# Patient Record
Sex: MALE | Race: WHITE | Employment: OTHER | URBAN - NONMETROPOLITAN AREA
[De-identification: names, ages, dates, MRNs, and addresses within clinical notes are randomized per-mention and may not be internally consistent; named-entity substitution may affect disease eponyms.]

---

## 2017-04-06 ENCOUNTER — OFFICE VISIT (OUTPATIENT)
Dept: FAMILY MEDICINE CLINIC | Age: 62
End: 2017-04-06

## 2017-04-06 VITALS
WEIGHT: 223 LBS | DIASTOLIC BLOOD PRESSURE: 82 MMHG | OXYGEN SATURATION: 98 % | RESPIRATION RATE: 20 BRPM | BODY MASS INDEX: 33.03 KG/M2 | HEART RATE: 64 BPM | HEIGHT: 69 IN | SYSTOLIC BLOOD PRESSURE: 138 MMHG

## 2017-04-06 DIAGNOSIS — I10 BENIGN HYPERTENSION: Primary | ICD-10-CM

## 2017-04-06 DIAGNOSIS — G43.119 INTRACTABLE MIGRAINE WITH AURA WITHOUT STATUS MIGRAINOSUS: ICD-10-CM

## 2017-04-06 DIAGNOSIS — K43.9 VENTRAL HERNIA WITHOUT OBSTRUCTION OR GANGRENE: ICD-10-CM

## 2017-04-06 PROCEDURE — G8427 DOCREV CUR MEDS BY ELIG CLIN: HCPCS | Performed by: FAMILY MEDICINE

## 2017-04-06 PROCEDURE — 3017F COLORECTAL CA SCREEN DOC REV: CPT | Performed by: FAMILY MEDICINE

## 2017-04-06 PROCEDURE — G8599 NO ASA/ANTIPLAT THER USE RNG: HCPCS | Performed by: FAMILY MEDICINE

## 2017-04-06 PROCEDURE — 1036F TOBACCO NON-USER: CPT | Performed by: FAMILY MEDICINE

## 2017-04-06 PROCEDURE — G8417 CALC BMI ABV UP PARAM F/U: HCPCS | Performed by: FAMILY MEDICINE

## 2017-04-06 PROCEDURE — 99214 OFFICE O/P EST MOD 30 MIN: CPT | Performed by: FAMILY MEDICINE

## 2017-04-06 RX ORDER — PROPRANOLOL HYDROCHLORIDE 40 MG/1
40 TABLET ORAL 3 TIMES DAILY
Qty: 90 TABLET | Refills: 3 | Status: ON HOLD | OUTPATIENT
Start: 2017-04-06 | End: 2017-06-03

## 2017-04-06 ASSESSMENT — ENCOUNTER SYMPTOMS
DIARRHEA: 0
NAUSEA: 0
SORE THROAT: 0
COUGH: 0
EYE DISCHARGE: 0
SHORTNESS OF BREATH: 0
RHINORRHEA: 0
ABDOMINAL PAIN: 0
WHEEZING: 0
CONSTIPATION: 0

## 2017-05-08 ENCOUNTER — OFFICE VISIT (OUTPATIENT)
Dept: FAMILY MEDICINE CLINIC | Age: 62
End: 2017-05-08

## 2017-05-08 VITALS
BODY MASS INDEX: 31.84 KG/M2 | HEIGHT: 69 IN | OXYGEN SATURATION: 97 % | RESPIRATION RATE: 20 BRPM | WEIGHT: 215 LBS | HEART RATE: 64 BPM | DIASTOLIC BLOOD PRESSURE: 80 MMHG | SYSTOLIC BLOOD PRESSURE: 122 MMHG

## 2017-05-08 DIAGNOSIS — K43.9 VENTRAL HERNIA WITHOUT OBSTRUCTION OR GANGRENE: ICD-10-CM

## 2017-05-08 DIAGNOSIS — G44.221 CHRONIC TENSION-TYPE HEADACHE, INTRACTABLE: ICD-10-CM

## 2017-05-08 DIAGNOSIS — I10 BENIGN HYPERTENSION: Primary | ICD-10-CM

## 2017-05-08 DIAGNOSIS — F33.1 MODERATE EPISODE OF RECURRENT MAJOR DEPRESSIVE DISORDER (HCC): ICD-10-CM

## 2017-05-08 PROCEDURE — 99214 OFFICE O/P EST MOD 30 MIN: CPT | Performed by: FAMILY MEDICINE

## 2017-05-08 PROCEDURE — G8427 DOCREV CUR MEDS BY ELIG CLIN: HCPCS | Performed by: FAMILY MEDICINE

## 2017-05-08 PROCEDURE — G8599 NO ASA/ANTIPLAT THER USE RNG: HCPCS | Performed by: FAMILY MEDICINE

## 2017-05-08 PROCEDURE — G8417 CALC BMI ABV UP PARAM F/U: HCPCS | Performed by: FAMILY MEDICINE

## 2017-05-08 PROCEDURE — 1036F TOBACCO NON-USER: CPT | Performed by: FAMILY MEDICINE

## 2017-05-08 PROCEDURE — 3017F COLORECTAL CA SCREEN DOC REV: CPT | Performed by: FAMILY MEDICINE

## 2017-05-08 RX ORDER — METHYLPREDNISOLONE 4 MG/1
TABLET ORAL
COMMUNITY
Start: 2017-04-28 | End: 2017-05-08

## 2017-05-08 RX ORDER — IBUPROFEN 800 MG/1
TABLET ORAL
COMMUNITY
Start: 2017-04-28 | End: 2017-06-05 | Stop reason: ALTCHOICE

## 2017-05-08 RX ORDER — HYDROCODONE BITARTRATE AND ACETAMINOPHEN 7.5; 325 MG/1; MG/1
TABLET ORAL
COMMUNITY
Start: 2017-04-28 | End: 2017-05-08

## 2017-05-08 RX ORDER — SERTRALINE HYDROCHLORIDE 100 MG/1
150 TABLET, FILM COATED ORAL DAILY
Qty: 135 TABLET | Refills: 3 | Status: SHIPPED | OUTPATIENT
Start: 2017-05-08 | End: 2017-11-06 | Stop reason: DRUGHIGH

## 2017-05-08 ASSESSMENT — ENCOUNTER SYMPTOMS
NAUSEA: 0
CONSTIPATION: 0
ABDOMINAL PAIN: 0
WHEEZING: 0
SORE THROAT: 0
COUGH: 0
EYE DISCHARGE: 0
ABDOMINAL DISTENTION: 1
SHORTNESS OF BREATH: 0
DIARRHEA: 0
RHINORRHEA: 0

## 2017-05-08 ASSESSMENT — PATIENT HEALTH QUESTIONNAIRE - PHQ9
SUM OF ALL RESPONSES TO PHQ QUESTIONS 1-9: 1
2. FEELING DOWN, DEPRESSED OR HOPELESS: 1
1. LITTLE INTEREST OR PLEASURE IN DOING THINGS: 0
SUM OF ALL RESPONSES TO PHQ9 QUESTIONS 1 & 2: 1

## 2017-05-16 ENCOUNTER — OFFICE VISIT (OUTPATIENT)
Age: 62
End: 2017-05-16

## 2017-05-16 VITALS
SYSTOLIC BLOOD PRESSURE: 126 MMHG | WEIGHT: 215.5 LBS | OXYGEN SATURATION: 95 % | HEIGHT: 68 IN | DIASTOLIC BLOOD PRESSURE: 68 MMHG | BODY MASS INDEX: 32.66 KG/M2 | RESPIRATION RATE: 18 BRPM | HEART RATE: 50 BPM | TEMPERATURE: 96.6 F

## 2017-05-16 DIAGNOSIS — Z01.818 PRE-OP TESTING: ICD-10-CM

## 2017-05-16 DIAGNOSIS — K43.9 VENTRAL HERNIA WITHOUT OBSTRUCTION OR GANGRENE: Primary | ICD-10-CM

## 2017-05-16 DIAGNOSIS — I10 ESSENTIAL HYPERTENSION: ICD-10-CM

## 2017-05-16 PROCEDURE — G8427 DOCREV CUR MEDS BY ELIG CLIN: HCPCS | Performed by: SURGERY

## 2017-05-16 PROCEDURE — G8417 CALC BMI ABV UP PARAM F/U: HCPCS | Performed by: SURGERY

## 2017-05-16 PROCEDURE — 99214 OFFICE O/P EST MOD 30 MIN: CPT | Performed by: SURGERY

## 2017-05-16 PROCEDURE — G8598 ASA/ANTIPLAT THER USED: HCPCS | Performed by: SURGERY

## 2017-05-16 PROCEDURE — 3017F COLORECTAL CA SCREEN DOC REV: CPT | Performed by: SURGERY

## 2017-05-16 PROCEDURE — 1036F TOBACCO NON-USER: CPT | Performed by: SURGERY

## 2017-05-16 RX ORDER — BUTORPHANOL TARTRATE 10 MG/ML
1 SPRAY, METERED NASAL EVERY 4 HOURS PRN
COMMUNITY

## 2017-05-16 ASSESSMENT — ENCOUNTER SYMPTOMS
COUGH: 0
BLOOD IN STOOL: 0
ABDOMINAL DISTENTION: 0
SINUS PRESSURE: 0
CHOKING: 0
NAUSEA: 0
BACK PAIN: 0
ANAL BLEEDING: 0
EYE REDNESS: 0
FACIAL SWELLING: 0
TROUBLE SWALLOWING: 0
VOMITING: 0
EYE PAIN: 0
WHEEZING: 0
SHORTNESS OF BREATH: 0
EYE ITCHING: 0
PHOTOPHOBIA: 0
RECTAL PAIN: 0
ABDOMINAL PAIN: 1
STRIDOR: 0
EYE DISCHARGE: 0
COLOR CHANGE: 0
APNEA: 0
SORE THROAT: 0
CHEST TIGHTNESS: 0
VOICE CHANGE: 0
DIARRHEA: 0
CONSTIPATION: 0
RHINORRHEA: 0

## 2017-05-18 ENCOUNTER — TELEPHONE (OUTPATIENT)
Age: 62
End: 2017-05-18

## 2017-05-18 ENCOUNTER — TELEPHONE (OUTPATIENT)
Dept: FAMILY MEDICINE CLINIC | Age: 62
End: 2017-05-18

## 2017-05-22 ENCOUNTER — OFFICE VISIT (OUTPATIENT)
Dept: FAMILY MEDICINE CLINIC | Age: 62
End: 2017-05-22

## 2017-05-22 VITALS
WEIGHT: 215.6 LBS | OXYGEN SATURATION: 96 % | HEART RATE: 56 BPM | SYSTOLIC BLOOD PRESSURE: 110 MMHG | BODY MASS INDEX: 32.78 KG/M2 | DIASTOLIC BLOOD PRESSURE: 70 MMHG | RESPIRATION RATE: 16 BRPM

## 2017-05-22 DIAGNOSIS — G47.33 OBSTRUCTIVE SLEEP APNEA OF ADULT: ICD-10-CM

## 2017-05-22 DIAGNOSIS — K43.9 VENTRAL HERNIA WITHOUT OBSTRUCTION OR GANGRENE: Primary | ICD-10-CM

## 2017-05-22 PROCEDURE — 3017F COLORECTAL CA SCREEN DOC REV: CPT | Performed by: FAMILY MEDICINE

## 2017-05-22 PROCEDURE — G8599 NO ASA/ANTIPLAT THER USE RNG: HCPCS | Performed by: FAMILY MEDICINE

## 2017-05-22 PROCEDURE — 1036F TOBACCO NON-USER: CPT | Performed by: FAMILY MEDICINE

## 2017-05-22 PROCEDURE — G8417 CALC BMI ABV UP PARAM F/U: HCPCS | Performed by: FAMILY MEDICINE

## 2017-05-22 PROCEDURE — 99214 OFFICE O/P EST MOD 30 MIN: CPT | Performed by: FAMILY MEDICINE

## 2017-05-22 PROCEDURE — G8427 DOCREV CUR MEDS BY ELIG CLIN: HCPCS | Performed by: FAMILY MEDICINE

## 2017-05-22 ASSESSMENT — ENCOUNTER SYMPTOMS
WHEEZING: 0
CONSTIPATION: 0
COUGH: 0
ABDOMINAL DISTENTION: 1
DIARRHEA: 0
SHORTNESS OF BREATH: 0
RHINORRHEA: 0
SORE THROAT: 0
EYE DISCHARGE: 0
ABDOMINAL PAIN: 1
NAUSEA: 0

## 2017-06-05 ENCOUNTER — TELEPHONE (OUTPATIENT)
Dept: PHARMACY | Facility: CLINIC | Age: 62
End: 2017-06-05

## 2017-06-06 ENCOUNTER — OFFICE VISIT (OUTPATIENT)
Dept: FAMILY MEDICINE CLINIC | Age: 62
End: 2017-06-06

## 2017-06-06 VITALS
HEART RATE: 44 BPM | OXYGEN SATURATION: 97 % | WEIGHT: 217 LBS | DIASTOLIC BLOOD PRESSURE: 80 MMHG | BODY MASS INDEX: 32.51 KG/M2 | SYSTOLIC BLOOD PRESSURE: 132 MMHG | RESPIRATION RATE: 16 BRPM

## 2017-06-06 DIAGNOSIS — R00.1 BRADYCARDIA: Primary | ICD-10-CM

## 2017-06-06 DIAGNOSIS — I10 ESSENTIAL HYPERTENSION: ICD-10-CM

## 2017-06-06 PROCEDURE — 99213 OFFICE O/P EST LOW 20 MIN: CPT | Performed by: NURSE PRACTITIONER

## 2017-06-06 PROCEDURE — G8417 CALC BMI ABV UP PARAM F/U: HCPCS | Performed by: NURSE PRACTITIONER

## 2017-06-06 PROCEDURE — G8598 ASA/ANTIPLAT THER USED: HCPCS | Performed by: NURSE PRACTITIONER

## 2017-06-06 PROCEDURE — 3017F COLORECTAL CA SCREEN DOC REV: CPT | Performed by: NURSE PRACTITIONER

## 2017-06-06 PROCEDURE — G8427 DOCREV CUR MEDS BY ELIG CLIN: HCPCS | Performed by: NURSE PRACTITIONER

## 2017-06-06 PROCEDURE — 93000 ELECTROCARDIOGRAM COMPLETE: CPT | Performed by: NURSE PRACTITIONER

## 2017-06-06 PROCEDURE — 1036F TOBACCO NON-USER: CPT | Performed by: NURSE PRACTITIONER

## 2017-06-06 RX ORDER — LISINOPRIL 10 MG/1
10 TABLET ORAL DAILY
Qty: 30 TABLET | Refills: 3 | Status: SHIPPED | OUTPATIENT
Start: 2017-06-06 | End: 2017-09-30 | Stop reason: SDUPTHER

## 2017-06-07 ASSESSMENT — ENCOUNTER SYMPTOMS
COLOR CHANGE: 0
SHORTNESS OF BREATH: 0

## 2017-06-20 ENCOUNTER — OFFICE VISIT (OUTPATIENT)
Dept: FAMILY MEDICINE CLINIC | Age: 62
End: 2017-06-20

## 2017-06-20 VITALS
RESPIRATION RATE: 16 BRPM | HEIGHT: 69 IN | DIASTOLIC BLOOD PRESSURE: 76 MMHG | OXYGEN SATURATION: 98 % | BODY MASS INDEX: 30.96 KG/M2 | SYSTOLIC BLOOD PRESSURE: 124 MMHG | HEART RATE: 74 BPM | WEIGHT: 209 LBS

## 2017-06-20 DIAGNOSIS — R00.1 BRADYCARDIA: Primary | ICD-10-CM

## 2017-06-20 PROCEDURE — G8427 DOCREV CUR MEDS BY ELIG CLIN: HCPCS | Performed by: NURSE PRACTITIONER

## 2017-06-20 PROCEDURE — G8598 ASA/ANTIPLAT THER USED: HCPCS | Performed by: NURSE PRACTITIONER

## 2017-06-20 PROCEDURE — 1036F TOBACCO NON-USER: CPT | Performed by: NURSE PRACTITIONER

## 2017-06-20 PROCEDURE — G8417 CALC BMI ABV UP PARAM F/U: HCPCS | Performed by: NURSE PRACTITIONER

## 2017-06-20 PROCEDURE — 99213 OFFICE O/P EST LOW 20 MIN: CPT | Performed by: NURSE PRACTITIONER

## 2017-06-20 PROCEDURE — 3017F COLORECTAL CA SCREEN DOC REV: CPT | Performed by: NURSE PRACTITIONER

## 2017-06-20 ASSESSMENT — ENCOUNTER SYMPTOMS
VOMITING: 0
NAUSEA: 0
SHORTNESS OF BREATH: 0
SORE THROAT: 0
ABDOMINAL DISTENTION: 0
WHEEZING: 0
SINUS PRESSURE: 0
COUGH: 0
ABDOMINAL PAIN: 1
DIARRHEA: 0
COLOR CHANGE: 0
CONSTIPATION: 0

## 2017-06-22 ENCOUNTER — OFFICE VISIT (OUTPATIENT)
Age: 62
End: 2017-06-22

## 2017-06-22 VITALS
BODY MASS INDEX: 31.25 KG/M2 | TEMPERATURE: 96.9 F | WEIGHT: 211 LBS | OXYGEN SATURATION: 95 % | RESPIRATION RATE: 18 BRPM | SYSTOLIC BLOOD PRESSURE: 134 MMHG | HEIGHT: 69 IN | HEART RATE: 66 BPM | DIASTOLIC BLOOD PRESSURE: 60 MMHG

## 2017-06-22 DIAGNOSIS — Z87.19 S/P REPAIR OF RECURRENT VENTRAL HERNIA: Primary | ICD-10-CM

## 2017-06-22 DIAGNOSIS — Z98.890 S/P REPAIR OF RECURRENT VENTRAL HERNIA: Primary | ICD-10-CM

## 2017-06-22 PROCEDURE — 99024 POSTOP FOLLOW-UP VISIT: CPT | Performed by: NURSE PRACTITIONER

## 2017-06-22 ASSESSMENT — ENCOUNTER SYMPTOMS
ABDOMINAL PAIN: 1
NAUSEA: 0
VOICE CHANGE: 0
CONSTIPATION: 0
ANAL BLEEDING: 0
RHINORRHEA: 0
SHORTNESS OF BREATH: 0
CHOKING: 0
RECTAL PAIN: 0
WHEEZING: 0
EYE PAIN: 0
EYE REDNESS: 0
PHOTOPHOBIA: 0
CHEST TIGHTNESS: 0
VOMITING: 0
EYE ITCHING: 0
COUGH: 0
TROUBLE SWALLOWING: 0
SORE THROAT: 0
COLOR CHANGE: 0
ABDOMINAL DISTENTION: 0
EYE DISCHARGE: 0
APNEA: 0
SINUS PRESSURE: 0
BACK PAIN: 0
STRIDOR: 0
DIARRHEA: 0
BLOOD IN STOOL: 0
FACIAL SWELLING: 0

## 2017-07-28 ENCOUNTER — HOSPITAL ENCOUNTER (INPATIENT)
Age: 62
LOS: 1 days | Discharge: HOME | DRG: 287 | End: 2017-07-31
Attending: EMERGENCY MEDICINE | Admitting: INTERNAL MEDICINE
Payer: MEDICARE

## 2017-07-28 ENCOUNTER — APPOINTMENT (OUTPATIENT)
Dept: GENERAL RADIOLOGY | Age: 62
DRG: 287 | End: 2017-07-28
Payer: MEDICARE

## 2017-07-28 DIAGNOSIS — R07.9 CHEST PAIN WITH MODERATE RISK OF ACUTE CORONARY SYNDROME: Primary | ICD-10-CM

## 2017-07-28 LAB
ANION GAP SERPL CALCULATED.3IONS-SCNC: 11 MEQ/L (ref 8–16)
ANISOCYTOSIS: ABNORMAL
BASOPHILS # BLD: 0.3 %
BASOPHILS ABSOLUTE: 0 THOU/MM3 (ref 0–0.1)
BUN BLDV-MCNC: 16 MG/DL (ref 7–22)
CALCIUM SERPL-MCNC: 9.1 MG/DL (ref 8.5–10.5)
CHLORIDE BLD-SCNC: 101 MEQ/L (ref 98–111)
CO2: 26 MEQ/L (ref 23–33)
CREAT SERPL-MCNC: 0.9 MG/DL (ref 0.4–1.2)
EKG ATRIAL RATE: 55 BPM
EKG P AXIS: 59 DEGREES
EKG P-R INTERVAL: 150 MS
EKG Q-T INTERVAL: 420 MS
EKG QRS DURATION: 126 MS
EKG QTC CALCULATION (BAZETT): 401 MS
EKG R AXIS: -48 DEGREES
EKG T AXIS: 47 DEGREES
EKG VENTRICULAR RATE: 55 BPM
EOSINOPHIL # BLD: 2.9 %
EOSINOPHILS ABSOLUTE: 0.2 THOU/MM3 (ref 0–0.4)
GFR SERPL CREATININE-BSD FRML MDRD: 86 ML/MIN/1.73M2
GLUCOSE BLD-MCNC: 95 MG/DL (ref 70–108)
HCT VFR BLD CALC: 44 % (ref 42–52)
HEMOGLOBIN: 14.7 GM/DL (ref 14–18)
LYMPHOCYTES # BLD: 28.5 %
LYMPHOCYTES ABSOLUTE: 2.2 THOU/MM3 (ref 1–4.8)
MCH RBC QN AUTO: 29 PG (ref 27–31)
MCHC RBC AUTO-ENTMCNC: 33.4 GM/DL (ref 33–37)
MCV RBC AUTO: 87.1 FL (ref 80–94)
MONOCYTES # BLD: 10.1 %
MONOCYTES ABSOLUTE: 0.8 THOU/MM3 (ref 0.4–1.3)
NUCLEATED RED BLOOD CELLS: 0 /100 WBC
OSMOLALITY CALCULATION: 276.7 MOSMOL/KG (ref 275–300)
PDW BLD-RTO: 14.9 % (ref 11.5–14.5)
PLATELET # BLD: 376 THOU/MM3 (ref 130–400)
PMV BLD AUTO: 8.8 MCM (ref 7.4–10.4)
POTASSIUM SERPL-SCNC: 4.7 MEQ/L (ref 3.5–5.2)
RBC # BLD: 5.05 MILL/MM3 (ref 4.7–6.1)
RBC # BLD: NORMAL 10*6/UL
SEG NEUTROPHILS: 58.2 %
SEGMENTED NEUTROPHILS ABSOLUTE COUNT: 4.5 THOU/MM3 (ref 1.8–7.7)
SODIUM BLD-SCNC: 138 MEQ/L (ref 135–145)
TROPONIN T: < 0.01 NG/ML
WBC # BLD: 7.7 THOU/MM3 (ref 4.8–10.8)

## 2017-07-28 PROCEDURE — 71020 XR CHEST STANDARD TWO VW: CPT

## 2017-07-28 PROCEDURE — 85025 COMPLETE CBC W/AUTO DIFF WBC: CPT

## 2017-07-28 PROCEDURE — 96374 THER/PROPH/DIAG INJ IV PUSH: CPT

## 2017-07-28 PROCEDURE — 84484 ASSAY OF TROPONIN QUANT: CPT

## 2017-07-28 PROCEDURE — 36415 COLL VENOUS BLD VENIPUNCTURE: CPT

## 2017-07-28 PROCEDURE — 96372 THER/PROPH/DIAG INJ SC/IM: CPT

## 2017-07-28 PROCEDURE — 93005 ELECTROCARDIOGRAM TRACING: CPT

## 2017-07-28 PROCEDURE — G0378 HOSPITAL OBSERVATION PER HR: HCPCS

## 2017-07-28 PROCEDURE — 99285 EMERGENCY DEPT VISIT HI MDM: CPT

## 2017-07-28 PROCEDURE — 6360000002 HC RX W HCPCS: Performed by: INTERNAL MEDICINE

## 2017-07-28 PROCEDURE — A6257 TRANSPARENT FILM <= 16 SQ IN: HCPCS

## 2017-07-28 PROCEDURE — 2580000003 HC RX 258: Performed by: INTERNAL MEDICINE

## 2017-07-28 PROCEDURE — 80048 BASIC METABOLIC PNL TOTAL CA: CPT

## 2017-07-28 RX ORDER — LISINOPRIL 10 MG/1
10 TABLET ORAL DAILY
Status: DISCONTINUED | OUTPATIENT
Start: 2017-07-28 | End: 2017-07-28

## 2017-07-28 RX ORDER — SODIUM CHLORIDE 0.9 % (FLUSH) 0.9 %
10 SYRINGE (ML) INJECTION PRN
Status: DISCONTINUED | OUTPATIENT
Start: 2017-07-28 | End: 2017-07-31 | Stop reason: SDUPTHER

## 2017-07-28 RX ORDER — OMEPRAZOLE 40 MG/1
40 CAPSULE, DELAYED RELEASE ORAL 2 TIMES DAILY
COMMUNITY
End: 2018-01-08 | Stop reason: SDUPTHER

## 2017-07-28 RX ORDER — ATORVASTATIN CALCIUM 20 MG/1
20 TABLET, FILM COATED ORAL NIGHTLY
Status: DISCONTINUED | OUTPATIENT
Start: 2017-07-28 | End: 2017-07-31 | Stop reason: HOSPADM

## 2017-07-28 RX ORDER — DIVALPROEX SODIUM 500 MG/1
500 TABLET, EXTENDED RELEASE ORAL DAILY
Status: DISCONTINUED | OUTPATIENT
Start: 2017-07-28 | End: 2017-07-28

## 2017-07-28 RX ORDER — ONDANSETRON 2 MG/ML
4 INJECTION INTRAMUSCULAR; INTRAVENOUS EVERY 6 HOURS PRN
Status: DISCONTINUED | OUTPATIENT
Start: 2017-07-28 | End: 2017-07-31 | Stop reason: HOSPADM

## 2017-07-28 RX ORDER — LISINOPRIL 10 MG/1
10 TABLET ORAL DAILY
Status: DISCONTINUED | OUTPATIENT
Start: 2017-07-29 | End: 2017-07-31 | Stop reason: HOSPADM

## 2017-07-28 RX ORDER — ACETAMINOPHEN 325 MG/1
650 TABLET ORAL EVERY 4 HOURS PRN
Status: DISCONTINUED | OUTPATIENT
Start: 2017-07-28 | End: 2017-07-31 | Stop reason: HOSPADM

## 2017-07-28 RX ORDER — ACETAMINOPHEN, ASPIRIN AND CAFFEINE 250; 250; 65 MG/1; MG/1; MG/1
2 TABLET, FILM COATED ORAL EVERY 8 HOURS PRN
Status: ON HOLD | COMMUNITY
End: 2018-10-21

## 2017-07-28 RX ORDER — SODIUM CHLORIDE 0.9 % (FLUSH) 0.9 %
10 SYRINGE (ML) INJECTION EVERY 12 HOURS SCHEDULED
Status: DISCONTINUED | OUTPATIENT
Start: 2017-07-28 | End: 2017-07-31 | Stop reason: SDUPTHER

## 2017-07-28 RX ORDER — LAMOTRIGINE 100 MG/1
200 TABLET ORAL DAILY
Status: DISCONTINUED | OUTPATIENT
Start: 2017-07-29 | End: 2017-07-28

## 2017-07-28 RX ORDER — DIVALPROEX SODIUM 500 MG/1
500 TABLET, EXTENDED RELEASE ORAL DAILY
Status: DISCONTINUED | OUTPATIENT
Start: 2017-07-29 | End: 2017-07-31 | Stop reason: HOSPADM

## 2017-07-28 RX ORDER — ASPIRIN 81 MG/1
81 TABLET, CHEWABLE ORAL DAILY
Status: DISCONTINUED | OUTPATIENT
Start: 2017-07-29 | End: 2017-07-31 | Stop reason: HOSPADM

## 2017-07-28 RX ORDER — LAMOTRIGINE 100 MG/1
200 TABLET ORAL DAILY
Status: DISCONTINUED | OUTPATIENT
Start: 2017-07-29 | End: 2017-07-31 | Stop reason: HOSPADM

## 2017-07-28 RX ADMIN — ENOXAPARIN SODIUM 40 MG: 40 INJECTION SUBCUTANEOUS at 18:49

## 2017-07-28 RX ADMIN — Medication 10 ML: at 22:26

## 2017-07-28 RX ADMIN — ONDANSETRON 4 MG: 2 INJECTION INTRAMUSCULAR; INTRAVENOUS at 20:26

## 2017-07-28 ASSESSMENT — PAIN DESCRIPTION - PAIN TYPE
TYPE: ACUTE PAIN
TYPE: INTRACTABLE PAIN

## 2017-07-28 ASSESSMENT — HEART SCORE: ECG: 1

## 2017-07-28 ASSESSMENT — ENCOUNTER SYMPTOMS
COUGH: 0
SHORTNESS OF BREATH: 1
VOMITING: 0
WHEEZING: 0
NAUSEA: 0
BLOOD IN STOOL: 0
SORE THROAT: 0
DIARRHEA: 0
ABDOMINAL PAIN: 0
BACK PAIN: 0

## 2017-07-28 ASSESSMENT — PAIN SCALES - GENERAL
PAINLEVEL_OUTOF10: 9
PAINLEVEL_OUTOF10: 1
PAINLEVEL_OUTOF10: 9
PAINLEVEL_OUTOF10: 10

## 2017-07-28 ASSESSMENT — PAIN DESCRIPTION - INTENSITY: RATING_2: 3

## 2017-07-28 ASSESSMENT — PAIN DESCRIPTION - LOCATION
LOCATION: HEAD
LOCATION: CHEST
LOCATION: HEAD
LOCATION_2: CHEST
LOCATION: HEAD

## 2017-07-28 ASSESSMENT — PAIN DESCRIPTION - DESCRIPTORS: DESCRIPTORS_2: SHARP

## 2017-07-28 ASSESSMENT — PAIN DESCRIPTION - PROGRESSION: CLINICAL_PROGRESSION: GRADUALLY WORSENING

## 2017-07-28 ASSESSMENT — PAIN DESCRIPTION - ORIENTATION: ORIENTATION: LEFT

## 2017-07-28 ASSESSMENT — PAIN DESCRIPTION - FREQUENCY: FREQUENCY: CONTINUOUS

## 2017-07-28 ASSESSMENT — PAIN DESCRIPTION - DURATION: DURATION_2: INTERMITTENT

## 2017-07-29 LAB
ANION GAP SERPL CALCULATED.3IONS-SCNC: 11 MEQ/L (ref 8–16)
BASOPHILS # BLD: 0.7 %
BASOPHILS ABSOLUTE: 0.1 THOU/MM3 (ref 0–0.1)
BUN BLDV-MCNC: 16 MG/DL (ref 7–22)
CALCIUM SERPL-MCNC: 8.9 MG/DL (ref 8.5–10.5)
CHLORIDE BLD-SCNC: 100 MEQ/L (ref 98–111)
CHOLESTEROL, TOTAL: 195 MG/DL (ref 100–199)
CO2: 27 MEQ/L (ref 23–33)
CREAT SERPL-MCNC: 1 MG/DL (ref 0.4–1.2)
EKG ATRIAL RATE: 54 BPM
EKG P AXIS: 52 DEGREES
EKG P-R INTERVAL: 152 MS
EKG Q-T INTERVAL: 436 MS
EKG QRS DURATION: 114 MS
EKG QTC CALCULATION (BAZETT): 413 MS
EKG R AXIS: -51 DEGREES
EKG T AXIS: 23 DEGREES
EKG VENTRICULAR RATE: 54 BPM
EOSINOPHIL # BLD: 4.9 %
EOSINOPHILS ABSOLUTE: 0.4 THOU/MM3 (ref 0–0.4)
GFR SERPL CREATININE-BSD FRML MDRD: 76 ML/MIN/1.73M2
GLUCOSE BLD-MCNC: 105 MG/DL (ref 70–108)
HCT VFR BLD CALC: 43.6 % (ref 42–52)
HDLC SERPL-MCNC: 32 MG/DL
HEMOGLOBIN: 14.6 GM/DL (ref 14–18)
LDL CHOLESTEROL CALCULATED: 138 MG/DL
LYMPHOCYTES # BLD: 24.5 %
LYMPHOCYTES ABSOLUTE: 2.1 THOU/MM3 (ref 1–4.8)
MCH RBC QN AUTO: 29.7 PG (ref 27–31)
MCHC RBC AUTO-ENTMCNC: 33.5 GM/DL (ref 33–37)
MCV RBC AUTO: 88.7 FL (ref 80–94)
MONOCYTES # BLD: 10.3 %
MONOCYTES ABSOLUTE: 0.9 THOU/MM3 (ref 0.4–1.3)
NUCLEATED RED BLOOD CELLS: 0 /100 WBC
PDW BLD-RTO: 14.3 % (ref 11.5–14.5)
PLATELET # BLD: 358 THOU/MM3 (ref 130–400)
PMV BLD AUTO: 8.8 MCM (ref 7.4–10.4)
POTASSIUM SERPL-SCNC: 5.8 MEQ/L (ref 3.5–5.2)
RBC # BLD: 4.92 MILL/MM3 (ref 4.7–6.1)
RBC # BLD: NORMAL 10*6/UL
SEG NEUTROPHILS: 59.6 %
SEGMENTED NEUTROPHILS ABSOLUTE COUNT: 5.1 THOU/MM3 (ref 1.8–7.7)
SODIUM BLD-SCNC: 138 MEQ/L (ref 135–145)
TRIGL SERPL-MCNC: 126 MG/DL (ref 0–199)
WBC # BLD: 8.5 THOU/MM3 (ref 4.8–10.8)

## 2017-07-29 PROCEDURE — 2580000003 HC RX 258: Performed by: INTERNAL MEDICINE

## 2017-07-29 PROCEDURE — 36415 COLL VENOUS BLD VENIPUNCTURE: CPT

## 2017-07-29 PROCEDURE — 96376 TX/PRO/DX INJ SAME DRUG ADON: CPT

## 2017-07-29 PROCEDURE — 6360000002 HC RX W HCPCS: Performed by: PHYSICIAN ASSISTANT

## 2017-07-29 PROCEDURE — 80048 BASIC METABOLIC PNL TOTAL CA: CPT

## 2017-07-29 PROCEDURE — 85025 COMPLETE CBC W/AUTO DIFF WBC: CPT

## 2017-07-29 PROCEDURE — 6360000002 HC RX W HCPCS: Performed by: INTERNAL MEDICINE

## 2017-07-29 PROCEDURE — G0378 HOSPITAL OBSERVATION PER HR: HCPCS

## 2017-07-29 PROCEDURE — 6370000000 HC RX 637 (ALT 250 FOR IP): Performed by: INTERNAL MEDICINE

## 2017-07-29 PROCEDURE — 96372 THER/PROPH/DIAG INJ SC/IM: CPT

## 2017-07-29 PROCEDURE — 99204 OFFICE O/P NEW MOD 45 MIN: CPT | Performed by: INTERNAL MEDICINE

## 2017-07-29 PROCEDURE — 96375 TX/PRO/DX INJ NEW DRUG ADDON: CPT

## 2017-07-29 PROCEDURE — 80061 LIPID PANEL: CPT

## 2017-07-29 RX ORDER — MORPHINE SULFATE 4 MG/ML
4 INJECTION, SOLUTION INTRAMUSCULAR; INTRAVENOUS EVERY 4 HOURS PRN
Status: DISCONTINUED | OUTPATIENT
Start: 2017-07-29 | End: 2017-07-29

## 2017-07-29 RX ORDER — MORPHINE SULFATE 2 MG/ML
2 INJECTION, SOLUTION INTRAMUSCULAR; INTRAVENOUS EVERY 4 HOURS PRN
Status: DISCONTINUED | OUTPATIENT
Start: 2017-07-29 | End: 2017-07-31 | Stop reason: HOSPADM

## 2017-07-29 RX ORDER — SUMATRIPTAN 6 MG/.5ML
6 INJECTION, SOLUTION SUBCUTANEOUS ONCE
Status: COMPLETED | OUTPATIENT
Start: 2017-07-29 | End: 2017-07-29

## 2017-07-29 RX ORDER — MORPHINE SULFATE 2 MG/ML
2 INJECTION, SOLUTION INTRAMUSCULAR; INTRAVENOUS EVERY 4 HOURS PRN
Status: DISCONTINUED | OUTPATIENT
Start: 2017-07-29 | End: 2017-07-29

## 2017-07-29 RX ORDER — MORPHINE SULFATE 4 MG/ML
4 INJECTION, SOLUTION INTRAMUSCULAR; INTRAVENOUS EVERY 4 HOURS PRN
Status: DISCONTINUED | OUTPATIENT
Start: 2017-07-29 | End: 2017-07-31 | Stop reason: HOSPADM

## 2017-07-29 RX ORDER — ACETAMINOPHEN, ASPIRIN AND CAFFEINE 250; 250; 65 MG/1; MG/1; MG/1
2 TABLET, FILM COATED ORAL EVERY 8 HOURS PRN
Status: DISCONTINUED | OUTPATIENT
Start: 2017-07-29 | End: 2017-07-31 | Stop reason: HOSPADM

## 2017-07-29 RX ADMIN — SUMATRIPTAN 6 MG: 6 INJECTION, SOLUTION SUBCUTANEOUS at 17:26

## 2017-07-29 RX ADMIN — Medication 10 ML: at 08:58

## 2017-07-29 RX ADMIN — MORPHINE SULFATE 4 MG: 4 INJECTION, SOLUTION INTRAMUSCULAR; INTRAVENOUS at 23:25

## 2017-07-29 RX ADMIN — Medication 10 ML: at 16:21

## 2017-07-29 RX ADMIN — LISINOPRIL 10 MG: 10 TABLET ORAL at 10:38

## 2017-07-29 RX ADMIN — ATORVASTATIN CALCIUM 20 MG: 20 TABLET, FILM COATED ORAL at 19:44

## 2017-07-29 RX ADMIN — MORPHINE SULFATE 4 MG: 4 INJECTION, SOLUTION INTRAMUSCULAR; INTRAVENOUS at 04:43

## 2017-07-29 RX ADMIN — ONDANSETRON 4 MG: 2 INJECTION INTRAMUSCULAR; INTRAVENOUS at 08:56

## 2017-07-29 RX ADMIN — DIVALPROEX SODIUM 500 MG: 500 TABLET, EXTENDED RELEASE ORAL at 10:32

## 2017-07-29 RX ADMIN — ONDANSETRON 4 MG: 2 INJECTION INTRAMUSCULAR; INTRAVENOUS at 16:17

## 2017-07-29 RX ADMIN — ASPIRIN 81 MG: 81 TABLET, CHEWABLE ORAL at 10:30

## 2017-07-29 RX ADMIN — Medication 10 ML: at 19:44

## 2017-07-29 RX ADMIN — Medication 10 ML: at 09:04

## 2017-07-29 RX ADMIN — MORPHINE SULFATE 4 MG: 4 INJECTION, SOLUTION INTRAMUSCULAR; INTRAVENOUS at 09:00

## 2017-07-29 RX ADMIN — LAMOTRIGINE 200 MG: 100 TABLET ORAL at 10:34

## 2017-07-29 RX ADMIN — ONDANSETRON 4 MG: 2 INJECTION INTRAMUSCULAR; INTRAVENOUS at 03:16

## 2017-07-29 RX ADMIN — MORPHINE SULFATE 4 MG: 4 INJECTION, SOLUTION INTRAMUSCULAR; INTRAVENOUS at 18:58

## 2017-07-29 RX ADMIN — ENOXAPARIN SODIUM 40 MG: 40 INJECTION SUBCUTANEOUS at 18:58

## 2017-07-29 RX ADMIN — ACETAMINOPHEN, ASPIRIN AND CAFFEINE 2 TABLET: 250; 250; 65 TABLET, FILM COATED ORAL at 11:34

## 2017-07-29 ASSESSMENT — PAIN DESCRIPTION - PAIN TYPE
TYPE: ACUTE PAIN
TYPE: ACUTE PAIN

## 2017-07-29 ASSESSMENT — PAIN DESCRIPTION - LOCATION
LOCATION: HEAD
LOCATION: HEAD

## 2017-07-29 ASSESSMENT — PAIN SCALES - GENERAL
PAINLEVEL_OUTOF10: 8
PAINLEVEL_OUTOF10: 10
PAINLEVEL_OUTOF10: 10
PAINLEVEL_OUTOF10: 8
PAINLEVEL_OUTOF10: 10

## 2017-07-29 ASSESSMENT — PAIN DESCRIPTION - PROGRESSION: CLINICAL_PROGRESSION: NOT CHANGED

## 2017-07-29 ASSESSMENT — PAIN DESCRIPTION - DESCRIPTORS
DESCRIPTORS: OTHER (COMMENT)
DESCRIPTORS: CONSTANT

## 2017-07-29 ASSESSMENT — PAIN - FUNCTIONAL ASSESSMENT: PAIN_FUNCTIONAL_ASSESSMENT: 0-10

## 2017-07-30 PROCEDURE — 96374 THER/PROPH/DIAG INJ IV PUSH: CPT

## 2017-07-30 PROCEDURE — 3430000000 HC RX DIAGNOSTIC RADIOPHARMACEUTICAL: Performed by: INTERNAL MEDICINE

## 2017-07-30 PROCEDURE — 78452 HT MUSCLE IMAGE SPECT MULT: CPT

## 2017-07-30 PROCEDURE — 1200000003 HC TELEMETRY R&B

## 2017-07-30 PROCEDURE — 2580000003 HC RX 258: Performed by: INTERNAL MEDICINE

## 2017-07-30 PROCEDURE — 6360000002 HC RX W HCPCS

## 2017-07-30 PROCEDURE — A9500 TC99M SESTAMIBI: HCPCS | Performed by: INTERNAL MEDICINE

## 2017-07-30 PROCEDURE — 6370000000 HC RX 637 (ALT 250 FOR IP): Performed by: INTERNAL MEDICINE

## 2017-07-30 PROCEDURE — 6360000002 HC RX W HCPCS: Performed by: INTERNAL MEDICINE

## 2017-07-30 PROCEDURE — 93017 CV STRESS TEST TRACING ONLY: CPT | Performed by: INTERNAL MEDICINE

## 2017-07-30 PROCEDURE — 96376 TX/PRO/DX INJ SAME DRUG ADON: CPT

## 2017-07-30 RX ORDER — PROPRANOLOL HYDROCHLORIDE 20 MG/1
20 TABLET ORAL 2 TIMES DAILY
Status: DISCONTINUED | OUTPATIENT
Start: 2017-07-30 | End: 2017-07-31 | Stop reason: HOSPADM

## 2017-07-30 RX ORDER — LAMOTRIGINE 200 MG/1
200 TABLET ORAL 2 TIMES DAILY
COMMUNITY
End: 2018-01-08 | Stop reason: SDUPTHER

## 2017-07-30 RX ADMIN — MORPHINE SULFATE 4 MG: 4 INJECTION, SOLUTION INTRAMUSCULAR; INTRAVENOUS at 23:47

## 2017-07-30 RX ADMIN — LAMOTRIGINE 200 MG: 100 TABLET ORAL at 07:29

## 2017-07-30 RX ADMIN — LISINOPRIL 10 MG: 10 TABLET ORAL at 07:29

## 2017-07-30 RX ADMIN — ACETAMINOPHEN, ASPIRIN AND CAFFEINE 2 TABLET: 250; 250; 65 TABLET, FILM COATED ORAL at 11:36

## 2017-07-30 RX ADMIN — ONDANSETRON 4 MG: 2 INJECTION INTRAMUSCULAR; INTRAVENOUS at 11:34

## 2017-07-30 RX ADMIN — ASPIRIN 81 MG: 81 TABLET, CHEWABLE ORAL at 07:28

## 2017-07-30 RX ADMIN — Medication 10 ML: at 07:25

## 2017-07-30 RX ADMIN — Medication 10.8 MILLICURIE: at 07:45

## 2017-07-30 RX ADMIN — MORPHINE SULFATE 2 MG: 2 INJECTION, SOLUTION INTRAMUSCULAR; INTRAVENOUS at 07:25

## 2017-07-30 RX ADMIN — Medication 34.7 MILLICURIE: at 08:40

## 2017-07-30 RX ADMIN — MORPHINE SULFATE 4 MG: 4 INJECTION, SOLUTION INTRAMUSCULAR; INTRAVENOUS at 18:23

## 2017-07-30 RX ADMIN — Medication 10 ML: at 18:23

## 2017-07-30 RX ADMIN — ONDANSETRON 4 MG: 2 INJECTION INTRAMUSCULAR; INTRAVENOUS at 23:48

## 2017-07-30 RX ADMIN — LAMOTRIGINE 200 MG: 100 TABLET ORAL at 21:46

## 2017-07-30 RX ADMIN — Medication 10 ML: at 11:36

## 2017-07-30 RX ADMIN — DIVALPROEX SODIUM 500 MG: 500 TABLET, EXTENDED RELEASE ORAL at 07:28

## 2017-07-30 RX ADMIN — ACETAMINOPHEN, ASPIRIN AND CAFFEINE 2 TABLET: 250; 250; 65 TABLET, FILM COATED ORAL at 21:48

## 2017-07-30 RX ADMIN — ENOXAPARIN SODIUM 40 MG: 40 INJECTION SUBCUTANEOUS at 18:23

## 2017-07-30 RX ADMIN — Medication 10 ML: at 23:48

## 2017-07-30 ASSESSMENT — PAIN SCALES - GENERAL
PAINLEVEL_OUTOF10: 8
PAINLEVEL_OUTOF10: 4
PAINLEVEL_OUTOF10: 0
PAINLEVEL_OUTOF10: 3
PAINLEVEL_OUTOF10: 6
PAINLEVEL_OUTOF10: 3
PAINLEVEL_OUTOF10: 7
PAINLEVEL_OUTOF10: 4

## 2017-07-30 ASSESSMENT — PAIN DESCRIPTION - PROGRESSION: CLINICAL_PROGRESSION: GRADUALLY IMPROVING

## 2017-07-30 ASSESSMENT — PAIN DESCRIPTION - LOCATION
LOCATION: HEAD

## 2017-07-30 ASSESSMENT — PAIN DESCRIPTION - ONSET: ONSET: ON-GOING

## 2017-07-30 ASSESSMENT — PAIN DESCRIPTION - PAIN TYPE
TYPE: CHRONIC PAIN
TYPE: ACUTE PAIN
TYPE: ACUTE PAIN

## 2017-07-30 ASSESSMENT — PAIN DESCRIPTION - FREQUENCY: FREQUENCY: CONTINUOUS

## 2017-07-30 ASSESSMENT — PAIN DESCRIPTION - DESCRIPTORS
DESCRIPTORS: HEADACHE
DESCRIPTORS: ACHING

## 2017-07-31 VITALS
HEART RATE: 72 BPM | HEIGHT: 68 IN | BODY MASS INDEX: 31.52 KG/M2 | SYSTOLIC BLOOD PRESSURE: 125 MMHG | DIASTOLIC BLOOD PRESSURE: 57 MMHG | RESPIRATION RATE: 16 BRPM | TEMPERATURE: 99.2 F | WEIGHT: 208 LBS | OXYGEN SATURATION: 91 %

## 2017-07-31 PROBLEM — Z98.890 S/P CARDIAC CATHETERIZATION: Status: ACTIVE | Noted: 2017-07-31

## 2017-07-31 PROBLEM — I20.0 UNSTABLE ANGINA (HCC): Status: ACTIVE | Noted: 2017-07-31

## 2017-07-31 LAB
ABO: NORMAL
ANION GAP SERPL CALCULATED.3IONS-SCNC: 10 MEQ/L (ref 8–16)
ANTIBODY SCREEN: NORMAL
APTT: 26.7 SECONDS (ref 22–38)
BUN BLDV-MCNC: 16 MG/DL (ref 7–22)
CALCIUM SERPL-MCNC: 8.8 MG/DL (ref 8.5–10.5)
CHLORIDE BLD-SCNC: 100 MEQ/L (ref 98–111)
CO2: 30 MEQ/L (ref 23–33)
CREAT SERPL-MCNC: 1.1 MG/DL (ref 0.4–1.2)
GFR SERPL CREATININE-BSD FRML MDRD: 68 ML/MIN/1.73M2
GLUCOSE BLD-MCNC: 95 MG/DL (ref 70–108)
HCT VFR BLD CALC: 45.2 % (ref 42–52)
HEMOGLOBIN: 15 GM/DL (ref 14–18)
INR BLD: 1.01 (ref 0.85–1.13)
MCH RBC QN AUTO: 29.6 PG (ref 27–31)
MCHC RBC AUTO-ENTMCNC: 33.3 GM/DL (ref 33–37)
MCV RBC AUTO: 88.7 FL (ref 80–94)
PDW BLD-RTO: 13.9 % (ref 11.5–14.5)
PLATELET # BLD: 350 THOU/MM3 (ref 130–400)
PMV BLD AUTO: 8.9 MCM (ref 7.4–10.4)
POTASSIUM SERPL-SCNC: 5.1 MEQ/L (ref 3.5–5.2)
RBC # BLD: 5.09 MILL/MM3 (ref 4.7–6.1)
RH FACTOR: NORMAL
SODIUM BLD-SCNC: 140 MEQ/L (ref 135–145)
WBC # BLD: 6.6 THOU/MM3 (ref 4.8–10.8)

## 2017-07-31 PROCEDURE — 93458 L HRT ARTERY/VENTRICLE ANGIO: CPT | Performed by: INTERNAL MEDICINE

## 2017-07-31 PROCEDURE — B2151ZZ FLUOROSCOPY OF LEFT HEART USING LOW OSMOLAR CONTRAST: ICD-10-PCS | Performed by: INTERNAL MEDICINE

## 2017-07-31 PROCEDURE — 99024 POSTOP FOLLOW-UP VISIT: CPT | Performed by: PHYSICIAN ASSISTANT

## 2017-07-31 PROCEDURE — 2500000003 HC RX 250 WO HCPCS

## 2017-07-31 PROCEDURE — 80048 BASIC METABOLIC PNL TOTAL CA: CPT

## 2017-07-31 PROCEDURE — 2580000003 HC RX 258: Performed by: INTERNAL MEDICINE

## 2017-07-31 PROCEDURE — 85730 THROMBOPLASTIN TIME PARTIAL: CPT

## 2017-07-31 PROCEDURE — 4A023N7 MEASUREMENT OF CARDIAC SAMPLING AND PRESSURE, LEFT HEART, PERCUTANEOUS APPROACH: ICD-10-PCS | Performed by: INTERNAL MEDICINE

## 2017-07-31 PROCEDURE — 86901 BLOOD TYPING SEROLOGIC RH(D): CPT

## 2017-07-31 PROCEDURE — A6258 TRANSPARENT FILM >16<=48 IN: HCPCS

## 2017-07-31 PROCEDURE — C1894 INTRO/SHEATH, NON-LASER: HCPCS

## 2017-07-31 PROCEDURE — 85027 COMPLETE CBC AUTOMATED: CPT

## 2017-07-31 PROCEDURE — 86900 BLOOD TYPING SEROLOGIC ABO: CPT

## 2017-07-31 PROCEDURE — 2580000003 HC RX 258: Performed by: PHYSICIAN ASSISTANT

## 2017-07-31 PROCEDURE — 2780000010 HC IMPLANT OTHER

## 2017-07-31 PROCEDURE — 36415 COLL VENOUS BLD VENIPUNCTURE: CPT

## 2017-07-31 PROCEDURE — 6360000002 HC RX W HCPCS

## 2017-07-31 PROCEDURE — 6360000002 HC RX W HCPCS: Performed by: INTERNAL MEDICINE

## 2017-07-31 PROCEDURE — C1769 GUIDE WIRE: HCPCS

## 2017-07-31 PROCEDURE — 85610 PROTHROMBIN TIME: CPT

## 2017-07-31 PROCEDURE — 86850 RBC ANTIBODY SCREEN: CPT

## 2017-07-31 PROCEDURE — 2720000010 HC SURG SUPPLY STERILE

## 2017-07-31 PROCEDURE — 6370000000 HC RX 637 (ALT 250 FOR IP): Performed by: INTERNAL MEDICINE

## 2017-07-31 PROCEDURE — C1760 CLOSURE DEV, VASC: HCPCS

## 2017-07-31 PROCEDURE — B2111ZZ FLUOROSCOPY OF MULTIPLE CORONARY ARTERIES USING LOW OSMOLAR CONTRAST: ICD-10-PCS | Performed by: INTERNAL MEDICINE

## 2017-07-31 RX ORDER — ONDANSETRON 2 MG/ML
4 INJECTION INTRAMUSCULAR; INTRAVENOUS EVERY 6 HOURS PRN
Status: DISCONTINUED | OUTPATIENT
Start: 2017-07-31 | End: 2017-07-31 | Stop reason: SDUPTHER

## 2017-07-31 RX ORDER — SODIUM CHLORIDE 0.9 % (FLUSH) 0.9 %
10 SYRINGE (ML) INJECTION PRN
Status: DISCONTINUED | OUTPATIENT
Start: 2017-07-31 | End: 2017-07-31 | Stop reason: HOSPADM

## 2017-07-31 RX ORDER — SODIUM CHLORIDE 9 MG/ML
INJECTION, SOLUTION INTRAVENOUS CONTINUOUS
Status: ACTIVE | OUTPATIENT
Start: 2017-07-31 | End: 2017-07-31

## 2017-07-31 RX ORDER — SODIUM CHLORIDE 0.9 % (FLUSH) 0.9 %
10 SYRINGE (ML) INJECTION EVERY 12 HOURS SCHEDULED
Status: DISCONTINUED | OUTPATIENT
Start: 2017-07-31 | End: 2017-07-31 | Stop reason: SDUPTHER

## 2017-07-31 RX ORDER — SODIUM CHLORIDE 9 MG/ML
INJECTION, SOLUTION INTRAVENOUS CONTINUOUS
Status: DISCONTINUED | OUTPATIENT
Start: 2017-07-31 | End: 2017-07-31 | Stop reason: HOSPADM

## 2017-07-31 RX ORDER — SODIUM CHLORIDE 0.9 % (FLUSH) 0.9 %
10 SYRINGE (ML) INJECTION PRN
Status: DISCONTINUED | OUTPATIENT
Start: 2017-07-31 | End: 2017-07-31 | Stop reason: SDUPTHER

## 2017-07-31 RX ORDER — SODIUM CHLORIDE 0.9 % (FLUSH) 0.9 %
10 SYRINGE (ML) INJECTION EVERY 12 HOURS SCHEDULED
Status: DISCONTINUED | OUTPATIENT
Start: 2017-07-31 | End: 2017-07-31 | Stop reason: HOSPADM

## 2017-07-31 RX ORDER — ATORVASTATIN CALCIUM 20 MG/1
20 TABLET, FILM COATED ORAL NIGHTLY
Qty: 30 TABLET | Refills: 3 | Status: SHIPPED | OUTPATIENT
Start: 2017-07-31 | End: 2018-05-27

## 2017-07-31 RX ORDER — ASPIRIN 81 MG/1
81 TABLET, CHEWABLE ORAL DAILY
Qty: 30 TABLET | Refills: 3 | Status: ON HOLD | OUTPATIENT
Start: 2017-07-31 | End: 2018-10-21

## 2017-07-31 RX ORDER — NITROGLYCERIN 0.4 MG/1
0.4 TABLET SUBLINGUAL EVERY 5 MIN PRN
Status: DISCONTINUED | OUTPATIENT
Start: 2017-07-31 | End: 2017-07-31 | Stop reason: HOSPADM

## 2017-07-31 RX ORDER — PROPRANOLOL HYDROCHLORIDE 20 MG/1
20 TABLET ORAL 2 TIMES DAILY
Qty: 90 TABLET | Refills: 3 | Status: SHIPPED | OUTPATIENT
Start: 2017-07-31 | End: 2017-08-01

## 2017-07-31 RX ORDER — DIPHENHYDRAMINE HYDROCHLORIDE 50 MG/ML
50 INJECTION INTRAMUSCULAR; INTRAVENOUS ONCE
Status: DISCONTINUED | OUTPATIENT
Start: 2017-07-31 | End: 2017-07-31 | Stop reason: HOSPADM

## 2017-07-31 RX ORDER — ACETAMINOPHEN 325 MG/1
650 TABLET ORAL EVERY 4 HOURS PRN
Status: DISCONTINUED | OUTPATIENT
Start: 2017-07-31 | End: 2017-07-31 | Stop reason: SDUPTHER

## 2017-07-31 RX ADMIN — Medication 10 ML: at 08:11

## 2017-07-31 RX ADMIN — SODIUM CHLORIDE: 9 INJECTION, SOLUTION INTRAVENOUS at 11:13

## 2017-07-31 RX ADMIN — MORPHINE SULFATE 4 MG: 4 INJECTION, SOLUTION INTRAMUSCULAR; INTRAVENOUS at 08:10

## 2017-07-31 RX ADMIN — LAMOTRIGINE 200 MG: 100 TABLET ORAL at 08:08

## 2017-07-31 RX ADMIN — ONDANSETRON 4 MG: 2 INJECTION INTRAMUSCULAR; INTRAVENOUS at 08:10

## 2017-07-31 RX ADMIN — ACETAMINOPHEN, ASPIRIN AND CAFFEINE 2 TABLET: 250; 250; 65 TABLET, FILM COATED ORAL at 11:17

## 2017-07-31 RX ADMIN — ASPIRIN 81 MG: 81 TABLET, CHEWABLE ORAL at 08:10

## 2017-07-31 RX ADMIN — LISINOPRIL 10 MG: 10 TABLET ORAL at 08:08

## 2017-07-31 RX ADMIN — DIVALPROEX SODIUM 500 MG: 500 TABLET, EXTENDED RELEASE ORAL at 08:08

## 2017-07-31 ASSESSMENT — PAIN DESCRIPTION - LOCATION
LOCATION: HEAD
LOCATION: HEAD

## 2017-07-31 ASSESSMENT — PAIN SCALES - GENERAL
PAINLEVEL_OUTOF10: 0
PAINLEVEL_OUTOF10: 9
PAINLEVEL_OUTOF10: 6
PAINLEVEL_OUTOF10: 10
PAINLEVEL_OUTOF10: 7
PAINLEVEL_OUTOF10: 7

## 2017-07-31 ASSESSMENT — PAIN DESCRIPTION - PAIN TYPE
TYPE: ACUTE PAIN
TYPE: ACUTE PAIN

## 2017-07-31 ASSESSMENT — PAIN DESCRIPTION - DESCRIPTORS: DESCRIPTORS: HEADACHE

## 2017-08-01 ENCOUNTER — TELEPHONE (OUTPATIENT)
Dept: PHARMACY | Facility: CLINIC | Age: 62
End: 2017-08-01

## 2017-08-01 ENCOUNTER — CARE COORDINATION (OUTPATIENT)
Dept: CASE MANAGEMENT | Age: 62
End: 2017-08-01

## 2017-08-01 RX ORDER — DIVALPROEX SODIUM 500 MG/1
1500 TABLET, EXTENDED RELEASE ORAL DAILY
COMMUNITY

## 2017-08-07 ENCOUNTER — OFFICE VISIT (OUTPATIENT)
Dept: FAMILY MEDICINE CLINIC | Age: 62
End: 2017-08-07
Payer: MEDICARE

## 2017-08-07 VITALS
HEART RATE: 66 BPM | BODY MASS INDEX: 31.83 KG/M2 | DIASTOLIC BLOOD PRESSURE: 68 MMHG | RESPIRATION RATE: 20 BRPM | OXYGEN SATURATION: 97 % | HEIGHT: 68 IN | WEIGHT: 210 LBS | SYSTOLIC BLOOD PRESSURE: 124 MMHG

## 2017-08-07 DIAGNOSIS — R07.9 CHEST PAIN, UNSPECIFIED TYPE: Primary | ICD-10-CM

## 2017-08-07 DIAGNOSIS — I20.9 ANGINAL PAIN (HCC): ICD-10-CM

## 2017-08-07 DIAGNOSIS — G43.001 MIGRAINE WITHOUT AURA AND WITH STATUS MIGRAINOSUS, NOT INTRACTABLE: ICD-10-CM

## 2017-08-07 DIAGNOSIS — R56.9 SEIZURE (HCC): ICD-10-CM

## 2017-08-07 PROCEDURE — 99495 TRANSJ CARE MGMT MOD F2F 14D: CPT | Performed by: NURSE PRACTITIONER

## 2017-08-07 RX ORDER — BUTALBITAL, ACETAMINOPHEN AND CAFFEINE 50; 325; 40 MG/1; MG/1; MG/1
1 TABLET ORAL EVERY 4 HOURS PRN
Qty: 45 TABLET | Refills: 1 | Status: SHIPPED | OUTPATIENT
Start: 2017-08-07 | End: 2018-10-03 | Stop reason: ALTCHOICE

## 2017-08-07 ASSESSMENT — ENCOUNTER SYMPTOMS
ABDOMINAL PAIN: 0
COUGH: 0
CONSTIPATION: 0
COLOR CHANGE: 0
SORE THROAT: 0
NAUSEA: 0
ABDOMINAL DISTENTION: 0
DIARRHEA: 0
SHORTNESS OF BREATH: 0
STRIDOR: 0
CHEST TIGHTNESS: 0
BACK PAIN: 0
SINUS PRESSURE: 0
VOMITING: 0
WHEEZING: 0

## 2017-10-02 RX ORDER — LISINOPRIL 10 MG/1
10 TABLET ORAL DAILY
Qty: 30 TABLET | Refills: 3 | Status: SHIPPED | OUTPATIENT
Start: 2017-10-02 | End: 2017-11-20

## 2017-10-02 RX ORDER — LISINOPRIL 10 MG/1
TABLET ORAL
Qty: 30 TABLET | Refills: 2 | Status: SHIPPED | OUTPATIENT
Start: 2017-10-02 | End: 2017-10-02

## 2017-10-02 NOTE — TELEPHONE ENCOUNTER
From: Jannette Ryan  To: Anastasia Ryan CNP  Sent: 10/1/2017 9:06 AM EDT  Subject: Medication Renewal Request    Original authorizing provider: TAISHA Luo.  Ruslan Mock would like a refill of the following medications:  lisinopril (PRINIVIL) 10 MG tablet Anastasia Ryan CNP]    Preferred pharmacy: [id number] - [place], [address] [place] [address] [phone]    Comment:

## 2017-11-06 ENCOUNTER — OFFICE VISIT (OUTPATIENT)
Dept: FAMILY MEDICINE CLINIC | Age: 62
End: 2017-11-06
Payer: MEDICARE

## 2017-11-06 VITALS
HEART RATE: 48 BPM | BODY MASS INDEX: 32.14 KG/M2 | DIASTOLIC BLOOD PRESSURE: 70 MMHG | SYSTOLIC BLOOD PRESSURE: 108 MMHG | WEIGHT: 211.4 LBS | OXYGEN SATURATION: 96 % | RESPIRATION RATE: 16 BRPM

## 2017-11-06 DIAGNOSIS — G40.909 SEIZURE DISORDER (HCC): ICD-10-CM

## 2017-11-06 DIAGNOSIS — R00.1 BRADYCARDIA, DRUG INDUCED: ICD-10-CM

## 2017-11-06 DIAGNOSIS — T50.905A BRADYCARDIA, DRUG INDUCED: ICD-10-CM

## 2017-11-06 DIAGNOSIS — F33.1 MODERATE EPISODE OF RECURRENT MAJOR DEPRESSIVE DISORDER (HCC): Primary | ICD-10-CM

## 2017-11-06 DIAGNOSIS — G43.119 INTRACTABLE MIGRAINE WITH AURA WITHOUT STATUS MIGRAINOSUS: ICD-10-CM

## 2017-11-06 PROCEDURE — 1036F TOBACCO NON-USER: CPT | Performed by: FAMILY MEDICINE

## 2017-11-06 PROCEDURE — 99214 OFFICE O/P EST MOD 30 MIN: CPT | Performed by: FAMILY MEDICINE

## 2017-11-06 PROCEDURE — G8598 ASA/ANTIPLAT THER USED: HCPCS | Performed by: FAMILY MEDICINE

## 2017-11-06 PROCEDURE — G8427 DOCREV CUR MEDS BY ELIG CLIN: HCPCS | Performed by: FAMILY MEDICINE

## 2017-11-06 PROCEDURE — G8417 CALC BMI ABV UP PARAM F/U: HCPCS | Performed by: FAMILY MEDICINE

## 2017-11-06 PROCEDURE — 3017F COLORECTAL CA SCREEN DOC REV: CPT | Performed by: FAMILY MEDICINE

## 2017-11-06 PROCEDURE — G8484 FLU IMMUNIZE NO ADMIN: HCPCS | Performed by: FAMILY MEDICINE

## 2017-11-06 RX ORDER — SERTRALINE HYDROCHLORIDE 100 MG/1
200 TABLET, FILM COATED ORAL DAILY
Qty: 180 TABLET | Refills: 3 | Status: SHIPPED
Start: 2017-11-06 | End: 2018-01-08 | Stop reason: SDUPTHER

## 2017-11-06 ASSESSMENT — ENCOUNTER SYMPTOMS
RHINORRHEA: 0
CONSTIPATION: 0
SHORTNESS OF BREATH: 0
ABDOMINAL PAIN: 0
EYE DISCHARGE: 0
NAUSEA: 0
COUGH: 0
SORE THROAT: 0
WHEEZING: 0
DIARRHEA: 0

## 2017-11-06 NOTE — PROGRESS NOTES
Ranjan Silva  [address] [place] [address]  Dept: [phone]  Dept Fax: [phone]  Loc: [phone]    Aislinn Morris is a 63 y.o. male who presents today for his medical conditions/complaints as noted below. Chief Complaint   Patient presents with    3 Month Follow-Up     mood f/u.doing ok wxcept for the migraines getting worse           HPI:     Patient presents for three-month recheck of mood. States that he recently increased his Zoloft from 150 mg daily to 200 mg daily. He states that this was approximately 2 weeks ago and he thinks he's noticed a slight improvement. He has had increased stress dealing with his stepdaughter and her children. He states that she was renting a trailer that he owns and left the placed trash. He states he is worried about the children she is caring for. He states he's had difficulty falling asleep. Also notes that he's had increased migraines lately and even had a seizure 2 weeks ago. He is still following with neurology who is planning to do Botox for him. Patient's propranolol was discontinued as he was having bradycardia and he was placed on Lopressor. Today patient's heart rate is in the 40s again. Patient denies dizziness or lightheadedness but does sometimes feel fatigued.         Current Outpatient Prescriptions   Medication Sig Dispense Refill    sertraline (ZOLOFT) 100 MG tablet Take 2 tablets by mouth daily 180 tablet 3    lisinopril (PRINIVIL) 10 MG tablet Take 1 tablet by mouth daily 30 tablet 3    butalbital-acetaminophen-caffeine (FIORICET) -40 MG per tablet Take 1 tablet by mouth every 4 hours as needed for Headaches or Migraine 45 tablet 1    divalproex (DEPAKOTE ER) 500 MG extended release tablet Take 1,000 mg by mouth daily      aspirin 81 MG chewable tablet Take 1 tablet by mouth daily 30 tablet 3    atorvastatin (LIPITOR) 20 MG tablet Take 1 tablet by mouth nightly

## 2017-11-20 ENCOUNTER — HOSPITAL ENCOUNTER (OUTPATIENT)
Age: 62
Discharge: HOME | End: 2017-11-20
Payer: MEDICARE

## 2017-11-20 ENCOUNTER — OFFICE VISIT (OUTPATIENT)
Dept: FAMILY MEDICINE CLINIC | Age: 62
End: 2017-11-20
Payer: MEDICARE

## 2017-11-20 VITALS
DIASTOLIC BLOOD PRESSURE: 78 MMHG | WEIGHT: 210.2 LBS | SYSTOLIC BLOOD PRESSURE: 118 MMHG | BODY MASS INDEX: 31.96 KG/M2 | OXYGEN SATURATION: 92 % | RESPIRATION RATE: 20 BRPM | HEART RATE: 62 BPM

## 2017-11-20 DIAGNOSIS — M25.474 EFFUSION OF RIGHT FOOT JOINT: ICD-10-CM

## 2017-11-20 DIAGNOSIS — T78.40XA ALLERGIC REACTION, INITIAL ENCOUNTER: ICD-10-CM

## 2017-11-20 DIAGNOSIS — I10 BENIGN HYPERTENSION: Primary | ICD-10-CM

## 2017-11-20 LAB
BASOPHILS # BLD: 0.3 %
BASOPHILS ABSOLUTE: 0 THOU/MM3 (ref 0–0.1)
EOSINOPHIL # BLD: 3.5 %
EOSINOPHILS ABSOLUTE: 0.3 THOU/MM3 (ref 0–0.4)
HCT VFR BLD CALC: 48.2 % (ref 42–52)
HEMOGLOBIN: 15.6 GM/DL (ref 14–18)
LYMPHOCYTES # BLD: 26.4 %
LYMPHOCYTES ABSOLUTE: 2.1 THOU/MM3 (ref 1–4.8)
MCH RBC QN AUTO: 29.4 PG (ref 27–31)
MCHC RBC AUTO-ENTMCNC: 32.3 GM/DL (ref 33–37)
MCV RBC AUTO: 91.1 FL (ref 80–94)
MONOCYTES # BLD: 9.5 %
MONOCYTES ABSOLUTE: 0.8 THOU/MM3 (ref 0.4–1.3)
NUCLEATED RED BLOOD CELLS: 0 /100 WBC
PDW BLD-RTO: 14.4 % (ref 11.5–14.5)
PLATELET # BLD: 374 THOU/MM3 (ref 130–400)
PMV BLD AUTO: 9.5 MCM (ref 7.4–10.4)
RBC # BLD: 5.29 MILL/MM3 (ref 4.7–6.1)
SEG NEUTROPHILS: 60.3 %
SEGMENTED NEUTROPHILS ABSOLUTE COUNT: 4.9 THOU/MM3 (ref 1.8–7.7)
WBC # BLD: 8.1 THOU/MM3 (ref 4.8–10.8)

## 2017-11-20 PROCEDURE — 99214 OFFICE O/P EST MOD 30 MIN: CPT | Performed by: FAMILY MEDICINE

## 2017-11-20 PROCEDURE — 1036F TOBACCO NON-USER: CPT | Performed by: FAMILY MEDICINE

## 2017-11-20 PROCEDURE — 86003 ALLG SPEC IGE CRUDE XTRC EA: CPT

## 2017-11-20 PROCEDURE — G8417 CALC BMI ABV UP PARAM F/U: HCPCS | Performed by: FAMILY MEDICINE

## 2017-11-20 PROCEDURE — 80053 COMPREHEN METABOLIC PANEL: CPT

## 2017-11-20 PROCEDURE — 36415 COLL VENOUS BLD VENIPUNCTURE: CPT

## 2017-11-20 PROCEDURE — G8427 DOCREV CUR MEDS BY ELIG CLIN: HCPCS | Performed by: FAMILY MEDICINE

## 2017-11-20 PROCEDURE — 84550 ASSAY OF BLOOD/URIC ACID: CPT

## 2017-11-20 PROCEDURE — 96372 THER/PROPH/DIAG INJ SC/IM: CPT | Performed by: FAMILY MEDICINE

## 2017-11-20 PROCEDURE — 3017F COLORECTAL CA SCREEN DOC REV: CPT | Performed by: FAMILY MEDICINE

## 2017-11-20 PROCEDURE — G8598 ASA/ANTIPLAT THER USED: HCPCS | Performed by: FAMILY MEDICINE

## 2017-11-20 PROCEDURE — G8484 FLU IMMUNIZE NO ADMIN: HCPCS | Performed by: FAMILY MEDICINE

## 2017-11-20 PROCEDURE — 85025 COMPLETE CBC W/AUTO DIFF WBC: CPT

## 2017-11-20 RX ORDER — TRIAMCINOLONE ACETONIDE 40 MG/ML
60 INJECTION, SUSPENSION INTRA-ARTICULAR; INTRAMUSCULAR ONCE
Status: COMPLETED | OUTPATIENT
Start: 2017-11-20 | End: 2017-11-20

## 2017-11-20 RX ADMIN — TRIAMCINOLONE ACETONIDE 60 MG: 40 INJECTION, SUSPENSION INTRA-ARTICULAR; INTRAMUSCULAR at 13:15

## 2017-11-20 ASSESSMENT — ENCOUNTER SYMPTOMS
NAUSEA: 0
FACIAL SWELLING: 1
WHEEZING: 0
DIARRHEA: 0
COUGH: 0
ABDOMINAL PAIN: 0
CONSTIPATION: 0
RHINORRHEA: 0
EYE DISCHARGE: 0
SHORTNESS OF BREATH: 0
SORE THROAT: 0

## 2017-11-20 NOTE — PROGRESS NOTES
Mercy Steve  [address] [place] [address]  Dept: [phone]  Dept Fax: [phone]  Loc: [phone]    Nickgene Carrier is a 63 y.o. male who presents today for his medical conditions/complaints as noted below. Chief Complaint   Patient presents with    Foot Swelling     first time for foot swelling. right foot pain and swelling on the bottom,also itchy. did drop something on his toe 2 years ago.  Oral Swelling     lip swelling starting yesterday at this. this has happened 2 times a month for 4 months           HPI:     Patient presents with complaints of swelling in his lip and his right foot. States that for the past 4 months or so patient has had intermittent swelling in his lip. He does not recall any new foods or medicines. He does take lisinopril but has been on this for a few years. He states that his lower lip swells. He denies any swelling of his tongue or throat. States he has not taken anything for it but it has gone down over the past day or so. Also states that since yesterday he had swelling of his right great toe. States that it's extremely painful and was red and warm as well. Denies any known trauma. Does state that he eats red meat occasionally but does not drink any alcohol. Has never had anything like this before. Past Medical History:   Diagnosis Date    Back pain     Depression     GERD (gastroesophageal reflux disease)     Headache(784.0) 9/22/2013    Hypertension     Migraines     ALICIA treated with BiPAP     2013    Pneumonia     S/P cardiac catheterization: 7/31/2017: No obstructive lasions. 7/31/2017 7/31/2017: No obstructive lasions.  Dr. Sharon Holloway [facility])       Past Surgical History:   Procedure Laterality Date    [facility]    BACK SURGERY  12/18/15    l3-s1 decompression, posterior fusion    CARDIAC CATHETERIZATION  2013    CERVICAL SPINE SURGERY 10-16-15    C4-6 ACDF    COLONOSCOPY      ENDOSCOPY, COLON, DIAGNOSTIC      [facility]  [facility]    [facility]    HERNIA REPAIR  2012    [address]    Rt     LUMBAR SPINE SURGERY  08/26/2014     L3-5 decompression, Dr. Spencer Strauss, [facility]    OTHER SURGICAL HISTORY  06/02/2017    Diagnostic laparoscopy, Laparoscopic Ventral hernia Repair with Mesh by Dr Shari orozco, Dr. Patience Marroquin       Family History   Problem Relation Age of Onset    Arthritis Father     Heart Disease Father     Other Father      COPD    Lupus Mother     Other Mother      Lupus    Depression Sister     Depression Child     Arthritis Maternal Grandmother     Arthritis Maternal Grandfather     Diabetes Maternal Grandfather     Heart Disease Maternal Grandfather     Asthma Paternal Grandmother     Asthma Paternal Grandfather        Social History   Substance Use Topics    Smoking status: Never Smoker    Smokeless tobacco: Never Used    Alcohol use No      Current Outpatient Prescriptions   Medication Sig Dispense Refill    sertraline (ZOLOFT) 100 MG tablet Take 2 tablets by mouth daily 180 tablet 3    butalbital-acetaminophen-caffeine (FIORICET) -40 MG per tablet Take 1 tablet by mouth every 4 hours as needed for Headaches or Migraine 45 tablet 1    divalproex (DEPAKOTE ER) 500 MG extended release tablet Take 1,000 mg by mouth daily      aspirin 81 MG chewable tablet Take 1 tablet by mouth daily 30 tablet 3    atorvastatin (LIPITOR) 20 MG tablet Take 1 tablet by mouth nightly 30 tablet 3    lamoTRIgine (LAMICTAL) 200 MG tablet Take 200 mg by mouth 2 times daily      omeprazole (PRILOSEC) 40 MG delayed release capsule Take 40 mg by mouth 2 times daily      aspirin-acetaminophen-caffeine (EXCEDRIN MIGRAINE) 250-250-65 MG per tablet Take 2 tablets by mouth every 8 hours as needed for Headaches      butorphanol (STADOL) 10 MG/ML nasal spray 1 spray by Nasal route [id number]    RAST Panel     Standing Status:   Future     Number of Occurrences:   1     Standing Expiration Date:   11/20/2018    Comprehensive Metabolic Panel     Standing Status:   Future     Number of Occurrences:   1     Standing Expiration Date:   11/20/2018    CBC Auto Differential     Standing Status:   Future     Number of Occurrences:   1     Standing Expiration Date:   11/20/2018    Uric Acid     Standing Status:   Future     Number of Occurrences:   1     Standing Expiration Date:   11/20/2018     Orders Placed This Encounter   Medications    triamcinolone acetonide (KENALOG-40) injection 60 mg        Reccommended tobacco cessation options including pharmacologic methods, counseled great than 3 minutes during this visit:  Yes  []  No  []     Patient given educational materials - see patient instructions. Discussed use, benefit, and side effects of prescribed medications. All patient questions answered. Pt voiced understanding. Reviewed health maintenance. Instructed to continue current medications, diet and exercise. Patient agreed with treatment plan. Follow up as directed.      Electronically signed by Karon Norwood MD on 11/20/2017 at 4:33 PM

## 2017-11-21 LAB
ALBUMIN SERPL-MCNC: 4.6 G/DL (ref 3.5–5.1)
ALP BLD-CCNC: 119 U/L (ref 38–126)
ALT SERPL-CCNC: 19 U/L (ref 11–66)
ANION GAP SERPL CALCULATED.3IONS-SCNC: 13 MEQ/L (ref 8–16)
AST SERPL-CCNC: 20 U/L (ref 5–40)
BILIRUB SERPL-MCNC: 0.4 MG/DL (ref 0.3–1.2)
BUN BLDV-MCNC: 15 MG/DL (ref 7–22)
CALCIUM SERPL-MCNC: 9.4 MG/DL (ref 8.5–10.5)
CHLORIDE BLD-SCNC: 102 MEQ/L (ref 98–111)
CO2: 27 MEQ/L (ref 23–33)
CREAT SERPL-MCNC: 0.9 MG/DL (ref 0.4–1.2)
GFR SERPL CREATININE-BSD FRML MDRD: 86 ML/MIN/1.73M2
GLUCOSE BLD-MCNC: 86 MG/DL (ref 70–108)
POTASSIUM SERPL-SCNC: 4.9 MEQ/L (ref 3.5–5.2)
SODIUM BLD-SCNC: 142 MEQ/L (ref 135–145)
TOTAL PROTEIN: 8.1 G/DL (ref 6.1–8)
URIC ACID: 6.3 MG/DL (ref 3.7–7)

## 2017-11-23 LAB
ALLERGEN BARLEY IGE: < 0.1 KU/L
ALLERGEN BEEF: < 0.1 KU/L
ALLERGEN BERMUDA GRASS IGE: < 0.1 KU/L
ALLERGEN BIRCH IGE: < 0.1 KU/L
ALLERGEN BOX ELDER: < 0.1 KU/L
ALLERGEN CABBAGE IGE: < 0.1 KU/L
ALLERGEN CARROT IGE: < 0.1 KU/L
ALLERGEN CAT DANDER IGE: < 0.1 KU/L
ALLERGEN CHICKEN IGE: < 0.1 KU/L
ALLERGEN CODFISH IGE: < 0.1 KU/L
ALLERGEN COMMON SHORT RAGWEED IGE: < 0.1 KU/L
ALLERGEN CORN IGE: < 0.1 KU/L
ALLERGEN COTTONWOOD: < 0.1 KU/L
ALLERGEN CRAB IGE: < 0.1 KU/L
ALLERGEN D. FARINAE (DUST MITE): < 0.1 KU/L
ALLERGEN DOG DANDER IGE: < 0.1 KU/L
ALLERGEN EGG WHITE IGE: < 0.1 KU/L
ALLERGEN ELM IGE: < 0.1 KU/L
ALLERGEN FUNGI/MOLD A. ALTERNATA IGE: < 0.1 KU/L
ALLERGEN FUNGI/MOLD A. FUMIGATUS IGE: < 0.1 KU/L
ALLERGEN FUNGI/MOLD M.RACEMOSUS IGE: < 0.1 KU/L
ALLERGEN FUNGI/MOLD P. NOTATUM IGE: < 0.1 KU/L
ALLERGEN GERMAN COCKROACH IGE: < 0.1 KU/L
ALLERGEN GRAPE IGE: < 0.1 KU/L
ALLERGEN INTERPRETATION/SCORE: NORMAL
ALLERGEN LETTUCE IGE: < 0.1 KU/L
ALLERGEN MILK IGE: < 0.1 KU/L
ALLERGEN MITE DUST PTERONYSSINUS IGE: < 0.1 KU/L
ALLERGEN MOUNTAIN CEDAR: < 0.1 KU/L
ALLERGEN MOUSE EPITHELIA IGE: < 0.1 KU/L
ALLERGEN NAVY BEAN: < 0.1 KU/L
ALLERGEN OAT: < 0.1 KU/L
ALLERGEN ORANGE IGE: < 0.1 KU/L
ALLERGEN PEANUT (F13) IGE: < 0.1 KU/L
ALLERGEN PECAN TREE IGE: < 0.1 KU/L
ALLERGEN PEPPER C. ANNUUM IGE: < 0.1 KU/L
ALLERGEN PORK: < 0.1 KU/L
ALLERGEN POTATO IGE: < 0.1 KU/L
ALLERGEN RICE IGE: < 0.1 KU/L
ALLERGEN RUSSIAN THISTLE IGE: < 0.1 KU/L
ALLERGEN RYE IGE: < 0.1 KU/L
ALLERGEN SHEEP SORREL (W18) IGE: < 0.1 KU/L
ALLERGEN SHRIMP IGE: < 0.1 KU/L
ALLERGEN SOYBEAN IGE: < 0.1 KU/L
ALLERGEN TIMOTHY GRASS: < 0.1 KU/L
ALLERGEN TOMATO IGE: < 0.1 KU/L
ALLERGEN TREE SYCAMORE: < 0.1 KU/L
ALLERGEN TUNA IGE: < 0.1 KU/L
ALLERGEN WALNUT TREE IGE: < 0.1 KU/L
ALLERGEN WEED, PIGWEED IGE: < 0.1 KU/L
ALLERGEN WHEAT IGE: < 0.1 KU/L
ALLERGEN WHITE ASH: < 0.1 KU/L
ALLERGEN WHITE MULBERRY TREE, IGE: < 0.1 KU/L
ALLERGEN, FUNGI/MOLD: < 0.1 KU/L
ALLERGEN, TREE, OAK: < 0.1 KU/L
IMMUNOGLOBULIN E: 64 KU/L

## 2018-01-08 ENCOUNTER — OFFICE VISIT (OUTPATIENT)
Dept: FAMILY MEDICINE CLINIC | Age: 63
End: 2018-01-08
Payer: MEDICARE

## 2018-01-08 VITALS
HEART RATE: 67 BPM | DIASTOLIC BLOOD PRESSURE: 80 MMHG | SYSTOLIC BLOOD PRESSURE: 122 MMHG | BODY MASS INDEX: 32.78 KG/M2 | RESPIRATION RATE: 20 BRPM | OXYGEN SATURATION: 95 % | WEIGHT: 215.6 LBS

## 2018-01-08 DIAGNOSIS — F33.1 MODERATE EPISODE OF RECURRENT MAJOR DEPRESSIVE DISORDER (HCC): Primary | ICD-10-CM

## 2018-01-08 DIAGNOSIS — K21.9 GASTROESOPHAGEAL REFLUX DISEASE WITHOUT ESOPHAGITIS: ICD-10-CM

## 2018-01-08 DIAGNOSIS — G43.119 INTRACTABLE MIGRAINE WITH AURA WITHOUT STATUS MIGRAINOSUS: ICD-10-CM

## 2018-01-08 PROCEDURE — 99214 OFFICE O/P EST MOD 30 MIN: CPT | Performed by: FAMILY MEDICINE

## 2018-01-08 PROCEDURE — G8417 CALC BMI ABV UP PARAM F/U: HCPCS | Performed by: FAMILY MEDICINE

## 2018-01-08 PROCEDURE — G8427 DOCREV CUR MEDS BY ELIG CLIN: HCPCS | Performed by: FAMILY MEDICINE

## 2018-01-08 PROCEDURE — G8598 ASA/ANTIPLAT THER USED: HCPCS | Performed by: FAMILY MEDICINE

## 2018-01-08 PROCEDURE — G8484 FLU IMMUNIZE NO ADMIN: HCPCS | Performed by: FAMILY MEDICINE

## 2018-01-08 PROCEDURE — 1036F TOBACCO NON-USER: CPT | Performed by: FAMILY MEDICINE

## 2018-01-08 PROCEDURE — 3017F COLORECTAL CA SCREEN DOC REV: CPT | Performed by: FAMILY MEDICINE

## 2018-01-08 RX ORDER — OMEPRAZOLE 40 MG/1
40 CAPSULE, DELAYED RELEASE ORAL DAILY
Qty: 90 CAPSULE | Refills: 3 | Status: SHIPPED | OUTPATIENT
Start: 2018-01-08 | End: 2019-01-26 | Stop reason: SDUPTHER

## 2018-01-08 RX ORDER — LAMOTRIGINE 200 MG/1
200 TABLET ORAL 2 TIMES DAILY
Qty: 180 TABLET | Refills: 3 | Status: SHIPPED | OUTPATIENT
Start: 2018-01-08 | End: 2019-01-26 | Stop reason: SDUPTHER

## 2018-01-08 RX ORDER — SERTRALINE HYDROCHLORIDE 100 MG/1
200 TABLET, FILM COATED ORAL DAILY
Qty: 180 TABLET | Refills: 3 | Status: SHIPPED | OUTPATIENT
Start: 2018-01-08 | End: 2019-01-24

## 2018-01-08 ASSESSMENT — ENCOUNTER SYMPTOMS
CONSTIPATION: 0
SORE THROAT: 0
DIARRHEA: 0
ABDOMINAL PAIN: 0
RHINORRHEA: 0
COUGH: 0
WHEEZING: 0
PHOTOPHOBIA: 1
EYE DISCHARGE: 0
SHORTNESS OF BREATH: 0
NAUSEA: 0

## 2018-01-08 NOTE — PROGRESS NOTES
Kacey Holder  [address] [place] [address]  Dept: [phone]  Dept Fax: [phone]  Loc: [phone]    Aaliyah Michel is a 63 y.o. male who presents today for his medical conditions/complaints as noted below. Chief Complaint   Patient presents with    1 Month Follow-Up     migraines.  Other     seeing dr Jenet Cogan on 02-02-18    Discuss Medications     needs refills           HPI:     Patient presents for three-month recheck. States that his mood is much better and he feels much less stress lately. He is taking 200 mg of Zoloft and he thinks this is controlling his depression well. Does have migraines which are still severe. Recently had his Depakote increased which she is not sure if it helped much. Is seeing pain management early next month in hopes that this will help. There is discussion of Botox injections and he is open to try this. He states that he sleeping well and appetite is good. Also has GERD but states this is controlled with Prilosec. Does request medication refills. Past Medical History:   Diagnosis Date    Back pain     Depression     GERD (gastroesophageal reflux disease)     Headache(784.0) 9/22/2013    Hypertension     Migraines     ALICIA treated with BiPAP     2013    Pneumonia     S/P cardiac catheterization: 7/31/2017: No obstructive lasions. 7/31/2017 7/31/2017: No obstructive lasions.  Dr. Mendoza  [facility])       Past Surgical History:   Procedure Laterality Date    [facility]    BACK SURGERY  12/18/15    l3-s1 decompression, posterior fusion   [address]  2013    CERVICAL SPINE SURGERY  10-16-15    C4-6 ACDF    COLONOSCOPY      ENDOSCOPY, COLON, DIAGNOSTIC      [facility]    [facility]    KNEE SURGERY  1976    Rt     LUMBAR SPINE SURGERY  08/26/2014     L3-5 decompression, Dr. John Mckeon, [facility] [place] Anaphylaxis    Dilaudid [Hydromorphone Hcl] Nausea And Vomiting and Rash    Nitrofuran Derivatives Nausea And Vomiting     Severe headache    Nitroglycerin Nausea And Vomiting     migraine       Health Maintenance   Topic Date Due    Hepatitis C screen  1955    HIV screen  05/09/1972    DTaP/Tdap/Td vaccine (1 - Tdap) 05/09/1976    Pneumococcal med risk (1 of 1 - PPSV23) 05/09/1976    Colon cancer screen colonoscopy  05/09/2007    A1C test (Diabetic or Prediabetic)  03/18/2015    Zostavax vaccine  05/09/2017    Flu vaccine (1) 09/01/2017    Potassium monitoring  11/20/2018    Creatinine monitoring  11/20/2018    Lipid screen  07/29/2022       Subjective:      Review of Systems   Constitutional: Negative for chills, fatigue and fever. HENT: Negative for congestion, rhinorrhea and sore throat. Eyes: Positive for photophobia. Negative for discharge and visual disturbance. Respiratory: Negative for cough, shortness of breath and wheezing. Cardiovascular: Negative for chest pain and palpitations. Gastrointestinal: Negative for abdominal pain, constipation, diarrhea and nausea. Genitourinary: Negative for dysuria and hematuria. Musculoskeletal: Negative for arthralgias and myalgias. Neurological: Positive for headaches. Negative for dizziness. Psychiatric/Behavioral: Positive for dysphoric mood (controlled with zoloft). Negative for sleep disturbance. The patient is not nervous/anxious. Objective:     Physical Exam   Constitutional: He is oriented to person, place, and time. He appears well-developed and well-nourished. HENT:   Head: Normocephalic and atraumatic. Eyes: Conjunctivae and EOM are normal. Right eye exhibits no discharge. Left eye exhibits no discharge. No scleral icterus. Neck: Normal range of motion. Cardiovascular: Normal rate, regular rhythm and normal heart sounds. Pulmonary/Chest: Effort normal and breath sounds normal. He has no wheezes.

## 2018-01-29 ENCOUNTER — OFFICE VISIT (OUTPATIENT)
Dept: FAMILY MEDICINE CLINIC | Age: 63
End: 2018-01-29
Payer: MEDICARE

## 2018-01-29 VITALS
BODY MASS INDEX: 32.44 KG/M2 | SYSTOLIC BLOOD PRESSURE: 134 MMHG | WEIGHT: 219 LBS | DIASTOLIC BLOOD PRESSURE: 88 MMHG | RESPIRATION RATE: 20 BRPM | HEART RATE: 70 BPM | HEIGHT: 69 IN | OXYGEN SATURATION: 96 %

## 2018-01-29 DIAGNOSIS — G40.311 INTRACTABLE GENERALIZED IDIOPATHIC EPILEPSY WITH STATUS EPILEPTICUS (HCC): ICD-10-CM

## 2018-01-29 DIAGNOSIS — R10.31 GROIN PAIN, RIGHT: Primary | ICD-10-CM

## 2018-01-29 PROCEDURE — G8484 FLU IMMUNIZE NO ADMIN: HCPCS | Performed by: NURSE PRACTITIONER

## 2018-01-29 PROCEDURE — 1036F TOBACCO NON-USER: CPT | Performed by: NURSE PRACTITIONER

## 2018-01-29 PROCEDURE — 3017F COLORECTAL CA SCREEN DOC REV: CPT | Performed by: NURSE PRACTITIONER

## 2018-01-29 PROCEDURE — 99213 OFFICE O/P EST LOW 20 MIN: CPT | Performed by: NURSE PRACTITIONER

## 2018-01-29 PROCEDURE — G8427 DOCREV CUR MEDS BY ELIG CLIN: HCPCS | Performed by: NURSE PRACTITIONER

## 2018-01-29 PROCEDURE — G8598 ASA/ANTIPLAT THER USED: HCPCS | Performed by: NURSE PRACTITIONER

## 2018-01-29 PROCEDURE — G8417 CALC BMI ABV UP PARAM F/U: HCPCS | Performed by: NURSE PRACTITIONER

## 2018-01-29 NOTE — PROGRESS NOTES
Boy Whitaker  [address] [place] [address]  Dept: [phone]  Dept Fax: [phone]  Loc: [phone]    Wendy Mcintosh is a 63 y.o. male who presents today for his medical conditions/complaints as noted below. Chief Complaint   Patient presents with    Groin Pain     right sided groin pain started 1 week ago has increased in pain has lump had hernia repair in 1996       HPI:       HPI    Right groin pain. Started getting back over last week. Last 2 days much worse. Pain doesn't vanessa away now. Did have hernia surgery in 1996. States when lifting things pain is much worse. No bowel or bladder problems. Past Medical History:   Diagnosis Date    Back pain     Depression     GERD (gastroesophageal reflux disease)     Headache(784.0) 9/22/2013    Hypertension     Migraines     ALICIA treated with BiPAP     2013    Pneumonia     S/P cardiac catheterization: 7/31/2017: No obstructive lasions. 7/31/2017 7/31/2017: No obstructive lasions.  Dr. Jeni Viveros [facility])       Past Surgical History:   Procedure Laterality Date    [facility]    BACK SURGERY  12/18/15    l3-s1 decompression, posterior fusion   [address]  2013    CERVICAL SPINE SURGERY  10-16-15    C4-6 ACDF    COLONOSCOPY      ENDOSCOPY, COLON, DIAGNOSTIC      [place]    [facility]    KNEE SURGERY  1976    Rt     LUMBAR SPINE SURGERY  08/26/2014     L3-5 decompression, Dr. Nav Cedillo, [facility] OTHER SURGICAL HISTORY  06/02/2017    Diagnostic laparoscopy, Laparoscopic Ventral hernia Repair with Mesh by Dr Kanika orozco, Dr. Melody De Santiago       Family History   Problem Relation Age of Onset    Arthritis Father     Heart Disease Father     Other Father      COPD    Lupus Mother     Other Mother      Lupus    Depression Sister    [facility] heavy lifting  Tylenol for discomfort    Orders Placed This Encounter   Procedures    US SCROTUM AND TESTICLES     Standing Status:   Future     Number of Occurrences:   1     Standing Expiration Date:   1/29/2019    US ABDOMEN LIMITED     Standing Status:   Future     Standing Expiration Date:   1/29/2019     No orders of the defined types were placed in this encounter. Patient given educational materials - see patient instructions. Discussed use, benefit, and side effects of prescribed medications. All patient questions answered. Pt voiced understanding. Reviewed health maintenance. Instructed to continue current medications, diet and exercise. Patient agreed with treatment plan. Follow up as directed.      Electronically signed by Miguel Paez CNP on 2/1/2018 at 8:47 AM

## 2018-01-31 ENCOUNTER — TELEPHONE (OUTPATIENT)
Dept: FAMILY MEDICINE CLINIC | Age: 63
End: 2018-01-31

## 2018-01-31 ENCOUNTER — HOSPITAL ENCOUNTER (OUTPATIENT)
Dept: ULTRASOUND IMAGING | Age: 63
Discharge: HOME | End: 2018-01-31
Payer: MEDICARE

## 2018-01-31 DIAGNOSIS — R10.31 GROIN PAIN, RIGHT: ICD-10-CM

## 2018-01-31 DIAGNOSIS — N45.2 ORCHITIS: ICD-10-CM

## 2018-01-31 DIAGNOSIS — R10.31 RIGHT LOWER QUADRANT ABDOMINAL PAIN: Primary | ICD-10-CM

## 2018-01-31 PROCEDURE — 76870 US EXAM SCROTUM: CPT

## 2018-01-31 RX ORDER — LEVOFLOXACIN 500 MG/1
500 TABLET, FILM COATED ORAL DAILY
Qty: 10 TABLET | Refills: 0 | Status: SHIPPED | OUTPATIENT
Start: 2018-01-31 | End: 2018-02-10

## 2018-01-31 NOTE — TELEPHONE ENCOUNTER
Please schedule pt for CT scan at [facility]. I spoke with pt regarding ultrasound result. Pain is mostly in right lower quad abd. Discussed ordering CT scan. Also sent in levaquin for orchitis. Pt ok with plan.

## 2018-02-01 ASSESSMENT — ENCOUNTER SYMPTOMS
ABDOMINAL DISTENTION: 0
SORE THROAT: 0
COLOR CHANGE: 0
ABDOMINAL PAIN: 1
COUGH: 0
VOMITING: 0
CHEST TIGHTNESS: 0
STRIDOR: 0
SHORTNESS OF BREATH: 0
BACK PAIN: 0
CONSTIPATION: 0
WHEEZING: 0
SINUS PRESSURE: 0
DIARRHEA: 0
NAUSEA: 0

## 2018-02-01 NOTE — TELEPHONE ENCOUNTER
Patient is scheduled for the CT on 02-02-18 at 8:30am. He needs to be at [facility] at 7am. Patient informed.

## 2018-02-02 ENCOUNTER — HOSPITAL ENCOUNTER (OUTPATIENT)
Age: 63
Discharge: HOME | End: 2018-02-02
Payer: MEDICARE

## 2018-02-02 ENCOUNTER — HOSPITAL ENCOUNTER (OUTPATIENT)
Dept: CT IMAGING | Age: 63
Discharge: HOME | End: 2018-02-02
Payer: MEDICARE

## 2018-02-02 DIAGNOSIS — R10.31 RIGHT LOWER QUADRANT ABDOMINAL PAIN: ICD-10-CM

## 2018-02-02 LAB
CREATININE, WHOLE BLOOD: 0.9 MG/DL (ref 0.5–1.2)
ESTIMATED GFR, PCACC: > 90 ML/MIN/1.73M2

## 2018-02-02 PROCEDURE — 6360000004 HC RX CONTRAST MEDICATION: Performed by: NURSE PRACTITIONER

## 2018-02-02 PROCEDURE — 82565 ASSAY OF CREATININE: CPT

## 2018-02-02 PROCEDURE — 74177 CT ABD & PELVIS W/CONTRAST: CPT

## 2018-02-02 RX ADMIN — IOHEXOL 50 ML: 240 INJECTION, SOLUTION INTRATHECAL; INTRAVASCULAR; INTRAVENOUS; ORAL at 07:10

## 2018-02-02 RX ADMIN — IOPAMIDOL 100 ML: 755 INJECTION, SOLUTION INTRAVENOUS at 08:28

## 2018-02-26 ENCOUNTER — OFFICE VISIT (OUTPATIENT)
Dept: FAMILY MEDICINE CLINIC | Age: 63
End: 2018-02-26
Payer: MEDICARE

## 2018-02-26 VITALS
WEIGHT: 218.6 LBS | SYSTOLIC BLOOD PRESSURE: 136 MMHG | DIASTOLIC BLOOD PRESSURE: 80 MMHG | BODY MASS INDEX: 32.75 KG/M2 | HEART RATE: 69 BPM | RESPIRATION RATE: 16 BRPM | OXYGEN SATURATION: 96 %

## 2018-02-26 DIAGNOSIS — G56.02 CARPAL TUNNEL SYNDROME OF LEFT WRIST: ICD-10-CM

## 2018-02-26 DIAGNOSIS — T78.40XD ALLERGIC REACTION, SUBSEQUENT ENCOUNTER: Primary | ICD-10-CM

## 2018-02-26 PROCEDURE — 3017F COLORECTAL CA SCREEN DOC REV: CPT | Performed by: FAMILY MEDICINE

## 2018-02-26 PROCEDURE — 1036F TOBACCO NON-USER: CPT | Performed by: FAMILY MEDICINE

## 2018-02-26 PROCEDURE — G8484 FLU IMMUNIZE NO ADMIN: HCPCS | Performed by: FAMILY MEDICINE

## 2018-02-26 PROCEDURE — 99214 OFFICE O/P EST MOD 30 MIN: CPT | Performed by: FAMILY MEDICINE

## 2018-02-26 PROCEDURE — G8427 DOCREV CUR MEDS BY ELIG CLIN: HCPCS | Performed by: FAMILY MEDICINE

## 2018-02-26 PROCEDURE — 96372 THER/PROPH/DIAG INJ SC/IM: CPT | Performed by: FAMILY MEDICINE

## 2018-02-26 PROCEDURE — G8417 CALC BMI ABV UP PARAM F/U: HCPCS | Performed by: FAMILY MEDICINE

## 2018-02-26 PROCEDURE — G8598 ASA/ANTIPLAT THER USED: HCPCS | Performed by: FAMILY MEDICINE

## 2018-02-26 RX ORDER — TRIAMCINOLONE ACETONIDE 40 MG/ML
60 INJECTION, SUSPENSION INTRA-ARTICULAR; INTRAMUSCULAR ONCE
Status: COMPLETED | OUTPATIENT
Start: 2018-02-26 | End: 2018-02-26

## 2018-02-26 RX ORDER — EPINEPHRINE 0.3 MG/.3ML
0.3 INJECTION SUBCUTANEOUS ONCE
Qty: 0.3 ML | Refills: 0 | Status: SHIPPED | OUTPATIENT
Start: 2018-02-26 | End: 2018-11-21 | Stop reason: SDUPTHER

## 2018-02-26 RX ADMIN — TRIAMCINOLONE ACETONIDE 60 MG: 40 INJECTION, SUSPENSION INTRA-ARTICULAR; INTRAMUSCULAR at 14:30

## 2018-02-27 ASSESSMENT — ENCOUNTER SYMPTOMS
WHEEZING: 0
DIARRHEA: 0
EYE DISCHARGE: 0
RHINORRHEA: 0
CONSTIPATION: 0
SHORTNESS OF BREATH: 0
SORE THROAT: 0
ABDOMINAL PAIN: 0
FACIAL SWELLING: 1
COUGH: 0
NAUSEA: 0

## 2018-02-28 ENCOUNTER — TELEPHONE (OUTPATIENT)
Dept: FAMILY MEDICINE CLINIC | Age: 63
End: 2018-02-28

## 2018-03-05 ENCOUNTER — HOSPITAL ENCOUNTER (OUTPATIENT)
Age: 63
Discharge: HOME | End: 2018-03-05
Payer: MEDICARE

## 2018-03-05 LAB
ALBUMIN SERPL-MCNC: 4.6 G/DL (ref 3.5–5.1)
ALP BLD-CCNC: 99 U/L (ref 38–126)
ALT SERPL-CCNC: 15 U/L (ref 11–66)
ANION GAP SERPL CALCULATED.3IONS-SCNC: 13 MEQ/L (ref 8–16)
AST SERPL-CCNC: 17 U/L (ref 5–40)
BASOPHILS # BLD: 0.7 %
BASOPHILS ABSOLUTE: 0.1 THOU/MM3 (ref 0–0.1)
BILIRUB SERPL-MCNC: 0.3 MG/DL (ref 0.3–1.2)
BUN BLDV-MCNC: 15 MG/DL (ref 7–22)
CALCIUM SERPL-MCNC: 9.4 MG/DL (ref 8.5–10.5)
CHLORIDE BLD-SCNC: 100 MEQ/L (ref 98–111)
CO2: 25 MEQ/L (ref 23–33)
CREAT SERPL-MCNC: 0.8 MG/DL (ref 0.4–1.2)
EOSINOPHIL # BLD: 0.5 %
EOSINOPHILS ABSOLUTE: 0.1 THOU/MM3 (ref 0–0.4)
GFR SERPL CREATININE-BSD FRML MDRD: > 90 ML/MIN/1.73M2
GLUCOSE BLD-MCNC: 96 MG/DL (ref 70–108)
HCT VFR BLD CALC: 50.7 % (ref 42–52)
HEMOGLOBIN: 17.3 GM/DL (ref 14–18)
LYMPHOCYTES # BLD: 24.4 %
LYMPHOCYTES ABSOLUTE: 2.4 THOU/MM3 (ref 1–4.8)
MCH RBC QN AUTO: 30.7 PG (ref 27–31)
MCHC RBC AUTO-ENTMCNC: 34.2 GM/DL (ref 33–37)
MCV RBC AUTO: 89.7 FL (ref 80–94)
MONOCYTES # BLD: 9.8 %
MONOCYTES ABSOLUTE: 1 THOU/MM3 (ref 0.4–1.3)
NUCLEATED RED BLOOD CELLS: 0 /100 WBC
PDW BLD-RTO: 13.9 % (ref 11.5–14.5)
PLATELET # BLD: 391 THOU/MM3 (ref 130–400)
PMV BLD AUTO: 9.1 FL (ref 7.4–10.4)
POTASSIUM SERPL-SCNC: 4.3 MEQ/L (ref 3.5–5.2)
RBC # BLD: 5.65 MILL/MM3 (ref 4.7–6.1)
SEG NEUTROPHILS: 64.6 %
SEGMENTED NEUTROPHILS ABSOLUTE COUNT: 6.5 THOU/MM3 (ref 1.8–7.7)
SODIUM BLD-SCNC: 138 MEQ/L (ref 135–145)
T3 TOTAL: 87 NG/DL (ref 72–181)
T4 FREE: 1.26 NG/DL (ref 0.93–1.76)
TOTAL PROTEIN: 8.1 G/DL (ref 6.1–8)
TSH SERPL DL<=0.05 MIU/L-ACNC: 2.56 UIU/ML (ref 0.4–4.2)
VITAMIN D 25-HYDROXY: 13 NG/ML (ref 30–100)
WBC # BLD: 10 THOU/MM3 (ref 4.8–10.8)

## 2018-03-05 PROCEDURE — 82306 VITAMIN D 25 HYDROXY: CPT

## 2018-03-05 PROCEDURE — 36415 COLL VENOUS BLD VENIPUNCTURE: CPT

## 2018-03-05 PROCEDURE — 86160 COMPLEMENT ANTIGEN: CPT

## 2018-03-05 PROCEDURE — 83520 IMMUNOASSAY QUANT NOS NONAB: CPT

## 2018-03-05 PROCEDURE — 84439 ASSAY OF FREE THYROXINE: CPT

## 2018-03-05 PROCEDURE — 80053 COMPREHEN METABOLIC PANEL: CPT

## 2018-03-05 PROCEDURE — 84443 ASSAY THYROID STIM HORMONE: CPT

## 2018-03-05 PROCEDURE — 86161 COMPLEMENT/FUNCTION ACTIVITY: CPT

## 2018-03-05 PROCEDURE — 86332 IMMUNE COMPLEX ASSAY: CPT

## 2018-03-05 PROCEDURE — 85025 COMPLETE CBC W/AUTO DIFF WBC: CPT

## 2018-03-05 PROCEDURE — 84480 ASSAY TRIIODOTHYRONINE (T3): CPT

## 2018-03-05 PROCEDURE — 86376 MICROSOMAL ANTIBODY EACH: CPT

## 2018-03-05 PROCEDURE — 86800 THYROGLOBULIN ANTIBODY: CPT

## 2018-03-05 PROCEDURE — 86003 ALLG SPEC IGE CRUDE XTRC EA: CPT

## 2018-03-07 LAB
C3 COMPLEMENT: 157 MG/DL (ref 88–201)
C4 COMPLEMENT: 25 MG/DL (ref 10–40)
MISC. #1 REFERENCE GROUP TEST: NORMAL
THYROGLOBULIN AB: < 0.9 IU/ML (ref 0–4)
THYROID PEROXIDASE ANTIBODY: < 0.3 IU/ML (ref 0–9)

## 2018-03-08 LAB
C1 EST.INHIB.FUNCT.: NORMAL
C1 ESTERASE INHIBITOR: 28 MG/DL (ref 21–39)
C1Q BINDING: NORMAL
TRYPTASE: NORMAL

## 2018-03-13 LAB — MISC. #2 REFERENCE REPORT: NORMAL

## 2018-03-20 ENCOUNTER — HOSPITAL ENCOUNTER (OUTPATIENT)
Age: 63
Discharge: HOME | End: 2018-03-20
Payer: MEDICARE

## 2018-03-20 ENCOUNTER — HOSPITAL ENCOUNTER (OUTPATIENT)
Dept: GENERAL RADIOLOGY | Age: 63
Discharge: HOME | End: 2018-03-20
Payer: MEDICARE

## 2018-03-20 DIAGNOSIS — M47.816 LUMBAR SPONDYLOSIS: ICD-10-CM

## 2018-03-20 DIAGNOSIS — M47.812 OSTEOARTHRITIS OF CERVICAL SPINE, UNSPECIFIED SPINAL OSTEOARTHRITIS COMPLICATION STATUS: ICD-10-CM

## 2018-03-20 PROCEDURE — 72050 X-RAY EXAM NECK SPINE 4/5VWS: CPT

## 2018-03-20 PROCEDURE — 72100 X-RAY EXAM L-S SPINE 2/3 VWS: CPT

## 2018-05-10 ENCOUNTER — HOSPITAL ENCOUNTER (OUTPATIENT)
Dept: MRI IMAGING | Age: 63
Discharge: HOME | End: 2018-05-10
Payer: MEDICARE

## 2018-05-10 ENCOUNTER — HOSPITAL ENCOUNTER (OUTPATIENT)
Age: 63
Discharge: HOME | End: 2018-05-10
Payer: MEDICARE

## 2018-05-10 DIAGNOSIS — M50.20 CERVICAL DISC DISPLACEMENT: ICD-10-CM

## 2018-05-10 PROCEDURE — 72141 MRI NECK SPINE W/O DYE: CPT

## 2018-05-27 ENCOUNTER — HOSPITAL ENCOUNTER (EMERGENCY)
Age: 63
Discharge: HOME | End: 2018-05-27
Attending: FAMILY MEDICINE
Payer: MEDICARE

## 2018-05-27 VITALS
DIASTOLIC BLOOD PRESSURE: 92 MMHG | BODY MASS INDEX: 32.58 KG/M2 | OXYGEN SATURATION: 93 % | HEIGHT: 68 IN | RESPIRATION RATE: 18 BRPM | SYSTOLIC BLOOD PRESSURE: 148 MMHG | WEIGHT: 215 LBS | HEART RATE: 73 BPM | TEMPERATURE: 98.4 F

## 2018-05-27 DIAGNOSIS — T78.3XXA ANGIOEDEMA, INITIAL ENCOUNTER: Primary | ICD-10-CM

## 2018-05-27 PROCEDURE — 96374 THER/PROPH/DIAG INJ IV PUSH: CPT

## 2018-05-27 PROCEDURE — 6360000002 HC RX W HCPCS: Performed by: FAMILY MEDICINE

## 2018-05-27 PROCEDURE — 96375 TX/PRO/DX INJ NEW DRUG ADDON: CPT

## 2018-05-27 PROCEDURE — 99283 EMERGENCY DEPT VISIT LOW MDM: CPT

## 2018-05-27 PROCEDURE — 2500000003 HC RX 250 WO HCPCS: Performed by: FAMILY MEDICINE

## 2018-05-27 PROCEDURE — S0028 INJECTION, FAMOTIDINE, 20 MG: HCPCS | Performed by: FAMILY MEDICINE

## 2018-05-27 RX ORDER — DIPHENHYDRAMINE HYDROCHLORIDE 50 MG/ML
50 INJECTION INTRAMUSCULAR; INTRAVENOUS ONCE
Status: COMPLETED | OUTPATIENT
Start: 2018-05-27 | End: 2018-05-27

## 2018-05-27 RX ORDER — METHYLPREDNISOLONE SODIUM SUCCINATE 125 MG/2ML
125 INJECTION, POWDER, LYOPHILIZED, FOR SOLUTION INTRAMUSCULAR; INTRAVENOUS ONCE
Status: COMPLETED | OUTPATIENT
Start: 2018-05-27 | End: 2018-05-27

## 2018-05-27 RX ADMIN — FAMOTIDINE 20 MG: 10 INJECTION, SOLUTION INTRAVENOUS at 16:09

## 2018-05-27 RX ADMIN — DIPHENHYDRAMINE HYDROCHLORIDE 50 MG: 50 INJECTION, SOLUTION INTRAMUSCULAR; INTRAVENOUS at 16:09

## 2018-05-27 RX ADMIN — METHYLPREDNISOLONE SODIUM SUCCINATE 125 MG: 125 INJECTION, POWDER, FOR SOLUTION INTRAMUSCULAR; INTRAVENOUS at 16:09

## 2018-05-27 ASSESSMENT — ENCOUNTER SYMPTOMS
NAUSEA: 0
SHORTNESS OF BREATH: 0
COUGH: 0
DIARRHEA: 0
EYE PAIN: 0
WHEEZING: 0
EYE DISCHARGE: 0
VOMITING: 0
RHINORRHEA: 0
BACK PAIN: 0
ABDOMINAL PAIN: 0
EYE REDNESS: 0
SORE THROAT: 0

## 2018-05-29 ENCOUNTER — OFFICE VISIT (OUTPATIENT)
Dept: FAMILY MEDICINE CLINIC | Age: 63
End: 2018-05-29
Payer: MEDICARE

## 2018-05-29 ENCOUNTER — HOSPITAL ENCOUNTER (OUTPATIENT)
Dept: CT IMAGING | Age: 63
Discharge: HOME | End: 2018-05-29
Payer: MEDICARE

## 2018-05-29 VITALS
HEIGHT: 68 IN | DIASTOLIC BLOOD PRESSURE: 84 MMHG | OXYGEN SATURATION: 98 % | RESPIRATION RATE: 20 BRPM | SYSTOLIC BLOOD PRESSURE: 132 MMHG | HEART RATE: 72 BPM | WEIGHT: 221 LBS | BODY MASS INDEX: 33.49 KG/M2

## 2018-05-29 DIAGNOSIS — G43.119 INTRACTABLE MIGRAINE WITH AURA WITHOUT STATUS MIGRAINOSUS: ICD-10-CM

## 2018-05-29 DIAGNOSIS — T78.3XXD ANGIOEDEMA, SUBSEQUENT ENCOUNTER: Primary | ICD-10-CM

## 2018-05-29 DIAGNOSIS — R22.1 NECK MASS: ICD-10-CM

## 2018-05-29 LAB
CREATININE, WHOLE BLOOD: 1 MG/DL (ref 0.5–1.2)
ESTIMATED GFR, PCACC: 81 ML/MIN/1.73M2

## 2018-05-29 PROCEDURE — 82565 ASSAY OF CREATININE: CPT

## 2018-05-29 PROCEDURE — G8427 DOCREV CUR MEDS BY ELIG CLIN: HCPCS | Performed by: FAMILY MEDICINE

## 2018-05-29 PROCEDURE — 99213 OFFICE O/P EST LOW 20 MIN: CPT | Performed by: FAMILY MEDICINE

## 2018-05-29 PROCEDURE — G8598 ASA/ANTIPLAT THER USED: HCPCS | Performed by: FAMILY MEDICINE

## 2018-05-29 PROCEDURE — 6360000004 HC RX CONTRAST MEDICATION: Performed by: PHYSICIAN ASSISTANT

## 2018-05-29 PROCEDURE — G8417 CALC BMI ABV UP PARAM F/U: HCPCS | Performed by: FAMILY MEDICINE

## 2018-05-29 PROCEDURE — 1036F TOBACCO NON-USER: CPT | Performed by: FAMILY MEDICINE

## 2018-05-29 PROCEDURE — 70491 CT SOFT TISSUE NECK W/DYE: CPT

## 2018-05-29 PROCEDURE — 3017F COLORECTAL CA SCREEN DOC REV: CPT | Performed by: FAMILY MEDICINE

## 2018-05-29 RX ADMIN — IOPAMIDOL 100 ML: 755 INJECTION, SOLUTION INTRAVENOUS at 08:46

## 2018-05-29 ASSESSMENT — PATIENT HEALTH QUESTIONNAIRE - PHQ9
SUM OF ALL RESPONSES TO PHQ QUESTIONS 1-9: 0
1. LITTLE INTEREST OR PLEASURE IN DOING THINGS: 0
SUM OF ALL RESPONSES TO PHQ9 QUESTIONS 1 & 2: 0
2. FEELING DOWN, DEPRESSED OR HOPELESS: 0

## 2018-05-29 ASSESSMENT — ENCOUNTER SYMPTOMS
SHORTNESS OF BREATH: 0
WHEEZING: 0
NAUSEA: 0
COUGH: 0
ABDOMINAL PAIN: 0
SORE THROAT: 0
CONSTIPATION: 0
DIARRHEA: 0
EYE DISCHARGE: 0
RHINORRHEA: 0

## 2018-07-16 ENCOUNTER — OFFICE VISIT (OUTPATIENT)
Dept: FAMILY MEDICINE CLINIC | Age: 63
End: 2018-07-16
Payer: MEDICARE

## 2018-07-16 VITALS
SYSTOLIC BLOOD PRESSURE: 138 MMHG | OXYGEN SATURATION: 96 % | RESPIRATION RATE: 18 BRPM | WEIGHT: 222.2 LBS | HEART RATE: 70 BPM | DIASTOLIC BLOOD PRESSURE: 86 MMHG | BODY MASS INDEX: 33.79 KG/M2

## 2018-07-16 DIAGNOSIS — F33.1 MODERATE EPISODE OF RECURRENT MAJOR DEPRESSIVE DISORDER (HCC): Primary | ICD-10-CM

## 2018-07-16 DIAGNOSIS — M54.2 NECK PAIN, CHRONIC: ICD-10-CM

## 2018-07-16 DIAGNOSIS — G89.29 NECK PAIN, CHRONIC: ICD-10-CM

## 2018-07-16 DIAGNOSIS — G43.119 INTRACTABLE MIGRAINE WITH AURA WITHOUT STATUS MIGRAINOSUS: ICD-10-CM

## 2018-07-16 DIAGNOSIS — G40.909 SEIZURE DISORDER (HCC): ICD-10-CM

## 2018-07-16 PROCEDURE — G8598 ASA/ANTIPLAT THER USED: HCPCS | Performed by: FAMILY MEDICINE

## 2018-07-16 PROCEDURE — 96372 THER/PROPH/DIAG INJ SC/IM: CPT | Performed by: FAMILY MEDICINE

## 2018-07-16 PROCEDURE — G8417 CALC BMI ABV UP PARAM F/U: HCPCS | Performed by: FAMILY MEDICINE

## 2018-07-16 PROCEDURE — 1036F TOBACCO NON-USER: CPT | Performed by: FAMILY MEDICINE

## 2018-07-16 PROCEDURE — G8427 DOCREV CUR MEDS BY ELIG CLIN: HCPCS | Performed by: FAMILY MEDICINE

## 2018-07-16 PROCEDURE — 3017F COLORECTAL CA SCREEN DOC REV: CPT | Performed by: FAMILY MEDICINE

## 2018-07-16 PROCEDURE — 99214 OFFICE O/P EST MOD 30 MIN: CPT | Performed by: FAMILY MEDICINE

## 2018-07-16 RX ORDER — KETOROLAC TROMETHAMINE 30 MG/ML
60 INJECTION, SOLUTION INTRAMUSCULAR; INTRAVENOUS ONCE
Status: COMPLETED | OUTPATIENT
Start: 2018-07-16 | End: 2018-07-16

## 2018-07-16 RX ADMIN — KETOROLAC TROMETHAMINE 60 MG: 30 INJECTION, SOLUTION INTRAMUSCULAR; INTRAVENOUS at 10:18

## 2018-07-16 ASSESSMENT — ENCOUNTER SYMPTOMS
FACIAL SWELLING: 0
ABDOMINAL PAIN: 0
DIARRHEA: 0
CONSTIPATION: 0
COUGH: 0
RHINORRHEA: 0
EYE DISCHARGE: 0
SHORTNESS OF BREATH: 0
BACK PAIN: 1
WHEEZING: 0
NAUSEA: 0
SORE THROAT: 0

## 2018-07-16 NOTE — PROGRESS NOTES
After obtaining consent, and per orders of Dr. Alfonso Louis, injection of Ketorolac 60mg given in Right upper quad. gluteus by Jared Russell. Patient instructed to report any adverse reaction to me immediately.     Administrations This Visit     ketorolac (TORADOL) injection 60 mg     Admin Date  07/16/2018  10:18 Action  Given Dose  60 mg Route  Intramuscular Site  Dorsogluteal Right Administered By  Jared Russell CMA ([facility])    Ordering Provider:  Carleen Mark MD    [address]:  [id number]    Lot#:  [id number]    :  Evita Hawkins    Patient Supplied?:  No
approx 10 months. Follow up with edgar  Migraine/neck pain- toradol today  Follow  Up with pain management and dr. Laura Rose as needed. Discussed use, benefit, and side effects of prescribed medications. Barriers to medication compliance addressed. All patient questions answered. Pt voiced understanding. Return in about 6 months (around 1/16/2019) for mood. No orders of the defined types were placed in this encounter. Orders Placed This Encounter   Medications    ketorolac (TORADOL) injection 60 mg        Reccommended tobacco cessation options including pharmacologic methods, counseled great than 3 minutes during this visit:  Yes  []  No  []     Patient given educational materials - see patient instructions. Discussed use, benefit, and side effects of prescribed medications. All patient questions answered. Pt voiced understanding. Reviewed health maintenance. Instructed to continue current medications, diet and exercise. Patient agreed with treatment plan. Follow up as directed.      Electronically signed by Kaylene Plunkett MD on 7/16/2018 at 10:17 AM

## 2018-08-20 ENCOUNTER — OFFICE VISIT (OUTPATIENT)
Dept: FAMILY MEDICINE CLINIC | Age: 63
End: 2018-08-20
Payer: MEDICARE

## 2018-08-20 VITALS
HEART RATE: 61 BPM | DIASTOLIC BLOOD PRESSURE: 86 MMHG | WEIGHT: 224.6 LBS | SYSTOLIC BLOOD PRESSURE: 138 MMHG | RESPIRATION RATE: 18 BRPM | OXYGEN SATURATION: 95 % | BODY MASS INDEX: 34.15 KG/M2

## 2018-08-20 DIAGNOSIS — M25.561 ACUTE PAIN OF RIGHT KNEE: Primary | ICD-10-CM

## 2018-08-20 PROCEDURE — 3017F COLORECTAL CA SCREEN DOC REV: CPT | Performed by: NURSE PRACTITIONER

## 2018-08-20 PROCEDURE — 1036F TOBACCO NON-USER: CPT | Performed by: NURSE PRACTITIONER

## 2018-08-20 PROCEDURE — 99213 OFFICE O/P EST LOW 20 MIN: CPT | Performed by: NURSE PRACTITIONER

## 2018-08-20 PROCEDURE — G8598 ASA/ANTIPLAT THER USED: HCPCS | Performed by: NURSE PRACTITIONER

## 2018-08-20 PROCEDURE — G8417 CALC BMI ABV UP PARAM F/U: HCPCS | Performed by: NURSE PRACTITIONER

## 2018-08-20 PROCEDURE — G8427 DOCREV CUR MEDS BY ELIG CLIN: HCPCS | Performed by: NURSE PRACTITIONER

## 2018-08-20 ASSESSMENT — ENCOUNTER SYMPTOMS
VOMITING: 0
BACK PAIN: 1
ABDOMINAL PAIN: 0
DIARRHEA: 0
COUGH: 0
SINUS PRESSURE: 0
SORE THROAT: 0
WHEEZING: 0
SINUS PAIN: 0
CONSTIPATION: 0
CHEST TIGHTNESS: 0
NAUSEA: 0
SHORTNESS OF BREATH: 0

## 2018-08-20 NOTE — PROGRESS NOTES
No current facility-administered medications for this visit. Allergies   Allergen Reactions    Bee Venom Anaphylaxis    Lisinopril     Dilaudid [Hydromorphone Hcl] Nausea And Vomiting and Rash    Nitrofuran Derivatives Nausea And Vomiting     Severe headache    Nitroglycerin Nausea And Vomiting     migraine       Health Maintenance   Topic Date Due    Hepatitis C screen  1955    HIV screen  05/09/1972    DTaP/Tdap/Td vaccine (1 - Tdap) 05/09/1976    Shingles Vaccine (1 of 2 - 2 Dose Series) 05/09/2007    Colon cancer screen colonoscopy  05/09/2007    A1C test (Diabetic or Prediabetic)  03/18/2015    Flu vaccine (1) 09/01/2018    Potassium monitoring  03/05/2019    Creatinine monitoring  05/29/2019    Lipid screen  07/29/2022       Subjective:      Review of Systems   Constitutional: Negative for activity change, appetite change, diaphoresis, fatigue and fever. HENT: Negative for dental problem, ear pain, sinus pain, sinus pressure and sore throat. Respiratory: Negative for cough, chest tightness, shortness of breath and wheezing. Cardiovascular: Negative for chest pain and palpitations. Gastrointestinal: Negative for abdominal pain, constipation, diarrhea, nausea and vomiting. Genitourinary: Negative for dysuria. Musculoskeletal: Positive for arthralgias, back pain, joint swelling and neck pain. Allergic/Immunologic: Negative for environmental allergies and food allergies. Neurological: Negative for dizziness and headaches. Psychiatric/Behavioral: Negative for agitation and confusion. Objective:     Physical Exam   Constitutional: He is oriented to person, place, and time. He appears well-developed and well-nourished. No distress. HENT:   Head: Normocephalic. Right Ear: External ear normal.   Left Ear: External ear normal.   Mouth/Throat: No oropharyngeal exudate. Eyes: Pupils are equal, round, and reactive to light. Right eye exhibits no discharge.  Left eye exhibits no discharge. Neck: Normal range of motion. No tracheal deviation present. No thyromegaly present. Cardiovascular: Normal rate, regular rhythm, S1 normal, S2 normal, normal heart sounds and intact distal pulses. Exam reveals no gallop and no friction rub. No murmur heard. Pulmonary/Chest: Breath sounds normal. No respiratory distress. He has no wheezes. He has no rales. Abdominal: Soft. Bowel sounds are normal. He exhibits no distension. There is no tenderness. There is no rebound. Musculoskeletal: Normal range of motion. He exhibits edema (right lateral knee) and tenderness (right knee). Right knee: He exhibits swelling. Tenderness found. Neurological: He is alert and oriented to person, place, and time. GCS eye subscore is 4. GCS verbal subscore is 5. GCS motor subscore is 6. Skin: Skin is warm and dry. No rash noted. He is not diaphoretic. No erythema. No pallor. Psychiatric: He has a normal mood and affect. His behavior is normal. Judgment and thought content normal.     /86 (Site: Left Arm, Position: Sitting, Cuff Size: Medium Adult)   Pulse 61   Resp 18   Wt 224 lb 9.6 oz (101.9 kg)   SpO2 95%   BMI 34.15 kg/m²     Assessment:       Diagnosis Orders   1. Acute pain of right knee  XR KNEE MIN 4 V RT       Plan:     Right knee XRAY for pain and edema. Over the counter tylenol if needed, but not at the same time as the other medications. Return to the office for worsening or other concerns. Discussed use, benefit, and side effects of prescribed medications. Barriers to medication compliance addressed. All patient questions answered. Pt voiced understanding. Return if symptoms worsen or fail to improve.     Orders Placed This Encounter   Procedures    XR KNEE MIN 4 V RT     Standing Status:   Future     Standing Expiration Date:   8/20/2019     Order Specific Question:   Reason for exam:     Answer:   pain     No orders of the defined types were

## 2018-08-22 ENCOUNTER — HOSPITAL ENCOUNTER (OUTPATIENT)
Age: 63
Discharge: HOME | End: 2018-08-22
Payer: MEDICARE

## 2018-08-22 ENCOUNTER — HOSPITAL ENCOUNTER (OUTPATIENT)
Dept: GENERAL RADIOLOGY | Age: 63
Discharge: HOME | End: 2018-08-22
Payer: MEDICARE

## 2018-08-22 DIAGNOSIS — M25.561 RIGHT KNEE PAIN, UNSPECIFIED CHRONICITY: ICD-10-CM

## 2018-08-22 PROCEDURE — 73564 X-RAY EXAM KNEE 4 OR MORE: CPT

## 2018-08-29 ENCOUNTER — OFFICE VISIT (OUTPATIENT)
Dept: FAMILY MEDICINE CLINIC | Age: 63
End: 2018-08-29
Payer: MEDICARE

## 2018-08-29 VITALS
DIASTOLIC BLOOD PRESSURE: 82 MMHG | SYSTOLIC BLOOD PRESSURE: 138 MMHG | HEART RATE: 64 BPM | RESPIRATION RATE: 16 BRPM | BODY MASS INDEX: 33.15 KG/M2 | WEIGHT: 218 LBS | OXYGEN SATURATION: 95 %

## 2018-08-29 DIAGNOSIS — M17.11 PRIMARY OSTEOARTHRITIS OF RIGHT KNEE: Primary | ICD-10-CM

## 2018-08-29 PROCEDURE — G8598 ASA/ANTIPLAT THER USED: HCPCS | Performed by: FAMILY MEDICINE

## 2018-08-29 PROCEDURE — G8417 CALC BMI ABV UP PARAM F/U: HCPCS | Performed by: FAMILY MEDICINE

## 2018-08-29 PROCEDURE — 96372 THER/PROPH/DIAG INJ SC/IM: CPT | Performed by: FAMILY MEDICINE

## 2018-08-29 PROCEDURE — 3017F COLORECTAL CA SCREEN DOC REV: CPT | Performed by: FAMILY MEDICINE

## 2018-08-29 PROCEDURE — 99213 OFFICE O/P EST LOW 20 MIN: CPT | Performed by: FAMILY MEDICINE

## 2018-08-29 PROCEDURE — G8427 DOCREV CUR MEDS BY ELIG CLIN: HCPCS | Performed by: FAMILY MEDICINE

## 2018-08-29 PROCEDURE — 1036F TOBACCO NON-USER: CPT | Performed by: FAMILY MEDICINE

## 2018-08-29 RX ORDER — TRAMADOL HYDROCHLORIDE 50 MG/1
50 TABLET ORAL EVERY 6 HOURS PRN
Qty: 12 TABLET | Refills: 0 | Status: SHIPPED | OUTPATIENT
Start: 2018-08-29 | End: 2018-09-01

## 2018-08-29 RX ORDER — TRIAMCINOLONE ACETONIDE 40 MG/ML
60 INJECTION, SUSPENSION INTRA-ARTICULAR; INTRAMUSCULAR ONCE
Status: COMPLETED | OUTPATIENT
Start: 2018-08-29 | End: 2018-08-29

## 2018-08-29 RX ADMIN — TRIAMCINOLONE ACETONIDE 60 MG: 40 INJECTION, SUSPENSION INTRA-ARTICULAR; INTRAMUSCULAR at 11:50

## 2018-08-29 ASSESSMENT — ENCOUNTER SYMPTOMS
SINUS PAIN: 0
SHORTNESS OF BREATH: 0
WHEEZING: 0
NAUSEA: 0
DIARRHEA: 0
VOMITING: 0

## 2018-08-29 NOTE — PROGRESS NOTES
After obtaining consent, and per orders of Dr. Estiven Trujillo, injection of 60mg Kenalog given in Right upper quad. gluteus by Justin Schilling. Patient instructed to report any adverse reaction to me immediately.   Administrations This Visit     triamcinolone acetonide (KENALOG-40) injection 60 mg     Admin Date  08/29/2018  11:50 Action  Given Dose  60 mg Route  Intramuscular Site  Dorsogluteal Right Administered By  Justin Schilling CMA ([facility])    Ordering Provider:  Kaylene Plunkett MD    NDC:  5966-0454-00    Lot#:  [id number]    :  [employer] (PRIMARY CARE)    Patient Supplied?:  No

## 2018-08-29 NOTE — PROGRESS NOTES
[facility]  [address] [place] [address]  Dept: [phone]  Dept Fax: [phone]  Loc: [phone]    Juli Jhonson is a 66 y.o. male who presents today for his medical conditions/complaints as noted below. Chief Complaint   Patient presents with    Knee Pain     swollen and painful -decreased range of motion.  Other     Patient stated that he is going to be having neck surgery soon. HPI:     David Cerrato is a 66year old male presents today for worsening right knee pain. David Cerrato was in the office last week with complaints of right knee pain. Patient received an x-ray which showed narrowing of the medial tibiofemoral joint compartment with mild spurring along the lateral aspect of the joint and the patella. Imaging consistent with degenerative arthritis of the right knee. Patient tried Tylenol with no improvement of symptoms. Knee Pain    Incident onset: no incident. There was no injury mechanism. The pain is present in the right knee. The quality of the pain is described as aching. The pain is moderate. The pain has been worsening since onset. Associated symptoms include a loss of motion and tingling. He reports no foreign bodies present. The symptoms are aggravated by movement, palpation and weight bearing. He has tried acetaminophen, rest and ice for the symptoms. The treatment provided no relief. Past Medical History:   Diagnosis Date    Back pain     Depression     GERD (gastroesophageal reflux disease)     Headache(784.0) 9/22/2013    Hypertension     Migraines     ALICIA treated with BiPAP     2013    Pneumonia     S/P cardiac catheterization: 7/31/2017: No obstructive lasions. 7/31/2017 7/31/2017: No obstructive lasions.  Dr. Carmelita Blackwell Seizures [facility])       Past Surgical History:   Procedure Laterality Date    [facility]  2012    [facility]    BACK SURGERY  12/18/15    l3-s1 every 8 hours as needed for Headaches      butorphanol (STADOL) 10 MG/ML nasal spray 1 spray by Nasal route every 4 hours as needed for Pain      magnesium oxide (MAG-OX) 400 MG tablet Take 400 mg by mouth daily.  EPINEPHrine (EPIPEN 2-BREA) 0.3 MG/0.3ML SOAJ injection Inject 0.3 mLs into the muscle once for 1 dose Use as directed for allergic reaction 0.3 mL 0     No current facility-administered medications for this visit. Allergies   Allergen Reactions    Bee Venom Anaphylaxis    Lisinopril     Dilaudid [Hydromorphone Hcl] Nausea And Vomiting and Rash    Nitrofuran Derivatives Nausea And Vomiting     Severe headache    Nitroglycerin Nausea And Vomiting     migraine       Health Maintenance   Topic Date Due    Hepatitis C screen  1955    HIV screen  05/09/1972    DTaP/Tdap/Td vaccine (1 - Tdap) 05/09/1976    Shingles Vaccine (1 of 2 - 2 Dose Series) 05/09/2007    Colon cancer screen colonoscopy  05/09/2007    A1C test (Diabetic or Prediabetic)  03/18/2015    Flu vaccine (1) 09/01/2018    Potassium monitoring  03/05/2019    Creatinine monitoring  05/29/2019    Lipid screen  07/29/2022       Subjective:      Review of Systems   Constitutional: Negative for chills and fever. HENT: Negative for congestion, postnasal drip and sinus pain. Respiratory: Negative for shortness of breath and wheezing. Cardiovascular: Negative for chest pain and palpitations. Gastrointestinal: Negative for diarrhea, nausea and vomiting. Musculoskeletal: Positive for arthralgias, gait problem, joint swelling and myalgias. Neurological: Positive for tingling. Psychiatric/Behavioral: Positive for sleep disturbance (2/2 knee pain). Negative for dysphoric mood. Objective:     Physical Exam   Constitutional: He is oriented to person, place, and time. He appears well-developed and well-nourished. HENT:   Head: Normocephalic and atraumatic.    Eyes: Conjunctivae and EOM are normal. Right eye

## 2018-09-26 ENCOUNTER — HOSPITAL ENCOUNTER (OUTPATIENT)
Dept: PREADMISSION TESTING | Age: 63
Discharge: HOME | End: 2018-09-30
Payer: MEDICARE

## 2018-09-26 ENCOUNTER — HOSPITAL ENCOUNTER (OUTPATIENT)
Dept: GENERAL RADIOLOGY | Age: 63
Discharge: HOME | End: 2018-09-26
Payer: MEDICARE

## 2018-09-26 VITALS
HEART RATE: 66 BPM | BODY MASS INDEX: 32.78 KG/M2 | HEIGHT: 68 IN | OXYGEN SATURATION: 96 % | TEMPERATURE: 98 F | WEIGHT: 216.27 LBS

## 2018-09-26 DIAGNOSIS — Z01.818 PRE-OP EXAM: ICD-10-CM

## 2018-09-26 DIAGNOSIS — M48.02 CERVICAL SPINAL STENOSIS: ICD-10-CM

## 2018-09-26 LAB
ANION GAP SERPL CALCULATED.3IONS-SCNC: 17 MEQ/L (ref 8–16)
APTT: 28.1 SECONDS (ref 22–38)
BUN BLDV-MCNC: 15 MG/DL (ref 7–22)
CALCIUM SERPL-MCNC: 9.6 MG/DL (ref 8.5–10.5)
CHLORIDE BLD-SCNC: 103 MEQ/L (ref 98–111)
CO2: 21 MEQ/L (ref 23–33)
CREAT SERPL-MCNC: 1 MG/DL (ref 0.4–1.2)
EKG ATRIAL RATE: 59 BPM
EKG P AXIS: 57 DEGREES
EKG P-R INTERVAL: 148 MS
EKG Q-T INTERVAL: 416 MS
EKG QRS DURATION: 128 MS
EKG QTC CALCULATION (BAZETT): 411 MS
EKG R AXIS: -51 DEGREES
EKG T AXIS: 69 DEGREES
EKG VENTRICULAR RATE: 59 BPM
ERYTHROCYTE [DISTWIDTH] IN BLOOD BY AUTOMATED COUNT: 13.2 % (ref 11.5–14.5)
ERYTHROCYTE [DISTWIDTH] IN BLOOD BY AUTOMATED COUNT: 43.2 FL (ref 35–45)
GFR SERPL CREATININE-BSD FRML MDRD: 76 ML/MIN/1.73M2
GLUCOSE BLD-MCNC: 115 MG/DL (ref 70–108)
HCT VFR BLD CALC: 49 % (ref 42–52)
HEMOGLOBIN: 16.2 GM/DL (ref 14–18)
INR BLD: 1.03 (ref 0.85–1.13)
MCH RBC QN AUTO: 29.9 PG (ref 26–33)
MCHC RBC AUTO-ENTMCNC: 33.1 GM/DL (ref 32.2–35.5)
MCV RBC AUTO: 90.4 FL (ref 80–94)
MRSA NASAL SCREEN RT-PCR: NEGATIVE
PLATELET # BLD: 390 THOU/MM3 (ref 130–400)
PMV BLD AUTO: 10.4 FL (ref 9.4–12.4)
POTASSIUM SERPL-SCNC: 4.2 MEQ/L (ref 3.5–5.2)
RBC # BLD: 5.42 MILL/MM3 (ref 4.7–6.1)
SODIUM BLD-SCNC: 141 MEQ/L (ref 135–145)
STAPH AUREUS SCREEN RT-PCR: POSITIVE
WBC # BLD: 8.7 THOU/MM3 (ref 4.8–10.8)

## 2018-09-26 PROCEDURE — 85027 COMPLETE CBC AUTOMATED: CPT

## 2018-09-26 PROCEDURE — 85730 THROMBOPLASTIN TIME PARTIAL: CPT

## 2018-09-26 PROCEDURE — 80048 BASIC METABOLIC PNL TOTAL CA: CPT

## 2018-09-26 PROCEDURE — 36415 COLL VENOUS BLD VENIPUNCTURE: CPT

## 2018-09-26 PROCEDURE — 71046 X-RAY EXAM CHEST 2 VIEWS: CPT

## 2018-09-26 PROCEDURE — 93005 ELECTROCARDIOGRAM TRACING: CPT | Performed by: ORTHOPAEDIC SURGERY

## 2018-09-26 PROCEDURE — 93010 ELECTROCARDIOGRAM REPORT: CPT | Performed by: NUCLEAR MEDICINE

## 2018-09-26 PROCEDURE — 87641 MR-STAPH DNA AMP PROBE: CPT

## 2018-09-26 PROCEDURE — 87640 STAPH A DNA AMP PROBE: CPT

## 2018-09-26 PROCEDURE — 85610 PROTHROMBIN TIME: CPT

## 2018-09-26 ASSESSMENT — PAIN SCALES - GENERAL: PAINLEVEL_OUTOF10: 5

## 2018-09-26 ASSESSMENT — PAIN DESCRIPTION - ORIENTATION: ORIENTATION: MID

## 2018-09-26 ASSESSMENT — PAIN DESCRIPTION - FREQUENCY: FREQUENCY: CONTINUOUS

## 2018-09-26 ASSESSMENT — PAIN DESCRIPTION - ONSET: ONSET: AWAKENED FROM SLEEP

## 2018-09-26 ASSESSMENT — PAIN DESCRIPTION - PROGRESSION: CLINICAL_PROGRESSION: GRADUALLY WORSENING

## 2018-09-26 ASSESSMENT — PAIN DESCRIPTION - DESCRIPTORS: DESCRIPTORS: ACHING;THROBBING;SHARP

## 2018-09-26 ASSESSMENT — PAIN DESCRIPTION - LOCATION: LOCATION: NECK

## 2018-09-26 ASSESSMENT — PAIN DESCRIPTION - PAIN TYPE: TYPE: CHRONIC PAIN

## 2018-09-27 NOTE — PROGRESS NOTES
PT +MSSA. MUPIROCIN OINTMENT RX CALLED IN TO [facility] IN [place]. PT NOTIFIED OF +MSSA RESULTS AND NEED TO  MUPIROCIN RX. I REVIEWED USE OF DATE AND USE OF MED WITH PT.  HE VERBALIZED UNDERSTANDING. DR MACHUCA'S OFFICE NOTIFIED OF +MSSA RESULTS.

## 2018-10-03 ENCOUNTER — OFFICE VISIT (OUTPATIENT)
Dept: FAMILY MEDICINE CLINIC | Age: 63
End: 2018-10-03
Payer: MEDICARE

## 2018-10-03 VITALS
HEART RATE: 64 BPM | WEIGHT: 215 LBS | RESPIRATION RATE: 20 BRPM | HEIGHT: 66 IN | DIASTOLIC BLOOD PRESSURE: 68 MMHG | OXYGEN SATURATION: 97 % | BODY MASS INDEX: 34.55 KG/M2 | SYSTOLIC BLOOD PRESSURE: 122 MMHG

## 2018-10-03 DIAGNOSIS — M48.02 DEGENERATIVE CERVICAL SPINAL STENOSIS: Primary | ICD-10-CM

## 2018-10-03 PROCEDURE — 1036F TOBACCO NON-USER: CPT | Performed by: FAMILY MEDICINE

## 2018-10-03 PROCEDURE — G8417 CALC BMI ABV UP PARAM F/U: HCPCS | Performed by: FAMILY MEDICINE

## 2018-10-03 PROCEDURE — G8427 DOCREV CUR MEDS BY ELIG CLIN: HCPCS | Performed by: FAMILY MEDICINE

## 2018-10-03 PROCEDURE — G8598 ASA/ANTIPLAT THER USED: HCPCS | Performed by: FAMILY MEDICINE

## 2018-10-03 PROCEDURE — 3017F COLORECTAL CA SCREEN DOC REV: CPT | Performed by: FAMILY MEDICINE

## 2018-10-03 PROCEDURE — G8484 FLU IMMUNIZE NO ADMIN: HCPCS | Performed by: FAMILY MEDICINE

## 2018-10-03 PROCEDURE — 99213 OFFICE O/P EST LOW 20 MIN: CPT | Performed by: FAMILY MEDICINE

## 2018-10-03 ASSESSMENT — ENCOUNTER SYMPTOMS
EYE DISCHARGE: 0
DIARRHEA: 0
SHORTNESS OF BREATH: 0
WHEEZING: 0
NAUSEA: 0
ABDOMINAL PAIN: 0
RHINORRHEA: 0
SORE THROAT: 0
COUGH: 0
CONSTIPATION: 0

## 2018-10-18 NOTE — H&P
Maternal Grandfather     Diabetes Maternal Grandfather     Heart Disease Maternal Grandfather     Asthma Paternal Grandmother     Asthma Paternal Grandfather        REVIEW OF SYSTEMS:    CONSTITUTIONAL:  negative for fevers, chills  RESPIRATORY:  negative for cough and shortness of breath  CARDIOVASCULAR:  negative for chest pain, palpitations  GASTROINTESTINAL:  negative for nausea, vomiting, incontinence  GENITOURINARY:  negative for frequency and urinary incontinence  MUSCULOSKELETAL:  (+) for cervical pain and arm pain  NEUROLOGICAL:  (+) for headaches and numbness/tingling  BEHAVIOR/PSYCH:  negative for depressed mood, increased anxiety    PHYSICAL EXAM:    VITALS: Ht 5 ft 8.5 in, Wt 220 lbs, BMI 32.96  CONSTITUTIONAL:  Awake, alert, cooperative, no apparent distress, and appears stated age  HEAD: Atraumatic, normocephalic  LUNGS:  No respiratory distress. CTA bilaterally. No wheezes, rales, rhonchi  CARDIOVASCULAR:  Regular rate and rhythm, no murmur  ABDOMEN:  Normal bowel sounds, soft, non-distended, non-tender  SPINE: Limited cervical ROM. Inspection of the spine shows a well-healed anterior cervical incision. TTP cervical spine. MUSCULOSKELETAL:  There is no redness, warmth, or swelling of the joints. Full range of motion noted. Motor strength is 5 out of 5 bilateral upper extremities. NEUROLOGIC:  Awake, alert, oriented to name, place and time.  Sensory is intact upper extremities    DATA:    CBC:   Lab Results   Component Value Date    WBC 8.7 09/26/2018    RBC 5.42 09/26/2018    RBC 4.75 05/04/2012    HGB 16.2 09/26/2018    HCT 49.0 09/26/2018    MCV 90.4 09/26/2018    MCH 29.9 09/26/2018    MCHC 33.1 09/26/2018    RDW 13.9 03/05/2018     09/26/2018    MPV 10.4 09/26/2018     WBC:    Lab Results   Component Value Date    WBC 8.7 09/26/2018     Hemoglobin/Hematocrit:    Lab Results   Component Value Date    HGB 16.2 09/26/2018    HCT 49.0 09/26/2018     CMP:    Lab Results   Component

## 2018-10-19 ENCOUNTER — ANESTHESIA EVENT (OUTPATIENT)
Dept: OPERATING ROOM | Age: 63
DRG: 473 | End: 2018-10-19
Payer: MEDICARE

## 2018-10-19 ENCOUNTER — ANESTHESIA (OUTPATIENT)
Dept: OPERATING ROOM | Age: 63
DRG: 473 | End: 2018-10-19
Payer: MEDICARE

## 2018-10-19 ENCOUNTER — HOSPITAL ENCOUNTER (INPATIENT)
Age: 63
LOS: 2 days | Discharge: HOME | DRG: 473 | End: 2018-10-21
Attending: ORTHOPAEDIC SURGERY | Admitting: ORTHOPAEDIC SURGERY
Payer: MEDICARE

## 2018-10-19 ENCOUNTER — APPOINTMENT (OUTPATIENT)
Dept: GENERAL RADIOLOGY | Age: 63
DRG: 473 | End: 2018-10-19
Attending: ORTHOPAEDIC SURGERY
Payer: MEDICARE

## 2018-10-19 VITALS
TEMPERATURE: 98.4 F | SYSTOLIC BLOOD PRESSURE: 168 MMHG | OXYGEN SATURATION: 94 % | RESPIRATION RATE: 3 BRPM | DIASTOLIC BLOOD PRESSURE: 110 MMHG

## 2018-10-19 PROBLEM — M48.02 CERVICAL SPINAL STENOSIS: Status: ACTIVE | Noted: 2018-10-19

## 2018-10-19 LAB
ABO: NORMAL
ANTIBODY SCREEN: NORMAL
RH FACTOR: NORMAL

## 2018-10-19 PROCEDURE — 6360000002 HC RX W HCPCS: Performed by: SPECIALIST

## 2018-10-19 PROCEDURE — 1200000000 HC SEMI PRIVATE

## 2018-10-19 PROCEDURE — 95940 IONM IN OPERATNG ROOM 15 MIN: CPT | Performed by: ORTHOPAEDIC SURGERY

## 2018-10-19 PROCEDURE — C9359 IMPLNT,BON VOID FILLER-PUTTY: HCPCS | Performed by: ORTHOPAEDIC SURGERY

## 2018-10-19 PROCEDURE — 2709999900 HC NON-CHARGEABLE SUPPLY: Performed by: ORTHOPAEDIC SURGERY

## 2018-10-19 PROCEDURE — 7100000001 HC PACU RECOVERY - ADDTL 15 MIN: Performed by: ORTHOPAEDIC SURGERY

## 2018-10-19 PROCEDURE — 6370000000 HC RX 637 (ALT 250 FOR IP): Performed by: ORTHOPAEDIC SURGERY

## 2018-10-19 PROCEDURE — 6360000002 HC RX W HCPCS: Performed by: ANESTHESIOLOGY

## 2018-10-19 PROCEDURE — 2500000003 HC RX 250 WO HCPCS: Performed by: ORTHOPAEDIC SURGERY

## 2018-10-19 PROCEDURE — 3600000015 HC SURGERY LEVEL 5 ADDTL 15MIN: Performed by: ORTHOPAEDIC SURGERY

## 2018-10-19 PROCEDURE — 86900 BLOOD TYPING SEROLOGIC ABO: CPT

## 2018-10-19 PROCEDURE — 2580000003 HC RX 258: Performed by: PHYSICIAN ASSISTANT

## 2018-10-19 PROCEDURE — 2580000003 HC RX 258: Performed by: ORTHOPAEDIC SURGERY

## 2018-10-19 PROCEDURE — 6370000000 HC RX 637 (ALT 250 FOR IP): Performed by: PHYSICIAN ASSISTANT

## 2018-10-19 PROCEDURE — 3700000000 HC ANESTHESIA ATTENDED CARE: Performed by: ORTHOPAEDIC SURGERY

## 2018-10-19 PROCEDURE — 94760 N-INVAS EAR/PLS OXIMETRY 1: CPT

## 2018-10-19 PROCEDURE — 86850 RBC ANTIBODY SCREEN: CPT

## 2018-10-19 PROCEDURE — 7100000000 HC PACU RECOVERY - FIRST 15 MIN: Performed by: ORTHOPAEDIC SURGERY

## 2018-10-19 PROCEDURE — L8699 PROSTHETIC IMPLANT NOS: HCPCS | Performed by: ORTHOPAEDIC SURGERY

## 2018-10-19 PROCEDURE — 6360000002 HC RX W HCPCS: Performed by: PHYSICIAN ASSISTANT

## 2018-10-19 PROCEDURE — 3700000001 HC ADD 15 MINUTES (ANESTHESIA): Performed by: ORTHOPAEDIC SURGERY

## 2018-10-19 PROCEDURE — 86901 BLOOD TYPING SEROLOGIC RH(D): CPT

## 2018-10-19 PROCEDURE — 3600000005 HC SURGERY LEVEL 5 BASE: Performed by: ORTHOPAEDIC SURGERY

## 2018-10-19 PROCEDURE — C1713 ANCHOR/SCREW BN/BN,TIS/BN: HCPCS | Performed by: ORTHOPAEDIC SURGERY

## 2018-10-19 PROCEDURE — 72020 X-RAY EXAM OF SPINE 1 VIEW: CPT

## 2018-10-19 PROCEDURE — 2720000010 HC SURG SUPPLY STERILE: Performed by: ORTHOPAEDIC SURGERY

## 2018-10-19 PROCEDURE — 2500000003 HC RX 250 WO HCPCS: Performed by: SPECIALIST

## 2018-10-19 PROCEDURE — 0RG10A0 FUSION OF CERVICAL VERTEBRAL JOINT WITH INTERBODY FUSION DEVICE, ANTERIOR APPROACH, ANTERIOR COLUMN, OPEN APPROACH: ICD-10-PCS | Performed by: ORTHOPAEDIC SURGERY

## 2018-10-19 PROCEDURE — 0RB30ZZ EXCISION OF CERVICAL VERTEBRAL DISC, OPEN APPROACH: ICD-10-PCS | Performed by: ORTHOPAEDIC SURGERY

## 2018-10-19 PROCEDURE — 36415 COLL VENOUS BLD VENIPUNCTURE: CPT

## 2018-10-19 DEVICE — SPACER 9010000037 PRV PK 16X12X7
Type: IMPLANTABLE DEVICE | Site: NECK | Status: FUNCTIONAL
Brand: PEEK PREVAIL™ CERVICAL INTERBODY DEVICE

## 2018-10-19 DEVICE — DBM 005001 PROGENIX DBM 1CC SRVC FEE
Type: IMPLANTABLE DEVICE | Site: NECK | Status: FUNCTIONAL
Brand: PROGENIX® PUTTY AND PROGENIX® PLUS

## 2018-10-19 DEVICE — SCREW 8792815 VA SELF DRILLING 3.5X15MM
Type: IMPLANTABLE DEVICE | Site: NECK | Status: FUNCTIONAL
Brand: ZEPHIR® ANTERIOR CERVICAL SYSTEM

## 2018-10-19 RX ORDER — SERTRALINE HYDROCHLORIDE 100 MG/1
200 TABLET, FILM COATED ORAL DAILY
Status: DISCONTINUED | OUTPATIENT
Start: 2018-10-19 | End: 2018-10-21 | Stop reason: HOSPADM

## 2018-10-19 RX ORDER — SODIUM CHLORIDE 9 MG/ML
INJECTION, SOLUTION INTRAVENOUS CONTINUOUS
Status: DISCONTINUED | OUTPATIENT
Start: 2018-10-19 | End: 2018-10-19

## 2018-10-19 RX ORDER — CYCLOBENZAPRINE HCL 10 MG
10 TABLET ORAL 3 TIMES DAILY PRN
Status: DISCONTINUED | OUTPATIENT
Start: 2018-10-19 | End: 2018-10-21 | Stop reason: HOSPADM

## 2018-10-19 RX ORDER — ROCURONIUM BROMIDE 10 MG/ML
INJECTION, SOLUTION INTRAVENOUS PRN
Status: DISCONTINUED | OUTPATIENT
Start: 2018-10-19 | End: 2018-10-19 | Stop reason: SDUPTHER

## 2018-10-19 RX ORDER — SODIUM CHLORIDE 0.9 % (FLUSH) 0.9 %
10 SYRINGE (ML) INJECTION EVERY 12 HOURS SCHEDULED
Status: DISCONTINUED | OUTPATIENT
Start: 2018-10-19 | End: 2018-10-19 | Stop reason: HOSPADM

## 2018-10-19 RX ORDER — LABETALOL HYDROCHLORIDE 5 MG/ML
5 INJECTION, SOLUTION INTRAVENOUS EVERY 10 MIN PRN
Status: DISCONTINUED | OUTPATIENT
Start: 2018-10-19 | End: 2018-10-19 | Stop reason: HOSPADM

## 2018-10-19 RX ORDER — KETOROLAC TROMETHAMINE 30 MG/ML
INJECTION, SOLUTION INTRAMUSCULAR; INTRAVENOUS PRN
Status: DISCONTINUED | OUTPATIENT
Start: 2018-10-19 | End: 2018-10-19 | Stop reason: SDUPTHER

## 2018-10-19 RX ORDER — SODIUM CHLORIDE 0.9 % (FLUSH) 0.9 %
10 SYRINGE (ML) INJECTION EVERY 12 HOURS SCHEDULED
Status: DISCONTINUED | OUTPATIENT
Start: 2018-10-19 | End: 2018-10-21 | Stop reason: HOSPADM

## 2018-10-19 RX ORDER — MORPHINE SULFATE 2 MG/ML
2 INJECTION, SOLUTION INTRAMUSCULAR; INTRAVENOUS
Status: DISCONTINUED | OUTPATIENT
Start: 2018-10-19 | End: 2018-10-21 | Stop reason: HOSPADM

## 2018-10-19 RX ORDER — DEXAMETHASONE SODIUM PHOSPHATE 4 MG/ML
INJECTION, SOLUTION INTRA-ARTICULAR; INTRALESIONAL; INTRAMUSCULAR; INTRAVENOUS; SOFT TISSUE PRN
Status: DISCONTINUED | OUTPATIENT
Start: 2018-10-19 | End: 2018-10-19 | Stop reason: SDUPTHER

## 2018-10-19 RX ORDER — MORPHINE SULFATE 4 MG/ML
4 INJECTION, SOLUTION INTRAMUSCULAR; INTRAVENOUS
Status: DISCONTINUED | OUTPATIENT
Start: 2018-10-19 | End: 2018-10-21 | Stop reason: HOSPADM

## 2018-10-19 RX ORDER — ONDANSETRON 2 MG/ML
4 INJECTION INTRAMUSCULAR; INTRAVENOUS EVERY 6 HOURS PRN
Status: DISCONTINUED | OUTPATIENT
Start: 2018-10-19 | End: 2018-10-21 | Stop reason: HOSPADM

## 2018-10-19 RX ORDER — FENTANYL CITRATE 50 UG/ML
25 INJECTION, SOLUTION INTRAMUSCULAR; INTRAVENOUS EVERY 5 MIN PRN
Status: DISCONTINUED | OUTPATIENT
Start: 2018-10-19 | End: 2018-10-19 | Stop reason: HOSPADM

## 2018-10-19 RX ORDER — METOPROLOL TARTRATE 5 MG/5ML
INJECTION INTRAVENOUS PRN
Status: DISCONTINUED | OUTPATIENT
Start: 2018-10-19 | End: 2018-10-19 | Stop reason: SDUPTHER

## 2018-10-19 RX ORDER — OXYCODONE HYDROCHLORIDE AND ACETAMINOPHEN 5; 325 MG/1; MG/1
1 TABLET ORAL EVERY 4 HOURS PRN
Status: DISCONTINUED | OUTPATIENT
Start: 2018-10-19 | End: 2018-10-21 | Stop reason: HOSPADM

## 2018-10-19 RX ORDER — SODIUM CHLORIDE 9 MG/ML
INJECTION, SOLUTION INTRAVENOUS CONTINUOUS
Status: DISCONTINUED | OUTPATIENT
Start: 2018-10-19 | End: 2018-10-21 | Stop reason: HOSPADM

## 2018-10-19 RX ORDER — NEOSTIGMINE METHYLSULFATE 1 MG/ML
INJECTION, SOLUTION INTRAVENOUS PRN
Status: DISCONTINUED | OUTPATIENT
Start: 2018-10-19 | End: 2018-10-19 | Stop reason: SDUPTHER

## 2018-10-19 RX ORDER — ACETAMINOPHEN 325 MG/1
650 TABLET ORAL EVERY 4 HOURS PRN
Status: DISCONTINUED | OUTPATIENT
Start: 2018-10-19 | End: 2018-10-21 | Stop reason: HOSPADM

## 2018-10-19 RX ORDER — LAMOTRIGINE 200 MG/1
200 TABLET ORAL 2 TIMES DAILY
Status: DISCONTINUED | OUTPATIENT
Start: 2018-10-19 | End: 2018-10-21 | Stop reason: HOSPADM

## 2018-10-19 RX ORDER — SODIUM CHLORIDE 0.9 % (FLUSH) 0.9 %
10 SYRINGE (ML) INJECTION PRN
Status: DISCONTINUED | OUTPATIENT
Start: 2018-10-19 | End: 2018-10-21 | Stop reason: HOSPADM

## 2018-10-19 RX ORDER — GLYCOPYRROLATE 1 MG/5 ML
SYRINGE (ML) INTRAVENOUS PRN
Status: DISCONTINUED | OUTPATIENT
Start: 2018-10-19 | End: 2018-10-19 | Stop reason: SDUPTHER

## 2018-10-19 RX ORDER — LABETALOL HYDROCHLORIDE 5 MG/ML
INJECTION, SOLUTION INTRAVENOUS PRN
Status: DISCONTINUED | OUTPATIENT
Start: 2018-10-19 | End: 2018-10-19 | Stop reason: SDUPTHER

## 2018-10-19 RX ORDER — FENTANYL CITRATE 50 UG/ML
INJECTION, SOLUTION INTRAMUSCULAR; INTRAVENOUS PRN
Status: DISCONTINUED | OUTPATIENT
Start: 2018-10-19 | End: 2018-10-19 | Stop reason: SDUPTHER

## 2018-10-19 RX ORDER — DOCUSATE SODIUM 100 MG/1
100 CAPSULE, LIQUID FILLED ORAL 2 TIMES DAILY
Status: DISCONTINUED | OUTPATIENT
Start: 2018-10-19 | End: 2018-10-21 | Stop reason: HOSPADM

## 2018-10-19 RX ORDER — DIVALPROEX SODIUM 500 MG/1
1500 TABLET, EXTENDED RELEASE ORAL DAILY
Status: DISCONTINUED | OUTPATIENT
Start: 2018-10-19 | End: 2018-10-21 | Stop reason: HOSPADM

## 2018-10-19 RX ORDER — BISACODYL 10 MG
10 SUPPOSITORY, RECTAL RECTAL DAILY PRN
Status: DISCONTINUED | OUTPATIENT
Start: 2018-10-19 | End: 2018-10-21 | Stop reason: HOSPADM

## 2018-10-19 RX ORDER — OXYCODONE HYDROCHLORIDE AND ACETAMINOPHEN 5; 325 MG/1; MG/1
2 TABLET ORAL EVERY 4 HOURS PRN
Status: DISCONTINUED | OUTPATIENT
Start: 2018-10-19 | End: 2018-10-21 | Stop reason: HOSPADM

## 2018-10-19 RX ORDER — OMEPRAZOLE 40 MG/1
40 CAPSULE, DELAYED RELEASE ORAL DAILY
Status: DISCONTINUED | OUTPATIENT
Start: 2018-10-19 | End: 2018-10-19 | Stop reason: CLARIF

## 2018-10-19 RX ORDER — FENTANYL CITRATE 50 UG/ML
50 INJECTION, SOLUTION INTRAMUSCULAR; INTRAVENOUS EVERY 5 MIN PRN
Status: DISCONTINUED | OUTPATIENT
Start: 2018-10-19 | End: 2018-10-19 | Stop reason: HOSPADM

## 2018-10-19 RX ORDER — MEPERIDINE HYDROCHLORIDE 25 MG/ML
12.5 INJECTION INTRAMUSCULAR; INTRAVENOUS; SUBCUTANEOUS EVERY 5 MIN PRN
Status: DISCONTINUED | OUTPATIENT
Start: 2018-10-19 | End: 2018-10-19 | Stop reason: HOSPADM

## 2018-10-19 RX ORDER — SODIUM CHLORIDE 0.9 % (FLUSH) 0.9 %
10 SYRINGE (ML) INJECTION PRN
Status: DISCONTINUED | OUTPATIENT
Start: 2018-10-19 | End: 2018-10-19 | Stop reason: HOSPADM

## 2018-10-19 RX ORDER — PROPOFOL 10 MG/ML
INJECTION, EMULSION INTRAVENOUS PRN
Status: DISCONTINUED | OUTPATIENT
Start: 2018-10-19 | End: 2018-10-19 | Stop reason: SDUPTHER

## 2018-10-19 RX ORDER — OMEPRAZOLE 40 MG/1
40 CAPSULE, DELAYED RELEASE ORAL
Status: DISCONTINUED | OUTPATIENT
Start: 2018-10-20 | End: 2018-10-21 | Stop reason: HOSPADM

## 2018-10-19 RX ORDER — PANTOPRAZOLE SODIUM 40 MG/1
40 TABLET, DELAYED RELEASE ORAL
Status: DISCONTINUED | OUTPATIENT
Start: 2018-10-19 | End: 2018-10-19

## 2018-10-19 RX ORDER — BUTORPHANOL TARTRATE 10 MG/ML
1 SPRAY, METERED NASAL EVERY 4 HOURS PRN
Status: DISCONTINUED | OUTPATIENT
Start: 2018-10-19 | End: 2018-10-19 | Stop reason: RX

## 2018-10-19 RX ORDER — PROMETHAZINE HYDROCHLORIDE 25 MG/ML
12.5 INJECTION, SOLUTION INTRAMUSCULAR; INTRAVENOUS
Status: DISCONTINUED | OUTPATIENT
Start: 2018-10-19 | End: 2018-10-19 | Stop reason: HOSPADM

## 2018-10-19 RX ORDER — LIDOCAINE HYDROCHLORIDE 20 MG/ML
INJECTION, SOLUTION INFILTRATION; PERINEURAL PRN
Status: DISCONTINUED | OUTPATIENT
Start: 2018-10-19 | End: 2018-10-19 | Stop reason: SDUPTHER

## 2018-10-19 RX ORDER — ACETAMINOPHEN 650 MG/1
650 SUPPOSITORY RECTAL EVERY 4 HOURS PRN
Status: DISCONTINUED | OUTPATIENT
Start: 2018-10-19 | End: 2018-10-21 | Stop reason: HOSPADM

## 2018-10-19 RX ORDER — ONDANSETRON 2 MG/ML
INJECTION INTRAMUSCULAR; INTRAVENOUS PRN
Status: DISCONTINUED | OUTPATIENT
Start: 2018-10-19 | End: 2018-10-19 | Stop reason: SDUPTHER

## 2018-10-19 RX ADMIN — LAMOTRIGINE 200 MG: 200 TABLET ORAL at 21:11

## 2018-10-19 RX ADMIN — PROPOFOL 200 MG: 10 INJECTION, EMULSION INTRAVENOUS at 07:27

## 2018-10-19 RX ADMIN — OXYCODONE HYDROCHLORIDE AND ACETAMINOPHEN 1 TABLET: 5; 325 TABLET ORAL at 14:13

## 2018-10-19 RX ADMIN — Medication 0.8 MG: at 08:34

## 2018-10-19 RX ADMIN — PROPOFOL 20 MG: 10 INJECTION, EMULSION INTRAVENOUS at 08:05

## 2018-10-19 RX ADMIN — LAMOTRIGINE 200 MG: 200 TABLET ORAL at 14:09

## 2018-10-19 RX ADMIN — ROCURONIUM BROMIDE 10 MG: 10 INJECTION INTRAVENOUS at 07:47

## 2018-10-19 RX ADMIN — Medication 0.1 MG: at 07:25

## 2018-10-19 RX ADMIN — ROCURONIUM BROMIDE 50 MG: 10 INJECTION INTRAVENOUS at 07:27

## 2018-10-19 RX ADMIN — MORPHINE SULFATE 2 MG: 2 INJECTION, SOLUTION INTRAMUSCULAR; INTRAVENOUS at 23:44

## 2018-10-19 RX ADMIN — OXYCODONE HYDROCHLORIDE AND ACETAMINOPHEN 2 TABLET: 5; 325 TABLET ORAL at 21:11

## 2018-10-19 RX ADMIN — Medication 2 G: at 07:34

## 2018-10-19 RX ADMIN — LIDOCAINE HYDROCHLORIDE 100 MG: 20 INJECTION, SOLUTION INFILTRATION; PERINEURAL at 07:27

## 2018-10-19 RX ADMIN — DIVALPROEX SODIUM 1500 MG: 500 TABLET, EXTENDED RELEASE ORAL at 14:08

## 2018-10-19 RX ADMIN — MORPHINE SULFATE 2 MG: 2 INJECTION, SOLUTION INTRAMUSCULAR; INTRAVENOUS at 10:54

## 2018-10-19 RX ADMIN — SODIUM CHLORIDE: 9 INJECTION, SOLUTION INTRAVENOUS at 23:44

## 2018-10-19 RX ADMIN — DOCUSATE SODIUM 100 MG: 100 CAPSULE, LIQUID FILLED ORAL at 21:10

## 2018-10-19 RX ADMIN — PROPOFOL 20 MG: 10 INJECTION, EMULSION INTRAVENOUS at 07:54

## 2018-10-19 RX ADMIN — PROPOFOL 20 MG: 10 INJECTION, EMULSION INTRAVENOUS at 07:58

## 2018-10-19 RX ADMIN — PHENYLEPHRINE HYDROCHLORIDE 100 MCG: 10 INJECTION INTRAVENOUS at 07:37

## 2018-10-19 RX ADMIN — PROPOFOL 20 MG: 10 INJECTION, EMULSION INTRAVENOUS at 08:01

## 2018-10-19 RX ADMIN — FENTANYL CITRATE 50 MCG: 50 INJECTION INTRAMUSCULAR; INTRAVENOUS at 09:10

## 2018-10-19 RX ADMIN — METOPROLOL TARTRATE 2 MG: 5 INJECTION, SOLUTION INTRAVENOUS at 07:56

## 2018-10-19 RX ADMIN — SODIUM CHLORIDE: 9 INJECTION, SOLUTION INTRAVENOUS at 08:20

## 2018-10-19 RX ADMIN — FENTANYL CITRATE 50 MCG: 50 INJECTION INTRAMUSCULAR; INTRAVENOUS at 07:27

## 2018-10-19 RX ADMIN — ONDANSETRON 4 MG: 2 INJECTION INTRAMUSCULAR; INTRAVENOUS at 16:11

## 2018-10-19 RX ADMIN — NEOSTIGMINE METHYLSULFATE 4.5 MG: 1 INJECTION, SOLUTION INTRAVENOUS at 08:34

## 2018-10-19 RX ADMIN — METOPROLOL TARTRATE 1 MG: 5 INJECTION, SOLUTION INTRAVENOUS at 08:08

## 2018-10-19 RX ADMIN — SODIUM CHLORIDE: 9 INJECTION, SOLUTION INTRAVENOUS at 06:12

## 2018-10-19 RX ADMIN — DEXTROSE MONOHYDRATE 2 G: 50 INJECTION, SOLUTION INTRAVENOUS at 23:44

## 2018-10-19 RX ADMIN — KETOROLAC TROMETHAMINE 30 MG: 30 INJECTION, SOLUTION INTRAMUSCULAR; INTRAVENOUS at 08:40

## 2018-10-19 RX ADMIN — DEXAMETHASONE SODIUM PHOSPHATE 8 MG: 4 INJECTION, SOLUTION INTRAMUSCULAR; INTRAVENOUS at 07:54

## 2018-10-19 RX ADMIN — MORPHINE SULFATE 2 MG: 2 INJECTION, SOLUTION INTRAMUSCULAR; INTRAVENOUS at 15:57

## 2018-10-19 RX ADMIN — ONDANSETRON HYDROCHLORIDE 4 MG: 4 INJECTION, SOLUTION INTRAMUSCULAR; INTRAVENOUS at 08:17

## 2018-10-19 RX ADMIN — FENTANYL CITRATE 50 MCG: 50 INJECTION INTRAMUSCULAR; INTRAVENOUS at 09:15

## 2018-10-19 RX ADMIN — FENTANYL CITRATE 50 MCG: 50 INJECTION INTRAMUSCULAR; INTRAVENOUS at 07:24

## 2018-10-19 RX ADMIN — SERTRALINE HYDROCHLORIDE 200 MG: 100 TABLET, FILM COATED ORAL at 14:09

## 2018-10-19 RX ADMIN — DEXTROSE MONOHYDRATE 2 G: 50 INJECTION, SOLUTION INTRAVENOUS at 15:57

## 2018-10-19 RX ADMIN — DOCUSATE SODIUM 100 MG: 100 CAPSULE, LIQUID FILLED ORAL at 14:12

## 2018-10-19 RX ADMIN — SODIUM CHLORIDE: 9 INJECTION, SOLUTION INTRAVENOUS at 15:57

## 2018-10-19 RX ADMIN — METOPROLOL TARTRATE 2 MG: 5 INJECTION, SOLUTION INTRAVENOUS at 08:03

## 2018-10-19 RX ADMIN — ONDANSETRON 4 MG: 2 INJECTION INTRAMUSCULAR; INTRAVENOUS at 21:16

## 2018-10-19 RX ADMIN — LABETALOL HYDROCHLORIDE 5 MG: 5 INJECTION INTRAVENOUS at 08:54

## 2018-10-19 ASSESSMENT — PULMONARY FUNCTION TESTS
PIF_VALUE: 18
PIF_VALUE: 17
PIF_VALUE: 17
PIF_VALUE: 19
PIF_VALUE: 16
PIF_VALUE: 0
PIF_VALUE: 1
PIF_VALUE: 3
PIF_VALUE: 18
PIF_VALUE: 18
PIF_VALUE: 19
PIF_VALUE: 16
PIF_VALUE: 19
PIF_VALUE: 16
PIF_VALUE: 18
PIF_VALUE: 44
PIF_VALUE: 17
PIF_VALUE: 19
PIF_VALUE: 18
PIF_VALUE: 19
PIF_VALUE: 19
PIF_VALUE: 2
PIF_VALUE: 18
PIF_VALUE: 18
PIF_VALUE: 0
PIF_VALUE: 17
PIF_VALUE: 19
PIF_VALUE: 17
PIF_VALUE: 17
PIF_VALUE: 1
PIF_VALUE: 19
PIF_VALUE: 17
PIF_VALUE: 22
PIF_VALUE: 19
PIF_VALUE: 17
PIF_VALUE: 18
PIF_VALUE: 12
PIF_VALUE: 18
PIF_VALUE: 18
PIF_VALUE: 19
PIF_VALUE: 19
PIF_VALUE: 17
PIF_VALUE: 20
PIF_VALUE: 19
PIF_VALUE: 16
PIF_VALUE: 19
PIF_VALUE: 20
PIF_VALUE: 21
PIF_VALUE: 18
PIF_VALUE: 16
PIF_VALUE: 19
PIF_VALUE: 17
PIF_VALUE: 18
PIF_VALUE: 19
PIF_VALUE: 17
PIF_VALUE: 18
PIF_VALUE: 18
PIF_VALUE: 1
PIF_VALUE: 19
PIF_VALUE: 18
PIF_VALUE: 19
PIF_VALUE: 17
PIF_VALUE: 19
PIF_VALUE: 33
PIF_VALUE: 17
PIF_VALUE: 19
PIF_VALUE: 18
PIF_VALUE: 18
PIF_VALUE: 19
PIF_VALUE: 17
PIF_VALUE: 18
PIF_VALUE: 19
PIF_VALUE: 18
PIF_VALUE: 16
PIF_VALUE: 18
PIF_VALUE: 19
PIF_VALUE: 12
PIF_VALUE: 17
PIF_VALUE: 18
PIF_VALUE: 4
PIF_VALUE: 18
PIF_VALUE: 23
PIF_VALUE: 18
PIF_VALUE: 17
PIF_VALUE: 19
PIF_VALUE: 17
PIF_VALUE: 18

## 2018-10-19 ASSESSMENT — PAIN DESCRIPTION - PROGRESSION
CLINICAL_PROGRESSION: NOT CHANGED
CLINICAL_PROGRESSION: GRADUALLY IMPROVING

## 2018-10-19 ASSESSMENT — PAIN SCALES - GENERAL
PAINLEVEL_OUTOF10: 6
PAINLEVEL_OUTOF10: 10
PAINLEVEL_OUTOF10: 6
PAINLEVEL_OUTOF10: 4
PAINLEVEL_OUTOF10: 5
PAINLEVEL_OUTOF10: 8
PAINLEVEL_OUTOF10: 8
PAINLEVEL_OUTOF10: 6
PAINLEVEL_OUTOF10: 10
PAINLEVEL_OUTOF10: 6
PAINLEVEL_OUTOF10: 8
PAINLEVEL_OUTOF10: 10
PAINLEVEL_OUTOF10: 6
PAINLEVEL_OUTOF10: 6
PAINLEVEL_OUTOF10: 8

## 2018-10-19 ASSESSMENT — PAIN DESCRIPTION - PAIN TYPE
TYPE: SURGICAL PAIN;CHRONIC PAIN
TYPE: ACUTE PAIN;SURGICAL PAIN
TYPE: ACUTE PAIN;SURGICAL PAIN
TYPE: CHRONIC PAIN;SURGICAL PAIN
TYPE: ACUTE PAIN;SURGICAL PAIN
TYPE: ACUTE PAIN;SURGICAL PAIN

## 2018-10-19 ASSESSMENT — PAIN DESCRIPTION - FREQUENCY
FREQUENCY: CONTINUOUS

## 2018-10-19 ASSESSMENT — PAIN DESCRIPTION - DESCRIPTORS
DESCRIPTORS: ACHING
DESCRIPTORS: DISCOMFORT;DULL
DESCRIPTORS: ACHING

## 2018-10-19 ASSESSMENT — PAIN DESCRIPTION - ORIENTATION
ORIENTATION: ANTERIOR
ORIENTATION: POSTERIOR
ORIENTATION: ANTERIOR

## 2018-10-19 ASSESSMENT — PAIN DESCRIPTION - LOCATION
LOCATION: NECK
LOCATION: NECK;BACK

## 2018-10-19 ASSESSMENT — PAIN - FUNCTIONAL ASSESSMENT: PAIN_FUNCTIONAL_ASSESSMENT: 0-10

## 2018-10-19 ASSESSMENT — ENCOUNTER SYMPTOMS: SHORTNESS OF BREATH: 1

## 2018-10-19 ASSESSMENT — PAIN DESCRIPTION - ONSET
ONSET: ON-GOING

## 2018-10-19 NOTE — PROGRESS NOTES
7331 ARRIVED IN PACU FROM O R AFTER GENERAL ANESTHESIA. SEE FLOW SHEET.   0910 HAND GRASPS STRONG BUT RIGHT JUST SLIGHTLY WEAKER THAN LEFT AS REPORTED ALSO WAS WHEN CHECKED PREOP PER DENNY DORADO RN , OR NURSE. FENTANYL GIVEN FOR POST OP PAIN PER ORDER DR. HARDEN. 9010 PAIN EASED UP NOW. ELÍAS. REPORT CALLED TO [facility].  5184 WAITING FOR TRANSPORT. WAITING ROOM NOTIFIED  2500 ONE TRANSPORTER HERE WAITING FOR SECOND   SECOND TRANSPORTER HERE. PT . TRANSPORTED TO ROOM WITH O2 IN STABLE CONDITION. ELÍAS.

## 2018-10-19 NOTE — CARE COORDINATION
10/19/18, 3:23 PM      Megan Davila       Admitted from: OR 10/19/2018/ 6[facility] Ext day: 0   Location: 7K-06/006-A Reason for admit: Cervical spinal stenosis [M48.02]  Cervical spinal stenosis [M48.02] Status: Inpatient  Admit order signed?: yes  PMH:  has a past medical history of Back pain; Depression; GERD (gastroesophageal reflux disease); Headache(784.0); Hypertension; Migraines; ALICIA treated with BiPAP; Pneumonia; S/P cardiac catheterization: 7/31/2017: No obstructive lasions. ; and Seizures ([address] Procedure: 10/19/18  C3-4 ACDF, Exploration of fusion C4-6 by Dr. Rayna Lindsay   Pertinent abnormal Imaging:na  Medications:  Scheduled Meds:   divalproex  1,500 mg Oral Daily    lamoTRIgine  200 mg Oral BID    sertraline  200 mg Oral Daily    sodium chloride flush  10 mL Intravenous 2 times per day    docusate sodium  100 mg Oral BID    ceFAZolin (ANCEF) IVPB  2 g Intravenous Q8H    [START ON 10/20/2018] omeprazole  40 mg Oral QAM AC     Continuous Infusions:   sodium chloride 125 mL/hr at 10/19/18 1052      Pertinent Info/Orders/Treatment Plan:  IV fluids, nausea and pain control, N/V checks, dressing care, Ancef IV, monitor drain outputs and record, incentive spirometer    Diet: DIET GENERAL;   Smoking status:  reports that he has never smoked.  He has never used smokeless tobacco.   PCP: Naomy Kirkland MD  Readmission: no  Readmission Risk Score: 7%    Discharge Planning  Current Residence:  Private Residence  Living Arrangements:  Spouse/Significant Other, Children   Support Systems:  Spouse/Significant Other  Current Services PTA:   none  Potential Assistance Needed:  Home Care  Potential Assistance Purchasing Medications:  No  Does patient want to participate in local refill/ meds to beds program?  Yes  Type of Home Care Services:  Nursing Services  Patient expects to be discharged to:  home  Expected Discharge date:  10/21/18  Follow Up Appointment: Best Day/ Time: Monday PM    Discharge Plan: Met

## 2018-10-19 NOTE — BRIEF OP NOTE
Brief Postoperative Note  ______________________________________________________________    Patient: Sherlyn Gant  YOB: 1955  MRN: [id number]  Date of Procedure: 10/19/2018    Pre-Op Diagnosis:   1) Prior C4-6 anterior cervical discectomy and fusion  2) C3-C4 and C6-C7 degenerative disc disease with stenosis and radiculopathy. Post-Op Diagnosis: Same       Procedure(s):  C3-4 ACDF  Exploration of fusion C4-6    Anesthesia: General    Surgeon(s):  [facility], MD    Staff:  Scrub Person First: Josafat Sargent  Physician Assistant: CORINA Jason     Estimated Blood Loss: 10 cc    Complications: None    Specimens:   None    Implants:    Implant Name Type Inv.  Item Serial No.  Lot No. LRB No. Used   KARON-PUTTY PROGENIX DBM 1CC Bone/Graft/Tissue KARON-PUTTY PROGENIX DBM 1CC  [employer] [employer] [id number] N/A 1   IMPL SPACER PEEK PREVAIL 7MM Spine IMPL SPACER PEEK PREVAIL 7MM  [employer] [employer] [id number] N/A 1   SCREW SPINE SELF DRILL 3.5X15MM Spine SCREW SPINE SELF DRILL 3.5X15MM   [employer] [employer]   N/A 2         Drains: RUBENS drain    Findings: Solid fusion at [address] [place]  Date: 10/19/2018  Time: 8:49 AM

## 2018-10-20 PROCEDURE — 1200000000 HC SEMI PRIVATE

## 2018-10-20 PROCEDURE — 6370000000 HC RX 637 (ALT 250 FOR IP): Performed by: PHYSICIAN ASSISTANT

## 2018-10-20 PROCEDURE — 2580000003 HC RX 258: Performed by: PHYSICIAN ASSISTANT

## 2018-10-20 PROCEDURE — 6360000002 HC RX W HCPCS: Performed by: PHYSICIAN ASSISTANT

## 2018-10-20 RX ORDER — DEXAMETHASONE SODIUM PHOSPHATE 4 MG/ML
6 INJECTION, SOLUTION INTRA-ARTICULAR; INTRALESIONAL; INTRAMUSCULAR; INTRAVENOUS; SOFT TISSUE ONCE
Status: COMPLETED | OUTPATIENT
Start: 2018-10-20 | End: 2018-10-20

## 2018-10-20 RX ORDER — ACETAMINOPHEN, ASPIRIN AND CAFFEINE 250; 250; 65 MG/1; MG/1; MG/1
1 TABLET, FILM COATED ORAL ONCE
Status: COMPLETED | OUTPATIENT
Start: 2018-10-20 | End: 2018-10-20

## 2018-10-20 RX ADMIN — ONDANSETRON 4 MG: 2 INJECTION INTRAMUSCULAR; INTRAVENOUS at 10:18

## 2018-10-20 RX ADMIN — Medication 10 ML: at 21:04

## 2018-10-20 RX ADMIN — ONDANSETRON 4 MG: 2 INJECTION INTRAMUSCULAR; INTRAVENOUS at 03:27

## 2018-10-20 RX ADMIN — OMEPRAZOLE 40 MG: 40 CAPSULE, DELAYED RELEASE ORAL at 08:40

## 2018-10-20 RX ADMIN — LAMOTRIGINE 200 MG: 200 TABLET ORAL at 08:40

## 2018-10-20 RX ADMIN — DIVALPROEX SODIUM 1500 MG: 500 TABLET, EXTENDED RELEASE ORAL at 08:40

## 2018-10-20 RX ADMIN — DOCUSATE SODIUM 100 MG: 100 CAPSULE, LIQUID FILLED ORAL at 21:04

## 2018-10-20 RX ADMIN — LAMOTRIGINE 200 MG: 200 TABLET ORAL at 21:04

## 2018-10-20 RX ADMIN — DEXAMETHASONE SODIUM PHOSPHATE 6 MG: 4 INJECTION, SOLUTION INTRAMUSCULAR; INTRAVENOUS at 14:26

## 2018-10-20 RX ADMIN — SODIUM CHLORIDE: 9 INJECTION, SOLUTION INTRAVENOUS at 11:37

## 2018-10-20 RX ADMIN — ACETAMINOPHEN, ASPIRIN AND CAFFEINE 1 TABLET: 250; 250; 65 TABLET, FILM COATED ORAL at 14:26

## 2018-10-20 ASSESSMENT — PAIN DESCRIPTION - DURATION: DURATION_2: CONTINUOUS

## 2018-10-20 ASSESSMENT — PAIN DESCRIPTION - ORIENTATION
ORIENTATION: ANTERIOR
ORIENTATION_2: ANTERIOR

## 2018-10-20 ASSESSMENT — PAIN DESCRIPTION - LOCATION
LOCATION: NECK
LOCATION_2: HEAD
LOCATION: NECK
LOCATION_2: NECK
LOCATION: HEAD
LOCATION_2: HEAD
LOCATION: NECK
LOCATION: HEAD
LOCATION_2: HEAD
LOCATION: NECK

## 2018-10-20 ASSESSMENT — PAIN DESCRIPTION - PROGRESSION
CLINICAL_PROGRESSION: NOT CHANGED
CLINICAL_PROGRESSION_2: NOT CHANGED
CLINICAL_PROGRESSION: GRADUALLY IMPROVING
CLINICAL_PROGRESSION: NOT CHANGED

## 2018-10-20 ASSESSMENT — PAIN SCALES - GENERAL
PAINLEVEL_OUTOF10: 10
PAINLEVEL_OUTOF10: 10
PAINLEVEL_OUTOF10: 3
PAINLEVEL_OUTOF10: 5
PAINLEVEL_OUTOF10: 10
PAINLEVEL_OUTOF10: 10
PAINLEVEL_OUTOF10: 2

## 2018-10-20 ASSESSMENT — PAIN DESCRIPTION - PAIN TYPE
TYPE_2: ACUTE PAIN
TYPE_2: SURGICAL
TYPE: ACUTE PAIN;SURGICAL PAIN
TYPE: SURGICAL PAIN
TYPE: SURGICAL PAIN
TYPE: ACUTE PAIN
TYPE: ACUTE PAIN
TYPE_2: ACUTE PAIN
TYPE: SURGICAL PAIN
TYPE_2: ACUTE PAIN

## 2018-10-20 ASSESSMENT — PAIN DESCRIPTION - ONSET
ONSET: ON-GOING
ONSET_2: ON-GOING

## 2018-10-20 ASSESSMENT — PAIN DESCRIPTION - INTENSITY
RATING_2: 10
RATING_2: 4
RATING_2: 10
RATING_2: 4

## 2018-10-20 ASSESSMENT — PAIN DESCRIPTION - DESCRIPTORS
DESCRIPTORS: ACHING
DESCRIPTORS_2: ACHING
DESCRIPTORS: ACHING
DESCRIPTORS: ACHING

## 2018-10-20 ASSESSMENT — PAIN DESCRIPTION - FREQUENCY
FREQUENCY: CONTINUOUS
FREQUENCY: INTERMITTENT
FREQUENCY: INTERMITTENT

## 2018-10-20 NOTE — PROGRESS NOTES
Department of Orthopedic Surgery  Spine Service  Attending Progress Note        Subjective:  Patient c/o migraine, present preoperatively. Patient reports it feels the same as his chronic migraines, takes Excedrin at home which mildly helps. Patient also having n/v secondary to migraine and pain medications-refusing pain meds. C/o mild dysphagia. Vitals  VITALS:  /70   Pulse 58   Temp 99.1 °F (37.3 °C) (Oral)   Resp 16   Ht 5' 8.5\" (1.74 m)   Wt 212 lb (96.2 kg)   SpO2 92%   BMI 31.77 kg/m²   24HR INTAKE/OUTPUT:    Intake/Output Summary (Last 24 hours) at 10/20/18 1238  Last data filed at 10/20/18 1131   Gross per 24 hour   Intake             2611 ml   Output             1290 ml   Net             1321 ml     URINARY CATHETER OUTPUT ([place]):     DRAIN/TUBE OUTPUT:  Closed/Suction Drain Left; Anterior Neck Bulb-Output (ml): 25 ml    PHYSICAL EXAM:    Orientation:  alert and oriented to person, place and time    Incision:  dressing in place, clean, dry, intact    Upper Extremity Motor :   Deltoid:  5/5  Biceps:  5/5  Triceps:  5/5  Wrist Flexion:  5/5  Wrist Extension:  5/5  Finger Flexion:  5/5  Finger Extension:  5/5    Upper Motor Neuron Signs:  None  Upper Extremity Sensory:  Intact C3-T1 distribution    Flatus:  positive    ABNORMAL EXAM FINDINGS:  none    LABS:    HgB:    Lab Results   Component Value Date    HGB 16.2 09/26/2018       ASSESSMENT AND PLAN:    Post operative day 1 status post C3-4 ACDF    1:  Monitor labs and drain output  2:  Activity Level:  As tolerated  3:  Pain Control:  Ok  4:  Discharge Planning:  Pending  5:  Okay to remove brace when patient sitting up or in bed. Brace to be worn when ambulating.    6:  Will try Excedrin for patient's migraine  7:  Decadron 6 mg IV x 1 dose for dysphagia    Tati Alvarez PA-C

## 2018-10-20 NOTE — OP NOTE
Service and had satisfactory general anesthesia. A first-generation cephalosporin was given within 1 hour of surgical incision, 2 g of cefazolin was given IV. Venous thromboembolic prophylaxis was performed with sequential devices. The patient was then positioned supine on a standard OR table, occiput in a doughnut. Neck extended well within the means of what can be tolerated neurologically. The anterior neck was then prepped and draped entirely in the usual sterile fashion.     Before incision, a formal time-out was taken per protocol. We next took a left-sided Freire-Donovan approach to the anterior cervical spine. A vertical incision was made in line with the skin crease. The platysma was divided in line with this incision. Blunt dissection was then proceeded medial to the sternocleidomastoid and carotid sheath. The omohyoid was divided. Intraoperative localization of level was confirmed. We then elevated the longus colli from C3 through C4 and C6 through C7. We then explored his prior fusion. Patient appeared to have a solid, robust fusion so we elected to leave his hardware in place.      Satisfied with the exposure and confirmation of level, we placed the self-retaining retractor at C6-C7. We then attempted to complete diskectomy, but were unable to safely gain access to the disc space due to patient's anatomy and close proximity to the esophagus.     Satisfied with this, we then repositioned the self-retaining retractor at C3-C4 and complete diskectomy was performed with a variety of curettes from uncus to uncus. Bilateral endplate decortications were performed with a high-speed agnes going back to the PLL. This thereby indirectly decompressed our neural elements. Satisfied with this, we then achieved hemostasis and sized the interspace. A size 7-mm PREVAIL cage was found to be appropriate. The cage was then packed with demineralized bone matrix, impacted into position.  It was then stabilized using

## 2018-10-21 VITALS
OXYGEN SATURATION: 94 % | TEMPERATURE: 98.8 F | WEIGHT: 212 LBS | HEART RATE: 66 BPM | BODY MASS INDEX: 31.4 KG/M2 | SYSTOLIC BLOOD PRESSURE: 142 MMHG | RESPIRATION RATE: 18 BRPM | DIASTOLIC BLOOD PRESSURE: 70 MMHG | HEIGHT: 69 IN

## 2018-10-21 PROCEDURE — 2580000003 HC RX 258: Performed by: PHYSICIAN ASSISTANT

## 2018-10-21 PROCEDURE — 6370000000 HC RX 637 (ALT 250 FOR IP): Performed by: PHYSICIAN ASSISTANT

## 2018-10-21 RX ORDER — ACETAMINOPHEN, ASPIRIN AND CAFFEINE 250; 250; 65 MG/1; MG/1; MG/1
2 TABLET, FILM COATED ORAL EVERY 8 HOURS PRN
Qty: 90 TABLET | Refills: 3 | Status: ON HOLD | OUTPATIENT
Start: 2018-10-22 | End: 2021-07-08 | Stop reason: HOSPADM

## 2018-10-21 RX ORDER — ASPIRIN 81 MG/1
81 TABLET, CHEWABLE ORAL DAILY
Qty: 30 TABLET | Refills: 3 | Status: SHIPPED | OUTPATIENT
Start: 2018-10-22 | End: 2020-02-25

## 2018-10-21 RX ADMIN — Medication 10 ML: at 08:09

## 2018-10-21 RX ADMIN — DOCUSATE SODIUM 100 MG: 100 CAPSULE, LIQUID FILLED ORAL at 08:09

## 2018-10-21 RX ADMIN — DIVALPROEX SODIUM 1500 MG: 500 TABLET, EXTENDED RELEASE ORAL at 08:10

## 2018-10-21 RX ADMIN — LAMOTRIGINE 200 MG: 200 TABLET ORAL at 08:10

## 2018-10-21 RX ADMIN — OMEPRAZOLE 40 MG: 40 CAPSULE, DELAYED RELEASE ORAL at 08:10

## 2018-10-21 RX ADMIN — SERTRALINE HYDROCHLORIDE 200 MG: 100 TABLET, FILM COATED ORAL at 08:10

## 2018-10-21 ASSESSMENT — PAIN DESCRIPTION - PROGRESSION: CLINICAL_PROGRESSION: NOT CHANGED

## 2018-10-21 ASSESSMENT — PAIN DESCRIPTION - ONSET: ONSET: ON-GOING

## 2018-10-21 ASSESSMENT — PAIN DESCRIPTION - ORIENTATION: ORIENTATION: ANTERIOR

## 2018-10-21 ASSESSMENT — PAIN DESCRIPTION - FREQUENCY: FREQUENCY: INTERMITTENT

## 2018-10-21 ASSESSMENT — PAIN DESCRIPTION - PAIN TYPE: TYPE: SURGICAL PAIN

## 2018-10-21 ASSESSMENT — PAIN DESCRIPTION - LOCATION: LOCATION: NECK

## 2018-10-21 ASSESSMENT — PAIN DESCRIPTION - DESCRIPTORS: DESCRIPTORS: ACHING

## 2018-10-21 ASSESSMENT — PAIN SCALES - GENERAL
PAINLEVEL_OUTOF10: 1
PAINLEVEL_OUTOF10: 0

## 2018-10-23 ENCOUNTER — OFFICE VISIT (OUTPATIENT)
Dept: FAMILY MEDICINE CLINIC | Age: 63
End: 2018-10-23
Payer: MEDICARE

## 2018-10-23 VITALS
OXYGEN SATURATION: 94 % | BODY MASS INDEX: 32.36 KG/M2 | WEIGHT: 216 LBS | RESPIRATION RATE: 12 BRPM | SYSTOLIC BLOOD PRESSURE: 120 MMHG | DIASTOLIC BLOOD PRESSURE: 86 MMHG | HEART RATE: 93 BPM

## 2018-10-23 DIAGNOSIS — G43.119 INTRACTABLE MIGRAINE WITH AURA WITHOUT STATUS MIGRAINOSUS: ICD-10-CM

## 2018-10-23 DIAGNOSIS — M48.02 CERVICAL SPINAL STENOSIS: Primary | ICD-10-CM

## 2018-10-23 DIAGNOSIS — R22.0 LIP SWELLING: ICD-10-CM

## 2018-10-23 PROCEDURE — 1111F DSCHRG MED/CURRENT MED MERGE: CPT | Performed by: FAMILY MEDICINE

## 2018-10-23 PROCEDURE — 99495 TRANSJ CARE MGMT MOD F2F 14D: CPT | Performed by: FAMILY MEDICINE

## 2018-10-23 ASSESSMENT — ENCOUNTER SYMPTOMS
DIARRHEA: 0
SORE THROAT: 0
NAUSEA: 0
FACIAL SWELLING: 1
ABDOMINAL PAIN: 0
EYE DISCHARGE: 0
WHEEZING: 0
COUGH: 0
CONSTIPATION: 0
SHORTNESS OF BREATH: 0
RHINORRHEA: 0

## 2018-10-23 NOTE — PROGRESS NOTES
 omeprazole (PRILOSEC) 40 MG delayed release capsule Take 1 capsule by mouth daily 90 capsule 3    divalproex (DEPAKOTE ER) 500 MG extended release tablet Take 1,500 mg by mouth daily       butorphanol (STADOL) 10 MG/ML nasal spray 1 spray by Nasal route every 4 hours as needed for Pain      magnesium oxide (MAG-OX) 400 MG tablet Take 400 mg by mouth daily. Medications patient taking as of now reconciled against medications ordered at time of hospital discharge: Yes    Chief Complaint   Patient presents with    Follow-Up from [address] [place] [address] [place] Sw[facility]       Patient presents for hospital follow-up after surgery on his cervical spine. States the surgery went well to his knowledge and although he does have some obvious surgical pain, he believes she is doing pretty well. He will start work with physical therapy in hopes of reducing pain even more. States that he did have a terrible migraine while in the hospital but was given some steroids and some Excedrin and that did resolve. Also, patient has noted some swelling of his lip since being discharged from the hospital.  Denies any swelling of his tongue or difficulty breathing. Patient has recurrent history of lip swelling but states he hasn't taken anything for this yet. He did have steroids in the hospital after his surgery so wasn't sure if he should take anymore or if he should take an EpiPen. Has seen allergist in the past who suggested that swelling was from lisinopril, but patient hasnt taken in months and still has had episode of lip swelling. States he is sleeping well and denies fever or chills. Otherwise doing well without complaints. Inpatient course: Discharge summary reviewed- see chart. Interval history/Current status: see HPI  Review of Systems   Constitutional: Negative for chills, fatigue and fever. HENT: Positive for facial swelling (lips, recurrent). Negative for congestion, rhinorrhea and sore throat.     Eyes: thought content normal.   Nursing note and vitals reviewed. Assessment/Plan:    1. Cervical spinal stenosis  Cont brace and restrctions per surg  - MO DISCHARGE MEDS RECONCILED W/ CURRENT OUTPATIENT MED LIST    2. Lip swelling  Benadryl q 4 hrs til resolution. Call or return if symptoms worsen or has swelling of tongue/throat    3. Intractable migraine with aura without status migrainosus  Cont meds as prescribed.   - MO DISCHARGE MEDS RECONCILED W/ CURRENT OUTPATIENT MED LIST      Medical Decision Making: moderate complexity

## 2018-11-20 ENCOUNTER — HOSPITAL ENCOUNTER (EMERGENCY)
Age: 63
Discharge: HOME | End: 2018-11-20
Attending: EMERGENCY MEDICINE
Payer: MEDICARE

## 2018-11-20 VITALS
HEIGHT: 69 IN | OXYGEN SATURATION: 99 % | WEIGHT: 218 LBS | HEART RATE: 67 BPM | RESPIRATION RATE: 18 BRPM | DIASTOLIC BLOOD PRESSURE: 89 MMHG | BODY MASS INDEX: 32.29 KG/M2 | SYSTOLIC BLOOD PRESSURE: 131 MMHG | TEMPERATURE: 98.1 F

## 2018-11-20 DIAGNOSIS — S61.412D LACERATION OF LEFT HAND WITHOUT FOREIGN BODY, SUBSEQUENT ENCOUNTER: Primary | ICD-10-CM

## 2018-11-20 PROCEDURE — 12002 RPR S/N/AX/GEN/TRNK2.6-7.5CM: CPT

## 2018-11-20 PROCEDURE — 2709999900 HC NON-CHARGEABLE SUPPLY

## 2018-11-20 PROCEDURE — 99282 EMERGENCY DEPT VISIT SF MDM: CPT

## 2018-11-20 PROCEDURE — 6360000002 HC RX W HCPCS: Performed by: EMERGENCY MEDICINE

## 2018-11-20 PROCEDURE — 90715 TDAP VACCINE 7 YRS/> IM: CPT | Performed by: EMERGENCY MEDICINE

## 2018-11-20 PROCEDURE — 90471 IMMUNIZATION ADMIN: CPT | Performed by: EMERGENCY MEDICINE

## 2018-11-20 PROCEDURE — 2500000003 HC RX 250 WO HCPCS: Performed by: EMERGENCY MEDICINE

## 2018-11-20 RX ORDER — LIDOCAINE HYDROCHLORIDE 10 MG/ML
5 INJECTION, SOLUTION INFILTRATION; PERINEURAL ONCE
Status: COMPLETED | OUTPATIENT
Start: 2018-11-20 | End: 2018-11-20

## 2018-11-20 RX ADMIN — LIDOCAINE HYDROCHLORIDE 5 ML: 10 INJECTION, SOLUTION INFILTRATION; PERINEURAL at 17:25

## 2018-11-20 RX ADMIN — TETANUS TOXOID, REDUCED DIPHTHERIA TOXOID AND ACELLULAR PERTUSSIS VACCINE, ADSORBED 0.5 ML: 5; 2.5; 8; 8; 2.5 SUSPENSION INTRAMUSCULAR at 17:26

## 2018-11-20 ASSESSMENT — PAIN SCALES - GENERAL
PAINLEVEL_OUTOF10: 4
PAINLEVEL_OUTOF10: 4

## 2018-11-20 NOTE — ED NOTES
Non stick dressing applied to sutures on hand. Pt tolerated well. Pt pink warm and dry, breathing with ease. Pt remains alert and oriented. AVS reviewed. Teach back method used. Denies questions or concerns. Pt discharged in stable condition.       Alyssa Sparrow RN  11/20/18 5998

## 2018-11-20 NOTE — ED PROVIDER NOTES
aspirin-acetaminophen-caffeine (EXCEDRIN MIGRAINE) 250-250-65 MG per tablet Take 2 tablets by mouth every 8 hours as needed for Headaches, Disp-90 tablet, R-3Normal      Cholecalciferol (VITAMIN D PO) Take by mouthHistorical Med      EPINEPHrine (EPIPEN 2-BREA) 0.3 MG/0.3ML SOAJ injection Inject 0.3 mLs into the muscle once for 1 dose Use as directed for allergic reaction, Disp-0.3 mL, R-0Normal      sertraline (ZOLOFT) 100 MG tablet Take 2 tablets by mouth daily, Disp-180 tablet, R-3Normal      lamoTRIgine (LAMICTAL) 200 MG tablet Take 1 tablet by mouth 2 times daily, Disp-180 tablet, R-3Normal      omeprazole (PRILOSEC) 40 MG delayed release capsule Take 1 capsule by mouth daily, Disp-90 capsule, R-3Normal      divalproex (DEPAKOTE ER) 500 MG extended release tablet Take 1,500 mg by mouth daily Historical Med      butorphanol (STADOL) 10 MG/ML nasal spray 1 spray by Nasal route every 4 hours as needed for PainHistorical Med      magnesium oxide (MAG-OX) 400 MG tablet Take 400 mg by mouth daily. ALLERGIES    is allergic to bee venom; lisinopril; dilaudid [hydromorphone hcl]; nitrofuran derivatives; and nitroglycerin. FAMILY HISTORY    indicated that his mother is . He indicated that his father is . He indicated that both of his sisters are alive. He indicated that his brother is alive. He indicated that the status of his maternal grandmother is unknown. He indicated that the status of his maternal grandfather is unknown. He indicated that the status of his paternal grandmother is unknown. He indicated that the status of his paternal grandfather is unknown. He indicated that all of his four tabitha are alive.     family history includes Arthritis in his father, maternal grandfather, and maternal grandmother; Asthma in his paternal grandfather and paternal grandmother; Depression in his child and sister; Diabetes in his maternal grandfather; Heart Disease in his father and maternal

## 2018-11-21 ENCOUNTER — OFFICE VISIT (OUTPATIENT)
Dept: FAMILY MEDICINE CLINIC | Age: 63
End: 2018-11-21
Payer: MEDICARE

## 2018-11-21 VITALS
OXYGEN SATURATION: 96 % | TEMPERATURE: 98.6 F | SYSTOLIC BLOOD PRESSURE: 154 MMHG | HEIGHT: 69 IN | HEART RATE: 68 BPM | BODY MASS INDEX: 31.93 KG/M2 | DIASTOLIC BLOOD PRESSURE: 100 MMHG | RESPIRATION RATE: 18 BRPM | WEIGHT: 215.6 LBS

## 2018-11-21 DIAGNOSIS — T78.3XXA ANGIOEDEMA, INITIAL ENCOUNTER: Primary | ICD-10-CM

## 2018-11-21 DIAGNOSIS — T78.40XD ALLERGIC REACTION, SUBSEQUENT ENCOUNTER: ICD-10-CM

## 2018-11-21 PROCEDURE — 96372 THER/PROPH/DIAG INJ SC/IM: CPT | Performed by: NURSE PRACTITIONER

## 2018-11-21 PROCEDURE — 3017F COLORECTAL CA SCREEN DOC REV: CPT | Performed by: NURSE PRACTITIONER

## 2018-11-21 PROCEDURE — G8427 DOCREV CUR MEDS BY ELIG CLIN: HCPCS | Performed by: NURSE PRACTITIONER

## 2018-11-21 PROCEDURE — G8417 CALC BMI ABV UP PARAM F/U: HCPCS | Performed by: NURSE PRACTITIONER

## 2018-11-21 PROCEDURE — G8598 ASA/ANTIPLAT THER USED: HCPCS | Performed by: NURSE PRACTITIONER

## 2018-11-21 PROCEDURE — 99213 OFFICE O/P EST LOW 20 MIN: CPT | Performed by: NURSE PRACTITIONER

## 2018-11-21 PROCEDURE — G8484 FLU IMMUNIZE NO ADMIN: HCPCS | Performed by: NURSE PRACTITIONER

## 2018-11-21 PROCEDURE — 1036F TOBACCO NON-USER: CPT | Performed by: NURSE PRACTITIONER

## 2018-11-21 RX ORDER — EPINEPHRINE 0.3 MG/.3ML
0.3 INJECTION SUBCUTANEOUS ONCE
Qty: 0.3 ML | Refills: 0 | Status: SHIPPED | OUTPATIENT
Start: 2018-11-21 | End: 2020-02-25 | Stop reason: SDUPTHER

## 2018-11-21 RX ORDER — TRIAMCINOLONE ACETONIDE 40 MG/ML
80 INJECTION, SUSPENSION INTRA-ARTICULAR; INTRAMUSCULAR ONCE
Status: COMPLETED | OUTPATIENT
Start: 2018-11-21 | End: 2018-11-21

## 2018-11-21 RX ADMIN — TRIAMCINOLONE ACETONIDE 80 MG: 40 INJECTION, SUSPENSION INTRA-ARTICULAR; INTRAMUSCULAR at 15:58

## 2018-11-21 ASSESSMENT — ENCOUNTER SYMPTOMS
NAUSEA: 0
CHEST TIGHTNESS: 0
VOMITING: 0
BACK PAIN: 0
SHORTNESS OF BREATH: 0
COLOR CHANGE: 0
DIARRHEA: 0
ABDOMINAL PAIN: 0
SORE THROAT: 0
SINUS PRESSURE: 0
WHEEZING: 0
STRIDOR: 0
COUGH: 0
ABDOMINAL DISTENTION: 0
CONSTIPATION: 0

## 2019-01-14 ENCOUNTER — OFFICE VISIT (OUTPATIENT)
Dept: FAMILY MEDICINE CLINIC | Age: 64
End: 2019-01-14
Payer: MEDICARE

## 2019-01-14 VITALS
DIASTOLIC BLOOD PRESSURE: 82 MMHG | OXYGEN SATURATION: 97 % | WEIGHT: 213 LBS | RESPIRATION RATE: 16 BRPM | BODY MASS INDEX: 31.45 KG/M2 | HEART RATE: 56 BPM | SYSTOLIC BLOOD PRESSURE: 128 MMHG

## 2019-01-14 DIAGNOSIS — M54.2 CHRONIC NECK PAIN: ICD-10-CM

## 2019-01-14 DIAGNOSIS — F33.1 MODERATE EPISODE OF RECURRENT MAJOR DEPRESSIVE DISORDER (HCC): ICD-10-CM

## 2019-01-14 DIAGNOSIS — G43.119 INTRACTABLE MIGRAINE WITH AURA WITHOUT STATUS MIGRAINOSUS: Primary | ICD-10-CM

## 2019-01-14 DIAGNOSIS — G89.29 CHRONIC NECK PAIN: ICD-10-CM

## 2019-01-14 PROCEDURE — 3017F COLORECTAL CA SCREEN DOC REV: CPT | Performed by: FAMILY MEDICINE

## 2019-01-14 PROCEDURE — G8598 ASA/ANTIPLAT THER USED: HCPCS | Performed by: FAMILY MEDICINE

## 2019-01-14 PROCEDURE — 96372 THER/PROPH/DIAG INJ SC/IM: CPT | Performed by: FAMILY MEDICINE

## 2019-01-14 PROCEDURE — G8427 DOCREV CUR MEDS BY ELIG CLIN: HCPCS | Performed by: FAMILY MEDICINE

## 2019-01-14 PROCEDURE — 1036F TOBACCO NON-USER: CPT | Performed by: FAMILY MEDICINE

## 2019-01-14 PROCEDURE — G8484 FLU IMMUNIZE NO ADMIN: HCPCS | Performed by: FAMILY MEDICINE

## 2019-01-14 PROCEDURE — 99214 OFFICE O/P EST MOD 30 MIN: CPT | Performed by: FAMILY MEDICINE

## 2019-01-14 PROCEDURE — G8417 CALC BMI ABV UP PARAM F/U: HCPCS | Performed by: FAMILY MEDICINE

## 2019-01-14 RX ORDER — KETOROLAC TROMETHAMINE 30 MG/ML
60 INJECTION, SOLUTION INTRAMUSCULAR; INTRAVENOUS ONCE
Status: COMPLETED | OUTPATIENT
Start: 2019-01-14 | End: 2019-01-14

## 2019-01-14 RX ORDER — RIZATRIPTAN BENZOATE 10 MG/1
10 TABLET ORAL
COMMUNITY
End: 2020-10-14

## 2019-01-14 RX ORDER — DESVENLAFAXINE 50 MG/1
50 TABLET, EXTENDED RELEASE ORAL DAILY
Qty: 30 TABLET | Refills: 3 | Status: SHIPPED | OUTPATIENT
Start: 2019-01-14 | End: 2019-06-05

## 2019-01-14 RX ADMIN — KETOROLAC TROMETHAMINE 60 MG: 30 INJECTION, SOLUTION INTRAMUSCULAR; INTRAVENOUS at 10:57

## 2019-01-14 ASSESSMENT — ENCOUNTER SYMPTOMS
DIARRHEA: 0
BACK PAIN: 1
COUGH: 0
ABDOMINAL PAIN: 0
RHINORRHEA: 0
SHORTNESS OF BREATH: 0
CONSTIPATION: 0
NAUSEA: 0
SORE THROAT: 0
WHEEZING: 0

## 2019-01-17 ENCOUNTER — HOSPITAL ENCOUNTER (OUTPATIENT)
Dept: PHYSICAL THERAPY | Age: 64
Setting detail: THERAPIES SERIES
Discharge: HOME | End: 2019-01-17
Payer: MEDICARE

## 2019-01-17 PROCEDURE — 97161 PT EVAL LOW COMPLEX 20 MIN: CPT

## 2019-01-17 PROCEDURE — 97162 PT EVAL MOD COMPLEX 30 MIN: CPT

## 2019-01-17 ASSESSMENT — PAIN DESCRIPTION - LOCATION: LOCATION: NECK

## 2019-01-17 ASSESSMENT — PAIN DESCRIPTION - ORIENTATION: ORIENTATION: RIGHT;LEFT;ANTERIOR

## 2019-01-17 ASSESSMENT — PAIN DESCRIPTION - PAIN TYPE: TYPE: CHRONIC PAIN

## 2019-01-17 ASSESSMENT — PAIN SCALES - GENERAL: PAINLEVEL_OUTOF10: 7

## 2019-01-21 ENCOUNTER — TELEPHONE (OUTPATIENT)
Dept: FAMILY MEDICINE CLINIC | Age: 64
End: 2019-01-21

## 2019-01-21 ENCOUNTER — HOSPITAL ENCOUNTER (OUTPATIENT)
Dept: PHYSICAL THERAPY | Age: 64
Setting detail: THERAPIES SERIES
Discharge: HOME | End: 2019-01-21
Payer: MEDICARE

## 2019-01-21 DIAGNOSIS — I10 BENIGN HYPERTENSION: Primary | ICD-10-CM

## 2019-01-21 PROCEDURE — 97140 MANUAL THERAPY 1/> REGIONS: CPT

## 2019-01-21 PROCEDURE — 97530 THERAPEUTIC ACTIVITIES: CPT

## 2019-01-21 RX ORDER — HYDROCHLOROTHIAZIDE 25 MG/1
25 TABLET ORAL EVERY MORNING
Qty: 90 TABLET | Refills: 1 | Status: SHIPPED | OUTPATIENT
Start: 2019-01-21 | End: 2019-04-11

## 2019-01-21 ASSESSMENT — PAIN DESCRIPTION - LOCATION: LOCATION: NECK

## 2019-01-21 ASSESSMENT — PAIN SCALES - GENERAL: PAINLEVEL_OUTOF10: 7

## 2019-01-21 ASSESSMENT — PAIN DESCRIPTION - PAIN TYPE: TYPE: CHRONIC PAIN

## 2019-01-22 ENCOUNTER — HOSPITAL ENCOUNTER (EMERGENCY)
Age: 64
Discharge: HOME | End: 2019-01-22
Payer: MEDICARE

## 2019-01-22 ENCOUNTER — TELEPHONE (OUTPATIENT)
Dept: FAMILY MEDICINE CLINIC | Age: 64
End: 2019-01-22

## 2019-01-22 VITALS
BODY MASS INDEX: 31.55 KG/M2 | RESPIRATION RATE: 16 BRPM | OXYGEN SATURATION: 94 % | TEMPERATURE: 98.6 F | HEART RATE: 64 BPM | DIASTOLIC BLOOD PRESSURE: 73 MMHG | WEIGHT: 213 LBS | SYSTOLIC BLOOD PRESSURE: 141 MMHG | HEIGHT: 69 IN

## 2019-01-22 DIAGNOSIS — G43.009 MIGRAINE WITHOUT AURA AND WITHOUT STATUS MIGRAINOSUS, NOT INTRACTABLE: Primary | ICD-10-CM

## 2019-01-22 DIAGNOSIS — R03.0 ELEVATED BLOOD PRESSURE READING: ICD-10-CM

## 2019-01-22 LAB
ALBUMIN SERPL-MCNC: 4.6 G/DL (ref 3.5–5.1)
ALP BLD-CCNC: 99 U/L (ref 38–126)
ALT SERPL-CCNC: 25 U/L (ref 11–66)
AMPHETAMINE+METHAMPHETAMINE URINE SCREEN: NEGATIVE
ANION GAP SERPL CALCULATED.3IONS-SCNC: 17 MEQ/L (ref 8–16)
AST SERPL-CCNC: 24 U/L (ref 5–40)
BARBITURATE QUANTITATIVE URINE: NEGATIVE
BASOPHILS # BLD: 0.4 %
BASOPHILS ABSOLUTE: 0 THOU/MM3 (ref 0–0.1)
BENZODIAZEPINE QUANTITATIVE URINE: NEGATIVE
BILIRUB SERPL-MCNC: 0.5 MG/DL (ref 0.3–1.2)
BUN BLDV-MCNC: 15 MG/DL (ref 7–22)
CALCIUM SERPL-MCNC: 9.8 MG/DL (ref 8.5–10.5)
CANNABINOID QUANTITATIVE URINE: NEGATIVE
CHLORIDE BLD-SCNC: 102 MEQ/L (ref 98–111)
CO2: 22 MEQ/L (ref 23–33)
COCAINE METABOLITE QUANTITATIVE URINE: NEGATIVE
CREAT SERPL-MCNC: 0.9 MG/DL (ref 0.4–1.2)
EKG ATRIAL RATE: 63 BPM
EKG P AXIS: 54 DEGREES
EKG P-R INTERVAL: 138 MS
EKG Q-T INTERVAL: 428 MS
EKG QRS DURATION: 112 MS
EKG QTC CALCULATION (BAZETT): 437 MS
EKG R AXIS: -47 DEGREES
EKG T AXIS: 30 DEGREES
EKG VENTRICULAR RATE: 63 BPM
EOSINOPHIL # BLD: 0.1 %
EOSINOPHILS ABSOLUTE: 0 THOU/MM3 (ref 0–0.4)
ERYTHROCYTE [DISTWIDTH] IN BLOOD BY AUTOMATED COUNT: 13.4 % (ref 11.5–14.5)
ERYTHROCYTE [DISTWIDTH] IN BLOOD BY AUTOMATED COUNT: 45.4 FL (ref 35–45)
GFR SERPL CREATININE-BSD FRML MDRD: 86 ML/MIN/1.73M2
GLUCOSE BLD-MCNC: 107 MG/DL (ref 70–108)
HCT VFR BLD CALC: 50.3 % (ref 42–52)
HEMOGLOBIN: 16.3 GM/DL (ref 14–18)
IMMATURE GRANS (ABS): 0.06 THOU/MM3 (ref 0–0.07)
IMMATURE GRANULOCYTES: 0.5 %
LYMPHOCYTES # BLD: 22.2 %
LYMPHOCYTES ABSOLUTE: 2.8 THOU/MM3 (ref 1–4.8)
MAGNESIUM: 2.4 MG/DL (ref 1.6–2.4)
MCH RBC QN AUTO: 30 PG (ref 26–33)
MCHC RBC AUTO-ENTMCNC: 32.4 GM/DL (ref 32.2–35.5)
MCV RBC AUTO: 92.5 FL (ref 80–94)
MONOCYTES # BLD: 9.7 %
MONOCYTES ABSOLUTE: 1.2 THOU/MM3 (ref 0.4–1.3)
NUCLEATED RED BLOOD CELLS: 0 /100 WBC
OPIATES, URINE: NEGATIVE
OSMOLALITY CALCULATION: 282.6 MOSMOL/KG (ref 275–300)
OXYCODONE: NEGATIVE
PHENCYCLIDINE QUANTITATIVE URINE: NEGATIVE
PLATELET # BLD: 406 THOU/MM3 (ref 130–400)
PMV BLD AUTO: 10.4 FL (ref 9.4–12.4)
POTASSIUM SERPL-SCNC: 4.8 MEQ/L (ref 3.5–5.2)
RBC # BLD: 5.44 MILL/MM3 (ref 4.7–6.1)
SEG NEUTROPHILS: 67.1 %
SEGMENTED NEUTROPHILS ABSOLUTE COUNT: 8.3 THOU/MM3 (ref 1.8–7.7)
SODIUM BLD-SCNC: 141 MEQ/L (ref 135–145)
TOTAL PROTEIN: 7.8 G/DL (ref 6.1–8)
TROPONIN T: < 0.01 NG/ML
WBC # BLD: 12.4 THOU/MM3 (ref 4.8–10.8)

## 2019-01-22 PROCEDURE — 93005 ELECTROCARDIOGRAM TRACING: CPT | Performed by: PHYSICIAN ASSISTANT

## 2019-01-22 PROCEDURE — 80307 DRUG TEST PRSMV CHEM ANLYZR: CPT

## 2019-01-22 PROCEDURE — 99284 EMERGENCY DEPT VISIT MOD MDM: CPT

## 2019-01-22 PROCEDURE — 83735 ASSAY OF MAGNESIUM: CPT

## 2019-01-22 PROCEDURE — 2580000003 HC RX 258: Performed by: PHYSICIAN ASSISTANT

## 2019-01-22 PROCEDURE — 6360000002 HC RX W HCPCS: Performed by: PHYSICIAN ASSISTANT

## 2019-01-22 PROCEDURE — 96374 THER/PROPH/DIAG INJ IV PUSH: CPT

## 2019-01-22 PROCEDURE — 36415 COLL VENOUS BLD VENIPUNCTURE: CPT

## 2019-01-22 PROCEDURE — 84484 ASSAY OF TROPONIN QUANT: CPT

## 2019-01-22 PROCEDURE — 80053 COMPREHEN METABOLIC PANEL: CPT

## 2019-01-22 PROCEDURE — 96375 TX/PRO/DX INJ NEW DRUG ADDON: CPT

## 2019-01-22 PROCEDURE — 85025 COMPLETE CBC W/AUTO DIFF WBC: CPT

## 2019-01-22 PROCEDURE — 96372 THER/PROPH/DIAG INJ SC/IM: CPT

## 2019-01-22 RX ORDER — KETOROLAC TROMETHAMINE 30 MG/ML
30 INJECTION, SOLUTION INTRAMUSCULAR; INTRAVENOUS ONCE
Status: COMPLETED | OUTPATIENT
Start: 2019-01-22 | End: 2019-01-22

## 2019-01-22 RX ORDER — ONDANSETRON 4 MG/1
4 TABLET, ORALLY DISINTEGRATING ORAL EVERY 8 HOURS PRN
Qty: 21 TABLET | Refills: 0 | Status: SHIPPED | OUTPATIENT
Start: 2019-01-22 | End: 2019-01-29

## 2019-01-22 RX ORDER — DIPHENHYDRAMINE HYDROCHLORIDE 50 MG/ML
25 INJECTION INTRAMUSCULAR; INTRAVENOUS ONCE
Status: COMPLETED | OUTPATIENT
Start: 2019-01-22 | End: 2019-01-22

## 2019-01-22 RX ORDER — 0.9 % SODIUM CHLORIDE 0.9 %
500 INTRAVENOUS SOLUTION INTRAVENOUS ONCE
Status: COMPLETED | OUTPATIENT
Start: 2019-01-22 | End: 2019-01-22

## 2019-01-22 RX ADMIN — DIPHENHYDRAMINE HYDROCHLORIDE 25 MG: 50 INJECTION, SOLUTION INTRAMUSCULAR; INTRAVENOUS at 14:33

## 2019-01-22 RX ADMIN — SODIUM CHLORIDE 500 ML: 9 INJECTION, SOLUTION INTRAVENOUS at 14:33

## 2019-01-22 RX ADMIN — KETOROLAC TROMETHAMINE 30 MG: 30 INJECTION, SOLUTION INTRAMUSCULAR at 14:33

## 2019-01-22 RX ADMIN — PROCHLORPERAZINE EDISYLATE 10 MG: 5 INJECTION INTRAMUSCULAR; INTRAVENOUS at 14:33

## 2019-01-22 ASSESSMENT — ENCOUNTER SYMPTOMS
ABDOMINAL PAIN: 0
DIARRHEA: 0
BLOOD IN STOOL: 0
CHEST TIGHTNESS: 0
CONSTIPATION: 0
SHORTNESS OF BREATH: 0
COUGH: 0
SORE THROAT: 0
NAUSEA: 1
BACK PAIN: 0
EYE PAIN: 0
RHINORRHEA: 0
VOMITING: 1
PHOTOPHOBIA: 1
WHEEZING: 0

## 2019-01-22 ASSESSMENT — PAIN SCALES - GENERAL
PAINLEVEL_OUTOF10: 10
PAINLEVEL_OUTOF10: 10

## 2019-01-22 ASSESSMENT — PAIN DESCRIPTION - DESCRIPTORS: DESCRIPTORS: ACHING;PRESSURE

## 2019-01-22 ASSESSMENT — PAIN DESCRIPTION - LOCATION: LOCATION: HEAD

## 2019-01-22 ASSESSMENT — PAIN DESCRIPTION - PAIN TYPE: TYPE: ACUTE PAIN

## 2019-01-23 ENCOUNTER — TELEPHONE (OUTPATIENT)
Dept: FAMILY MEDICINE CLINIC | Age: 64
End: 2019-01-23

## 2019-01-23 ENCOUNTER — HOSPITAL ENCOUNTER (OUTPATIENT)
Dept: PHYSICAL THERAPY | Age: 64
Setting detail: THERAPIES SERIES
Discharge: HOME | End: 2019-01-23
Payer: MEDICARE

## 2019-01-23 PROCEDURE — 97530 THERAPEUTIC ACTIVITIES: CPT

## 2019-01-23 PROCEDURE — 97140 MANUAL THERAPY 1/> REGIONS: CPT

## 2019-01-23 PROCEDURE — 93010 ELECTROCARDIOGRAM REPORT: CPT | Performed by: INTERNAL MEDICINE

## 2019-01-23 ASSESSMENT — PAIN SCALES - GENERAL: PAINLEVEL_OUTOF10: 6

## 2019-01-23 ASSESSMENT — PAIN DESCRIPTION - PAIN TYPE: TYPE: CHRONIC PAIN

## 2019-01-23 ASSESSMENT — PAIN DESCRIPTION - LOCATION: LOCATION: BACK;NECK

## 2019-01-23 ASSESSMENT — PAIN DESCRIPTION - ORIENTATION: ORIENTATION: LOWER

## 2019-01-24 ENCOUNTER — OFFICE VISIT (OUTPATIENT)
Dept: FAMILY MEDICINE CLINIC | Age: 64
End: 2019-01-24
Payer: MEDICARE

## 2019-01-24 VITALS
RESPIRATION RATE: 18 BRPM | WEIGHT: 211.6 LBS | DIASTOLIC BLOOD PRESSURE: 80 MMHG | BODY MASS INDEX: 31.25 KG/M2 | SYSTOLIC BLOOD PRESSURE: 130 MMHG | OXYGEN SATURATION: 98 % | HEART RATE: 56 BPM

## 2019-01-24 DIAGNOSIS — G40.909 SEIZURE DISORDER (HCC): ICD-10-CM

## 2019-01-24 DIAGNOSIS — F33.1 MODERATE EPISODE OF RECURRENT MAJOR DEPRESSIVE DISORDER (HCC): Primary | ICD-10-CM

## 2019-01-24 DIAGNOSIS — G43.119 INTRACTABLE MIGRAINE WITH AURA WITHOUT STATUS MIGRAINOSUS: ICD-10-CM

## 2019-01-24 PROCEDURE — G8417 CALC BMI ABV UP PARAM F/U: HCPCS | Performed by: FAMILY MEDICINE

## 2019-01-24 PROCEDURE — G8484 FLU IMMUNIZE NO ADMIN: HCPCS | Performed by: FAMILY MEDICINE

## 2019-01-24 PROCEDURE — 1036F TOBACCO NON-USER: CPT | Performed by: FAMILY MEDICINE

## 2019-01-24 PROCEDURE — 99214 OFFICE O/P EST MOD 30 MIN: CPT | Performed by: FAMILY MEDICINE

## 2019-01-24 PROCEDURE — G8427 DOCREV CUR MEDS BY ELIG CLIN: HCPCS | Performed by: FAMILY MEDICINE

## 2019-01-24 PROCEDURE — G8598 ASA/ANTIPLAT THER USED: HCPCS | Performed by: FAMILY MEDICINE

## 2019-01-24 PROCEDURE — 3017F COLORECTAL CA SCREEN DOC REV: CPT | Performed by: FAMILY MEDICINE

## 2019-01-24 RX ORDER — KETOROLAC TROMETHAMINE 10 MG/1
10 TABLET, FILM COATED ORAL EVERY 8 HOURS PRN
Qty: 10 TABLET | Refills: 0 | Status: SHIPPED | OUTPATIENT
Start: 2019-01-24 | End: 2020-02-25

## 2019-01-24 ASSESSMENT — ENCOUNTER SYMPTOMS
SORE THROAT: 0
COUGH: 0
ABDOMINAL PAIN: 0
BACK PAIN: 1
RHINORRHEA: 0
WHEEZING: 0
DIARRHEA: 0
CONSTIPATION: 0
SHORTNESS OF BREATH: 0
NAUSEA: 0

## 2019-01-26 DIAGNOSIS — K21.9 GASTROESOPHAGEAL REFLUX DISEASE WITHOUT ESOPHAGITIS: ICD-10-CM

## 2019-01-26 DIAGNOSIS — G43.119 INTRACTABLE MIGRAINE WITH AURA WITHOUT STATUS MIGRAINOSUS: ICD-10-CM

## 2019-01-26 DIAGNOSIS — F33.1 MODERATE EPISODE OF RECURRENT MAJOR DEPRESSIVE DISORDER (HCC): ICD-10-CM

## 2019-01-28 ENCOUNTER — HOSPITAL ENCOUNTER (OUTPATIENT)
Dept: PHYSICAL THERAPY | Age: 64
Setting detail: THERAPIES SERIES
Discharge: HOME | End: 2019-01-28
Payer: MEDICARE

## 2019-01-28 PROCEDURE — 97530 THERAPEUTIC ACTIVITIES: CPT

## 2019-01-28 PROCEDURE — 97140 MANUAL THERAPY 1/> REGIONS: CPT

## 2019-01-28 RX ORDER — OMEPRAZOLE 40 MG/1
40 CAPSULE, DELAYED RELEASE ORAL DAILY
Qty: 90 CAPSULE | Refills: 3 | Status: SHIPPED | OUTPATIENT
Start: 2019-01-28 | End: 2020-02-04 | Stop reason: SDUPTHER

## 2019-01-28 RX ORDER — LAMOTRIGINE 200 MG/1
TABLET ORAL
Qty: 180 TABLET | Refills: 3 | Status: SHIPPED | OUTPATIENT
Start: 2019-01-28

## 2019-01-28 RX ORDER — SERTRALINE HYDROCHLORIDE 100 MG/1
200 TABLET, FILM COATED ORAL DAILY
Qty: 180 TABLET | Refills: 3 | Status: SHIPPED | OUTPATIENT
Start: 2019-01-28 | End: 2019-03-14

## 2019-01-28 ASSESSMENT — PAIN SCALES - GENERAL: PAINLEVEL_OUTOF10: 10

## 2019-01-28 ASSESSMENT — PAIN DESCRIPTION - LOCATION: LOCATION: HEAD

## 2019-01-28 ASSESSMENT — PAIN DESCRIPTION - PAIN TYPE: TYPE: CHRONIC PAIN

## 2019-01-31 ENCOUNTER — HOSPITAL ENCOUNTER (OUTPATIENT)
Dept: PHYSICAL THERAPY | Age: 64
Setting detail: THERAPIES SERIES
End: 2019-01-31
Payer: MEDICARE

## 2019-01-31 ASSESSMENT — PAIN SCALES - GENERAL: PAINLEVEL_OUTOF10: 7

## 2019-02-04 ENCOUNTER — HOSPITAL ENCOUNTER (OUTPATIENT)
Dept: PHYSICAL THERAPY | Age: 64
Setting detail: THERAPIES SERIES
Discharge: HOME | End: 2019-02-04
Payer: MEDICARE

## 2019-02-04 PROCEDURE — 97140 MANUAL THERAPY 1/> REGIONS: CPT

## 2019-02-04 PROCEDURE — 97110 THERAPEUTIC EXERCISES: CPT

## 2019-02-07 ENCOUNTER — HOSPITAL ENCOUNTER (OUTPATIENT)
Dept: PHYSICAL THERAPY | Age: 64
Setting detail: THERAPIES SERIES
Discharge: HOME | End: 2019-02-07
Payer: MEDICARE

## 2019-02-07 PROCEDURE — 97140 MANUAL THERAPY 1/> REGIONS: CPT

## 2019-02-07 PROCEDURE — 97110 THERAPEUTIC EXERCISES: CPT

## 2019-02-07 ASSESSMENT — PAIN DESCRIPTION - PAIN TYPE: TYPE: CHRONIC PAIN

## 2019-02-07 ASSESSMENT — PAIN SCALES - GENERAL: PAINLEVEL_OUTOF10: 5

## 2019-02-07 ASSESSMENT — PAIN DESCRIPTION - LOCATION: LOCATION: NECK

## 2019-02-11 ENCOUNTER — APPOINTMENT (OUTPATIENT)
Dept: PHYSICAL THERAPY | Age: 64
End: 2019-02-11
Payer: MEDICARE

## 2019-02-14 ENCOUNTER — HOSPITAL ENCOUNTER (OUTPATIENT)
Dept: PHYSICAL THERAPY | Age: 64
Setting detail: THERAPIES SERIES
Discharge: HOME | End: 2019-02-14
Payer: MEDICARE

## 2019-02-14 PROCEDURE — 97530 THERAPEUTIC ACTIVITIES: CPT

## 2019-02-14 ASSESSMENT — PAIN DESCRIPTION - LOCATION: LOCATION: NECK

## 2019-02-14 ASSESSMENT — PAIN DESCRIPTION - PAIN TYPE: TYPE: CHRONIC PAIN

## 2019-02-14 ASSESSMENT — PAIN SCALES - GENERAL: PAINLEVEL_OUTOF10: 10

## 2019-03-14 ENCOUNTER — OFFICE VISIT (OUTPATIENT)
Dept: FAMILY MEDICINE CLINIC | Age: 64
End: 2019-03-14
Payer: MEDICARE

## 2019-03-14 VITALS
BODY MASS INDEX: 31.22 KG/M2 | RESPIRATION RATE: 12 BRPM | DIASTOLIC BLOOD PRESSURE: 80 MMHG | SYSTOLIC BLOOD PRESSURE: 120 MMHG | OXYGEN SATURATION: 94 % | HEART RATE: 63 BPM | WEIGHT: 211.4 LBS

## 2019-03-14 DIAGNOSIS — R42 VERTIGO: ICD-10-CM

## 2019-03-14 DIAGNOSIS — Z11.4 ENCOUNTER FOR SCREENING FOR HIV: ICD-10-CM

## 2019-03-14 DIAGNOSIS — Z11.59 ENCOUNTER FOR HEPATITIS C SCREENING TEST FOR LOW RISK PATIENT: ICD-10-CM

## 2019-03-14 DIAGNOSIS — F33.1 MODERATE EPISODE OF RECURRENT MAJOR DEPRESSIVE DISORDER (HCC): Primary | ICD-10-CM

## 2019-03-14 DIAGNOSIS — I10 ESSENTIAL HYPERTENSION: ICD-10-CM

## 2019-03-14 DIAGNOSIS — M48.02 CERVICAL SPINAL STENOSIS: ICD-10-CM

## 2019-03-14 PROCEDURE — G8427 DOCREV CUR MEDS BY ELIG CLIN: HCPCS | Performed by: FAMILY MEDICINE

## 2019-03-14 PROCEDURE — G8484 FLU IMMUNIZE NO ADMIN: HCPCS | Performed by: FAMILY MEDICINE

## 2019-03-14 PROCEDURE — 3017F COLORECTAL CA SCREEN DOC REV: CPT | Performed by: FAMILY MEDICINE

## 2019-03-14 PROCEDURE — G8417 CALC BMI ABV UP PARAM F/U: HCPCS | Performed by: FAMILY MEDICINE

## 2019-03-14 PROCEDURE — 99214 OFFICE O/P EST MOD 30 MIN: CPT | Performed by: FAMILY MEDICINE

## 2019-03-14 PROCEDURE — G8598 ASA/ANTIPLAT THER USED: HCPCS | Performed by: FAMILY MEDICINE

## 2019-03-14 PROCEDURE — 1036F TOBACCO NON-USER: CPT | Performed by: FAMILY MEDICINE

## 2019-03-14 ASSESSMENT — ENCOUNTER SYMPTOMS
EYE DISCHARGE: 0
CONSTIPATION: 0
DIARRHEA: 0
SHORTNESS OF BREATH: 0
WHEEZING: 0
RHINORRHEA: 0

## 2019-03-18 ENCOUNTER — HOSPITAL ENCOUNTER (OUTPATIENT)
Age: 64
Discharge: HOME | End: 2019-03-18
Payer: MEDICARE

## 2019-03-18 DIAGNOSIS — Z11.4 ENCOUNTER FOR SCREENING FOR HIV: ICD-10-CM

## 2019-03-18 DIAGNOSIS — Z11.59 ENCOUNTER FOR HEPATITIS C SCREENING TEST FOR LOW RISK PATIENT: ICD-10-CM

## 2019-03-18 PROCEDURE — 36415 COLL VENOUS BLD VENIPUNCTURE: CPT

## 2019-03-18 PROCEDURE — 87389 HIV-1 AG W/HIV-1&-2 AB AG IA: CPT

## 2019-03-18 PROCEDURE — 86803 HEPATITIS C AB TEST: CPT

## 2019-03-19 LAB — HEPATITIS C ANTIBODY: NEGATIVE

## 2019-03-21 LAB — HIV-2 AB: NEGATIVE

## 2019-04-11 ENCOUNTER — PATIENT MESSAGE (OUTPATIENT)
Dept: FAMILY MEDICINE CLINIC | Age: 64
End: 2019-04-11

## 2019-04-11 NOTE — TELEPHONE ENCOUNTER
From: Brynn Ellington  To: Sinai Solorzano MD  Sent: 4/11/2019 11:21 AM EDT  Subject: Non-Urgent Medical Question    let you know, I think I know what is making me very dizzy, I was reading more on the blood pressure med hydrochiorothiazide 25mg. that makes you dizzy. so I didn't take for 4days an felt fine, I started it back up today an after a few hours I started to get very dizzy. please let me know what you want me to do.  thank you Surgeon Performing Repair (Optional): RAPHAEL Tucker MD

## 2019-06-05 ENCOUNTER — PATIENT MESSAGE (OUTPATIENT)
Dept: FAMILY MEDICINE CLINIC | Age: 64
End: 2019-06-05

## 2019-06-05 DIAGNOSIS — F33.1 MODERATE EPISODE OF RECURRENT MAJOR DEPRESSIVE DISORDER (HCC): ICD-10-CM

## 2019-06-05 RX ORDER — SERTRALINE HYDROCHLORIDE 100 MG/1
200 TABLET, FILM COATED ORAL DAILY
Qty: 180 TABLET | Refills: 3
Start: 2019-06-05 | End: 2021-06-10 | Stop reason: SDUPTHER

## 2019-06-05 NOTE — TELEPHONE ENCOUNTER
From: Jahaira Perea  To:  Brandi Cho MD  Sent: 6/5/2019 1:18 PM EDT  Subject: Prescription Question    was wondering if I can go back to zoloff, the other is getting to expensive. medicare isn't paying their part, we don't know why. plus I have a lot of zoloff left thank you Salvatore Bauer

## 2020-02-05 RX ORDER — OMEPRAZOLE 40 MG/1
40 CAPSULE, DELAYED RELEASE ORAL DAILY
Qty: 90 CAPSULE | Refills: 3 | Status: SHIPPED | OUTPATIENT
Start: 2020-02-05 | End: 2020-11-16

## 2020-02-18 ENCOUNTER — TELEPHONE (OUTPATIENT)
Dept: FAMILY MEDICINE CLINIC | Age: 65
End: 2020-02-18

## 2020-02-18 NOTE — TELEPHONE ENCOUNTER
Patient is scheduled for a pre op with Dr. Conor Rosales 02/27/20. I received the form from [facility] and they have ordered the testing required. I attempted to contact patient to see where is will have the testing done but the telephone listed is no longer is service.

## 2020-02-25 ENCOUNTER — HOSPITAL ENCOUNTER (OUTPATIENT)
Dept: GENERAL RADIOLOGY | Age: 65
Discharge: HOME | End: 2020-02-25
Payer: MEDICARE

## 2020-02-25 ENCOUNTER — HOSPITAL ENCOUNTER (OUTPATIENT)
Dept: PREADMISSION TESTING | Age: 65
Discharge: HOME | End: 2020-02-29
Payer: MEDICARE

## 2020-02-25 VITALS
OXYGEN SATURATION: 94 % | WEIGHT: 205.03 LBS | HEART RATE: 63 BPM | TEMPERATURE: 97.5 F | SYSTOLIC BLOOD PRESSURE: 146 MMHG | BODY MASS INDEX: 30.37 KG/M2 | DIASTOLIC BLOOD PRESSURE: 93 MMHG | HEIGHT: 69 IN

## 2020-02-25 LAB
ANION GAP SERPL CALCULATED.3IONS-SCNC: 12 MEQ/L (ref 8–16)
APTT: 31.6 SECONDS (ref 22–38)
BUN BLDV-MCNC: 10 MG/DL (ref 7–22)
CALCIUM SERPL-MCNC: 9.2 MG/DL (ref 8.5–10.5)
CHLORIDE BLD-SCNC: 102 MEQ/L (ref 98–111)
CO2: 28 MEQ/L (ref 23–33)
CREAT SERPL-MCNC: 1 MG/DL (ref 0.4–1.2)
EKG ATRIAL RATE: 62 BPM
EKG P AXIS: 58 DEGREES
EKG P-R INTERVAL: 140 MS
EKG Q-T INTERVAL: 420 MS
EKG QRS DURATION: 106 MS
EKG QTC CALCULATION (BAZETT): 426 MS
EKG R AXIS: -60 DEGREES
EKG T AXIS: 48 DEGREES
EKG VENTRICULAR RATE: 62 BPM
ERYTHROCYTE [DISTWIDTH] IN BLOOD BY AUTOMATED COUNT: 12.8 % (ref 11.5–14.5)
ERYTHROCYTE [DISTWIDTH] IN BLOOD BY AUTOMATED COUNT: 45.2 FL (ref 35–45)
GFR SERPL CREATININE-BSD FRML MDRD: 76 ML/MIN/1.73M2
GLUCOSE BLD-MCNC: 108 MG/DL (ref 70–108)
HCT VFR BLD CALC: 52.2 % (ref 42–52)
HEMOGLOBIN: 16.4 GM/DL (ref 14–18)
INR BLD: 1.02 (ref 0.85–1.13)
MCH RBC QN AUTO: 30.4 PG (ref 26–33)
MCHC RBC AUTO-ENTMCNC: 31.4 GM/DL (ref 32.2–35.5)
MCV RBC AUTO: 96.8 FL (ref 80–94)
MRSA NASAL SCREEN RT-PCR: NEGATIVE
PLATELET # BLD: 446 THOU/MM3 (ref 130–400)
PMV BLD AUTO: 10.2 FL (ref 9.4–12.4)
POTASSIUM SERPL-SCNC: 4.6 MEQ/L (ref 3.5–5.2)
RBC # BLD: 5.39 MILL/MM3 (ref 4.7–6.1)
SODIUM BLD-SCNC: 142 MEQ/L (ref 135–145)
STAPH AUREUS SCREEN RT-PCR: NEGATIVE
WBC # BLD: 9.4 THOU/MM3 (ref 4.8–10.8)

## 2020-02-25 PROCEDURE — 80048 BASIC METABOLIC PNL TOTAL CA: CPT

## 2020-02-25 PROCEDURE — 87081 CULTURE SCREEN ONLY: CPT

## 2020-02-25 PROCEDURE — 71046 X-RAY EXAM CHEST 2 VIEWS: CPT

## 2020-02-25 PROCEDURE — 87641 MR-STAPH DNA AMP PROBE: CPT

## 2020-02-25 PROCEDURE — 93010 ELECTROCARDIOGRAM REPORT: CPT | Performed by: INTERNAL MEDICINE

## 2020-02-25 PROCEDURE — 85730 THROMBOPLASTIN TIME PARTIAL: CPT

## 2020-02-25 PROCEDURE — 85610 PROTHROMBIN TIME: CPT

## 2020-02-25 PROCEDURE — 85027 COMPLETE CBC AUTOMATED: CPT

## 2020-02-25 PROCEDURE — 87640 STAPH A DNA AMP PROBE: CPT

## 2020-02-25 PROCEDURE — 93005 ELECTROCARDIOGRAM TRACING: CPT | Performed by: ORTHOPAEDIC SURGERY

## 2020-02-25 PROCEDURE — 36415 COLL VENOUS BLD VENIPUNCTURE: CPT

## 2020-02-25 RX ORDER — EPINEPHRINE 0.3 MG/.3ML
0.3 INJECTION SUBCUTANEOUS PRN
COMMUNITY
End: 2022-08-31

## 2020-02-25 NOTE — TELEPHONE ENCOUNTER
Labs, EKG and testing are done and in the media. Will print off tomorrow to place with the form in the mailbox that needs filled out for prior authorization.

## 2020-02-25 NOTE — PROGRESS NOTES
[employer]® KIT SHOWER INSTRUCITONS    ___3/11_____ (date)   FIRST SHOWER: Two days before surgery: Take a shower and wash your entire body, including your hair and scalp in the following manner:   Wash your hair using normal shampoo. Make sure you rinse the shampoo from your hair and body. Wash your face with your regular soap or cleanser.  Use one of the sponges in the [facility] kit and apply 1/3 of the Chlorhexidine soap to the sponge, wash from your neck down. This is very important. Do not use the soap on your face or hair and avoid private areas.  Rinse your body thoroughly. This is very important.  Using a fresh, clean towel, dry your body.  Dress in freshly washed clothes.  Do not use lotions, powders, or creams after this shower.      ___3/12_____ (date)   SECOND SHOWER: The day before surgery: Take a shower and wash your entire body, including your hair and scalp in the following manner:   Wash your hair using normal shampoo. Make sure you rinse the shampoo from your hair and body. Wash your face with your regular soap or cleanser.  Use one of the sponges in the [facility] kit and apply 1/3 of the Chlorhexidine soap to the sponge, wash from your neck down. This is very important. Do not use the soap on your face or hair and avoid private areas.  Rinse your body thoroughly. This is very important.  Using a fresh, clean towel, dry your body.  Dress in freshly washed clothes.  Fresh clean sheets and pillow cases should be used after this shower.  Do not use lotions, powders, or creams after this shower. __3/13______ (date)   THE FINAL SHOWER: The morning of surgery: Take a shower and wash your entire body, including your hair and scalp in the following manner:   Wash your hair using normal shampoo. Make sure you rinse the shampoo from your hair and body. Wash your face with your regular soap or cleanser.    Use one of the sponges in the [facility] kit and apply 1/3 of the Chlorhexidine soap to the sponge, wash from your neck down. This is very important. Do not use the soap on your face or hair and avoid private areas.  Rinse your body thoroughly. This is very important.  Using a fresh, clean towel, dry your body.  Dress warmly with freshly washed clean clothes. Keeping warm before surgery decreases your risk of developing and infection.  Do not use lotions, powders, or creams after this shower.

## 2020-02-25 NOTE — PROGRESS NOTES
SURGERY PREPARATION CHECKLIST     NAME: ________________________________________     DATE OF SURGERY: ____________________________    Enter Dates, Check (?) circles to indicate task is completed. Date MUPIROCIN NASAL OINTMENT BODY CLEANSING     DAY 1 ________3/8______________   Morning    Evening          Day 2 _________3/9_____________   Morning     Evening        Day 3 ________3/10______________   Morning  Evening        Day 4 _________3/11_____________   Morning    Evening        Day 5 ________3/12______________   Morning  Evening        Day 6 ______3/13________________  (Day of Surgery)               PLEASE COMPLETE and BRING THIS CHECKLIST WITH YOU TO [address] to give to your nurse on the day of surgery. You will be notified if you need to use Mupirocin Nasal Ointment.

## 2020-02-25 NOTE — PROGRESS NOTES
Lumbar Spine Surgery Pre-op Instructions   Nothing to eat or drink after midnight   Bring your medications in the original bottles    Bring photo ID and any health insurance cards   Wear comfortable clean loose fitting clothing   Do not wear any jewelry    Bring your walker   Bring your back brace if you were given one   Bring your CPAP machine if you have one   Wear clean clothes to bed and place fresh clean sheets and pillowcases on your bed the night before surgery   Physical therapy and occupational therapy clinic declined due to multiple back surgeries   Routine preop instructions given for pain, hand hygiene, fall prevention, infection prevention, anesthesia, cough and deep breath, and progressive diet and ambulation   Bathing:  o _x___Shower 2 days before, day before and morning of surgery using the StartClean® kit provided (follow the instructions provided with the kit), OR   o ____Cleanse your surgical site, 2 days before, day before and morning of surgery using the Baltazar 2% CHG cloths® provided (follow the instructions provided).  Do not shave the surgical area! If needed, your nurse will use clippers to remove any excess hair at the surgical site when you come in for surgery. Do not use a razor on the site of your surgery for at least 2 days prior to surgery because shaving can leave tiny nicks in the skin which may allow germs to enter and cause infection.    Perform the exercises given in your booklet 3 times daily starting today

## 2020-02-25 NOTE — PROGRESS NOTES
Keeping You Safe for Surgery    Staphylococcus aureus or Aaliyah Estrada is a germ that lives on the skin and in the nose of some healthy people. Your skin protects you from those germs. However, when you have surgery, we will be cutting your skin. Sometimes germs can get into those cuts and cause infection. How do we screen for Staph? We will swab your nose to see if you are a carrier of Staph. The results will be available about 2 hours after we obtain the nasal specimen. A positive test does not mean you have an infection; it just means you are a carrier of Staph. Your surgery most likely will not be canceled or delayed. If my test is positive, what happens? If your test is positive, a nurse will call you and tell you to get Mupirocin Ointment (Bactroban) at your pharmacy. The medicine may come in one large tube or may come in many small packets. When the medication is administered into your nose, it works by killing some of the germs that could cause you to get a surgical site infection.  If you get a big tube of Mupirocin, place enough medicine to cover the tip of a cotton swab (Q-tip). Place the cotton swab inside one nostril and rub the medicine onto the inside of the nose. Then repeat this same procedure in your other nostril.  If you get small individual tubes, put half the tube on a cotton swab and put the medicine in one nostril. Then the other half in the other nostril. After the medication has been inserted into each nostril, gently press your nose together and release for about a minute to get the medicine all over the inside of your nose. Do this once in the morning and once at night for 5 days prior to your scheduled surgery date. If I have Staph, will I be treated differently in the hospital?  If you have a certain type of Staph called MRSA (Methicillin-Resistant Staph aureus), you will be in a single room on Contact Precautions.  This means your doctors and nurses will wear gloves and gowns when taking care of you. We do this (along with good hand hygiene) to make sure we do not spread MRSA to any another patients in our care.

## 2020-02-27 ENCOUNTER — OFFICE VISIT (OUTPATIENT)
Dept: FAMILY MEDICINE CLINIC | Age: 65
End: 2020-02-27
Payer: MEDICARE

## 2020-02-27 VITALS
WEIGHT: 207.3 LBS | OXYGEN SATURATION: 96 % | RESPIRATION RATE: 18 BRPM | BODY MASS INDEX: 30.7 KG/M2 | DIASTOLIC BLOOD PRESSURE: 82 MMHG | SYSTOLIC BLOOD PRESSURE: 128 MMHG | HEART RATE: 61 BPM

## 2020-02-27 LAB — MRSA SCREEN: NORMAL

## 2020-02-27 PROCEDURE — 99214 OFFICE O/P EST MOD 30 MIN: CPT | Performed by: FAMILY MEDICINE

## 2020-02-27 PROCEDURE — 3017F COLORECTAL CA SCREEN DOC REV: CPT | Performed by: FAMILY MEDICINE

## 2020-02-27 PROCEDURE — G8427 DOCREV CUR MEDS BY ELIG CLIN: HCPCS | Performed by: FAMILY MEDICINE

## 2020-02-27 PROCEDURE — G8417 CALC BMI ABV UP PARAM F/U: HCPCS | Performed by: FAMILY MEDICINE

## 2020-02-27 PROCEDURE — 96372 THER/PROPH/DIAG INJ SC/IM: CPT | Performed by: FAMILY MEDICINE

## 2020-02-27 PROCEDURE — G8484 FLU IMMUNIZE NO ADMIN: HCPCS | Performed by: FAMILY MEDICINE

## 2020-02-27 PROCEDURE — 1036F TOBACCO NON-USER: CPT | Performed by: FAMILY MEDICINE

## 2020-02-27 RX ORDER — KETOROLAC TROMETHAMINE 30 MG/ML
60 INJECTION, SOLUTION INTRAMUSCULAR; INTRAVENOUS ONCE
Status: COMPLETED | OUTPATIENT
Start: 2020-02-27 | End: 2020-02-27

## 2020-02-27 RX ADMIN — KETOROLAC TROMETHAMINE 60 MG: 30 INJECTION, SOLUTION INTRAMUSCULAR; INTRAVENOUS at 09:57

## 2020-02-27 SDOH — ECONOMIC STABILITY: TRANSPORTATION INSECURITY
IN THE PAST 12 MONTHS, HAS LACK OF TRANSPORTATION KEPT YOU FROM MEETINGS, WORK, OR FROM GETTING THINGS NEEDED FOR DAILY LIVING?: NO

## 2020-02-27 SDOH — ECONOMIC STABILITY: FOOD INSECURITY: WITHIN THE PAST 12 MONTHS, YOU WORRIED THAT YOUR FOOD WOULD RUN OUT BEFORE YOU GOT MONEY TO BUY MORE.: NEVER TRUE

## 2020-02-27 SDOH — ECONOMIC STABILITY: TRANSPORTATION INSECURITY
IN THE PAST 12 MONTHS, HAS THE LACK OF TRANSPORTATION KEPT YOU FROM MEDICAL APPOINTMENTS OR FROM GETTING MEDICATIONS?: NO

## 2020-02-27 SDOH — ECONOMIC STABILITY: INCOME INSECURITY: HOW HARD IS IT FOR YOU TO PAY FOR THE VERY BASICS LIKE FOOD, HOUSING, MEDICAL CARE, AND HEATING?: NOT HARD AT ALL

## 2020-02-27 SDOH — ECONOMIC STABILITY: FOOD INSECURITY: WITHIN THE PAST 12 MONTHS, THE FOOD YOU BOUGHT JUST DIDN'T LAST AND YOU DIDN'T HAVE MONEY TO GET MORE.: NEVER TRUE

## 2020-02-27 ASSESSMENT — ENCOUNTER SYMPTOMS
NAUSEA: 0
WHEEZING: 0
EYE DISCHARGE: 0
RHINORRHEA: 0
SHORTNESS OF BREATH: 0
SORE THROAT: 0
BACK PAIN: 1
CONSTIPATION: 0
COUGH: 0
DIARRHEA: 0
ABDOMINAL PAIN: 0

## 2020-02-27 NOTE — PROGRESS NOTES
[address] [place] [address]  [address] [place] [address]  Dept: [phone]  Dept Fax: [phone]  Loc: [phone]    Christiana Matos is a 60 y.o. male who presents today for his medical conditions/complaints as noted below. Chief Complaint   Patient presents with    Pre-op Exam           HPI:     And presents for preop clearance exam prior to his lumbar spine surgery. He is having surgery next month to remove previously placed hardware in his lumbar spine. Patient complains of chronic achy pain in his low back with radiation down his right leg. He states that the pain is severe and makes it difficult for him to sleep. Also having difficulty with ambulation as this makes it worse. He does see pain management. He is also still having headaches but states these are fairly well controlled at this time. He denies any chest pain or shortness of breath and states that he can walk a flight of stairs without getting short of breath. Did have a heart cath in 2017 is had no change in cardiac symptoms since then. Labs and EKG were reviewed as well as chest x-ray.   Lab Results       Component                Value               Date                       WBC                      9.4                 02/25/2020                 HGB                      16.4                02/25/2020                 HCT                      52.2 (H)            02/25/2020                 PLT                      446 (H)             02/25/2020                 CHOL                     195                 07/29/2017                 TRIG                     126                 07/29/2017                 HDL                      32                  07/29/2017                 ALT                      25                  01/22/2019                 AST                      24                  01/22/2019                 NA                       142 02/25/2020                 K                        4.6                 02/25/2020                 CL                       102                 02/25/2020                 CREATININE               1.0                 02/25/2020                 BUN                      10                  02/25/2020                 CO2                      28                  02/25/2020                        INR                      1.02                02/25/2020                            Current Outpatient Medications   Medication Sig Dispense Refill    zoster recombinant adjuvanted vaccine [facility]) 50 MCG/0.5ML SUSR injection Inject 0.5 mLs into the muscle once for 1 dose 50 MCG IM then repeat 2-6 months. 1 each 1    EPINEPHrine (EPIPEN) 0.3 MG/0.3ML SOAJ injection Inject 0.3 mg into the muscle as needed Use as directed for allergic reaction      omeprazole (PRILOSEC) 40 MG delayed release capsule Take 1 capsule by mouth daily 90 capsule 3    sertraline (ZOLOFT) 100 MG tablet Take 2 tablets by mouth daily 180 tablet 3    lamoTRIgine (LAMICTAL) 200 MG tablet TAKE 1 TABLET BY MOUTH TWO  TIMES DAILY 180 tablet 3    rizatriptan (MAXALT) 10 MG tablet Take 10 mg by mouth once as needed for Migraine May repeat in 2 hours if needed      aspirin-acetaminophen-caffeine (EXCEDRIN MIGRAINE) 250-250-65 MG per tablet Take 2 tablets by mouth every 8 hours as needed for Headaches 90 tablet 3    divalproex (DEPAKOTE ER) 500 MG extended release tablet Take by mouth daily 3 tab      butorphanol (STADOL) 10 MG/ML nasal spray 1 spray by Nasal route every 4 hours as needed for Pain       No current facility-administered medications for this visit.       Allergies   Allergen Reactions    Bee Venom Anaphylaxis    Lisinopril Swelling    Dilaudid [Hydromorphone Hcl] Nausea And Vomiting and Rash    Nitrofuran Derivatives Nausea And Vomiting     Severe headache    Nitroglycerin Nausea And Vomiting     migraine       Subjective:      Review of Systems   Constitutional: Positive for activity change. Negative for chills, fatigue and fever. HENT: Negative for congestion, rhinorrhea and sore throat. Eyes: Negative for discharge and visual disturbance. Respiratory: Negative for cough, shortness of breath and wheezing. Cardiovascular: Negative for chest pain and palpitations. Gastrointestinal: Negative for abdominal pain, constipation, diarrhea and nausea. Genitourinary: Negative for dysuria and hematuria. Musculoskeletal: Positive for back pain and gait problem. Negative for arthralgias and myalgias. Neurological: Positive for headaches. Negative for dizziness. Psychiatric/Behavioral: Positive for sleep disturbance. The patient is not nervous/anxious. Objective:     /82 (Site: Left Upper Arm, Position: Sitting, Cuff Size: Medium Adult)   Pulse 61   Resp 18   Wt 207 lb 4.8 oz (94 kg)   SpO2 96%   BMI 30.70 kg/m²     Physical Exam  Vitals signs and nursing note reviewed. Constitutional:       Appearance: He is well-developed. Comments: Appears uncomfortable   HENT:      Head: Normocephalic and atraumatic. Eyes:      General: No scleral icterus. Right eye: No discharge. Left eye: No discharge. Conjunctiva/sclera: Conjunctivae normal.   Neck:      Musculoskeletal: Normal range of motion. Cardiovascular:      Rate and Rhythm: Normal rate and regular rhythm. Heart sounds: Normal heart sounds. Pulmonary:      Effort: Pulmonary effort is normal.      Breath sounds: Normal breath sounds. No wheezing. Abdominal:      General: Bowel sounds are normal. There is no distension. Palpations: Abdomen is soft. Tenderness: There is no abdominal tenderness. Musculoskeletal:      Lumbar back: He exhibits decreased range of motion, tenderness, pain and spasm. Lymphadenopathy:      Cervical: No cervical adenopathy. Skin:     General: Skin is warm and dry.    Neurological:      Mental Status: He is alert and oriented to person, place, and time. Coordination: Coordination normal.   Psychiatric:         Behavior: Behavior normal.         Thought Content: Thought content normal.         Judgment: Judgment normal.           Assessment:       Diagnosis Orders   1. Back pain of lumbar region with sciatica  ketorolac (TORADOL) injection 60 mg   2. Need for prophylactic vaccination and inoculation against varicella  zoster recombinant adjuvanted vaccine [facility]) 50 MCG/0.5ML SUSR injection       Plan:     toradol for acute back pain  prescriptino for shingrix  Cleared for surgery as scheduled  Discussed use, benefit, and side effects of prescribed medications. Barriers to medication compliance addressed. All patient questions answered. Pt voiced understanding. No follow-ups on file. No orders of the defined types were placed in this encounter. Orders Placed This Encounter   Medications    zoster recombinant adjuvanted vaccine (SHINGRIX) 50 MCG/0.5ML SUSR injection     Sig: Inject 0.5 mLs into the muscle once for 1 dose 50 MCG IM then repeat 2-6 months.      Dispense:  1 each     Refill:  1    ketorolac (TORADOL) injection 60 mg           Electronically signed by Romina Mott MD on 2/27/2020 at 12:01 PM

## 2020-02-27 NOTE — PROGRESS NOTES
After obtaining consent, and per orders of Dr. Cristian Mcdonald, injection of 60mg Toradol given in Right upper quad. gluteus by Heriberto Perez. Patient instructed to report any adverse reaction to me immediately. Administrations This Visit     ketorolac (TORADOL) injection 60 mg     Admin Date  02/27/2020  09:57 Action  Given Dose  60 mg Route  Intramuscular Site  Dorsogluteal Right Administered By  Heriberto Perez, [facility] ([address] [place] [address])    Ordering Provider:  Sigifredo Ott MD    [address]:  [id number]    Lot#:  [id number]    :  [address] [place] [address]    Patient Supplied?:  No                Patient tolerated well.

## 2020-03-12 NOTE — H&P
History and Physical    James Serrano (1955)  3/12/2020      CHIEF COMPLAINT:  Lumbar pain    HISTORY OF PRESENT ILLNESS:    The patient is a 72-year-old male who is having constant lumbar pain. No radicular symptoms. Symptoms have been present on a chronic basis. No injury or fall. Current VAS 9/10. Percentage wise, 100% back and 0% into the legs. Activities that aggravate the pain include movement. Activities that help relieve the pain include rest. Modifying factors include medication management, physician directed home exercises, nerve ablations and injections with only temporary relief. He had a prior L3-S1 decompression and fusion in 2015 with Dr. Katerina Fragoso. He also had a L3-5 laminectomy in 2014, C4-6 ACDF in 2015 and C3-4 ACDF in 2018. Patient is a nonsmoker. PCP: Lori Benavides MD    Past Medical History:        Diagnosis Date    Back pain     Depression     GERD (gastroesophageal reflux disease)     Headache(784.0) 9/22/2013    Migraines     ALICIA treated with BiPAP 2013    doesn't wear Bipap    Pneumonia     S/P cardiac catheterization: 7/31/2017: No obstructive lasions. 7/31/2017 7/31/2017: No obstructive lasions.  Dr. Demetrio Moreno Seizures [facility])      Past Surgical History:        Procedure Laterality Date    [facility]  [facility]    BACK SURGERY  12/18/15    l3-s1 decompression, posterior fusion   [address]  2013    CERVICAL SPINE SURGERY  10-16-15    C4-6 ACDF    COLONOSCOPY      ENDOSCOPY, COLON, DIAGNOSTIC      [facility]IA [facility]PAIR  1996    [place]    [facility]    KNEE SURGERY  1976    Rt     LUMBAR SPINE SURGERY  08/26/2014     L3-5 decompression, Dr. Katerina Fragoso, [address] SURGERY  10/19/2018    OTHER SURGICAL HISTORY  06/02/2017    Diagnostic laparoscopy, Laparoscopic Ventral hernia Repair with Mesh by Dr Butt Holding OFFICE/OUTPT VISIT,PROCEDURE ONLY N/A 10/19/2018    C3-4 AND C6-7 ACDF performed by Didi Huston MD at [address] [place] [address]    Rt Rotator cuff, Dr. Jose M Paz     Current Medications:   Prior to Admission medications    Medication Sig Start Date End Date Taking? Authorizing Provider   EPINEPHrine (EPIPEN) 0.3 MG/0.3ML SOAJ injection Inject 0.3 mg into the muscle as needed Use as directed for allergic reaction    Historical Provider, MD   omeprazole (PRILOSEC) 40 MG delayed release capsule Take 1 capsule by mouth daily 2/5/20   Dariana Tsai MD   sertraline (ZOLOFT) 100 MG tablet Take 2 tablets by mouth daily 6/5/19   YUSEF Martinez CNP   lamoTRIgine (LAMICTAL) 200 MG tablet TAKE 1 TABLET BY MOUTH TWO  TIMES DAILY 1/28/19   Dariana Tsai MD   rizatriptan (MAXALT) 10 MG tablet Take 10 mg by mouth once as needed for Migraine May repeat in 2 hours if needed    Historical Provider, MD   aspirin-acetaminophen-caffeine (EXCEDRIN MIGRAINE) 252-032-40 MG per tablet Take 2 tablets by mouth every 8 hours as needed for Headaches 10/22/18   CORINA Anna   divalproex (DEPAKOTE ER) 500 MG extended release tablet Take by mouth daily 3 tab    Historical Provider, MD   butorphanol (STADOL) 10 MG/ML nasal spray 1 spray by Nasal route every 4 hours as needed for Pain    Historical Provider, MD    D@  Allergies:  Bee venom; Lisinopril; Dilaudid [hydromorphone hcl]; Nitrofuran derivatives; and Nitroglycerin    Social History:   TOBACCO:   reports that he has never smoked. He has never used smokeless tobacco.  ETOH:   reports no history of alcohol use. DRUGS:   reports no history of drug use.   Family History:       Problem Relation Age of Onset    Arthritis Father     Heart Disease Father     Other Father         COPD    Lupus Mother     Other Mother         Lupus    Depression Sister     Depression Child     Arthritis Maternal Grandmother     Stroke Maternal Grandmother     High Blood Pressure Maternal Grandmother     Arthritis Maternal Grandfather     Diabetes Maternal Grandfather     Heart Disease Maternal Grandfather     Asthma Paternal Grandmother     Stroke Paternal Grandmother     High Blood Pressure Paternal Grandmother     Asthma Paternal Grandfather        REVIEW OF SYSTEMS:    CONSTITUTIONAL:  negative for fevers, chills  RESPIRATORY:  negative for cough and shortness of breath  CARDIOVASCULAR:  negative for chest pain, palpitations  GASTROINTESTINAL:  negative for nausea, vomiting, incontinence  GENITOURINARY:  negative for frequency and urinary incontinence  MUSCULOSKELETAL:  (+) for back pain, negative for leg pain  NEUROLOGICAL:  negative for numbness/tingling, syncope  BEHAVIOR/PSYCH:  negative for depressed mood, increased anxiety    PHYSICAL EXAM:    VITAL SIGNS: Ht 5'8\" Wt 218 lbs BMI 32.66  CONSTITUTIONAL:  Awake, alert, cooperative, no apparent distress, and appears stated age  HEAD: Atraumatic, normocephalic  LUNGS:  No respiratory distress. CTA bilaterally. No wheezes, rales, rhonchi  CARDIOVASCULAR:  Regular rate and rhythm, no murmur  ABDOMEN:  Normal bowel sounds, soft, non-distended, non-tender  SPINE: Limited lumbar ROM. Inspection of the spine shows no skin lesions or open wounds. TTP lumbar spine and bilateral paraspinous muscles. MUSCULOSKELETAL:  There is no redness, warmth, or swelling of the joints. Full range of motion noted. Motor strength is 5 out of 5 bilateral LE  Well-healed lumbar incision  NEUROLOGIC:  Awake, alert, oriented to name, place and time. Sensory is intact bilateral LE.     DATA:    CBC:   Lab Results   Component Value Date    WBC 9.4 02/25/2020    RBC 5.39 02/25/2020    RBC 4.75 05/04/2012    HGB 16.4 02/25/2020    HCT 52.2 02/25/2020    MCV 96.8 02/25/2020    MCH 30.4 02/25/2020    MCHC 31.4 02/25/2020    RDW 13.9 03/05/2018     02/25/2020    MPV 10.2 02/25/2020     WBC:    Lab Results   Component Value Date    WBC 9.4 02/25/2020     Hemoglobin/Hematocrit:    Lab Results   Component Value Date    HGB 16.4 02/25/2020    HCT 52.2 02/25/2020

## 2020-03-13 ENCOUNTER — ANESTHESIA EVENT (OUTPATIENT)
Dept: OPERATING ROOM | Age: 65
DRG: 495 | End: 2020-03-13
Payer: MEDICARE

## 2020-03-13 ENCOUNTER — ANESTHESIA (OUTPATIENT)
Dept: OPERATING ROOM | Age: 65
DRG: 495 | End: 2020-03-13
Payer: MEDICARE

## 2020-03-13 ENCOUNTER — HOSPITAL ENCOUNTER (INPATIENT)
Age: 65
LOS: 3 days | Discharge: HOME | DRG: 495 | End: 2020-03-16
Attending: ORTHOPAEDIC SURGERY | Admitting: ORTHOPAEDIC SURGERY
Payer: MEDICARE

## 2020-03-13 ENCOUNTER — APPOINTMENT (OUTPATIENT)
Dept: GENERAL RADIOLOGY | Age: 65
DRG: 495 | End: 2020-03-13
Attending: ORTHOPAEDIC SURGERY
Payer: MEDICARE

## 2020-03-13 VITALS
TEMPERATURE: 98.2 F | SYSTOLIC BLOOD PRESSURE: 129 MMHG | DIASTOLIC BLOOD PRESSURE: 71 MMHG | RESPIRATION RATE: 3 BRPM | OXYGEN SATURATION: 97 %

## 2020-03-13 PROBLEM — T84.84XA PAINFUL ORTHOPAEDIC HARDWARE (HCC): Status: ACTIVE | Noted: 2020-03-13

## 2020-03-13 LAB
ABO: NORMAL
ANTIBODY SCREEN: NORMAL
RH FACTOR: NORMAL

## 2020-03-13 PROCEDURE — 86900 BLOOD TYPING SEROLOGIC ABO: CPT

## 2020-03-13 PROCEDURE — 6360000002 HC RX W HCPCS: Performed by: PHYSICIAN ASSISTANT

## 2020-03-13 PROCEDURE — 2720000010 HC SURG SUPPLY STERILE: Performed by: ORTHOPAEDIC SURGERY

## 2020-03-13 PROCEDURE — 6360000002 HC RX W HCPCS: Performed by: NURSE ANESTHETIST, CERTIFIED REGISTERED

## 2020-03-13 PROCEDURE — 86901 BLOOD TYPING SEROLOGIC RH(D): CPT

## 2020-03-13 PROCEDURE — 6370000000 HC RX 637 (ALT 250 FOR IP): Performed by: PHYSICIAN ASSISTANT

## 2020-03-13 PROCEDURE — 2580000003 HC RX 258: Performed by: ORTHOPAEDIC SURGERY

## 2020-03-13 PROCEDURE — 7100000001 HC PACU RECOVERY - ADDTL 15 MIN: Performed by: ORTHOPAEDIC SURGERY

## 2020-03-13 PROCEDURE — 86850 RBC ANTIBODY SCREEN: CPT

## 2020-03-13 PROCEDURE — 3600000004 HC SURGERY LEVEL 4 BASE: Performed by: ORTHOPAEDIC SURGERY

## 2020-03-13 PROCEDURE — 2580000003 HC RX 258: Performed by: PHYSICIAN ASSISTANT

## 2020-03-13 PROCEDURE — 1200000000 HC SEMI PRIVATE

## 2020-03-13 PROCEDURE — 3700000000 HC ANESTHESIA ATTENDED CARE: Performed by: ORTHOPAEDIC SURGERY

## 2020-03-13 PROCEDURE — 2780000010 HC IMPLANT OTHER: Performed by: ORTHOPAEDIC SURGERY

## 2020-03-13 PROCEDURE — 7100000000 HC PACU RECOVERY - FIRST 15 MIN: Performed by: ORTHOPAEDIC SURGERY

## 2020-03-13 PROCEDURE — 0QP004Z REMOVAL OF INTERNAL FIXATION DEVICE FROM LUMBAR VERTEBRA, OPEN APPROACH: ICD-10-PCS | Performed by: ORTHOPAEDIC SURGERY

## 2020-03-13 PROCEDURE — 36415 COLL VENOUS BLD VENIPUNCTURE: CPT

## 2020-03-13 PROCEDURE — 6360000002 HC RX W HCPCS: Performed by: ANESTHESIOLOGY

## 2020-03-13 PROCEDURE — 2500000003 HC RX 250 WO HCPCS: Performed by: ORTHOPAEDIC SURGERY

## 2020-03-13 PROCEDURE — 72020 X-RAY EXAM OF SPINE 1 VIEW: CPT

## 2020-03-13 PROCEDURE — 0QP104Z REMOVAL OF INTERNAL FIXATION DEVICE FROM SACRUM, OPEN APPROACH: ICD-10-PCS | Performed by: ORTHOPAEDIC SURGERY

## 2020-03-13 PROCEDURE — 94760 N-INVAS EAR/PLS OXIMETRY 1: CPT

## 2020-03-13 PROCEDURE — 3600000014 HC SURGERY LEVEL 4 ADDTL 15MIN: Performed by: ORTHOPAEDIC SURGERY

## 2020-03-13 PROCEDURE — 2709999900 HC NON-CHARGEABLE SUPPLY: Performed by: ORTHOPAEDIC SURGERY

## 2020-03-13 PROCEDURE — 2500000003 HC RX 250 WO HCPCS: Performed by: NURSE ANESTHETIST, CERTIFIED REGISTERED

## 2020-03-13 PROCEDURE — 2700000000 HC OXYGEN THERAPY PER DAY

## 2020-03-13 PROCEDURE — 3700000001 HC ADD 15 MINUTES (ANESTHESIA): Performed by: ORTHOPAEDIC SURGERY

## 2020-03-13 DEVICE — AGENT HEMOSTATIC SURGIFLOW MATRIX KIT W/THROMBIN: Type: IMPLANTABLE DEVICE | Status: FUNCTIONAL

## 2020-03-13 RX ORDER — LAMOTRIGINE 200 MG/1
200 TABLET ORAL 2 TIMES DAILY
Status: DISCONTINUED | OUTPATIENT
Start: 2020-03-13 | End: 2020-03-16 | Stop reason: HOSPADM

## 2020-03-13 RX ORDER — GLYCOPYRROLATE 1 MG/5 ML
SYRINGE (ML) INTRAVENOUS PRN
Status: DISCONTINUED | OUTPATIENT
Start: 2020-03-13 | End: 2020-03-13 | Stop reason: SDUPTHER

## 2020-03-13 RX ORDER — DIVALPROEX SODIUM 500 MG/1
500 TABLET, EXTENDED RELEASE ORAL DAILY
Status: DISCONTINUED | OUTPATIENT
Start: 2020-03-13 | End: 2020-03-16 | Stop reason: HOSPADM

## 2020-03-13 RX ORDER — LIDOCAINE HYDROCHLORIDE 20 MG/ML
INJECTION, SOLUTION INTRAVENOUS PRN
Status: DISCONTINUED | OUTPATIENT
Start: 2020-03-13 | End: 2020-03-13 | Stop reason: SDUPTHER

## 2020-03-13 RX ORDER — ROCURONIUM BROMIDE 10 MG/ML
INJECTION, SOLUTION INTRAVENOUS PRN
Status: DISCONTINUED | OUTPATIENT
Start: 2020-03-13 | End: 2020-03-13 | Stop reason: SDUPTHER

## 2020-03-13 RX ORDER — SUMATRIPTAN 50 MG/1
100 TABLET, FILM COATED ORAL
Status: DISPENSED | OUTPATIENT
Start: 2020-03-13 | End: 2020-03-13

## 2020-03-13 RX ORDER — MORPHINE SULFATE 4 MG/ML
4 INJECTION, SOLUTION INTRAMUSCULAR; INTRAVENOUS
Status: DISCONTINUED | OUTPATIENT
Start: 2020-03-13 | End: 2020-03-16 | Stop reason: HOSPADM

## 2020-03-13 RX ORDER — ONDANSETRON 2 MG/ML
4 INJECTION INTRAMUSCULAR; INTRAVENOUS EVERY 6 HOURS PRN
Status: DISCONTINUED | OUTPATIENT
Start: 2020-03-13 | End: 2020-03-16 | Stop reason: HOSPADM

## 2020-03-13 RX ORDER — SODIUM CHLORIDE 0.9 % (FLUSH) 0.9 %
10 SYRINGE (ML) INJECTION EVERY 12 HOURS SCHEDULED
Status: DISCONTINUED | OUTPATIENT
Start: 2020-03-13 | End: 2020-03-13 | Stop reason: HOSPADM

## 2020-03-13 RX ORDER — SODIUM CHLORIDE 0.9 % (FLUSH) 0.9 %
10 SYRINGE (ML) INJECTION PRN
Status: DISCONTINUED | OUTPATIENT
Start: 2020-03-13 | End: 2020-03-13 | Stop reason: HOSPADM

## 2020-03-13 RX ORDER — LAMOTRIGINE 100 MG/1
200 TABLET ORAL 2 TIMES DAILY
Status: DISCONTINUED | OUTPATIENT
Start: 2020-03-13 | End: 2020-03-13 | Stop reason: SDUPTHER

## 2020-03-13 RX ORDER — PROMETHAZINE HYDROCHLORIDE 25 MG/ML
12.5 INJECTION, SOLUTION INTRAMUSCULAR; INTRAVENOUS
Status: DISCONTINUED | OUTPATIENT
Start: 2020-03-13 | End: 2020-03-13 | Stop reason: HOSPADM

## 2020-03-13 RX ORDER — POLYETHYLENE GLYCOL 3350 17 G/17G
17 POWDER, FOR SOLUTION ORAL DAILY
Status: DISCONTINUED | OUTPATIENT
Start: 2020-03-13 | End: 2020-03-16 | Stop reason: HOSPADM

## 2020-03-13 RX ORDER — CYCLOBENZAPRINE HCL 10 MG
10 TABLET ORAL 3 TIMES DAILY PRN
Status: DISCONTINUED | OUTPATIENT
Start: 2020-03-13 | End: 2020-03-14

## 2020-03-13 RX ORDER — SODIUM CHLORIDE 9 MG/ML
INJECTION, SOLUTION INTRAVENOUS CONTINUOUS
Status: DISCONTINUED | OUTPATIENT
Start: 2020-03-13 | End: 2020-03-13

## 2020-03-13 RX ORDER — EPHEDRINE SULFATE/0.9% NACL/PF 50 MG/5 ML
SYRINGE (ML) INTRAVENOUS PRN
Status: DISCONTINUED | OUTPATIENT
Start: 2020-03-13 | End: 2020-03-13 | Stop reason: SDUPTHER

## 2020-03-13 RX ORDER — OXYCODONE HYDROCHLORIDE AND ACETAMINOPHEN 5; 325 MG/1; MG/1
1 TABLET ORAL EVERY 6 HOURS PRN
Status: DISCONTINUED | OUTPATIENT
Start: 2020-03-13 | End: 2020-03-16 | Stop reason: HOSPADM

## 2020-03-13 RX ORDER — NEOSTIGMINE METHYLSULFATE 5 MG/5 ML
SYRINGE (ML) INTRAVENOUS PRN
Status: DISCONTINUED | OUTPATIENT
Start: 2020-03-13 | End: 2020-03-13 | Stop reason: SDUPTHER

## 2020-03-13 RX ORDER — FENTANYL CITRATE 50 UG/ML
INJECTION, SOLUTION INTRAMUSCULAR; INTRAVENOUS PRN
Status: DISCONTINUED | OUTPATIENT
Start: 2020-03-13 | End: 2020-03-13 | Stop reason: SDUPTHER

## 2020-03-13 RX ORDER — MORPHINE SULFATE 2 MG/ML
1 INJECTION, SOLUTION INTRAMUSCULAR; INTRAVENOUS EVERY 5 MIN PRN
Status: COMPLETED | OUTPATIENT
Start: 2020-03-13 | End: 2020-03-13

## 2020-03-13 RX ORDER — LABETALOL 20 MG/4 ML (5 MG/ML) INTRAVENOUS SYRINGE
10 EVERY 10 MIN PRN
Status: DISCONTINUED | OUTPATIENT
Start: 2020-03-13 | End: 2020-03-13 | Stop reason: HOSPADM

## 2020-03-13 RX ORDER — SUCCINYLCHOLINE/SOD CL,ISO/PF 200MG/10ML
SYRINGE (ML) INTRAVENOUS PRN
Status: DISCONTINUED | OUTPATIENT
Start: 2020-03-13 | End: 2020-03-13 | Stop reason: SDUPTHER

## 2020-03-13 RX ORDER — SODIUM CHLORIDE 0.9 % (FLUSH) 0.9 %
10 SYRINGE (ML) INJECTION PRN
Status: DISCONTINUED | OUTPATIENT
Start: 2020-03-13 | End: 2020-03-16 | Stop reason: HOSPADM

## 2020-03-13 RX ORDER — OMEPRAZOLE 40 MG/1
40 CAPSULE, DELAYED RELEASE ORAL DAILY
Status: DISCONTINUED | OUTPATIENT
Start: 2020-03-14 | End: 2020-03-16 | Stop reason: HOSPADM

## 2020-03-13 RX ORDER — PANTOPRAZOLE SODIUM 40 MG/1
40 TABLET, DELAYED RELEASE ORAL
Status: DISCONTINUED | OUTPATIENT
Start: 2020-03-14 | End: 2020-03-13 | Stop reason: ALTCHOICE

## 2020-03-13 RX ORDER — ONDANSETRON 2 MG/ML
INJECTION INTRAMUSCULAR; INTRAVENOUS PRN
Status: DISCONTINUED | OUTPATIENT
Start: 2020-03-13 | End: 2020-03-13 | Stop reason: SDUPTHER

## 2020-03-13 RX ORDER — EPINEPHRINE 0.3 MG/.3ML
0.3 INJECTION SUBCUTANEOUS PRN
Status: DISCONTINUED | OUTPATIENT
Start: 2020-03-13 | End: 2020-03-13 | Stop reason: RX

## 2020-03-13 RX ORDER — BISACODYL 10 MG
10 SUPPOSITORY, RECTAL RECTAL DAILY PRN
Status: DISCONTINUED | OUTPATIENT
Start: 2020-03-13 | End: 2020-03-16 | Stop reason: HOSPADM

## 2020-03-13 RX ORDER — FENTANYL CITRATE 50 UG/ML
50 INJECTION, SOLUTION INTRAMUSCULAR; INTRAVENOUS EVERY 5 MIN PRN
Status: COMPLETED | OUTPATIENT
Start: 2020-03-13 | End: 2020-03-13

## 2020-03-13 RX ORDER — SERTRALINE HYDROCHLORIDE 100 MG/1
200 TABLET, FILM COATED ORAL DAILY
Status: DISCONTINUED | OUTPATIENT
Start: 2020-03-13 | End: 2020-03-16 | Stop reason: HOSPADM

## 2020-03-13 RX ORDER — OXYCODONE HYDROCHLORIDE AND ACETAMINOPHEN 5; 325 MG/1; MG/1
2 TABLET ORAL EVERY 6 HOURS PRN
Status: DISCONTINUED | OUTPATIENT
Start: 2020-03-13 | End: 2020-03-16 | Stop reason: HOSPADM

## 2020-03-13 RX ORDER — RIZATRIPTAN BENZOATE 10 MG/1
10 TABLET ORAL
Status: DISCONTINUED | OUTPATIENT
Start: 2020-03-13 | End: 2020-03-13 | Stop reason: CLARIF

## 2020-03-13 RX ORDER — PROMETHAZINE HYDROCHLORIDE 25 MG/1
12.5 TABLET ORAL EVERY 6 HOURS PRN
Status: DISCONTINUED | OUTPATIENT
Start: 2020-03-13 | End: 2020-03-16 | Stop reason: HOSPADM

## 2020-03-13 RX ORDER — SODIUM CHLORIDE 9 MG/ML
INJECTION, SOLUTION INTRAVENOUS CONTINUOUS
Status: DISCONTINUED | OUTPATIENT
Start: 2020-03-13 | End: 2020-03-16 | Stop reason: HOSPADM

## 2020-03-13 RX ORDER — DEXAMETHASONE SODIUM PHOSPHATE 4 MG/ML
INJECTION, SOLUTION INTRA-ARTICULAR; INTRALESIONAL; INTRAMUSCULAR; INTRAVENOUS; SOFT TISSUE PRN
Status: DISCONTINUED | OUTPATIENT
Start: 2020-03-13 | End: 2020-03-13 | Stop reason: SDUPTHER

## 2020-03-13 RX ORDER — SODIUM CHLORIDE 0.9 % (FLUSH) 0.9 %
10 SYRINGE (ML) INJECTION EVERY 12 HOURS SCHEDULED
Status: DISCONTINUED | OUTPATIENT
Start: 2020-03-13 | End: 2020-03-16 | Stop reason: HOSPADM

## 2020-03-13 RX ORDER — PROPOFOL 10 MG/ML
INJECTION, EMULSION INTRAVENOUS PRN
Status: DISCONTINUED | OUTPATIENT
Start: 2020-03-13 | End: 2020-03-13 | Stop reason: SDUPTHER

## 2020-03-13 RX ORDER — MORPHINE SULFATE 2 MG/ML
2 INJECTION, SOLUTION INTRAMUSCULAR; INTRAVENOUS
Status: DISCONTINUED | OUTPATIENT
Start: 2020-03-13 | End: 2020-03-16 | Stop reason: HOSPADM

## 2020-03-13 RX ADMIN — FENTANYL CITRATE 50 MCG: 50 INJECTION, SOLUTION INTRAMUSCULAR; INTRAVENOUS at 13:45

## 2020-03-13 RX ADMIN — MORPHINE SULFATE 1 MG: 2 INJECTION, SOLUTION INTRAMUSCULAR; INTRAVENOUS at 14:00

## 2020-03-13 RX ADMIN — PROPOFOL 50 MG: 10 INJECTION, EMULSION INTRAVENOUS at 12:55

## 2020-03-13 RX ADMIN — OXYCODONE HYDROCHLORIDE AND ACETAMINOPHEN 2 TABLET: 5; 325 TABLET ORAL at 17:30

## 2020-03-13 RX ADMIN — Medication 4 MG: at 13:08

## 2020-03-13 RX ADMIN — Medication 10 MG: at 12:46

## 2020-03-13 RX ADMIN — FENTANYL CITRATE 50 MCG: 50 INJECTION, SOLUTION INTRAMUSCULAR; INTRAVENOUS at 13:35

## 2020-03-13 RX ADMIN — SODIUM CHLORIDE: 9 INJECTION, SOLUTION INTRAVENOUS at 07:42

## 2020-03-13 RX ADMIN — PROPOFOL 150 MG: 10 INJECTION, EMULSION INTRAVENOUS at 11:30

## 2020-03-13 RX ADMIN — MORPHINE SULFATE 1 MG: 2 INJECTION, SOLUTION INTRAMUSCULAR; INTRAVENOUS at 13:55

## 2020-03-13 RX ADMIN — MORPHINE SULFATE 4 MG: 4 INJECTION, SOLUTION INTRAMUSCULAR; INTRAVENOUS at 19:38

## 2020-03-13 RX ADMIN — MORPHINE SULFATE 1 MG: 2 INJECTION, SOLUTION INTRAMUSCULAR; INTRAVENOUS at 14:10

## 2020-03-13 RX ADMIN — CEFAZOLIN 2 G: 10 INJECTION, POWDER, FOR SOLUTION INTRAVENOUS at 11:43

## 2020-03-13 RX ADMIN — FENTANYL CITRATE 50 MCG: 50 INJECTION INTRAMUSCULAR; INTRAVENOUS at 12:55

## 2020-03-13 RX ADMIN — SERTRALINE HYDROCHLORIDE 200 MG: 100 TABLET, FILM COATED ORAL at 21:49

## 2020-03-13 RX ADMIN — FENTANYL CITRATE 50 MCG: 50 INJECTION, SOLUTION INTRAMUSCULAR; INTRAVENOUS at 13:40

## 2020-03-13 RX ADMIN — DEXAMETHASONE SODIUM PHOSPHATE 8 MG: 4 INJECTION, SOLUTION INTRAMUSCULAR; INTRAVENOUS at 12:06

## 2020-03-13 RX ADMIN — MORPHINE SULFATE 4 MG: 4 INJECTION, SOLUTION INTRAMUSCULAR; INTRAVENOUS at 15:33

## 2020-03-13 RX ADMIN — SODIUM CHLORIDE: 9 INJECTION, SOLUTION INTRAVENOUS at 11:47

## 2020-03-13 RX ADMIN — PROMETHAZINE HYDROCHLORIDE 12.5 MG: 25 TABLET ORAL at 17:30

## 2020-03-13 RX ADMIN — DIVALPROEX SODIUM 500 MG: 500 TABLET, EXTENDED RELEASE ORAL at 17:32

## 2020-03-13 RX ADMIN — Medication 0.6 MG: at 13:08

## 2020-03-13 RX ADMIN — Medication 140 MG: at 11:30

## 2020-03-13 RX ADMIN — Medication 10 MG: at 11:49

## 2020-03-13 RX ADMIN — Medication 10 MG: at 12:17

## 2020-03-13 RX ADMIN — FENTANYL CITRATE 50 MCG: 50 INJECTION, SOLUTION INTRAMUSCULAR; INTRAVENOUS at 13:50

## 2020-03-13 RX ADMIN — LIDOCAINE HYDROCHLORIDE 100 MG: 20 INJECTION, SOLUTION INTRAVENOUS at 11:30

## 2020-03-13 RX ADMIN — DEXTROSE MONOHYDRATE 2 G: 50 INJECTION, SOLUTION INTRAVENOUS at 21:48

## 2020-03-13 RX ADMIN — FENTANYL CITRATE 100 MCG: 50 INJECTION INTRAMUSCULAR; INTRAVENOUS at 11:30

## 2020-03-13 RX ADMIN — Medication 10 MG: at 11:51

## 2020-03-13 RX ADMIN — POLYETHYLENE GLYCOL 3350 17 G: 17 POWDER, FOR SOLUTION ORAL at 21:50

## 2020-03-13 RX ADMIN — ROCURONIUM BROMIDE 30 MG: 10 INJECTION INTRAVENOUS at 11:43

## 2020-03-13 RX ADMIN — ONDANSETRON HYDROCHLORIDE 4 MG: 4 INJECTION, SOLUTION INTRAMUSCULAR; INTRAVENOUS at 13:05

## 2020-03-13 RX ADMIN — LAMOTRIGINE 200 MG: 200 TABLET ORAL at 21:49

## 2020-03-13 RX ADMIN — OXYCODONE HYDROCHLORIDE AND ACETAMINOPHEN 2 TABLET: 5; 325 TABLET ORAL at 21:55

## 2020-03-13 RX ADMIN — FENTANYL CITRATE 100 MCG: 50 INJECTION INTRAMUSCULAR; INTRAVENOUS at 11:56

## 2020-03-13 RX ADMIN — SODIUM CHLORIDE: 9 INJECTION, SOLUTION INTRAVENOUS at 14:23

## 2020-03-13 RX ADMIN — MORPHINE SULFATE 1 MG: 2 INJECTION, SOLUTION INTRAMUSCULAR; INTRAVENOUS at 14:05

## 2020-03-13 ASSESSMENT — PULMONARY FUNCTION TESTS
PIF_VALUE: 9
PIF_VALUE: 15
PIF_VALUE: 15
PIF_VALUE: 22
PIF_VALUE: 1
PIF_VALUE: 21
PIF_VALUE: 20
PIF_VALUE: 20
PIF_VALUE: 11
PIF_VALUE: 1
PIF_VALUE: 21
PIF_VALUE: 30
PIF_VALUE: 11
PIF_VALUE: 15
PIF_VALUE: 18
PIF_VALUE: 40
PIF_VALUE: 14
PIF_VALUE: 18
PIF_VALUE: 14
PIF_VALUE: 14
PIF_VALUE: 16
PIF_VALUE: 1
PIF_VALUE: 15
PIF_VALUE: 14
PIF_VALUE: 18
PIF_VALUE: 2
PIF_VALUE: 1
PIF_VALUE: 17
PIF_VALUE: 14
PIF_VALUE: 2
PIF_VALUE: 18
PIF_VALUE: 0
PIF_VALUE: 22
PIF_VALUE: 0
PIF_VALUE: 17
PIF_VALUE: 14
PIF_VALUE: 18
PIF_VALUE: 0
PIF_VALUE: 16
PIF_VALUE: 31
PIF_VALUE: 15
PIF_VALUE: 16
PIF_VALUE: 14
PIF_VALUE: 20
PIF_VALUE: 20
PIF_VALUE: 14
PIF_VALUE: 11
PIF_VALUE: 21
PIF_VALUE: 23
PIF_VALUE: 20
PIF_VALUE: 2
PIF_VALUE: 11
PIF_VALUE: 19
PIF_VALUE: 18
PIF_VALUE: 11
PIF_VALUE: 15
PIF_VALUE: 21
PIF_VALUE: 16
PIF_VALUE: 19
PIF_VALUE: 15
PIF_VALUE: 14
PIF_VALUE: 17
PIF_VALUE: 11
PIF_VALUE: 14
PIF_VALUE: 10
PIF_VALUE: 13
PIF_VALUE: 20
PIF_VALUE: 9
PIF_VALUE: 11
PIF_VALUE: 18
PIF_VALUE: 16
PIF_VALUE: 19
PIF_VALUE: 11
PIF_VALUE: 11
PIF_VALUE: 14
PIF_VALUE: 21
PIF_VALUE: 18
PIF_VALUE: 15
PIF_VALUE: 10
PIF_VALUE: 21
PIF_VALUE: 23
PIF_VALUE: 15
PIF_VALUE: 11
PIF_VALUE: 11
PIF_VALUE: 15
PIF_VALUE: 15
PIF_VALUE: 19
PIF_VALUE: 11
PIF_VALUE: 16
PIF_VALUE: 14
PIF_VALUE: 20
PIF_VALUE: 24
PIF_VALUE: 22
PIF_VALUE: 21
PIF_VALUE: 18
PIF_VALUE: 19
PIF_VALUE: 20
PIF_VALUE: 19
PIF_VALUE: 15
PIF_VALUE: 21
PIF_VALUE: 18
PIF_VALUE: 16
PIF_VALUE: 23
PIF_VALUE: 14
PIF_VALUE: 11
PIF_VALUE: 16
PIF_VALUE: 23

## 2020-03-13 ASSESSMENT — PAIN SCALES - GENERAL
PAINLEVEL_OUTOF10: 9
PAINLEVEL_OUTOF10: 6
PAINLEVEL_OUTOF10: 8
PAINLEVEL_OUTOF10: 9
PAINLEVEL_OUTOF10: 6
PAINLEVEL_OUTOF10: 9
PAINLEVEL_OUTOF10: 6
PAINLEVEL_OUTOF10: 8
PAINLEVEL_OUTOF10: 8
PAINLEVEL_OUTOF10: 6
PAINLEVEL_OUTOF10: 8
PAINLEVEL_OUTOF10: 8

## 2020-03-13 ASSESSMENT — PAIN DESCRIPTION - FREQUENCY
FREQUENCY: CONTINUOUS
FREQUENCY: CONTINUOUS

## 2020-03-13 ASSESSMENT — PAIN DESCRIPTION - LOCATION
LOCATION: BACK

## 2020-03-13 ASSESSMENT — PAIN DESCRIPTION - DESCRIPTORS
DESCRIPTORS: SHARP;ACHING
DESCRIPTORS: SHARP;SHOOTING;PRESSURE
DESCRIPTORS: NUMBNESS;TINGLING;SHARP;SHOOTING

## 2020-03-13 ASSESSMENT — PAIN DESCRIPTION - PAIN TYPE
TYPE: SURGICAL PAIN

## 2020-03-13 ASSESSMENT — PAIN DESCRIPTION - PROGRESSION: CLINICAL_PROGRESSION: NOT CHANGED

## 2020-03-13 ASSESSMENT — PAIN DESCRIPTION - ORIENTATION
ORIENTATION: LOWER
ORIENTATION: LOWER

## 2020-03-13 ASSESSMENT — PAIN - FUNCTIONAL ASSESSMENT
PAIN_FUNCTIONAL_ASSESSMENT: 0-10
PAIN_FUNCTIONAL_ASSESSMENT: PREVENTS OR INTERFERES SOME ACTIVE ACTIVITIES AND ADLS

## 2020-03-13 ASSESSMENT — PAIN DESCRIPTION - ONSET: ONSET: ON-GOING

## 2020-03-13 ASSESSMENT — ENCOUNTER SYMPTOMS: SHORTNESS OF BREATH: 1

## 2020-03-13 NOTE — ANESTHESIA POSTPROCEDURE EVALUATION
Department of Anesthesiology  Postprocedure Note    Patient: Carmen Bradley  MRN: [id number]  YOB: 1955  Date of evaluation: 3/13/2020  Time:  3:07 PM     Procedure Summary     Date:  03/13/20 Room / Location:  [address] [place]    Anesthesia Start:  5455 Anesthesia Stop:  7765    Procedure:  L3-S1 HARDWARE REMOVAL (N/A ) Diagnosis:  (PAINFUL ORTHOPAEDIC HARDWARE)    Surgeon:  Dee Dee Jones MD Responsible Provider:  Figueroa Izaguirre DO    Anesthesia Type:  general ASA Status:  3          Anesthesia Type: general    Mustapha Phase I: Mustapha Score: 9    Mustapha Phase II:      Last vitals: Reviewed and per EMR flowsheets. Anesthesia Post Evaluation    Comments: Kathleen Lozano 62  POST-ANESTHESIA NOTE       Name:  Carmen Bradley                                         Age:  60 y.o.   MRN:  [id number]      Last Vitals:  /67   Pulse 88   Temp 97.8 °F (36.6 °C) (Temporal)   Resp 10   Ht 5' 8.5\" (1.74 m)   Wt 206 lb (93.4 kg)   SpO2 98%   BMI 30.87 kg/m²   Patient Vitals in the past 4 hrs:  03/13/20 1445, BP:106/67, Pulse:88, Resp:10, SpO2:98 %  03/13/20 1440, BP:101/62, Pulse:68, Resp:15, SpO2:98 %  03/13/20 1435, BP:102/63, Pulse:73, Resp:13, SpO2:98 %  03/13/20 1430, BP:100/66, Pulse:71, Resp:19, SpO2:96 %  03/13/20 1425, BP:(!) 105/58, Pulse:69, Resp:20, SpO2:99 %  03/13/20 1420, BP:106/69, Pulse:69, Resp:14, SpO2:98 %  03/13/20 1415, BP:106/63, Pulse:105, Resp:17, SpO2:100 %  03/13/20 1410, BP:108/66, Pulse:69, Resp:12, SpO2:98 %  03/13/20 1405, BP:108/61, Pulse:68, Resp:16, SpO2:100 %  03/13/20 1400, BP:(!) 100/55, Pulse:66, Resp:12, SpO2:99 %  03/13/20 1355, BP:109/66, Pulse:75, Resp:12, SpO2:100 %  03/13/20 1350, BP:110/69, Pulse:67, Resp:10, SpO2:100 %  03/13/20 1345, BP:118/77, Pulse:64, Resp:12, SpO2:96 %  03/13/20 1340, BP:127/82, Pulse:70, Resp:16, SpO2:94 %  03/13/20 1335, BP:128/79, Pulse:69, Resp:12, SpO2:92 %  03/13/20 1330, BP:107/74, Pulse:73, Resp:12, SpO2:95 %  03/13/20 1325, BP:(!) 103/59, Pulse:75, Resp:11, SpO2:97 %  03/13/20 1320, BP:(!) 142/72, Pulse:69, Resp:10  03/13/20 1318, BP:(!) 142/72, Temp:97.8 °F (36.6 °C), Temp src:Temporal, Pulse:74, Resp:12, SpO2:95 %    Level of Consciousness:  Awake    Respiratory:  Stable    Oxygen Saturation:  Stable    Cardiovascular:  Stable    Hydration:  Adequate    PONV:  Stable    Post-op Pain:  Adequate analgesia    Post-op Assessment:  No apparent anesthetic complications    Additional Follow-Up / Treatment / Comment:  None    Lolly Sutherland.  DO Theodore  March 13, 2020   3:07 PM

## 2020-03-13 NOTE — OP NOTE
OPERATIVE REPORT     PATIENT NAME: Evita Mcnulty                              : 1955  MED REC NO:  [id number]                         ACCOUNT NO: [de-identified]                               ROOM: 011  ADMISSION DATE: 3/13/2020    PROVIDER:  Aric Bustillos M.D.     DATE OF PROCEDURE: 3/13/2020     PREOPERATIVE DIAGNOSES:  1. Prior L3-S1 decompression and fusion with symptomatic hardware  2. Obesity, BMI 30.9     POSTOPERATIVE DIAGNOSES:  1. Prior L3-S1 decompression and fusion with symptomatic hardware  2. Obesity, BMI 30.9     OPERATION PERFORMED:  1. L3-S1 exploration of fusion  2. L3-S1 removal of instrumentation     SURGEON: Rossi Power MD    ASSISTANT:  Sharmila Terry PA-C     ANESTHESIA:  General.     INDICATIONS:  This is a 60 y.o. male who underwent a L3-S1 decompression and fusion in . He developed refractory back pain. Patient had a hardware injection with improvement of his symptoms. CT was obtained and showed a solid fusion with no evidence of screw loosening. Due to the persistence of symptoms and reduction in the ADLs, patient elected surgical treatment for removal of the hardware. Patient, therefore, understood indications for the surgery as well as its risks, benefits, and alternatives. These risks include but are not limited to paralysis, infection, hematoma, dural tear, nerve root injury, nonunion, DVT/PE, stroke, MI, death etc.  All questions were answered. Informed consent was obtained. OPERATIVE PROCEDURE:  The patient was taken to the operating room by the Anesthesiology Service and had satisfactory general anesthesia. A first-generation cephalosporin was given within 1 hour of surgical incision. 2 gm of cefazolin was given IV. Venous thromboembolic prophylaxis was performed with sequential devices. The patient was then positioned prone on a standard OSI frame with the abdomen hanging free and all bony prominences well padded.   The low back was then prepped

## 2020-03-13 NOTE — PROGRESS NOTES
2860:  Patient admitted to [facility] room 16. Justyna,spouse at bedside. Arm bands applied. Bilat. ASHUTOSH hose, Socks, SCD's applied. Call light in reach. Patient verbalized approval for first name, last initial and physician name on unit whiteboard.

## 2020-03-13 NOTE — PROGRESS NOTES
5 ARRIVED IN PACU FROM O R AFTER GENERAL ANESTHESIA . SEE FLOW SHEET   1335 FENTANYL GIVEN FOR PAIN 9/10. SEE MAR  1355 CONTINUES TO COMPLAIN OF POST OP BACK PAIN . [place] NOW WITH MORPHINE PER ORDER. SEE MAR.  1400 TAKING ICE CHIPS. Keesha AT 4/10. DOZES. 1430 DOZES. [facility] NOTIFIED. WAITING FOR TRANSPORT.   1456 TO ROOM WITH O2 PER TRANSPORT IN STABLE CONDITION.

## 2020-03-13 NOTE — BRIEF OP NOTE
Brief Postoperative Note  ______________________________________________________________    Patient: Huyen Vigil  YOB: 1955  MRN: [id number]  Date of Procedure: 3/13/2020    Pre-Op Diagnosis:   1) Prior L3-S1 decompression and fusion with symptomatic hardware    Post-Op Diagnosis: Same       Procedure(s):  L3-S1 HARDWARE REMOVAL    Anesthesia: General    Surgeon(s):  Ethel Bloom MD    Assistant: Irlanda KEVIN    Estimated Blood Loss (mL): 828 mL    Complications: None    Specimens:   None    Implants:  Implant Name Type Inv.  Item Serial No.  Lot No. LRB No. Used   KIT SEALANT SURGIFLO HEMOSTATIC MATRIX Bone/Graft/Tissue/Human/Synth K[employer] HEMOSTATIC MATRIX  [facility]: [address] [facility] [id number] N/A 1         Drains:   Closed/Suction Drain Back [employer] (Active)       Findings: Solid fusion, no screw loosening    Ethel Bloom MD  Date: 3/13/2020  Time: 12:46 PM

## 2020-03-13 NOTE — ANESTHESIA PRE PROCEDURE
Department of Anesthesiology  Preprocedure Note       Name:  Dilip Kaur   Age:  60 y.o.  :  1955                                          MRN:  [id number]         Date:  3/13/2020      Surgeon: Massimo Villafuerte):  Valdez Perkins MD    Procedure: L3-S1 HARDWARE REMOVAL (N/A )    Medications prior to admission:   Prior to Admission medications    Medication Sig Start Date End Date Taking?  Authorizing Provider   omeprazole (PRILOSEC) 40 MG delayed release capsule Take 1 capsule by mouth daily 20  Yes Tea Leon MD   sertraline (ZOLOFT) 100 MG tablet Take 2 tablets by mouth daily 19  Yes YUSEF Apple - CNP   lamoTRIgine (LAMICTAL) 200 MG tablet TAKE 1 TABLET BY MOUTH TWO  TIMES DAILY 19  Yes Tea Leon MD   rizatriptan (MAXALT) 10 MG tablet Take 10 mg by mouth once as needed for Migraine May repeat in 2 hours if needed   Yes Historical Provider, MD   aspirin-acetaminophen-caffeine (Ирина Blocker) 160-475-87 MG per tablet Take 2 tablets by mouth every 8 hours as needed for Headaches 10/22/18  Yes VeCORINA Reynoso   divalproex (DEPAKOTE ER) 500 MG extended release tablet Take by mouth daily 3 tab   Yes Historical Provider, MD   butorphanol (STADOL) 10 MG/ML nasal spray 1 spray by Nasal route every 4 hours as needed for Pain   Yes Historical Provider, MD   EPINEPHrine (EPIPEN) 0.3 MG/0.3ML SOAJ injection Inject 0.3 mg into the muscle as needed Use as directed for allergic reaction    Historical Provider, MD       Current medications:    Current Facility-Administered Medications   Medication Dose Route Frequency Provider Last Rate Last Dose    0.9 % sodium chloride infusion   Intravenous Continuous CORINA Pearl 125 mL/hr at 20 0742      sodium chloride flush 0.9 % injection 10 mL  10 mL Intravenous 2 times per day CORINA Reid        sodium chloride flush 0.9 % injection 10 mL  10 mL Intravenous PRN CORINA Reid        ceFAZolin (ANCEF) 2 g in dextrose 5 % 50 30.87 kg/m². CBC:   Lab Results   Component Value Date    WBC 9.4 02/25/2020    RBC 5.39 02/25/2020    RBC 4.75 05/04/2012    HGB 16.4 02/25/2020    HCT 52.2 02/25/2020    MCV 96.8 02/25/2020    RDW 13.9 03/05/2018     02/25/2020       CMP:   Lab Results   Component Value Date     02/25/2020    K 4.6 02/25/2020     02/25/2020    CO2 28 02/25/2020    BUN 10 02/25/2020    CREATININE 1.0 02/25/2020    LABGLOM 76 02/25/2020    GLUCOSE 108 02/25/2020    GLUCOSE 103 05/09/2012    PROT 7.8 01/22/2019    CALCIUM 9.2 02/25/2020    BILITOT 0.5 01/22/2019    ALKPHOS 99 01/22/2019    AST 24 01/22/2019    ALT 25 01/22/2019       POC Tests: No results for input(s): POCGLU, POCNA, POCK, POCCL, POCBUN, POCHEMO, POCHCT in the last 72 hours. Coags:   Lab Results   Component Value Date    INR 1.02 02/25/2020    APTT 31.6 02/25/2020       HCG (If Applicable): No results found for: PREGTESTUR, PREGSERUM, HCG, HCGQUANT     ABGs: No results found for: PHART, PO2ART, XRQ4YKU, RMS0QID, BEART, P6OHBZQH     Type & Screen (If Applicable):  Lab Results   Component Value Date    LABRH NEG 03/13/2020       Anesthesia Evaluation  Patient summary reviewed  Airway: Mallampati: II  TM distance: >3 FB   Neck ROM: full  Mouth opening: > = 3 FB Dental:          Pulmonary:   (+) shortness of breath:  sleep apnea:                             Cardiovascular:    (+) hypertension:, angina:, CAD: non-obstructive,       ECG reviewed                        Neuro/Psych:   (+) seizures:, headaches:, psychiatric history:            GI/Hepatic/Renal:             Endo/Other:                     Abdominal:           Vascular:                                        Anesthesia Plan      general     ASA 3       Induction: intravenous. MIPS: Postoperative opioids intended and Prophylactic antiemetics administered. Anesthetic plan and risks discussed with patient and spouse. Plan discussed with JAY. Shima De Jesus.  DO Theodore

## 2020-03-13 NOTE — PROGRESS NOTES
Patient brought from PACU. Patient alert and oriented x 4. Iv normal saline infusing at 100ml/hr in left wrist with 800ml left in bag. 02 via nasal cannula 2.5L. surgical dressing to back dry and intact, hemovac intact. SCD and ASHUTOSH hose bilateral lower legs. Wife at bedside. Ice chips and water given. Patient oriented to room and call light. Two nurse skin assessment performed by Evita Tan RN and Jarod DOMINIQUE.

## 2020-03-14 PROCEDURE — 2580000003 HC RX 258: Performed by: PHYSICIAN ASSISTANT

## 2020-03-14 PROCEDURE — 6370000000 HC RX 637 (ALT 250 FOR IP): Performed by: PHYSICIAN ASSISTANT

## 2020-03-14 PROCEDURE — 6360000002 HC RX W HCPCS: Performed by: PHYSICIAN ASSISTANT

## 2020-03-14 PROCEDURE — 1200000000 HC SEMI PRIVATE

## 2020-03-14 PROCEDURE — 94761 N-INVAS EAR/PLS OXIMETRY MLT: CPT

## 2020-03-14 PROCEDURE — 2700000000 HC OXYGEN THERAPY PER DAY

## 2020-03-14 RX ORDER — DIAZEPAM 5 MG/1
5 TABLET ORAL EVERY 8 HOURS PRN
Status: DISCONTINUED | OUTPATIENT
Start: 2020-03-14 | End: 2020-03-16 | Stop reason: HOSPADM

## 2020-03-14 RX ADMIN — MORPHINE SULFATE 4 MG: 4 INJECTION, SOLUTION INTRAMUSCULAR; INTRAVENOUS at 21:31

## 2020-03-14 RX ADMIN — DIAZEPAM 5 MG: 5 TABLET ORAL at 21:32

## 2020-03-14 RX ADMIN — SERTRALINE HYDROCHLORIDE 200 MG: 100 TABLET, FILM COATED ORAL at 09:44

## 2020-03-14 RX ADMIN — SODIUM CHLORIDE, PRESERVATIVE FREE 10 ML: 5 INJECTION INTRAVENOUS at 09:44

## 2020-03-14 RX ADMIN — DIVALPROEX SODIUM 500 MG: 500 TABLET, EXTENDED RELEASE ORAL at 09:44

## 2020-03-14 RX ADMIN — SODIUM CHLORIDE: 9 INJECTION, SOLUTION INTRAVENOUS at 23:18

## 2020-03-14 RX ADMIN — DEXTROSE MONOHYDRATE 2 G: 50 INJECTION, SOLUTION INTRAVENOUS at 05:32

## 2020-03-14 RX ADMIN — BISACODYL 5 MG: 5 TABLET, COATED ORAL at 00:26

## 2020-03-14 RX ADMIN — POLYETHYLENE GLYCOL 3350 17 G: 17 POWDER, FOR SOLUTION ORAL at 09:56

## 2020-03-14 RX ADMIN — SODIUM CHLORIDE: 9 INJECTION, SOLUTION INTRAVENOUS at 09:53

## 2020-03-14 RX ADMIN — BISACODYL 5 MG: 5 TABLET, COATED ORAL at 09:43

## 2020-03-14 RX ADMIN — OMEPRAZOLE 40 MG: 40 CAPSULE, DELAYED RELEASE ORAL at 06:30

## 2020-03-14 RX ADMIN — OXYCODONE HYDROCHLORIDE AND ACETAMINOPHEN 2 TABLET: 5; 325 TABLET ORAL at 23:18

## 2020-03-14 RX ADMIN — MORPHINE SULFATE 4 MG: 4 INJECTION, SOLUTION INTRAMUSCULAR; INTRAVENOUS at 14:52

## 2020-03-14 RX ADMIN — OXYCODONE HYDROCHLORIDE AND ACETAMINOPHEN 2 TABLET: 5; 325 TABLET ORAL at 03:56

## 2020-03-14 RX ADMIN — LAMOTRIGINE 200 MG: 200 TABLET ORAL at 09:44

## 2020-03-14 RX ADMIN — LAMOTRIGINE 200 MG: 200 TABLET ORAL at 21:32

## 2020-03-14 RX ADMIN — OXYCODONE HYDROCHLORIDE AND ACETAMINOPHEN 2 TABLET: 5; 325 TABLET ORAL at 09:47

## 2020-03-14 ASSESSMENT — PAIN DESCRIPTION - DESCRIPTORS
DESCRIPTORS: ACHING
DESCRIPTORS: SHARP

## 2020-03-14 ASSESSMENT — PAIN SCALES - GENERAL
PAINLEVEL_OUTOF10: 8
PAINLEVEL_OUTOF10: 10
PAINLEVEL_OUTOF10: 8
PAINLEVEL_OUTOF10: 10
PAINLEVEL_OUTOF10: 8
PAINLEVEL_OUTOF10: 9
PAINLEVEL_OUTOF10: 7
PAINLEVEL_OUTOF10: 9
PAINLEVEL_OUTOF10: 10
PAINLEVEL_OUTOF10: 8
PAINLEVEL_OUTOF10: 10

## 2020-03-14 ASSESSMENT — PAIN DESCRIPTION - ORIENTATION
ORIENTATION: LOWER
ORIENTATION: LOWER

## 2020-03-14 ASSESSMENT — PAIN - FUNCTIONAL ASSESSMENT
PAIN_FUNCTIONAL_ASSESSMENT: PREVENTS OR INTERFERES SOME ACTIVE ACTIVITIES AND ADLS
PAIN_FUNCTIONAL_ASSESSMENT: PREVENTS OR INTERFERES SOME ACTIVE ACTIVITIES AND ADLS

## 2020-03-14 ASSESSMENT — PAIN DESCRIPTION - FREQUENCY
FREQUENCY: CONTINUOUS
FREQUENCY: CONTINUOUS

## 2020-03-14 ASSESSMENT — PAIN DESCRIPTION - ONSET
ONSET: ON-GOING
ONSET: ON-GOING

## 2020-03-14 ASSESSMENT — PAIN DESCRIPTION - PROGRESSION
CLINICAL_PROGRESSION: NOT CHANGED
CLINICAL_PROGRESSION: NOT CHANGED

## 2020-03-14 ASSESSMENT — PAIN DESCRIPTION - LOCATION
LOCATION: BACK
LOCATION: BACK

## 2020-03-14 ASSESSMENT — PAIN DESCRIPTION - PAIN TYPE
TYPE: SURGICAL PAIN
TYPE: SURGICAL PAIN

## 2020-03-14 NOTE — PROGRESS NOTES
Department of Orthopedic Surgery  Spine Service  Attending Progress Note        Subjective:  Patient doing okay, c/o severe back surgical site pain. No numbness/tingling or radicular sx. Vitals  VITALS:  /65   Pulse 67   Temp 97.9 °F (36.6 °C) (Oral)   Resp 18   Ht 5' 8.5\" (1.74 m)   Wt 206 lb (93.4 kg)   SpO2 96%   BMI 30.87 kg/m²   24HR INTAKE/OUTPUT:      Intake/Output Summary (Last 24 hours) at 3/14/2020 1114  Last data filed at 3/14/2020 4850  Gross per 24 hour   Intake 4185.63 ml   Output 1465 ml   Net 2720.63 ml     URINARY CATHETER OUTPUT (Colindres):     DRAIN/TUBE OUTPUT:  Closed/Suction Drain Back Accordion-Output (ml): 200 ml      PHYSICAL EXAM:    Orientation:  alert and oriented to person, place and time    Incision:  dressing in place, clean, dry, intact      Lower Extremity Motor :  quadriceps, extensor hallucis longus, dorsiflexion, plantarflexion 5/5 bilaterally  Lower Extremity Sensory:  Intact L1-S1    Flatus:  positive    ABNORMAL EXAM FINDINGS:  none    LABS:    HgB:    Lab Results   Component Value Date    HGB 16.4 02/25/2020         ASSESSMENT AND PLAN:    Post operative day 1 status post L3-S1 hardware remova    1:  Monitor labs and drain output  2:  Activity Level:  As tolerated.  PT ordered  3:  Pain Control:  Good  4:  Discharge Planning:  Pending, home tomorrow  5:  Switch flexeril to Valium       CORINA Royal

## 2020-03-14 NOTE — PLAN OF CARE
Problem: Pain:  Goal: Pain level will decrease  Description: Pain level will decrease  Outcome: Ongoing  Note: Pt report pain at 7 on scale. Pt states oral/iv medication helping to achieve pain goal of a 5 on scale. Problem: Infection:  Goal: Will remain free from infection  Description: Will remain free from infection  Outcome: Ongoing  Note: Pt shows no signs or symptoms of infection this shift will continue to monitor. Problem: Daily Care:  Goal: Daily care needs are met  Description: Daily care needs are met  Outcome: Ongoing  Note: Pt encouraged to provide self care as able. Continue to monitor needs. Problem: Skin Integrity:  Goal: Skin integrity will stabilize  Description: Skin integrity will stabilize  Outcome: Ongoing  Note: Pt able to make position changes per staff assistance every 2 hours and as needed. Encouraged to make frequent changes in position to prevent skin breakdown. Problem: Discharge Planning:  Goal: Patients continuum of care needs are met  Description: Patients continuum of care needs are met  Outcome: Ongoing  Note: Pt plans home with wife at discharge. Care manager and social working helping with discharge needs. Problem: Respiratory  Goal: O2 Sat > 90%  Outcome: Ongoing  Note: Pt sats 95 on 2 LO2 per nasal cannula. No shortness of breath noted. Lungs clear diminished, uses IS, and able to C&DB  as ordered. Problem: GI  Goal: No bowel complications  Outcome: Ongoing  Note: Pt with bowel sounds, not passing flatus, and without nausea. Taking prescribed medications to assist with BM       Problem:   Goal: Adequate urinary output  Outcome: Ongoing  Note: Pt voiding adequate amounts without difficulty.         Problem: Pain:  Goal: Control of acute pain  Description: Control of acute pain  Outcome: Completed  Note: duplicate     Problem: Pain:  Goal: Control of chronic pain  Description: Control of chronic pain  Outcome: Completed  Note: duplicate

## 2020-03-15 PROCEDURE — 6360000002 HC RX W HCPCS: Performed by: PHYSICIAN ASSISTANT

## 2020-03-15 PROCEDURE — 97110 THERAPEUTIC EXERCISES: CPT

## 2020-03-15 PROCEDURE — 97161 PT EVAL LOW COMPLEX 20 MIN: CPT

## 2020-03-15 PROCEDURE — 6370000000 HC RX 637 (ALT 250 FOR IP): Performed by: PHYSICIAN ASSISTANT

## 2020-03-15 PROCEDURE — 1200000000 HC SEMI PRIVATE

## 2020-03-15 RX ORDER — DIAZEPAM 5 MG/1
5 TABLET ORAL EVERY 8 HOURS PRN
Qty: 21 TABLET | Refills: 0 | Status: SHIPPED | OUTPATIENT
Start: 2020-03-15 | End: 2020-03-22

## 2020-03-15 RX ADMIN — OXYCODONE HYDROCHLORIDE AND ACETAMINOPHEN 2 TABLET: 5; 325 TABLET ORAL at 06:11

## 2020-03-15 RX ADMIN — LAMOTRIGINE 200 MG: 200 TABLET ORAL at 21:04

## 2020-03-15 RX ADMIN — LAMOTRIGINE 200 MG: 200 TABLET ORAL at 11:25

## 2020-03-15 RX ADMIN — DIVALPROEX SODIUM 500 MG: 500 TABLET, EXTENDED RELEASE ORAL at 11:25

## 2020-03-15 RX ADMIN — OXYCODONE HYDROCHLORIDE AND ACETAMINOPHEN 2 TABLET: 5; 325 TABLET ORAL at 21:03

## 2020-03-15 RX ADMIN — SERTRALINE HYDROCHLORIDE 200 MG: 100 TABLET, FILM COATED ORAL at 11:22

## 2020-03-15 RX ADMIN — DIAZEPAM 5 MG: 5 TABLET ORAL at 14:34

## 2020-03-15 RX ADMIN — ONDANSETRON 4 MG: 2 INJECTION INTRAMUSCULAR; INTRAVENOUS at 08:06

## 2020-03-15 RX ADMIN — POLYETHYLENE GLYCOL 3350 17 G: 17 POWDER, FOR SOLUTION ORAL at 11:20

## 2020-03-15 RX ADMIN — BISACODYL 5 MG: 5 TABLET, COATED ORAL at 11:21

## 2020-03-15 RX ADMIN — OMEPRAZOLE 40 MG: 40 CAPSULE, DELAYED RELEASE ORAL at 06:11

## 2020-03-15 RX ADMIN — DIAZEPAM 5 MG: 5 TABLET ORAL at 06:11

## 2020-03-15 RX ADMIN — OXYCODONE HYDROCHLORIDE AND ACETAMINOPHEN 2 TABLET: 5; 325 TABLET ORAL at 12:48

## 2020-03-15 ASSESSMENT — PAIN SCALES - GENERAL
PAINLEVEL_OUTOF10: 5
PAINLEVEL_OUTOF10: 7
PAINLEVEL_OUTOF10: 7
PAINLEVEL_OUTOF10: 8

## 2020-03-15 ASSESSMENT — PAIN DESCRIPTION - PROGRESSION
CLINICAL_PROGRESSION: NOT CHANGED
CLINICAL_PROGRESSION: NOT CHANGED

## 2020-03-15 ASSESSMENT — PAIN DESCRIPTION - DESCRIPTORS
DESCRIPTORS: ACHING
DESCRIPTORS: ACHING

## 2020-03-15 ASSESSMENT — PAIN DESCRIPTION - LOCATION
LOCATION: BACK
LOCATION: BACK

## 2020-03-15 ASSESSMENT — PAIN DESCRIPTION - DIRECTION: RADIATING_TOWARDS: FEET

## 2020-03-15 ASSESSMENT — PAIN DESCRIPTION - ORIENTATION
ORIENTATION: MID;LOWER
ORIENTATION: MID;LOWER

## 2020-03-15 ASSESSMENT — PAIN DESCRIPTION - PAIN TYPE
TYPE: SURGICAL PAIN
TYPE: SURGICAL PAIN

## 2020-03-15 ASSESSMENT — PAIN DESCRIPTION - ONSET
ONSET: ON-GOING
ONSET: ON-GOING

## 2020-03-15 ASSESSMENT — PAIN DESCRIPTION - FREQUENCY
FREQUENCY: CONTINUOUS
FREQUENCY: CONTINUOUS

## 2020-03-15 NOTE — PROGRESS NOTES
[facility] - [id number]    Time In: 6060  Time Out: 0804  Timed Code Treatment Minutes: 8 Minutes  Minutes: 29          Date: 3/15/2020  Patient Name: Jayleen Marie,  Gender:  male        MRN: [id number]  : 1955  (60 y.o.)      Referring Practitioner: Elpidio Wynne PA-C   Diagnosis: Painful orthopaedic hardware  Additional Pertinent Hx: 51-year-old male who is having constant lumbar pain. No radicular symptoms. Symptoms have been present on a chronic basis. He had a prior L3-S1 decompression and fusion in  with Dr. Nayeli Rodriguez. He also had a L3-5 laminectomy in , C4-6 ACDF in  and C3-4 ACDF in 2018. Patient is a nonsmoker. L3-S1 HARDWARE REMOVAL on 3-13-20. Restrictions/Precautions:  Restrictions/Precautions: General Precautions, Fall Risk  Position Activity Restriction  Spinal Precautions: No Bending, No Lifting, No Twisting    Subjective:  Chart Reviewed: Yes  Patient assessed for rehabilitation services?: Yes  Comments:      Subjective: Pt resting in bed  and c/o pain in low back RN approved session     General:  Overall Orientation Status: Within Normal Limits    Vision: Within Functional Limits    Hearing: Within functional limits         Pain:  Yes(low back ).      Pre Treatment Pain Screening  Pain at present: 8    Social/Functional History:    Lives With: Spouse  Type of Home: House  Home Layout: One level  Home Access: Stairs to enter with rails  Entrance Stairs - Number of Steps: 3 BRITTON   Entrance Stairs - Rails: Both  Home Equipment: Rolling walker, Standard walker, [email or web address] Help From: Family(Wife works during the day )  ADL Assistance: Needs assistance(Assist with shoes )     Ambulation Assistance: Independent(pt was not using a RW )  Transfer Assistance: Independent       Occupation: On disability  Additional Comments: Pt plans to return home     OBJECTIVE:  Range of Motion:  Right Lower Extremity: Impaired - Ankle df limited to neutral, pt initially stiff but able to attain normal ROM in supine with AAROM   Left Lower Extremity: Impaired - Ankle df limited to neutral, pt initially stiff but able to attain normal ROM in supine with AAROM     Strength:  Did not assess due to increased pain. Pt at least 3/5 throughout     Balance:  Static Sitting Balance:  Stand By Assistance, Upon coming up to sit pt c/o light headedness. Needs cues to deep breath. RN called in to see drain in back upon sitting up. Dressing saturated but drain intact. RN to contact MD.   Static Standing Balance: Contact Guard Assistance, with RW     Bed Mobility:  Rolling to Left: Contact Guard Assistance  cues for hooklying. Side lying to sit CGA x 1. Cues to breath during mobility        Transfers:  Sit to Stand: [address], with increased time for completion, cues for hand placement   Stand to Sit:Contact Guard Assistance, with increased time for completion, with verbal cues    Ambulation:  Contact Guard Assistance  Distance: 4'x 1   Surface: Level Tile  Device:Rolling Walker  Gait Deviations: Forward Flexed Posture, Decreased Step Length Bilaterally, Decreased Gait Speed, Wide Base of Support and cues to deep breath and not hold breath       Exercise:  Patient was guided in 1 set(s) 10 reps of exercise to both lower extremities. Ankle pumps, Heelslides and Hip abduction/adduction. Exercises were completed for increased independence with functional mobility. Pt required increased time to perform due to pain. Cues to deep breath. Functional Outcome Measures: Completed  -PAC Inpatient Mobility without Stair Climbing Raw Score : 15  -PAC Inpatient without Stair Climbing T-Scale Score : 43.03    ASSESSMENT:  Activity Tolerance:  Patient tolerance of  treatment: good. Pt with decreased endurance and mobility at this time. Continue to assess progress for needs at time of discharge.       Treatment Initiated: See ther ex above. Assessment: Body structures, Functions, Activity limitations: Decreased functional mobility , Decreased strength, Decreased endurance, Decreased ROM, Decreased balance  Assessment: Pt with increased pain this session. Pt able to get up to chair but pt very gaurded. Pt with limited mobility at this time. Pt with poor endurance. Pt to benefit from increased therapy to improve strength for functional mobility. Plan to continue to assess progress. Pending progress pt may require additional therapy prior to home due to wife working during the day. Prognosis: Good    REQUIRES PT FOLLOW UP: Yes    Discharge Recommendations:  Discharge Recommendations: Continue to assess pending progress    Patient Education:  PT Education: Goals, Precautions, PT Role, Plan of Care    Equipment Recommendations:       Plan:  Times per week: 6x0  Times per day: Daily  Current Treatment Recommendations: Strengthening, ROM, Balance Training, Transfer Training, Functional Mobility Training, Home Exercise Program, Gait Training, Endurance Training, Stair training    Goals:  Patient goals : To return home   Short term goals  Time Frame for Short term goals: At time of discharge   Short term goal 1: Mod I with bed mobility using log roll technique to get in and out of bed. Short term goal 2: Mod I with t/fs so pt can get up to go to the bathroom  Short term goal 3: Mod I to amb with 'x 1 for household amb   Short term goal 4: Pt to negotiate 3 steps with B HRs with SBA x 1 to enter home   Long term goals  Time Frame for Long term goals : No LTGs secondary to ELOS     Following session, patient left in safe position with all fall risk precautions in place.

## 2020-03-15 NOTE — PLAN OF CARE
Problem: Pain:  Description: Pain management should include both nonpharmacologic and pharmacologic interventions. Goal: Pain level will decrease  Description: Pain level will decrease  Outcome: Ongoing  Note: Pain goal \"5\". Consistently rating pain at 10 with oral pain meds available and given per pt demand. Asleep at recheck     Problem: Infection:  Goal: Will remain free from infection  Description: Will remain free from infection  Outcome: Ongoing     Problem: Daily Care:  Goal: Daily care needs are met  Description: Daily care needs are met  Outcome: Ongoing     Problem: Skin Integrity:  Goal: Skin integrity will stabilize  Description: Skin integrity will stabilize  Outcome: Ongoing     Problem: Discharge Planning:  Goal: Patients continuum of care needs are met  Description: Patients continuum of care needs are met  Outcome: Ongoing     Problem: Respiratory  Goal: O2 Sat > 90%  Outcome: Ongoing     Problem: GI  Goal: No bowel complications  Outcome: Ongoing     Problem:   Goal: Adequate urinary output  Outcome: Ongoing   Care plan reviewed with patient  who verbalizes understanding of the plan of care and contribute to goal setting.

## 2020-03-15 NOTE — PROGRESS NOTES
Department of Orthopedic Surgery  Spine Service  Attending Progress Note        Subjective: Patient continues to c/o severe back pain. No numbness/tingling or radicular sx. Drain not staying compressed    Vitals  VITALS:  BP (!) 145/87   Pulse 97   Temp 98.1 °F (36.7 °C) (Oral)   Resp 18   Ht 5' 8.5\" (1.74 m)   Wt 206 lb (93.4 kg)   SpO2 93%   BMI 30.87 kg/m²   24HR INTAKE/OUTPUT:      Intake/Output Summary (Last 24 hours) at 3/15/2020 0451  Last data filed at 3/15/2020 0342  Gross per 24 hour   Intake 2945.75 ml   Output 1245 ml   Net 1700.75 ml     URINARY CATHETER OUTPUT (Colindres):     DRAIN/TUBE OUTPUT:  Closed/Suction Drain Back Accordion-Output (ml): 20 ml      PHYSICAL EXAM:    Orientation:  alert and oriented to person, place and time    Incision:  dressing saturated      Lower Extremity Motor :  quadriceps, extensor hallucis longus, dorsiflexion, plantarflexion 5/5 bilaterally  Lower Extremity Sensory:  Intact L1-S1    Flatus:  positive    ABNORMAL EXAM FINDINGS:  none    LABS:    HgB:    Lab Results   Component Value Date    HGB 16.4 02/25/2020         ASSESSMENT AND PLAN:    Post operative day 2 status post L3-S1 hardware removal    1:  Monitor labs. Remove drain  2:  Activity Level:  As tolerated.  PT ordered  3:  Pain Control:  Good  4:  Discharge Planning:  Pending      CORINA Winters

## 2020-03-16 VITALS
RESPIRATION RATE: 16 BRPM | BODY MASS INDEX: 30.51 KG/M2 | HEIGHT: 69 IN | DIASTOLIC BLOOD PRESSURE: 77 MMHG | WEIGHT: 206 LBS | TEMPERATURE: 98.2 F | SYSTOLIC BLOOD PRESSURE: 115 MMHG | OXYGEN SATURATION: 91 % | HEART RATE: 93 BPM

## 2020-03-16 PROCEDURE — 6370000000 HC RX 637 (ALT 250 FOR IP): Performed by: PHYSICIAN ASSISTANT

## 2020-03-16 PROCEDURE — 97116 GAIT TRAINING THERAPY: CPT

## 2020-03-16 PROCEDURE — 2580000003 HC RX 258: Performed by: PHYSICIAN ASSISTANT

## 2020-03-16 PROCEDURE — 97110 THERAPEUTIC EXERCISES: CPT

## 2020-03-16 RX ADMIN — POLYETHYLENE GLYCOL 3350 17 G: 17 POWDER, FOR SOLUTION ORAL at 09:03

## 2020-03-16 RX ADMIN — OXYCODONE HYDROCHLORIDE AND ACETAMINOPHEN 2 TABLET: 5; 325 TABLET ORAL at 11:04

## 2020-03-16 RX ADMIN — DIVALPROEX SODIUM 500 MG: 500 TABLET, EXTENDED RELEASE ORAL at 09:04

## 2020-03-16 RX ADMIN — BISACODYL 10 MG: 10 SUPPOSITORY RECTAL at 12:35

## 2020-03-16 RX ADMIN — SERTRALINE HYDROCHLORIDE 200 MG: 100 TABLET, FILM COATED ORAL at 09:04

## 2020-03-16 RX ADMIN — DIAZEPAM 5 MG: 5 TABLET ORAL at 02:37

## 2020-03-16 RX ADMIN — OXYCODONE HYDROCHLORIDE AND ACETAMINOPHEN 2 TABLET: 5; 325 TABLET ORAL at 03:47

## 2020-03-16 RX ADMIN — LAMOTRIGINE 200 MG: 200 TABLET ORAL at 09:04

## 2020-03-16 RX ADMIN — BISACODYL 5 MG: 5 TABLET, COATED ORAL at 09:03

## 2020-03-16 RX ADMIN — SODIUM CHLORIDE, PRESERVATIVE FREE 10 ML: 5 INJECTION INTRAVENOUS at 09:04

## 2020-03-16 RX ADMIN — OMEPRAZOLE 40 MG: 40 CAPSULE, DELAYED RELEASE ORAL at 05:54

## 2020-03-16 ASSESSMENT — PAIN DESCRIPTION - ORIENTATION
ORIENTATION: LOWER
ORIENTATION: LOWER

## 2020-03-16 ASSESSMENT — PAIN DESCRIPTION - PAIN TYPE
TYPE: SURGICAL PAIN
TYPE: SURGICAL PAIN

## 2020-03-16 ASSESSMENT — PAIN SCALES - GENERAL
PAINLEVEL_OUTOF10: 6
PAINLEVEL_OUTOF10: 7
PAINLEVEL_OUTOF10: 7
PAINLEVEL_OUTOF10: 4
PAINLEVEL_OUTOF10: 5
PAINLEVEL_OUTOF10: 7

## 2020-03-16 ASSESSMENT — PAIN DESCRIPTION - LOCATION
LOCATION: BACK
LOCATION: BACK

## 2020-03-16 ASSESSMENT — PAIN - FUNCTIONAL ASSESSMENT: PAIN_FUNCTIONAL_ASSESSMENT: PREVENTS OR INTERFERES SOME ACTIVE ACTIVITIES AND ADLS

## 2020-03-16 ASSESSMENT — PAIN DESCRIPTION - DESCRIPTORS: DESCRIPTORS: SHARP

## 2020-03-16 ASSESSMENT — PAIN DESCRIPTION - FREQUENCY: FREQUENCY: CONTINUOUS

## 2020-03-16 ASSESSMENT — PAIN DESCRIPTION - ONSET: ONSET: ON-GOING

## 2020-03-16 ASSESSMENT — PAIN DESCRIPTION - PROGRESSION: CLINICAL_PROGRESSION: NOT CHANGED

## 2020-03-16 NOTE — PROGRESS NOTES
[address] [address]  INPATIENT PHYSICAL THERAPY  DAILY NOTE  [facility] ORTHOPEDICS 7K - [id number]      Time In: 3949  Time Out: 0930  Timed Code Treatment Minutes: 25 Minutes  Minutes: 25          Date: 3/16/2020  Patient Name: Sade Abarca,  Gender:  male        MRN: [id number]  : 1955  (60 y.o.)     Referring Practitioner: Blake Noguera PA-C   Diagnosis: Painful orthopaedic hardware  Additional Pertinent Hx: 82-year-old male who is having constant lumbar pain. No radicular symptoms. Symptoms have been present on a chronic basis. He had a prior L3-S1 decompression and fusion in  with Dr. Emily Pfeiffer. He also had a L3-5 laminectomy in , C4-6 ACDF in  and C3-4 ACDF in 2018. Patient is a nonsmoker. L3-S1 HARDWARE REMOVAL on 3-13-20. Prior Level of Function:  Lives With: Spouse  Type of Home: House  Home Layout: One level  Home Access: Stairs to enter with rails  Entrance Stairs - Number of Steps: 3 BRITTON   Entrance Stairs - Rails: Both  Home Equipment: Rolling walker, Standard walker, [employer] Help From: Family(Wife works during the day )  ADL Assistance: Needs assistance(Assist with shoes )  Ambulation Assistance: Independent(pt was not using a RW )  Transfer Assistance: Independent  Additional Comments: Pt plans to return home     Restrictions/Precautions:  Restrictions/Precautions: General Precautions, Fall Risk  Position Activity Restriction  Spinal Precautions: No Bending, No Lifting, No Twisting    SUBJECTIVE: RN approved session. Pt resting in bed at arrival, agreeable to session.      PAIN: 6/10:       OBJECTIVE:  Bed Mobility:  Rolling to Left: Stand By Assistance, with increased time for completion   Rolling to Right: Stand By Assistance, with increased time for completion   Supine to Sit: Stand By Assistance, with increased time for completion  Sit to Supine: Stand By Assistance, with increased time for completion   Scooting: Stand By Assistance, with increased time

## 2020-03-16 NOTE — PROGRESS NOTES
Department of Orthopedic Surgery  Spine Service  Attending Progress Note        Subjective: no new issues, pt reports he is doing better today    Vitals  VITALS:  /82   Pulse 81   Temp 98.2 °F (36.8 °C) (Oral)   Resp 18   Ht 5' 8.5\" (1.74 m)   Wt 206 lb (93.4 kg)   SpO2 93%   BMI 30.87 kg/m²   24HR INTAKE/OUTPUT:      Intake/Output Summary (Last 24 hours) at 3/16/2020 0622  Last data filed at 3/16/2020 0344  Gross per 24 hour   Intake 1585.39 ml   Output 850 ml   Net 735.39 ml     URINARY CATHETER OUTPUT (Colindres):     DRAIN/TUBE OUTPUT:  [REMOVED] Closed/Suction Drain Back Accordion-Output (ml): 20 ml      PHYSICAL EXAM:    Orientation:  alert and oriented to person, place and time    Incision:  dressing saturated      Lower Extremity Motor :  quadriceps, extensor hallucis longus, dorsiflexion, plantarflexion 5/5 bilaterally  Lower Extremity Sensory:  Intact L1-S1    Flatus:  positive    ABNORMAL EXAM FINDINGS:  none    LABS:    HgB:    Lab Results   Component Value Date    HGB 16.4 02/25/2020         ASSESSMENT AND PLAN:    Post operative day 3 status post L3-S1 hardware removal    1:  Monitor labs. 2:  Activity Level:  As tolerated.  PT ordered  3:  Pain Control:  Good  4:  Discharge Planning: Home today  5:  Acute post-op pulmonary insufficiency requiring O2--now resolved      CORINA Angelo

## 2020-03-16 NOTE — PLAN OF CARE
Problem: Pain:  Goal: Pain level will decrease  Description: Pain level will decrease  Outcome: Ongoing  Note: Pt report pain at 7 on scale. Pt states oral medication helping to achieve pain goal of a 5 on scale. Problem: Infection:  Goal: Will remain free from infection  Description: Will remain free from infection  Outcome: Ongoing  Note: Pt shows no signs or symptoms of infection this shift will continue to monitor. Problem: Daily Care:  Goal: Daily care needs are met  Description: Daily care needs are met  Outcome: Ongoing  Note: Pt encouraged to provide self care as able. Continue to monitor needs. Problem: Skin Integrity:  Goal: Skin integrity will stabilize  Description: Skin integrity will stabilize  Outcome: Ongoing  Note: Pt able to make position changes per staff assistance every 2 hours and as needed. Encouraged to make frequent changes in position to prevent skin breakdown. Problem: Discharge Planning:  Goal: Patients continuum of care needs are met  Description: Patients continuum of care needs are met  Outcome: Ongoing  Note: Pt plans home with wife at discharge. Care manager and social working helping with discharge needs. Problem: Respiratory  Goal: O2 Sat > 90%  Outcome: Ongoing  Note: Pt sats 92 on 2 LO2 per nasal cannula. No shortness of breath noted. Lungs clear diminished, uses IS, and able to C&DB  as ordered. Problem: GI  Goal: No bowel complications  Outcome: Ongoing  Note: Pt with bowel sounds,  passing flatus, and without nausea. Taking prescribed medications to assist with BM        Problem:   Goal: Adequate urinary output  Outcome: Ongoing  Note: Pt voiding adequate amounts without difficulty. Care plan reviewed with patient. Patient verbalize understanding of the plan of care and contribute to goal setting.

## 2020-03-16 NOTE — PROGRESS NOTES
1500 Discharge instructions reviewed and taught using teach-back method with patient. Script given for Valium. Dr. Papi Hurtado discharge instructions reviewed with patient. Follow-up appointment made with family physician. Family physician voiced they are not doing follow-ups for three weeks that patient could do an evisit if he wanted. Follow-up appointment made for 3 weeks per their request.  Patient denies any questions. Await for wife to arrive.     1602 Patient discharged via wheelchair with transport. Personal belongings sent with patient. Home medications given back to patient.

## 2020-03-17 ENCOUNTER — CARE COORDINATION (OUTPATIENT)
Dept: CASE MANAGEMENT | Age: 65
End: 2020-03-17

## 2020-03-17 PROCEDURE — 1111F DSCHRG MED/CURRENT MED MERGE: CPT | Performed by: FAMILY MEDICINE

## 2020-03-17 NOTE — DISCHARGE SUMMARY
date.        PHYSICAL EXAMINATION ON DISCHARGE:   The patient was afebrile, stable. Dressing was clean, dry and intact. 5/5 strength in bilateral lower extremities. Neurologically intact. DISCHARGE INSTRUCTIONS:   Patient can resume regular diet. Resume home medications except for NSAIDs or blood thinners. Resume asa POD#2 and all other blood thinners POD#5. Okay to resume NSAIDs 6 months after surgery. Take previously prescribed narcotic pain medications as directed. Change the dressing daily until the incision is clean and dry and then leave open to air. Okay to take a shower without submerging the incision. Limit any heavy lifting, bending or twisting until first postoperative visit. Patient to follow up with Dr. Jeni Herr 6 weeks post discharge as scheduled.     Franco Morelos PA-C

## 2020-03-31 ENCOUNTER — CARE COORDINATION (OUTPATIENT)
Dept: CASE MANAGEMENT | Age: 65
End: 2020-03-31

## 2020-03-31 NOTE — CARE COORDINATION
Patient resolved from the Care Transitions episode on 3/17/20  Patient/family has been provided the following resources and education related to COVID-19:                         Signs, symptoms and red flags related to COVID-19            CDC exposure and quarantine guidelines            [facility]it exposure contact - [phone]            Contact for their local Department of Health     No further outreach scheduled with this CTN/ACM. Episode of Care resolved. Patient has this CTN/ACM contact information if future needs arise.

## 2020-04-06 ENCOUNTER — OFFICE VISIT (OUTPATIENT)
Dept: PRIMARY CARE CLINIC | Age: 65
End: 2020-04-06
Payer: MEDICARE

## 2020-04-06 VITALS
RESPIRATION RATE: 16 BRPM | WEIGHT: 204 LBS | DIASTOLIC BLOOD PRESSURE: 68 MMHG | SYSTOLIC BLOOD PRESSURE: 120 MMHG | OXYGEN SATURATION: 98 % | BODY MASS INDEX: 30.57 KG/M2 | HEART RATE: 65 BPM

## 2020-04-06 PROCEDURE — 3017F COLORECTAL CA SCREEN DOC REV: CPT | Performed by: FAMILY MEDICINE

## 2020-04-06 PROCEDURE — G0439 PPPS, SUBSEQ VISIT: HCPCS | Performed by: FAMILY MEDICINE

## 2020-04-06 ASSESSMENT — LIFESTYLE VARIABLES: HOW OFTEN DO YOU HAVE A DRINK CONTAINING ALCOHOL: 0

## 2020-04-06 ASSESSMENT — PATIENT HEALTH QUESTIONNAIRE - PHQ9
SUM OF ALL RESPONSES TO PHQ QUESTIONS 1-9: 0
SUM OF ALL RESPONSES TO PHQ QUESTIONS 1-9: 0

## 2020-04-06 NOTE — PATIENT INSTRUCTIONS
family can't agree on what should be in your living will. · You can change your living will at any time. Some people find that their wishes about end-of-life care change as their health changes. If you make big changes to your living will, complete a new form. · If you move to another state, make sure that your living will is legal in the state where you now live. In most cases, doctors will respect your wishes even if you have a form from a different state. · You might use a universal form that has been approved by many states. This kind of form can sometimes be filled out and stored online. Your digital copy will then be available wherever you have a connection to the internet. The doctors and nurses who need to treat you can find it right away. · Your state may offer an online registry. This is another place where you can store your living will online. · It's a good idea to get your living will notarized. This means using a person called a  to watch two people sign, or witness, your living will. What should you know when you create a living will? Here are some questions to ask yourself as you make your living will:  · Do you know enough about life support methods that might be used? If not, talk to your doctor so you know what might be done if you can't breathe on your own, your heart stops, or you can't swallow. · What things would you still want to be able to do after you receive life-support methods? Would you want to be able to walk? To speak? To eat on your own? To live without the help of machines? · Do you want certain Congregational practices performed if you become very ill? · If you have a choice, where do you want to be cared for? In your home? At a hospital or nursing home? · If you have a choice at the end of your life, where would you prefer to die? At home? In a hospital or nursing home? Somewhere else? · Would you prefer to be buried or cremated?   · Do you want your organs to by making a few small changes. Follow-up care is a key part of your treatment and safety. Be sure to make and go to all appointments, and call your doctor if you are having problems. It's also a good idea to know your test results and keep a list of the medicines you take. How can you care for yourself at home? Look at what you eat  · Keep a food diary for a week or two and record everything you eat or drink. Track the number of servings you eat from each food group. · For a balanced diet every day, eat a variety of:  ? 6 or more ounce-equivalents of grains, such as cereals, breads, crackers, rice, or pasta, every day. An ounce-equivalent is 1 slice of bread, 1 cup of ready-to-eat cereal, or ½ cup of cooked rice, cooked pasta, or cooked cereal.  ? 2½ cups of vegetables, especially:  § Dark-green vegetables such as broccoli and spinach. § Orange vegetables such as carrots and sweet potatoes. § Dry beans (such as newman and kidney beans) and peas (such as lentils). ? 2 cups of fresh, frozen, or canned fruit. A small apple or 1 banana or orange equals 1 cup. ? 3 cups of nonfat or low-fat milk, yogurt, or other milk products. ? 5½ ounces of meat and beans, such as chicken, fish, lean meat, beans, nuts, and seeds. One egg, 1 tablespoon of peanut butter, ½ ounce nuts or seeds, or ¼ cup of cooked beans equals 1 ounce of meat. · Learn how to read food labels for serving sizes and ingredients. Fast-food and convenience-food meals often contain few or no fruits or vegetables. Make sure you eat some fruits and vegetables to make the meal more nutritious. · Look at your food diary. For each food group, add up what you have eaten and then divide the total by the number of days. This will give you an idea of how much you are eating from each food group. See if you can find some ways to change your diet to make it more healthy. Start small  · Do not try to make dramatic changes to your diet all at once.  You might feel

## 2020-04-06 NOTE — PROGRESS NOTES
Medicare Annual Wellness Visit  Name: Raj Begin Date: 2020   MRN: [id number] Sex: Male   Age: 60 y.o. Ethnicity: Non-/Non    : 1955 Race: Yazmin Lopez is here for Follow-Up from Hospital (Transition of care-call done follow up from back surgery. ) and Medicare AWV    Screenings for behavioral, psychosocial and functional/safety risks, and cognitive dysfunction are all negative except as indicated below. These results, as well as other patient data from the [address] form, are documented in Flowsheets linked to this Encounter. Allergies   Allergen Reactions    Bee Venom Anaphylaxis    Lisinopril Swelling    Dilaudid [Hydromorphone Hcl] Nausea And Vomiting and Rash    Nitrofuran Derivatives Nausea And Vomiting     Severe headache    Nitroglycerin Nausea And Vomiting     migraine       Prior to Visit Medications    Medication Sig Taking?  Authorizing Provider   EPINEPHrine (EPIPEN) 0.3 MG/0.3ML SOAJ injection Inject 0.3 mg into the muscle as needed Use as directed for allergic reaction Yes Historical Provider, MD   omeprazole (PRILOSEC) 40 MG delayed release capsule Take 1 capsule by mouth daily Yes Paola Mendez MD   sertraline (ZOLOFT) 100 MG tablet Take 2 tablets by mouth daily Yes YUSEF Dias CNP   lamoTRIgine (LAMICTAL) 200 MG tablet TAKE 1 TABLET BY MOUTH TWO  TIMES DAILY Yes Paola Mendez MD   rizatriptan (MAXALT) 10 MG tablet Take 10 mg by mouth once as needed for Migraine May repeat in 2 hours if needed Yes Historical Provider, MD   aspirin-acetaminophen-caffeine ([address] [place] [address]) [id number] MG per tablet Take 2 tablets by mouth every 8 hours as needed for Headaches Yes CORINA Mccall   divalproex (DEPAKOTE ER) 500 MG extended release tablet Take by mouth daily 3 tab Yes Historical Provider, MD   butorphanol (STADOL) 10 MG/ML nasal spray 1 spray by Nasal route every 4 hours as needed for Pain Yes Historical Provider, MD Past Medical History:   Diagnosis Date    Back pain     Depression     GERD (gastroesophageal reflux disease)     Headache(784.0) 9/22/2013    Migraines     ALICIA treated with BiPAP 2013    doesn't wear Bipap    Pneumonia     S/P cardiac catheterization: 7/31/2017: No obstructive lasions. 7/31/2017 7/31/2017: No obstructive lasions.  Dr. Kwan Ward [facility])        Past Surgical History:   Procedure Laterality Date    [facility]  [facility]    BACK SURGERY  12/18/15    l3-s1 decompression, posterior fusion   [address]  2013    CERVICAL SPINE SURGERY  10-16-15    C4-6 ACDF    COLONOSCOPY      ENDOSCOPY, COLON, DIAGNOSTIC      [facility]    [facility]    KNEE SURGERY  1976    Rt     LUMBAR SPINE SURGERY  08/26/2014     L3-5 decompression, Dr. Anthony Corona, [address] SURGERY  10/19/2018    OTHER SURGICAL HISTORY  06/02/2017    Diagnostic laparoscopy, Laparoscopic Ventral hernia Repair with Mesh by Dr Skyler Stringer OFFICE/OUTPT VISIT,PROCEDURE ONLY N/A 10/19/2018    C3-4 AND C6-7 ACDF performed by Joey Pappas MD at [address] [place] [address] N/A 3/13/2020    L3-S1 HARDWARE REMOVAL performed by Joey Pappas MD at [facility]    Rt Rotator cuff, Dr. Brenda Montoya       Family History   Problem Relation Age of Onset    Arthritis Father     Heart Disease Father     Other Father         COPD    Lupus Mother     Other Mother         Lupus    Depression Sister     Depression Child     Arthritis Maternal Grandmother     Stroke Maternal Grandmother     High Blood Pressure Maternal Grandmother     Arthritis Maternal Grandfather     Diabetes Maternal Grandfather     Heart Disease Maternal Grandfather     Asthma Paternal Grandmother     Stroke Paternal Grandmother     High Blood Pressure Paternal Grandmother     Asthma Paternal Grandfather        CareTeam (Including outside providers/suppliers regularly involved in providing care):   Patient Care Team:  Sharla Baxter MD as PCP - Melissa Vergara MD as PCP - [facility] Empaneled Provider  Speedy Bateman PA-C as Physician Assistant (Pulmonology)  Jerica Hansen MD as Consulting Physician (Cardiology)  Anand Nixon MD as Consulting Physician (Neurology)  Maria Guadalupe Goins MD as Consulting Physician (Gastroenterology)  Todd Carias MD as Consulting Physician (Sleep Medicine)  Palma Schaumann (Physician Assistant)    Wt Readings from Last 3 Encounters:   04/06/20 204 lb (92.5 kg)   03/13/20 206 lb (93.4 kg)   02/25/20 205 lb 0.4 oz (93 kg)     Vitals:    04/06/20 1320   BP: 120/68   Site: Left Upper Arm   Position: Sitting   Cuff Size: Medium Adult   Pulse: 65   Resp: 16   SpO2: 98%   Weight: 204 lb (92.5 kg)     Body mass index is 30.57 kg/m². Based upon direct observation of the patient, evaluation of cognition reveals recent and remote memory intact. General Appearance: alert and oriented to person, place and time, well-developed and well-nourished, in no acute distress  Head: normocephalic and atraumatic    Patient's complete Health Risk Assessment and screening values have been reviewed and are found in Flowsheets. The following problems were reviewed today and where indicated follow up appointments were made and/or referrals ordered. Positive Risk Factor Screenings with Interventions:     Health Habits/Nutrition:     Body mass index is 30.57 kg/m².   Health Habits/Nutrition Interventions:  · Inadequate physical activity:  patient is not ready to increase his/her physical activity level at this time    Personalized Preventive Plan   Current Health Maintenance Status  Immunization History   Administered Date(s) Administered    Tdap (Boostrix, Adacel) 11/20/2018        Health Maintenance   Topic Date Due    Shingles Vaccine (1 of 2) 05/09/2007    A1C test (Diabetic or Prediabetic)  03/18/2015    Annual Wellness Visit (AWV)  05/29/2019    Flu vaccine (Season Ended) 09/01/2020    Potassium monitoring  02/25/2021    Creatinine monitoring  02/25/2021    Colon cancer screen colonoscopy  05/04/2022    Lipid screen  07/29/2022    DTaP/Tdap/Td vaccine (2 - Td) 11/20/2028    Hepatitis C screen  Completed    HIV screen  Completed    Hepatitis A vaccine  Aged Out    Hepatitis B vaccine  Aged Out    Hib vaccine  Aged Out    Meningococcal (ACWY) vaccine  Aged Out    Pneumococcal 0-64 years Vaccine  Aged Out     Recommendations for [facility] Due: see orders and patient instructions/AVS.  . Recommended screening schedule for the next 5-10 years is provided to the patient in written form: see Patient Rosa Manzo was seen today for follow-up from hospital and medicare awv.     Diagnoses and all orders for this visit:    Coronary artery disease involving native coronary artery without angina pectoris, unspecified whether native or transplanted heart    Encounter for Medicare annual wellness exam    Screening for lipoid disorders    Routine general medical examination at a health care facility

## 2020-07-01 ENCOUNTER — HOSPITAL ENCOUNTER (EMERGENCY)
Age: 65
Discharge: HOME | End: 2020-07-01
Attending: EMERGENCY MEDICINE
Payer: MEDICARE

## 2020-07-01 ENCOUNTER — TELEPHONE (OUTPATIENT)
Dept: FAMILY MEDICINE CLINIC | Age: 65
End: 2020-07-01

## 2020-07-01 ENCOUNTER — APPOINTMENT (OUTPATIENT)
Dept: CT IMAGING | Age: 65
End: 2020-07-01
Payer: MEDICARE

## 2020-07-01 VITALS
WEIGHT: 218 LBS | HEIGHT: 68 IN | TEMPERATURE: 97.3 F | RESPIRATION RATE: 16 BRPM | SYSTOLIC BLOOD PRESSURE: 137 MMHG | HEART RATE: 50 BPM | DIASTOLIC BLOOD PRESSURE: 94 MMHG | BODY MASS INDEX: 33.04 KG/M2 | OXYGEN SATURATION: 96 %

## 2020-07-01 LAB
ALBUMIN SERPL-MCNC: 3.7 GM/DL (ref 3.4–5)
ALP BLD-CCNC: 109 U/L (ref 46–116)
ALT SERPL-CCNC: 25 U/L (ref 14–63)
ANION GAP: 6 MEQ/L (ref 8–16)
AST SERPL-CCNC: 22 U/L (ref 15–37)
BASOPHILS # BLD: 0.6 % (ref 0–3)
BILIRUB SERPL-MCNC: 0.3 MG/DL (ref 0.2–1)
BUN BLDV-MCNC: 13 MG/DL (ref 7–18)
CHLORIDE BLD-SCNC: 105 MEQ/L (ref 98–107)
CO2: 32 MEQ/L (ref 21–32)
CREAT SERPL-MCNC: 1.1 MG/DL (ref 0.6–1.3)
DATE LAST DOSE: ABNORMAL
DOSE TIME: ABNORMAL
EOSINOPHILS RELATIVE PERCENT: 6.5 % (ref 0–4)
GFR, ESTIMATED: 72 ML/MIN/1.73M2
GLUCOSE BLD-MCNC: 95 MG/DL (ref 74–106)
HCT VFR BLD CALC: 47.4 % (ref 42–52)
HEMOGLOBIN: 15.4 GM/DL (ref 14–18)
LYMPHOCYTES # BLD: 29.3 % (ref 15–47)
MCH RBC QN AUTO: 29 PG (ref 27–31)
MCHC RBC AUTO-ENTMCNC: 32.5 GM/DL (ref 33–37)
MCV RBC AUTO: 89.3 FL (ref 80–94)
MONOCYTES: 10.3 % (ref 0–12)
PDW BLD-RTO: 14.7 % (ref 11.5–14.5)
PLATELET # BLD: 389 THOU/MM3 (ref 130–400)
PMV BLD AUTO: 8.2 FL (ref 7.4–10.4)
POC CALCIUM: 9.5 MG/DL (ref 8.5–10.1)
POTASSIUM SERPL-SCNC: 4.8 MEQ/L (ref 3.5–5.1)
RBC # BLD: 5.31 MILL/MM3 (ref 4.7–6.1)
SEGS: 53.3 % (ref 43–75)
SODIUM BLD-SCNC: 143 MEQ/L (ref 136–145)
TOTAL PROTEIN: 8 GM/DL (ref 6.4–8.2)
VALPROIC ACID LEVEL: 4.1 MCG/ML (ref 50–100)
WBC # BLD: 9.2 THOU/MM3 (ref 4.8–10.8)

## 2020-07-01 PROCEDURE — 99283 EMERGENCY DEPT VISIT LOW MDM: CPT

## 2020-07-01 PROCEDURE — 80053 COMPREHEN METABOLIC PANEL: CPT

## 2020-07-01 PROCEDURE — 96372 THER/PROPH/DIAG INJ SC/IM: CPT

## 2020-07-01 PROCEDURE — 6370000000 HC RX 637 (ALT 250 FOR IP): Performed by: EMERGENCY MEDICINE

## 2020-07-01 PROCEDURE — 80164 ASSAY DIPROPYLACETIC ACD TOT: CPT

## 2020-07-01 PROCEDURE — 70450 CT HEAD/BRAIN W/O DYE: CPT

## 2020-07-01 PROCEDURE — 36415 COLL VENOUS BLD VENIPUNCTURE: CPT

## 2020-07-01 PROCEDURE — 6360000002 HC RX W HCPCS: Performed by: EMERGENCY MEDICINE

## 2020-07-01 PROCEDURE — 80175 DRUG SCREEN QUAN LAMOTRIGINE: CPT

## 2020-07-01 PROCEDURE — 85025 COMPLETE CBC W/AUTO DIFF WBC: CPT

## 2020-07-01 RX ORDER — KETOROLAC TROMETHAMINE 30 MG/ML
30 INJECTION, SOLUTION INTRAMUSCULAR; INTRAVENOUS ONCE
Status: COMPLETED | OUTPATIENT
Start: 2020-07-01 | End: 2020-07-01

## 2020-07-01 RX ORDER — ONDANSETRON 4 MG/1
4 TABLET, ORALLY DISINTEGRATING ORAL ONCE
Status: COMPLETED | OUTPATIENT
Start: 2020-07-01 | End: 2020-07-01

## 2020-07-01 RX ORDER — ONDANSETRON 4 MG/1
4 TABLET, FILM COATED ORAL 3 TIMES DAILY PRN
Qty: 15 TABLET | Refills: 0 | Status: SHIPPED | OUTPATIENT
Start: 2020-07-01 | End: 2020-10-14

## 2020-07-01 RX ORDER — KETOROLAC TROMETHAMINE 30 MG/ML
15 INJECTION, SOLUTION INTRAMUSCULAR; INTRAVENOUS ONCE
Status: DISCONTINUED | OUTPATIENT
Start: 2020-07-01 | End: 2020-07-01

## 2020-07-01 RX ADMIN — KETOROLAC TROMETHAMINE 30 MG: 30 INJECTION, SOLUTION INTRAMUSCULAR at 13:30

## 2020-07-01 RX ADMIN — ONDANSETRON 4 MG: 4 TABLET, ORALLY DISINTEGRATING ORAL at 12:21

## 2020-07-01 ASSESSMENT — PAIN SCALES - GENERAL
PAINLEVEL_OUTOF10: 9
PAINLEVEL_OUTOF10: 4
PAINLEVEL_OUTOF10: 9

## 2020-07-01 ASSESSMENT — PAIN DESCRIPTION - LOCATION: LOCATION: HEAD

## 2020-07-01 NOTE — ED NOTES
Pupils equal and reactive. Hand grasp equal, strong. Pedal push pull equal and strong.       Manisha Lawrence RN  07/01/20 5959

## 2020-07-01 NOTE — TELEPHONE ENCOUNTER
Patient called and stated he had fell out of bed last night and hit his head. He has been dizzy, blurred vision and just not feeling right. I did not have any openings with Dr. Macarena Ramirez and Bruno Aleman is out of the office today. No nausea or vomiting. He just feels funny and not right. Advised patient to go to the ER or [address]

## 2020-07-01 NOTE — ED NOTES
Pt presents amb with spouse. Pt gait slightly unsteady. Pt states he fell out of bed last night, hitting head on night stand. Pt has migraines and has generalized head pain. Pt also has seizure disorder and wife does not recall him shaking. She woke up to him falling out of bed. Pt usually sleeps in center of bed. Pt complains of feeling lethargic and nauseated since. Pt feels like this after his seizures.        Wolf Serrano RN  07/01/20 0516

## 2020-07-01 NOTE — ED PROVIDER NOTES
[place] [phone]  Phone: [address]    Chief Complaint   Patient presents with    Fall    Fatigue    Nausea    Head Injury       GUILHERME Gilbert is a 63 y.o. male who presents above-noted complaint. Patient's been doing pretty well. He found himself on the floor this morning face down after falling out of bed. Unclear what happened at that point. He does not recall having seizure or other issues. He has history of chronic headaches and migraines. He subsequently has headache and nausea this morning. He was not sure if it was from related to head injury or something else going on. Denies tongue biting although is edentulous as well and is denies bowel bladder incontinence. PAST MEDICAL HISTORY    Past Medical History:   Diagnosis Date    Back pain     Depression     GERD (gastroesophageal reflux disease)     Headache(784.0) 9/22/2013    Migraines     ALICIA treated with BiPAP 2013    doesn't wear Bipap    Pneumonia     S/P cardiac catheterization: 7/31/2017: No obstructive lasions. 7/31/2017 7/31/2017: No obstructive lasions.  Dr. Jay Jay Leblanc [facility])        SURGICAL HISTORY    Past Surgical History:   Procedure Laterality Date    [facility]    BACK SURGERY  12/18/15    l3-s1 decompression, posterior fusion   [address]  2013    CERVICAL SPINE SURGERY  10-16-15    C4-6 ACDF    COLONOSCOPY      ENDOSCOPY, COLON, DIAGNOSTIC      [facility]  [facility]    [facility]    KNEE SURGERY  1976    Rt     LUMBAR SPINE SURGERY  08/26/2014     L3-5 decompression, Dr. Marylin Snyder, [address] SURGERY  10/19/2018    OTHER SURGICAL HISTORY  06/02/2017    Diagnostic laparoscopy, Laparoscopic Ventral hernia Repair with Mesh by Dr Mike Grimm OFFICE/OUTPT VISIT,PROCEDURE ONLY N/A 10/19/2018    C3-4 AND C6-7 ACDF performed by Tobias Torres MD at [address] N/A 3/13/2020    L3-S1 HARDWARE REMOVAL performed by Tobias Torres MD at [address]  2001    Rt Rotator cuff, Dr. Sammi Conner    Current Outpatient Rx   Medication Sig Dispense Refill    ondansetron (ZOFRAN) 4 MG tablet Take 1 tablet by mouth 3 times daily as needed for Nausea or Vomiting 15 tablet 0    EPINEPHrine (EPIPEN) 0.3 MG/0.3ML SOAJ injection Inject 0.3 mg into the muscle as needed Use as directed for allergic reaction      omeprazole (PRILOSEC) 40 MG delayed release capsule Take 1 capsule by mouth daily 90 capsule 3    sertraline (ZOLOFT) 100 MG tablet Take 2 tablets by mouth daily 180 tablet 3    lamoTRIgine (LAMICTAL) 200 MG tablet TAKE 1 TABLET BY MOUTH TWO  TIMES DAILY 180 tablet 3    aspirin-acetaminophen-caffeine (EXCEDRIN MIGRAINE) 250-250-65 MG per tablet Take 2 tablets by mouth every 8 hours as needed for Headaches 90 tablet 3    divalproex (DEPAKOTE ER) 500 MG extended release tablet Take by mouth daily 3 tab      butorphanol (STADOL) 10 MG/ML nasal spray 1 spray by Nasal route every 4 hours as needed for Pain      rizatriptan (MAXALT) 10 MG tablet Take 10 mg by mouth once as needed for Migraine May repeat in 2 hours if needed         ALLERGIES    Allergies   Allergen Reactions    Bee Venom Anaphylaxis    Lisinopril Swelling    Dilaudid [Hydromorphone Hcl] Nausea And Vomiting and Rash    Nitrofuran Derivatives Nausea And Vomiting     Severe headache    Nitroglycerin Nausea And Vomiting     migraine       FAMILY HISTORY    Family History   Problem Relation Age of Onset    Arthritis Father     Heart Disease Father     Other Father         COPD    Lupus Mother     Other Mother         Lupus    Depression Sister     Depression Child     Arthritis Maternal Grandmother     Stroke Maternal Grandmother     High Blood Pressure Maternal Grandmother     Arthritis Maternal Grandfather     Diabetes Maternal Grandfather     Heart Disease Maternal Grandfather     Asthma Paternal Grandmother     Stroke Paternal Grandmother     High Blood Pressure Paternal Grandmother     Asthma Paternal Grandfather        SOCIAL HISTORY    Social History     Socioeconomic History    Marital status:      Spouse name: Jose Last Number of children: 4    Years of education: None    Highest education level: None   Occupational History    None   Social Needs    Financial resource strain: Not hard at all   Velia-Michelle insecurity     Worry: Never true     Inability: Never true    Transportation needs     Medical: No     Non-medical: No   Tobacco Use    Smoking status: Never Smoker    Smokeless tobacco: Never Used   Substance and Sexual Activity    Alcohol use: No    Drug use: No    Sexual activity: Yes     Partners: Female   Lifestyle    Physical activity     Days per week: None     Minutes per session: None    Stress: None   Relationships    Social connections     Talks on phone: None     Gets together: None     Attends [employer] service: None     Active member of club or organization: None     Attends meetings of clubs or organizations: None     Relationship status: None    Intimate partner violence     Fear of current or ex partner: None     Emotionally abused: None     Physically abused: None     Forced sexual activity: None   Other Topics Concern    None   Social History Narrative    None       REVIEW OF SYSTEMS    Positive for fall head injury. Negative for neck pain chest pain or abdominal pain. No focal weakness. All systems negative except as marked.      PHYSICAL EXAM    VITAL SIGNS: BP (!) 149/91   Pulse 50   Temp 97.3 °F (36.3 °C) (Temporal)   Resp 16   Ht 5' 8\" (1.727 m)   Wt 218 lb (98.9 kg)   SpO2 97%   BMI 33.15 kg/m²    Constitutional:  Alert not toxic or ill, not toxic able to give coherent history  HENT:  Normocephalic, Atraumatic, Bilateral external ears normal, Oropharynx moist, No oral exudates, Nose normal.  No evidence of trauma  Cervical Spine: Normal range of motion,  No stridor. No tenderness, Supple,  Eyes:  No discharge or  Swelling,Conjunctiva normal, PERRL, EOMI,  Respiratory: No respiratory distress, Normal breath sounds,  No wheezing, No chest tenderness. Cardiovascular:  Normal heart rate, Normal rhythm, No murmurs, No rubs, No gallops. GI:  No reproducible pain, Bowel sounds normal, Soft, No masses, No pulsatile masses. No tenderness  Musculoskeletal:  Intact distal pulses, No edema, No tenderness, No cyanosis, No clubbing. Good range of motion in all major joints. No tenderness to palpation or major deformities noted. Back:No tenderness. Integument:  Warm, Dry, No erythema, No rash (on exposed areas)   Lymphatic:  No lymphadenopathy noted. Neurologic:  Alert & oriented x 3, Normal motor function, Normal sensory function, No focal deficits noted. Psychiatric:  Affect normal, Judgment normal, Mood normal.     EKG             NIH Stroke Scale  Interval: Baseline  Level of Consciousness (1a. ): Alert  LOC Questions (1b. ): Answers both correctly  LOC Commands (1c. ): Performs both tasks correctly  Best Gaze (2. ): Normal  Visual (3. ): No visual loss  Facial Palsy (4. ): Normal symmetrical movement  Motor Arm, Left (5a. ): No drift  Motor Arm, Right (5b. ): No drift  Motor Leg, Left (6a. ): No drift  Motor Leg, Right (6b. ): No drift  Limb Ataxia (7. ): Absent  Sensory (8. ): Normal  Best Language (9. ): No aphasia  Dysarthria (10. ): Normal        RADIOLOGY    CT HEAD WO CONTRAST   Final Result   No acute intracranial findings. **This report has been created using voice recognition software. It may contain minor errors which are inherent in voice recognition technology. **      Final report electronically signed by Dr. Yusuf Wheleer on 7/1/2020 12:44 PM          PROCEDURES    none      CONSULTS:  None      CRITICAL vomiting I did obtain a CT which was unremarkable. He is encouraged to take his home meds. I estimate there is LOW risk for SKULL FRACTURE, SUBARACHNOID HEMORRHAGE, INTRACRANIAL HEMORRHAGE, SUBDURAL OR EPIDURAL HEMATOMA,  thus I consider the discharge disposition reasonable. The patient and/or family and I have discussed the diagnosis and risks, and we agree with discharging home to follow-up with their primary doctor. We also discussed returning to the Emergency Department immediately if new or worsening symptoms occur. We have discussed the symptoms which are most concerning (e.g., changing or worsening pain, weakness, vomiting, fever) that necessitate immediate return. FINAL IMPRESSION    1. Closed head injury, initial encounter    2. Nausea    3.  General weakness         PATIENT REFERRED TO:  Hieu Andersen MD  [address] [place] [facility] [address] [phone]    Call   For evaluation      DISCHARGE MEDICATIONS:  New Prescriptions    ONDANSETRON (ZOFRAN) 4 MG TABLET    Take 1 tablet by mouth 3 times daily as needed for Nausea or Vomiting           Susannah Green MD  07/01/20 5389

## 2020-07-03 LAB — LAMOTRIGINE LEVEL: 1.8 UG/ML (ref 2.5–15)

## 2020-10-12 ENCOUNTER — APPOINTMENT (OUTPATIENT)
Dept: GENERAL RADIOLOGY | Age: 65
End: 2020-10-12
Payer: MEDICARE

## 2020-10-12 ENCOUNTER — HOSPITAL ENCOUNTER (EMERGENCY)
Age: 65
Discharge: HOME | End: 2020-10-13
Attending: EMERGENCY MEDICINE
Payer: MEDICARE

## 2020-10-12 LAB
BASOPHILS # BLD: 0.5 %
BASOPHILS ABSOLUTE: 0 THOU/MM3 (ref 0–0.1)
EKG ATRIAL RATE: 60 BPM
EKG P AXIS: 66 DEGREES
EKG P-R INTERVAL: 140 MS
EKG Q-T INTERVAL: 414 MS
EKG QRS DURATION: 126 MS
EKG QTC CALCULATION (BAZETT): 414 MS
EKG R AXIS: -47 DEGREES
EKG T AXIS: 53 DEGREES
EKG VENTRICULAR RATE: 60 BPM
EOSINOPHIL # BLD: 3.7 %
EOSINOPHILS ABSOLUTE: 0.3 THOU/MM3 (ref 0–0.4)
ERYTHROCYTE [DISTWIDTH] IN BLOOD BY AUTOMATED COUNT: 13.6 % (ref 11.5–14.5)
ERYTHROCYTE [DISTWIDTH] IN BLOOD BY AUTOMATED COUNT: 47.8 FL (ref 35–45)
HCT VFR BLD CALC: 47.6 % (ref 42–52)
HEMOGLOBIN: 15.5 GM/DL (ref 14–18)
IMMATURE GRANS (ABS): 0.04 THOU/MM3 (ref 0–0.07)
IMMATURE GRANULOCYTES: 0.4 %
LYMPHOCYTES # BLD: 29.3 %
LYMPHOCYTES ABSOLUTE: 2.8 THOU/MM3 (ref 1–4.8)
MCH RBC QN AUTO: 30.9 PG (ref 26–33)
MCHC RBC AUTO-ENTMCNC: 32.6 GM/DL (ref 32.2–35.5)
MCV RBC AUTO: 94.8 FL (ref 80–94)
MONOCYTES # BLD: 12.5 %
MONOCYTES ABSOLUTE: 1.2 THOU/MM3 (ref 0.4–1.3)
NUCLEATED RED BLOOD CELLS: 0 /100 WBC
PLATELET # BLD: 361 THOU/MM3 (ref 130–400)
PMV BLD AUTO: 10.8 FL (ref 9.4–12.4)
RBC # BLD: 5.02 MILL/MM3 (ref 4.7–6.1)
SEG NEUTROPHILS: 53.6 %
SEGMENTED NEUTROPHILS ABSOLUTE COUNT: 5 THOU/MM3 (ref 1.8–7.7)
WBC # BLD: 9.4 THOU/MM3 (ref 4.8–10.8)

## 2020-10-12 PROCEDURE — 83690 ASSAY OF LIPASE: CPT

## 2020-10-12 PROCEDURE — 99285 EMERGENCY DEPT VISIT HI MDM: CPT

## 2020-10-12 PROCEDURE — 85025 COMPLETE CBC W/AUTO DIFF WBC: CPT

## 2020-10-12 PROCEDURE — 83735 ASSAY OF MAGNESIUM: CPT

## 2020-10-12 PROCEDURE — G0480 DRUG TEST DEF 1-7 CLASSES: HCPCS

## 2020-10-12 PROCEDURE — 36415 COLL VENOUS BLD VENIPUNCTURE: CPT

## 2020-10-12 PROCEDURE — 83880 ASSAY OF NATRIURETIC PEPTIDE: CPT

## 2020-10-12 PROCEDURE — 93005 ELECTROCARDIOGRAM TRACING: CPT | Performed by: EMERGENCY MEDICINE

## 2020-10-12 PROCEDURE — 80053 COMPREHEN METABOLIC PANEL: CPT

## 2020-10-12 PROCEDURE — 99284 EMERGENCY DEPT VISIT MOD MDM: CPT

## 2020-10-12 PROCEDURE — 82248 BILIRUBIN DIRECT: CPT

## 2020-10-12 PROCEDURE — 84484 ASSAY OF TROPONIN QUANT: CPT

## 2020-10-12 PROCEDURE — 71045 X-RAY EXAM CHEST 1 VIEW: CPT

## 2020-10-12 PROCEDURE — 6370000000 HC RX 637 (ALT 250 FOR IP): Performed by: EMERGENCY MEDICINE

## 2020-10-12 RX ORDER — PANTOPRAZOLE SODIUM 40 MG/1
40 TABLET, DELAYED RELEASE ORAL ONCE
Status: COMPLETED | OUTPATIENT
Start: 2020-10-12 | End: 2020-10-12

## 2020-10-12 RX ORDER — ONDANSETRON 4 MG/1
4 TABLET, ORALLY DISINTEGRATING ORAL ONCE
Status: COMPLETED | OUTPATIENT
Start: 2020-10-12 | End: 2020-10-12

## 2020-10-12 RX ADMIN — ONDANSETRON 4 MG: 4 TABLET, ORALLY DISINTEGRATING ORAL at 23:41

## 2020-10-12 RX ADMIN — PANTOPRAZOLE SODIUM 40 MG: 40 TABLET, DELAYED RELEASE ORAL at 23:41

## 2020-10-12 ASSESSMENT — ENCOUNTER SYMPTOMS
EYE PAIN: 0
TROUBLE SWALLOWING: 0
EYE REDNESS: 0
NAUSEA: 0
VOMITING: 0
VOICE CHANGE: 0
COUGH: 0
BACK PAIN: 0
CONSTIPATION: 0
SORE THROAT: 0
CHOKING: 0
ABDOMINAL PAIN: 0
DIARRHEA: 0
CHEST TIGHTNESS: 0
SINUS PRESSURE: 0
EYE DISCHARGE: 0
PHOTOPHOBIA: 0
WHEEZING: 0
BLOOD IN STOOL: 0
EYE ITCHING: 0
ABDOMINAL DISTENTION: 0
SHORTNESS OF BREATH: 0
RHINORRHEA: 0

## 2020-10-12 ASSESSMENT — PAIN SCALES - GENERAL: PAINLEVEL_OUTOF10: 5

## 2020-10-13 VITALS
SYSTOLIC BLOOD PRESSURE: 152 MMHG | DIASTOLIC BLOOD PRESSURE: 99 MMHG | HEART RATE: 58 BPM | RESPIRATION RATE: 18 BRPM | TEMPERATURE: 98.2 F | OXYGEN SATURATION: 94 %

## 2020-10-13 LAB
ALBUMIN SERPL-MCNC: 4.1 G/DL (ref 3.5–5.1)
ALP BLD-CCNC: 82 U/L (ref 38–126)
ALT SERPL-CCNC: 19 U/L (ref 11–66)
ANION GAP SERPL CALCULATED.3IONS-SCNC: 10 MEQ/L (ref 8–16)
AST SERPL-CCNC: 21 U/L (ref 5–40)
BILIRUB SERPL-MCNC: 0.2 MG/DL (ref 0.3–1.2)
BILIRUBIN DIRECT: < 0.2 MG/DL (ref 0–0.3)
BUN BLDV-MCNC: 12 MG/DL (ref 7–22)
CALCIUM SERPL-MCNC: 9 MG/DL (ref 8.5–10.5)
CHLORIDE BLD-SCNC: 102 MEQ/L (ref 98–111)
CO2: 26 MEQ/L (ref 23–33)
CREAT SERPL-MCNC: 0.9 MG/DL (ref 0.4–1.2)
ETHYL ALCOHOL, SERUM: < 0.01 %
GFR SERPL CREATININE-BSD FRML MDRD: 85 ML/MIN/1.73M2
GLUCOSE BLD-MCNC: 115 MG/DL (ref 70–108)
LIPASE: 23.5 U/L (ref 5.6–51.3)
MAGNESIUM: 2 MG/DL (ref 1.6–2.4)
OSMOLALITY CALCULATION: 276.4 MOSMOL/KG (ref 275–300)
POTASSIUM SERPL-SCNC: 4.7 MEQ/L (ref 3.5–5.2)
PRO-BNP: 152.4 PG/ML (ref 0–900)
SODIUM BLD-SCNC: 138 MEQ/L (ref 135–145)
TOTAL PROTEIN: 7.5 G/DL (ref 6.1–8)
TROPONIN T: < 0.01 NG/ML
TROPONIN T: < 0.01 NG/ML

## 2020-10-13 PROCEDURE — 84484 ASSAY OF TROPONIN QUANT: CPT

## 2020-10-13 PROCEDURE — 36415 COLL VENOUS BLD VENIPUNCTURE: CPT

## 2020-10-13 NOTE — ED NOTES
Resting in bed. Wife at bedside. Reports pain unchanged. Medicated per MAR. Will monitor.         Fco Neri RN  10/12/20 5982

## 2020-10-13 NOTE — ED NOTES
Resting in bed. Lab drawn. Call light within reach. Denies any need. , will monitor.       Albert Leon RN  10/13/20 2370

## 2020-10-13 NOTE — ED PROVIDER NOTES
[facility]  eMERGENCY dEPARTMENT eNCOUnter          CHIEF COMPLAINT       Chief Complaint   Patient presents with    Chest Pain       Nurses Notes reviewed and I agree except as noted in the HPI. HISTORY OF PRESENT ILLNESS    Sandoval Tompkins is a 63 y.o. male who presents for chest pain. He states that it started while he was watching TV. It started radiating up his neck and down his left arm. Patient states that he became concerned so he decided to come in and seek evaluation and treatment. Currently the patient is resting comfortably on the cot in no apparent distress. He is not experiencing the pain anymore. He denies any trauma to his neck. He ate approximately 2 hours prior to this. He does have some mild epigastric discomfort. Patient did have a cardiac catheterization in 2017 which was essentially negative. Currently the patient is resting comfortably on the cot in no apparent distress. Cardiac catheterization  Conclusions      Procedure Summary   Normal LM. Normal LAD. Normal LCx. Normal RCA. Normal LV systolic function. Recommendations   Patient and family were informed about the findings and their questions   were answered to their satisfaction. The importance of lifestyle changes and cardiovascular risk factors   modification was emphasized. The importance of compliance with medications was emphasized. Estimated Blood Loss:15 ml. Complications:No complications. Signatures      ----------------------------------------------------------------   Electronically signed by Kaylee Soto MD (Performing   Physician) on 07/31/2017 at 18:33    REVIEW OF SYSTEMS     Review of Systems   Constitutional: Negative for activity change, appetite change, diaphoresis, fatigue and unexpected weight change.    HENT: Negative for congestion, ear discharge, ear pain, hearing loss, rhinorrhea, sinus pressure, sore throat, trouble swallowing and voice change. Eyes: Negative for photophobia, pain, discharge, redness and itching. Respiratory: Negative for cough, choking, chest tightness, shortness of breath and wheezing. Cardiovascular: Positive for chest pain. Negative for palpitations and leg swelling. Gastrointestinal: Negative for abdominal distention, abdominal pain, blood in stool, constipation, diarrhea, nausea and vomiting. Endocrine: Negative for polydipsia, polyphagia and polyuria. Genitourinary: Negative for decreased urine volume, difficulty urinating, dysuria, enuresis, frequency, hematuria and urgency. Musculoskeletal: Negative for arthralgias, back pain, gait problem, myalgias, neck pain and neck stiffness. Skin: Negative for pallor and rash. Allergic/Immunologic: Negative for immunocompromised state. Neurological: Negative for dizziness, tremors, seizures, syncope, facial asymmetry, weakness, light-headedness, numbness and headaches. Hematological: Negative for adenopathy. Does not bruise/bleed easily. Psychiatric/Behavioral: Negative for agitation, hallucinations and suicidal ideas. The patient is not nervous/anxious. PAST MEDICAL HISTORY    has a past medical history of Back pain, Depression, GERD (gastroesophageal reflux disease), Headache(784.0), Migraines, ALICIA treated with BiPAP, Pneumonia, S/P cardiac catheterization: 7/31/2017: No obstructive lasions. , and Seizures ([address] SURGICAL HISTORY      has a past surgical history that includes knee surgery (1976); Appendectomy (2012); hernia repair (1996); hernia repair (2012); shoulder surgery (2001); Cervical spine surgery (10-16-15); Cardiac catheterization (2013); Lumbar spine surgery (08/26/2014 ); back surgery (12/18/15); other surgical history (06/02/2017); Colonoscopy; Endoscopy, colon, diagnostic; pr office/outpt visit,procedure only (N/A, 10/19/2018); Neck surgery (10/19/2018); and REMOVE HARDWARE SPINE (N/A, 3/13/2020).     CURRENT MEDICATIONS Previous Medications    ASPIRIN-ACETAMINOPHEN-CAFFEINE (EXCEDRIN MIGRAINE) 250-250-65 MG PER TABLET    Take 2 tablets by mouth every 8 hours as needed for Headaches    BUTORPHANOL (STADOL) 10 MG/ML NASAL SPRAY    1 spray by Nasal route every 4 hours as needed for Pain    DIVALPROEX (DEPAKOTE ER) 500 MG EXTENDED RELEASE TABLET    Take by mouth daily 3 tab    EPINEPHRINE (EPIPEN) 0.3 MG/0.3ML SOAJ INJECTION    Inject 0.3 mg into the muscle as needed Use as directed for allergic reaction    LAMOTRIGINE (LAMICTAL) 200 MG TABLET    TAKE 1 TABLET BY MOUTH TWO  TIMES DAILY    OMEPRAZOLE (PRILOSEC) 40 MG DELAYED RELEASE CAPSULE    Take 1 capsule by mouth daily    ONDANSETRON (ZOFRAN) 4 MG TABLET    Take 1 tablet by mouth 3 times daily as needed for Nausea or Vomiting    RIZATRIPTAN (MAXALT) 10 MG TABLET    Take 10 mg by mouth once as needed for Migraine May repeat in 2 hours if needed    SERTRALINE (ZOLOFT) 100 MG TABLET    Take 2 tablets by mouth daily       ALLERGIES     is allergic to bee venom; lisinopril; dilaudid [hydromorphone hcl]; nitrofuran derivatives; and nitroglycerin. FAMILY HISTORY     He indicated that his mother is . He indicated that his father is . He indicated that both of his sisters are alive. He indicated that his brother is alive. He indicated that the status of his maternal grandmother is unknown. He indicated that the status of his maternal grandfather is unknown. He indicated that the status of his paternal grandmother is unknown. He indicated that the status of his paternal grandfather is unknown. He indicated that all of his four children are alive.    family history includes Arthritis in his father, maternal grandfather, and maternal grandmother; Asthma in his paternal grandfather and paternal grandmother; Depression in his child and sister; Diabetes in his maternal grandfather; Heart Disease in his father and maternal grandfather; High Blood Pressure in his maternal grandmother and paternal grandmother; Lupus in his mother; Other in his father and mother; Stroke in his maternal grandmother and paternal grandmother. SOCIAL HISTORY      reports that he has never smoked. He has never used smokeless tobacco. He reports that he does not drink alcohol or use drugs. PHYSICAL EXAM     INITIAL VITALS:  oral temperature is 98.2 °F (36.8 °C). His blood pressure is 152/99 (abnormal) and his pulse is 58. His respiration is 18 and oxygen saturation is 94%. Physical Exam  Vitals signs and nursing note reviewed. Constitutional:       General: He is not in acute distress. Appearance: He is well-developed. He is not diaphoretic. HENT:      Head: Normocephalic and atraumatic. Right Ear: External ear normal.      Left Ear: External ear normal.      Nose: Nose normal.   Eyes:      General: Lids are normal. No scleral icterus. Right eye: No discharge. Left eye: No discharge. Conjunctiva/sclera: Conjunctivae normal.      Right eye: No exudate. Left eye: No exudate. Pupils: Pupils are equal, round, and reactive to light. Neck:      Musculoskeletal: Normal range of motion and neck supple. Normal range of motion. Thyroid: No thyromegaly. Vascular: No JVD. Trachea: No tracheal deviation. Cardiovascular:      Rate and Rhythm: Normal rate and regular rhythm. Pulses: Normal pulses. Carotid pulses are 2+ on the right side and 2+ on the left side. Radial pulses are 2+ on the right side and 2+ on the left side. Femoral pulses are 2+ on the right side and 2+ on the left side. Popliteal pulses are 2+ on the right side and 2+ on the left side. Dorsalis pedis pulses are 2+ on the right side and 2+ on the left side. Posterior tibial pulses are 2+ on the right side and 2+ on the left side. Heart sounds: Normal heart sounds, S1 normal and S2 normal. No murmur. No friction rub. No gallop. Pulmonary:      Effort: Pulmonary effort is normal. No respiratory distress. Breath sounds: Normal breath sounds. No stridor. No decreased breath sounds, wheezing, rhonchi or rales. Chest:      Chest wall: No tenderness. Abdominal:      General: Bowel sounds are normal. There is no distension. Palpations: Abdomen is soft. There is no mass. Tenderness: There is no abdominal tenderness. There is no guarding or rebound. Musculoskeletal: Normal range of motion. General: No tenderness. Right shoulder: He exhibits no tenderness, no bony tenderness, no crepitus and normal strength. Right lower leg: No edema. Left lower leg: No edema. Lymphadenopathy:      Cervical: No cervical adenopathy. Skin:     General: Skin is warm and dry. Findings: No bruising, ecchymosis, lesion or rash. Neurological:      Mental Status: He is alert and oriented to person, place, and time. Cranial Nerves: No cranial nerve deficit. Sensory: No sensory deficit. Coordination: Coordination normal.      Deep Tendon Reflexes: Reflexes are normal and symmetric. Psychiatric:         Speech: Speech normal.         Behavior: Behavior normal.         Thought Content: Thought content normal.         Judgment: Judgment normal.           DIFFERENTIAL DIAGNOSIS:   Differential diagnosis discussed extensively at bedside with the patient including but not limited to GERD gastritis less likely chest pain ACS    DIAGNOSTIC RESULTS     EKG: All EKG's are interpreted by the Emergency Department Physician who either signs or Co-signs this chart in the absence of a cardiologist.  EKG revealed normal sinus rhythm, slight left axis deviation, ventricular rate of 60 ND interval 148 QRS duration 126 QT interval 414 QTC of 414. When compared with EKG dated February 25, 2020 there are no more fusion complexes or PACs. Probable still left ventricular hypertrophy.   No other significant changes appreciated. RADIOLOGY: non-plain film images(s) such as CT, Ultrasound and MRI are read by the radiologist.  XR CHEST PORTABLE   Final Result   Mild linear atelectasis within the left lower lung. This document has been electronically signed by: Faviola Carballo MD on    10/12/2020 11:11 PM               LABS:   Labs Reviewed   CBC WITH AUTO DIFFERENTIAL - Abnormal; Notable for the following components:       Result Value    MCV 94.8 (*)     RDW-SD 47.8 (*)     All other components within normal limits   BASIC METABOLIC PANEL - Abnormal; Notable for the following components:    Glucose 115 (*)     All other components within normal limits   HEPATIC FUNCTION PANEL - Abnormal; Notable for the following components: Total Bilirubin 0.2 (*)     All other components within normal limits   GLOMERULAR FILTRATION RATE, ESTIMATED - Abnormal; Notable for the following components:    Est, Glom Filt Rate 85 (*)     All other components within normal limits   BRAIN NATRIURETIC PEPTIDE   LIPASE   TROPONIN   MAGNESIUM   ETHANOL   OSMOLALITY   ANION GAP   TROPONIN       EMERGENCY DEPARTMENT COURSE:   Vitals:    Vitals:    10/12/20 2249 10/12/20 2340 10/13/20 0107   BP:  (!) 150/93 (!) 152/99   Pulse: 63 64 58   Resp: 20 14 18   Temp: 98.2 °F (36.8 °C)     TempSrc: Oral     SpO2: 94% 94% 94%     Patient was assessed at bedside appropriate labs and imaging were ordered. Patient was given fluids at bedside. This may represent GERD so reflux medication was administered. Patient had improvement of symptoms. He had 2 sets of negative troponins here. At this point I feel the patient be safely discharged home. He is instructed to follow-up with clarisa cardiologist here on the second floor on 10/14/2020. His scheduled appointment is at 10 AM.  I discussed this with the patient and wife at bedside who understood and agreed with the plan. Patient is subsequently discharged home in good condition.     Patient has what appears to be atypical chest pain. Patient is instructed to take his reflux medications as prescribed. He is instructed to follow-up with his primary care physician call for an appointment within the next 1 to 2 days. He has atypical chest pain however he is scheduled to follow-up with [facility] cardiology on the second floor here on 10/14/2020 at 10 AM he is instructed to keep this appointment. Patient is instructed to take all medications as prescribed follow-up as directed return to the emergency room immediately for any new or worsening complaints. CRITICAL CARE:   None    CONSULTS:  None    PROCEDURES:  None    FINAL IMPRESSION      1. Atypical chest pain    2.  Gastroesophageal reflux disease without esophagitis          DISPOSITION/PLAN   Discharge    PATIENT REFERRED TO:  [facility] [address]  [facility] [address]  Go in 1 day  Keep scheduled appointment at [address]:  New Prescriptions    No medications on file       (Please note that portions of this note were completed with a voice recognition program.  Efforts were made to edit the dictations but occasionally words are mis-transcribed.)    Sam Bonilla, [address], DO  10/13/20 4045

## 2020-10-13 NOTE — ED NOTES
Bed: 002A  Expected date: 10/12/20  Expected time: 10:39 PM  Means of arrival: [facility]  Comments:     Sylvia Coburn RN  10/12/20 4075

## 2020-10-14 ENCOUNTER — OFFICE VISIT (OUTPATIENT)
Dept: CARDIOLOGY CLINIC | Age: 65
End: 2020-10-14
Payer: MEDICARE

## 2020-10-14 ENCOUNTER — HOSPITAL ENCOUNTER (OUTPATIENT)
Dept: CT IMAGING | Age: 65
Discharge: HOME | End: 2020-10-14
Payer: MEDICARE

## 2020-10-14 VITALS
SYSTOLIC BLOOD PRESSURE: 138 MMHG | BODY MASS INDEX: 30.63 KG/M2 | HEART RATE: 69 BPM | HEIGHT: 69 IN | WEIGHT: 206.8 LBS | DIASTOLIC BLOOD PRESSURE: 88 MMHG

## 2020-10-14 PROBLEM — I20.9 ANGINA PECTORIS, UNSPECIFIED (HCC): Status: ACTIVE | Noted: 2020-03-13

## 2020-10-14 PROCEDURE — 6360000004 HC RX CONTRAST MEDICATION: Performed by: NUCLEAR MEDICINE

## 2020-10-14 PROCEDURE — 1036F TOBACCO NON-USER: CPT | Performed by: NUCLEAR MEDICINE

## 2020-10-14 PROCEDURE — 99204 OFFICE O/P NEW MOD 45 MIN: CPT | Performed by: NUCLEAR MEDICINE

## 2020-10-14 PROCEDURE — 71275 CT ANGIOGRAPHY CHEST: CPT

## 2020-10-14 PROCEDURE — G8484 FLU IMMUNIZE NO ADMIN: HCPCS | Performed by: NUCLEAR MEDICINE

## 2020-10-14 PROCEDURE — G8417 CALC BMI ABV UP PARAM F/U: HCPCS | Performed by: NUCLEAR MEDICINE

## 2020-10-14 PROCEDURE — 3017F COLORECTAL CA SCREEN DOC REV: CPT | Performed by: NUCLEAR MEDICINE

## 2020-10-14 PROCEDURE — G8427 DOCREV CUR MEDS BY ELIG CLIN: HCPCS | Performed by: NUCLEAR MEDICINE

## 2020-10-14 RX ADMIN — IOPAMIDOL 85 ML: 755 INJECTION, SOLUTION INTRAVENOUS at 10:54

## 2020-10-14 ASSESSMENT — ENCOUNTER SYMPTOMS
RECTAL PAIN: 0
NAUSEA: 0
PHOTOPHOBIA: 0
CHEST TIGHTNESS: 1
CONSTIPATION: 0
ABDOMINAL DISTENTION: 0
DIARRHEA: 0
COLOR CHANGE: 0
ANAL BLEEDING: 0
BLOOD IN STOOL: 0
ABDOMINAL PAIN: 0
VOMITING: 0
SHORTNESS OF BREATH: 1
BACK PAIN: 0

## 2020-10-14 NOTE — PROGRESS NOTES
REMOVAL performed by Dilip Bustillos MD at [address] [place] [address]    Rt Rotator cuff, Dr. Alyssa Yu     Family History   Problem Relation Age of Onset    Arthritis Father     Heart Disease Father     Other Father         COPD    Lupus Mother     Other Mother         Lupus    Depression Sister     Depression Child     Arthritis Maternal Grandmother     Stroke Maternal Grandmother     High Blood Pressure Maternal Grandmother     Arthritis Maternal Grandfather     Diabetes Maternal Grandfather     Heart Disease Maternal Grandfather     Asthma Paternal Grandmother     Stroke Paternal Grandmother     High Blood Pressure Paternal Grandmother     Asthma Paternal Grandfather      Social History     Tobacco Use    Smoking status: Never Smoker    Smokeless tobacco: Never Used   Substance Use Topics    Alcohol use: No      Current Outpatient Medications   Medication Sig Dispense Refill    EPINEPHrine (EPIPEN) 0.3 MG/0.3ML SOAJ injection Inject 0.3 mg into the muscle as needed Use as directed for allergic reaction      omeprazole (PRILOSEC) 40 MG delayed release capsule Take 1 capsule by mouth daily 90 capsule 3    sertraline (ZOLOFT) 100 MG tablet Take 2 tablets by mouth daily 180 tablet 3    lamoTRIgine (LAMICTAL) 200 MG tablet TAKE 1 TABLET BY MOUTH TWO  TIMES DAILY 180 tablet 3    aspirin-acetaminophen-caffeine (EXCEDRIN MIGRAINE) 250-250-65 MG per tablet Take 2 tablets by mouth every 8 hours as needed for Headaches 90 tablet 3    divalproex (DEPAKOTE ER) 500 MG extended release tablet Take by mouth daily 3 tab      butorphanol (STADOL) 10 MG/ML nasal spray 1 spray by Nasal route every 4 hours as needed for Pain       No current facility-administered medications for this visit.       Allergies   Allergen Reactions    Bee Venom Anaphylaxis    Lisinopril Swelling    Dilaudid [Hydromorphone Hcl] Nausea And Vomiting and Rash    Nitrofuran Derivatives Nausea And Vomiting     Severe headache    Nitroglycerin Nausea And Vomiting     migraine     Health Maintenance   Topic Date Due    Shingles Vaccine (1 of 2) 05/09/2007    A1C test (Diabetic or Prediabetic)  03/18/2015    Flu vaccine (1) 09/01/2020    Annual Wellness Visit (AWV)  04/07/2021    Potassium monitoring  10/12/2021    Creatinine monitoring  10/12/2021    Colon cancer screen colonoscopy  05/04/2022    Lipid screen  07/29/2022    DTaP/Tdap/Td vaccine (2 - Td) 11/20/2028    Hepatitis C screen  Completed    HIV screen  Completed    Hepatitis A vaccine  Aged Out    Hepatitis B vaccine  Aged Out    Hib vaccine  Aged Out    Meningococcal (ACWY) vaccine  Aged Out    Pneumococcal 0-64 years Vaccine  Aged Out       Subjective:  Review of Systems   Constitutional: Positive for fatigue. HENT: Negative for ear pain and postnasal drip. Eyes: Negative for photophobia. Respiratory: Positive for chest tightness and shortness of breath. Cardiovascular: Positive for chest pain. Negative for palpitations. Gastrointestinal: Negative for abdominal distention, abdominal pain, anal bleeding, blood in stool, constipation, diarrhea, nausea, rectal pain and vomiting. Endocrine: Negative for polyphagia. Genitourinary: Negative for dysuria, hematuria and urgency. Musculoskeletal: Negative for arthralgias, back pain, gait problem, joint swelling, myalgias, neck pain and neck stiffness. Skin: Negative for color change, pallor, rash and wound. Allergic/Immunologic: Negative for food allergies. Neurological: Positive for light-headedness. Negative for dizziness and syncope. Psychiatric/Behavioral: Negative for decreased concentration, dysphoric mood, hallucinations, self-injury and sleep disturbance. The patient is not nervous/anxious and is not hyperactive. Objective:  Physical Exam  HENT:      Head: Normocephalic. Nose: Nose normal.   Eyes:      Pupils: Pupils are equal, round, and reactive to light.    Neck: Musculoskeletal: Normal range of motion. Cardiovascular:      Rate and Rhythm: Normal rate and regular rhythm. Heart sounds: Murmur present. Pulmonary:      Effort: No respiratory distress. Breath sounds: No stridor. No wheezing, rhonchi or rales. Chest:      Chest wall: No tenderness. Abdominal:      General: There is no distension. Palpations: There is no mass. Tenderness: There is no abdominal tenderness. There is no right CVA tenderness, left CVA tenderness, guarding or rebound. Hernia: No hernia is present. Musculoskeletal:         General: No swelling, tenderness, deformity or signs of injury. Right lower leg: No edema. Left lower leg: No edema. Skin:     Coloration: Skin is not jaundiced or pale. Findings: No bruising, erythema, lesion or rash. Neurological:      Mental Status: He is alert. Cranial Nerves: No cranial nerve deficit. Sensory: No sensory deficit. Motor: No weakness. Coordination: Coordination normal.      Gait: Gait normal.      Deep Tendon Reflexes: Reflexes normal.   Psychiatric:         Mood and Affect: Mood normal.       /88   Pulse 69   Ht 5' 8.5\" (1.74 m)   Wt 206 lb 12.8 oz (93.8 kg)   BMI 30.99 kg/m²     Assessment:      Diagnosis Orders   1. Precordial pain     2. Angina pectoris, unspecified ([address]     3. Essential hypertension     as above  Chest pain  ?/ atelectesis   Vs angina  Vs pleuritic   Vs GI related  ECG was okay   Cath 2017 was okay     Plan:  No follow-ups on file. CTA chest today   If normal will consider a stress test and echo   Continue risk factor modification and medical management. Thank you for allowing me to participate in the care of your patient. Please don't hesitate to contact me regarding any further issues related to the patient care      Orders Placed:  No orders of the defined types were placed in this encounter.       Medications Prescribed:  No orders of the defined types

## 2020-10-14 NOTE — PROGRESS NOTES
Pt c/o s.o.b. with exertion, c/o sharp left sided chest pain that started yesterday that radiated to left side of neck. States chest pain and neck pain is still present. Describes pain today as a dull ache.

## 2020-10-14 NOTE — ADDENDUM NOTE
Addended by: James Cornell on: 10/14/2020 02:03 PM     Modules accepted: Orders Encounter Date: 9/5/2017    SCRIBE #1 NOTE: Bee PINEDA am scribing for, and in the presence of,  Myles Obrien PA-C. I have scribed the following portions of the note - Other sections scribed: HPI, ROS.       History     Chief Complaint   Patient presents with    Dental Pain     x 2 weeks     CC: Dental Pain    34 year old female presents to the ED for evaluation of a 2 week history of dental pain, left sided jaw pain, and left sided ear pain. Pt was evaluated at [facility] on 8/3017 for the same symptoms; she was prescribed Penicillin and Percocet from this encounter. Pt reports the Penicillin decreased the swelling and the Percocet made the pain tolerable. Pt was evaluated by a dentist but cannot afford to have the procedure completed right now. She has an appointment with [facility] on 9/12/17. She denies difficulty swallowing and all other associated symptoms. Dental pain is exacerbated with eating. No alleviating factors reported.         PMHx: anemia, anxiety, bipolar 1 disorder, depression, endometriosis, GERD, history of psychiatric care, history of psychiatric hospitalization, mounika, opiate use, s/p gastric bypass, and suicidal ideation.         The history is provided by the patient. No  was used.     Review of patient's allergies indicates:   Allergen Reactions    Tramadol      Stomach upset and vomiting      Past Medical History:   Diagnosis Date    Anemia     Anxiety     Bipolar 1 disorder     Depression     Ear infection     Endometriosis     GERD (gastroesophageal reflux disease)     History of psychiatric care     History of psychiatric hospitalization     [facility]    Mounika     Opiate use     Psychiatric problem     S/P gastric bypass     Suicidal ideation     Therapy     Dr Roas On 12/26/14, scheduled appointment     Past Surgical History:   Procedure Laterality Date    ABDOMINAL SURGERY      Exp lap to r/o  bowel obstruction. Pt says surg was neg     SECTION, LOW TRANSVERSE      x2    and 10/2016    DILATION AND CURETTAGE OF UTERUS      GASTRIC BYPASS  2005     Family History   Problem Relation Age of Onset    Diabetes Mother     Depression Mother     Depression Sister     Cancer Maternal Grandmother 80     CRC     Social History   Substance Use Topics    Smoking status: Light Tobacco Smoker     Packs/day: 0.50     Years: 2.00     Types: Cigarettes     Last attempt to quit: 3/12/2016    Smokeless tobacco: Current User    Alcohol use No     Review of Systems   Constitutional: Negative for fever.   HENT: Positive for dental problem (pain) and ear pain (left). Negative for sore throat.         (+) left sided jaw pain   Respiratory: Negative for shortness of breath.    Cardiovascular: Negative for chest pain.   Gastrointestinal: Negative for nausea.   Genitourinary: Negative for dysuria.   Musculoskeletal: Negative for back pain.   Skin: Negative for rash.   Neurological: Negative for weakness.   Hematological: Does not bruise/bleed easily.       Physical Exam     Initial Vitals [17 0926]   BP Pulse Resp Temp SpO2   127/62 69 16 98.2 °F (36.8 °C) 99 %      MAP       83.67         Physical Exam    Nursing note and vitals reviewed.  Constitutional: She appears well-developed and well-nourished. She is not diaphoretic. No distress.   HENT:   Head: Normocephalic and atraumatic.   Mouth/Throat:       Jaw with painless range of motion, tolerating secretions, no significant tender anterior cervical lymphadenopathy, no tonsillar exudate or erythema, no tonsillar asymmetry, uvula midline, no stridor on exam, tenderness to percussion of various teeth left-sided mouth.  Sublingual area soft.  No significant or erythema to gumline, no obvious intraoral abscess.   Eyes: Conjunctivae and EOM are normal. Pupils are equal, round, and reactive to light.   Neck: Normal range of motion. Neck supple. No tracheal  deviation present.   Cardiovascular: Normal heart sounds.   Pulmonary/Chest: Breath sounds normal. No stridor. No respiratory distress. She has no wheezes. She has no rhonchi. She exhibits no tenderness.   Abdominal: Soft. Bowel sounds are normal. She exhibits no distension and no mass. There is no tenderness. There is no guarding.   Musculoskeletal: Normal range of motion. She exhibits no tenderness.   Lymphadenopathy:     She has no cervical adenopathy.   Neurological: She is alert and oriented to person, place, and time.   Skin: Skin is warm and dry. Capillary refill takes less than 2 seconds.   Psychiatric: She has a normal mood and affect. Her behavior is normal. Judgment and thought content normal.         ED Course   Procedures  Labs Reviewed   POCT URINE PREGNANCY             Medical Decision Making:   Initial Assessment:   34-year-old female chief complaint tooth pain ×2 weeks.  Differential Diagnosis:   Dental pain, gingivitis, periodontitis, odontogenic infection, Guzman's angina, pharyngitis, dental abscess  ED Management:  Patient overall well-appearing, in no acute distress, afebrile, vitals within normal limits.    Patient recently seen in this ED on 8/25/17, diagnosed with odontogenic infection, given Pen-Vee K.  Patient has finished her antibiotics.  She has seen a dentist who has recommended some sort of oral surgery.  Patient was unable to afford surgery, therefore made an appointment with [facility] dental clinic.  She does have an appointment on September 12 of this year.  Patient denies fever or chills.  She denies dysphagia or odynophagia.  She does exhibit pain to percussion to various teeth left side of her mouth without obvious intraoral abscess or gumline erythema/swelling.  Jaw with full range of motion.  Patient tolerating secretions, sublingual area soft, no tender cervical lymphadenopathy, no evidence of Guzman's angina.  Neck remains supple.  I do not feel need for IV antibiotics or  antibiotics of any kind.  There is no evidence of infection on exam, therefore I've instructed patient to keep her appointment with the dentist on 9/12/17.  I will discharge with short course of analgesia, muscle relaxers, mouthwash, and benzocaine to help get patient to her appointment.  She does understand and agree treatment plan.  I've asked her to return to this ED if any other problems occur.  Other:   I have discussed this case with another health care provider.       <> Summary of the Discussion: I have discussed this case with .            Scribe Attestation:   Scribe #1: I performed the above scribed service and the documentation accurately describes the services I performed. I attest to the accuracy of the note.    Attending Attestation:           Physician Attestation for Scribe:  Physician Attestation Statement for Scribe #1: I, Myles Obrien PA-C, reviewed documentation, as scribed by Bee Su in my presence, and it is both accurate and complete.                 ED Course      Clinical Impression:   The encounter diagnosis was Pain, dental.    Disposition:   Disposition: Discharged  Condition: Stable                        Myles Obrien PA-C  09/05/17 1047

## 2020-10-15 ENCOUNTER — TELEPHONE (OUTPATIENT)
Dept: CARDIOLOGY CLINIC | Age: 65
End: 2020-10-15

## 2020-11-06 ENCOUNTER — HOSPITAL ENCOUNTER (OUTPATIENT)
Dept: NON INVASIVE DIAGNOSTICS | Age: 65
Discharge: HOME | End: 2020-11-06
Payer: MEDICARE

## 2020-11-06 VITALS — HEIGHT: 69 IN | WEIGHT: 214 LBS | BODY MASS INDEX: 31.7 KG/M2

## 2020-11-06 LAB
LV EF: 48 %
LVEF MODALITY: NORMAL

## 2020-11-06 PROCEDURE — 93016 CV STRESS TEST SUPVJ ONLY: CPT | Performed by: NUCLEAR MEDICINE

## 2020-11-06 PROCEDURE — 3430000000 HC RX DIAGNOSTIC RADIOPHARMACEUTICAL: Performed by: NUCLEAR MEDICINE

## 2020-11-06 PROCEDURE — 78452 HT MUSCLE IMAGE SPECT MULT: CPT

## 2020-11-06 PROCEDURE — 93306 TTE W/DOPPLER COMPLETE: CPT

## 2020-11-06 PROCEDURE — A9500 TC99M SESTAMIBI: HCPCS | Performed by: NUCLEAR MEDICINE

## 2020-11-06 PROCEDURE — 93017 CV STRESS TEST TRACING ONLY: CPT | Performed by: NUCLEAR MEDICINE

## 2020-11-06 PROCEDURE — 93018 CV STRESS TEST I&R ONLY: CPT | Performed by: NUCLEAR MEDICINE

## 2020-11-06 PROCEDURE — 96374 THER/PROPH/DIAG INJ IV PUSH: CPT | Performed by: NUCLEAR MEDICINE

## 2020-11-06 PROCEDURE — 78452 HT MUSCLE IMAGE SPECT MULT: CPT | Performed by: NUCLEAR MEDICINE

## 2020-11-06 PROCEDURE — 6360000002 HC RX W HCPCS

## 2020-11-06 RX ADMIN — Medication 33 MILLICURIE: at 12:50

## 2020-11-06 RX ADMIN — Medication 9.4 MILLICURIE: at 11:55

## 2020-11-16 RX ORDER — OMEPRAZOLE 40 MG/1
40 CAPSULE, DELAYED RELEASE ORAL DAILY
Qty: 90 CAPSULE | Refills: 3 | Status: SHIPPED | OUTPATIENT
Start: 2020-11-16 | End: 2021-07-29 | Stop reason: SDUPTHER

## 2021-02-25 ENCOUNTER — VIRTUAL VISIT (OUTPATIENT)
Dept: FAMILY MEDICINE CLINIC | Age: 66
End: 2021-02-25
Payer: MEDICARE

## 2021-02-25 DIAGNOSIS — G40.909 SEIZURE DISORDER (HCC): ICD-10-CM

## 2021-02-25 DIAGNOSIS — I20.9 ANGINA PECTORIS, UNSPECIFIED (HCC): ICD-10-CM

## 2021-02-25 DIAGNOSIS — M54.40 BACK PAIN OF LUMBAR REGION WITH SCIATICA: Primary | ICD-10-CM

## 2021-02-25 DIAGNOSIS — F33.1 MODERATE EPISODE OF RECURRENT MAJOR DEPRESSIVE DISORDER (HCC): ICD-10-CM

## 2021-02-25 PROBLEM — I26.99 PULMONARY EMBOLISM (HCC): Status: ACTIVE | Noted: 2021-02-25

## 2021-02-25 PROBLEM — I26.99 PULMONARY EMBOLISM (HCC): Status: RESOLVED | Noted: 2021-02-25 | Resolved: 2021-02-25

## 2021-02-25 PROCEDURE — 99214 OFFICE O/P EST MOD 30 MIN: CPT | Performed by: FAMILY MEDICINE

## 2021-02-25 PROCEDURE — 3017F COLORECTAL CA SCREEN DOC REV: CPT | Performed by: FAMILY MEDICINE

## 2021-02-25 PROCEDURE — G8428 CUR MEDS NOT DOCUMENT: HCPCS | Performed by: FAMILY MEDICINE

## 2021-02-25 RX ORDER — METHYLPREDNISOLONE 4 MG/1
TABLET ORAL
Qty: 1 KIT | Refills: 0 | Status: SHIPPED | OUTPATIENT
Start: 2021-02-25 | End: 2021-03-03

## 2021-02-25 ASSESSMENT — ENCOUNTER SYMPTOMS
SHORTNESS OF BREATH: 0
BACK PAIN: 1

## 2021-02-25 NOTE — PROGRESS NOTES
[address] [employer] cancelled script there per pts request spoke to Nayely Taylor and called JADA Walsh spoke to [employer] pharmacist gave him the script and he will get it filled for the pt.

## 2021-03-11 ENCOUNTER — OFFICE VISIT (OUTPATIENT)
Dept: PAIN MANAGEMENT | Age: 66
End: 2021-03-11
Payer: MEDICARE

## 2021-03-11 ENCOUNTER — HOSPITAL ENCOUNTER (OUTPATIENT)
Age: 66
Discharge: HOME | End: 2021-03-11
Payer: MEDICARE

## 2021-03-11 VITALS
BODY MASS INDEX: 31.84 KG/M2 | HEART RATE: 70 BPM | DIASTOLIC BLOOD PRESSURE: 70 MMHG | HEIGHT: 69 IN | SYSTOLIC BLOOD PRESSURE: 128 MMHG | WEIGHT: 215 LBS

## 2021-03-11 DIAGNOSIS — M96.1 LUMBAR POST-LAMINECTOMY SYNDROME: Primary | ICD-10-CM

## 2021-03-11 DIAGNOSIS — Z01.818 PRE-OP TESTING: ICD-10-CM

## 2021-03-11 DIAGNOSIS — M48.07 LUMBOSACRAL SPINAL STENOSIS: ICD-10-CM

## 2021-03-11 DIAGNOSIS — M48.061 SPINAL STENOSIS OF LUMBAR REGION WITHOUT NEUROGENIC CLAUDICATION: ICD-10-CM

## 2021-03-11 DIAGNOSIS — G89.4 CHRONIC PAIN SYNDROME: ICD-10-CM

## 2021-03-11 DIAGNOSIS — M47.816 SPONDYLOSIS OF LUMBAR REGION WITHOUT MYELOPATHY OR RADICULOPATHY: ICD-10-CM

## 2021-03-11 LAB
EKG ATRIAL RATE: 57 BPM
EKG P AXIS: 47 DEGREES
EKG P-R INTERVAL: 144 MS
EKG Q-T INTERVAL: 430 MS
EKG QRS DURATION: 112 MS
EKG QTC CALCULATION (BAZETT): 418 MS
EKG R AXIS: -37 DEGREES
EKG T AXIS: 48 DEGREES
EKG VENTRICULAR RATE: 57 BPM

## 2021-03-11 PROCEDURE — 93005 ELECTROCARDIOGRAM TRACING: CPT | Performed by: FAMILY MEDICINE

## 2021-03-11 PROCEDURE — 1036F TOBACCO NON-USER: CPT | Performed by: NURSE PRACTITIONER

## 2021-03-11 PROCEDURE — G8417 CALC BMI ABV UP PARAM F/U: HCPCS | Performed by: NURSE PRACTITIONER

## 2021-03-11 PROCEDURE — G8484 FLU IMMUNIZE NO ADMIN: HCPCS | Performed by: NURSE PRACTITIONER

## 2021-03-11 PROCEDURE — 3017F COLORECTAL CA SCREEN DOC REV: CPT | Performed by: NURSE PRACTITIONER

## 2021-03-11 PROCEDURE — G8427 DOCREV CUR MEDS BY ELIG CLIN: HCPCS | Performed by: NURSE PRACTITIONER

## 2021-03-11 PROCEDURE — 99205 OFFICE O/P NEW HI 60 MIN: CPT | Performed by: NURSE PRACTITIONER

## 2021-03-11 ASSESSMENT — ENCOUNTER SYMPTOMS
BACK PAIN: 1
DIARRHEA: 0
RHINORRHEA: 0
WHEEZING: 0
EYE PAIN: 0
SINUS PRESSURE: 0
VOMITING: 0
ABDOMINAL PAIN: 0
NAUSEA: 0
PHOTOPHOBIA: 0
CONSTIPATION: 0
CHEST TIGHTNESS: 0
SORE THROAT: 0
COUGH: 0
COLOR CHANGE: 0
SHORTNESS OF BREATH: 0

## 2021-03-11 NOTE — PROGRESS NOTES
[facility]  [address]  Dept: [phone]  Dept Fax: [phone]  Loc: [phone]    Visit Date: 3/11/2021    Nikki Rodriguez is a 63 y.o. male who is referred for pain management evaluation and treatment per Dr. Allyson Briones. CAGE and CAGE-AID Questions   1. In the last three months, have you felt you should cut down or stop drinking or using drugs? Yes []        No [x]     2. In the last three months, has anyone annoyed you or gotten on your nerves by telling you to cut down or stop drinking or using drugs? Yes []        No [x]     3. In the last three months, have you felt guilty or bad about how much you drink or use drugs? Yes []        No [x]     4. In the last three months, have you been waking up wanting to have an alcoholic drink or use drugs? Yes []        No [x]        Opioid Risk Tool:  Clinician Form       1. Family History of Substance Abuse: Female Male    Alcohol   []1   []3    Illegal drugs   []2   []3    Prescription drugs     []4   []4   2. Personal History of Substance Abuse:          Alcohol   []3   []3    Illegal drugs   []4   []4    Prescription drugs     []5   []5   3. Age (kris box if between 12 and 39):     []1   []1   4. History of Preadolescent Sexual Abuse:     []3   []0   5. Psychological Disease:      Attention deficit disorder, obsessive-compulsive disorder, bipolar, schizophrenia   []2   []2      Depression     []1   [x]1    Scoring Totals  1       Total Score  Low Risk  Moderate Risk  High Risk   Risk Category   0 - 3   4 - 7   8 or Above      Patient states symptoms interfere with:  A.  General Activity:  yes   B. Mood: yes    C. Walking Ability:   yes   D. Normal Work (Includes both work outside the home and housework):   yes    E.  Relations with Other People:  no   F. Sleep:   yes   G.  Enjoyment of Life:  yes       HPI:     ChiefComplaint: Low back pain, Hip pain and Knee pain     HPI  New pt here  with complaints of low back pain for the past 15 years. He has had 3-4 lumbar surgeries since 2013 all with Dr Kojo Goff last one March 2020 with hardware removal and L3-S1 Fusion. He has h/o Cervical surgery also in 2014. He continues to have low back pain mostly all axial back pain. He does have left hip pain Right knee pain and pending RTKR soon. When he raises his right leg is causes right hip area to be painful. He has RLE weakness and pain but states mostly all from his knee. He has went to [facility] pain clinic and has had long history of Lumbar RFA  with relief but over past 2 years minimal relief. He describes his low back bilateral SI pain as constant aching sharp shooting stabbing at times. The pain increases with walking standing bending lifting twisting turning getting up and down. He has RLE weakness walks with limp unsteady gait at times. Rates pain at highest 10/10, lowest 5/10 average 6-7/10. He has had Lumbar RFA LESI TFLESI hip knee injections, PT, Chiropractor, steroid burst, narcotics, muscle relaxers, NSAIDS, rubs creams patches. , back brace. Ice heat massage. He denies h/o cancer or long term steroid use  He denies any bowel or bladder dysfunction. 1. Prior lower lumbar fusion, L3-S1. Metallic pedicle screws and rods are in place. Period multilevel laminectomy defects are present. 2. Mild thoracolumbar levoscoliosis. Cannot exclude muscle spasm. 3. Moderate disc space narrowing and degenerative disc disease L1-2. Mild disc space narrowing L3-4. Suggestion of mild retrolisthesis L3 upon L4, 3 mm. Mild vertebral body spondylosis scattered in the lumbar spine. Sacroiliac joints unremarkable. FINDINGS: The alignment of the osseous framework is satisfactory and stable.       Consistent with the history, there are surgical changes of the posterior elements that extend from L4 inferiorly through L5, consistent with decompressive laminectomy.  Postcontrast images reveal pathologically enhancing tissues that extend along the    right more than left aspects of the thecal sac as well is into the ventral epidural space. These result in very minimal if any contour deformity of the thecal sac. There is some enhancement of the joint space of the right L5-S1 facet complex.       The appearance of the conus medullaris and the cauda equina show no evidence of intrinsic abnormality. There is a small component of the cauda equina that shows characteristics of pathologic enhancement. This is challenging to localize, but estimated    right S2.       Imaged portions of the thoracic canal and foramina show no significant stenosis, grossly stable. Once again, findings are suspicious for a congenitally narrowed canal.       The L1-2 canal is mildly narrowed related to disc osteophyte complex, predominantly resulting in narrowing of the right more the left lateral canal. There is also mild right more than left foraminal stenosis. There are mild facet degenerative changes    bilaterally.       The L2-3 canal is also minimally affected by mild facet degenerative changes and slightly prominent epidural left more than right epidural fat, which has progressed since the prior exam. No aggressive features. Foramina are uncompromised.       The L3-4 canal is narrowed related to moderate facet degeneration bilaterally. There is improved appearance of the canal related to partially resected posterior elements. Diffuse bulging disc material is again evident, stable. Bilateral foraminal    stenosis, right more than left, mild/moderate also. Slight progression of bilateral foraminal stenosis suggested.       There is diffuse bulging disc material at L4-5, similar to the prior exam. Decompressive laminectomy as well as partial right medial facetectomy changes produce improved appearance of the central canal. There is persistent foraminal stenosis bilaterally,    mild/moderate.  This appears progressed, best demonstrated by the near-complete effacement of the perineural fat in the foramina bilaterally.       At L5-S1, there is severe stenosis of the thecal sac demonstrated. This has progressed. Essentially, complete effacement of subarachnoid spaces now evident. This results from moderate severe facet degenerative changes, prominent epidural fat, and very    minimal disc material. Very mild foraminal stenosis bilaterally.       Surrounding tissues show no concerning findings.           Impression   IMPRESSION: SURGICAL CHANGES HAVE OCCURRED IN THE INTERVAL RESULTING IN IMPROVED APPEARANCE OF THE CENTRAL CANAL AT L4-5 AND ALSO AT L3-4. HOWEVER, THERE HAS BEEN INTERVAL PROGRESSION OF BILATERAL FORAMINAL STENOSIS AT THESE LEVELS, PREDOMINANTLY RELATED    TO BULGING ANNULAR FIBERS OF THE DISC AND FACET OSTEOPHYTES. THERE HAS ALSO BEEN INTERVAL PROGRESSION OF CANAL STENOSIS AT L5-S1. SOME CONTRIBUTION BY EPIDURAL FIBROSIS ALSO DEMONSTRATED. PATHOLOGICALLY ENHANCING RIGHT S2 NEURAL ELEMENTS COULD REPRESENT    SEQUELAE OF ARACHNOIDITIS, THOUGH THE FINDINGS ARE NONSPECIFIC.             The patient is allergic to bee venom; lisinopril; dilaudid [hydromorphone hcl]; nitrofuran derivatives; and nitroglycerin. Subjective:      Review of Systems   Constitutional: Positive for activity change. Negative for appetite change, chills, diaphoresis, fatigue, fever and unexpected weight change. HENT: Negative for congestion, ear pain, hearing loss, mouth sores, nosebleeds, rhinorrhea, sinus pressure and sore throat. Eyes: Negative for photophobia, pain and visual disturbance. Respiratory: Negative for cough, chest tightness, shortness of breath and wheezing. Cardiovascular: Negative for chest pain and palpitations. CAD HTN ANgina   Gastrointestinal: Negative for abdominal pain, constipation, diarrhea, nausea and vomiting.    Endocrine: Negative for cold intolerance, heat intolerance, polydipsia, polyphagia and polyuria. Genitourinary: Negative for decreased urine volume, difficulty urinating, frequency and hematuria. Musculoskeletal: Positive for arthralgias, back pain, gait problem and myalgias. Negative for joint swelling, neck pain and neck stiffness. Skin: Negative for color change and rash. Allergic/Immunologic: Negative for food allergies and immunocompromised state. Neurological: Positive for seizures and headaches. Negative for dizziness, tremors, syncope, facial asymmetry, speech difficulty, weakness, light-headedness and numbness. Hematological: Does not bruise/bleed easily. Psychiatric/Behavioral: Negative for agitation, behavioral problems, confusion, decreased concentration, dysphoric mood, hallucinations, self-injury, sleep disturbance and suicidal ideas. The patient is nervous/anxious. The patient is not hyperactive. MDD       Objective:     Vitals:    03/11/21 1128   BP: 128/70   Site: Left Upper Arm   Position: Sitting   Cuff Size: Large Adult   Pulse: 70   Weight: 215 lb (97.5 kg)   Height: 5' 8.5\" (1.74 m)       Physical Exam  Vitals signs and nursing note reviewed. Constitutional:       General: He is not in acute distress. Appearance: He is well-developed. He is not diaphoretic. HENT:      Head: Normocephalic and atraumatic. Right Ear: External ear normal.      Left Ear: External ear normal.      Nose: Nose normal.      Mouth/Throat:      Pharynx: No oropharyngeal exudate. Eyes:      General: No scleral icterus. Right eye: No discharge. Left eye: No discharge. Conjunctiva/sclera: Conjunctivae normal.      Pupils: Pupils are equal, round, and reactive to light. Neck:      Musculoskeletal: Neck supple. Decreased range of motion. No edema, erythema, neck rigidity or muscular tenderness. Thyroid: No thyromegaly. Comments: CERVICAL  FUSION 2014  Cardiovascular:      Rate and Rhythm: Normal rate and regular rhythm. Heart sounds: Normal heart sounds. No murmur. No friction rub. No gallop. Pulmonary:      Effort: Pulmonary effort is normal. No respiratory distress. Breath sounds: Normal breath sounds. No wheezing or rales. Chest:      Chest wall: No tenderness. Abdominal:      General: Bowel sounds are normal. There is no distension. Palpations: Abdomen is soft. Tenderness: There is no abdominal tenderness. There is no guarding or rebound. Musculoskeletal:         General: Tenderness present. Right shoulder: He exhibits decreased range of motion and tenderness. Right hip: He exhibits decreased range of motion, decreased strength, tenderness and bony tenderness. Left hip: He exhibits decreased range of motion, decreased strength, tenderness and bony tenderness. Right knee: He exhibits decreased range of motion, swelling and bony tenderness. Tenderness found. Cervical back: He exhibits decreased range of motion and tenderness. Thoracic back: He exhibits tenderness, bony tenderness, pain and spasm. Lumbar back: He exhibits decreased range of motion, tenderness, bony tenderness, pain and spasm. Right upper leg: He exhibits tenderness. Left upper leg: He exhibits tenderness. Comments: H/o cervical surgery no pain but limited ROM  same with Right shoulder    Skin:     General: Skin is warm. Coloration: Skin is not pale. Findings: No erythema or rash. Neurological:      Mental Status: He is alert and oriented to person, place, and time. He is not disoriented. Cranial Nerves: No cranial nerve deficit. Sensory: No sensory deficit. Motor: Weakness present. No atrophy or abnormal muscle tone. Coordination: Coordination normal.      Gait: Gait abnormal.      Deep Tendon Reflexes: Babinski sign absent on the right side. Reflex Scores:       Tricep reflexes are 2+ on the right side and 2+ on the left side.        Bicep reflexes are 2+ on the right side and 2+ on the left side. Brachioradialis reflexes are 2+ on the right side and 2+ on the left side. Patellar reflexes are 1+ on the right side and 2+ on the left side. Achilles reflexes are 1+ on the right side and 2+ on the left side. Comments: 4/5 RUE 3/5 RLE   SLR + RLE   Psychiatric:         Attention and Perception: Attention and perception normal. He is attentive. Mood and Affect: Mood and affect normal. Mood is not anxious or depressed. Affect is not labile, blunt, angry or inappropriate. Speech: Speech normal. He is communicative. Speech is not rapid and pressured, delayed, slurred or tangential.         Behavior: Behavior normal. Behavior is not agitated, slowed, aggressive, withdrawn, hyperactive or combative. Thought Content: Thought content normal. Thought content is not paranoid or delusional. Thought content does not include homicidal or suicidal ideation. Thought content does not include homicidal or suicidal plan. Cognition and Memory: Cognition and memory normal. Memory is not impaired. He does not exhibit impaired recent memory or impaired remote memory. Judgment: Judgment normal. Judgment is not impulsive or inappropriate. Comments: MDD       LAURA test: +  Yeoman's test: +  Gaenslen test: +     Assessment:     1. Lumbar post-laminectomy syndrome    2. Lumbosacral spinal stenosis    3. Spinal stenosis of lumbar region without neurogenic claudication    4. Spondylosis of lumbar region without myelopathy or radiculopathy    5. Chronic pain syndrome    6. Pre-op testing            Plan:      · Patient read and signed orientation and opioid agreement. · OARRS reviewed. Current MED: 12.5  · Patient was not offered naloxone for home. · Discussed long term side effects of medications, tolerance, dependency and addiction. · UDS preformed today.   · Patient told can not receive any pain medications from any other source. · No evidence of abuse, diversion or aberrant behavior. · Prescription Needs: No prescription pain medications at this time   Medications and/or procedures to improve function and quality of life- patient understanding with this and that may not be pain free   Discussed possible weaning of medication dosing dependent on treatment/procedure results.  Discussed with patient about safe storage of medications at home   Testing: reviewed Lumbar MRI from 2015 Reviewed Lumbar XR 2021   Will order updated Lumbar LAZ to eval and SCS trial    Procedures: SCS trial Marcinro or Gatzke after all pre op testing and Psych eval if need, will need Dr Luke Otero cardiac clearance also   Discussed with patient about risks with procedure including infection, reaction to medication, increased pain, or bleeding.  Medications:none, not interested    Psych Eval for SCS trial   Lumbar MRI EKG CBC CMP    If patient is on blood thinners will need approval to hold:N/A      Previous Treatments tried:  · PT: Yes,  any benefit? No  · NSAIDs: yes,  any benefit? No,  how long taken:  Months to years   · Chiropractic: Yes,  any benefit? No  · Muscle relaxants: Yes,  any benefit? No  · Narcotics: Yes,  any benefit? Yes  · Spine surgeon consult: Yes  · Any Implants: Yes    Meds. Prescribed:   No orders of the defined types were placed in this encounter. Return in about 2 months (around 5/11/2021) for [facility] after Psych eval  adn Lumbar MRI LABS, Follow up after procedure. Time spent with patient was 60 minutes more than 50% was spent  Counseling/coordinated the patient'scare.     Electronically signed by YUSEF Solomon CNP on 3/11/2021 at 1:44 PM

## 2021-03-19 ENCOUNTER — HOSPITAL ENCOUNTER (OUTPATIENT)
Dept: MRI IMAGING | Age: 66
Discharge: HOME | End: 2021-03-19
Payer: MEDICARE

## 2021-03-19 ENCOUNTER — HOSPITAL ENCOUNTER (OUTPATIENT)
Age: 66
Discharge: HOME | End: 2021-03-19
Payer: MEDICARE

## 2021-03-19 DIAGNOSIS — M48.061 SPINAL STENOSIS OF LUMBAR REGION WITHOUT NEUROGENIC CLAUDICATION: ICD-10-CM

## 2021-03-19 DIAGNOSIS — M48.07 LUMBOSACRAL SPINAL STENOSIS: ICD-10-CM

## 2021-03-19 DIAGNOSIS — Z01.818 PRE-OP TESTING: ICD-10-CM

## 2021-03-19 DIAGNOSIS — M96.1 LUMBAR POST-LAMINECTOMY SYNDROME: ICD-10-CM

## 2021-03-19 DIAGNOSIS — M47.816 SPONDYLOSIS OF LUMBAR REGION WITHOUT MYELOPATHY OR RADICULOPATHY: ICD-10-CM

## 2021-03-19 DIAGNOSIS — G89.4 CHRONIC PAIN SYNDROME: ICD-10-CM

## 2021-03-19 LAB
ALBUMIN SERPL-MCNC: 4.3 G/DL (ref 3.5–5.1)
ALP BLD-CCNC: 72 U/L (ref 38–126)
ALT SERPL-CCNC: 12 U/L (ref 11–66)
ANION GAP SERPL CALCULATED.3IONS-SCNC: 7 MEQ/L (ref 8–16)
AST SERPL-CCNC: 16 U/L (ref 5–40)
BASOPHILS # BLD: 0.8 %
BASOPHILS ABSOLUTE: 0.1 THOU/MM3 (ref 0–0.1)
BILIRUB SERPL-MCNC: 0.3 MG/DL (ref 0.3–1.2)
BUN BLDV-MCNC: 12 MG/DL (ref 7–22)
CALCIUM SERPL-MCNC: 9.3 MG/DL (ref 8.5–10.5)
CHLORIDE BLD-SCNC: 104 MEQ/L (ref 98–111)
CO2: 30 MEQ/L (ref 23–33)
CREAT SERPL-MCNC: 1 MG/DL (ref 0.4–1.2)
EOSINOPHIL # BLD: 3.2 %
EOSINOPHILS ABSOLUTE: 0.2 THOU/MM3 (ref 0–0.4)
ERYTHROCYTE [DISTWIDTH] IN BLOOD BY AUTOMATED COUNT: 13.2 % (ref 11.5–14.5)
ERYTHROCYTE [DISTWIDTH] IN BLOOD BY AUTOMATED COUNT: 47.8 FL (ref 35–45)
GFR SERPL CREATININE-BSD FRML MDRD: 75 ML/MIN/1.73M2
GLUCOSE BLD-MCNC: 88 MG/DL (ref 70–108)
HCT VFR BLD CALC: 50.6 % (ref 42–52)
HEMOGLOBIN: 15.7 GM/DL (ref 14–18)
IMMATURE GRANS (ABS): 0.06 THOU/MM3 (ref 0–0.07)
IMMATURE GRANULOCYTES: 0.8 %
LYMPHOCYTES # BLD: 28.5 %
LYMPHOCYTES ABSOLUTE: 2.1 THOU/MM3 (ref 1–4.8)
MCH RBC QN AUTO: 30.3 PG (ref 26–33)
MCHC RBC AUTO-ENTMCNC: 31 GM/DL (ref 32.2–35.5)
MCV RBC AUTO: 97.5 FL (ref 80–94)
MONOCYTES # BLD: 9.3 %
MONOCYTES ABSOLUTE: 0.7 THOU/MM3 (ref 0.4–1.3)
NUCLEATED RED BLOOD CELLS: 0 /100 WBC
PLATELET # BLD: 370 THOU/MM3 (ref 130–400)
PMV BLD AUTO: 11.1 FL (ref 9.4–12.4)
POTASSIUM SERPL-SCNC: 5.5 MEQ/L (ref 3.5–5.2)
RBC # BLD: 5.19 MILL/MM3 (ref 4.7–6.1)
SEG NEUTROPHILS: 57.4 %
SEGMENTED NEUTROPHILS ABSOLUTE COUNT: 4.1 THOU/MM3 (ref 1.8–7.7)
SODIUM BLD-SCNC: 141 MEQ/L (ref 135–145)
TOTAL PROTEIN: 7.7 G/DL (ref 6.1–8)
WBC # BLD: 7.2 THOU/MM3 (ref 4.8–10.8)

## 2021-03-19 PROCEDURE — 80053 COMPREHEN METABOLIC PANEL: CPT

## 2021-03-19 PROCEDURE — 85025 COMPLETE CBC W/AUTO DIFF WBC: CPT

## 2021-03-19 PROCEDURE — 36415 COLL VENOUS BLD VENIPUNCTURE: CPT

## 2021-03-19 PROCEDURE — 72148 MRI LUMBAR SPINE W/O DYE: CPT

## 2021-04-28 ENCOUNTER — OFFICE VISIT (OUTPATIENT)
Dept: PSYCHOLOGY | Age: 66
End: 2021-04-28
Payer: MEDICARE

## 2021-04-28 DIAGNOSIS — F33.1 MAJOR DEPRESSIVE DISORDER, RECURRENT EPISODE, MODERATE (HCC): Primary | ICD-10-CM

## 2021-04-28 PROCEDURE — 90791 PSYCH DIAGNOSTIC EVALUATION: CPT | Performed by: PSYCHOLOGIST

## 2021-04-28 PROCEDURE — 1036F TOBACCO NON-USER: CPT | Performed by: PSYCHOLOGIST

## 2021-04-28 NOTE — PROGRESS NOTES
no suicidal ideation reported      Methods of Evaluation  Clinically Useful Depression Outcome Scale (CUDOS), Clinically Useful Anxiety Outcome Scale (CUXOS), Chronic Pain Acceptance Questionnaire-Revised (CPAQ-R) and Psychological Inflexibility in Pain Scale (PIPS)      Test Findings  Joel's scores on the CUDOS and CUXOS indicated moderate depression, and minimal anxiety. Additionally, his scores on the CPAQ-R indicate he has a moderately high degree of acceptance of his chronic pain. From the [id number], he seems to possess a moderately high degree of psychological inflexibility to pain. Diagnostic Considerations  Major Depressive Disorder, Recurrent, Moderate         Conclusions  Overall results from the brief interview and assessment methods revealed no significant mental health or substance abuse issues that would discourage an SCS trial.  The moderate degree of depressive symptoms seem related to the pain and limited functionality that results. He's also on medication to address this.   Therefore, he is psychologically cleared for the SCS trial.

## 2021-05-13 ENCOUNTER — OFFICE VISIT (OUTPATIENT)
Dept: PAIN MANAGEMENT | Age: 66
End: 2021-05-13
Payer: MEDICARE

## 2021-05-13 VITALS
SYSTOLIC BLOOD PRESSURE: 140 MMHG | HEART RATE: 70 BPM | HEIGHT: 69 IN | DIASTOLIC BLOOD PRESSURE: 80 MMHG | WEIGHT: 215 LBS | BODY MASS INDEX: 31.84 KG/M2

## 2021-05-13 DIAGNOSIS — M96.1 LUMBAR POST-LAMINECTOMY SYNDROME: Primary | ICD-10-CM

## 2021-05-13 DIAGNOSIS — M48.061 SPINAL STENOSIS OF LUMBAR REGION WITHOUT NEUROGENIC CLAUDICATION: ICD-10-CM

## 2021-05-13 DIAGNOSIS — G89.4 CHRONIC PAIN SYNDROME: ICD-10-CM

## 2021-05-13 DIAGNOSIS — M54.16 LUMBAR RADICULITIS: ICD-10-CM

## 2021-05-13 DIAGNOSIS — M47.816 SPONDYLOSIS OF LUMBAR REGION WITHOUT MYELOPATHY OR RADICULOPATHY: ICD-10-CM

## 2021-05-13 DIAGNOSIS — M48.07 LUMBOSACRAL SPINAL STENOSIS: ICD-10-CM

## 2021-05-13 PROCEDURE — G8427 DOCREV CUR MEDS BY ELIG CLIN: HCPCS | Performed by: NURSE PRACTITIONER

## 2021-05-13 PROCEDURE — 1036F TOBACCO NON-USER: CPT | Performed by: NURSE PRACTITIONER

## 2021-05-13 PROCEDURE — 3017F COLORECTAL CA SCREEN DOC REV: CPT | Performed by: NURSE PRACTITIONER

## 2021-05-13 PROCEDURE — 99214 OFFICE O/P EST MOD 30 MIN: CPT | Performed by: NURSE PRACTITIONER

## 2021-05-13 PROCEDURE — G8417 CALC BMI ABV UP PARAM F/U: HCPCS | Performed by: NURSE PRACTITIONER

## 2021-05-13 ASSESSMENT — ENCOUNTER SYMPTOMS
VOMITING: 0
SORE THROAT: 0
WHEEZING: 0
SHORTNESS OF BREATH: 0
BACK PAIN: 1
NAUSEA: 0
EYE PAIN: 0
CONSTIPATION: 0
SINUS PRESSURE: 0
DIARRHEA: 0
ABDOMINAL PAIN: 0
RHINORRHEA: 0
COLOR CHANGE: 0
CHEST TIGHTNESS: 0
PHOTOPHOBIA: 0
COUGH: 0

## 2021-05-13 NOTE — PROGRESS NOTES
[facility] PAIN MANAGEMENT  [address] [facility]  Dept: [phone]  Dept Fax: [phone]  Loc: [phone]    Visit Date: 5/13/2021    Functionality Assessment/Goals Worksheet     On a scale of 0 (Does not Interfere) to 10 (Completely Interferes)     1. Which number describes how during the past week pain has interfered with       the following:  A. General Activity:  6  B. Mood: 7  C. Walking Ability:  8  D. Normal Work (Includes both work outside the home and housework):  8  E. Relations with Other People:   3  F. Sleep:   9  G. Enjoyment of Life:   7    2. Patient Prefers to Take their Pain Medications:     []  On a regular basis   []  Only when necessary    [x]  Does not take pain medications    3. What are the Patient's Goals/Expectations for Visiting Pain Management? [x]  Learn about my pain    []  Receive Medication   []  Physical Therapy     []  Treat Depression   []  Receive Injections    []  Treat Sleep   []  Deal with Anxiety and Stress   []  Treat Opoid Dependence/Addiction   [x]  Other:SCS    HPI:   Reed Elaine is a 61 y.o. male is here today for    Chief Complaint: Low back pain, Right leg pain, Left leg pain and Hip pain    HPI   F/U Psych eval for SCS trial, Labs EKG. He continues to have low back mid back bilateral SI hip BLE pain. He has had multiple back surgeries and injections without releif. Still pending RTKR this year with Dr Irasema Salazar. He has BLE weakness unsteady gait falls at times. His pain is sharp shooting stabbing increases with walking bending lifting twisting turning. Marisol Rued He denies any ER visits since last visit. Pain scale with out pain medications or at its worst is 9/10. Pain scale with pain medications or at its best is 5/10. Lumbar MRI 2021  FINDINGS:   There are postoperative changes between L3 and S1.  There has been interval removal of the bilateral pedicular screws at L3, L4, L5 and S1. There are laminectomy defects at L3, L4, L5. There is degenerative change in the vertebral body endplates adjacent    to the L1-2, L2-3, L3-4 and L4-5 disc spaces.       The lumbar vertebral bodies are normally aligned. Jerilynn Jeremias is normal marrow signal throughout. Jerilynn Jeremias is no bone marrow edema.  There are no compression fractures.  No pars defects are noted.  The discs have normal signal throughout.       The distal spinal cord, conus medullaris and cauda equina are normal.        There are no gross abnormalities in the visualized aspects of the distal thoracic spine.       On the axial images, at T12-L1, there is no disc herniation, canal stenosis or cord compression       At L1-L2, there is mild canal and mild-to-moderate bilateral foraminal stenosis, right greater than left.       At L2-L3, there is mild canal and mild to moderate bilateral foraminal stenosis.       At L3-L4, there are postoperative changes. There is no recurrent disc herniation or canal stenosis. There is mild to moderate bilateral foraminal stenosis.       At L4-L5, there are postoperative changes. There is no recurrent disc herniation or canal stenosis. There is mild to moderate bilateral foraminal stenosis.       At L5-S1, there are postoperative changes. There is no recurrent disc herniation or canal stenosis. This moderate left and mild/moderate right-sided foraminal stenosis.       There are degenerative changes involving the sacroiliac joints bilaterally.       There are no suspicious findings in the visualized aspects of the retroperitoneum and paraspinal soft tissues.               Impression       1. Postoperative changes between L3 and S1. Removal of bilateral pedicular screws at L3, L4, L5 and S1. Laminectomy defects at L3, L4 and L5.   2. There is no recurrent disc herniation or canal stenosis.  There is mild to moderate bilateral foraminal stenosis at L3-4, L4-5 and L5-S1.   3. There is mild canal and mild to moderate Upper Arm Right Upper Arm   Position: Sitting    Cuff Size: Medium Adult    Pulse: 70    Weight: 215 lb (97.5 kg)    Height: 5' 8.5\" (1.74 m)        Physical Exam  Vitals signs and nursing note reviewed. Constitutional:       General: He is not in acute distress. Appearance: He is well-developed. He is not diaphoretic. HENT:      Head: Normocephalic and atraumatic. Right Ear: External ear normal.      Left Ear: External ear normal.      Nose: Nose normal.      Mouth/Throat:      Pharynx: No oropharyngeal exudate. Eyes:      General: No scleral icterus. Right eye: No discharge. Left eye: No discharge. Conjunctiva/sclera: Conjunctivae normal.      Pupils: Pupils are equal, round, and reactive to light. Neck:      Musculoskeletal: Neck supple. Decreased range of motion. No edema, erythema, neck rigidity or muscular tenderness. Thyroid: No thyromegaly. Comments: CERVICAL  FUSION 2014  Cardiovascular:      Rate and Rhythm: Normal rate and regular rhythm. Heart sounds: Normal heart sounds. No murmur. No friction rub. No gallop. Pulmonary:      Effort: Pulmonary effort is normal. No respiratory distress. Breath sounds: Normal breath sounds. No wheezing or rales. Chest:      Chest wall: No tenderness. Abdominal:      General: Bowel sounds are normal. There is no distension. Palpations: Abdomen is soft. Tenderness: There is no abdominal tenderness. There is no guarding or rebound. Musculoskeletal:         General: Tenderness present. Right shoulder: He exhibits decreased range of motion and tenderness. Right hip: He exhibits decreased range of motion, decreased strength, tenderness and bony tenderness. Left hip: He exhibits decreased range of motion, decreased strength, tenderness and bony tenderness. Right knee: He exhibits decreased range of motion, swelling and bony tenderness. Tenderness found.       Cervical back: He exhibits suicidal ideation. Thought content does not include homicidal or suicidal plan. Cognition and Memory: Cognition and memory normal. Memory is not impaired. He does not exhibit impaired recent memory or impaired remote memory. Judgment: Judgment normal. Judgment is not impulsive or inappropriate. Comments: MDD       LAURA test:+   Yeomans test: +  Gaenslen test:+     Assessment:     1. Lumbar post-laminectomy syndrome    2. Lumbosacral spinal stenosis    3. Lumbar radiculitis    4. Spinal stenosis of lumbar region without neurogenic claudication    5. Spondylosis of lumbar region without myelopathy or radiculopathy    6. Chronic pain syndrome            Plan:      · OARRS reviewed. Current MED: 0  · Patient was not offered naloxone for home. · Discussed long term side effects of medications, tolerance, dependency and addiction. · Previous UDS reviewed  · UDS preformed today for compliance. · Patient told can not receive any pain medications from any other source. · No evidence of abuse, diversion or aberrant behavior.  Medications and/or procedures to improve function and quality of life- patient understanding with this and that may not be pain free   Discussed with patient about safe storage of medications at home   Discussed possible weaning of medication dosing dependent on treatment/procedure results.  Testing:reviewed MRI LABS EKG Dr Margie Santos note     Procedures: SCS trial once cardiac cleared had recent abnormal EKG with changes from prior EKG   Discussed with patient about risks with procedure including infection, reaction to medication, increased pain, or bleeding.  Medications:none   If patient is on blood thinners will need approval to hold:N/A   Needs to f/u with Dr Raman Salcedo for Cardiac clearance, has not had a F/U       Meds. Prescribed:   No orders of the defined types were placed in this encounter.       Return for SCS trial if cardiac cleared, Follow up after procedure [facility].               Electronically signed by YUSEF Gallardo CNP on5/13/2021 at 12:40 PM

## 2021-05-17 ENCOUNTER — TELEPHONE (OUTPATIENT)
Dept: CARDIOLOGY CLINIC | Age: 66
End: 2021-05-17

## 2021-05-17 ENCOUNTER — TELEPHONE (OUTPATIENT)
Dept: PHYSICAL MEDICINE AND REHAB | Age: 66
End: 2021-05-17

## 2021-05-17 NOTE — TELEPHONE ENCOUNTER
Pre op Risk Assessment    Procedure SCS trial   Physician Neuroscience  Date of surgery/procedure TBD    Last OV 10-14-20  Last Stress 11-6-20  Last Echo 11-6-20  Last Cath 7-28-17  Is patient on blood thinners None

## 2021-05-17 NOTE — TELEPHONE ENCOUNTER
Patient has no follow up on file. Patient last seen Oct 2020  Called patient. Patient scheduled for 6-1-21.

## 2021-05-17 NOTE — TELEPHONE ENCOUNTER
Patient stated that he is needing cardiac clearance from Dr. Norbert Powers and that [facility] office was supposed to be sending something over regarding this. Procedure date wont be scheduled until he has clearance. He said that the procedure is for a stimulator in his back. Patient advised he is not on any anti coag medications.   DOLV 10/14/2020   Please advise   [phone]

## 2021-06-01 ENCOUNTER — OFFICE VISIT (OUTPATIENT)
Dept: CARDIOLOGY CLINIC | Age: 66
End: 2021-06-01
Payer: MEDICARE

## 2021-06-01 VITALS
HEART RATE: 64 BPM | HEIGHT: 69 IN | DIASTOLIC BLOOD PRESSURE: 72 MMHG | WEIGHT: 211 LBS | SYSTOLIC BLOOD PRESSURE: 120 MMHG | BODY MASS INDEX: 31.25 KG/M2

## 2021-06-01 DIAGNOSIS — Z01.818 PRE-OP EXAM: ICD-10-CM

## 2021-06-01 DIAGNOSIS — I25.10 CORONARY ARTERY DISEASE INVOLVING NATIVE CORONARY ARTERY OF NATIVE HEART WITHOUT ANGINA PECTORIS: Primary | ICD-10-CM

## 2021-06-01 DIAGNOSIS — I10 ESSENTIAL HYPERTENSION: ICD-10-CM

## 2021-06-01 PROCEDURE — 93000 ELECTROCARDIOGRAM COMPLETE: CPT | Performed by: NUCLEAR MEDICINE

## 2021-06-01 PROCEDURE — 99213 OFFICE O/P EST LOW 20 MIN: CPT | Performed by: NUCLEAR MEDICINE

## 2021-06-01 PROCEDURE — 3017F COLORECTAL CA SCREEN DOC REV: CPT | Performed by: NUCLEAR MEDICINE

## 2021-06-01 PROCEDURE — G8427 DOCREV CUR MEDS BY ELIG CLIN: HCPCS | Performed by: NUCLEAR MEDICINE

## 2021-06-01 PROCEDURE — 1036F TOBACCO NON-USER: CPT | Performed by: NUCLEAR MEDICINE

## 2021-06-01 PROCEDURE — G8417 CALC BMI ABV UP PARAM F/U: HCPCS | Performed by: NUCLEAR MEDICINE

## 2021-06-01 NOTE — PROGRESS NOTES
[address] \[facility]\"" [address]  [place] [address]  Dept: [phone]  Dept Fax: [phone]  Loc: [phone]    Visit Date: 6/1/2021    Carmen Strong is a 61 y.o. male who presents todayfor:  Chief Complaint   Patient presents with    Cardiac Clearance     SCS trial with neuro st [facility]    Coronary Artery Disease    Hypertension    Pre-op Exam   going for back stimulator  Does have chronic chest pain   Cath 2017 was okay   Stress test lately was okay  Chronic dyspnea  Chronic chest pain   No changes clinically   Some dizziness  No syncope        HPI:  HPI  Past Medical History:   Diagnosis Date    Back pain     Depression     GERD (gastroesophageal reflux disease)     Headache(784.0) 9/22/2013    Migraines     ALICIA treated with BiPAP 2013    doesn't wear Bipap    Pneumonia     Pulmonary embolism ([address] 2/25/2021    S/P cardiac catheterization: 7/31/2017: No obstructive lasions. 7/31/2017 7/31/2017: No obstructive lasions.  Dr. Barbara Trujillo Seizures [facility])       Past Surgical History:   Procedure Laterality Date    APP[facility]OMY  2012    [facility]    BACK SURGERY  12/18/15    l3-s1 decompression, posterior fusion   [address]  2013    CERVICAL SPINE SURGERY  10-16-15    C4-6 ACDF    COLONOSCOPY      ENDOSCOPY, COLON, DIAGNOSTIC      [place]IA REPAIR  [place]    [place]    HERNIA REPAIR  2012    [facility]    KNEE SURGERY  1976    Rt     LUMBAR SPINE SURGERY  08/26/2014     L3-5 decompression, Dr. Madan Corea, [address] SURGERY  10/19/2018    OTHER SURGICAL HISTORY  06/02/2017    Diagnostic laparoscopy, Laparoscopic Ventral hernia Repair with Mesh by Dr Jayant Allred OFFICE/OUTPT VISIT,PROCEDURE ONLY N/A 10/19/2018    C3-4 AND C6-7 ACDF performed by Linda Wagner MD at [address] [place] [address] N/A 3/13/2020    L3-S1 HARDWARE REMOVAL performed by Linda Wagner MD at [place] [address] SURGERY  2001    Rt Rotator cuff, Dr. Stefano Remy     Family History   Problem Relation Age of Onset    Arthritis Father     Heart Disease Father     Other Father         COPD    Lupus Mother     Other Mother         Lupus    Depression Sister     Depression Child     Arthritis Maternal Grandmother     Stroke Maternal Grandmother     High Blood Pressure Maternal Grandmother     Arthritis Maternal Grandfather     Diabetes Maternal Grandfather     Heart Disease Maternal Grandfather     Asthma Paternal Grandmother     Stroke Paternal Grandmother     High Blood Pressure Paternal Grandmother     Asthma Paternal Grandfather      Social History     Tobacco Use    Smoking status: Never Smoker    Smokeless tobacco: Never Used   Substance Use Topics    Alcohol use: No      Current Outpatient Medications   Medication Sig Dispense Refill    omeprazole (PRILOSEC) 40 MG delayed release capsule TAKE 1 CAPSULE BY MOUTH  DAILY 90 capsule 3    EPINEPHrine (EPIPEN) 0.3 MG/0.3ML SOAJ injection Inject 0.3 mg into the muscle as needed Use as directed for allergic reaction      sertraline (ZOLOFT) 100 MG tablet Take 2 tablets by mouth daily 180 tablet 3    lamoTRIgine (LAMICTAL) 200 MG tablet TAKE 1 TABLET BY MOUTH TWO  TIMES DAILY 180 tablet 3    aspirin-acetaminophen-caffeine (EXCEDRIN MIGRAINE) 250-250-65 MG per tablet Take 2 tablets by mouth every 8 hours as needed for Headaches 90 tablet 3    divalproex (DEPAKOTE ER) 500 MG extended release tablet Take by mouth daily 3 tab      butorphanol (STADOL) 10 MG/ML nasal spray 1 spray by Nasal route every 4 hours as needed for Pain       No current facility-administered medications for this visit.      Allergies   Allergen Reactions    Bee Venom Anaphylaxis    Lisinopril Swelling    Dilaudid [Hydromorphone Hcl] Nausea And Vomiting and Rash    Nitrofuran Derivatives Nausea And Vomiting     Severe headache    Nitroglycerin Nausea And Vomiting     migraine     Health Maintenance   Topic Date Due    COVID-19 Vaccine (1) Never done    Shingles Vaccine (1 of 2) Never done    A1C test (Diabetic or Prediabetic)  03/18/2015    Annual Wellness Visit (AWV)  Never done    Flu vaccine (Season Ended) 09/01/2021    Potassium monitoring  03/19/2022    Creatinine monitoring  03/19/2022    Colon cancer screen colonoscopy  05/04/2022    Lipid screen  07/29/2022    DTaP/Tdap/Td vaccine (2 - Td) 11/20/2028    Hepatitis C screen  Completed    HIV screen  Completed    Hepatitis A vaccine  Aged Out    Hepatitis B vaccine  Aged Out    Hib vaccine  Aged Out    Meningococcal (ACWY) vaccine  Aged Out    Pneumococcal 0-64 years Vaccine  Aged Out       Subjective:  Review of Systems  General:   No fever, no chills, No fatigue or weight loss  Pulmonary:    some dyspnea, no wheezing  Cardiac:    chronic chest pain,   GI:     No nausea or vomiting, no abdominal pain  Neuro:    No dizziness or light headedness,   Musculoskeletal:  No recent active issues  Extremities:   No edema, no obvious claudication       Objective:  Physical Exam  /72   Pulse 64   Ht 5' 8.5\" (1.74 m)   Wt 211 lb (95.7 kg)   BMI 31.62 kg/m²   General:   Well developed, well nourished  Lungs:   Clear to auscultation  Heart:    Normal S1 S2, Slight murmur. no rubs, no gallops  Abdomen:   Soft, non tender, no organomegalies, positive bowel sounds  Extremities:   No edema, no cyanosis, good peripheral pulses  Neurological:   Awake, alert, oriented. No obvious focal deficits  Musculoskelatal:  No obvious deformities    Assessment:      Diagnosis Orders   1. Coronary artery disease involving native coronary artery of native heart without angina pectoris  EKG 12 Lead   2. Pre-op exam  EKG 12 Lead   3. Essential hypertension     as above  Unchanged clinically   ECG in office was done today. I reviewed the ECG. No acute findings      Plan:  No follow-ups on file.   Clear for surgery   Continue risk factor modification and

## 2021-06-09 DIAGNOSIS — M54.17 LUMBOSACRAL RADICULITIS: Primary | ICD-10-CM

## 2021-06-10 DIAGNOSIS — F33.1 MODERATE EPISODE OF RECURRENT MAJOR DEPRESSIVE DISORDER (HCC): ICD-10-CM

## 2021-06-10 RX ORDER — SERTRALINE HYDROCHLORIDE 100 MG/1
200 TABLET, FILM COATED ORAL DAILY
Qty: 180 TABLET | Refills: 3 | Status: SHIPPED | OUTPATIENT
Start: 2021-06-10 | End: 2021-07-29 | Stop reason: SDUPTHER

## 2021-06-23 ENCOUNTER — HOSPITAL ENCOUNTER (OUTPATIENT)
Age: 66
Discharge: HOME | End: 2021-06-23
Payer: MEDICARE

## 2021-06-23 DIAGNOSIS — M54.17 LUMBOSACRAL RADICULITIS: ICD-10-CM

## 2021-06-23 LAB
BASOPHILS # BLD: 0.7 %
BASOPHILS ABSOLUTE: 0.1 THOU/MM3 (ref 0–0.1)
EOSINOPHIL # BLD: 4.7 %
EOSINOPHILS ABSOLUTE: 0.4 THOU/MM3 (ref 0–0.4)
ERYTHROCYTE [DISTWIDTH] IN BLOOD BY AUTOMATED COUNT: 13.2 % (ref 11.5–14.5)
ERYTHROCYTE [DISTWIDTH] IN BLOOD BY AUTOMATED COUNT: 47.3 FL (ref 35–45)
HCT VFR BLD CALC: 51.3 % (ref 42–52)
HEMOGLOBIN: 16 GM/DL (ref 14–18)
IMMATURE GRANS (ABS): 0.04 THOU/MM3 (ref 0–0.07)
IMMATURE GRANULOCYTES: 0.5 %
LYMPHOCYTES # BLD: 31.7 %
LYMPHOCYTES ABSOLUTE: 2.4 THOU/MM3 (ref 1–4.8)
MCH RBC QN AUTO: 30.2 PG (ref 26–33)
MCHC RBC AUTO-ENTMCNC: 31.2 GM/DL (ref 32.2–35.5)
MCV RBC AUTO: 97 FL (ref 80–94)
MONOCYTES # BLD: 10.8 %
MONOCYTES ABSOLUTE: 0.8 THOU/MM3 (ref 0.4–1.3)
NUCLEATED RED BLOOD CELLS: 0 /100 WBC
PLATELET # BLD: 365 THOU/MM3 (ref 130–400)
PMV BLD AUTO: 10.3 FL (ref 9.4–12.4)
RBC # BLD: 5.29 MILL/MM3 (ref 4.7–6.1)
SEG NEUTROPHILS: 51.6 %
SEGMENTED NEUTROPHILS ABSOLUTE COUNT: 3.9 THOU/MM3 (ref 1.8–7.7)
WBC # BLD: 7.5 THOU/MM3 (ref 4.8–10.8)

## 2021-06-23 PROCEDURE — 36415 COLL VENOUS BLD VENIPUNCTURE: CPT

## 2021-06-23 PROCEDURE — 80048 BASIC METABOLIC PNL TOTAL CA: CPT

## 2021-06-23 PROCEDURE — 85025 COMPLETE CBC W/AUTO DIFF WBC: CPT

## 2021-06-24 LAB
ANION GAP SERPL CALCULATED.3IONS-SCNC: 10 MEQ/L (ref 8–16)
BUN BLDV-MCNC: 13 MG/DL (ref 7–22)
CALCIUM SERPL-MCNC: 9.7 MG/DL (ref 8.5–10.5)
CHLORIDE BLD-SCNC: 102 MEQ/L (ref 98–111)
CO2: 27 MEQ/L (ref 23–33)
CREAT SERPL-MCNC: 1.1 MG/DL (ref 0.4–1.2)
GFR SERPL CREATININE-BSD FRML MDRD: 67 ML/MIN/1.73M2
GLUCOSE BLD-MCNC: 85 MG/DL (ref 70–108)
POTASSIUM SERPL-SCNC: 4.8 MEQ/L (ref 3.5–5.2)
SODIUM BLD-SCNC: 139 MEQ/L (ref 135–145)

## 2021-07-08 ENCOUNTER — HOSPITAL ENCOUNTER (OUTPATIENT)
Age: 66
Setting detail: OUTPATIENT SURGERY
Discharge: HOME | End: 2021-07-08
Attending: PAIN MEDICINE | Admitting: PAIN MEDICINE
Payer: MEDICARE

## 2021-07-08 ENCOUNTER — APPOINTMENT (OUTPATIENT)
Dept: GENERAL RADIOLOGY | Age: 66
End: 2021-07-08
Attending: PAIN MEDICINE
Payer: MEDICARE

## 2021-07-08 ENCOUNTER — ANESTHESIA EVENT (OUTPATIENT)
Dept: OPERATING ROOM | Age: 66
End: 2021-07-08
Payer: MEDICARE

## 2021-07-08 ENCOUNTER — ANESTHESIA (OUTPATIENT)
Dept: OPERATING ROOM | Age: 66
End: 2021-07-08
Payer: MEDICARE

## 2021-07-08 VITALS
TEMPERATURE: 98 F | RESPIRATION RATE: 12 BRPM | DIASTOLIC BLOOD PRESSURE: 79 MMHG | SYSTOLIC BLOOD PRESSURE: 122 MMHG | OXYGEN SATURATION: 92 %

## 2021-07-08 VITALS
HEIGHT: 68 IN | TEMPERATURE: 97.3 F | HEART RATE: 69 BPM | BODY MASS INDEX: 31.04 KG/M2 | SYSTOLIC BLOOD PRESSURE: 133 MMHG | OXYGEN SATURATION: 97 % | WEIGHT: 204.8 LBS | RESPIRATION RATE: 16 BRPM | DIASTOLIC BLOOD PRESSURE: 63 MMHG

## 2021-07-08 PROCEDURE — 3700000001 HC ADD 15 MINUTES (ANESTHESIA): Performed by: PAIN MEDICINE

## 2021-07-08 PROCEDURE — 3600000055 HC PAIN LEVEL 3 ADDL 15 MIN: Performed by: PAIN MEDICINE

## 2021-07-08 PROCEDURE — 7100000011 HC PHASE II RECOVERY - ADDTL 15 MIN: Performed by: PAIN MEDICINE

## 2021-07-08 PROCEDURE — 3600000054 HC PAIN LEVEL 3 BASE: Performed by: PAIN MEDICINE

## 2021-07-08 PROCEDURE — C1820 GENERATOR NEURO RECHG BAT SY: HCPCS | Performed by: PAIN MEDICINE

## 2021-07-08 PROCEDURE — 2709999900 HC NON-CHARGEABLE SUPPLY: Performed by: PAIN MEDICINE

## 2021-07-08 PROCEDURE — 6360000002 HC RX W HCPCS: Performed by: NURSE ANESTHETIST, CERTIFIED REGISTERED

## 2021-07-08 PROCEDURE — 95972 ALYS CPLX SP/PN NPGT W/PRGRM: CPT | Performed by: PAIN MEDICINE

## 2021-07-08 PROCEDURE — 2500000003 HC RX 250 WO HCPCS: Performed by: PAIN MEDICINE

## 2021-07-08 PROCEDURE — 7100000010 HC PHASE II RECOVERY - FIRST 15 MIN: Performed by: PAIN MEDICINE

## 2021-07-08 PROCEDURE — C1778 LEAD, NEUROSTIMULATOR: HCPCS | Performed by: PAIN MEDICINE

## 2021-07-08 PROCEDURE — 2580000003 HC RX 258: Performed by: NURSE ANESTHETIST, CERTIFIED REGISTERED

## 2021-07-08 PROCEDURE — 3700000000 HC ANESTHESIA ATTENDED CARE: Performed by: PAIN MEDICINE

## 2021-07-08 PROCEDURE — 6360000004 HC RX CONTRAST MEDICATION: Performed by: PAIN MEDICINE

## 2021-07-08 PROCEDURE — 63650 IMPLANT NEUROELECTRODES: CPT | Performed by: PAIN MEDICINE

## 2021-07-08 PROCEDURE — 3209999900 FLUORO FOR SURGICAL PROCEDURES

## 2021-07-08 DEVICE — 50CM 16 CONTACT TRIAL LEAD KIT
Type: IMPLANTABLE DEVICE | Status: FUNCTIONAL
Brand: INFINION™  16

## 2021-07-08 DEVICE — O.R. CABLE 1X16, 61CM AND EXTENSION: Type: IMPLANTABLE DEVICE | Status: FUNCTIONAL

## 2021-07-08 RX ORDER — FENTANYL CITRATE 50 UG/ML
INJECTION, SOLUTION INTRAMUSCULAR; INTRAVENOUS PRN
Status: DISCONTINUED | OUTPATIENT
Start: 2021-07-08 | End: 2021-07-08 | Stop reason: SDUPTHER

## 2021-07-08 RX ORDER — PROPOFOL 10 MG/ML
INJECTION, EMULSION INTRAVENOUS PRN
Status: DISCONTINUED | OUTPATIENT
Start: 2021-07-08 | End: 2021-07-08 | Stop reason: SDUPTHER

## 2021-07-08 RX ORDER — SODIUM CHLORIDE 9 MG/ML
INJECTION, SOLUTION INTRAVENOUS CONTINUOUS PRN
Status: DISCONTINUED | OUTPATIENT
Start: 2021-07-08 | End: 2021-07-08 | Stop reason: SDUPTHER

## 2021-07-08 RX ORDER — LIDOCAINE HYDROCHLORIDE 10 MG/ML
INJECTION, SOLUTION EPIDURAL; INFILTRATION; INTRACAUDAL; PERINEURAL PRN
Status: DISCONTINUED | OUTPATIENT
Start: 2021-07-08 | End: 2021-07-08 | Stop reason: ALTCHOICE

## 2021-07-08 RX ORDER — CEFAZOLIN SODIUM 1 G/3ML
INJECTION, POWDER, FOR SOLUTION INTRAMUSCULAR; INTRAVENOUS PRN
Status: DISCONTINUED | OUTPATIENT
Start: 2021-07-08 | End: 2021-07-08 | Stop reason: SDUPTHER

## 2021-07-08 RX ADMIN — SODIUM CHLORIDE: 9 INJECTION, SOLUTION INTRAVENOUS at 11:33

## 2021-07-08 RX ADMIN — CEFAZOLIN 2000 MG: 1 INJECTION, POWDER, FOR SOLUTION INTRAMUSCULAR; INTRAVENOUS at 11:35

## 2021-07-08 RX ADMIN — PROPOFOL 30 MG: 10 INJECTION, EMULSION INTRAVENOUS at 12:07

## 2021-07-08 RX ADMIN — PROPOFOL 30 MG: 10 INJECTION, EMULSION INTRAVENOUS at 11:37

## 2021-07-08 RX ADMIN — PROPOFOL 25 MG: 10 INJECTION, EMULSION INTRAVENOUS at 11:53

## 2021-07-08 RX ADMIN — PROPOFOL 40 MG: 10 INJECTION, EMULSION INTRAVENOUS at 12:01

## 2021-07-08 RX ADMIN — PROPOFOL 25 MG: 10 INJECTION, EMULSION INTRAVENOUS at 11:50

## 2021-07-08 RX ADMIN — PROPOFOL 30 MG: 10 INJECTION, EMULSION INTRAVENOUS at 12:04

## 2021-07-08 RX ADMIN — FENTANYL CITRATE 50 MCG: 50 INJECTION, SOLUTION INTRAMUSCULAR; INTRAVENOUS at 11:34

## 2021-07-08 RX ADMIN — PROPOFOL 30 MG: 10 INJECTION, EMULSION INTRAVENOUS at 11:40

## 2021-07-08 RX ADMIN — PROPOFOL 40 MG: 10 INJECTION, EMULSION INTRAVENOUS at 11:47

## 2021-07-08 RX ADMIN — PROPOFOL 50 MG: 10 INJECTION, EMULSION INTRAVENOUS at 11:45

## 2021-07-08 RX ADMIN — FENTANYL CITRATE 50 MCG: 50 INJECTION, SOLUTION INTRAMUSCULAR; INTRAVENOUS at 12:10

## 2021-07-08 ASSESSMENT — PULMONARY FUNCTION TESTS
PIF_VALUE: 0

## 2021-07-08 ASSESSMENT — PAIN SCALES - GENERAL: PAINLEVEL_OUTOF10: 0

## 2021-07-08 ASSESSMENT — PAIN - FUNCTIONAL ASSESSMENT: PAIN_FUNCTIONAL_ASSESSMENT: 0-10

## 2021-07-08 NOTE — PROGRESS NOTES
1219-Patient to Phase II via cart. Report received from [facility]. Patient drowsy but responsive. Vitals obtained and stable. Respirations even and unlabored on room air. Patient denies pain, nausea, numbness and tingling. Patient able to move all extremities. Dressing in place on lower back. No drainage noted. Patient instructed to stay in bed. Instructed on call light use. 1223-Patient provided with snack and drink. Denies needs. Wife at bedside. James-Stimulator Rep at bedside. 1250-Patients IV removed with no complications. Patient getting dressed at bedside. Assisted with placing belt for stimulator on.  1259-Discharge instructions reviewed. Verbalized understanding. 1306-Patient discharged in stable condition. Patient brought to car via wheelchair with assistance from RN. Patient tolerated well. All belongings sent with patient.

## 2021-07-08 NOTE — ANESTHESIA POSTPROCEDURE EVALUATION
Department of Anesthesiology  Postprocedure Note    Patient: Frandy Ellsworth  MRN: [id number]  YOB: 1955  Date of evaluation: 7/8/2021  Time:  12:41 PM     Procedure Summary     Date: 07/08/21 Room / Location: [address] [place] [address] 03 / [address] [facility]    Anesthesia Start: 0775 Anesthesia Stop: 1219    Procedure: SCS trial  entrance L1 tip T7 bilaterally (N/A ) Diagnosis: (Lumbar post lamiectomy syndrome)    Surgeons: Lila Garcia MD Responsible Provider: Luis Carlos Zarate MD    Anesthesia Type: MAC ASA Status: 3          Anesthesia Type: MAC    Mustapha Phase I:      Mustapha Phase II: Mustapha Score: 9    Last vitals: Reviewed and per EMR flowsheets.        Anesthesia Post Evaluation    Patient location during evaluation: PACU  Patient participation: complete - patient participated  Level of consciousness: awake  Airway patency: patent  Nausea & Vomiting: no vomiting and no nausea  Complications: no  Cardiovascular status: hemodynamically stable  Respiratory status: acceptable  Hydration status: stable

## 2021-07-08 NOTE — OP NOTE
Operative Note      Pre-Procedure Note    Patient Name: Nish Thacker   YOB: 1955  Medical Record Number: [id number]     Date: 7/8/21      If patient receives adequate benefit, Dr Alyce De La Vega will place permanent SCS. Indication:  L-Post-Laminectomy Syndrome, L-radiculitis, Chronic Pain Syndrome    Consent: On file. Vital Signs:   Vitals:    07/08/21 1042   BP: (!) 166/98   Pulse: 63   Resp: 16   Temp: 97.9 °F (36.6 °C)   SpO2: 99%       Past Medical History:   has a past medical history of Back pain, Depression, GERD (gastroesophageal reflux disease), Headache(784.0), Migraines, ALICIA treated with BiPAP, Pneumonia, Pulmonary embolism ([facility] S/P cardiac catheterization: 7/31/2017: No obstructive lasions. , and Seizures ([facility] [address] [facility] Past Surgical History:   has a past surgical history that includes knee surgery (1976); Appendectomy (2012); hernia repair (1996); hernia repair (2012); shoulder surgery (2001); Cervical spine surgery (10-16-15); Cardiac catheterization (2013); Lumbar spine surgery (08/26/2014 ); back surgery (12/18/15); other surgical history (06/02/2017); Colonoscopy; Endoscopy, colon, diagnostic; pr office/outpt visit,procedure only (N/A, 10/19/2018); Neck surgery (10/19/2018); and REMOVE HARDWARE SPINE (N/A, 3/13/2020). Pre-Sedation Documentation and Exam:   Vital signs have been reviewed (see flow sheet for vitals). Sedation/ Anesthesia Plan:   MAC    Patient is an appropriate candidate for plan of sedation: yes      Preoperative Diagnosis:   L-Post-Laminectomy Syndrome, L-radiculitis, Chronic Pain Syndrome    Post-Op Dx: as above    Procedure Done:     PROCEDURE PERFORMED:  TWO LEAS TRIAL SCS    1- AP Film or Thoracolumbar Spine w/ fluoroscopy guidance and interpretation  2- Placement of specialized epidural needle to the-L-1 interspace  3- Placement of  lead withSingle Chamber 16 electrodes to the T-7.  Placement of a second lead with dual 8 electrodes to the T -7 area  4- Intra-operative  programming of lead taking 30 minutes. 5- Complex programming in recovery taking 30 minutes. Indication for the Procedure: The patient is an established patient with the above known diagnosis. The patient understands the reason for the procedure along with the risks, benefits and alternatives available. The patient has had the opportunity to ask questions prior to the procedure, and the patient has given written, informed, consent to proceed with the procedure. An informed consent was obtained from the patient for the procedure. Medical Necessity: This patient has chronic pain that has failed to respond to conservative measures as outlined in the original history and physical exam on the patients symptoms include low back pain and disability of a moderate to severe degree with intermittent leg pain. The goals of treatment are to 1) achieve optimal pain control, recognizing that a pain-free state may not be achievable; 2) minimize adverse outcomes; 3) enhance functional abilities, and physical and psychological well-being; and 4) enhance the quality of life for patients with chronic pain. Procedure:    After starting IV and applying monitors patient was given 2 g of Ancef  IV for antibiotic prophylaxis. Patient's vital signs including blood pressure pulse oximetry and pulse rate were monitored throughout the procedure and a meaningful communication was kept up with the patient. The patient placed in a prone position. The back was prepped and draped in the usual sterile fashion and strict aseptic precautions were observed throughout the procedure. .  After confirmation of vertebral count through thorocolumbar films , the C-arm was used to help define the angle and access point for the epidural needle.   Prior to needle placement, and after location of the access point, under fluoroscopic guidance L1 area was identified, 10 mL of 1% xylocaine was used to anesthetize the skin . A 6 inch 14-gauge curved tip  needle was inserted through the skin under fluoroscopic guidance until it got to the epidural space with loss of resistance with air. General aspiration was performed and it was negative for CSF or any blood. This was confirmed with AP and lateral views of the fluoroscopy . After negative aspiration injected 2 cc of Omnipaque with adequate spread. An 16 Lead(Octrode) electrode Linear lead was channeled through the epidural needle and guided carefully so that the top electrode T7, on the midline. This placement gave appropriate stimulation. Various program settings were worked through within the operating room, to ensure appropriate coverage with the stimulation. Minor adjustments were made as needed to develop goodcoverage. These same steps were followed to place an  lead midline to the left of the first lead. .  And the top  Electrode Is paced at T7  The patient reporting a Lacourse was confirmed with fluoroscopy with AP and lateral views. Under fluoroscopic guidance the Epidural needle and the Stylus were pulled making sure that the electrodes had not moved. Both leads were anchored using the tapered anchor Provided and the anchors were sutured to the skin using 2-0 silk. Again confirmed appropriate stimulation. .  The electrode and sit insertion sites were dressed  with 4 x 4 gauze and tegaderm. Total Omnipaque used was 4 cc  Total 1% Xylocaine used was 20 cc      The patient was then taken from the procedure room via stretcher, and placed in a recovery room. Once in the recovery room, the fine tune adjustments were made to the stimulation pattern. This additional programming was needed to give the patient optimal coverage during the trial.  Post operatively the patient was monitored until stable. Following the procedure the patient's vital signs were stable. Patient was given adequate instructions regarding the use of the stimulator.

## 2021-07-08 NOTE — H&P
[address] [address]  History and Physical Update    Pt Name: Elyse Chamberlain  MRN: [id number]  YOB: 1955  Date of evaluation: 7/8/2021      I have examined the patient and reviewed the H&P/Consult and there are no changes to the patient or plans.         Electronically signed by Rashid Argueta MD on 7/8/2021 at 10:56 AM

## 2021-07-08 NOTE — H&P
H&P    F/U Psych eval for SCS trial, Labs EKG. He continues to have low back mid back bilateral SI hip BLE pain. He has had multiple back surgeries and injections without releif. Still pending RTKR this year with Dr Pily Camejo. He has BLE weakness unsteady gait falls at times. His pain is sharp shooting stabbing increases with walking bending lifting twisting turning.         . He denies any ER visits since last visit.     Pain scale with out pain medications or at its worst is 9/10. Pain scale with pain medications or at its best is 5/10.     Lumbar MRI 2021  FINDINGS:   There are postoperative changes between L3 and S1. There has been interval removal of the bilateral pedicular screws at L3, L4, L5 and S1. There are laminectomy defects at L3, L4, L5. There is degenerative change in the vertebral body endplates adjacent    to the L1-2, L2-3, L3-4 and L4-5 disc spaces.       The lumbar vertebral bodies are normally aligned. Azeb Lagos is normal marrow signal throughout. Azeb Lagos is no bone marrow edema.  There are no compression fractures.  No pars defects are noted.  The discs have normal signal throughout.       The distal spinal cord, conus medullaris and cauda equina are normal.        There are no gross abnormalities in the visualized aspects of the distal thoracic spine.       On the axial images, at T12-L1, there is no disc herniation, canal stenosis or cord compression       At L1-L2, there is mild canal and mild-to-moderate bilateral foraminal stenosis, right greater than left.       At L2-L3, there is mild canal and mild to moderate bilateral foraminal stenosis.       At L3-L4, there are postoperative changes. There is no recurrent disc herniation or canal stenosis. There is mild to moderate bilateral foraminal stenosis.       At L4-L5, there are postoperative changes. There is no recurrent disc herniation or canal stenosis.  There is mild to moderate bilateral foraminal stenosis.       At L5-S1, there are postoperative changes. There is no recurrent disc herniation or canal stenosis. This moderate left and mild/moderate right-sided foraminal stenosis.       There are degenerative changes involving the sacroiliac joints bilaterally.       There are no suspicious findings in the visualized aspects of the retroperitoneum and paraspinal soft tissues.               Impression       1. Postoperative changes between L3 and S1. Removal of bilateral pedicular screws at L3, L4, L5 and S1. Laminectomy defects at L3, L4 and L5.   2. There is no recurrent disc herniation or canal stenosis. There is mild to moderate bilateral foraminal stenosis at L3-4, L4-5 and L5-S1.   3. There is mild canal and mild to moderate bilateral foraminal stenosis at L1-2 and L2-3.   4. There is degenerative change involving the sacroiliac joints bilaterally.             The patientis allergic to bee venom; lisinopril; dilaudid [hydromorphone hcl]; nitrofuran derivatives; and nitroglycerin.        Subjective:      Review of Systems   Constitutional: Positive for activity change. Negative for appetite change, chills, diaphoresis, fatigue, fever and unexpected weight change. HENT: Negative for congestion, ear pain, hearing loss, mouth sores, nosebleeds, rhinorrhea, sinus pressure and sore throat. Eyes: Negative for photophobia, pain and visual disturbance. Respiratory: Negative for cough, chest tightness, shortness of breath and wheezing. Cardiovascular: Negative for chest pain and palpitations. CAD HTN ANgina   Gastrointestinal: Negative for abdominal pain, constipation, diarrhea, nausea and vomiting. Endocrine: Negative for cold intolerance, heat intolerance, polydipsia, polyphagia and polyuria. Genitourinary: Negative for decreased urine volume, difficulty urinating, frequency and hematuria. Musculoskeletal: Positive for arthralgias, back pain, gait problem and myalgias. Negative for joint swelling, neck pain and neck stiffness. Skin: Negative for color change and rash. Allergic/Immunologic: Negative for food allergies and immunocompromised state. Neurological: Positive for seizures and headaches. Negative for dizziness, tremors, syncope, facial asymmetry, speech difficulty, weakness, light-headedness and numbness. Hematological: Does not bruise/bleed easily. Psychiatric/Behavioral: Negative for agitation, behavioral problems, confusion, decreased concentration, dysphoric mood, hallucinations, self-injury, sleep disturbance and suicidal ideas. The patient is nervous/anxious. The patient is not hyperactive. MDD         Objective:      Vitals        Vitals:     05/13/21 1206 05/13/21 1211   BP: (!) 140/80 (!) 140/80   Site: Left Upper Arm Right Upper Arm   Position: Sitting     Cuff Size: Medium Adult     Pulse: 70     Weight: 215 lb (97.5 kg)     Height: 5' 8.5\" (1.74 m)              Physical Exam  Vitals signs and nursing note reviewed. Constitutional:       General: He is not in acute distress. Appearance: He is well-developed. He is not diaphoretic. HENT:      Head: Normocephalic and atraumatic. Right Ear: External ear normal.      Left Ear: External ear normal.      Nose: Nose normal.      Mouth/Throat:      Pharynx: No oropharyngeal exudate. Eyes:      General: No scleral icterus. Right eye: No discharge. Left eye: No discharge. Conjunctiva/sclera: Conjunctivae normal.      Pupils: Pupils are equal, round, and reactive to light. Neck:      Musculoskeletal: Neck supple. Decreased range of motion. No edema, erythema, neck rigidity or muscular tenderness. Thyroid: No thyromegaly. Comments: CERVICAL  FUSION 2014  Cardiovascular:      Rate and Rhythm: Normal rate and regular rhythm. Heart sounds: Normal heart sounds. No murmur. No friction rub. No gallop. Pulmonary:      Effort: Pulmonary effort is normal. No respiratory distress.       Breath sounds: Normal breath sounds. No wheezing or rales. Chest:      Chest wall: No tenderness. Abdominal:      General: Bowel sounds are normal. There is no distension. Palpations: Abdomen is soft. Tenderness: There is no abdominal tenderness. There is no guarding or rebound. Musculoskeletal:         General: Tenderness present. Right shoulder: He exhibits decreased range of motion and tenderness. Right hip: He exhibits decreased range of motion, decreased strength, tenderness and bony tenderness. Left hip: He exhibits decreased range of motion, decreased strength, tenderness and bony tenderness. Right knee: He exhibits decreased range of motion, swelling and bony tenderness. Tenderness found. Cervical back: He exhibits decreased range of motion and tenderness. Thoracic back: He exhibits tenderness, bony tenderness, pain and spasm. Lumbar back: He exhibits decreased range of motion, tenderness, bony tenderness, pain and spasm. Right upper leg: He exhibits tenderness. Left upper leg: He exhibits tenderness. Comments: H/o cervical surgery no pain but limited ROM  same with Right shoulder    Skin:     General: Skin is warm. Coloration: Skin is not pale. Findings: No erythema or rash. Neurological:      Mental Status: He is alert and oriented to person, place, and time. He is not disoriented. Cranial Nerves: No cranial nerve deficit. Sensory: No sensory deficit. Motor: Weakness present. No atrophy or abnormal muscle tone. Coordination: Coordination normal.      Gait: Gait abnormal.      Deep Tendon Reflexes: Babinski sign absent on the right side. Reflex Scores:       Tricep reflexes are 2+ on the right side and 2+ on the left side. Bicep reflexes are 2+ on the right side and 2+ on the left side. Brachioradialis reflexes are 2+ on the right side and 2+ on the left side.        Patellar reflexes are 1+ on the right side and 2+ on the left side. Achilles reflexes are 1+ on the right side and 2+ on the left side. Comments: 4/5 RUE 3/5 RLE   SLR + RLE   Psychiatric:         Attention and Perception: Attention and perception normal. He is attentive. Mood and Affect: Mood and affect normal. Mood is not anxious or depressed. Affect is not labile, blunt, angry or inappropriate. Speech: Speech normal. He is communicative. Speech is not rapid and pressured, delayed, slurred or tangential.         Behavior: Behavior normal. Behavior is not agitated, slowed, aggressive, withdrawn, hyperactive or combative. Thought Content: Thought content normal. Thought content is not paranoid or delusional. Thought content does not include homicidal or suicidal ideation. Thought content does not include homicidal or suicidal plan. Cognition and Memory: Cognition and memory normal. Memory is not impaired. He does not exhibit impaired recent memory or impaired remote memory. Judgment: Judgment normal. Judgment is not impulsive or inappropriate. Comments: MDD         LAURA test:+   Yeomans test: +  Gaenslen test:+  Assessment:      1. Lumbar post-laminectomy syndrome    2. Lumbosacral spinal stenosis    3. Lumbar radiculitis    4. Spinal stenosis of lumbar region without neurogenic claudication    5. Spondylosis of lumbar region without myelopathy or radiculopathy    6. Chronic pain syndrome       Plan:      · OARRS reviewed. Current MED: 0  · Patient was not offered naloxone for home. · Discussed long term side effects of medications, tolerance, dependency and addiction. · Previous UDS reviewed  · UDS preformed today for compliance. · Patient told can not receive any pain medications from any other source. · No evidence of abuse, diversion or aberrant behavior.   · Medications and/or procedures to improve function and quality of life- patient understanding with this and that may not be pain free  · Discussed with patient about safe storage of medications at home  · Discussed possible weaning of medication dosing dependent on treatment/procedure results. · Testing:reviewed MRI LABS EKG Dr [place] note    · Procedures: SCS trial once cardiac cleared had recent abnormal EKG with changes from prior EKG  LABS. REVIEWED  · Discussed with patient about risks with procedure including infection, reaction to medication, increased pain, or bleeding.   · Medications:none  · If patient is on blood thinners will need approval to hold:N/A  · Needs to f/u with Dr Danielle Magaña for Cardiac clearance, has not had a F/U - CLEARED                Return for SCS trial if cardiac cleared, Follow up after procedure GEORGETTE CARRIZALES II.VIERTEL.

## 2021-07-08 NOTE — ANESTHESIA PRE PROCEDURE
R53.81    Obstructive sleep apnea of adult G47.33    Restless sleeper G47.9    Intractable headache R51.9    Seizure disorder (HCC) G40.909    Benign hypertension I10    Nausea & vomiting R11.2    Leukocytosis D72.829    Shortness of breath R06.02    Acute chest pain R07.9    Coronary artery disease involving native coronary artery without angina pectoris I25.10    Gastroesophageal reflux disease without esophagitis K21.9    ALICIA treated with BiPAP G47.33    Intractable migraine with aura without status migrainosus G43.119    Ventral hernia without obstruction or gangrene K43.9    Moderate episode of recurrent major depressive disorder (HCC) F33.1    Unstable angina (HCC) I20.0    S/P cardiac catheterization: 7/31/2017: No obstructive lasions. Z98.890    Bradycardia, drug induced R00.1, T50.905A    Cervical spinal stenosis M48.02    Angina pectoris, unspecified (HCC) I20.9       Past Medical History:        Diagnosis Date    Back pain     Depression     GERD (gastroesophageal reflux disease)     Headache(784.0) 9/22/2013    Migraines     ALICIA treated with BiPAP 2013    doesn't wear Bipap    Pneumonia     Pulmonary embolism ([facility] [address] 2/25/2021    S/P cardiac catheterization: 7/31/2017: No obstructive lasions. 7/31/2017 7/31/2017: No obstructive lasions.  Dr. Prasanna Mendez Seizures [facility])        Past Surgical History:        Procedure Laterality Date    [facility]    BACK SURGERY  12/18/15    l3-s1 decompression, posterior fusion   [address]  2013    CERVICAL SPINE SURGERY  10-16-15    C4-6 ACDF    COLONOSCOPY      ENDOSCOPY, COLON, DIAGNOSTIC      [place]IA REPAIR  1996    [place]    [facility]    KNEE SURGERY  1976    Rt     LUMBAR SPINE SURGERY  08/26/2014     L3-5 decompression, Dr. Patria Wright, [address] SURGERY  10/19/2018    OTHER SURGICAL HISTORY  06/02/2017    Diagnostic laparoscopy, Laparoscopic Ventral hernia Repair

## 2021-07-14 ENCOUNTER — OFFICE VISIT (OUTPATIENT)
Dept: PHYSICAL MEDICINE AND REHAB | Age: 66
End: 2021-07-14
Payer: MEDICARE

## 2021-07-14 VITALS
WEIGHT: 204 LBS | BODY MASS INDEX: 30.92 KG/M2 | DIASTOLIC BLOOD PRESSURE: 82 MMHG | HEIGHT: 68 IN | SYSTOLIC BLOOD PRESSURE: 134 MMHG

## 2021-07-14 DIAGNOSIS — M54.42 CHRONIC BILATERAL LOW BACK PAIN WITH BILATERAL SCIATICA: ICD-10-CM

## 2021-07-14 DIAGNOSIS — M54.41 CHRONIC BILATERAL LOW BACK PAIN WITH BILATERAL SCIATICA: ICD-10-CM

## 2021-07-14 DIAGNOSIS — M54.16 LUMBAR RADICULITIS: ICD-10-CM

## 2021-07-14 DIAGNOSIS — M47.816 SPONDYLOSIS OF LUMBAR REGION WITHOUT MYELOPATHY OR RADICULOPATHY: ICD-10-CM

## 2021-07-14 DIAGNOSIS — M96.1 LUMBAR POST-LAMINECTOMY SYNDROME: Primary | ICD-10-CM

## 2021-07-14 DIAGNOSIS — G43.119 INTRACTABLE MIGRAINE WITH AURA WITHOUT STATUS MIGRAINOSUS: ICD-10-CM

## 2021-07-14 DIAGNOSIS — G89.4 CHRONIC PAIN SYNDROME: ICD-10-CM

## 2021-07-14 DIAGNOSIS — M48.061 SPINAL STENOSIS OF LUMBAR REGION WITHOUT NEUROGENIC CLAUDICATION: ICD-10-CM

## 2021-07-14 DIAGNOSIS — G89.29 CHRONIC BILATERAL LOW BACK PAIN WITH BILATERAL SCIATICA: ICD-10-CM

## 2021-07-14 PROCEDURE — 3017F COLORECTAL CA SCREEN DOC REV: CPT | Performed by: NURSE PRACTITIONER

## 2021-07-14 PROCEDURE — G8417 CALC BMI ABV UP PARAM F/U: HCPCS | Performed by: NURSE PRACTITIONER

## 2021-07-14 PROCEDURE — G8427 DOCREV CUR MEDS BY ELIG CLIN: HCPCS | Performed by: NURSE PRACTITIONER

## 2021-07-14 PROCEDURE — 1036F TOBACCO NON-USER: CPT | Performed by: NURSE PRACTITIONER

## 2021-07-14 PROCEDURE — 99214 OFFICE O/P EST MOD 30 MIN: CPT | Performed by: NURSE PRACTITIONER

## 2021-07-14 ASSESSMENT — ENCOUNTER SYMPTOMS: BACK PAIN: 1

## 2021-07-14 NOTE — PROGRESS NOTES
[facility] [address]  Dept: [phone]  Dept Fax: [phone]: [phone]    Visit Date: 7/14/2021    Functionality Assessment/Goals Worksheet     On a scale of 0 (Does not Interfere) to 10 (Completely Interferes)     1. Which number describes how during the past week pain has interfered with       the following:  A. General Activity:  0  B. Mood: 0  C. Walking Ability:  0  D. Normal Work (Includes both work outside the home and housework):  3  E. Relations with Other People:   0  F. Sleep:   3  G. Enjoyment of Life:   0    2. Patient Prefers to Take their Pain Medications:     []  On a regular basis   []  Only when necessary    [x]  Does not take pain medications    3. What are the Patient's Goals/Expectations for Visiting Pain Management? []  Learn about my pain    [x]  Receive Medication   []  Physical Therapy     []  Treat Depression   [x]  Receive Injections    []  Treat Sleep   []  Deal with Anxiety and Stress   []  Treat Opoid Dependence/Addiction   []  Other:      HPI:   Fadumo Andino is a 61 y.o. male is here today for    Chief Complaint: Low back pain, leg pain     HPI   FU from SCS trial from 7/8/2021 and here for removal. . ecieveing 100% relief from SCS trial and denies any pain at all \"if it is on\" Able to tolerate more standing and minimal with activity. Slept better. States this is the best he has felt in years. Wants to move forward to permanent SCS. At this time denies any back pain or leg pain with SCS trial.     Does not take any medications for pain       Medications reviewed. Patient denies side effects with medications. Patient states he is taking medications as prescribed. Hedenies receiving pain medications from other sources. He denies any ER visits since last visit.     Pain scale with out pain medications or at its worst is 0/10 with SCS trial     The patientis allergic to bee venom, lisinopril, dilaudid [hydromorphone hcl], nitrofuran derivatives, and nitroglycerin. Subjective:      Review of Systems   Constitutional: Negative. Musculoskeletal: Positive for arthralgias, back pain and gait problem. Negative for joint swelling, myalgias, neck pain and neck stiffness. Full ROM with SCS trial    Neurological: Positive for weakness. Negative for numbness. Psychiatric/Behavioral: Negative. Mood improved with SCS , along with sleep. Objective:     Vitals:    07/14/21 1253   BP: 134/82   Weight: 204 lb (92.5 kg)   Height: 5' 8\" (1.727 m)       Physical Exam  Vitals and nursing note reviewed. Constitutional:       General: He is not in acute distress. Appearance: He is well-developed. He is not diaphoretic. HENT:      Head: Normocephalic and atraumatic. Right Ear: External ear normal.      Left Ear: External ear normal.      Nose: Nose normal.      Mouth/Throat:      Pharynx: No oropharyngeal exudate. Eyes:      General: No scleral icterus. Right eye: No discharge. Left eye: No discharge. Conjunctiva/sclera: Conjunctivae normal.      Pupils: Pupils are equal, round, and reactive to light. Neck:      Thyroid: No thyromegaly. Comments: CERVICAL  FUSION 2014  Cardiovascular:      Rate and Rhythm: Normal rate and regular rhythm. Heart sounds: Normal heart sounds. No murmur heard. No friction rub. No gallop. Pulmonary:      Effort: Pulmonary effort is normal. No respiratory distress. Breath sounds: Normal breath sounds. No wheezing or rales. Chest:      Chest wall: No tenderness. Abdominal:      General: Bowel sounds are normal. There is no distension. Palpations: Abdomen is soft. Tenderness: There is no abdominal tenderness. There is no guarding or rebound. Musculoskeletal:         General: Tenderness present. Right shoulder: Tenderness present. Decreased range of motion. Cervical back: Neck supple. No edema, erythema, rigidity or tenderness. No muscular tenderness. Decreased range of motion. Thoracic back: Spasms, tenderness and bony tenderness present. Lumbar back: Spasms, tenderness and bony tenderness present. Decreased range of motion. Right hip: Tenderness and bony tenderness present. Decreased range of motion. Decreased strength. Left hip: Tenderness and bony tenderness present. Decreased range of motion. Decreased strength. Right upper leg: Tenderness present. Left upper leg: Tenderness present. Right knee: Swelling and bony tenderness present. Decreased range of motion. Tenderness present. Skin:     General: Skin is warm. Coloration: Skin is not pale. Findings: No erythema or rash. Neurological:      Mental Status: He is alert and oriented to person, place, and time. He is not disoriented. Cranial Nerves: No cranial nerve deficit. Sensory: No sensory deficit. Motor: Weakness present. No atrophy or abnormal muscle tone. Coordination: Coordination normal.      Gait: Gait abnormal.      Deep Tendon Reflexes: Babinski sign absent on the right side. Reflex Scores:       Tricep reflexes are 2+ on the right side and 2+ on the left side. Bicep reflexes are 2+ on the right side and 2+ on the left side. Brachioradialis reflexes are 2+ on the right side and 2+ on the left side. Patellar reflexes are 1+ on the right side and 2+ on the left side. Achilles reflexes are 1+ on the right side and 2+ on the left side. Comments: 4/5 RUE 4/5 RLE   SLR + RLE   Psychiatric:         Attention and Perception: Attention and perception normal. He is attentive. Mood and Affect: Mood and affect normal. Mood is not anxious or depressed. Affect is not labile, blunt, angry or inappropriate. Speech: Speech normal. He is communicative.  Speech is not rapid and pressured, delayed, slurred or tangential.         Behavior: Behavior normal. Behavior is not agitated, slowed, aggressive, withdrawn, hyperactive or combative. Thought Content: Thought content normal. Thought content is not paranoid or delusional. Thought content does not include homicidal or suicidal ideation. Thought content does not include homicidal or suicidal plan. Cognition and Memory: Cognition and memory normal. Memory is not impaired. He does not exhibit impaired recent memory or impaired remote memory. Judgment: Judgment normal. Judgment is not impulsive or inappropriate. LAURA test: +   Yeomans test: +   Gaenslen test: +      Assessment:     1. Lumbar post-laminectomy syndrome    2. Lumbar radiculitis    3. Spinal stenosis of lumbar region without neurogenic claudication    4. Intractable migraine with aura without status migrainosus    5. Chronic bilateral low back pain with bilateral sciatica    6. Spondylosis of lumbar region without myelopathy or radiculopathy    7. Chronic pain syndrome            Plan:      · OARRS reviewed. Current MED: 0  · Patient was not offered naloxone for home. · Discussed long term side effects of medications, tolerance, dependency and addiction. · Previous UDS reviewed  · UDS preformed today for compliance. · Patient told can not receive any pain medications from any other source. · No evidence of abuse, diversion or aberrant behavior.  Medications and/or procedures to improve function and quality of life- patient understanding with this and that may not be pain free   Discussed with patient about safe storage of medications at home   Discussed possible weaning of medication dosing dependent on treatment/procedure results.  Discussed with patient about risks with procedure including infection, reaction to medication, increased pain, or bleeding.    Procedure notes reveiwed in detail   Received 100% pain relief from SCS trial with improvement of mobility.  Tolerated SCS trial removal   Plan permanent SCS placement with Dr. Alejandra Starks. Procedure and risks discussed in detail with patient.  reviewed Lumbar MRI, labs and EKG   Not needing any pain medications        Meds. Prescribed:   No orders of the defined types were placed in this encounter. Return for Permanent SCS placement with Dr. Alejandra Starks. , follow up after procedure.                Electronically signed by YUSEF Mann CNP on7/14/2021 at 1:24 PM

## 2021-07-22 ENCOUNTER — TELEPHONE (OUTPATIENT)
Dept: PHYSICAL MEDICINE AND REHAB | Age: 66
End: 2021-07-22

## 2021-07-22 NOTE — TELEPHONE ENCOUNTER
Pt. Contacted. SCS Permanent placement rescheduled to 8/17/2021 @ 8am, arrive @ Bren Mcmahon RN notified and available for procedure. Follow up 8/30/2021 @ 4:20pm. Ken.

## 2021-07-29 DIAGNOSIS — K21.9 GASTROESOPHAGEAL REFLUX DISEASE WITHOUT ESOPHAGITIS: ICD-10-CM

## 2021-07-29 DIAGNOSIS — F33.1 MODERATE EPISODE OF RECURRENT MAJOR DEPRESSIVE DISORDER (HCC): ICD-10-CM

## 2021-07-29 RX ORDER — OMEPRAZOLE 40 MG/1
40 CAPSULE, DELAYED RELEASE ORAL DAILY
Qty: 90 CAPSULE | Refills: 3 | Status: SHIPPED | OUTPATIENT
Start: 2021-07-29 | End: 2022-07-18

## 2021-07-29 RX ORDER — SERTRALINE HYDROCHLORIDE 100 MG/1
200 TABLET, FILM COATED ORAL DAILY
Qty: 180 TABLET | Refills: 3 | Status: SHIPPED | OUTPATIENT
Start: 2021-07-29 | End: 2022-05-30

## 2021-07-30 ENCOUNTER — HOSPITAL ENCOUNTER (EMERGENCY)
Age: 66
Discharge: HOME | End: 2021-07-30
Attending: FAMILY MEDICINE
Payer: MEDICARE

## 2021-07-30 VITALS
HEART RATE: 82 BPM | OXYGEN SATURATION: 93 % | HEIGHT: 68 IN | RESPIRATION RATE: 20 BRPM | BODY MASS INDEX: 32.43 KG/M2 | WEIGHT: 214 LBS | DIASTOLIC BLOOD PRESSURE: 88 MMHG | TEMPERATURE: 97 F | SYSTOLIC BLOOD PRESSURE: 140 MMHG

## 2021-07-30 DIAGNOSIS — T16.2XXA EAR FOREIGN BODY, LEFT, INITIAL ENCOUNTER: Primary | ICD-10-CM

## 2021-07-30 PROCEDURE — 99283 EMERGENCY DEPT VISIT LOW MDM: CPT

## 2021-07-30 ASSESSMENT — ENCOUNTER SYMPTOMS
VOMITING: 0
COUGH: 0
SHORTNESS OF BREATH: 0
NAUSEA: 0
SINUS PRESSURE: 0
SINUS PAIN: 0

## 2021-07-30 ASSESSMENT — PAIN DESCRIPTION - LOCATION: LOCATION: EAR

## 2021-07-30 ASSESSMENT — PAIN SCALES - GENERAL: PAINLEVEL_OUTOF10: 2

## 2021-07-30 ASSESSMENT — PAIN DESCRIPTION - PAIN TYPE: TYPE: ACUTE PAIN

## 2021-07-30 ASSESSMENT — PAIN DESCRIPTION - ORIENTATION: ORIENTATION: LEFT

## 2021-07-30 NOTE — ED NOTES
Discharge teaching and instructions for condition explained to patient. AVS reviewed with patient. Patient voiced understanding regarding prescriptions, follow up appointments and care of self at home. Pt discharged to home in stable condition per self.        Raymond Carbajal RN  07/30/21 [id number]

## 2021-07-30 NOTE — ED NOTES
Presents from home c/o foreign body left ear states when he was working on car approximate 30 minutes prior to arrival he was getting out from under the car and felt something drop into his ear. Unsure if it was \"a piece of rust or a pebble\". Discomfort rated 2/10.  Triaged exam room [address] Pa Rios RN  07/30/21 6699

## 2021-07-30 NOTE — ED NOTES
Left ear irrigated using 75 ml sterile water. Pt tolerated well. Says it feels \"better\" now just feels like water in the ear. Dr Charlotte Sultana updated. Pt waiting for reassessment.      Savannah Stewart RN  07/30/21 1941

## 2021-07-31 NOTE — ED PROVIDER NOTES
[facility]  eMERGENCY dEPARTMENT eNCOUnter          CHIEF COMPLAINT       Chief Complaint   Patient presents with    Foreign Body in Ear       Nurses Notes reviewed and I agree except as noted in the HPI. HISTORY OF PRESENT ILLNESS    Chad Owen is a 61 y.o. male who presents possible foreign body to his left ear. Patient states that he felt like something went into his left ear. Incident occurred just prior to arrival.  He notes that he attempted to try to remove it but he does not believe that he had any benefit. Denies any hearing loss. Denies any pain. REVIEW OF SYSTEMS     Review of Systems   Constitutional: Negative for chills and fever. HENT: Negative for congestion, ear pain, sinus pressure and sinus pain. Sensation of something in left ear   Respiratory: Negative for cough and shortness of breath. Gastrointestinal: Negative for nausea and vomiting. All other systems reviewed and are negative. PAST MEDICAL HISTORY    has a past medical history of Back pain, Depression, GERD (gastroesophageal reflux disease), Headache(784.0), Migraines, ALICIA treated with BiPAP, Pneumonia, Pulmonary embolism ([address] S/P cardiac catheterization: 7/31/2017: No obstructive lasions. , and Seizures ([address] SURGICAL HISTORY      has a past surgical history that includes knee surgery (1976); Appendectomy (2012); hernia repair (1996); hernia repair (2012); shoulder surgery (2001); Cervical spine surgery (10-16-15); Cardiac catheterization (2013); Lumbar spine surgery (08/26/2014 ); back surgery (12/18/15); other surgical history (06/02/2017); Colonoscopy; Endoscopy, colon, diagnostic; pr office/outpt visit,procedure only (N/A, 10/19/2018); Neck surgery (10/19/2018); REMOVE HARDWARE SPINE (N/A, 3/13/2020); and Stimulator Surgery (N/A, 7/8/2021).     CURRENT MEDICATIONS       Discharge Medication List as of 7/30/2021  7:48 PM      CONTINUE these medications which have NOT CHANGED    Details   omeprazole (PRILOSEC) 40 MG delayed release capsule Take 1 capsule by mouth daily, Disp-90 capsule, R-3Requesting 1 year supplyNormal      sertraline (ZOLOFT) 100 MG tablet Take 2 tablets by mouth daily, Disp-180 tablet, R-3Normal      EPINEPHrine (EPIPEN) 0.3 MG/0.3ML SOAJ injection Inject 0.3 mg into the muscle as needed Use as directed for allergic reactionHistorical Med      lamoTRIgine (LAMICTAL) 200 MG tablet TAKE 1 TABLET BY MOUTH TWO  TIMES DAILY, Disp-180 tablet, R-3Normal      divalproex (DEPAKOTE ER) 500 MG extended release tablet Take by mouth daily 3 tabHistorical Med      butorphanol (STADOL) 10 MG/ML nasal spray 1 spray by Nasal route every 4 hours as needed for PainHistorical Med             ALLERGIES     is allergic to bee venom, lisinopril, dilaudid [hydromorphone hcl], nitrofuran derivatives, and nitroglycerin. FAMILY HISTORY     He indicated that his mother is . He indicated that his father is . He indicated that both of his sisters are alive. He indicated that his brother is alive. He indicated that the status of his maternal grandmother is unknown. He indicated that the status of his maternal grandfather is unknown. He indicated that the status of his paternal grandmother is unknown. He indicated that the status of his paternal grandfather is unknown. He indicated that all of his four children are alive. family history includes Arthritis in his father, maternal grandfather, and maternal grandmother; Asthma in his paternal grandfather and paternal grandmother; Depression in his child and sister; Diabetes in his maternal grandfather; Heart Disease in his father and maternal grandfather; High Blood Pressure in his maternal grandmother and paternal grandmother; Lupus in his mother; Other in his father and mother; Stroke in his maternal grandmother and paternal grandmother. SOCIAL HISTORY      reports that he has never smoked.  He has never used smokeless tobacco. He reports that he does not drink alcohol and does not use drugs. PHYSICAL EXAM     INITIAL VITALS:  height is 5' 8\" (1.727 m) and weight is 214 lb (97.1 kg). His temperature is 97 °F (36.1 °C). His blood pressure is 140/88 (abnormal) and his pulse is 82. His respiration is 20 and oxygen saturation is 93%. Physical Exam  Vitals and nursing note reviewed. Constitutional:       General: He is not in acute distress. HENT:      Left Ear: Tympanic membrane and ear canal normal. There is no impacted cerumen. Ears:      Comments: No visible foreign bodies. Nose: Nose normal.   Eyes:      Extraocular Movements: Extraocular movements intact. Conjunctiva/sclera: Conjunctivae normal.      Pupils: Pupils are equal, round, and reactive to light. Neurological:      Mental Status: He is alert. Psychiatric:         Mood and Affect: Mood normal.         Behavior: Behavior normal.         DIFFERENTIAL DIAGNOSIS:   Foreign body left ear,otitis media    DIAGNOSTIC RESULTS         RADIOLOGY: non-plain film images(s) such as CT, Ultrasound and MRI are read by the radiologist.      LABS:   Labs Reviewed - No data to display    EMERGENCY DEPARTMENT COURSE:   Vitals:    Vitals:    07/30/21 1917   BP: (!) 140/88   Pulse: 82   Resp: 20   Temp: 97 °F (36.1 °C)   SpO2: 93%   Weight: 214 lb (97.1 kg)   Height: 5' 8\" (1.727 m)     On inspection I did not see any visible foreign bodies to the left ear. I did asked nursing to irrigate the ear to see if there is anything that may be there that I cannot visualize. After irrigation I again inspected the left ear and there is no evidence of any foreign bodies. The membranes intact. There is no other forms of external debris that I can visualize. No pain or tragal tenderness. Care instructions were provided. PROCEDURES:  None    FINAL IMPRESSION      1. Ear foreign body, left, initial encounter          DISPOSITION/PLAN   Home.   Care instructions provided. Follow-up as needed with PCP or ED.     PATIENT REFERRED TO:  Parth Lal MD  [address] [place] [facility] [address]  [phone]    Call in 3 days  If symptoms worsen, As needed      DISCHARGE MEDICATIONS:  Discharge Medication List as of 7/30/2021  7:48 PM          (Please note that portions of this note were completed with a voice recognition program.  Efforts were made to edit the dictations but occasionally words are mis-transcribed.)    MD Eddie Vela MD  07/30/21 3871

## 2021-08-04 ENCOUNTER — TELEPHONE (OUTPATIENT)
Dept: PHYSICAL MEDICINE AND REHAB | Age: 66
End: 2021-08-04

## 2021-08-04 DIAGNOSIS — M96.1 LUMBAR POST-LAMINECTOMY SYNDROME: Primary | ICD-10-CM

## 2021-08-04 NOTE — TELEPHONE ENCOUNTER
Pt. Contacted. Procedure and follow up cancelled due to provider out of office. Order entered for referral to Dr. Nargis Benitez.

## 2021-08-18 ENCOUNTER — NURSE TRIAGE (OUTPATIENT)
Dept: OTHER | Facility: CLINIC | Age: 66
End: 2021-08-18

## 2021-08-19 ENCOUNTER — HOSPITAL ENCOUNTER (EMERGENCY)
Age: 66
Discharge: HOME | End: 2021-08-19
Attending: FAMILY MEDICINE
Payer: MEDICARE

## 2021-08-19 VITALS
RESPIRATION RATE: 14 BRPM | WEIGHT: 214 LBS | DIASTOLIC BLOOD PRESSURE: 70 MMHG | HEART RATE: 58 BPM | TEMPERATURE: 97 F | BODY MASS INDEX: 32.43 KG/M2 | HEIGHT: 68 IN | SYSTOLIC BLOOD PRESSURE: 148 MMHG | OXYGEN SATURATION: 96 %

## 2021-08-19 DIAGNOSIS — M70.22 OLECRANON BURSITIS OF LEFT ELBOW: Primary | ICD-10-CM

## 2021-08-19 DIAGNOSIS — R03.0 ELEVATED BLOOD PRESSURE READING: ICD-10-CM

## 2021-08-19 PROCEDURE — 99283 EMERGENCY DEPT VISIT LOW MDM: CPT

## 2021-08-19 ASSESSMENT — ENCOUNTER SYMPTOMS
COUGH: 0
ABDOMINAL PAIN: 0
VOMITING: 0
WHEEZING: 0
NAUSEA: 0
BACK PAIN: 0
DIARRHEA: 0
SORE THROAT: 0
SHORTNESS OF BREATH: 0

## 2021-08-19 ASSESSMENT — PAIN DESCRIPTION - ORIENTATION: ORIENTATION: LEFT

## 2021-08-19 ASSESSMENT — PAIN DESCRIPTION - LOCATION: LOCATION: ELBOW

## 2021-08-19 ASSESSMENT — PAIN SCALES - GENERAL: PAINLEVEL_OUTOF10: 4

## 2021-08-19 NOTE — ED PROVIDER NOTES
[address]/[facility] COMPLAINT       Chief Complaint   Patient presents with    Joint Swelling     lt       Nurses Notes reviewed and I agree except as noted in the HPI. HISTORY OF PRESENT ILLNESS    Dee Barajas is a 61 y.o. male who presents evaluation of swelling and pain in the left elbow. Patient states that he has been mounting tiles still having to hold the tiles with his left hand a lot over the past couple of days. Patient states that he has some worsening of pain when he tries to bend his elbow. Patient states that swelling started this past Tuesday evening and is since increased. There is no redness or warmth noted to the region. No wound. Is not experiencing any numbness or tingling in the left upper extremity. He has not had this occur in the past.    REVIEW OF SYSTEMS     Review of Systems   Constitutional: Negative for activity change, appetite change, chills and fever. HENT: Negative for ear pain and sore throat. Respiratory: Negative for cough, shortness of breath and wheezing. Cardiovascular: Negative for chest pain and leg swelling. Gastrointestinal: Negative for abdominal pain, diarrhea, nausea and vomiting. Genitourinary: Negative for dysuria, flank pain and hematuria. Musculoskeletal: Positive for joint swelling. Negative for arthralgias, back pain, gait problem and neck pain. Skin: Negative for rash and wound. Neurological: Negative for weakness, light-headedness and headaches. Psychiatric/Behavioral: Negative for agitation and hallucinations. The patient is not nervous/anxious. PAST MEDICAL HISTORY    has a past medical history of Back pain, Depression, GERD (gastroesophageal reflux disease), Headache(784.0), Migraines, ALICIA treated with BiPAP, Pneumonia, Pulmonary embolism ([address] S/P cardiac catheterization: 7/31/2017: No obstructive lasions. , and Seizures ([address]     SURGICAL HISTORY has a past surgical history that includes knee surgery (); Appendectomy (); hernia repair (); hernia repair (); shoulder surgery (); Cervical spine surgery (10-16-15); Cardiac catheterization (); Lumbar spine surgery (2014 ); back surgery (12/18/15); other surgical history (2017); Colonoscopy; Endoscopy, colon, diagnostic; pr office/outpt visit,procedure only (N/A, 10/19/2018); Neck surgery (10/19/2018); REMOVE HARDWARE SPINE (N/A, 3/13/2020); and Stimulator Surgery (N/A, 2021). CURRENT MEDICATIONS       Previous Medications    BUTORPHANOL (STADOL) 10 MG/ML NASAL SPRAY    1 spray by Nasal route every 4 hours as needed for Pain    DIVALPROEX (DEPAKOTE ER) 500 MG EXTENDED RELEASE TABLET    Take by mouth daily 3 tab    EPINEPHRINE (EPIPEN) 0.3 MG/0.3ML SOAJ INJECTION    Inject 0.3 mg into the muscle as needed Use as directed for allergic reaction    LAMOTRIGINE (LAMICTAL) 200 MG TABLET    TAKE 1 TABLET BY MOUTH TWO  TIMES DAILY    OMEPRAZOLE (PRILOSEC) 40 MG DELAYED RELEASE CAPSULE    Take 1 capsule by mouth daily    SERTRALINE (ZOLOFT) 100 MG TABLET    Take 2 tablets by mouth daily       ALLERGIES     is allergic to bee venom, lisinopril, dilaudid [hydromorphone hcl], nitrofuran derivatives, and nitroglycerin. FAMILY HISTORY     He indicated that his mother is . He indicated that his father is . He indicated that both of his sisters are alive. He indicated that his brother is alive. He indicated that the status of his maternal grandmother is unknown. He indicated that the status of his maternal grandfather is unknown. He indicated that the status of his paternal grandmother is unknown. He indicated that the status of his paternal grandfather is unknown. He indicated that all of his four children are alive.    family history includes Arthritis in his father, maternal grandfather, and maternal grandmother; Asthma in his paternal grandfather and paternal grandmother; Depression in his child and sister; Diabetes in his maternal grandfather; Heart Disease in his father and maternal grandfather; High Blood Pressure in his maternal grandmother and paternal grandmother; Lupus in his mother; Other in his father and mother; Stroke in his maternal grandmother and paternal grandmother. SOCIAL HISTORY      reports that he has never smoked. He has never used smokeless tobacco. He reports that he does not drink alcohol and does not use drugs. PHYSICAL EXAM     INITIAL VITALS:  height is 5' 8\" (1.727 m) and weight is 214 lb (97.1 kg). His temporal temperature is 97 °F (36.1 °C). His blood pressure is 148/70 (abnormal) and his pulse is 58. His respiration is 14 and oxygen saturation is 96%. Physical Exam  Vitals and nursing note reviewed. Constitutional:       General: He is not in acute distress. Appearance: He is not diaphoretic. Cardiovascular:      Rate and Rhythm: Normal rate and regular rhythm. Heart sounds: Normal heart sounds. Pulmonary:      Effort: Pulmonary effort is normal.      Breath sounds: Normal breath sounds. Abdominal:      General: Bowel sounds are normal. There is no distension. Palpations: Abdomen is soft. Tenderness: There is no abdominal tenderness. Musculoskeletal:      Comments: Left olecranon bursa enlarged and mildly tender with flexion to 90 degrees. Motion limited to left elbow secondary to amount of swelling. Lymphadenopathy:      Cervical: No cervical adenopathy. Skin:     General: Skin is warm and dry. Findings: No erythema (no warmth to left elbow). Neurological:      General: No focal deficit present. Mental Status: He is alert and oriented to person, place, and time. Sensory: No sensory deficit.    Psychiatric:         Mood and Affect: Mood normal.         Behavior: Behavior normal.         DIFFERENTIAL DIAGNOSIS:   Olecranon bursitis, septic joint    DIAGNOSTIC RESULTS       LABS: Labs Reviewed - No data to display    DEPARTMENT COURSE:   Vitals:    Vitals:    08/19/21 1130 08/19/21 1208   BP: (!) 155/83 (!) 148/70   Pulse: 62 58   Resp: 14 14   Temp: 97 °F (36.1 °C)    TempSrc: Temporal    SpO2: 96%    Weight: 214 lb (97.1 kg)    Height: 5' 8\" (1.727 m)        MDM:  Patient presents for evaluation of olecranon bursitis. Diagnosis reviewed with patient. He is recommended to use a compression sleeve, apply ice, and use his ibuprofen 800 as directed on the label. He will follow-up with his PCP if this persists for prolonged period of time. Otherwise watchful waiting for symptom resolution. Patient blood pressure was also mildly elevated while in department. Does not have history of hypertension. Is recommended to follow-up with his PCP regarding this as well. CRITICAL CARE:   None    CONSULTS:  None    PROCEDURES:  None    FINAL IMPRESSION      1. Olecranon bursitis of left elbow    2.  Elevated blood pressure reading          DISPOSITION/PLAN   Discharge    PATIENT REFERRED TO:  Natty Zheng MD  [address] [place]  [phone]    Schedule an appointment as soon as possible for a visit in 2 weeks  ED visit follow up      [address]:  New Prescriptions    No medications on file       (Please note that portions of this note were completedwith a voice recognition program.  Efforts were made to edit the dictations but occasionally words are mis-transcribed.)    MD Darius Rose MD  08/19/21 0363

## 2021-08-19 NOTE — ED NOTES
Pt presents amb per self. Complans of lt elbow swelling starting on Tues since putting up tile in bathroom around tub. Pt has extreme swelling on lt elbow. Denies fall or injury. Pt spouse josh around swelling yesterday and increased today with increase in pain. No redness or increase in warmth noted.        Asher Cheung RN  08/19/21 5072

## 2021-08-19 NOTE — ED NOTES
Information given to pt on bursitis.  advised pt to get an elastic elbow pad. Pt released ambulatory in stable condition. Resp easy, non-labored. Skin warm, dry. Color pink. AVS  reviewed. Pt voiced understanding.      Leonie Gant, TRIP  08/19/21 8910

## 2021-08-30 ENCOUNTER — PATIENT MESSAGE (OUTPATIENT)
Dept: PAIN MANAGEMENT | Age: 66
End: 2021-08-30

## 2021-08-31 NOTE — TELEPHONE ENCOUNTER
Pt. Contacted. Informed that we do not have a date for Dr. France Mac to start doing the SCS Permanent implants. Is in agreement to see Dr. Zelalem Rosas for consult. Informed him that their office will call him to schedule.

## 2021-09-02 ENCOUNTER — HOSPITAL ENCOUNTER (OUTPATIENT)
Dept: GENERAL RADIOLOGY | Age: 66
Discharge: HOME | End: 2021-09-02
Payer: MEDICARE

## 2021-09-02 ENCOUNTER — TELEPHONE (OUTPATIENT)
Dept: NEUROSURGERY | Age: 66
End: 2021-09-02

## 2021-09-02 ENCOUNTER — OFFICE VISIT (OUTPATIENT)
Dept: NEUROSURGERY | Age: 66
End: 2021-09-02
Payer: MEDICARE

## 2021-09-02 ENCOUNTER — HOSPITAL ENCOUNTER (OUTPATIENT)
Age: 66
Discharge: HOME | End: 2021-09-02
Payer: MEDICARE

## 2021-09-02 VITALS
HEIGHT: 68 IN | DIASTOLIC BLOOD PRESSURE: 92 MMHG | SYSTOLIC BLOOD PRESSURE: 156 MMHG | HEART RATE: 63 BPM | BODY MASS INDEX: 32.44 KG/M2 | WEIGHT: 214.07 LBS

## 2021-09-02 DIAGNOSIS — M54.16 LUMBAR RADICULITIS: Primary | ICD-10-CM

## 2021-09-02 DIAGNOSIS — Z01.818 PRE-OP TESTING: ICD-10-CM

## 2021-09-02 DIAGNOSIS — M54.16 LUMBAR RADICULITIS: ICD-10-CM

## 2021-09-02 DIAGNOSIS — G89.4 CHRONIC PAIN SYNDROME: ICD-10-CM

## 2021-09-02 LAB
ANION GAP SERPL CALCULATED.3IONS-SCNC: 12 MEQ/L (ref 8–16)
APTT: 30 SECONDS (ref 22–38)
BASOPHILS # BLD: 0.7 %
BASOPHILS ABSOLUTE: 0.1 THOU/MM3 (ref 0–0.1)
BUN BLDV-MCNC: 13 MG/DL (ref 7–22)
CALCIUM SERPL-MCNC: 9.5 MG/DL (ref 8.5–10.5)
CHLORIDE BLD-SCNC: 103 MEQ/L (ref 98–111)
CO2: 26 MEQ/L (ref 23–33)
CREAT SERPL-MCNC: 0.9 MG/DL (ref 0.4–1.2)
EOSINOPHIL # BLD: 3 %
EOSINOPHILS ABSOLUTE: 0.3 THOU/MM3 (ref 0–0.4)
ERYTHROCYTE [DISTWIDTH] IN BLOOD BY AUTOMATED COUNT: 13.2 % (ref 11.5–14.5)
ERYTHROCYTE [DISTWIDTH] IN BLOOD BY AUTOMATED COUNT: 46.7 FL (ref 35–45)
GFR SERPL CREATININE-BSD FRML MDRD: 85 ML/MIN/1.73M2
GLUCOSE BLD-MCNC: 87 MG/DL (ref 70–108)
HCT VFR BLD CALC: 49 % (ref 42–52)
HEMOGLOBIN: 16.1 GM/DL (ref 14–18)
IMMATURE GRANS (ABS): 0.04 THOU/MM3 (ref 0–0.07)
IMMATURE GRANULOCYTES: 0.5 %
INR BLD: 0.98 (ref 0.85–1.13)
LYMPHOCYTES # BLD: 30.7 %
LYMPHOCYTES ABSOLUTE: 2.6 THOU/MM3 (ref 1–4.8)
MCH RBC QN AUTO: 31.4 PG (ref 26–33)
MCHC RBC AUTO-ENTMCNC: 32.9 GM/DL (ref 32.2–35.5)
MCV RBC AUTO: 95.5 FL (ref 80–94)
MONOCYTES # BLD: 10.9 %
MONOCYTES ABSOLUTE: 0.9 THOU/MM3 (ref 0.4–1.3)
MRSA NASAL SCREEN RT-PCR: NEGATIVE
NUCLEATED RED BLOOD CELLS: 0 /100 WBC
PLATELET # BLD: 398 THOU/MM3 (ref 130–400)
PMV BLD AUTO: 10.1 FL (ref 9.4–12.4)
POTASSIUM SERPL-SCNC: 5.1 MEQ/L (ref 3.5–5.2)
RBC # BLD: 5.13 MILL/MM3 (ref 4.7–6.1)
SEG NEUTROPHILS: 54.2 %
SEGMENTED NEUTROPHILS ABSOLUTE COUNT: 4.6 THOU/MM3 (ref 1.8–7.7)
SODIUM BLD-SCNC: 141 MEQ/L (ref 135–145)
STAPH AUREUS SCREEN RT-PCR: POSITIVE
WBC # BLD: 8.4 THOU/MM3 (ref 4.8–10.8)

## 2021-09-02 PROCEDURE — 80048 BASIC METABOLIC PNL TOTAL CA: CPT

## 2021-09-02 PROCEDURE — 99203 OFFICE O/P NEW LOW 30 MIN: CPT | Performed by: PHYSICIAN ASSISTANT

## 2021-09-02 PROCEDURE — 71046 X-RAY EXAM CHEST 2 VIEWS: CPT

## 2021-09-02 PROCEDURE — 36415 COLL VENOUS BLD VENIPUNCTURE: CPT

## 2021-09-02 PROCEDURE — G8427 DOCREV CUR MEDS BY ELIG CLIN: HCPCS | Performed by: PHYSICIAN ASSISTANT

## 2021-09-02 PROCEDURE — 87641 MR-STAPH DNA AMP PROBE: CPT

## 2021-09-02 PROCEDURE — 85730 THROMBOPLASTIN TIME PARTIAL: CPT

## 2021-09-02 PROCEDURE — 85025 COMPLETE CBC W/AUTO DIFF WBC: CPT

## 2021-09-02 PROCEDURE — G8417 CALC BMI ABV UP PARAM F/U: HCPCS | Performed by: PHYSICIAN ASSISTANT

## 2021-09-02 PROCEDURE — 85610 PROTHROMBIN TIME: CPT

## 2021-09-02 PROCEDURE — 3017F COLORECTAL CA SCREEN DOC REV: CPT | Performed by: PHYSICIAN ASSISTANT

## 2021-09-02 PROCEDURE — 1036F TOBACCO NON-USER: CPT | Performed by: PHYSICIAN ASSISTANT

## 2021-09-02 PROCEDURE — 87640 STAPH A DNA AMP PROBE: CPT

## 2021-09-02 NOTE — TELEPHONE ENCOUNTER
SURGERY [address]  [facility] [address] [facility] [address] [facility], [address]      Phone *[phone] *[phone]   Surgical Scheduling Direct Line Phone *[phone] Fax *[phone]      Frandy Ellsworth   1955   male    [address] [facility] [address]           Home Phone: [phone]    Cell Phone:    Telephone Information:   Mobile [phone]          Surgeon: Soren Cruz Surgery Date: 9/9/21  Time: 12:30 pm    Procedure: Thoracic laminectomy for placement of permanent spinal cord stimulator and left flank internal pulse generator     Diagnosis: Chronic pain syndrome     Important Medical History:       Special Inst/Equip: Position: Prone, parvin table, microscope, laminectomy set, neuropsyology, high speed drill    Anesthesia:  Gen            Admission Type: Same Day       Case Location:      Main OR         Need Preop Cardiac Clearance:       Does Patient have Cardiologist/physician?          Surgery Confirmation #: __________________________________________________    : ________________________   Date: __________________________     [employer] Name: Medicare A B, [employer] for life Medicare Supp  CPT: 39786, 26922

## 2021-09-02 NOTE — PROGRESS NOTES
[facility]  [address] [place] [address] Road [address]  Dept: [phone]  Dept Fax: [phone]      Patient Name:  Laurinda Jeans  Visit Date:  9/2/2021    HPI:     Mr. Perlita Harris is a 61 y.o. male that presents today at [facility] for evaluation of the following: For evaluation for placement of permanent spinal cord stimulator to address chronic pain. Chief Complaint   Patient presents with    Consultation     Discuss Permanent spinal cord stimulator        HPI   Mr. Perlita Harris is a very pleasant 62-year-old gentleman, a never smoker tobacco and nonuser of alcohol with a medical history significant for seizure disorder, migraine headache (unspecified), anxiety and depression, chronic back pain, GERD, obstructive sleep apnea, pneumonia, cardiac catheterization performed in 2017, pulmonary embolism diagnosed February 2021. Surgical history is significant for removal of spinal hardware in March 2020 L3-S1 hardware removed. Is referred to our service from pain management specialist Dr. Marvel Chiang who recently conducted a successful spinal cord stimulator trial utilizing the [employer] device on 7/8/2021. He arrives today company and walking without assistance. He relates significant improvement in back pain following the trial with excellent coverage and relief noted. Is otherwise stable and neurologically intact on exam pain moderately controlled with medications today.          Medications:    Current Outpatient Medications:     omeprazole (PRILOSEC) 40 MG delayed release capsule, Take 1 capsule by mouth daily, Disp: 90 capsule, Rfl: 3    sertraline (ZOLOFT) 100 MG tablet, Take 2 tablets by mouth daily, Disp: 180 tablet, Rfl: 3    EPINEPHrine (EPIPEN) 0.3 MG/0.3ML SOAJ injection, Inject 0.3 mg into the muscle as needed Use as directed for allergic reaction, Disp: , Rfl:     lamoTRIgine (LAMICTAL) 200 MG tablet, TAKE 1 TABLET BY MOUTH TWO  TIMES DAILY, Disp: 180 tablet, Rfl: 3    divalproex (DEPAKOTE ER) 500 MG extended release tablet, Take by mouth daily 3 tab, Disp: , Rfl:     butorphanol (STADOL) 10 MG/ML nasal spray, 1 spray by Nasal route every 4 hours as needed for Pain, Disp: , Rfl:     The patient is allergic to bee venom, lisinopril, dilaudid [hydromorphone hcl], nitrofuran derivatives, and nitroglycerin. Past Medical History  Stas Palacio  has a past medical history of Back pain, Depression, GERD (gastroesophageal reflux disease), Headache(784.0), Migraines, STAS treated with BiPAP, Pneumonia, Pulmonary embolism ([facility] [address] S/P cardiac catheterization: 7/31/2017: No obstructive lasions. , and Seizures ([facility] [address] Past Surgical History  The patient  has a past surgical history that includes knee surgery (1976); Appendectomy (2012); hernia repair (1996); hernia repair (2012); shoulder surgery (2001); Cervical spine surgery (10-16-15); Cardiac catheterization (2013); Lumbar spine surgery (08/26/2014 ); back surgery (12/18/15); other surgical history (06/02/2017); Colonoscopy; Endoscopy, colon, diagnostic; pr office/outpt visit,procedure only (N/A, 10/19/2018); Neck surgery (10/19/2018); REMOVE HARDWARE SPINE (N/A, 3/13/2020); and Stimulator Surgery (N/A, 7/8/2021). Family History  This patient's family history includes Arthritis in his father, maternal grandfather, and maternal grandmother; Asthma in his paternal grandfather and paternal grandmother; Depression in his child and sister; Diabetes in his maternal grandfather; Heart Disease in his father and maternal grandfather; High Blood Pressure in his maternal grandmother and paternal grandmother; Lupus in his mother; Other in his father and mother; Stroke in his maternal grandmother and paternal grandmother. Social History  Stas Palacio  reports that he has never smoked.  He has never used smokeless tobacco. He reports that he does not drink alcohol and does not use drugs.    Subjective:      Review of Systems     Changed from spinal cord stimulator trial evaluation. Constitutional: Positive for activity change. Negative for appetite change, chills, diaphoresis, fatigue, fever and unexpected weight change. HENT: Negative for congestion, ear pain, hearing loss, mouth sores, nosebleeds, rhinorrhea, sinus pressure and sore throat.    Eyes: Negative for photophobia, pain and visual disturbance. Respiratory: Negative for cough, chest tightness, shortness of breath and wheezing.    Cardiovascular: Negative for chest pain and palpitations.        CAD HTN ANgina   Gastrointestinal: Negative for abdominal pain, constipation, diarrhea, nausea and vomiting. Endocrine: Negative for cold intolerance, heat intolerance, polydipsia, polyphagia and polyuria. Genitourinary: Negative for decreased urine volume, difficulty urinating, frequency and hematuria. Musculoskeletal: Positive for arthralgias, back pain, gait problem and myalgias. Negative for joint swelling, neck pain and neck stiffness. Skin: Negative for color change and rash. Allergic/Immunologic: Negative for food allergies and immunocompromised state. Neurological: Positive for seizures and headaches. Negative for dizziness, tremors, syncope, facial asymmetry, speech difficulty, weakness, light-headedness and numbness. Hematological: Does not bruise/bleed easily. Psychiatric/Behavioral: Negative for agitation, behavioral problems, confusion, decreased concentration, dysphoric mood, hallucinations, self-injury, sleep disturbance and suicidal ideas. The patient is nervous/anxious.  The patient is not hyperactive.     Objective:     BP (!) 156/92 (Site: Left Upper Arm, Position: Sitting, Cuff Size: Large Adult)   Pulse 63   Ht 5' 7.99\" (1.727 m)   Wt 214 lb 1.1 oz (97.1 kg)   BMI 32.56 kg/m²          Physical Exam   Patient remained stable and grossly intact neurologically to his baseline with no additional significant changes noted from prior examination. Pain was moderately controlled with medications at the time of exam.    Reviewed MRI Type:  Film and Report    Lab Results   Component Value Date    WBC 7.5 06/23/2021    HGB 16.0 06/23/2021    HCT 51.3 06/23/2021     06/23/2021    CHOL 195 07/29/2017    TRIG 126 07/29/2017    HDL 32 07/29/2017    ALT 12 03/19/2021    AST 16 03/19/2021     06/23/2021    K 4.8 06/23/2021     06/23/2021    CREATININE 1.1 06/23/2021    BUN 13 06/23/2021    CO2 27 06/23/2021    TSH 2.560 03/05/2018    PSA 0.45 05/09/2012    INR 1.02 02/25/2020    LABA1C 5.8 03/18/2014       Assessment and Plan      Diagnosis Orders   1. Lumbar radiculitis     2. Chronic pain syndrome         Based on his history and evaluation having recently undergone a successful spinal cord stimulator trial with excellent coverage and relief we feel it is reasonable to offer surgical intervention in the form of a thoracic laminectomy for placement of permanent spinal cord stimulator and left flank internal pulse generator. We described the procedure in detail along with expectations for recovery and the associated risks which include, but are not limited to; bleeding, infection, anesthetic complications, neurologic injury, incomplete relief, and even death. Understanding the risks associated the procedure he is amicable to moving forward and a consent was offered for his signature with surgery scheduling in process. Tentative follow-up appointment to serve as a postoperative appointment has been made for 8 weeks from today. He is looking forward to the procedure and is encouraged to reach out to our office with any additional questions or concerns or should he have any significant changes noted in his general presentation.        Electronically signed by Reina Melara PA-C on 9/2/2021 at 2:00 PM

## 2021-09-03 NOTE — PROGRESS NOTES
Following instructions given to patient, who states understanding:    NPO after midnight  [employer] and 's license  Wear comfortable clean clothing  Do not bring jewelry   Shower night before and morning of surgery with a liquid antibacterial soap  Bring medications in original bottles  Follow all instructions given by your physician   needed at discharge  Call [phone] for any questions  Report to SDS on 2nd floor  If you would become ill prior to surgery, please call the surgeon  May have a visitor with you, we request that you limit to 2 visitors in pre-op area  Please bring and wear mask

## 2021-09-03 NOTE — PROGRESS NOTES
In preparation for their surgical procedure above patient was screened for Obstructive Sleep Apnea (ALICIA) using the STOP-Bang Questionnaire by the Pre-Admission Testing department. This is a pre-surgical screening tool for patient safety and serves as a recommendation, this WILL NOT cause cancellation of surgery. STOP-Bang Questionnaire  * Do you currently see a pulmonologist?  No     If yes STOP, do not complete. Patient follows with Dr.     1.  Do you snore loudly (able to be heard in the next room)? No    2. Do you often feel tired or sleepy during the daytime? No       3. Has anyone ever told you that you stop breathing during your sleep? No    4. Do you have or are you being treated for high blood pressure? No      5. BMI more than 35? BMI (Calculated): 32.6        No    6. Age over 48 years? 61 y.o. Yes    7. Neck Circumference greater than 17 inches for male or 16 inches for female? Measured           (visits only)            Not Applicable    8. Gender Male? Yes      TOTAL SCORE: 2    ALICIA - Low Risk : Yes to 0 - 2 questions  ALICIA - Intermediate Risk : Yes to 3 - 4 questions  ALICIA - High Risk : Yes to 5 - 8 questions    Adapted from:   STOP Questionnaire: A Tool to Screen Patients for Obstructive Sleep Apnea   [facility] Keiko Ramirez, M.B.B.S., Melina Gabriel M.D., Cherie Bills. Tino Nieves, Ph.D., Ami Goldsmith M.B.B.S., Nany Ortiz M.Sc., Rosa Back M.D., [facility]. NEREIDA Hernández.P.C.    Anesthesiology 2008; 117:894-60 Copyright 2008, the [address] of Anesthesiologists, [facility].   ----------------------------------------------------------------------------------------------------------------

## 2021-09-03 NOTE — PROGRESS NOTES
I notified Colonel Dante GRANADOS in Dr. Monika Casillas office of Positive pre op Nasal Swab for MSSA; Mupirocin ointment needs to be called in per protocol. Thank you.

## 2021-09-09 ENCOUNTER — APPOINTMENT (OUTPATIENT)
Dept: GENERAL RADIOLOGY | Age: 66
DRG: 981 | End: 2021-09-09
Attending: NEUROLOGICAL SURGERY
Payer: MEDICARE

## 2021-09-09 ENCOUNTER — HOSPITAL ENCOUNTER (INPATIENT)
Age: 66
LOS: 5 days | Discharge: HOME | DRG: 981 | End: 2021-09-15
Attending: NEUROLOGICAL SURGERY | Admitting: NEUROLOGICAL SURGERY
Payer: MEDICARE

## 2021-09-09 ENCOUNTER — ANESTHESIA (OUTPATIENT)
Dept: OPERATING ROOM | Age: 66
DRG: 981 | End: 2021-09-09
Payer: MEDICARE

## 2021-09-09 ENCOUNTER — ANESTHESIA EVENT (OUTPATIENT)
Dept: OPERATING ROOM | Age: 66
DRG: 981 | End: 2021-09-09
Payer: MEDICARE

## 2021-09-09 VITALS — OXYGEN SATURATION: 100 % | SYSTOLIC BLOOD PRESSURE: 156 MMHG | TEMPERATURE: 97.3 F | DIASTOLIC BLOOD PRESSURE: 86 MMHG

## 2021-09-09 DIAGNOSIS — G89.4 CHRONIC PAIN SYNDROME: Primary | ICD-10-CM

## 2021-09-09 PROBLEM — Z96.89 S/P INSERTION OF SPINAL CORD STIMULATOR: Status: ACTIVE | Noted: 2021-09-09

## 2021-09-09 LAB — APTT: 29 SECONDS (ref 22–38)

## 2021-09-09 PROCEDURE — 6360000002 HC RX W HCPCS: Performed by: NURSE ANESTHETIST, CERTIFIED REGISTERED

## 2021-09-09 PROCEDURE — 7100000011 HC PHASE II RECOVERY - ADDTL 15 MIN: Performed by: NEUROLOGICAL SURGERY

## 2021-09-09 PROCEDURE — 2500000003 HC RX 250 WO HCPCS: Performed by: STUDENT IN AN ORGANIZED HEALTH CARE EDUCATION/TRAINING PROGRAM

## 2021-09-09 PROCEDURE — 85730 THROMBOPLASTIN TIME PARTIAL: CPT

## 2021-09-09 PROCEDURE — 2580000003 HC RX 258: Performed by: PHYSICIAN ASSISTANT

## 2021-09-09 PROCEDURE — 36415 COLL VENOUS BLD VENIPUNCTURE: CPT

## 2021-09-09 PROCEDURE — 6360000002 HC RX W HCPCS: Performed by: NEUROLOGICAL SURGERY

## 2021-09-09 PROCEDURE — 6360000002 HC RX W HCPCS: Performed by: PHYSICIAN ASSISTANT

## 2021-09-09 PROCEDURE — 51798 US URINE CAPACITY MEASURE: CPT

## 2021-09-09 PROCEDURE — 3700000000 HC ANESTHESIA ATTENDED CARE: Performed by: NEUROLOGICAL SURGERY

## 2021-09-09 PROCEDURE — 63685 INS/RPLC SPI NPG/RCVR POCKET: CPT | Performed by: NEUROLOGICAL SURGERY

## 2021-09-09 PROCEDURE — 7100000001 HC PACU RECOVERY - ADDTL 15 MIN: Performed by: NEUROLOGICAL SURGERY

## 2021-09-09 PROCEDURE — 3600000004 HC SURGERY LEVEL 4 BASE: Performed by: NEUROLOGICAL SURGERY

## 2021-09-09 PROCEDURE — C1778 LEAD, NEUROSTIMULATOR: HCPCS | Performed by: NEUROLOGICAL SURGERY

## 2021-09-09 PROCEDURE — C1713 ANCHOR/SCREW BN/BN,TIS/BN: HCPCS | Performed by: NEUROLOGICAL SURGERY

## 2021-09-09 PROCEDURE — 6370000000 HC RX 637 (ALT 250 FOR IP): Performed by: STUDENT IN AN ORGANIZED HEALTH CARE EDUCATION/TRAINING PROGRAM

## 2021-09-09 PROCEDURE — 6360000002 HC RX W HCPCS: Performed by: STUDENT IN AN ORGANIZED HEALTH CARE EDUCATION/TRAINING PROGRAM

## 2021-09-09 PROCEDURE — 0JH80BZ INSERTION OF SINGLE ARRAY STIMULATOR GENERATOR INTO ABDOMEN SUBCUTANEOUS TISSUE AND FASCIA, OPEN APPROACH: ICD-10-PCS | Performed by: NEUROLOGICAL SURGERY

## 2021-09-09 PROCEDURE — C1820 GENERATOR NEURO RECHG BAT SY: HCPCS | Performed by: NEUROLOGICAL SURGERY

## 2021-09-09 PROCEDURE — 3600000014 HC SURGERY LEVEL 4 ADDTL 15MIN: Performed by: NEUROLOGICAL SURGERY

## 2021-09-09 PROCEDURE — 63655 IMPLANT NEUROELECTRODES: CPT | Performed by: PHYSICIAN ASSISTANT

## 2021-09-09 PROCEDURE — 3209999900 FLUORO FOR SURGICAL PROCEDURES

## 2021-09-09 PROCEDURE — 6370000000 HC RX 637 (ALT 250 FOR IP): Performed by: PHYSICIAN ASSISTANT

## 2021-09-09 PROCEDURE — G0378 HOSPITAL OBSERVATION PER HR: HCPCS

## 2021-09-09 PROCEDURE — 7100000010 HC PHASE II RECOVERY - FIRST 15 MIN: Performed by: NEUROLOGICAL SURGERY

## 2021-09-09 PROCEDURE — 2500000003 HC RX 250 WO HCPCS: Performed by: NURSE ANESTHETIST, CERTIFIED REGISTERED

## 2021-09-09 PROCEDURE — 00HV0MZ INSERTION OF NEUROSTIMULATOR LEAD INTO SPINAL CORD, OPEN APPROACH: ICD-10-PCS | Performed by: NEUROLOGICAL SURGERY

## 2021-09-09 PROCEDURE — 2709999900 HC NON-CHARGEABLE SUPPLY: Performed by: NEUROLOGICAL SURGERY

## 2021-09-09 PROCEDURE — 3700000001 HC ADD 15 MINUTES (ANESTHESIA): Performed by: NEUROLOGICAL SURGERY

## 2021-09-09 PROCEDURE — 7100000000 HC PACU RECOVERY - FIRST 15 MIN: Performed by: NEUROLOGICAL SURGERY

## 2021-09-09 PROCEDURE — 2720000010 HC SURG SUPPLY STERILE: Performed by: NEUROLOGICAL SURGERY

## 2021-09-09 PROCEDURE — 6370000000 HC RX 637 (ALT 250 FOR IP): Performed by: NEUROLOGICAL SURGERY

## 2021-09-09 PROCEDURE — 63685 INS/RPLC SPI NPG/RCVR POCKET: CPT | Performed by: PHYSICIAN ASSISTANT

## 2021-09-09 PROCEDURE — 63655 IMPLANT NEUROELECTRODES: CPT | Performed by: NEUROLOGICAL SURGERY

## 2021-09-09 PROCEDURE — 72072 X-RAY EXAM THORAC SPINE 3VWS: CPT

## 2021-09-09 DEVICE — STIMULATOR WAVEWRITER ALPHA IMPL PULSE GENRTR KIT: Type: IMPLANTABLE DEVICE | Site: BACK | Status: FUNCTIONAL

## 2021-09-09 DEVICE — CHARGING SYSTEM KIT
Type: IMPLANTABLE DEVICE | Status: FUNCTIONAL
Brand: PRECISION™

## 2021-09-09 DEVICE — KIT NEUROMODULATION NEXT GENERATION ANCHR CLIK: Type: IMPLANTABLE DEVICE | Site: BACK | Status: FUNCTIONAL

## 2021-09-09 DEVICE — Z DUP USE 2679261 LEAD NEUROSTIMULATOR SPNL CRD PADDLE STIM SYS SURG EQUIP: Type: IMPLANTABLE DEVICE | Site: BACK | Status: FUNCTIONAL

## 2021-09-09 RX ORDER — VANCOMYCIN HYDROCHLORIDE 1 G/20ML
INJECTION, POWDER, LYOPHILIZED, FOR SOLUTION INTRAVENOUS PRN
Status: DISCONTINUED | OUTPATIENT
Start: 2021-09-09 | End: 2021-09-09 | Stop reason: ALTCHOICE

## 2021-09-09 RX ORDER — PROPOFOL 10 MG/ML
INJECTION, EMULSION INTRAVENOUS PRN
Status: DISCONTINUED | OUTPATIENT
Start: 2021-09-09 | End: 2021-09-09 | Stop reason: SDUPTHER

## 2021-09-09 RX ORDER — DEXAMETHASONE SODIUM PHOSPHATE 10 MG/ML
INJECTION, EMULSION INTRAMUSCULAR; INTRAVENOUS PRN
Status: DISCONTINUED | OUTPATIENT
Start: 2021-09-09 | End: 2021-09-09 | Stop reason: SDUPTHER

## 2021-09-09 RX ORDER — DEXAMETHASONE SODIUM PHOSPHATE 4 MG/ML
4 INJECTION, SOLUTION INTRA-ARTICULAR; INTRALESIONAL; INTRAMUSCULAR; INTRAVENOUS; SOFT TISSUE EVERY 6 HOURS
Status: DISCONTINUED | OUTPATIENT
Start: 2021-09-09 | End: 2021-09-15 | Stop reason: HOSPADM

## 2021-09-09 RX ORDER — HYDROCODONE BITARTRATE AND ACETAMINOPHEN 5; 325 MG/1; MG/1
TABLET ORAL
Status: DISPENSED
Start: 2021-09-09 | End: 2021-09-10

## 2021-09-09 RX ORDER — GINSENG 100 MG
CAPSULE ORAL PRN
Status: DISCONTINUED | OUTPATIENT
Start: 2021-09-09 | End: 2021-09-09 | Stop reason: ALTCHOICE

## 2021-09-09 RX ORDER — ONDANSETRON 2 MG/ML
4 INJECTION INTRAMUSCULAR; INTRAVENOUS EVERY 6 HOURS PRN
Status: DISCONTINUED | OUTPATIENT
Start: 2021-09-09 | End: 2021-09-15 | Stop reason: HOSPADM

## 2021-09-09 RX ORDER — FENTANYL CITRATE 50 UG/ML
25 INJECTION, SOLUTION INTRAMUSCULAR; INTRAVENOUS PRN
Status: DISCONTINUED | OUTPATIENT
Start: 2021-09-09 | End: 2021-09-09 | Stop reason: HOSPADM

## 2021-09-09 RX ORDER — DEXAMETHASONE SODIUM PHOSPHATE 4 MG/ML
INJECTION, SOLUTION INTRA-ARTICULAR; INTRALESIONAL; INTRAMUSCULAR; INTRAVENOUS; SOFT TISSUE
Status: DISPENSED
Start: 2021-09-09 | End: 2021-09-10

## 2021-09-09 RX ORDER — DIPHENHYDRAMINE HYDROCHLORIDE 50 MG/ML
25 INJECTION INTRAMUSCULAR; INTRAVENOUS EVERY 6 HOURS PRN
Status: DISCONTINUED | OUTPATIENT
Start: 2021-09-09 | End: 2021-09-15 | Stop reason: HOSPADM

## 2021-09-09 RX ORDER — ACETAMINOPHEN, ASPIRIN AND CAFFEINE 250; 250; 65 MG/1; MG/1; MG/1
1 TABLET, FILM COATED ORAL EVERY 6 HOURS PRN
COMMUNITY

## 2021-09-09 RX ORDER — SODIUM CHLORIDE 9 MG/ML
INJECTION, SOLUTION INTRAVENOUS CONTINUOUS
Status: DISCONTINUED | OUTPATIENT
Start: 2021-09-09 | End: 2021-09-09

## 2021-09-09 RX ORDER — SULFAMETHOXAZOLE AND TRIMETHOPRIM 400; 80 MG/1; MG/1
1 TABLET ORAL DAILY
Qty: 10 TABLET | Refills: 0 | Status: SHIPPED | OUTPATIENT
Start: 2021-09-09 | End: 2021-09-19

## 2021-09-09 RX ORDER — SODIUM CHLORIDE 9 MG/ML
25 INJECTION, SOLUTION INTRAVENOUS PRN
Status: DISCONTINUED | OUTPATIENT
Start: 2021-09-09 | End: 2021-09-09

## 2021-09-09 RX ORDER — ONDANSETRON 2 MG/ML
INJECTION INTRAMUSCULAR; INTRAVENOUS PRN
Status: DISCONTINUED | OUTPATIENT
Start: 2021-09-09 | End: 2021-09-09 | Stop reason: SDUPTHER

## 2021-09-09 RX ORDER — SODIUM CHLORIDE 0.9 % (FLUSH) 0.9 %
5-40 SYRINGE (ML) INJECTION EVERY 12 HOURS SCHEDULED
Status: DISCONTINUED | OUTPATIENT
Start: 2021-09-09 | End: 2021-09-09

## 2021-09-09 RX ORDER — SODIUM CHLORIDE 0.9 % (FLUSH) 0.9 %
5-40 SYRINGE (ML) INJECTION PRN
Status: DISCONTINUED | OUTPATIENT
Start: 2021-09-09 | End: 2021-09-09

## 2021-09-09 RX ORDER — FENTANYL CITRATE 50 UG/ML
INJECTION, SOLUTION INTRAMUSCULAR; INTRAVENOUS
Status: DISPENSED
Start: 2021-09-09 | End: 2021-09-10

## 2021-09-09 RX ORDER — KETOROLAC TROMETHAMINE 30 MG/ML
15 INJECTION, SOLUTION INTRAMUSCULAR; INTRAVENOUS ONCE
Status: COMPLETED | OUTPATIENT
Start: 2021-09-09 | End: 2021-09-09

## 2021-09-09 RX ORDER — HYDROCODONE BITARTRATE AND ACETAMINOPHEN 5; 325 MG/1; MG/1
1 TABLET ORAL EVERY 4 HOURS PRN
Status: DISCONTINUED | OUTPATIENT
Start: 2021-09-09 | End: 2021-09-15 | Stop reason: HOSPADM

## 2021-09-09 RX ORDER — NEOSTIGMINE METHYLSULFATE 5 MG/5 ML
SYRINGE (ML) INTRAVENOUS PRN
Status: DISCONTINUED | OUTPATIENT
Start: 2021-09-09 | End: 2021-09-09 | Stop reason: SDUPTHER

## 2021-09-09 RX ORDER — TRAMADOL HYDROCHLORIDE 50 MG/1
50 TABLET ORAL EVERY 6 HOURS PRN
Qty: 20 TABLET | Refills: 0 | Status: SHIPPED | OUTPATIENT
Start: 2021-09-09 | End: 2021-09-14

## 2021-09-09 RX ORDER — KETOROLAC TROMETHAMINE 30 MG/ML
INJECTION, SOLUTION INTRAMUSCULAR; INTRAVENOUS
Status: DISPENSED
Start: 2021-09-09 | End: 2021-09-10

## 2021-09-09 RX ORDER — KETOROLAC TROMETHAMINE 30 MG/ML
15 INJECTION, SOLUTION INTRAMUSCULAR; INTRAVENOUS EVERY 6 HOURS PRN
Status: DISPENSED | OUTPATIENT
Start: 2021-09-09 | End: 2021-09-14

## 2021-09-09 RX ORDER — TRANEXAMIC ACID 100 MG/ML
INJECTION, SOLUTION INTRAVENOUS PRN
Status: DISCONTINUED | OUTPATIENT
Start: 2021-09-09 | End: 2021-09-09 | Stop reason: SDUPTHER

## 2021-09-09 RX ORDER — OXYCODONE HYDROCHLORIDE AND ACETAMINOPHEN 5; 325 MG/1; MG/1
1 TABLET ORAL
Status: COMPLETED | OUTPATIENT
Start: 2021-09-09 | End: 2021-09-09

## 2021-09-09 RX ORDER — LIDOCAINE HCL/PF 100 MG/5ML
SYRINGE (ML) INJECTION PRN
Status: DISCONTINUED | OUTPATIENT
Start: 2021-09-09 | End: 2021-09-09 | Stop reason: SDUPTHER

## 2021-09-09 RX ORDER — GLYCOPYRROLATE 1 MG/5 ML
SYRINGE (ML) INTRAVENOUS PRN
Status: DISCONTINUED | OUTPATIENT
Start: 2021-09-09 | End: 2021-09-09 | Stop reason: SDUPTHER

## 2021-09-09 RX ORDER — OXYCODONE HYDROCHLORIDE AND ACETAMINOPHEN 5; 325 MG/1; MG/1
TABLET ORAL
Status: DISPENSED
Start: 2021-09-09 | End: 2021-09-10

## 2021-09-09 RX ORDER — FENTANYL CITRATE 50 UG/ML
50 INJECTION, SOLUTION INTRAMUSCULAR; INTRAVENOUS PRN
Status: COMPLETED | OUTPATIENT
Start: 2021-09-09 | End: 2021-09-09

## 2021-09-09 RX ORDER — ROCURONIUM BROMIDE 10 MG/ML
INJECTION, SOLUTION INTRAVENOUS PRN
Status: DISCONTINUED | OUTPATIENT
Start: 2021-09-09 | End: 2021-09-09 | Stop reason: SDUPTHER

## 2021-09-09 RX ORDER — FENTANYL CITRATE 50 UG/ML
INJECTION, SOLUTION INTRAMUSCULAR; INTRAVENOUS PRN
Status: DISCONTINUED | OUTPATIENT
Start: 2021-09-09 | End: 2021-09-09 | Stop reason: SDUPTHER

## 2021-09-09 RX ORDER — LABETALOL 20 MG/4 ML (5 MG/ML) INTRAVENOUS SYRINGE
10 ONCE
Status: COMPLETED | OUTPATIENT
Start: 2021-09-09 | End: 2021-09-09

## 2021-09-09 RX ORDER — LABETALOL 20 MG/4 ML (5 MG/ML) INTRAVENOUS SYRINGE
Status: DISPENSED
Start: 2021-09-09 | End: 2021-09-10

## 2021-09-09 RX ADMIN — DEXAMETHASONE SODIUM PHOSPHATE 10 MG: 10 INJECTION, EMULSION INTRAMUSCULAR; INTRAVENOUS at 12:55

## 2021-09-09 RX ADMIN — FENTANYL CITRATE 50 MCG: 0.05 INJECTION, SOLUTION INTRAMUSCULAR; INTRAVENOUS at 15:15

## 2021-09-09 RX ADMIN — Medication 100 MG: at 12:45

## 2021-09-09 RX ADMIN — FENTANYL CITRATE 50 MCG: 0.05 INJECTION, SOLUTION INTRAMUSCULAR; INTRAVENOUS at 15:35

## 2021-09-09 RX ADMIN — Medication 4 MG: at 14:47

## 2021-09-09 RX ADMIN — PROPOFOL 150 MG: 10 INJECTION, EMULSION INTRAVENOUS at 12:45

## 2021-09-09 RX ADMIN — SODIUM CHLORIDE: 9 INJECTION, SOLUTION INTRAVENOUS at 12:00

## 2021-09-09 RX ADMIN — LABETALOL 20 MG/4 ML (5 MG/ML) INTRAVENOUS SYRINGE 5 MG: at 15:10

## 2021-09-09 RX ADMIN — KETOROLAC TROMETHAMINE 15 MG: 30 INJECTION, SOLUTION INTRAMUSCULAR; INTRAVENOUS at 17:19

## 2021-09-09 RX ADMIN — KETOROLAC TROMETHAMINE 15 MG: 30 INJECTION, SOLUTION INTRAMUSCULAR; INTRAVENOUS at 21:22

## 2021-09-09 RX ADMIN — DEXAMETHASONE SODIUM PHOSPHATE 4 MG: 4 INJECTION, SOLUTION INTRA-ARTICULAR; INTRALESIONAL; INTRAMUSCULAR; INTRAVENOUS; SOFT TISSUE at 19:27

## 2021-09-09 RX ADMIN — TRANEXAMIC ACID 1000 MG: 100 INJECTION, SOLUTION INTRAVENOUS at 13:37

## 2021-09-09 RX ADMIN — FENTANYL CITRATE 50 MCG: 0.05 INJECTION, SOLUTION INTRAMUSCULAR; INTRAVENOUS at 15:08

## 2021-09-09 RX ADMIN — FENTANYL CITRATE 100 MCG: 50 INJECTION, SOLUTION INTRAMUSCULAR; INTRAVENOUS at 12:45

## 2021-09-09 RX ADMIN — OXYCODONE HYDROCHLORIDE AND ACETAMINOPHEN 1 TABLET: 5; 325 TABLET ORAL at 15:49

## 2021-09-09 RX ADMIN — CEFAZOLIN 2000 MG: 10 INJECTION, POWDER, FOR SOLUTION INTRAVENOUS; PARENTERAL at 12:55

## 2021-09-09 RX ADMIN — FENTANYL CITRATE 50 MCG: 0.05 INJECTION, SOLUTION INTRAMUSCULAR; INTRAVENOUS at 15:28

## 2021-09-09 RX ADMIN — ONDANSETRON 4 MG: 2 INJECTION INTRAMUSCULAR; INTRAVENOUS at 22:36

## 2021-09-09 RX ADMIN — FENTANYL CITRATE 100 MCG: 50 INJECTION, SOLUTION INTRAMUSCULAR; INTRAVENOUS at 13:32

## 2021-09-09 RX ADMIN — HYDROCODONE BITARTRATE AND ACETAMINOPHEN 1 TABLET: 5; 325 TABLET ORAL at 19:39

## 2021-09-09 RX ADMIN — ONDANSETRON HYDROCHLORIDE 4 MG: 4 INJECTION, SOLUTION INTRAMUSCULAR; INTRAVENOUS at 14:44

## 2021-09-09 RX ADMIN — SODIUM CHLORIDE: 9 INJECTION, SOLUTION INTRAVENOUS at 14:54

## 2021-09-09 RX ADMIN — Medication 0.6 MG: at 14:47

## 2021-09-09 RX ADMIN — ROCURONIUM BROMIDE 50 MG: 10 INJECTION INTRAVENOUS at 12:45

## 2021-09-09 ASSESSMENT — PULMONARY FUNCTION TESTS
PIF_VALUE: 18
PIF_VALUE: 21
PIF_VALUE: 20
PIF_VALUE: 18
PIF_VALUE: 18
PIF_VALUE: 0
PIF_VALUE: 20
PIF_VALUE: 18
PIF_VALUE: 20
PIF_VALUE: 19
PIF_VALUE: 18
PIF_VALUE: 20
PIF_VALUE: 23
PIF_VALUE: 21
PIF_VALUE: 20
PIF_VALUE: 20
PIF_VALUE: 22
PIF_VALUE: 20
PIF_VALUE: 19
PIF_VALUE: 20
PIF_VALUE: 18
PIF_VALUE: 18
PIF_VALUE: 20
PIF_VALUE: 20
PIF_VALUE: 19
PIF_VALUE: 18
PIF_VALUE: 21
PIF_VALUE: 20
PIF_VALUE: 1
PIF_VALUE: 0
PIF_VALUE: 18
PIF_VALUE: 21
PIF_VALUE: 20
PIF_VALUE: 19
PIF_VALUE: 21
PIF_VALUE: 30
PIF_VALUE: 20
PIF_VALUE: 20
PIF_VALUE: 19
PIF_VALUE: 20
PIF_VALUE: 19
PIF_VALUE: 22
PIF_VALUE: 22
PIF_VALUE: 25
PIF_VALUE: 19
PIF_VALUE: 38
PIF_VALUE: 20
PIF_VALUE: 18
PIF_VALUE: 18
PIF_VALUE: 20
PIF_VALUE: 19
PIF_VALUE: 17
PIF_VALUE: 20
PIF_VALUE: 21
PIF_VALUE: 21
PIF_VALUE: 20
PIF_VALUE: 0
PIF_VALUE: 20
PIF_VALUE: 16
PIF_VALUE: 20
PIF_VALUE: 18
PIF_VALUE: 18
PIF_VALUE: 21
PIF_VALUE: 18
PIF_VALUE: 20
PIF_VALUE: 20
PIF_VALUE: 21
PIF_VALUE: 20
PIF_VALUE: 25
PIF_VALUE: 18
PIF_VALUE: 18
PIF_VALUE: 21
PIF_VALUE: 18
PIF_VALUE: 19
PIF_VALUE: 18
PIF_VALUE: 22
PIF_VALUE: 19
PIF_VALUE: 21
PIF_VALUE: 20
PIF_VALUE: 2
PIF_VALUE: 20
PIF_VALUE: 21
PIF_VALUE: 17
PIF_VALUE: 21
PIF_VALUE: 6
PIF_VALUE: 2
PIF_VALUE: 20
PIF_VALUE: 21
PIF_VALUE: 20
PIF_VALUE: 2
PIF_VALUE: 20
PIF_VALUE: 18
PIF_VALUE: 18
PIF_VALUE: 19
PIF_VALUE: 21
PIF_VALUE: 21
PIF_VALUE: 18
PIF_VALUE: 20
PIF_VALUE: 20
PIF_VALUE: 19
PIF_VALUE: 19
PIF_VALUE: 23
PIF_VALUE: 21
PIF_VALUE: 20
PIF_VALUE: 26
PIF_VALUE: 20
PIF_VALUE: 27
PIF_VALUE: 18
PIF_VALUE: 19
PIF_VALUE: 20
PIF_VALUE: 23
PIF_VALUE: 20
PIF_VALUE: 21
PIF_VALUE: 18
PIF_VALUE: 18
PIF_VALUE: 1
PIF_VALUE: 21
PIF_VALUE: 18
PIF_VALUE: 20
PIF_VALUE: 21
PIF_VALUE: 20
PIF_VALUE: 20
PIF_VALUE: 4
PIF_VALUE: 18
PIF_VALUE: 18
PIF_VALUE: 20
PIF_VALUE: 18
PIF_VALUE: 22
PIF_VALUE: 21
PIF_VALUE: 21

## 2021-09-09 ASSESSMENT — PAIN DESCRIPTION - PAIN TYPE: TYPE: ACUTE PAIN

## 2021-09-09 ASSESSMENT — PAIN SCALES - GENERAL
PAINLEVEL_OUTOF10: 7
PAINLEVEL_OUTOF10: 10
PAINLEVEL_OUTOF10: 7
PAINLEVEL_OUTOF10: 10
PAINLEVEL_OUTOF10: 6
PAINLEVEL_OUTOF10: 10
PAINLEVEL_OUTOF10: 7
PAINLEVEL_OUTOF10: 10
PAINLEVEL_OUTOF10: 7
PAINLEVEL_OUTOF10: 8
PAINLEVEL_OUTOF10: 10

## 2021-09-09 ASSESSMENT — PAIN DESCRIPTION - LOCATION: LOCATION: ABDOMEN

## 2021-09-09 ASSESSMENT — PAIN - FUNCTIONAL ASSESSMENT: PAIN_FUNCTIONAL_ASSESSMENT: 0-10

## 2021-09-09 ASSESSMENT — PAIN DESCRIPTION - DESCRIPTORS: DESCRIPTORS: ACHING

## 2021-09-09 NOTE — H&P
Elyse Chamberlain is a 61 y.o.2  male, a never smoker tobacco and nonuser of alcohol with a medical history significant for seizure disorder, headache (migraine), anxiety and depression, chronic back pain, GERD, ALICIA, pneumonia, cardiac catheterization in 2017 and pulmonary embolism in 2021 who presents to our service as referral from pain management specialist Dr. Leonel Barragan who recently conducted a successful spinal cord stimulator trial utilizing Portapure device on 7/8/2021. She enjoyed significant improvement in back pain following the trial with excellent coverage and relief noted. The trial was initiated to address low back and mid back bilateral SI pain along with bilateral lower extremity pain. Double back surgeries and injections without relief prompted the trial.  He described his pain as sharp shooting stabbing increased with walking bending lifting twisting or turning. Exacerbated his pain level was estimated at a 9 out of 10 with an average of 5 out of 10 noted. Latest MRI of the lumbar spine reveals postoperative changes between L3 and S1 with prior instrumentation removal from L3-S1 along with laminectomy defects. Multiple level degenerative changes were also noted. Additionally, a left inflamed bursa is noted at the elbow, absent fever. Past Medical History:   Diagnosis Date    Back pain     Depression     GERD (gastroesophageal reflux disease)     Headache(784.0) 9/22/2013    Migraines     ALICIA treated with BiPAP 2013    doesn't wear Bipap    Pneumonia     Pulmonary embolism ([address] 2/25/2021    S/P cardiac catheterization: 7/31/2017: No obstructive lasions. 7/31/2017 7/31/2017: No obstructive lasions. Dr. Toni Patel Seizures [facility])        Allergies:    Allergies   Allergen Reactions    Bee Venom Anaphylaxis    Lisinopril Swelling    Dilaudid [Hydromorphone Hcl] Nausea And Vomiting and Rash    Nitrofuran Derivatives Nausea And Vomiting     Severe headache    Normocephalic and atraumatic.      Right Ear: External ear normal.      Left Ear: External ear normal.      Nose: Nose normal.      Mouth/Throat:      Pharynx: No oropharyngeal exudate. Eyes:      General: No scleral icterus.        Right eye: No discharge.         Left eye: No discharge.      Conjunctiva/sclera: Conjunctivae normal.      Pupils: Pupils are equal, round, and reactive to light. Neck:      Musculoskeletal: Neck supple. Decreased range of motion. No edema, erythema, neck rigidity or muscular tenderness.      Thyroid: No thyromegaly.      Comments: CERVICAL  FUSION 2014  Cardiovascular:      Rate and Rhythm: Normal rate and regular rhythm.      Heart sounds: Normal heart sounds. No murmur. No friction rub. No gallop.    Pulmonary:      Effort: Pulmonary effort is normal. No respiratory distress.      Breath sounds: Normal breath sounds. No wheezing or rales. Chest:      Chest wall: No tenderness. Abdominal:      General: Bowel sounds are normal. There is no distension.      Palpations: Abdomen is soft.      Tenderness: There is no abdominal tenderness. There is no guarding or rebound.    Musculoskeletal:         General: Tenderness present.      Right shoulder: He exhibits decreased range of motion and tenderness.      Right hip: He exhibits decreased range of motion, decreased strength, tenderness and bony tenderness.      Left hip: He exhibits decreased range of motion, decreased strength, tenderness and bony tenderness.      Right knee: He exhibits decreased range of motion, swelling and bony tenderness. Tenderness found.      Cervical back: He exhibits decreased range of motion and tenderness.      Thoracic back: He exhibits tenderness, bony tenderness, pain and spasm.      Lumbar back: He exhibits decreased range of motion, tenderness, bony tenderness, pain and spasm.      Right upper leg: He exhibits tenderness.      Left upper leg: He exhibits tenderness.      Comments: H/o cervical surgery no pain but limited ROM  same with Right shoulder    Skin:     General: Skin is warm.      Coloration: Skin is not pale.      Findings: No erythema or rash. Neurological:      Mental Status: He is alert and oriented to person, place, and time. He is not disoriented.      Cranial Nerves: No cranial nerve deficit.      Sensory: No sensory deficit.      Motor: Weakness present. No atrophy or abnormal muscle tone.      Coordination: Coordination normal.      Gait: Gait abnormal.      Deep Tendon Reflexes: Babinski sign absent on the right side.      Reflex Scores:       Tricep reflexes are 2+ on the right side and 2+ on the left side.       Bicep reflexes are 2+ on the right side and 2+ on the left side.       Brachioradialis reflexes are 2+ on the right side and 2+ on the left side.       Patellar reflexes are 1+ on the right side and 2+ on the left side.       Achilles reflexes are 1+ on the right side and 2+ on the left side.     Comments: 4/5 RUE 3/5 RLE   SLR + RLE   Psychiatric:         Attention and Perception: Attention and perception normal. He is attentive.         Mood and Affect: Mood and affect normal. Mood is not anxious or depressed. Affect is not labile, blunt, angry or inappropriate.         Speech: Speech normal. He is communicative. Speech is not rapid and pressured, delayed, slurred or tangential.         Behavior: Behavior normal. Behavior is not agitated, slowed, aggressive, withdrawn, hyperactive or combative.         Thought Content: Thought content normal. Thought content is not paranoid or delusional. Thought content does not include homicidal or suicidal ideation. Thought content does not include homicidal or suicidal plan.         Cognition and Memory: Cognition and memory normal. Memory is not impaired.  He does not exhibit impaired recent memory or impaired remote memory.         Judgment: [place] is not impulsive or inappropriate.      Comments: MDD     Assessment:  Chronic pain syndrome    Plan:  Based on his history and evaluation having recently undergone a successful spinal cord stimulator trial with excellent coverage and relief we feel it is reasonable to offer surgical intervention in the form of a thoracic laminectomy for placement of permanent spinal cord stimulator and left flank internal pulse generator. We described the procedure in detail along with expectations for recovery and the associated risks which include, but are not limited to; bleeding, infection, anesthetic complications, neurologic injury, incomplete relief, and even death. Understanding the risks associated the procedure he is amicable to moving forward and a consent was offered for his signature with surgery scheduled at [place] [id number] with Dr. Anna Castro for 9/9/2021.     Maggie Carranza PA-C  9/9/2021

## 2021-09-09 NOTE — ANESTHESIA PRE PROCEDURE
Department of Anesthesiology  Preprocedure Note       Name:  Nish Thacker   Age:  61 y.o.  :  1955                                          MRN:  [id number]         Date:  2021      Surgeon: Holly Sarkar):  Hayde Ramirez MD    Procedure: Procedure(s):  THORACIC LAMINECTOMY FOR PLACEMENT OF PERMANENT SPINAL CORD STMULATOR AND LEFT FLANK INTERNAL PULSE GENERATOR    Medications prior to admission:   Prior to Admission medications    Medication Sig Start Date End Date Taking? Authorizing Provider   aspirin-acetaminophen-caffeine (EXCEDRIN MIGRAINE) 093-187-95 MG per tablet Take 1 tablet by mouth every 6 hours as needed for Headaches    Historical Provider, MD   omeprazole (PRILOSEC) 40 MG delayed release capsule Take 1 capsule by mouth daily 21   YUSEF Jefferson CNP   sertraline (ZOLOFT) 100 MG tablet Take 2 tablets by mouth daily 21   YUSEF Jefferson CNP   EPINEPHrine (EPIPEN) 0.3 MG/0.3ML SOAJ injection Inject 0.3 mg into the muscle as needed Use as directed for allergic reaction    Historical Provider, MD   lamoTRIgine (LAMICTAL) 200 MG tablet TAKE 1 TABLET BY MOUTH TWO  TIMES DAILY 19   Doug Perea MD   divalproex (DEPAKOTE ER) 500 MG extended release tablet Take by mouth daily 3 tab    Historical Provider, MD   butorphanol (STADOL) 10 MG/ML nasal spray 1 spray by Nasal route every 4 hours as needed for Pain    Historical Provider, MD       Current medications:    No current facility-administered medications for this visit. No current outpatient medications on file.      Facility-Administered Medications Ordered in Other Visits   Medication Dose Route Frequency Provider Last Rate Last Admin    0.9 % sodium chloride infusion   IntraVENous Continuous Lovely Obregon PA-C 100 mL/hr at 21 1200 New Bag at 21 1200    sodium chloride flush 0.9 % injection 5-40 mL  5-40 mL IntraVENous 2 times per day Lovely Obregon PA-C        sodium chloride flush 0.9 % injection 5-40 mL  5-40 mL IntraVENous PRN Andreas Quintero PA-C        0.9 % sodium chloride infusion  25 mL IntraVENous PRN Adnreas Quintero PA-C        ceFAZolin (ANCEF) 2000 mg in dextrose 5 % 50 mL IVPB  2,000 mg IntraVENous On Call to Alondra Dunbar PA-C           Allergies: Allergies   Allergen Reactions    Bee Venom Anaphylaxis    Lisinopril Swelling    Dilaudid [Hydromorphone Hcl] Nausea And Vomiting and Rash    Nitrofuran Derivatives Nausea And Vomiting     Severe headache    Nitroglycerin Nausea And Vomiting     migraine       Problem List:    Patient Active Problem List   Diagnosis Code    Headache R51.9    SOB (shortness of breath) R06.02    Dyspnea R06.00    LV dysfunction I51.9    Non-restorative sleep G47.8    Snoring R06.83    Obesity (BMI 30-39. 9) E66.9    Physical deconditioning R53.81    Obstructive sleep apnea of adult G47.33    Restless sleeper G47.9    Intractable headache R51.9    Seizure disorder (HCC) G40.909    Benign hypertension I10    Nausea & vomiting R11.2    Leukocytosis D72.829    Shortness of breath R06.02    Acute chest pain R07.9    Coronary artery disease involving native coronary artery without angina pectoris I25.10    Gastroesophageal reflux disease without esophagitis K21.9    ALICIA treated with BiPAP G47.33    Intractable migraine with aura without status migrainosus G43.119    Ventral hernia without obstruction or gangrene K43.9    Moderate episode of recurrent major depressive disorder ([facility] [address] F33.1    Unstable angina (HCC) I20.0    S/P cardiac catheterization: 7/31/2017: No obstructive lasions.  Z98.890    Bradycardia, drug induced R00.1, T50.905A    Cervical spinal stenosis M48.02    Angina pectoris, unspecified (HCC) I20.9    Lumbar post-laminectomy syndrome M96.1    Lumbar radiculitis M54.16    Chronic pain syndrome G89.4       Past Medical History:        Diagnosis Date    Back pain     Depression     GERD BMI:   Wt Readings from Last 3 Encounters:   09/09/21 208 lb (94.3 kg)   09/02/21 214 lb 1.1 oz (97.1 kg)   08/19/21 214 lb (97.1 kg)     There is no height or weight on file to calculate BMI.    CBC:   Lab Results   Component Value Date    WBC 8.4 09/02/2021    RBC 5.13 09/02/2021    RBC 4.75 05/04/2012    HGB 16.1 09/02/2021    HCT 49.0 09/02/2021    MCV 95.5 09/02/2021    RDW 14.7 07/01/2020     09/02/2021       CMP:   Lab Results   Component Value Date     09/02/2021    K 5.1 09/02/2021     09/02/2021    CO2 26 09/02/2021    BUN 13 09/02/2021    CREATININE 0.9 09/02/2021    LABGLOM 85 09/02/2021    GLUCOSE 87 09/02/2021    GLUCOSE 103 05/09/2012    PROT 7.7 03/19/2021    CALCIUM 9.5 09/02/2021    BILITOT 0.3 03/19/2021    ALKPHOS 72 03/19/2021    AST 16 03/19/2021    ALT 12 03/19/2021       POC Tests: No results for input(s): POCGLU, POCNA, POCK, POCCL, POCBUN, POCHEMO, POCHCT in the last 72 hours.     Coags:   Lab Results   Component Value Date    INR 0.98 09/02/2021    APTT 30.0 09/02/2021       HCG (If Applicable): No results found for: PREGTESTUR, PREGSERUM, HCG, HCGQUANT     ABGs: No results found for: PHART, PO2ART, BRY8MTF, WNA7YPW, BEART, C3EYLDIS     Type & Screen (If Applicable):  Lab Results   Component Value Date    LABRH NEG 03/13/2020       Drug/Infectious Status (If Applicable):  Lab Results   Component Value Date    HEPCAB Negative 03/18/2019       COVID-19 Screening (If Applicable): No results found for: COVID19        Anesthesia Evaluation  Patient summary reviewed no history of anesthetic complications:   Airway: Mallampati: II  TM distance: >3 FB   Neck ROM: full  Mouth opening: > = 3 FB Dental:    (+) edentulous      Pulmonary:normal exam    (+) sleep apnea:      (-) COPD and asthma                           Cardiovascular:  Exercise tolerance: good (>4 METS),   (+) hypertension: no interval change,         Rhythm: regular  Rate: normal                 ROS comment: Stress 11/2020  Imaging Results:Calculated gated LVEF 51 %.  small apical fixed defect possible attenuation vs small infarct  no obvious ischemia    Echo 11/20   Summary   Ejection fraction is visually estimated at 45%-50%   There was mild global hypokinesis of the left ventricle. Neuro/Psych:   (+) seizures (last sz 3 years ago): well controlled, headaches: migraine headaches,             GI/Hepatic/Renal:   (+) GERD: well controlled,      (-) liver disease and no renal disease       Endo/Other:        (-) diabetes mellitus, hypothyroidism, hyperthyroidism               Abdominal:   (+) obese,           Vascular:     - DVT and PE. Other Findings:               Anesthesia Plan      general     ASA 3     (GETA. PIV. Additional access can be obtained after induction if needed. Standard ASA monitors. IV/PO opioids and other adjuncts as needed for pain control. PACU post op for recovery. Possible anesthetics complications were discussed with the patient, including but not limited to: PONV, damage to the airway and surrounding structures (teeth, lips, gums, tongue, etc.), adverse reactions to medicine, cardiac complications (MI, CHF, arrhythmias, etc.), respiratory complications (post-op ventilation, respiratory failure, etc.), neurologic complications (nerve damage, stroke, seizure), and death. The patient was given the opportunity to ask questions and all questions were answered to the patient's satisfaction. The patient is in agreement with the anesthetic plan.  )  Induction: intravenous. Anesthetic plan and risks discussed with patient. Plan discussed with CRNA.                   Antonia Hoang DO   9/9/2021

## 2021-09-09 NOTE — OP NOTE
[address]    Patient name: Marco A Jordan  Medical Record Number: [id number]  Account Number: [de-identified]      Date of Procedure: 9/9/2021    Pre-operative Diagnosis:   · Chronic pain disorder. · Postlaminectomy syndrome. · Failed back syndrome. · Chronic back pain that goes to both legs. · S/p successful spinal cord stimulator trial.    Post-operative Diagnosis: The same. PROCEDURE:      -Thoracic spine  laminectomy (T9)  -Placement of permanent paddle of spinal cord stimulator at level of T7-T8 [employer]). - Placement of battery (pulse generator ) of spinal cord stimulator in left flank area. Anesthesia: General endotracheal     Surgeon:  Mandy Choudhary MD   Assistant: Zhang Walter PA-C     Estimated Blood Loss: minimal       Drains: None      Blood Transfusions: None      Complications: none immediately appreciated     Specimens:  None      Indications for Procedure: This is a 62-year-old male who has a long history of progressive severe back pain that goes to both legs and history of a previous multiple lumbar spine surgery s who underwent a successful spinal cord stimulator trial by Dr. Zay Jason (pain medicine). During patient spinal cord stimulator trial he experienced at up to 100% reduction and relief  pain. He was happy with the results of her spinal cord stimulator trial and he wanted to proceed with permanent placement of spinal cord stimulator system (paddle and battery). For this reason patient is referred to neurosurgery. I discussed with patient  And his wife this recommended surgical intervention (thoracic spine laminectomy and placement of spinal cord stimulator system, paddle and battery)  in detail including the associated risks and benefits, as well as, the alternative treatment options. All questions were answered. Patient elected to proceed with this recommended surgical intervention.       SURGERY IN DETAIL:       The patient was brought to the OR, and  he  was placed  supine initially in his OR table. Then, general anesthesia was inducted;  then IV line, Col[place] catheter were inserted and maintained. Next, we  flipped the patient on [facility] table in supine position. Then, we  draped and prepped the patient in the standard fashion. Next, we started  to localize the spinal level using the fluoro, estimating at the  level of the lamina of T9. Then, we did skin incision all the way down to reach  the space between  T9 and T10. Then, we did another confirmation by fluoro  For that level, then I proceeded to do laminectomy for the lower portion of  T9 lamina. Next, we brought the spinal cord stimulator  stimulator paddle and we advanced it to approximately the level of T7 and the T8. Next, as we satisfied with the  location of the paddle confirmed with the intraoperative fluoroscopy images, we anchored the paddle and fixed in its position. Then, we tunneled the leads of the paddle to the right flank area where we  made a pocket and we inserted the neurostimulator battery in that pocket. Next, we connected  The battery to leads. Next, we did another check for the impendences and as  impendence appeared to be okay, we proceeded with performing copious irrigation  to all the skin incisions. Then, we closed the skin incisions in the  standard fashion after applying a vancomycin powder. Patient tolerated the surgery very well without  intraoperative complications. At the end of surgery, patient was turned  back in his bed. Then, she was extubated and then he was transferred to the  PACU for further observation and treatment. Mariel Dickson PA-C ( Neurosurgery PA) assisted throughout the procedure with positioning, draping, retraction, wound closure, and dressing application.     Mike Painting MD, MD  Electronically signed by me on 9/9/2021 at 3:55 PM

## 2021-09-09 NOTE — PROGRESS NOTES
At patients bedsides for vitals, patient moaning, respirations labored, patient moaning and states he is having pain 10/10 to abdomen around belly button, also states he is having trouble lifting right arm without excruciating pain radiating to back. Patient begins shaking uncontrollably, more so in the left leg, patient does no lose conciousness. Hand grasp moderate and equal, no n/t. Pedal push and pull moderate and equal. Dr. Thony Baker notified at this time, no recommendations or orders at this time. Vivi Morris with Dr. Skyler Wei notified, advised to call Dr. Skyler Wei. Skyler Wei states nothing surgically could have affected that abdomen, order for bladder scan received and straight cath if needed, 15mg toradol also ordered. Dr. Skyler Wei would like patient stay for a couple hours, will have Vivi Morris re-evaluate around 7. May admit for observation if no improvement.

## 2021-09-09 NOTE — PROGRESS NOTES
1500 PT arrived to PACU, VSS, SBP > 180  1505 pt c/o 7/10 pain given fentanyl 50 mcg  1510 labetalol 5 mg given for SBP > 160  1515 pt c/o 7/10 pain, fentanyl 50 mcg given  1525 pt c/o 10/10 pain, fentanyll 50 mcg given  1535 pt c/o 7/10 pain, fentanyl 50 mcg given  1550 pt c/o 6/10 pain, percocet 1 tab given  1610 pt meets d/c criteria, transferred to [facility]

## 2021-09-09 NOTE — PROGRESS NOTES
Patient was seen and examined in the pre op area in conjunction with neurosurgery PA Avelina Baumgarten PA-C). This is a 72-year-old male who underwent a successful spinal cord stimulator trial per Dr. Cayetano Bray on 7/ 8/2021. Patient was happy with the trial and he wants to proceed with permanent placement of spinal cord stimulator paddle and battery. There are no changes in patient symptoms and neurological exam since the last time we saw him neurosurgery outpatient clinic. Today I discussed with patient and his family (wife) again today planned/recommended surgical intervention as well as the associated risk and benefit and alternative. All questions and concerns were addressed and answered. Patient elected again to go with today planned surgical intervention.

## 2021-09-09 NOTE — PROGRESS NOTES
Patient admitted to [facility] 09. Abdomen clipped. Consent signed. Patient in bed. Orientated to room. Call light in reach.

## 2021-09-09 NOTE — PROGRESS NOTES
Neurosurgery Interim Progress Note    Patient:  Bibiana Lamb      Unit/Bed:[facility] OR (General) POOL R*    YOB: 1955    MRN: [id number]     Acct: [de-identified]     Admit date: 9/9/2021    No chief complaint on file. Patient Seen, Chart, Physician notes, Labs, Radiology studies reviewed. The patient is seen and evaluated in the post-operative recovery setting at 3.5 hours post op from thoracic laminectomy and placement of a [employer] Spinal Cord Stimulator and left flank IPG performed by Dr. Horace Hendricks. Patient is complaining of abdominal pain along with pain exaccerbated with ritht arm extension. Incisions are inspected and are flat and dry with no tenderness to palpation. Patient recommendation and treatment plan from a neurosurgical perspective at this time:    Neurosurgery has turned the device to the off position and will admit the patient for observation. Neurosurgery anticipates a discharge home tomorrow if symptoms are improved.        Electronically signed by Brittney Patino PA-C on 9/9/2021 at 6:06 PM

## 2021-09-10 ENCOUNTER — APPOINTMENT (OUTPATIENT)
Dept: CT IMAGING | Age: 66
DRG: 981 | End: 2021-09-10
Attending: NEUROLOGICAL SURGERY
Payer: MEDICARE

## 2021-09-10 PROBLEM — M48.07 LUMBOSACRAL SPINAL STENOSIS: Status: ACTIVE | Noted: 2021-09-10

## 2021-09-10 LAB
ALBUMIN SERPL-MCNC: 4.1 G/DL (ref 3.5–5.1)
ALP BLD-CCNC: 79 U/L (ref 38–126)
ALT SERPL-CCNC: 19 U/L (ref 11–66)
ANION GAP SERPL CALCULATED.3IONS-SCNC: 11 MEQ/L (ref 8–16)
AST SERPL-CCNC: 29 U/L (ref 5–40)
BASOPHILS # BLD: 0.2 %
BASOPHILS ABSOLUTE: 0 THOU/MM3 (ref 0–0.1)
BILIRUB SERPL-MCNC: 0.3 MG/DL (ref 0.3–1.2)
BUN BLDV-MCNC: 19 MG/DL (ref 7–22)
CALCIUM SERPL-MCNC: 8.4 MG/DL (ref 8.5–10.5)
CHLORIDE BLD-SCNC: 106 MEQ/L (ref 98–111)
CO2: 24 MEQ/L (ref 23–33)
CREAT SERPL-MCNC: 0.8 MG/DL (ref 0.4–1.2)
EOSINOPHIL # BLD: 0 %
EOSINOPHILS ABSOLUTE: 0 THOU/MM3 (ref 0–0.4)
ERYTHROCYTE [DISTWIDTH] IN BLOOD BY AUTOMATED COUNT: 13.6 % (ref 11.5–14.5)
ERYTHROCYTE [DISTWIDTH] IN BLOOD BY AUTOMATED COUNT: 48.4 FL (ref 35–45)
GFR SERPL CREATININE-BSD FRML MDRD: > 90 ML/MIN/1.73M2
GLUCOSE BLD-MCNC: 121 MG/DL (ref 70–108)
HCT VFR BLD CALC: 44.5 % (ref 42–52)
HEMOGLOBIN: 14.1 GM/DL (ref 14–18)
IMMATURE GRANS (ABS): 0.08 THOU/MM3 (ref 0–0.07)
IMMATURE GRANULOCYTES: 0.6 %
LACTIC ACID: 1 MMOL/L (ref 0.5–2)
LYMPHOCYTES # BLD: 10.6 %
LYMPHOCYTES ABSOLUTE: 1.5 THOU/MM3 (ref 1–4.8)
MCH RBC QN AUTO: 30.9 PG (ref 26–33)
MCHC RBC AUTO-ENTMCNC: 31.7 GM/DL (ref 32.2–35.5)
MCV RBC AUTO: 97.6 FL (ref 80–94)
MONOCYTES # BLD: 12.4 %
MONOCYTES ABSOLUTE: 1.7 THOU/MM3 (ref 0.4–1.3)
NUCLEATED RED BLOOD CELLS: 0 /100 WBC
PLATELET # BLD: 403 THOU/MM3 (ref 130–400)
PMV BLD AUTO: 10.4 FL (ref 9.4–12.4)
POTASSIUM SERPL-SCNC: 4.3 MEQ/L (ref 3.5–5.2)
RBC # BLD: 4.56 MILL/MM3 (ref 4.7–6.1)
SEG NEUTROPHILS: 76.2 %
SEGMENTED NEUTROPHILS ABSOLUTE COUNT: 10.6 THOU/MM3 (ref 1.8–7.7)
SODIUM BLD-SCNC: 141 MEQ/L (ref 135–145)
TOTAL PROTEIN: 7.1 G/DL (ref 6.1–8)
WBC # BLD: 13.9 THOU/MM3 (ref 4.8–10.8)

## 2021-09-10 PROCEDURE — 96375 TX/PRO/DX INJ NEW DRUG ADDON: CPT

## 2021-09-10 PROCEDURE — 6360000002 HC RX W HCPCS: Performed by: SURGERY

## 2021-09-10 PROCEDURE — 6370000000 HC RX 637 (ALT 250 FOR IP): Performed by: FAMILY MEDICINE

## 2021-09-10 PROCEDURE — 85025 COMPLETE CBC W/AUTO DIFF WBC: CPT

## 2021-09-10 PROCEDURE — 99203 OFFICE O/P NEW LOW 30 MIN: CPT | Performed by: FAMILY MEDICINE

## 2021-09-10 PROCEDURE — 6360000004 HC RX CONTRAST MEDICATION: Performed by: SURGERY

## 2021-09-10 PROCEDURE — 83605 ASSAY OF LACTIC ACID: CPT

## 2021-09-10 PROCEDURE — 36415 COLL VENOUS BLD VENIPUNCTURE: CPT

## 2021-09-10 PROCEDURE — 6360000002 HC RX W HCPCS: Performed by: PHYSICIAN ASSISTANT

## 2021-09-10 PROCEDURE — 1200000000 HC SEMI PRIVATE

## 2021-09-10 PROCEDURE — 96374 THER/PROPH/DIAG INJ IV PUSH: CPT

## 2021-09-10 PROCEDURE — 6360000004 HC RX CONTRAST MEDICATION: Performed by: FAMILY MEDICINE

## 2021-09-10 PROCEDURE — 74178 CT ABD&PLV WO CNTR FLWD CNTR: CPT

## 2021-09-10 PROCEDURE — 96372 THER/PROPH/DIAG INJ SC/IM: CPT

## 2021-09-10 PROCEDURE — 99024 POSTOP FOLLOW-UP VISIT: CPT | Performed by: NEUROLOGICAL SURGERY

## 2021-09-10 PROCEDURE — 99222 1ST HOSP IP/OBS MODERATE 55: CPT | Performed by: SURGERY

## 2021-09-10 PROCEDURE — 6360000002 HC RX W HCPCS

## 2021-09-10 PROCEDURE — 74174 CTA ABD&PLVS W/CONTRAST: CPT

## 2021-09-10 PROCEDURE — 2580000003 HC RX 258: Performed by: FAMILY MEDICINE

## 2021-09-10 PROCEDURE — 6370000000 HC RX 637 (ALT 250 FOR IP): Performed by: PHYSICIAN ASSISTANT

## 2021-09-10 PROCEDURE — 6360000002 HC RX W HCPCS: Performed by: FAMILY MEDICINE

## 2021-09-10 PROCEDURE — 80053 COMPREHEN METABOLIC PANEL: CPT

## 2021-09-10 PROCEDURE — 96376 TX/PRO/DX INJ SAME DRUG ADON: CPT

## 2021-09-10 PROCEDURE — APPSS30 APP SPLIT SHARED TIME 16-30 MINUTES: Performed by: PHYSICIAN ASSISTANT

## 2021-09-10 PROCEDURE — 6370000000 HC RX 637 (ALT 250 FOR IP): Performed by: SURGERY

## 2021-09-10 RX ORDER — DICYCLOMINE HYDROCHLORIDE 10 MG/ML
10 INJECTION INTRAMUSCULAR 4 TIMES DAILY PRN
Status: DISCONTINUED | OUTPATIENT
Start: 2021-09-10 | End: 2021-09-15 | Stop reason: HOSPADM

## 2021-09-10 RX ORDER — LORAZEPAM 2 MG/ML
1 INJECTION INTRAMUSCULAR EVERY 6 HOURS PRN
Status: DISCONTINUED | OUTPATIENT
Start: 2021-09-10 | End: 2021-09-15 | Stop reason: HOSPADM

## 2021-09-10 RX ORDER — MORPHINE SULFATE 4 MG/ML
INJECTION, SOLUTION INTRAMUSCULAR; INTRAVENOUS
Status: COMPLETED
Start: 2021-09-10 | End: 2021-09-10

## 2021-09-10 RX ORDER — SODIUM CHLORIDE 9 MG/ML
INJECTION, SOLUTION INTRAVENOUS CONTINUOUS
Status: DISCONTINUED | OUTPATIENT
Start: 2021-09-10 | End: 2021-09-15 | Stop reason: HOSPADM

## 2021-09-10 RX ORDER — DIAZEPAM 5 MG/1
5 TABLET ORAL ONCE
Status: COMPLETED | OUTPATIENT
Start: 2021-09-10 | End: 2021-09-10

## 2021-09-10 RX ORDER — MORPHINE SULFATE 2 MG/ML
2 INJECTION, SOLUTION INTRAMUSCULAR; INTRAVENOUS
Status: DISCONTINUED | OUTPATIENT
Start: 2021-09-10 | End: 2021-09-15 | Stop reason: HOSPADM

## 2021-09-10 RX ORDER — DIVALPROEX SODIUM 500 MG/1
500 TABLET, EXTENDED RELEASE ORAL DAILY
Status: DISCONTINUED | OUTPATIENT
Start: 2021-09-10 | End: 2021-09-15 | Stop reason: HOSPADM

## 2021-09-10 RX ORDER — LAMOTRIGINE 100 MG/1
200 TABLET ORAL 2 TIMES DAILY
Status: DISCONTINUED | OUTPATIENT
Start: 2021-09-10 | End: 2021-09-15 | Stop reason: HOSPADM

## 2021-09-10 RX ORDER — DICYCLOMINE HYDROCHLORIDE 10 MG/ML
10 INJECTION INTRAMUSCULAR ONCE
Status: COMPLETED | OUTPATIENT
Start: 2021-09-10 | End: 2021-09-10

## 2021-09-10 RX ORDER — DICYCLOMINE HYDROCHLORIDE 10 MG/ML
10 INJECTION INTRAMUSCULAR ONCE
Status: DISCONTINUED | OUTPATIENT
Start: 2021-09-10 | End: 2021-09-10

## 2021-09-10 RX ORDER — MORPHINE SULFATE 4 MG/ML
4 INJECTION, SOLUTION INTRAMUSCULAR; INTRAVENOUS
Status: DISCONTINUED | OUTPATIENT
Start: 2021-09-10 | End: 2021-09-15 | Stop reason: HOSPADM

## 2021-09-10 RX ADMIN — HYDROCODONE BITARTRATE AND ACETAMINOPHEN 1 TABLET: 5; 325 TABLET ORAL at 00:10

## 2021-09-10 RX ADMIN — KETOROLAC TROMETHAMINE 15 MG: 30 INJECTION, SOLUTION INTRAMUSCULAR; INTRAVENOUS at 04:07

## 2021-09-10 RX ADMIN — DEXAMETHASONE SODIUM PHOSPHATE 4 MG: 4 INJECTION, SOLUTION INTRA-ARTICULAR; INTRALESIONAL; INTRAMUSCULAR; INTRAVENOUS; SOFT TISSUE at 09:37

## 2021-09-10 RX ADMIN — ONDANSETRON 4 MG: 2 INJECTION INTRAMUSCULAR; INTRAVENOUS at 14:30

## 2021-09-10 RX ADMIN — LAMOTRIGINE 200 MG: 100 TABLET ORAL at 14:27

## 2021-09-10 RX ADMIN — HYDROCODONE BITARTRATE AND ACETAMINOPHEN 1 TABLET: 5; 325 TABLET ORAL at 06:41

## 2021-09-10 RX ADMIN — LORAZEPAM 1 MG: 2 INJECTION INTRAMUSCULAR; INTRAVENOUS at 14:27

## 2021-09-10 RX ADMIN — MORPHINE SULFATE 4 MG: 4 INJECTION, SOLUTION INTRAMUSCULAR; INTRAVENOUS at 21:45

## 2021-09-10 RX ADMIN — LAMOTRIGINE 200 MG: 100 TABLET ORAL at 21:44

## 2021-09-10 RX ADMIN — IOPAMIDOL 80 ML: 755 INJECTION, SOLUTION INTRAVENOUS at 19:43

## 2021-09-10 RX ADMIN — DIAZEPAM 5 MG: 5 TABLET ORAL at 16:24

## 2021-09-10 RX ADMIN — DEXAMETHASONE SODIUM PHOSPHATE 4 MG: 4 INJECTION, SOLUTION INTRA-ARTICULAR; INTRALESIONAL; INTRAMUSCULAR; INTRAVENOUS; SOFT TISSUE at 00:14

## 2021-09-10 RX ADMIN — DEXAMETHASONE SODIUM PHOSPHATE 4 MG: 4 INJECTION, SOLUTION INTRA-ARTICULAR; INTRALESIONAL; INTRAMUSCULAR; INTRAVENOUS; SOFT TISSUE at 16:29

## 2021-09-10 RX ADMIN — MORPHINE SULFATE 4 MG: 4 INJECTION, SOLUTION INTRAMUSCULAR; INTRAVENOUS at 18:34

## 2021-09-10 RX ADMIN — DIVALPROEX SODIUM 500 MG: 500 TABLET, EXTENDED RELEASE ORAL at 14:27

## 2021-09-10 RX ADMIN — DEXAMETHASONE SODIUM PHOSPHATE 4 MG: 4 INJECTION, SOLUTION INTRA-ARTICULAR; INTRALESIONAL; INTRAMUSCULAR; INTRAVENOUS; SOFT TISSUE at 18:27

## 2021-09-10 RX ADMIN — MORPHINE SULFATE 4 MG: 4 INJECTION, SOLUTION INTRAMUSCULAR; INTRAVENOUS at 14:53

## 2021-09-10 RX ADMIN — IOPAMIDOL 80 ML: 755 INJECTION, SOLUTION INTRAVENOUS at 12:24

## 2021-09-10 RX ADMIN — SODIUM CHLORIDE: 9 INJECTION, SOLUTION INTRAVENOUS at 14:25

## 2021-09-10 RX ADMIN — DICYCLOMINE HYDROCHLORIDE 10 MG: 10 INJECTION INTRAMUSCULAR at 11:10

## 2021-09-10 ASSESSMENT — PAIN SCALES - GENERAL
PAINLEVEL_OUTOF10: 10

## 2021-09-10 ASSESSMENT — PAIN DESCRIPTION - LOCATION
LOCATION: ABDOMEN
LOCATION: ABDOMEN

## 2021-09-10 ASSESSMENT — PAIN DESCRIPTION - DESCRIPTORS: DESCRIPTORS: PRESSURE

## 2021-09-10 ASSESSMENT — PAIN DESCRIPTION - FREQUENCY: FREQUENCY: CONTINUOUS

## 2021-09-10 ASSESSMENT — PAIN DESCRIPTION - PAIN TYPE
TYPE: ACUTE PAIN
TYPE: ACUTE PAIN

## 2021-09-10 ASSESSMENT — PAIN DESCRIPTION - ONSET: ONSET: ON-GOING

## 2021-09-10 ASSESSMENT — PAIN DESCRIPTION - ORIENTATION: ORIENTATION: RIGHT;LEFT;UPPER;LOWER;ANTERIOR

## 2021-09-10 ASSESSMENT — PAIN DESCRIPTION - DIRECTION: RADIATING_TOWARDS: EPIGASTRIC AREA

## 2021-09-10 ASSESSMENT — PAIN DESCRIPTION - PROGRESSION: CLINICAL_PROGRESSION: NOT CHANGED

## 2021-09-10 ASSESSMENT — PAIN - FUNCTIONAL ASSESSMENT: PAIN_FUNCTIONAL_ASSESSMENT: PREVENTS OR INTERFERES SOME ACTIVE ACTIVITIES AND ADLS

## 2021-09-10 NOTE — CONSULTS
Hospitalist Consult Note        Patient:  Katheryn Bray  YOB: 1955  Date of Service: 9/10/2021  MRN: [id number]   Acct:  [de-identified]   Primary Care Physician: Reginald Flores MD    Chief Complaint:  Severe abdominal pain  Reason for consult  Post-op severe localized abdominal pain    Date of Service: Pt seen/examined in consultation on 9/10/2021     History Of Present Illness:      Hollice [employer] [place] y.o. male who we are asked to see/evaluate by Abelino Cruz MD for medical management of localized abdominal pain. Patient underwent permanent spinal cord stimulator and internal pulse generator at the left flank yesterday by neurosurgeon. Reported his pain started afterward mostly at the mid abdominal line from the pubic bone area shoots all the way up to epigastric area, he rated the pain 10/10 on the scale, his pain at rest and with any movement. He received multiple analgesics/opiods with no improvement. Denies any previous intra-abdominal surgeries, although he endorsed having abdominal hernia in the past.  PMH: ventral hernia, CAD s/p cath 7/31/2017, GERD,  Seizure disorder, lumbar post-laminectomy syndrome. Denies any fever, chills, nausea, vomiting, diarrhea, constipation, or any other constitutional sxs other than mentioned above. Assessment and Plan:-  1. Localized mid-abdominal wall pain: currently pain 10/10 as I mentioned above, received multiple analgesics/opiods didn't work. I'll give one dose of Bentyl IM now to work on smooth muscle contractions of the colon, also I'll order CT andomen/pelvis with contrast to r/o any intra-abdominal pathology including incarcerated hernia/fecal impaction/intestinal obstruction, other other etiology. Keep NPO for now, IV hydration start maintenance gently, get CBC with diff and CMP. Tailor treatment according to CT results. Our service and surgery service consulted by neurosurgeon service.   2. Pos-operative pulmonary insufficiency: post-operatively very common, currently on 2 L o2 sating 93%, continue wean off o2 as tolerated. 3. Seizure disorder: no home medications restarted,  Lamictal and depakote restart to prevent withdrawals,  Ativan PRN if seizure developed. Past Medical History:        Diagnosis Date    Back pain     Depression     GERD (gastroesophageal reflux disease)     Headache(784.0) 9/22/2013    Migraines     ALICIA treated with BiPAP 2013    doesn't wear Bipap    Pneumonia     Pulmonary embolism ([address] 2/25/2021    S/P cardiac catheterization: 7/31/2017: No obstructive lasions. 7/31/2017 7/31/2017: No obstructive lasions. Dr. Franck Conley Seizures [facility])        Past Surgical History:        Procedure Laterality Date    [facility]    BACK SURGERY  12/18/15    l3-s1 decompression, posterior fusion   [address]  2013    CERVICAL SPINE SURGERY  10-16-15    C4-6 ACDF    COLONOSCOPY      ENDOSCOPY, COLON, DIAGNOSTIC      HERNIA REPAIR  1996    [place]    [facility]ERN[facility] REPAIR  2012    [facility]    KNEE SURGERY  1976    Rt     LUMBAR SPINE SURGERY  08/26/2014     L3-5 decompression, Dr. Tona Dave, [address] SURGERY  10/19/2018    OTHER SURGICAL HISTORY  06/02/2017    Diagnostic laparoscopy, Laparoscopic Ventral hernia Repair with Mesh by Dr Teodoro Loyola OFFICE/OUTPT VISIT,PROCEDURE ONLY N/A 10/19/2018    C3-4 AND C6-7 ACDF performed by Armaan Bailey MD at [address]  N/A 3/13/2020    L3-S1 HARDWARE REMOVAL performed by Armaan Bailey MD at [facility] 119 cuff, Dr. Daphene Mates N/A 7/8/2021    SCS trial  entrance L1 tip T7 bilaterally performed by Silvia Webb MD at [address] [place] [address] Medications:   No current facility-administered medications on file prior to encounter.      Current Outpatient Medications on File Prior to Encounter   Medication Sig Dispense Refill    aspirin-acetaminophen-caffeine (EXCEDRIN MIGRAINE) 250-250-65 MG per tablet Take 1 tablet by mouth every 6 hours as needed for Headaches      omeprazole (PRILOSEC) 40 MG delayed release capsule Take 1 capsule by mouth daily 90 capsule 3    sertraline (ZOLOFT) 100 MG tablet Take 2 tablets by mouth daily 180 tablet 3    lamoTRIgine (LAMICTAL) 200 MG tablet TAKE 1 TABLET BY MOUTH TWO  TIMES DAILY 180 tablet 3    divalproex (DEPAKOTE ER) 500 MG extended release tablet Take by mouth daily 3 tab      butorphanol (STADOL) 10 MG/ML nasal spray 1 spray by Nasal route every 4 hours as needed for Pain      EPINEPHrine (EPIPEN) 0.3 MG/0.3ML SOAJ injection Inject 0.3 mg into the muscle as needed Use as directed for allergic reaction         Allergies:    Bee venom, Lisinopril, Dilaudid [hydromorphone hcl], Nitrofuran derivatives, and Nitroglycerin    Social History:    reports that he has never smoked. He has never used smokeless tobacco. He reports that he does not drink alcohol and does not use drugs. Family History:       Problem Relation Age of Onset    Arthritis Father     Heart Disease Father     Other Father         COPD    Lupus Mother     Other Mother         Lupus    Depression Sister     Depression Child     Arthritis Maternal Grandmother     Stroke Maternal Grandmother     High Blood Pressure Maternal Grandmother     Arthritis Maternal Grandfather     Diabetes Maternal Grandfather     Heart Disease Maternal Grandfather     Asthma Paternal Grandmother     Stroke Paternal Grandmother     High Blood Pressure Paternal Grandmother     Asthma Paternal Grandfather        Diet:  Diet NPO Exceptions are: Sips of Water with Meds    Review of systems:   Pertinent positives as noted in the HPI. All other systems reviewed and negative.     PHYSICAL EXAM:  /81   Pulse 74   Temp 97.9 °F (36.6 °C) (Oral)   Resp 18   Ht 5' 8.5\" (1.74 m)   Wt 208 lb (94.3 kg)   SpO2 93%   BMI 31.17 kg/m²   General appearance: In mild distress, obesity  HEENT: Normal cephalic, atraumatic without obvious deformity. Pupils equal, round, and reactive to light. Extra ocular muscles intact. Conjunctivae/corneas clear. Neck: Supple, with full range of motion. No jugular venous distention. Trachea midline. Respiratory:  Normal respiratory effort. Clear to auscultation, bilaterally without Rales/Wheezes/Rhonchi. Cardiovascular: Regular rate and rhythm with normal S1/S2 without murmurs, rubs or gallops. Abdomen: Tenderness at the mid abdominal line area from the pubic bone all the way up to the epigastric area, suspected rebound tenderness and guarding. Distended slightly. Musculoskeletal:  No clubbing, cyanosis or edema bilaterally. Skin: Skin color, texture, turgor normal.  No rashes or lesions. Neurologic:  Neurovascularly intact without any focal sensory/motor deficits. Cranial nerves: II-XII intact, grossly non-focal.  Psychiatric: Alert and oriented, thought content appropriate, normal insight  Capillary Refill: Brisk,< 3 seconds   Peripheral Pulses: +2 palpable, equal bilaterally     Labs:   No results for input(s): WBC, HGB, HCT, PLT in the last 72 hours. No results for input(s): NA, K, CL, CO2, BUN, CREATININE, CALCIUM, PHOS in the last 72 hours. Invalid input(s): MAGNES  No results for input(s): AST, ALT, BILIDIR, BILITOT, ALKPHOS in the last 72 hours. No results for input(s): INR in the last 72 hours. No results for input(s): Alyssia Bergman in the last 72 hours. Urinalysis:    Lab Results   Component Value Date    NITRU NEGATIVE 11/25/2016    WBCUA NONE 11/25/2016    BACTERIA NONE 11/25/2016    RBCUA NONE 11/25/2016    BLOODU NEGATIVE 11/25/2016    SPECGRAV 1.015 11/25/2016    GLUCOSEU neg 03/11/2015    GLUCOSEU NEGATIVE 06/11/2013       Radiology:   FLUORO FOR SURGICAL PROCEDURES   Final Result      XR THORACIC SPINE (3 VIEWS)   Final Result   Status post neurostimulator insertion.             **This report has been created using voice recognition software. It may contain minor errors which are inherent in voice recognition technology. **      Final report electronically signed by Dr. Lucita Canavan on 9/9/2021 4:44 PM      CT ABDOMEN PELVIS W WO CONTRAST Additional Contrast? Radiologist Recommendation    (Results Pending)     XR THORACIC SPINE (3 VIEWS)    Result Date: 9/9/2021  THORACIC SPINE 4 VIEWS: CLINICAL INFORMATION: surgery TECHNIQUE: 4 fluoroscopic spot films of the thoracic spine were obtained during neurostimulator insertion by Dr. Raphael Horne. The actual fluoroscopy time is 33.3 seconds. FINDINGS: Metallic instrument is present particularly lower thoracic spine. Neurostimulator leads have been inserted and appear to be located at about the T7-8 level. Status post neurostimulator insertion. **This report has been created using voice recognition software. It may contain minor errors which are inherent in voice recognition technology. ** Final report electronically signed by Dr. Lucita Canavan on 9/9/2021 4:44 PM    FLUORO FOR SURGICAL PROCEDURES    Result Date: 9/9/2021  Radiology exam is complete. No Radiologist dictation. Please follow up with ordering provider.                [address] CONSULT    Electronically signed by Noralee Merlin, MD on 9/10/2021 at 11:03 AM

## 2021-09-10 NOTE — PROGRESS NOTES
Report called to Ewa Ariza, [facility] on [address] [facility]. Transport requested. Sravanthi ARRIAGA updated about patients continued abdominal pain 10/10, states he updated Dr. Soren Cruz.

## 2021-09-10 NOTE — CARE COORDINATION
9/10/21, 7:43 AM EDT  DISCHARGE PLANNING EVALUATION:    Jj Dobbins       Admitted: 9/9/2021/ [place] day: 0   Location: 7K-16/016-A Reason for admit: Chronic pain syndrome [G89.4]  S/P insertion of spinal cord stimulator [Z96.89]   PMH:  has a past medical history of Back pain, Depression, GERD (gastroesophageal reflux disease), Headache(784.0), Migraines, ALICIA treated with BiPAP, Pneumonia, Pulmonary embolism ([facility] [address] S/P cardiac catheterization: 7/31/2017: No obstructive lasions. , and Seizures ([facility] [address] Procedure: 09/09  surgery by Dr Christian Morgan  Thoracic spine  laminectomy (T9)  Placement of permanent paddle of spinal cord stimulator at level of T7-T8 ([employer]). Placement of battery (pulse generator ) of spinal cord stimulator in left flank area. 09/10 CT abdomen:No acute inflammatory or infectious process in the abdomen or pelvis. No evidence of bowel obstruction.        Barriers to Discharge:  POD #1, NV checks, pain/nausea control. O2 2L/min sat 93%, abdomen distended/tight with noted nausea. Afebrile, WBC 13.9. Pain 10/10. Decadron IV. General surgery and hospitalist consulted. PCP: Gustavo Brantley MD   %    Patient Goals/Plan/Treatment Preferences: Met with Bryan and his wife Bre Ashford; both live home and patient was independent PTA. He uses no DME, has current PCP, and can afford meds. He requests [facility] at time of discharge post op. SW consulted. Transportation/Food Security/Housekeeping Addressed:  No issues identified.

## 2021-09-10 NOTE — PROGRESS NOTES
Pt transferred to  7K16 from PACU. Pt alert and oriented x 4. Complaining of pain on his abdomen, pt had a large brown emesis. Rates pain 10/10  on the scale. Pt had IV site free of s/s of infection or infiltration. Vital signs obtained. Assessment and data collection initiated. Spouse at bed site. Policies and procedures for 7K explained. All questions answered with no further questions at this time. Fall prevention and safety brochure discussed with patient. Bed alarm on. Call light in reach.

## 2021-09-10 NOTE — ANESTHESIA POSTPROCEDURE EVALUATION
Department of Anesthesiology  Postprocedure Note    Patient: Shruti Eason  MRN: [id number]  YOB: 1955  Date of evaluation: 9/10/2021  Time:  8:44 AM     Procedure Summary     Date: 09/09/21 Room / Location: [address] Albina Nasreen    Anesthesia Start: 3363 Anesthesia Stop: 1018    Procedure: THORACIC LAMINECTOMY FOR PLACEMENT OF PERMANENT SPINAL CORD STMULATOR AND LEFT FLANK INTERNAL PULSE GENERATOR (Left Head) Diagnosis: (CHRONIC PAIN SYNDROME)    Surgeons: Ibis Toribio MD Responsible Provider: Sharad Richter DO    Anesthesia Type: general ASA Status: 3          Anesthesia Type: general    Mustapha Phase I: Mustapha Score: 9    Mustapha Phase II: Mustapha Score: 9    Last vitals: Reviewed and per EMR flowsheets.        Anesthesia Post Evaluation    Patient location during evaluation: PACU  Patient participation: complete - patient participated  Level of consciousness: awake and alert  Airway patency: patent  Nausea & Vomiting: no vomiting and no nausea  Complications: no  Cardiovascular status: hemodynamically stable  Respiratory status: acceptable  Hydration status: stable

## 2021-09-10 NOTE — PROGRESS NOTES
Neurosurgery up date note   Patient was seen and examined by me. Patient is still complaining from severe abdominal pain (up to 10/10) that is associated with abdominal tenderness and rigidity. Patient's abdominal CT showed normal findings. I discussed with the case with Dr. Flora Brambila (general surgery). Dr. Flora Brambila he will come and evaluate the patient soon. At this time we are waiting the recommendations from Dr. Flora Brambila. - In meantime: keep the patient n.p.o. and pain control.  -I discussed and reviewed the case with patient, his wife and with patient's nurse.  -Neurosurgery will follow.

## 2021-09-10 NOTE — PROGRESS NOTES
Addendum by Dr. Agnes Rowley MD:  I have seen and examined the patient independently. Face to face evaluation and examination was performed. The below evaluation and note have been reviewed. Labs and radiographs were reviewed. I Have discussed with Neurosurgery PA about this patient in detail. The below assessment and plan have been reviewed. Please see my modifications mentioned below. My additional comments and modifications:  -Postop day 1 (placement of permanent spinal cord stimulator system). -Patient is continue to experience severe abdominal pain.  -The general surgery and the hospital services were consulted (waiting for their recommendation). -In Meantime pain control and keep the patient n.p.o.. -I discussed the case with patient and his wife also discussed the case with patient nurse.  -Neurosurgery to follow. Agnes Rowley MD       Neurosurgery Progress Note    Patient:  Glorine Nyhan      Unit/Bed:7-16/016-A    YOB: 1955    MRN: [id number]     Acct: [de-identified]     Admit date: 9/9/2021    No chief complaint on file. Patient Seen, Chart, Physician notes, Labs, Radiology studies reviewed. Subjective: Patient is seen and evaluated on the floor following admission through PACU with complaints of intractable abdominal pain following a procedure performed by Dr. Derick Stover that involved thoracic laminectomy and placement of permanent spinal cord stimulator and internal pulse generator. She was noticeably uncomfortable at the time of exam this morning with pain poorly controlled. Past, Family, Social History unchanged from admission. Diet:  ADULT DIET; Regular    Medications:  Scheduled Meds:   dexamethasone  4 mg IntraVENous Q6H     Continuous Infusions:  PRN Meds:ketorolac, HYDROcodone 5 mg - acetaminophen, diphenhydrAMINE, ondansetron    Objective: Patient is lying in bed with the head of the bed mildly elevated and is noticeably uncomfortable. Patient complains of intense intractable abdominal pain. Is otherwise intact for strength and sensation bilaterally and symmetrically. Vitals: /81   Pulse 74   Temp 97.9 °F (36.6 °C) (Oral)   Resp 18   Ht 5' 8.5\" (1.74 m)   Wt 208 lb (94.3 kg)   SpO2 93%   BMI 31.17 kg/m²   Physical Exam:  Alert and attentive. Language appropriate, with no aphasia. Pupils equal.  Facial strength symmetric. Review of systems:    Patient complains of headache (described as migraine-like), patient also complains of nausea with vomiting. He denies chest pain or shortness of breath. Intense abdominal discomfort is noted on exam.      24 hour intake/output:    Intake/Output Summary (Last 24 hours) at 9/10/2021 0906  Last data filed at 9/10/2021 0400  Gross per 24 hour   Intake 2100 ml   Output 700 ml   Net 1400 ml     Last 3 weights: Wt Readings from Last 3 Encounters:   09/09/21 208 lb (94.3 kg)   09/02/21 214 lb 1.1 oz (97.1 kg)   08/19/21 214 lb (97.1 kg)         CBC: No results for input(s): WBC, HGB, PLT in the last 72 hours. BMP:  No results for input(s): NA, K, CL, CO2, BUN, CREATININE, GLUCOSE in the last 72 hours. Calcium:No results for input(s): CALCIUM in the last 72 hours. Magnesium:No results for input(s): MG in the last 72 hours. Glucose:No results for input(s): POCGLU in the last 72 hours. HgbA1C: No results for input(s): LABA1C in the last 72 hours. Lipids: No results for input(s): CHOL, TRIG, HDL, LDLCALC in the last 72 hours. Invalid input(s): LDL    Radiology reports as per the Radiologist  Radiology: XR THORACIC SPINE (3 VIEWS)    Result Date: 9/9/2021  THORACIC SPINE 4 VIEWS: CLINICAL INFORMATION: surgery TECHNIQUE: 4 fluoroscopic spot films of the thoracic spine were obtained during neurostimulator insertion by Dr. Barbara Betancourt. The actual fluoroscopy time is 33.3 seconds. FINDINGS: Metallic instrument is present particularly lower thoracic spine.  Neurostimulator leads have been inserted

## 2021-09-11 LAB
ERYTHROCYTE [DISTWIDTH] IN BLOOD BY AUTOMATED COUNT: 13.4 % (ref 11.5–14.5)
ERYTHROCYTE [DISTWIDTH] IN BLOOD BY AUTOMATED COUNT: 48.2 FL (ref 35–45)
FOLATE: 7.4 NG/ML (ref 4.8–24.2)
HCT VFR BLD CALC: 42.1 % (ref 42–52)
HEMOGLOBIN: 13.3 GM/DL (ref 14–18)
LACTIC ACID: 1.2 MMOL/L (ref 0.5–2)
MCH RBC QN AUTO: 30.9 PG (ref 26–33)
MCHC RBC AUTO-ENTMCNC: 31.6 GM/DL (ref 32.2–35.5)
MCV RBC AUTO: 97.9 FL (ref 80–94)
PLATELET # BLD: 361 THOU/MM3 (ref 130–400)
PMV BLD AUTO: 10.2 FL (ref 9.4–12.4)
RBC # BLD: 4.3 MILL/MM3 (ref 4.7–6.1)
TSH SERPL DL<=0.05 MIU/L-ACNC: 1.91 UIU/ML (ref 0.4–4.2)
VITAMIN B-12: 719 PG/ML (ref 211–911)
WBC # BLD: 10.9 THOU/MM3 (ref 4.8–10.8)

## 2021-09-11 PROCEDURE — 84443 ASSAY THYROID STIM HORMONE: CPT

## 2021-09-11 PROCEDURE — 6370000000 HC RX 637 (ALT 250 FOR IP): Performed by: PHYSICIAN ASSISTANT

## 2021-09-11 PROCEDURE — 6360000002 HC RX W HCPCS: Performed by: SURGERY

## 2021-09-11 PROCEDURE — 2580000003 HC RX 258: Performed by: FAMILY MEDICINE

## 2021-09-11 PROCEDURE — 6370000000 HC RX 637 (ALT 250 FOR IP): Performed by: STUDENT IN AN ORGANIZED HEALTH CARE EDUCATION/TRAINING PROGRAM

## 2021-09-11 PROCEDURE — 82746 ASSAY OF FOLIC ACID SERUM: CPT

## 2021-09-11 PROCEDURE — 6370000000 HC RX 637 (ALT 250 FOR IP): Performed by: FAMILY MEDICINE

## 2021-09-11 PROCEDURE — 82607 VITAMIN B-12: CPT

## 2021-09-11 PROCEDURE — 99232 SBSQ HOSP IP/OBS MODERATE 35: CPT | Performed by: SURGERY

## 2021-09-11 PROCEDURE — 99024 POSTOP FOLLOW-UP VISIT: CPT | Performed by: NEUROLOGICAL SURGERY

## 2021-09-11 PROCEDURE — 97162 PT EVAL MOD COMPLEX 30 MIN: CPT

## 2021-09-11 PROCEDURE — 6360000002 HC RX W HCPCS: Performed by: PHYSICIAN ASSISTANT

## 2021-09-11 PROCEDURE — 97116 GAIT TRAINING THERAPY: CPT

## 2021-09-11 PROCEDURE — 1200000000 HC SEMI PRIVATE

## 2021-09-11 PROCEDURE — 83605 ASSAY OF LACTIC ACID: CPT

## 2021-09-11 PROCEDURE — 99233 SBSQ HOSP IP/OBS HIGH 50: CPT | Performed by: STUDENT IN AN ORGANIZED HEALTH CARE EDUCATION/TRAINING PROGRAM

## 2021-09-11 PROCEDURE — 36415 COLL VENOUS BLD VENIPUNCTURE: CPT

## 2021-09-11 PROCEDURE — 85027 COMPLETE CBC AUTOMATED: CPT

## 2021-09-11 PROCEDURE — APPSS30 APP SPLIT SHARED TIME 16-30 MINUTES: Performed by: PHYSICIAN ASSISTANT

## 2021-09-11 RX ORDER — CYCLOBENZAPRINE HCL 10 MG
10 TABLET ORAL 3 TIMES DAILY PRN
Status: DISCONTINUED | OUTPATIENT
Start: 2021-09-11 | End: 2021-09-12

## 2021-09-11 RX ORDER — POLYETHYLENE GLYCOL 3350 17 G/17G
17 POWDER, FOR SOLUTION ORAL DAILY
Status: DISCONTINUED | OUTPATIENT
Start: 2021-09-11 | End: 2021-09-13

## 2021-09-11 RX ORDER — DOCUSATE SODIUM 100 MG/1
100 CAPSULE, LIQUID FILLED ORAL DAILY
Status: DISCONTINUED | OUTPATIENT
Start: 2021-09-11 | End: 2021-09-12

## 2021-09-11 RX ORDER — DULOXETIN HYDROCHLORIDE 20 MG/1
20 CAPSULE, DELAYED RELEASE ORAL DAILY
Status: DISCONTINUED | OUTPATIENT
Start: 2021-09-11 | End: 2021-09-14

## 2021-09-11 RX ORDER — PREGABALIN 75 MG/1
75 CAPSULE ORAL 2 TIMES DAILY
Status: DISCONTINUED | OUTPATIENT
Start: 2021-09-11 | End: 2021-09-14

## 2021-09-11 RX ADMIN — PREGABALIN 75 MG: 75 CAPSULE ORAL at 10:12

## 2021-09-11 RX ADMIN — MORPHINE SULFATE 4 MG: 4 INJECTION, SOLUTION INTRAMUSCULAR; INTRAVENOUS at 04:08

## 2021-09-11 RX ADMIN — MORPHINE SULFATE 4 MG: 4 INJECTION, SOLUTION INTRAMUSCULAR; INTRAVENOUS at 00:37

## 2021-09-11 RX ADMIN — MORPHINE SULFATE 4 MG: 4 INJECTION, SOLUTION INTRAMUSCULAR; INTRAVENOUS at 08:07

## 2021-09-11 RX ADMIN — SODIUM CHLORIDE: 9 INJECTION, SOLUTION INTRAVENOUS at 17:06

## 2021-09-11 RX ADMIN — DEXAMETHASONE SODIUM PHOSPHATE 4 MG: 4 INJECTION, SOLUTION INTRA-ARTICULAR; INTRALESIONAL; INTRAMUSCULAR; INTRAVENOUS; SOFT TISSUE at 18:13

## 2021-09-11 RX ADMIN — DOCUSATE SODIUM 100 MG: 100 CAPSULE ORAL at 13:38

## 2021-09-11 RX ADMIN — SODIUM CHLORIDE: 9 INJECTION, SOLUTION INTRAVENOUS at 04:08

## 2021-09-11 RX ADMIN — DEXAMETHASONE SODIUM PHOSPHATE 4 MG: 4 INJECTION, SOLUTION INTRA-ARTICULAR; INTRALESIONAL; INTRAMUSCULAR; INTRAVENOUS; SOFT TISSUE at 13:38

## 2021-09-11 RX ADMIN — DIVALPROEX SODIUM 500 MG: 500 TABLET, EXTENDED RELEASE ORAL at 10:12

## 2021-09-11 RX ADMIN — LAMOTRIGINE 200 MG: 100 TABLET ORAL at 10:12

## 2021-09-11 RX ADMIN — PREGABALIN 75 MG: 75 CAPSULE ORAL at 20:47

## 2021-09-11 RX ADMIN — MORPHINE SULFATE 4 MG: 4 INJECTION, SOLUTION INTRAMUSCULAR; INTRAVENOUS at 15:51

## 2021-09-11 RX ADMIN — DULOXETINE HYDROCHLORIDE 20 MG: 20 CAPSULE, DELAYED RELEASE ORAL at 11:11

## 2021-09-11 RX ADMIN — CYCLOBENZAPRINE 10 MG: 10 TABLET, FILM COATED ORAL at 20:47

## 2021-09-11 RX ADMIN — MORPHINE SULFATE 4 MG: 4 INJECTION, SOLUTION INTRAMUSCULAR; INTRAVENOUS at 18:12

## 2021-09-11 RX ADMIN — POLYETHYLENE GLYCOL 3350 17 G: 17 POWDER, FOR SOLUTION ORAL at 13:38

## 2021-09-11 RX ADMIN — MORPHINE SULFATE 4 MG: 4 INJECTION, SOLUTION INTRAMUSCULAR; INTRAVENOUS at 20:47

## 2021-09-11 RX ADMIN — LAMOTRIGINE 200 MG: 100 TABLET ORAL at 20:46

## 2021-09-11 RX ADMIN — DEXAMETHASONE SODIUM PHOSPHATE 4 MG: 4 INJECTION, SOLUTION INTRA-ARTICULAR; INTRALESIONAL; INTRAMUSCULAR; INTRAVENOUS; SOFT TISSUE at 06:03

## 2021-09-11 RX ADMIN — DEXAMETHASONE SODIUM PHOSPHATE 4 MG: 4 INJECTION, SOLUTION INTRA-ARTICULAR; INTRALESIONAL; INTRAMUSCULAR; INTRAVENOUS; SOFT TISSUE at 00:36

## 2021-09-11 ASSESSMENT — PAIN DESCRIPTION - PROGRESSION
CLINICAL_PROGRESSION: NOT CHANGED

## 2021-09-11 ASSESSMENT — PAIN DESCRIPTION - PAIN TYPE
TYPE: ACUTE PAIN

## 2021-09-11 ASSESSMENT — PAIN DESCRIPTION - ONSET
ONSET: ON-GOING

## 2021-09-11 ASSESSMENT — PAIN SCALES - GENERAL
PAINLEVEL_OUTOF10: 9
PAINLEVEL_OUTOF10: 10

## 2021-09-11 ASSESSMENT — PAIN DESCRIPTION - FREQUENCY
FREQUENCY: CONTINUOUS

## 2021-09-11 ASSESSMENT — PAIN DESCRIPTION - DESCRIPTORS
DESCRIPTORS: TENDER;PRESSURE
DESCRIPTORS: PRESSURE;TENDER
DESCRIPTORS: TENDER;PRESSURE

## 2021-09-11 ASSESSMENT — PAIN DESCRIPTION - LOCATION
LOCATION: ABDOMEN

## 2021-09-11 ASSESSMENT — PAIN - FUNCTIONAL ASSESSMENT
PAIN_FUNCTIONAL_ASSESSMENT: PREVENTS OR INTERFERES SOME ACTIVE ACTIVITIES AND ADLS

## 2021-09-11 ASSESSMENT — PAIN DESCRIPTION - ORIENTATION
ORIENTATION: MID;UPPER;LOWER;ANTERIOR
ORIENTATION: MID
ORIENTATION: MID

## 2021-09-11 NOTE — PROGRESS NOTES
[facility]  INPATIENT PHYSICAL THERAPY  EVALUATION  [facility] ORTHOPEDICS 7K - 7K-16/016-A    Time In:   Time Out: 1138  Timed Code Treatment Minutes: 12 Minutes  Minutes: 21          Date: 2021  Patient Name: Reese Baxter,  Gender:  male        MRN: [id number]  : 1955  (61 y.o.)      Referring Practitioner: Janice Cruz PA-C  Diagnosis: chronic pain syndrome  Additional Pertinent Hx: Pt with hx of failed back syndrome and succesful spinal cord simulator trial is now s/p THORACIC LAMINECTOMY FOR PLACEMENT OF PERMANENT SPINAL CORD STMULATOR AND LEFT FLANK INTERNAL PULSE GENERATOR (DOS: 21) by Dr. Katina Sanchez. Pt has been c/o 10/10 abdominal pain since surgery--per chart review doctors are suspecting neurological origin. Restrictions/Precautions:  Restrictions/Precautions: General Precautions, Fall Risk    Subjective:  Chart Reviewed: Yes  Patient assessed for rehabilitation services?: Yes  Family / Caregiver Present: No  Subjective: RN approved PT evaluation, and states pt continues to have 10/10 abdominal pain. Pt agreeable to therapy with encouragement, and cooperated well, but was limited in performance due to pain. Wife did arrive jail through session and was supportive. General:  Follows Commands: Within Functional Limits    Vision: Impaired  Vision Exceptions: Wears glasses at all times    Hearing: Within functional limits    Pain: 10/10: abdomen     Vitals: Oxygen: Pt requested to remove O2 for ambulation--decreased to 86%. Returned pt to 2L O2 immediately following ambulation and pt recovered to 96% with cues for deep breathing.      Social/Functional History:    Lives With: Spouse  Type of Home: House  Home Layout: One level  Home Access: Stairs to enter with rails  Entrance Stairs - Number of Steps: 2 [place]; a couple steps within the house to access various rooms  Home Equipment: Redgie Blocker, 4 wheeled walker (did not use DME PTA)     Ambulation Assistance: Independent  Transfer Assistance: Independent    Active : Yes  Occupation: On disability  Additional Comments: wife works full time, so will not be available all the time, daughters can help some--pt will be at home alone some    OBJECTIVE:  Range of Motion:  Bilateral Lower Extremity: WFL     Strength:  Bilateral Lower Extremity: Impaired - functional weakness present with STS transfer    Balance:  Static Sitting Balance:  Stand By Assistance  Dynamic Sitting Balance: Stand By Assistance  Static Standing Balance: Contact Guard Assistance  Dynamic Standing Balance: [address], with cues for safety, with verbal cues , with increased time for completion    Bed Mobility:  Supine to Sit: [address], with head of bed raised, with rail, with verbal cues , with increased time for completion  Scooting: Contact Guard Assistance, X 1, with increased time for completion, for seated scooting    Transfers:  Sit to Stand: [address], with increased time for completion, cues for hand placement, with verbal cues  Stand to [facility], X 1, with increased time for completion, cues for hand placement, with verbal cues    Ambulation:  Contact Guard Assistance, X 1, with cues for safety, with verbal cues , with increased time for completion  Distance: 30'  Surface: Level Tile  Device:Rolling Walker  Gait Deviations:  Slow Vi, Decreased Step Length Bilaterally, Decreased Gait Speed and pt reported increased abdominal pain during swing phase of R LE  *cues for taking smaller steps with R LE to alleviate discomfort. *several pauses due to high pain level     Functional Outcome Measures: Completed  AM-PAC Inpatient Mobility Raw Score : 16  AM-PAC Inpatient T-Scale Score : 40.78    ASSESSMENT:  Activity Tolerance:  Patient tolerance of  treatment: fair. Limited by pain. Treatment Initiated: Treatment and education initiated within context of evaluation.   Evaluation time included review of current medical information, gathering information related to past medical, social and functional history, completion of standardized testing, formal and informal observation of tasks, assessment of data and development of plan of care and goals. Treatment time included skilled education and facilitation of tasks to increase safety and independence with functional mobility for improved independence and quality of life. Assessment: Body structures, Functions, Activity limitations: Decreased functional mobility , Decreased posture, Decreased endurance, Decreased strength, Decreased safe awareness, Increased pain  Assessment: Pt is below PLOF by way of transfers and ambulation s/p insertion of permanent spinal cord stimulator. He is primarily limited by severe abdominal pain, as well as decreased endurance. Pt will benefit from continued physical therapy to return to PLOF. Prognosis: Good    REQUIRES PT FOLLOW UP: Yes    Discharge Recommendations:  Discharge Recommendations: Continue to assess pending progress, Patient would benefit from continued therapy after discharge (once pt's pain is under control, anticipate he will be appropriate for home with assist prn)    Patient Education:  PT Education: Goals, Transfer Training, PT Role, Functional Mobility Training, General Safety, Gait Training    Equipment Recommendations:  Equipment Needed: No    Plan:  Times per week: 6-7x O  Times per day: Daily  Current Treatment Recommendations: Strengthening, Gait Training, Stair training, Patient/Caregiver Education & Training, Balance Training, Functional Mobility Training, Endurance Training, Transfer Training, Home Exercise Program, Safety Education & Training    Goals:  Patient goals : decrease pain, move better  Short term goals  Time Frame for Short term goals: by hospital discharge  Short term goal 1: Supine to/from sit with HOB flat with modified independence for ease of transfers at discharge site.   Short term goal 2: Sit to/from stand with modified independence for ease of transfers at discharge site. Short term goal 3: Ambulate 200' with LRD and modified independence for community distance ambulation. Short term goal 4: Ascend/descend 2 steps with HR and modified independence for entry into home. Long term goals  Time Frame for Long term goals : N/A due to short ELOS. Following session, patient left in safe position with all fall risk precautions in place.     Ye Rodriguez, PT, DPT

## 2021-09-11 NOTE — CONSULTS
[address] [place] [address]                                  CONSULTATION    PATIENT NAME: Siddharth Granda                   :        1955  MED REC NO:   [id number]                           ROOM:       0016  ACCOUNT NO:   [de-identified]                           ADMIT DATE: 2021  PROVIDER:     Edu Jones. Claudio Elder MD    CONSULT DATE:  09/10/2021    CHIEF COMPLAINT:  Acute abdominal pain post laminectomy and spinal cord  stimulator placement. HISTORY OF PRESENT ILLNESS:  The patient is a 57-year-old white male who  I had seen in the past performing a ventral hernia repair. The patient  was taken to Surgery yesterday by Dr. Yoanna Singh performing a thoracic spine  laminectomy and placement of a permanent paddle spinal cord stimulator  to level of T7-T8. According to the wife, when he got back to the  same-day surgery prior to being admitted, he was complaining of a lot of  abdominal pain. Apparently all night, the pain was severe in his  abdomen, continued throughout the day today, 09/10, and a CT scan was  ordered of the abdomen and pelvis. It has been read as basically  completely normal for any acute process. No evidence of bowel  obstruction, no ileus, no free air, no free fluid, and it was done with  oral contrast.  However, the patient has continued to have basically a  rigid abdomen and surgical consultation was requested. PAST MEDICAL HISTORY:  Positive for reflux disease, depression,  hypertension, migraines, history of obstructive sleep apnea,  postoperative nausea and vomiting. He does have a history of seizures  and a long history of chronic back pain. SURGERIES:  Include a remote appendectomy in . He has had a  posterior fusion back in 2015. He has had an ACDF of the cervical  spine C4-C6.   He has had an inguinal hernia repair in  and then had a  ventral hernia repaired by me in 2017 using a 10 x 15 cm Echo PS  Ventralight mesh. This was performed laparoscopically. SOCIAL HISTORY:  He is . He is a nonsmoker and nondrinker. FAMILY HISTORY:  Positive for coronary artery disease, COPD, lupus. ALLERGIES:  ARE TO BEE VENOM, DILAUDID, NITROFURAN, NITROGLYCERIN. PHYSICAL EXAMINATION:  GENERAL:  The patient is a 30-year-old white male. He is resting in his  bed. HEAD, EARS, EYES, NOSE AND THROAT:  Pupils are equal.  EOMs are intact. Sclerae are clear. NECK:  Soft. CARDIAC:  S1, S2.  RESPIRATIONS:  Clear. ABDOMEN:  Tense. The patient has significant pain to palpation, but  even just stroking the abdominal wall, he elicits pain response. Groins  are negative. No hernia. EXTREMITIES:  The upper and lower extremities within normal limits. LABORATORY DATA:  Revealed a white count of 13.9, hemoglobin of 14.1. CT scan report and films were reviewed and other than showing  cholelithiasis, showed no acute abnormality. There was no evidence of  pneumatosis of the bowel. No acute or inflammatory, infectious process  seen and no evidence of bowel obstruction. ASSESSMENT AND PLAN:  Acute abdominal pain that has now been ongoing for  almost 24 hours. The patient was seen initially at 3:30 today. We  would expect that with 24 hours of potential perforated bowel or any  other issues, that at some point would be abnormal on the CT scan. I am  going to order a lactic acid. I will return in a few hours to see the  patient. Also my recommendation is go ahead and give him some  narcotics, he has only been on Toradol overnight, but also like to try  Valium to see if this could be severe muscle spasm. Again, this is a  very unusual case. Difficult to imagine he has a surgical process in  the abdomen with a completely normal CT scan. The patient was prone for  an hour-and-a-half approximately. Again, we will check lactic acid and  we will recheck.     ADDENDUM:  The patient was seen again this evening at 6:30. Lactic acid  was 1.0 which is normal.  The patient's abdominal exam is unchanged even  with morphine and the Valium. Again just lightly stroking the skin, the  patient is complaining of pain. My recommendation would be to perform a  stat CTA. The only thing I can think is that he may have clotted off  SMA or JARON, but again this would be 24 hours in the making and you think  that the CT scan showed significant pneumatosis or some evidence of  ischemic bowel. Also you expect his lactic acid to be elevated. We  will proceed with CTA, I discussed with the nurse. CELESTINE HERNANDEZ [facility], MD    D: 09/10/2021 19:01:52       T: 09/10/2021 19:06:22     [email or web address]  Job#: [id number]     Doc#: [id number]    CC:

## 2021-09-11 NOTE — PROGRESS NOTES
Hospitalist Progress Note      Patient:  Liberty Wooten    Unit/Bed:7K-16/016-A  YOB: 1955  MRN: [id number]   Acct: [de-identified]   PCP: Farzaneh Shaikh MD  Date of Admission: 9/9/2021    Assessment/Plan:    1. Severe abdominal pain:  a. Severe periumbilical and right lower quadrant abdominal pain with hyper algesia/hyperesthesia. Concerning for thoracic radiculopathy, but with atypical presentation given lack of dermatomal distribution. b. CT A/P and CTA A/P without acute pathology. c. Check TSH and B12 today  d. Start Lyrica and duloxetine. Continue decadron, Norco prn, flexeril prn, and toradol prn.   e. Consider adding tramadol or amitriptyline for adjunctive therapy for possible neuropathic pain. f. Encourage out of bed as able and PT/OT as tolerated. g. Advance diet as tolerated.   h. General surgery and neurosurgery following. 2. Postoperative hypoxia:   a. Likely contributing factor of splinting due to severe abdominal pain. b. We will wean supplemental oxygen as tolerated. 3. Seizure disorder:   a. Continue Depakote and Lamictal.  b. Seizure precautions in place. c. As needed Ativan for seizure-like activity. 4. Depression:   a. Zoloft on hold. Would not resume while patient is receiving Cymbalta. The Cymbalta does not improve patient's pain, may resume home Zoloft and discontinue Cymbalta. Disposition: Unknown at this time. Patient continues to have severe abdominal pain. Disposition per neurosurgery. Chief Complaint: Severe abdominal pain    Hospital Course:    9/9: Underwent thoracic spine laminectomy with placement of permanent paddle spinal cord stimulator at T7-8    9/10: Postop, patient having severe abdominal pain. General surgery and hospitalist consulted for evaluation and management. CT abdomen pelvis without any acute pathology. Significant for gallstones.   CT angiogram of abdomen pelvis completed with no evidence of occlusion. Patient's pain nonresponsive to opioids, muscle relaxers, benzodiazepines. Subjective (past 24 hours):   Patient states he continues to have severe abdominal pain, mostly in the periumbilical region as well as the right lower quadrant. Denies any chest pain. He has difficulty with deep inspiration. He admits to passing flatus, but no bowel movements. Denies any nausea vomiting fevers or chills. States none of the medications he is received have improved his pain. Past medical history, family history, social history and allergies reviewed again and is unchanged since admission. ROS (12 point review of systems completed. Pertinent positives noted. Otherwise ROS is negative)     Medications:  Reviewed    Infusion Medications    sodium chloride 75 mL/hr at 09/11/21 0408     Scheduled Medications    pregabalin  75 mg Oral BID    DULoxetine  20 mg Oral Daily    polyethylene glycol  17 g Oral Daily    docusate sodium  100 mg Oral Daily    divalproex  500 mg Oral Daily    lamoTRIgine  200 mg Oral BID    dexamethasone  4 mg IntraVENous Q6H     PRN Meds: cyclobenzaprine, LORazepam, dicyclomine, morphine **OR** morphine, ketorolac, HYDROcodone 5 mg - acetaminophen, diphenhydrAMINE, ondansetron      Intake/Output Summary (Last 24 hours) at 9/11/2021 1129  Last data filed at 9/11/2021 0756  Gross per 24 hour   Intake 2054.82 ml   Output 1225 ml   Net 829.82 ml       Diet:  ADULT DIET; Clear Liquid    Exam:  BP (!) 145/80   Pulse 74   Temp 97.8 °F (36.6 °C) (Oral)   Resp 18   Ht 5' 8.5\" (1.74 m)   Wt 237 lb 4.8 oz (107.6 kg)   SpO2 92%   BMI 35.56 kg/m²   General appearance: Elderly male resting in bed. Appears uncomfortable. HEENT: Pupils equal, round, and reactive to light. Conjunctivae/corneas clear. Neck: Supple, with full range of motion. No jugular venous distention. Trachea midline. Respiratory: Shallow respirations. Mild tachypnea.   Lungs are clear to auscultation without wheezes, rales, rhonchi. Cardiovascular: Regular rate and rhythm with normal S1/S2 without murmurs, rubs or gallops. Abdomen: Exquisite tenderness to light palpation of the patient's skin the periumbilical and right lower quadrants. No flank pain. Hypoactive bowel sounds. Musculoskeletal: passive and active ROM x 4 extremities. Skin: No active skin lesions. No discoloration. Clean and dry dressings over the patient's thoracic spine following spinal cord stimulator placement. Neurologic:  Neurovascularly intact without any focal sensory/motor deficits. Cranial nerves: II-XII intact, grossly non-focal.  Hyperalgesia and hyperesthesia noted the patient's abdomen and right lower quadrant. Psychiatric: Alert and oriented, thought content appropriate, normal insight  Capillary Refill: Brisk,< 3 seconds   Peripheral Pulses: +2 palpable, equal bilaterally     Labs:   Recent Labs     09/10/21  1054 09/11/21  0808   WBC 13.9* 10.9*   HGB 14.1 13.3*   HCT 44.5 42.1   * 361     Recent Labs     09/10/21  1054      K 4.3      CO2 24   BUN 19   CREATININE 0.8   CALCIUM 8.4*     Recent Labs     09/10/21  1054   AST 29   ALT 19   BILITOT 0.3   ALKPHOS 79     No results for input(s): INR in the last 72 hours. No results for input(s): Les Hector in the last 72 hours.     Microbiology:    Blood culture #1: No results found for: BC    Blood culture #2:No results found for: April Latina    Organism:No results found for: ORG    No results found for: LABGRAM    MRSA culture only:No results found for: [facility]    Urine culture: No results found for: LABURIN    Respiratory culture: No results found for: CULTRESP    Aerobic and Anaerobic :  No results found for: LABAERO  No results found for: LABANAE    Urinalysis:      Lab Results   Component Value Date    NITRU NEGATIVE 11/25/2016    [address] NONE 11/25/2016    BACTERIA NONE 11/25/2016    RBCUA NONE 11/25/2016    BLOODU NEGATIVE 11/25/2016 SPECGRAV 1.015 11/25/2016    GLUCOSEU neg 03/11/2015    GLUCOSEU NEGATIVE 06/11/2013       Radiology:  CTA ABDOMEN PELVIS W WO CONTRAST   Final Result       1. No evidence of acute intra-abdominal or intrapelvic abnormality. 2. Cholelithiasis. **This report has been created using voice recognition software. It may contain minor errors which are inherent in voice recognition technology. **      Final report electronically signed by Dr. Lilliana Acuna MD on 9/10/2021 8:34 PM      CT ABDOMEN PELVIS W WO CONTRAST Additional Contrast? Radiologist Recommendation   Final Result      No acute inflammatory or infectious process in the abdomen or pelvis. No evidence of bowel obstruction. **This report has been created using voice recognition software. It may contain minor errors which are inherent in voice recognition technology. **      Final report electronically signed by Dr. Gregory Hernandez on 9/10/2021 1:02 PM      FLUORO FOR SURGICAL PROCEDURES   Final Result      XR THORACIC SPINE (3 VIEWS)   Final Result   Status post neurostimulator insertion. **This report has been created using voice recognition software. It may contain minor errors which are inherent in voice recognition technology. **      Final report electronically signed by Dr. Rhoda Serrano on 9/9/2021 4:44 PM        CT ABDOMEN PELVIS W WO CONTRAST Additional Contrast? Radiologist Recommendation    Result Date: 9/10/2021  PROCEDURE: CT ABDOMEN PELVIS W WO CONTRAST CLINICAL INFORMATION: severe localized abdominal pain at the mid abdominal wall, r/o any pathology including incarcerated hernia/fecal impaction/obestruction . COMPARISON: None. TECHNIQUE: 5 mm axial CT images were obtained through the abdomen and pelvis before and after the administration of intravenous and oral contrast. Coronal and sagittal reconstructions were obtained.  Isovue-370 IV and 40 oral contrast All CT scans at this facility use dose modulation, iterative reconstruction, and/or weight-based dosing when appropriate to reduce radiation dose to as low as reasonably achievable. FINDINGS: Lower chest: Bibasilar atelectasis. Lingular and left lower lobe Liver: The entire liver is not included on this exam. Elevated right hemidiaphragm and a portion the liver is excluded from this imaging the liver is otherwise unremarkable in appearance Gallbladder/Biliary tree: There are gallstones present. There is no ductal dilatation or pericholecystic edema. Spleen: Unremarkable. No splenomegaly. Pancreas: Unremarkable. No mass or pancreatic ductal dilatation. No findings to suggest acute pancreatitis. Adrenal glands: Unremarkable. No mass. Kidneys and ureters: Unremarkable. No hydroureteronephrosis, mass, or cyst. No renal or ureteral calculi. Stomach, small bowel, and colon: Stomach and duodenum are unremarkable. Small bowel and colon are unremarkable. No bowel wall thickening or bowel obstruction. Appendix: Prior appendectomy. Omentum and mesentery: Unremarkable Aorta, vascular: No aortic aneurysm or dissection. No significant vascular abnormality. Reproductive: Unremarkable Bladder: Unremarkable. No wall thickening or obvious mass. No stones. Intraperitoneal/retroperitoneal Space: There is no ascites, abscess, adenopathy, or mass. No pneumoperitoneum. Abdominal and pelvic body wall soft tissues: Prominent fat-containing left inguinal hernia. Musculoskeletal structures: Postsurgical changes lumbar spine with laminectomies and L3-5. Recently placed thoracic spinal stimulator     No acute inflammatory or infectious process in the abdomen or pelvis. No evidence of bowel obstruction. **This report has been created using voice recognition software. It may contain minor errors which are inherent in voice recognition technology. ** Final report electronically signed by Dr. Ryan Howell on 9/10/2021 1:02 PM    XR THORACIC SPINE (3 VIEWS)    Result Date: 9/9/2021  THORACIC SPINE 4 VIEWS: CLINICAL INFORMATION: surgery TECHNIQUE: 4 fluoroscopic spot films of the thoracic spine were obtained during neurostimulator insertion by Dr. Gilmar Mart. The actual fluoroscopy time is 33.3 seconds. FINDINGS: Metallic instrument is present particularly lower thoracic spine. Neurostimulator leads have been inserted and appear to be located at about the T7-8 level. Status post neurostimulator insertion. **This report has been created using voice recognition software. It may contain minor errors which are inherent in voice recognition technology. ** Final report electronically signed by Dr. Kaleb Orosco on 9/9/2021 4:44 PM    FLUORO FOR SURGICAL PROCEDURES    Result Date: 9/9/2021  Radiology exam is complete. No Radiologist dictation. Please follow up with ordering provider. CTA ABDOMEN PELVIS W WO CONTRAST    Result Date: 9/10/2021  PROCEDURE: CTA ABDOMEN PELVIS W WO CONTRAST CLINICAL INFORMATION: severe abd pain . Severe abdominal pain after spinal cord stimulator and internal pulse generator placement. Pain increased since previous CT this morning. COMPARISON: CT abdomen pelvis from the same date at 12:23 PM. TECHNIQUE: Helical CT of the abdomen and pelvis during intravenous administration of 80 mL Isovue-370 injected in the right forearm with multiplanar volumetric maximum intensity projection reconstructions. All CT scans at this facility use dose modulation, iterative reconstruction, and/or weight-based dosing when appropriate to reduce radiation dose to as low as reasonably achievable. FINDINGS: There is atelectasis at the bilateral lung bases, stable compared to prior exam. The visualized portion of the heart is unremarkable. The ascending aorta is normal in caliber without focal aneurysm or stenosis. The iliac vessels are patent and normal in appearance. The celiac, mesenteric and renal arteries are patent without flow-limiting stenosis. The liver is unremarkable. There is contrast within the gallbladder. There is a hyperdense stone at the gallbladder neck, stable compared to prior exam. Adrenal glands are unremarkable. The kidneys are unremarkable. The spleen and pancreas are unremarkable. No retroperitoneal or mesenteric lymphadenopathy is identified. The large bowel appears within normal in its. The appendix is normal in appearance. Small bowel appears within normal limits. The bladder is unremarkable without focal filling defect. The prostate is mildly enlarged, stable compared to prior exam. No free fluid is identified. Redemonstration of laminectomies at L3-L5. There is a stable spinal stimulator generator in the subcutis fat overlying the left back muscles. The stimulator lead enters the spinal canal at the T9 level, stable compared to prior exam.      1. No evidence of acute intra-abdominal or intrapelvic abnormality. 2. Cholelithiasis. **This report has been created using voice recognition software. It may contain minor errors which are inherent in voice recognition technology. ** Final report electronically signed by Dr. Anali Avila MD on 9/10/2021 8:34 PM      Electronically signed by Arnie Ya DO on 9/11/2021 at 11:29 AM

## 2021-09-11 NOTE — PROGRESS NOTES
Addendum by Dr. Lawayne Gottron, MD:  I have seen and examined the patient independently. Face to face evaluation and examination was performed. The below evaluation and note have been reviewed. Labs and radiographs were reviewed. I Have discussed with Neurosurgery PA about this patient in detail. The below assessment and plan have been reviewed. Please see my modifications mentioned below. My additional comments and modifications:  -Postop day 2 (placement of permanent spinal cord stimulator system). -Patient is continue to experience severe abdominal pain.  -Abdominal CT and CTA were negative.  -No indication for any acute surgical intervention at this time per general surgery.  -Follow-up the recommendation of general surgery and hospital service at this time. - Pain control.   -I discussed the case with patient and his wife also I discussed the case with patient nurse. I discussed the case with hospitalist service and general surgery.  -Neurosurgery to follow. Lawayne Gottron, MD         Neurosurgery Progress Note    Patient:  Leon Cast      Unit/Bed:-16/016-A    YOB: 1955    MRN: [id number]     Acct: [de-identified]     Admit date: 9/9/2021    No chief complaint on file. Patient Seen, Chart, Physician notes, Labs, Radiology studies reviewed. Subjective: The patient is seen and evaluated on the floor postoperative day #2 following thoracic laminectomy and placement of permanent spinal cord stimulator and left flank internal pulse generator performed by Dr. Natali Winn. He continues with intractable abdominal pain he did at a 8 or 9 out of 10 at the time of exam.        Past, Family, Social History unchanged from admission. Diet:  ADULT DIET;  Clear Liquid    Medications:  Scheduled Meds:   pregabalin  75 mg Oral BID    DULoxetine  20 mg Oral Daily    polyethylene glycol  17 g Oral Daily    docusate sodium  100 mg Oral Daily    divalproex  500 mg Oral Daily    lamoTRIgine  200 mg Oral BID    dexamethasone  4 mg IntraVENous Q6H     Continuous Infusions:   sodium chloride 75 mL/hr at 09/11/21 0408     PRN Meds:cyclobenzaprine, LORazepam, dicyclomine, morphine **OR** morphine, ketorolac, HYDROcodone 5 mg - acetaminophen, diphenhydrAMINE, ondansetron    Objective: Patient is lying in bed with head of bed mildly elevated and is noticeably uncomfortable with continuing abdominal discomfort. He is otherwise stable and intact for strength and sensation bilaterally and symmetrically, as to his baseline with no additional significant changes noted overnight. Surgical incisions remain flat and dry with dressings intact. Vitals: /78   Pulse 79   Temp 98.2 °F (36.8 °C) (Oral)   Resp 20   Ht 5' 8.5\" (1.74 m)   Wt 237 lb 4.8 oz (107.6 kg)   SpO2 95%   BMI 35.56 kg/m²   Physical Exam:  Alert and attentive. Language appropriate, with no aphasia. Pupils equal.  Facial strength symmetric. Review of systems:    Significant abdominal pain continues. Additional complaints of mild incisional site discomfort are noted. Nausea with vomiting and headache remain. She denies chest pain or shortness of breath was afebrile at the time of exam.      24 hour intake/output:    Intake/Output Summary (Last 24 hours) at 9/11/2021 1222  Last data filed at 9/11/2021 1156  Gross per 24 hour   Intake 2054.82 ml   Output 1300 ml   Net 754.82 ml     Last 3 weights: Wt Readings from Last 3 Encounters:   09/11/21 237 lb 4.8 oz (107.6 kg)   09/02/21 214 lb 1.1 oz (97.1 kg)   08/19/21 214 lb (97.1 kg)         CBC:   Recent Labs     09/10/21  1054 09/11/21  0808   WBC 13.9* 10.9*   HGB 14.1 13.3*   * 361     BMP:    Recent Labs     09/10/21  1054      K 4.3      CO2 24   BUN 19   CREATININE 0.8   GLUCOSE 121*     Calcium:  Recent Labs     09/10/21  1054   CALCIUM 8.4*     Magnesium:No results for input(s): MG in the last 72 hours.   Glucose:No results for input(s): POCGLU in the last 72 hours. HgbA1C: No results for input(s): LABA1C in the last 72 hours. Lipids: No results for input(s): CHOL, TRIG, HDL, LDLCALC in the last 72 hours. Invalid input(s): LDL    Radiology reports as per the Radiologist  Radiology: CT ABDOMEN PELVIS W WO CONTRAST Additional Contrast? Radiologist Recommendation    Result Date: 9/10/2021  PROCEDURE: CT ABDOMEN PELVIS W WO CONTRAST CLINICAL INFORMATION: severe localized abdominal pain at the mid abdominal wall, r/o any pathology including incarcerated hernia/fecal impaction/obestruction . COMPARISON: None. TECHNIQUE: 5 mm axial CT images were obtained through the abdomen and pelvis before and after the administration of intravenous and oral contrast. Coronal and sagittal reconstructions were obtained. Isovue-370 IV and 40 oral contrast All CT scans at this facility use dose modulation, iterative reconstruction, and/or weight-based dosing when appropriate to reduce radiation dose to as low as reasonably achievable. FINDINGS: Lower chest: Bibasilar atelectasis. Lingular and left lower lobe Liver: The entire liver is not included on this exam. Elevated right hemidiaphragm and a portion the liver is excluded from this imaging the liver is otherwise unremarkable in appearance Gallbladder/Biliary tree: There are gallstones present. There is no ductal dilatation or pericholecystic edema. Spleen: Unremarkable. No splenomegaly. Pancreas: Unremarkable. No mass or pancreatic ductal dilatation. No findings to suggest acute pancreatitis. Adrenal glands: Unremarkable. No mass. Kidneys and ureters: Unremarkable. No hydroureteronephrosis, mass, or cyst. No renal or ureteral calculi. Stomach, small bowel, and colon: Stomach and duodenum are unremarkable. Small bowel and colon are unremarkable. No bowel wall thickening or bowel obstruction. Appendix: Prior appendectomy. Omentum and mesentery: Unremarkable Aorta, vascular: No aortic aneurysm or dissection.  No significant vascular abnormality. Reproductive: Unremarkable Bladder: Unremarkable. No wall thickening or obvious mass. No stones. Intraperitoneal/retroperitoneal Space: There is no ascites, abscess, adenopathy, or mass. No pneumoperitoneum. Abdominal and pelvic body wall soft tissues: Prominent fat-containing left inguinal hernia. Musculoskeletal structures: Postsurgical changes lumbar spine with laminectomies and L3-5. Recently placed thoracic spinal stimulator     No acute inflammatory or infectious process in the abdomen or pelvis. No evidence of bowel obstruction. **This report has been created using voice recognition software. It may contain minor errors which are inherent in voice recognition technology. ** Final report electronically signed by Dr. Claribel Palacio on 9/10/2021 1:02 PM    XR THORACIC SPINE (3 VIEWS)    Result Date: 9/9/2021  THORACIC SPINE 4 VIEWS: CLINICAL INFORMATION: surgery TECHNIQUE: 4 fluoroscopic spot films of the thoracic spine were obtained during neurostimulator insertion by Dr. Naomie Martinez. The actual fluoroscopy time is 33.3 seconds. FINDINGS: Metallic instrument is present particularly lower thoracic spine. Neurostimulator leads have been inserted and appear to be located at about the T7-8 level. Status post neurostimulator insertion. **This report has been created using voice recognition software. It may contain minor errors which are inherent in voice recognition technology. ** Final report electronically signed by Dr. Kit Mohamud on 9/9/2021 4:44 PM    FLUORO FOR SURGICAL PROCEDURES    Result Date: 9/9/2021  Radiology exam is complete. No Radiologist dictation. Please follow up with ordering provider. CTA ABDOMEN PELVIS W WO CONTRAST    Result Date: 9/10/2021  PROCEDURE: CTA ABDOMEN PELVIS W WO CONTRAST CLINICAL INFORMATION: severe abd pain . Severe abdominal pain after spinal cord stimulator and internal pulse generator placement. Pain increased since previous CT this morning. COMPARISON: CT abdomen pelvis from the same date at 12:23 PM. TECHNIQUE: Helical CT of the abdomen and pelvis during intravenous administration of 80 mL Isovue-370 injected in the right forearm with multiplanar volumetric maximum intensity projection reconstructions. All CT scans at this facility use dose modulation, iterative reconstruction, and/or weight-based dosing when appropriate to reduce radiation dose to as low as reasonably achievable. FINDINGS: There is atelectasis at the bilateral lung bases, stable compared to prior exam. The visualized portion of the heart is unremarkable. The ascending aorta is normal in caliber without focal aneurysm or stenosis. The iliac vessels are patent and normal in appearance. The celiac, mesenteric and renal arteries are patent without flow-limiting stenosis. The liver is unremarkable. There is contrast within the gallbladder. There is a hyperdense stone at the gallbladder neck, stable compared to prior exam. Adrenal glands are unremarkable. The kidneys are unremarkable. The spleen and pancreas are unremarkable. No retroperitoneal or mesenteric lymphadenopathy is identified. The large bowel appears within normal in its. The appendix is normal in appearance. Small bowel appears within normal limits. The bladder is unremarkable without focal filling defect. The prostate is mildly enlarged, stable compared to prior exam. No free fluid is identified. Redemonstration of laminectomies at L3-L5. There is a stable spinal stimulator generator in the subcutis fat overlying the left back muscles. The stimulator lead enters the spinal canal at the T9 level, stable compared to prior exam.      1. No evidence of acute intra-abdominal or intrapelvic abnormality. 2. Cholelithiasis. **This report has been created using voice recognition software. It may contain minor errors which are inherent in voice recognition technology. ** Final report electronically signed by Dr. Danial Sahu MD on 9/10/2021 8:34 PM                   Assessment: Postoperative day #2 from thoracic laminectomy and placement of permanent spinal cord stimulator with left leg internal pulse generator performed by Dr. Torres Singleton. Admission to address postoperative abdominal pain. Active Problems:    Chronic pain syndrome    S/P insertion of spinal cord stimulator    Lumbosacral spinal stenosis  Resolved Problems:    * No resolved hospital problems. *        Plan: Patient is seen and evaluated on the floor with evaluation exam findings reviewed and discussed with Dr. Torres Singleton, with nursing and with spouse. Patient remains noticeably uncomfortable with abdominal pain rated at an 8-9 out of 10. Patient also relates inability to pass flatulence x2 days. Surgical incisions are flat and dry with dressings intact and he is otherwise intact and stable neurologically to his baseline with no additional significant changes noted overnight. A CT scan of the abdomen revealed no acute inflammatory or infectious processes for the abdomen or pelvis and no evidence of bowel obstruction. A CTA of the abdomen and pelvis was equally unremarkable with no evidence of acute intra-abdominal or intrapelvic abnormalities. The appendix was normal in appearance with a hyperdense stone at the gallbladder neck considered stable compared to prior imaging noted. Neurosurgery contends that this symptom presentation may be secondary to positioning from the procedure with a muscle relaxant added to the medication regimen. Bowel protocol orders were placed along with PT and OT with emphasis on mobilization.   Neurosurgery to follow      Electronically signed by Lisha Moore PA-C on 9/11/2021 at 12:22 PM    Neurosurgery

## 2021-09-11 NOTE — PROGRESS NOTES
[address] [facility],   On behalf of Dr. Татьяна Plummer   Covering for Dr. Cherie Reyes  Daily Progress Note  Patient seen and examined independently by me. The below note has been modified by me to reflect current findings and plans. Labs, cultures, and radiographs where available were reviewed. I discussed patient concerns with the patient's nurse and instructions were given. Please see our orders for the updated patient care plan. Pt still with pain, but seems to be even related to light touch on the abdominal wall. Lactic acid normal, afebrile, WBC 10.9, CT and CTA unremarkable for intra-abdominal pathology. May likely be related to neuropathic pain. Clear liquids as tolerated. Electronically signed by Devon Mitchell DO on 9/11/2021 at 12:01 PM    Pt Name: Jj Dobbins  Medical Record Number: [id number]  Date of Birth 1955   Today's Date: 9/11/2021  Chief complaint: Abdominal Pain  ASSESSMENT:   1. Hospital day # 2  2. Acute abdominal pain post laminectomy and spinal cord stimulator placement    3. Has a past medical history of Back pain, Depression, GERD (gastroesophageal reflux disease), Headache(784.0), Migraines, ALICIA treated with BiPAP, Pneumonia, Pulmonary embolism ([address] S/P cardiac catheterization: 7/31/2017: No obstructive lasions. , and Seizures ([address] RECOMMENDATIONS:   1. IV hydration  2. Analgesics and antiemetics as needed  3. Clear liquid diet as tolerated  4. CT and CTA demonstrated no surgical process responsible for abdominal pain, lactic acid normal. Suspect neurological source of abdominal pain  SUBJECTIVE:   Refugia Mandy is still experiencing severe abdominal pain. He denies nausea or vomiting, has not passed flatus and has not had a bowel movement. He denies any oral intake during hospital course, has only tolerated sips and medications. His pain is poorly controlled on current medications.  States having 10/10 pain diffusely throughout abdomen, worsening since procedure. Has been in constant pain since procedure, unable to move in bed or get out of bed. MEDICATIONS   Scheduled Meds:   pregabalin  75 mg Oral BID    DULoxetine  20 mg Oral Daily    polyethylene glycol  17 g Oral Daily    docusate sodium  100 mg Oral Daily    divalproex  500 mg Oral Daily    lamoTRIgine  200 mg Oral BID    dexamethasone  4 mg IntraVENous Q6H     Continuous Infusions:   sodium chloride 75 mL/hr at 21 0408     PRN Meds:.cyclobenzaprine, LORazepam, dicyclomine, morphine **OR** morphine, ketorolac, HYDROcodone 5 mg - acetaminophen, diphenhydrAMINE, ondansetron  OBJECTIVE   CURRENT VITALS:  height is 5' 8.5\" (1.74 m) and weight is 237 lb 4.8 oz (107.6 kg). His oral temperature is 97.8 °F (36.6 °C). His blood pressure is 145/80 (abnormal) and his pulse is 74. His respiration is 18 and oxygen saturation is 92%. Temperature Range (24h):Temp: 97.8 °F (36.6 °C) Temp  Av.2 °F (36.8 °C)  Min: 97.7 °F (36.5 °C)  Max: 98.7 °F (37.1 °C)  BP Range (37H): Systolic (24TYA), OYC:831 , Min:128 , BUA:492     Diastolic (19PFI), HJR:31, Min:77, Max:81    Pulse Range (24h): Pulse  Av.4  Min: 73  Max: 83  Respiration Range (24h): Resp  Av.4  Min: 18  Max: 20  Current Pulse Ox (24h):  SpO2: 92 %  Pulse Ox Range (24h):  SpO2  Av.4 %  Min: 92 %  Max: 93 %  Oxygen Amount and Delivery: O2 Flow Rate (L/min): 2 L/min  Incentive Spirometry Tx:            GENERAL: alert, cooperative, moderate distress  LUNGS: clear to ausculation, without wheezes, rales or rhonci  HEART: normal rate and regular rhythm  ABDOMEN: tenderness present- diffusely, beginning at umbilicus up to RUQ and LUQ  with rebound and guarding  EXTREMITY: no cyanosis, clubbing or edema  In: 1375.4 [P.O.:400; I.V.:975.4]  Out: 1125 [Urine:1125]     Date 21 235   Shift [id number] 6438-1184 7070-6206 24 Hour Total   INTAKE   P.O.(mL/kg/hr) 0(0)   0   I. V.(mL/kg) 517. 9(4.8)   517. 9(4.8) Electronically signed by Tristan Cordoba DO on 9/11/2021 at 11:13 AM

## 2021-09-12 ENCOUNTER — APPOINTMENT (OUTPATIENT)
Dept: GENERAL RADIOLOGY | Age: 66
DRG: 981 | End: 2021-09-12
Attending: NEUROLOGICAL SURGERY
Payer: MEDICARE

## 2021-09-12 LAB
ALBUMIN SERPL-MCNC: 3.8 G/DL (ref 3.5–5.1)
ALP BLD-CCNC: 69 U/L (ref 38–126)
ALT SERPL-CCNC: 23 U/L (ref 11–66)
ANION GAP SERPL CALCULATED.3IONS-SCNC: 8 MEQ/L (ref 8–16)
AST SERPL-CCNC: 30 U/L (ref 5–40)
BILIRUB SERPL-MCNC: 0.4 MG/DL (ref 0.3–1.2)
BUN BLDV-MCNC: 20 MG/DL (ref 7–22)
CALCIUM SERPL-MCNC: 8.4 MG/DL (ref 8.5–10.5)
CHLORIDE BLD-SCNC: 104 MEQ/L (ref 98–111)
CO2: 26 MEQ/L (ref 23–33)
CREAT SERPL-MCNC: 0.8 MG/DL (ref 0.4–1.2)
ERYTHROCYTE [DISTWIDTH] IN BLOOD BY AUTOMATED COUNT: 13.2 % (ref 11.5–14.5)
ERYTHROCYTE [DISTWIDTH] IN BLOOD BY AUTOMATED COUNT: 47 FL (ref 35–45)
GFR SERPL CREATININE-BSD FRML MDRD: > 90 ML/MIN/1.73M2
GLUCOSE BLD-MCNC: 117 MG/DL (ref 70–108)
HCT VFR BLD CALC: 41 % (ref 42–52)
HEMOGLOBIN: 13.5 GM/DL (ref 14–18)
LACTIC ACID: 2.2 MMOL/L (ref 0.5–2)
MCH RBC QN AUTO: 31.8 PG (ref 26–33)
MCHC RBC AUTO-ENTMCNC: 32.9 GM/DL (ref 32.2–35.5)
MCV RBC AUTO: 96.7 FL (ref 80–94)
PHOSPHORUS: 2.2 MG/DL (ref 2.4–4.7)
PLATELET # BLD: 344 THOU/MM3 (ref 130–400)
PMV BLD AUTO: 9.8 FL (ref 9.4–12.4)
POTASSIUM REFLEX MAGNESIUM: 3.8 MEQ/L (ref 3.5–5.2)
RBC # BLD: 4.24 MILL/MM3 (ref 4.7–6.1)
SODIUM BLD-SCNC: 138 MEQ/L (ref 135–145)
TOTAL PROTEIN: 6.6 G/DL (ref 6.1–8)
WBC # BLD: 11 THOU/MM3 (ref 4.8–10.8)

## 2021-09-12 PROCEDURE — 99232 SBSQ HOSP IP/OBS MODERATE 35: CPT | Performed by: STUDENT IN AN ORGANIZED HEALTH CARE EDUCATION/TRAINING PROGRAM

## 2021-09-12 PROCEDURE — 6360000002 HC RX W HCPCS: Performed by: PHYSICIAN ASSISTANT

## 2021-09-12 PROCEDURE — 74018 RADEX ABDOMEN 1 VIEW: CPT

## 2021-09-12 PROCEDURE — 6370000000 HC RX 637 (ALT 250 FOR IP): Performed by: FAMILY MEDICINE

## 2021-09-12 PROCEDURE — 97116 GAIT TRAINING THERAPY: CPT

## 2021-09-12 PROCEDURE — 84100 ASSAY OF PHOSPHORUS: CPT

## 2021-09-12 PROCEDURE — 97530 THERAPEUTIC ACTIVITIES: CPT

## 2021-09-12 PROCEDURE — 36415 COLL VENOUS BLD VENIPUNCTURE: CPT

## 2021-09-12 PROCEDURE — 99232 SBSQ HOSP IP/OBS MODERATE 35: CPT | Performed by: SURGERY

## 2021-09-12 PROCEDURE — 80053 COMPREHEN METABOLIC PANEL: CPT

## 2021-09-12 PROCEDURE — APPSS30 APP SPLIT SHARED TIME 16-30 MINUTES: Performed by: PHYSICIAN ASSISTANT

## 2021-09-12 PROCEDURE — 85027 COMPLETE CBC AUTOMATED: CPT

## 2021-09-12 PROCEDURE — 6370000000 HC RX 637 (ALT 250 FOR IP): Performed by: PHYSICIAN ASSISTANT

## 2021-09-12 PROCEDURE — 83605 ASSAY OF LACTIC ACID: CPT

## 2021-09-12 PROCEDURE — 6370000000 HC RX 637 (ALT 250 FOR IP): Performed by: STUDENT IN AN ORGANIZED HEALTH CARE EDUCATION/TRAINING PROGRAM

## 2021-09-12 PROCEDURE — 97165 OT EVAL LOW COMPLEX 30 MIN: CPT

## 2021-09-12 PROCEDURE — 2580000003 HC RX 258: Performed by: FAMILY MEDICINE

## 2021-09-12 PROCEDURE — 1200000000 HC SEMI PRIVATE

## 2021-09-12 PROCEDURE — 99024 POSTOP FOLLOW-UP VISIT: CPT | Performed by: NEUROLOGICAL SURGERY

## 2021-09-12 RX ORDER — DOCUSATE SODIUM 100 MG/1
100 CAPSULE, LIQUID FILLED ORAL 2 TIMES DAILY
Status: DISCONTINUED | OUTPATIENT
Start: 2021-09-12 | End: 2021-09-15 | Stop reason: HOSPADM

## 2021-09-12 RX ORDER — DIAZEPAM 5 MG/1
5 TABLET ORAL EVERY 8 HOURS PRN
Status: DISCONTINUED | OUTPATIENT
Start: 2021-09-12 | End: 2021-09-15 | Stop reason: HOSPADM

## 2021-09-12 RX ORDER — SODIUM PHOSPHATE,MONO-DIBASIC 19G-7G/118
1 ENEMA (ML) RECTAL ONCE
Status: COMPLETED | OUTPATIENT
Start: 2021-09-12 | End: 2021-09-12

## 2021-09-12 RX ORDER — SENNA PLUS 8.6 MG/1
2 TABLET ORAL NIGHTLY
Status: DISCONTINUED | OUTPATIENT
Start: 2021-09-12 | End: 2021-09-15 | Stop reason: HOSPADM

## 2021-09-12 RX ADMIN — DIVALPROEX SODIUM 500 MG: 500 TABLET, EXTENDED RELEASE ORAL at 09:24

## 2021-09-12 RX ADMIN — DOCUSATE SODIUM 100 MG: 100 CAPSULE ORAL at 09:25

## 2021-09-12 RX ADMIN — SODIUM PHOSPHATE, DIBASIC AND SODIUM PHOSPHATE, MONOBASIC 1 ENEMA: 7; 19 ENEMA RECTAL at 18:52

## 2021-09-12 RX ADMIN — DIPHENHYDRAMINE HYDROCHLORIDE 25 MG: 50 INJECTION, SOLUTION INTRAMUSCULAR; INTRAVENOUS at 00:45

## 2021-09-12 RX ADMIN — KETOROLAC TROMETHAMINE 15 MG: 30 INJECTION, SOLUTION INTRAMUSCULAR; INTRAVENOUS at 21:50

## 2021-09-12 RX ADMIN — DEXAMETHASONE SODIUM PHOSPHATE 4 MG: 4 INJECTION, SOLUTION INTRA-ARTICULAR; INTRALESIONAL; INTRAMUSCULAR; INTRAVENOUS; SOFT TISSUE at 13:22

## 2021-09-12 RX ADMIN — POTASSIUM & SODIUM PHOSPHATES POWDER PACK 280-160-250 MG 500 MG: 280-160-250 PACK at 17:41

## 2021-09-12 RX ADMIN — PREGABALIN 75 MG: 75 CAPSULE ORAL at 20:08

## 2021-09-12 RX ADMIN — DIAZEPAM 5 MG: 5 TABLET ORAL at 21:50

## 2021-09-12 RX ADMIN — DOCUSATE SODIUM 100 MG: 100 CAPSULE ORAL at 20:08

## 2021-09-12 RX ADMIN — LAMOTRIGINE 200 MG: 100 TABLET ORAL at 20:08

## 2021-09-12 RX ADMIN — KETOROLAC TROMETHAMINE 15 MG: 30 INJECTION, SOLUTION INTRAMUSCULAR; INTRAVENOUS at 09:23

## 2021-09-12 RX ADMIN — KETOROLAC TROMETHAMINE 15 MG: 30 INJECTION, SOLUTION INTRAMUSCULAR; INTRAVENOUS at 00:45

## 2021-09-12 RX ADMIN — HYDROCODONE BITARTRATE AND ACETAMINOPHEN 1 TABLET: 5; 325 TABLET ORAL at 03:35

## 2021-09-12 RX ADMIN — KETOROLAC TROMETHAMINE 15 MG: 30 INJECTION, SOLUTION INTRAMUSCULAR; INTRAVENOUS at 15:28

## 2021-09-12 RX ADMIN — DEXAMETHASONE SODIUM PHOSPHATE 4 MG: 4 INJECTION, SOLUTION INTRA-ARTICULAR; INTRALESIONAL; INTRAMUSCULAR; INTRAVENOUS; SOFT TISSUE at 20:08

## 2021-09-12 RX ADMIN — MAGNESIUM CITRATE 296 ML: 1.75 LIQUID ORAL at 11:59

## 2021-09-12 RX ADMIN — SODIUM CHLORIDE: 9 INJECTION, SOLUTION INTRAVENOUS at 20:14

## 2021-09-12 RX ADMIN — DEXAMETHASONE SODIUM PHOSPHATE 4 MG: 4 INJECTION, SOLUTION INTRA-ARTICULAR; INTRALESIONAL; INTRAMUSCULAR; INTRAVENOUS; SOFT TISSUE at 05:24

## 2021-09-12 RX ADMIN — PREGABALIN 75 MG: 75 CAPSULE ORAL at 09:23

## 2021-09-12 RX ADMIN — DULOXETINE HYDROCHLORIDE 20 MG: 20 CAPSULE, DELAYED RELEASE ORAL at 09:24

## 2021-09-12 RX ADMIN — DIPHENHYDRAMINE HYDROCHLORIDE 25 MG: 50 INJECTION, SOLUTION INTRAMUSCULAR; INTRAVENOUS at 13:21

## 2021-09-12 RX ADMIN — DIPHENHYDRAMINE HYDROCHLORIDE 25 MG: 50 INJECTION, SOLUTION INTRAMUSCULAR; INTRAVENOUS at 20:09

## 2021-09-12 RX ADMIN — CYCLOBENZAPRINE 10 MG: 10 TABLET, FILM COATED ORAL at 03:35

## 2021-09-12 RX ADMIN — DEXAMETHASONE SODIUM PHOSPHATE 4 MG: 4 INJECTION, SOLUTION INTRA-ARTICULAR; INTRALESIONAL; INTRAMUSCULAR; INTRAVENOUS; SOFT TISSUE at 00:45

## 2021-09-12 RX ADMIN — LAMOTRIGINE 200 MG: 100 TABLET ORAL at 09:24

## 2021-09-12 RX ADMIN — SENNOSIDES 17.2 MG: 8.6 TABLET, COATED ORAL at 20:08

## 2021-09-12 RX ADMIN — DIAZEPAM 5 MG: 5 TABLET ORAL at 13:22

## 2021-09-12 RX ADMIN — POLYETHYLENE GLYCOL 3350 17 G: 17 POWDER, FOR SOLUTION ORAL at 09:23

## 2021-09-12 ASSESSMENT — PAIN SCALES - GENERAL
PAINLEVEL_OUTOF10: 10
PAINLEVEL_OUTOF10: 9
PAINLEVEL_OUTOF10: 6
PAINLEVEL_OUTOF10: 9
PAINLEVEL_OUTOF10: 6
PAINLEVEL_OUTOF10: 6

## 2021-09-12 NOTE — PROGRESS NOTES
Addendum by Dr. Savanah Stewart MD:  I have seen and examined the patient independently. Face to face evaluation and examination was performed. The below evaluation and note have been reviewed. Labs and radiographs were reviewed. I Have discussed with Neurosurgery PA about this patient in detail. The below assessment and plan have been reviewed. Please see my modifications mentioned below. My additional comments and modifications:  -Postop day 3 (placement of permanent spinal cord stimulator system). -Patient is continue to experience severe abdominal pain.  -Abdominal CT and CTA were negative.  -No indication for any acute surgical intervention at this time per general surgery. - KUP showed: paralytic ileus involving predominantly the colon  -Follow-up the recommendation of general surgery and hospital service at this time. - Pain control.   -Bowel protocol.   -I discussed the case with patient and his wife also I discussed the case with patient nurse. I discussed the case with hospitalist service and general surgery.  -Neurosurgery to follow. Savanah Stewart MD       Neurosurgery Progress Note    Patient:  [facility] [facility]      Unit/Bed:[facility]16/016-A    YOB: 1955    MRN: [id number]     Acct: [de-identified]     Admit date: 9/9/2021    No chief complaint on file. Patient Seen, Chart, Physician notes, Labs, Radiology studies reviewed. Subjective: Patient is seen and evaluated on the floor with evaluation exam findings reviewed and discussed with Dr. Candace Duffy, Dr. [facility] surgery and with hospitalist and nursing. He is postoperative day #3 following thoracic laminectomy and placement of spinal cord stimulator and left flank internal pulse generator performed by Dr. Candace Duffy. Patient was sitting up in a chair this morning at the time of exam relating little improvement in intractable abdominal pain rated at between 8 and 9 out of 10 this morning.   Patient adds that he has not had a bowel movement since the day before his procedure and has not been passing gas. Past, Family, Social History unchanged from admission. Diet:  ADULT DIET; Full Liquid    Medications:  Scheduled Meds:   docusate sodium  100 mg Oral BID    senna  2 tablet Oral Nightly    pregabalin  75 mg Oral BID    DULoxetine  20 mg Oral Daily    polyethylene glycol  17 g Oral Daily    divalproex  500 mg Oral Daily    lamoTRIgine  200 mg Oral BID    dexamethasone  4 mg IntraVENous Q6H     Continuous Infusions:   sodium chloride Stopped (09/11/21 1707)     PRN Meds:magnesium citrate, diazePAM, LORazepam, dicyclomine, morphine **OR** morphine, ketorolac, HYDROcodone 5 mg - acetaminophen, diphenhydrAMINE, ondansetron    Objective: Patient is observed sitting up in a chair with 8-9 out of 10 abdominal pain with increased sensitivity to touch. Pain has been intractable to muscle relaxants pain medications. Incisions remain flat and dry with dressings intact he is otherwise stable and intact neurologically for strength and sensation bilaterally and symmetrically. Some nausea with vomiting continues    Vitals: BP (!) 146/85   Pulse 63   Temp 97.1 °F (36.2 °C) (Oral)   Resp 18   Ht 5' 8.5\" (1.74 m)   Wt 237 lb 4.8 oz (107.6 kg)   SpO2 95%   BMI 35.56 kg/m²   Physical Exam:  Alert and attentive. Language appropriate, with no aphasia. Pupils equal.  Facial strength symmetric. Review of systems:    Patient denies headache. Positive for occasional nausea with vomiting. Positive for intense abdominal pain and sensitivity to touch for the abdomen and an 8 out of 10 at the time of exam this morning. Patient denies chest pain or shortness of breath. 24 hour intake/output:    Intake/Output Summary (Last 24 hours) at 9/12/2021 1043  Last data filed at 9/11/2021 2222  Gross per 24 hour   Intake 600 ml   Output 625 ml   Net -25 ml     Last 3 weights:   Wt Readings from Last 3 Encounters: 09/11/21 237 lb 4.8 oz (107.6 kg)   09/02/21 214 lb 1.1 oz (97.1 kg)   08/19/21 214 lb (97.1 kg)         CBC:   Recent Labs     09/10/21  1054 09/11/21  0808 09/12/21  0948   WBC 13.9* 10.9* 11.0*   HGB 14.1 13.3* 13.5*   * 361 344     BMP:    Recent Labs     09/10/21  1054      K 4.3      CO2 24   BUN 19   CREATININE 0.8   GLUCOSE 121*     Calcium:  Recent Labs     09/10/21  1054   CALCIUM 8.4*     Magnesium:No results for input(s): MG in the last 72 hours. Glucose:No results for input(s): POCGLU in the last 72 hours. HgbA1C: No results for input(s): LABA1C in the last 72 hours. Lipids: No results for input(s): CHOL, TRIG, HDL, LDLCALC in the last 72 hours. Invalid input(s): LDL    Radiology reports as per the Radiologist  Radiology: CT ABDOMEN PELVIS W WO CONTRAST Additional Contrast? Radiologist Recommendation    Result Date: 9/10/2021  PROCEDURE: CT ABDOMEN PELVIS W WO CONTRAST CLINICAL INFORMATION: severe localized abdominal pain at the mid abdominal wall, r/o any pathology including incarcerated hernia/fecal impaction/obestruction . COMPARISON: None. TECHNIQUE: 5 mm axial CT images were obtained through the abdomen and pelvis before and after the administration of intravenous and oral contrast. Coronal and sagittal reconstructions were obtained. Isovue-370 IV and 40 oral contrast All CT scans at this facility use dose modulation, iterative reconstruction, and/or weight-based dosing when appropriate to reduce radiation dose to as low as reasonably achievable. FINDINGS: Lower chest: Bibasilar atelectasis. Lingular and left lower lobe Liver: The entire liver is not included on this exam. Elevated right hemidiaphragm and a portion the liver is excluded from this imaging the liver is otherwise unremarkable in appearance Gallbladder/Biliary tree: There are gallstones present. There is no ductal dilatation or pericholecystic edema. Spleen: Unremarkable. No splenomegaly.  Pancreas: Unremarkable. No mass or pancreatic ductal dilatation. No findings to suggest acute pancreatitis. Adrenal glands: Unremarkable. No mass. Kidneys and ureters: Unremarkable. No hydroureteronephrosis, mass, or cyst. No renal or ureteral calculi. Stomach, small bowel, and colon: Stomach and duodenum are unremarkable. Small bowel and colon are unremarkable. No bowel wall thickening or bowel obstruction. Appendix: Prior appendectomy. Omentum and mesentery: Unremarkable Aorta, vascular: No aortic aneurysm or dissection. No significant vascular abnormality. Reproductive: Unremarkable Bladder: Unremarkable. No wall thickening or obvious mass. No stones. Intraperitoneal/retroperitoneal Space: There is no ascites, abscess, adenopathy, or mass. No pneumoperitoneum. Abdominal and pelvic body wall soft tissues: Prominent fat-containing left inguinal hernia. Musculoskeletal structures: Postsurgical changes lumbar spine with laminectomies and L3-5. Recently placed thoracic spinal stimulator     No acute inflammatory or infectious process in the abdomen or pelvis. No evidence of bowel obstruction. **This report has been created using voice recognition software. It may contain minor errors which are inherent in voice recognition technology. ** Final report electronically signed by Dr. Katy Rehman on 9/10/2021 1:02 PM    XR THORACIC SPINE (3 VIEWS)    Result Date: 9/9/2021  THORACIC SPINE 4 VIEWS: CLINICAL INFORMATION: surgery TECHNIQUE: 4 fluoroscopic spot films of the thoracic spine were obtained during neurostimulator insertion by Dr. Bonny Whitten. The actual fluoroscopy time is 33.3 seconds. FINDINGS: Metallic instrument is present particularly lower thoracic spine. Neurostimulator leads have been inserted and appear to be located at about the T7-8 level. Status post neurostimulator insertion. **This report has been created using voice recognition software.   It may contain minor errors which are inherent in voice recognition technology. ** Final report electronically signed by Dr. Jacklyn Gomez on 9/9/2021 4:44 PM    FLUORO FOR SURGICAL PROCEDURES    Result Date: 9/9/2021  Radiology exam is complete. No Radiologist dictation. Please follow up with ordering provider. CTA ABDOMEN PELVIS W WO CONTRAST    Result Date: 9/10/2021  PROCEDURE: CTA ABDOMEN PELVIS W WO CONTRAST CLINICAL INFORMATION: severe abd pain . Severe abdominal pain after spinal cord stimulator and internal pulse generator placement. Pain increased since previous CT this morning. COMPARISON: CT abdomen pelvis from the same date at 12:23 PM. TECHNIQUE: Helical CT of the abdomen and pelvis during intravenous administration of 80 mL Isovue-370 injected in the right forearm with multiplanar volumetric maximum intensity projection reconstructions. All CT scans at this facility use dose modulation, iterative reconstruction, and/or weight-based dosing when appropriate to reduce radiation dose to as low as reasonably achievable. FINDINGS: There is atelectasis at the bilateral lung bases, stable compared to prior exam. The visualized portion of the heart is unremarkable. The ascending aorta is normal in caliber without focal aneurysm or stenosis. The iliac vessels are patent and normal in appearance. The celiac, mesenteric and renal arteries are patent without flow-limiting stenosis. The liver is unremarkable. There is contrast within the gallbladder. There is a hyperdense stone at the gallbladder neck, stable compared to prior exam. Adrenal glands are unremarkable. The kidneys are unremarkable. The spleen and pancreas are unremarkable. No retroperitoneal or mesenteric lymphadenopathy is identified. The large bowel appears within normal in its. The appendix is normal in appearance. Small bowel appears within normal limits. The bladder is unremarkable without focal filling defect. The prostate is mildly enlarged, stable compared to prior exam. No free fluid is identified. Redemonstration of laminectomies at L3-L5. There is a stable spinal stimulator generator in the subcutis fat overlying the left back muscles. The stimulator lead enters the spinal canal at the T9 level, stable compared to prior exam.      1. No evidence of acute intra-abdominal or intrapelvic abnormality. 2. Cholelithiasis. **This report has been created using voice recognition software. It may contain minor errors which are inherent in voice recognition technology. ** Final report electronically signed by Dr. Anila Scott MD on 9/10/2021 8:34 PM                   Assessment: Intractable abdominal pain postoperative day 3 from thoracic laminectomy and placement of spinal cord stimulator and left leg internal pulse generator performed Dr. Anna Castro. Active Problems:    Chronic pain syndrome    S/P insertion of spinal cord stimulator    Lumbosacral spinal stenosis  Resolved Problems:    * No resolved hospital problems. *        Plan: Patient is seen and evaluated on the floor with evaluation exam findings reviewed and discussed with Dr. Anna Castro, Dr. Traci Bethea surgery, with hospitalist and nursing. Patient continues with intractable abdominal discomfort and sensitivity to touch with pain rated at between 8 and 9 out of 10 this morning. Of note: Patient has not experienced a bowel movement since the day prior to his procedure and is now passing gas. An aggressive bowel regimen is in place. Hospitalist service has added Lyrica and Cymbalta to the medication regimen to address abdominal discomfort with Flexeril discontinued in favor of Valium per general surgery. A KUB has been ordered by general surgery and is pending. Neurosurgery continues to recommend PT and OT as tolerated with mobilization and up to a chair along with continued pain control.   Neurosurgery to follow      Electronically signed by Maggie Carranza PA-C on 9/12/2021 at 10:43 AM    Neurosurgery

## 2021-09-12 NOTE — PROGRESS NOTES
[address] Ameena Yen MD  Covering for Dr. Alicia Johansen  Daily Progress Note    Pt Name: Kym Moe Record Number: [id number]  Date of Birth 1955   Today's Date: 9/12/2021  Chief complaint: Abdominal Pain  ASSESSMENT:   1. Hospital day # 3  2. Acute abdominal pain post laminectomy and spinal cord stimulator placement    3. Has a past medical history of Back pain, Depression, GERD (gastroesophageal reflux disease), Headache(784.0), Migraines, ALICIA treated with BiPAP, Pneumonia, Pulmonary embolism ([facility] [address] S/P cardiac catheterization: 7/31/2017: No obstructive lasions. , and Seizures ([facility] [address] RECOMMENDATIONS:   1. IV hydration  2. Analgesics and antiemetics as needed  3. Tolerating clear liquids. Full liquids today. 4. CT and CTA demonstrated no surgical process responsible for abdominal pain, lactic acid normal. Suspect neurological source of abdominal pain. Await a.m. labs. Check KUB. SUBJECTIVE:   Kylie Luciano is still experiencing severe abdominal pain. Chart reviewed. Updated by nursing staff. He denies nausea or vomiting, has not passed flatus and has not had a bowel movement. Tolerating clear liquids. Still having significant pain on abdominal wall. Even to light touch. Patient feels more abdominal wall and not deep intra-abdominal as area of pain. Has been in constant pain since procedure. CT and CTA has been within normal limits. No significant lactic acidosis. Etiology unknown so far.   MEDICATIONS   Scheduled Meds:   pregabalin  75 mg Oral BID    DULoxetine  20 mg Oral Daily    polyethylene glycol  17 g Oral Daily    docusate sodium  100 mg Oral Daily    divalproex  500 mg Oral Daily    lamoTRIgine  200 mg Oral BID    dexamethasone  4 mg IntraVENous Q6H     Continuous Infusions:   sodium chloride Stopped (09/11/21 3277)     PRN Meds:.cyclobenzaprine, LORazepam, dicyclomine, morphine **OR** morphine, ketorolac, HYDROcodone 5 mg - acetaminophen, diphenhydrAMINE, ondansetron  OBJECTIVE   CURRENT VITALS:  height is 5' 8.5\" (1.74 m) and weight is 237 lb 4.8 oz (107.6 kg). His oral temperature is 97.1 °F (36.2 °C). His blood pressure is 146/85 (abnormal) and his pulse is 63. His respiration is 18 and oxygen saturation is 95%. Temperature Range (24h):Temp: 97.1 °F (36.2 °C) Temp  Av.9 °F (36.6 °C)  Min: 97.1 °F (36.2 °C)  Max: 98.4 °F (36.9 °C)  BP Range (21R): Systolic (67WRC), ZIC:963 , Min:139 , LRK:805     Diastolic (39DQP), YAK:24, Min:78, Max:92    Pulse Range (24h): Pulse  Av.8  Min: 62  Max: 87  Respiration Range (24h): Resp  Av.2  Min: 18  Max: 20  Current Pulse Ox (24h):  SpO2: 95 %  Pulse Ox Range (24h):  SpO2  Av.8 %  Min: 94 %  Max: 95 %  Oxygen Amount and Delivery: O2 Flow Rate (L/min): 1 L/min  Incentive Spirometry Tx:            GENERAL: alert, cooperative, moderate distress  LUNGS: clear to ausculation, without wheezes, rales or rhonci  HEART: normal rate and regular rhythm  ABDOMEN: tenderness present- diffusely, beginning at umbilicus up to RUQ and LUQ  with rebound and guarding. Tenderness even to light touch. EXTREMITY: no cyanosis, clubbing or edema  In: 600 [P.O.:600]  Out: 350 [Urine:350]       LABS     Recent Labs     09/10/21  1054 21  0808   WBC 13.9* 10.9*   HGB 14.1 13.3*   HCT 44.5 42.1   * 361     --    K 4.3  --      --    CO2 24  --    BUN 19  --    CREATININE 0.8  --    CALCIUM 8.4*  --       No results for input(s): PTT, INR in the last 72 hours. Invalid input(s): PT  Recent Labs     09/10/21  1054 09/10/21  1502 21  0808   AST 29  --   --    ALT 19  --   --    BILITOT 0.3  --   --    LACTA  --  1.0 1.2     No results for input(s): TROPONINT in the last 72 hours. RADIOLOGY     CTA ABDOMEN PELVIS W WO CONTRAST   Final Result       1. No evidence of acute intra-abdominal or intrapelvic abnormality. 2. Cholelithiasis.                **This report has been created using

## 2021-09-12 NOTE — PROGRESS NOTES
[address]eli[address]on[address] Lozano 62  INPATIENT OCCUPATIONAL THERAPY  [facility] ORTHOPEDICS 7K  EVALUATION    Time:   Time In: 4142  Time Out: 1009  Timed Code Treatment Minutes: 35 Minutes  Minutes: 41    Date: 2021  Patient Name: Gamaliel Greenberg,   Gender: male      MRN: [id number]  : 1955  (61 y.o.)  Referring Practitioner: Red Dean PA-C  Diagnosis: Chronic pain syndrome  Additional Pertinent Hx: Pt with hx of failed back syndrome and succesful spinal cord simulator trial is now s/p THORACIC LAMINECTOMY FOR PLACEMENT OF PERMANENT SPINAL CORD STMULATOR AND LEFT FLANK INTERNAL PULSE GENERATOR (DOS: 21) by Dr. Radha Velázquez. Pt has been c/o 10/10 abdominal pain since surgery--per RN, Dr. Rahda Velázquez and Dr. Bryce Maddox, may be due to pt with no flatus or bowel movements post-op. Restrictions/Precautions:  Restrictions/Precautions: General Precautions, Fall Risk    Subjective  Chart Reviewed: Yes, Orders, History and Physical, Operative Notes  Patient assessed for rehabilitation services?: Yes  Family / Caregiver Present: No    Subjective: RN approved OT session. Patient continues to report 10/10 pain in abdomen. Dr Radha Velázquez and Dr. Bryce Maddox in to see pt during session with pt reporting no flatus or bowel movement since surgery (DOS: ). At end of session pt sitting up in chair. Pain:  Pain Assessment  Patient Currently in Pain: Yes  Pain Level: 10    Vitals: Oxygen: 97% on 1L. Maintaining >93% on RA during ambulation. At end of session pt up in recliner with O2 off, notified RN.     Social/Functional History:  Lives With: Spouse  Type of Home: House  Home Layout: One level  Home Access: Stairs to enter with rails  Entrance Stairs - Number of Steps: 2 BRITTON; a couple steps within the house to access various rooms  Home Equipment: Silvia Corona, 4 wheeled walker (did not use DME PTA)   Bathroom Shower/Tub: Tub/Shower unit  Bathroom Toilet: Standard  Bathroom Equipment:  (none PTA)  Bathroom Accessibility: Zafar Zapata accessible  IADL Comments: Ind with IADLs PTA including grocery shopping - wife and pt share responsibilities       ADL Assistance: Independent  Homemaking Assistance: Independent  Homemaking Responsibilities: Yes (shares responsibilities with wife)  Ambulation Assistance: Independent  Transfer Assistance: Independent    Active : Yes  Occupation: On disability  Additional Comments: wife works full time, so will not be available all the time, daughters can help some--pt will be at home alone some    VISION:Corrected wears glasses full time    HEARING:  WNL    COGNITION: WFL    RANGE OF MOTION:  Bilateral Upper Extremity:  WFL    STRENGTH:  Bilateral Upper Extremity:  WFL    SENSATION:   WFL    ADL:   No ADL's completed this session. Pt declines need to use restroom and reports he would like to wait to complete other ADLs when wife arrives, Andressa Berry declining due to 10/10 pain. BALANCE:  Sitting Balance:  Stand By Assistance. Standing Balance: Contact Guard Assistance. BED MOBILITY:  Supine to Sit: Contact Guard Assistance, with head of bed raised, with rail, with increased time for completion due to pain    TRANSFERS:  Sit to Stand:  Contact Guard Assistance. Stand to Sit: Contact Guard Assistance. FUNCTIONAL MOBILITY:  Assistive Device: Rolling Walker  Assist Level:  Contact Guard Assistance. [facility] on unit and in room  Patient with good safety awareness, slow pace. Pt required 2 standing rest breaks due to pain. Verbal cues required to continue with pursed lip breathing due to pt holding breath when in pain. Activity Tolerance:  Patient tolerance of  treatment: fair. Patient limited due to 10/10 pain but agreeable to mobility. Dr. Masterson Breed in during session and emphasizing importance of increasing OOB activity to stimulate bowels to move. Anticipate pt with increased activity tolerance once pain better controlled. Assessment:  Assessment:  This 60 yo male s/p thoracic laminectomy for placement of permanent spinal cord stimulator and left flank internal pulse generator (DOS: 9/9/21), now presenting with 10/10 pain impairing overall participation in all mobility, self care, and endurance. Patient would benefit from skilled OT services throughout admission to continue increased overall endurance and strength for safe return to PLOF once pain controlled. Performance deficits / Impairments: Decreased functional mobility , Decreased ADL status, Decreased endurance  Prognosis: Good  REQUIRES OT FOLLOW UP: Yes    Treatment Initiated: Treatment and education initiated within context of evaluation. Evaluation time included review of current medical information, gathering information related to past medical, social and functional history, completion of standardized testing, formal and informal observation of tasks, assessment of data and development of plan of care and goals. Treatment time included skilled education and facilitation of tasks to increase safety and independence with ADL's for improved functional independence and quality of life. Discharge Recommendations:  Continue to assess pending progress, Patient would benefit from continued therapy after discharge (Pt will be able to dc home with assist prn once pain better under control)    Patient Education:  OT Education: OT Role, Plan of Care    Equipment Recommendations:  Equipment Needed: No (continue to monitor for needs)    Plan:  Times per week: 5x  Current Treatment Recommendations: Endurance Training, Functional Mobility Training, Strengthening, Self-Care / ADL. See long-term goal time frame for expected duration of plan of care. If no long-term goals established, a short length of stay is anticipated. Goals:  Patient goals :  For pain to go away  Short term goals  Time Frame for Short term goals: By discharge  Short term goal 1: Patient to increase activity tolerance to/from BR and HH distances with SBA to increase indep in home environment. Short term goal 2: Patient to complete BADL routine including toileting with SBA to increase indep in self care completion. Short term goal 3: Patient to tolerate dynamic standing task >3 minutes with SBA and 2UE release to increase indep bathing and toileting tasks. Short term goal 4: Patient to complete simple homemaking task with SBA to increase indep in making lunch at home. Following session, patient left in safe position with all fall risk precautions in place.

## 2021-09-12 NOTE — PROGRESS NOTES
[facility]  INPATIENT PHYSICAL THERAPY  DAILY NOTE  [facility] ORTHOPEDICS 7K - 7K-16/016-A    Time In: 7545  Time Out: 1238  Timed Code Treatment Minutes: 15 Minutes  Minutes: 15          Date: 2021  Patient Name: Amanda Joshi,  Gender:  male        MRN: [id number]  : 1955  (61 y.o.)     Referring Practitioner: Jeanna Duarte PA-C  Diagnosis: chronic pain syndrome  Additional Pertinent Hx: Pt with hx of failed back syndrome and succesful spinal cord simulator trial is now s/p THORACIC LAMINECTOMY FOR PLACEMENT OF PERMANENT SPINAL CORD STMULATOR AND LEFT FLANK INTERNAL PULSE GENERATOR (DOS: 21) by Dr. Glory Salazar. Pt has been c/o 10/10 abdominal pain since surgery--per chart review doctors are suspecting neurological origin. Prior Level of Function:  Lives With: Spouse  Type of Home: House  Home Layout: One level  Home Access: Stairs to enter with rails  Entrance Stairs - Number of Steps: 2 BRITTON; a couple steps within the house to access various rooms  Home Equipment: [employer], 4 wheeled walker (did not use DME PTA)   Bathroom Shower/Tub: Tub/Shower unit  Bathroom Toilet: Standard  Bathroom Equipment:  (none PTA)  Bathroom Accessibility: Walker accessible    ADL Assistance: Independent  Homemaking Assistance: Independent  Homemaking Responsibilities: Yes (shares responsibilities with wife)  Ambulation Assistance: Independent  Transfer Assistance: Independent  Active : Yes  IADL Comments: Ind with IADLs PTA including grocery shopping - wife and pt share responsibilities  Additional Comments: wife works full time, so will not be available all the time, daughters can help some--pt will be at home alone some    Restrictions/Precautions:  Restrictions/Precautions: General Precautions, Fall Risk     SUBJECTIVE: RN approved PT tx session. During session, RN came in to tell pt he is now NPO, and did inform PT following session that the pt has an ileus which is causing his abdominal pain.  Pt did agree to therapy, but was limited due to high pain level. Pt very focused on pain throughout session, and having difficulty speaking at normal volume due to high pain. Cues given to pt to focus on deep breathing once returned to bed. Pillows placed under knees to decrease stretch on abdominal musculature. PAIN: 10/10: abdominal     Vitals: Vitals not assessed per clinical judgement, see nursing flowsheet    OBJECTIVE:  Bed Mobility:  Supine to Sit: Stand By Assistance, X 1, with head of bed raised, with rail, with verbal cues , with increased time for completion, very slow, guarded, pt splinting abdomen  Sit to Supine: Minimal Assistance, X 1, with head of bed raised, with verbal cues , with increased time for completion, PT assisted with LE to decrease pressure associated with abdominal mm contraction due to severe pain   Scooting: Stand By Assistance, with increased time for completion, cues for bending both knees and using bed rails to pull self up towards HOB     Transfers:  Sit to Stand: [employer], X 1, with increased time for completion, cues for hand placement, with verbal cues  Stand to Sit:Contact Guard Assistance, X 1, with increased time for completion, cues for hand placement, with verbal cues, pt splinting at abdomen, and using other hand on bed rail--in very obvious discomfort     Ambulation:  Contact Guard Assistance, X 1, with cues for safety, with verbal cues , with increased time for completion  Distance: 50'  Surface: Level Tile  Device:Rolling Walker  Gait Deviations:   Forward Flexed Posture, Slow Vi, Decreased Step Length Bilaterally, Decreased Gait Speed and Narrow Base of Support  *cues to increase RENÉ, pt at increased tripping risk     Balance:  Static Sitting Balance:  Stand By Assistance  Dynamic Sitting Balance: Stand By Assistance  Static Standing Balance: Contact Guard Assistance  Dynamic Standing Balance: Contact Guard Assistance    Exercise:  Deferred due to 10/10 abdominal pain     Functional Outcome Measures: Completed  AM-PAC Inpatient Mobility Raw Score : 16  AM-PAC Inpatient T-Scale Score : 40.78    ASSESSMENT:  Assessment: Patient progressing slowly toward established goals. Activity Tolerance:  Patient tolerance of  treatment: poor. High pain. Equipment Recommendations:Equipment Needed: No  Discharge Recommendations:  Continue to assess pending progress, Patient would benefit from continued therapy after discharge (once pt's pain is under control, anticipate he will be appropriate for home with assist prn)    Plan: Times per week: 6-7x O  Times per day: Daily  Current Treatment Recommendations: Strengthening, Gait Training, Stair training, Patient/Caregiver Education & Training, Balance Training, Functional Mobility Training, Endurance Training, Transfer Training, Home Exercise Program, Safety Education & Training    Patient Education  Patient Education: Plan of Care, Bed Mobility, Transfers, Gait,  - Patient Verbalized Understanding, - Patient Requires Continued Education    Goals:  Patient goals : decrease pain, move better  Short term goals  Time Frame for Short term goals: by hospital discharge  Short term goal 1: Supine to/from sit with HOB flat with modified independence for ease of transfers at discharge site. Short term goal 2: Sit to/from stand with modified independence for ease of transfers at discharge site. Short term goal 3: Ambulate 200' with LRD and modified independence for community distance ambulation. Short term goal 4: Ascend/descend 2 steps with HR and modified independence for entry into home. Long term goals  Time Frame for Long term goals : N/A due to short ELOS. Following session, patient left in safe position with all fall risk precautions in place.     Jose Henderson, PT, DPT

## 2021-09-12 NOTE — PROGRESS NOTES
Hospitalist Progress Note      Patient:  Gely Peña    Unit/Bed:7K-16/016-A  YOB: 1955  MRN: [id number]   Acct: [de-identified]   PCP: Valeri Prado MD  Date of Admission: 9/9/2021    Assessment/Plan:    1. Severe abdominal pain:  a. Severe periumbilical and right lower quadrant abdominal pain with hyper algesia/hyperesthesia. b. No relief with Cymbalta or lyrica added yesterday. Continue for now along with opioids and benzodiazepines. c. Repeat KUB due to worsening abdominal distention and patient not passing flatus  d. CT A/P and CTA A/P on 9/10 without acute pathology. e. Encourage out of bed as able and PT/OT as tolerated. f. Advance diet as tolerated. g. General surgery and neurosurgery following. 2. Postoperative hypoxia:   a. Likely contributing factor of splinting due to severe abdominal pain. b. We will wean supplemental oxygen as tolerated. 3. Seizure disorder:   a. Continue Depakote and Lamictal.  b. Seizure precautions in place. c. As needed Ativan for seizure-like activity. 4. Depression:   a. Zoloft on hold. Would not resume while patient is receiving Cymbalta. The Cymbalta does not improve patient's pain, may resume home Zoloft and discontinue Cymbalta. Disposition: Unknown at this time. Patient continues to have severe abdominal pain. Disposition per neurosurgery. Chief Complaint: Severe abdominal pain    Hospital Course:    9/9: Underwent thoracic spine laminectomy with placement of permanent paddle spinal cord stimulator at T7-8    9/10: Postop, patient having severe abdominal pain. General surgery and hospitalist consulted for evaluation and management. CT abdomen pelvis without any acute pathology. Significant for gallstones. CT angiogram of abdomen pelvis completed with no evidence of occlusion. Patient's pain nonresponsive to opioids, muscle relaxers, benzodiazepines.     Subjective (past 24 hours): Patient continues to have severe abdominal pain refractory to medical management at this time. He states that he has not been passing flatus and has not had a bowel movement since the surgery. He was able to ambulate around the room, with significant discomfort yesterday. He states none of the medications he has received I provided even mild relief of his symptoms. Denies any nausea or vomiting. States he feels as though his abdomen is more distended than usual.      Past medical history, family history, social history and allergies reviewed again and is unchanged since admission. ROS (12 point review of systems completed. Pertinent positives noted. Otherwise ROS is negative)     Medications:  Reviewed    Infusion Medications    sodium chloride Stopped (09/11/21 1707)     Scheduled Medications    docusate sodium  100 mg Oral BID    senna  2 tablet Oral Nightly    pregabalin  75 mg Oral BID    DULoxetine  20 mg Oral Daily    polyethylene glycol  17 g Oral Daily    divalproex  500 mg Oral Daily    lamoTRIgine  200 mg Oral BID    dexamethasone  4 mg IntraVENous Q6H     PRN Meds: magnesium citrate, diazePAM, LORazepam, dicyclomine, morphine **OR** morphine, ketorolac, HYDROcodone 5 mg - acetaminophen, diphenhydrAMINE, ondansetron      Intake/Output Summary (Last 24 hours) at 9/12/2021 1213  Last data filed at 9/11/2021 2222  Gross per 24 hour   Intake 600 ml   Output 450 ml   Net 150 ml       Diet:  Diet NPO Exceptions are: Sips of Water with Meds    Exam:  BP (!) 146/85   Pulse 63   Temp 97.1 °F (36.2 °C) (Oral)   Resp 18   Ht 5' 8.5\" (1.74 m)   Wt 237 lb 4.8 oz (107.6 kg)   SpO2 95%   BMI 35.56 kg/m²   General appearance: Elderly male resting in bed. Appears uncomfortable. HEENT: Pupils equal, round, and reactive to light. Conjunctivae/corneas clear. Neck: Supple, with full range of motion. No jugular venous distention. Trachea midline. Respiratory: Shallow respirations. Mild tachypnea. Lungs are clear to auscultation without wheezes, rales, rhonchi. Cardiovascular: Regular rate and rhythm with normal S1/S2 without murmurs, rubs or gallops. Abdomen: Mild abdominal distention. Hypoactive bowel sounds. Exquisite tenderness to palpation mostly in the right lower quadrant, right upper quadrant and epigastric regions. Hyperesthesia noted to the abdominal wall. Musculoskeletal: passive and active ROM x 4 extremities. Skin: No active skin lesions. No discoloration. Clean and dry dressings over the patient's thoracic spine following spinal cord stimulator placement. Neurologic:  Neurovascularly intact without any focal sensory/motor deficits. Cranial nerves: II-XII intact, grossly non-focal.  Hyperalgesia and hyperesthesia noted the patient's abdomen and right lower quadrant. Psychiatric: Alert and oriented, thought content appropriate, normal insight  Capillary Refill: Brisk,< 3 seconds   Peripheral Pulses: +2 palpable, equal bilaterally     Labs:   Recent Labs     09/10/21  1054 09/11/21  0808 09/12/21  0948   WBC 13.9* 10.9* 11.0*   HGB 14.1 13.3* 13.5*   HCT 44.5 42.1 41.0*   * 361 344     Recent Labs     09/10/21  1054      K 4.3      CO2 24   BUN 19   CREATININE 0.8   CALCIUM 8.4*     Recent Labs     09/10/21  1054   AST 29   ALT 19   BILITOT 0.3   ALKPHOS 79     No results for input(s): INR in the last 72 hours. No results for input(s): Shellia Bugler in the last 72 hours.     Microbiology:    Blood culture #1: No results found for: BC    Blood culture #2:No results found for: Abisai Dunk    Organism:No results found for: ORG    No results found for: LABGRAM    MRSA culture only:No results found for: [facility]    Urine culture: No results found for: LABURIN    Respiratory culture: No results found for: CULTRESP    Aerobic and Anaerobic :  No results found for: LABAERO  No results found for: LABANAE    Urinalysis:      Lab Results   Component Value Date    NITRU NEGATIVE 11/25/2016    WBCUA NONE 11/25/2016    BACTERIA NONE 11/25/2016    RBCUA NONE 11/25/2016    BLOODU NEGATIVE 11/25/2016    SPECGRAV 1.015 11/25/2016    GLUCOSEU neg 03/11/2015    GLUCOSEU NEGATIVE 06/11/2013       Radiology:  XR ABDOMEN (KUB) (SINGLE AP VIEW)   Final Result   Findings of paralytic ileus involving predominantly the colon. **This report has been created using voice recognition software. It may contain minor errors which are inherent in voice recognition technology. **         Final report electronically signed by Dr. Myranda Baxter on 9/12/2021 10:48 AM      CTA ABDOMEN PELVIS W WO CONTRAST   Final Result       1. No evidence of acute intra-abdominal or intrapelvic abnormality. 2. Cholelithiasis. **This report has been created using voice recognition software. It may contain minor errors which are inherent in voice recognition technology. **      Final report electronically signed by Dr. Tristen Shaikh MD on 9/10/2021 8:34 PM      CT ABDOMEN PELVIS W WO CONTRAST Additional Contrast? Radiologist Recommendation   Final Result      No acute inflammatory or infectious process in the abdomen or pelvis. No evidence of bowel obstruction. **This report has been created using voice recognition software. It may contain minor errors which are inherent in voice recognition technology. **      Final report electronically signed by Dr. Tani Salazar on 9/10/2021 1:02 PM      FLUORO FOR SURGICAL PROCEDURES   Final Result      XR THORACIC SPINE (3 VIEWS)   Final Result   Status post neurostimulator insertion. **This report has been created using voice recognition software. It may contain minor errors which are inherent in voice recognition technology. **      Final report electronically signed by Dr. Myranda Baxter on 9/9/2021 4:44 PM        CT ABDOMEN PELVIS W WO CONTRAST Additional Contrast? Radiologist Recommendation    Result Date: 9/10/2021  PROCEDURE: CT ABDOMEN PELVIS W WO CONTRAST CLINICAL INFORMATION: severe localized abdominal pain at the mid abdominal wall, r/o any pathology including incarcerated hernia/fecal impaction/obestruction . COMPARISON: None. TECHNIQUE: 5 mm axial CT images were obtained through the abdomen and pelvis before and after the administration of intravenous and oral contrast. Coronal and sagittal reconstructions were obtained. Isovue-370 IV and 40 oral contrast All CT scans at this facility use dose modulation, iterative reconstruction, and/or weight-based dosing when appropriate to reduce radiation dose to as low as reasonably achievable. FINDINGS: Lower chest: Bibasilar atelectasis. Lingular and left lower lobe Liver: The entire liver is not included on this exam. Elevated right hemidiaphragm and a portion the liver is excluded from this imaging the liver is otherwise unremarkable in appearance Gallbladder/Biliary tree: There are gallstones present. There is no ductal dilatation or pericholecystic edema. Spleen: Unremarkable. No splenomegaly. Pancreas: Unremarkable. No mass or pancreatic ductal dilatation. No findings to suggest acute pancreatitis. Adrenal glands: Unremarkable. No mass. Kidneys and ureters: Unremarkable. No hydroureteronephrosis, mass, or cyst. No renal or ureteral calculi. Stomach, small bowel, and colon: Stomach and duodenum are unremarkable. Small bowel and colon are unremarkable. No bowel wall thickening or bowel obstruction. Appendix: Prior appendectomy. Omentum and mesentery: Unremarkable Aorta, vascular: No aortic aneurysm or dissection. No significant vascular abnormality. Reproductive: Unremarkable Bladder: Unremarkable. No wall thickening or obvious mass. No stones. Intraperitoneal/retroperitoneal Space: There is no ascites, abscess, adenopathy, or mass. No pneumoperitoneum. Abdominal and pelvic body wall soft tissues: Prominent fat-containing left inguinal hernia.  Musculoskeletal structures: Postsurgical changes lumbar appropriate to reduce radiation dose to as low as reasonably achievable. FINDINGS: There is atelectasis at the bilateral lung bases, stable compared to prior exam. The visualized portion of the heart is unremarkable. The ascending aorta is normal in caliber without focal aneurysm or stenosis. The iliac vessels are patent and normal in appearance. The celiac, mesenteric and renal arteries are patent without flow-limiting stenosis. The liver is unremarkable. There is contrast within the gallbladder. There is a hyperdense stone at the gallbladder neck, stable compared to prior exam. Adrenal glands are unremarkable. The kidneys are unremarkable. The spleen and pancreas are unremarkable. No retroperitoneal or mesenteric lymphadenopathy is identified. The large bowel appears within normal in its. The appendix is normal in appearance. Small bowel appears within normal limits. The bladder is unremarkable without focal filling defect. The prostate is mildly enlarged, stable compared to prior exam. No free fluid is identified. Redemonstration of laminectomies at L3-L5. There is a stable spinal stimulator generator in the subcutis fat overlying the left back muscles. The stimulator lead enters the spinal canal at the T9 level, stable compared to prior exam.      1. No evidence of acute intra-abdominal or intrapelvic abnormality. 2. Cholelithiasis. **This report has been created using voice recognition software. It may contain minor errors which are inherent in voice recognition technology. ** Final report electronically signed by Dr. Danial Sahu MD on 9/10/2021 8:34 PM      Electronically signed by Melodie Gold DO on 9/12/2021 at 12:13 PM

## 2021-09-13 ENCOUNTER — TELEPHONE (OUTPATIENT)
Dept: NEUROSURGERY | Age: 66
End: 2021-09-13

## 2021-09-13 ENCOUNTER — APPOINTMENT (OUTPATIENT)
Dept: GENERAL RADIOLOGY | Age: 66
DRG: 981 | End: 2021-09-13
Attending: NEUROLOGICAL SURGERY
Payer: MEDICARE

## 2021-09-13 LAB
ALBUMIN SERPL-MCNC: 3.8 G/DL (ref 3.5–5.1)
ALP BLD-CCNC: 70 U/L (ref 38–126)
ALT SERPL-CCNC: 23 U/L (ref 11–66)
ANION GAP SERPL CALCULATED.3IONS-SCNC: 7 MEQ/L (ref 8–16)
AST SERPL-CCNC: 23 U/L (ref 5–40)
BASOPHILS # BLD: 0.1 %
BASOPHILS ABSOLUTE: 0 THOU/MM3 (ref 0–0.1)
BILIRUB SERPL-MCNC: 0.4 MG/DL (ref 0.3–1.2)
BUN BLDV-MCNC: 21 MG/DL (ref 7–22)
CALCIUM SERPL-MCNC: 8.4 MG/DL (ref 8.5–10.5)
CHLORIDE BLD-SCNC: 105 MEQ/L (ref 98–111)
CO2: 26 MEQ/L (ref 23–33)
CREAT SERPL-MCNC: 0.8 MG/DL (ref 0.4–1.2)
EOSINOPHIL # BLD: 0 %
EOSINOPHILS ABSOLUTE: 0 THOU/MM3 (ref 0–0.4)
ERYTHROCYTE [DISTWIDTH] IN BLOOD BY AUTOMATED COUNT: 12.9 % (ref 11.5–14.5)
ERYTHROCYTE [DISTWIDTH] IN BLOOD BY AUTOMATED COUNT: 45.8 FL (ref 35–45)
GFR SERPL CREATININE-BSD FRML MDRD: > 90 ML/MIN/1.73M2
GLUCOSE BLD-MCNC: 106 MG/DL (ref 70–108)
HCT VFR BLD CALC: 41.4 % (ref 42–52)
HEMOGLOBIN: 13.6 GM/DL (ref 14–18)
IMMATURE GRANS (ABS): 0.11 THOU/MM3 (ref 0–0.07)
IMMATURE GRANULOCYTES: 1 %
LYMPHOCYTES # BLD: 13.6 %
LYMPHOCYTES ABSOLUTE: 1.5 THOU/MM3 (ref 1–4.8)
MAGNESIUM: 2.3 MG/DL (ref 1.6–2.4)
MCH RBC QN AUTO: 31.4 PG (ref 26–33)
MCHC RBC AUTO-ENTMCNC: 32.9 GM/DL (ref 32.2–35.5)
MCV RBC AUTO: 95.6 FL (ref 80–94)
MONOCYTES # BLD: 8.6 %
MONOCYTES ABSOLUTE: 0.9 THOU/MM3 (ref 0.4–1.3)
NUCLEATED RED BLOOD CELLS: 0 /100 WBC
PHOSPHORUS: 3.3 MG/DL (ref 2.4–4.7)
PLATELET # BLD: 364 THOU/MM3 (ref 130–400)
PMV BLD AUTO: 9.8 FL (ref 9.4–12.4)
POTASSIUM SERPL-SCNC: 4.6 MEQ/L (ref 3.5–5.2)
RBC # BLD: 4.33 MILL/MM3 (ref 4.7–6.1)
SEG NEUTROPHILS: 76.7 %
SEGMENTED NEUTROPHILS ABSOLUTE COUNT: 8.4 THOU/MM3 (ref 1.8–7.7)
SODIUM BLD-SCNC: 138 MEQ/L (ref 135–145)
TOTAL PROTEIN: 6.4 G/DL (ref 6.1–8)
WBC # BLD: 11 THOU/MM3 (ref 4.8–10.8)

## 2021-09-13 PROCEDURE — 97535 SELF CARE MNGMENT TRAINING: CPT

## 2021-09-13 PROCEDURE — 99024 POSTOP FOLLOW-UP VISIT: CPT | Performed by: NEUROLOGICAL SURGERY

## 2021-09-13 PROCEDURE — 1200000000 HC SEMI PRIVATE

## 2021-09-13 PROCEDURE — 36415 COLL VENOUS BLD VENIPUNCTURE: CPT

## 2021-09-13 PROCEDURE — 6370000000 HC RX 637 (ALT 250 FOR IP): Performed by: PHYSICIAN ASSISTANT

## 2021-09-13 PROCEDURE — APPSS30 APP SPLIT SHARED TIME 16-30 MINUTES: Performed by: PHYSICIAN ASSISTANT

## 2021-09-13 PROCEDURE — 6370000000 HC RX 637 (ALT 250 FOR IP): Performed by: STUDENT IN AN ORGANIZED HEALTH CARE EDUCATION/TRAINING PROGRAM

## 2021-09-13 PROCEDURE — 99232 SBSQ HOSP IP/OBS MODERATE 35: CPT | Performed by: STUDENT IN AN ORGANIZED HEALTH CARE EDUCATION/TRAINING PROGRAM

## 2021-09-13 PROCEDURE — 6370000000 HC RX 637 (ALT 250 FOR IP): Performed by: INTERNAL MEDICINE

## 2021-09-13 PROCEDURE — 6360000002 HC RX W HCPCS: Performed by: PHYSICIAN ASSISTANT

## 2021-09-13 PROCEDURE — 6360000002 HC RX W HCPCS: Performed by: INTERNAL MEDICINE

## 2021-09-13 PROCEDURE — 6370000000 HC RX 637 (ALT 250 FOR IP): Performed by: FAMILY MEDICINE

## 2021-09-13 PROCEDURE — 83735 ASSAY OF MAGNESIUM: CPT

## 2021-09-13 PROCEDURE — 74018 RADEX ABDOMEN 1 VIEW: CPT

## 2021-09-13 PROCEDURE — 2580000003 HC RX 258: Performed by: FAMILY MEDICINE

## 2021-09-13 PROCEDURE — 97530 THERAPEUTIC ACTIVITIES: CPT

## 2021-09-13 PROCEDURE — APPSS30 APP SPLIT SHARED TIME 16-30 MINUTES: Performed by: NURSE PRACTITIONER

## 2021-09-13 PROCEDURE — 85025 COMPLETE CBC W/AUTO DIFF WBC: CPT

## 2021-09-13 PROCEDURE — 80053 COMPREHEN METABOLIC PANEL: CPT

## 2021-09-13 PROCEDURE — 84100 ASSAY OF PHOSPHORUS: CPT

## 2021-09-13 PROCEDURE — 97110 THERAPEUTIC EXERCISES: CPT

## 2021-09-13 PROCEDURE — 99232 SBSQ HOSP IP/OBS MODERATE 35: CPT | Performed by: SURGERY

## 2021-09-13 RX ORDER — NEOSTIGMINE METHYLSULFATE 1 MG/ML
0.5 INJECTION, SOLUTION INTRAVENOUS ONCE
Status: COMPLETED | OUTPATIENT
Start: 2021-09-13 | End: 2021-09-13

## 2021-09-13 RX ORDER — POLYETHYLENE GLYCOL 3350 17 G/17G
17 POWDER, FOR SOLUTION ORAL 3 TIMES DAILY
Status: DISCONTINUED | OUTPATIENT
Start: 2021-09-13 | End: 2021-09-15 | Stop reason: HOSPADM

## 2021-09-13 RX ADMIN — LAMOTRIGINE 200 MG: 100 TABLET ORAL at 08:47

## 2021-09-13 RX ADMIN — DEXAMETHASONE SODIUM PHOSPHATE 4 MG: 4 INJECTION, SOLUTION INTRA-ARTICULAR; INTRALESIONAL; INTRAMUSCULAR; INTRAVENOUS; SOFT TISSUE at 18:38

## 2021-09-13 RX ADMIN — DOCUSATE SODIUM 100 MG: 100 CAPSULE ORAL at 08:48

## 2021-09-13 RX ADMIN — KETOROLAC TROMETHAMINE 15 MG: 30 INJECTION, SOLUTION INTRAMUSCULAR; INTRAVENOUS at 10:57

## 2021-09-13 RX ADMIN — POLYETHYLENE GLYCOL 3350 17 G: 17 POWDER, FOR SOLUTION ORAL at 08:48

## 2021-09-13 RX ADMIN — DEXAMETHASONE SODIUM PHOSPHATE 4 MG: 4 INJECTION, SOLUTION INTRA-ARTICULAR; INTRALESIONAL; INTRAMUSCULAR; INTRAVENOUS; SOFT TISSUE at 09:25

## 2021-09-13 RX ADMIN — DIAZEPAM 5 MG: 5 TABLET ORAL at 10:09

## 2021-09-13 RX ADMIN — PREGABALIN 75 MG: 75 CAPSULE ORAL at 20:32

## 2021-09-13 RX ADMIN — DEXAMETHASONE SODIUM PHOSPHATE 4 MG: 4 INJECTION, SOLUTION INTRA-ARTICULAR; INTRALESIONAL; INTRAMUSCULAR; INTRAVENOUS; SOFT TISSUE at 01:42

## 2021-09-13 RX ADMIN — POLYETHYLENE GLYCOL 3350 17 G: 17 POWDER, FOR SOLUTION ORAL at 19:48

## 2021-09-13 RX ADMIN — DOCUSATE SODIUM 100 MG: 100 CAPSULE ORAL at 20:02

## 2021-09-13 RX ADMIN — METHYLNALTREXONE BROMIDE 12 MG: 12 INJECTION, SOLUTION SUBCUTANEOUS at 15:03

## 2021-09-13 RX ADMIN — KETOROLAC TROMETHAMINE 15 MG: 30 INJECTION, SOLUTION INTRAMUSCULAR; INTRAVENOUS at 18:38

## 2021-09-13 RX ADMIN — SODIUM CHLORIDE: 9 INJECTION, SOLUTION INTRAVENOUS at 22:36

## 2021-09-13 RX ADMIN — KETOROLAC TROMETHAMINE 15 MG: 30 INJECTION, SOLUTION INTRAMUSCULAR; INTRAVENOUS at 04:07

## 2021-09-13 RX ADMIN — DULOXETINE HYDROCHLORIDE 20 MG: 20 CAPSULE, DELAYED RELEASE ORAL at 08:47

## 2021-09-13 RX ADMIN — DEXAMETHASONE SODIUM PHOSPHATE 4 MG: 4 INJECTION, SOLUTION INTRA-ARTICULAR; INTRALESIONAL; INTRAMUSCULAR; INTRAVENOUS; SOFT TISSUE at 12:49

## 2021-09-13 RX ADMIN — SENNOSIDES 17.2 MG: 8.6 TABLET, COATED ORAL at 20:02

## 2021-09-13 RX ADMIN — DIVALPROEX SODIUM 500 MG: 500 TABLET, EXTENDED RELEASE ORAL at 08:48

## 2021-09-13 RX ADMIN — NEOSTIGMINE METHYLSULFATE 0.5 MG: 1 INJECTION, SOLUTION INTRAVENOUS at 20:26

## 2021-09-13 RX ADMIN — SODIUM CHLORIDE: 9 INJECTION, SOLUTION INTRAVENOUS at 09:25

## 2021-09-13 RX ADMIN — LAMOTRIGINE 200 MG: 100 TABLET ORAL at 20:32

## 2021-09-13 RX ADMIN — PREGABALIN 75 MG: 75 CAPSULE ORAL at 08:47

## 2021-09-13 ASSESSMENT — PAIN DESCRIPTION - ONSET
ONSET: ON-GOING

## 2021-09-13 ASSESSMENT — PAIN DESCRIPTION - LOCATION
LOCATION: ABDOMEN

## 2021-09-13 ASSESSMENT — PAIN DESCRIPTION - FREQUENCY
FREQUENCY: CONTINUOUS

## 2021-09-13 ASSESSMENT — PAIN DESCRIPTION - PROGRESSION
CLINICAL_PROGRESSION: NOT CHANGED
CLINICAL_PROGRESSION: NOT CHANGED

## 2021-09-13 ASSESSMENT — PAIN SCALES - GENERAL
PAINLEVEL_OUTOF10: 10
PAINLEVEL_OUTOF10: 6

## 2021-09-13 ASSESSMENT — PAIN DESCRIPTION - PAIN TYPE
TYPE: ACUTE PAIN

## 2021-09-13 ASSESSMENT — PAIN DESCRIPTION - ORIENTATION
ORIENTATION: RIGHT;MID
ORIENTATION: MID;LOWER
ORIENTATION: MID;LOWER

## 2021-09-13 ASSESSMENT — PAIN DESCRIPTION - DESCRIPTORS
DESCRIPTORS: CONSTANT;PRESSURE
DESCRIPTORS: PRESSURE;TENDER

## 2021-09-13 NOTE — PROGRESS NOTES
Addendum by Dr. Ninoska Simeon MD:  I have seen and examined the patient independently. Face to face evaluation and examination was performed. The below evaluation and note have been reviewed. Labs and radiographs were reviewed. I Have discussed with Neurosurgery PA about this patient in detail. The below assessment and plan have been reviewed. Please see my modifications mentioned below. My additional comments and modifications:  -Postop day 4 (placement of permanent spinal cord stimulator system). -Patient is continue to experience severe abdominal pain.  -Abdominal CT and CTA were negative.  -No indication for any acute surgical intervention at this time per general surgery. - KUP showed: paralytic ileus involving predominantly the colon  -Follow-up the recommendation of general surgery and hospital service at this time. - Pain control.   -Bowel protocol. -GI consultation.   -I discussed the case with patient and his wife also I discussed the case with patient nurse. I discussed the case with hospitalist service and general surgery.  -Neurosurgery to follow. Ninoska Simeon MD         Neurosurgery Progress Note    Patient:  Amanda Joshi      Unit/Bed:[facility]16/016-A    YOB: 1955    MRN: [id number]     Acct: [de-identified]     Admit date: 9/9/2021    No chief complaint on file. Patient Seen, Chart, Physician notes, Labs, Radiology studies reviewed. Subjective: Patient is seen and evaluated on the floor with evaluation exam findings reviewed and discussed with Dr. Micah Aceves and with nursing. He continues with complaints of intense abdominal pain rated at between an 8 or 9 out of 10 on exam.        Past, Family, Social History unchanged from admission.     Diet:  Diet NPO Exceptions are: Sips of Water with Meds    Medications:  Scheduled Meds:   docusate sodium  100 mg Oral BID    senna  2 tablet Oral Nightly    pregabalin  75 mg Oral BID    DULoxetine  20 mg Oral Daily  polyethylene glycol  17 g Oral Daily    divalproex  500 mg Oral Daily    lamoTRIgine  200 mg Oral BID    dexamethasone  4 mg IntraVENous Q6H     Continuous Infusions:   sodium chloride 75 mL/hr at 09/13/21 0925     PRN Meds:magnesium citrate, diazePAM, LORazepam, dicyclomine, morphine **OR** morphine, ketorolac, HYDROcodone 5 mg - acetaminophen, diphenhydrAMINE, ondansetron    Objective: Patient is lying in bed with the head of the bed mildly elevated and complaining of continuing intense abdominal discomfort. Patient is noted to have moved his bowels following enema x2. Incisions are flat and dry with orders to change dressings today. He is otherwise stable and intact neurologically for strength and sensation bilaterally and symmetrically with no additional significant changes noted overnight. Vitals: BP (!) 173/96   Pulse 61   Temp 97.8 °F (36.6 °C) (Oral)   Resp 18   Ht 5' 8.5\" (1.74 m)   Wt 237 lb 4.8 oz (107.6 kg)   SpO2 96%   BMI 35.56 kg/m²   Physical Exam:  Alert and attentive. Language appropriate, with no aphasia. Pupils equal.  Facial strength symmetric. Review of systems:    Patient is positive for intense abdominal discomfort. He denies headache, positive transient nausea without vomiting, patient denies chest pain or shortness of breath. 24 hour intake/output:    Intake/Output Summary (Last 24 hours) at 9/13/2021 1025  Last data filed at 9/13/2021 0726  Gross per 24 hour   Intake 3036.69 ml   Output 675 ml   Net 2361.69 ml     Last 3 weights:   Wt Readings from Last 3 Encounters:   09/11/21 237 lb 4.8 oz (107.6 kg)   09/02/21 214 lb 1.1 oz (97.1 kg)   08/19/21 214 lb (97.1 kg)         CBC:   Recent Labs     09/11/21  0808 09/12/21  0948 09/13/21  0934   WBC 10.9* 11.0* 11.0*   HGB 13.3* 13.5* 13.6*    344 364     BMP:    Recent Labs     09/10/21  1054 09/12/21  1333    138   K 4.3 3.8    104   CO2 24 26   BUN 19 20   CREATININE 0.8 0.8   GLUCOSE 121* 117*     Calcium:  Recent Labs     09/12/21  1333   CALCIUM 8.4*     Magnesium:No results for input(s): MG in the last 72 hours. Glucose:No results for input(s): POCGLU in the last 72 hours. HgbA1C: No results for input(s): LABA1C in the last 72 hours. Lipids: No results for input(s): CHOL, TRIG, HDL, LDLCALC in the last 72 hours. Invalid input(s): LDL    Radiology reports as per the Radiologist  Radiology: CT ABDOMEN PELVIS W WO CONTRAST Additional Contrast? Radiologist Recommendation    Result Date: 9/10/2021  PROCEDURE: CT ABDOMEN PELVIS W WO CONTRAST CLINICAL INFORMATION: severe localized abdominal pain at the mid abdominal wall, r/o any pathology including incarcerated hernia/fecal impaction/obestruction . COMPARISON: None. TECHNIQUE: 5 mm axial CT images were obtained through the abdomen and pelvis before and after the administration of intravenous and oral contrast. Coronal and sagittal reconstructions were obtained. Isovue-370 IV and 40 oral contrast All CT scans at this facility use dose modulation, iterative reconstruction, and/or weight-based dosing when appropriate to reduce radiation dose to as low as reasonably achievable. FINDINGS: Lower chest: Bibasilar atelectasis. Lingular and left lower lobe Liver: The entire liver is not included on this exam. Elevated right hemidiaphragm and a portion the liver is excluded from this imaging the liver is otherwise unremarkable in appearance Gallbladder/Biliary tree: There are gallstones present. There is no ductal dilatation or pericholecystic edema. Spleen: Unremarkable. No splenomegaly. Pancreas: Unremarkable. No mass or pancreatic ductal dilatation. No findings to suggest acute pancreatitis. Adrenal glands: Unremarkable. No mass. Kidneys and ureters: Unremarkable. No hydroureteronephrosis, mass, or cyst. No renal or ureteral calculi. Stomach, small bowel, and colon: Stomach and duodenum are unremarkable. Small bowel and colon are unremarkable.  No bowel wall thickening or bowel obstruction. Appendix: Prior appendectomy. Omentum and mesentery: Unremarkable Aorta, vascular: No aortic aneurysm or dissection. No significant vascular abnormality. Reproductive: Unremarkable Bladder: Unremarkable. No wall thickening or obvious mass. No stones. Intraperitoneal/retroperitoneal Space: There is no ascites, abscess, adenopathy, or mass. No pneumoperitoneum. Abdominal and pelvic body wall soft tissues: Prominent fat-containing left inguinal hernia. Musculoskeletal structures: Postsurgical changes lumbar spine with laminectomies and L3-5. Recently placed thoracic spinal stimulator     No acute inflammatory or infectious process in the abdomen or pelvis. No evidence of bowel obstruction. **This report has been created using voice recognition software. It may contain minor errors which are inherent in voice recognition technology. ** Final report electronically signed by Dr. Evita Mcgee on 9/10/2021 1:02 PM    XR THORACIC SPINE (3 VIEWS)    Result Date: 9/9/2021  THORACIC SPINE 4 VIEWS: CLINICAL INFORMATION: surgery TECHNIQUE: 4 fluoroscopic spot films of the thoracic spine were obtained during neurostimulator insertion by Dr. Lisa Chiu. The actual fluoroscopy time is 33.3 seconds. FINDINGS: Metallic instrument is present particularly lower thoracic spine. Neurostimulator leads have been inserted and appear to be located at about the T7-8 level. Status post neurostimulator insertion. **This report has been created using voice recognition software. It may contain minor errors which are inherent in voice recognition technology. ** Final report electronically signed by Dr. Renata Mora on 9/9/2021 4:44 PM    FLUORO FOR SURGICAL PROCEDURES    Result Date: 9/9/2021  Radiology exam is complete. No Radiologist dictation. Please follow up with ordering provider.      CTA ABDOMEN PELVIS W WO CONTRAST    Result Date: 9/10/2021  PROCEDURE: CTA ABDOMEN PELVIS W WO CONTRAST CLINICAL INFORMATION: severe abd pain . Severe abdominal pain after spinal cord stimulator and internal pulse generator placement. Pain increased since previous CT this morning. COMPARISON: CT abdomen pelvis from the same date at 12:23 PM. TECHNIQUE: Helical CT of the abdomen and pelvis during intravenous administration of 80 mL Isovue-370 injected in the right forearm with multiplanar volumetric maximum intensity projection reconstructions. All CT scans at this facility use dose modulation, iterative reconstruction, and/or weight-based dosing when appropriate to reduce radiation dose to as low as reasonably achievable. FINDINGS: There is atelectasis at the bilateral lung bases, stable compared to prior exam. The visualized portion of the heart is unremarkable. The ascending aorta is normal in caliber without focal aneurysm or stenosis. The iliac vessels are patent and normal in appearance. The celiac, mesenteric and renal arteries are patent without flow-limiting stenosis. The liver is unremarkable. There is contrast within the gallbladder. There is a hyperdense stone at the gallbladder neck, stable compared to prior exam. Adrenal glands are unremarkable. The kidneys are unremarkable. The spleen and pancreas are unremarkable. No retroperitoneal or mesenteric lymphadenopathy is identified. The large bowel appears within normal in its. The appendix is normal in appearance. Small bowel appears within normal limits. The bladder is unremarkable without focal filling defect. The prostate is mildly enlarged, stable compared to prior exam. No free fluid is identified. Redemonstration of laminectomies at L3-L5. There is a stable spinal stimulator generator in the subcutis fat overlying the left back muscles. The stimulator lead enters the spinal canal at the T9 level, stable compared to prior exam.      1. No evidence of acute intra-abdominal or intrapelvic abnormality. 2. Cholelithiasis.  **This report has been created using voice recognition software. It may contain minor errors which are inherent in voice recognition technology. ** Final report electronically signed by Dr. Karen Hodges MD on 9/10/2021 8:34 PM                   Assessment: Intractable abdominal pain postoperative day 4 from thoracic laminectomy and placement of spinal cord stimulator and left flank internal pulse generator performed by Dr. Barbara Gallardo    Active Problems:    Chronic pain syndrome    S/P insertion of spinal cord stimulator    Lumbosacral spinal stenosis  Resolved Problems:    * No resolved hospital problems. *        Plan: Patient is seen and evaluated on the floor with evaluation exam findings reviewed and discussed with Dr. Barbara Gallardo and with nursing. Patient continues with intractable abdominal discomfort and sensitivity to touch with pain rated at between 8 and 9 out of 10 again this morning. Patient notes having moved his bowels following enemas x2, without relief of abdominal discomfort. Incisions are flat and dry with a dressing change anticipated for this morning. KUB performed yesterday was significant for paralytic ileus with a repeat of the KUB in process. Gastroenterology consult is recommended.   Neurosurgery to follow      Electronically signed by Fidel Torrez PA-C on 9/13/2021 at 10:25 AM    Neurosurgery

## 2021-09-13 NOTE — PROGRESS NOTES
A&O x4. Speech is clear and articulate. Affect is appropriate. Sensory perceptions are intact. Pupils constrict 4-3 mm. Sclera are white. Conjunctiva are pink and moist. Oral mucosa are pink and moist. No teeth present, wears top and bottom dentures. Hand grasp is equal and strong bilaterally. Pedal push/pull strong and equal bilaterally. Heart tones are regular S1S2. Cap refill less than 3 seconds. Radial pulses 2+ bilaterally. Posterior tibial pulses 1+ bilaterally. Pedal pulses 1+ bilaterally. Breathing is unlabored. Lung sounds clear and equal anteriorly and posteriorly, bilaterally. Abdomen is soft and tender. Bowel sounds are active in all 4 quadrants.  Last BM was 9/9/21 in the am.

## 2021-09-13 NOTE — CARE COORDINATION
9/13/21, 10:16 AM EDT    DISCHARGE ON GOING EVALUATION    Juju Jaimes day: 3  Location: -16/016-A Reason for admit: Chronic pain syndrome [G89.4]  S/P insertion of spinal cord stimulator [Z96.89]  Lumbosacral spinal stenosis [M48.07]   Procedure:   09/09/21  surgery by Dr Bro Pass  Thoracic spine  laminectomy (T9)  Placement of permanent paddle of spinal cord stimulator at level of T7-T8 (Masabi). Placement of battery (pulse generator ) of spinal cord stimulator in left flank area. 9/13/21 KUB: Persistent gaseous distention of multiple small bowel loops are seen. However, the overall distention appears improved when compared to the prior examination. This could represent possible diffuse ileus. Barriers to Discharge: PT/OT. Pain management. N/V checks. I&O. General surgery consulted for post-op abdominal pain/ ileus? GI consult: Ileus / hx constipation  PCP: Doug Perea MD  Readmission Risk Score: 11%  Patient Goals/Plan/Treatment Preferences: Planning home with wife, Requests [facility].

## 2021-09-13 NOTE — PROGRESS NOTES
[facility]  Occupational Therapy  Daily Note  Time:   Time In: 1311  Time Out: 1349  Timed Code Treatment Minutes: 45 Minutes  Minutes: 38          Date: 2021  Patient Name: Eloisa Malave,   Gender: male      Room: [facility]16/016-A  MRN: [id number]  : 1955  (61 y.o.)  Referring Practitioner: Marissa Benitez PA-C  Diagnosis: Chronic pain syndrome  Additional Pertinent Hx: Pt with hx of failed back syndrome and succesful spinal cord simulator trial is now s/p THORACIC LAMINECTOMY FOR PLACEMENT OF PERMANENT SPINAL CORD STMULATOR AND LEFT FLANK INTERNAL PULSE GENERATOR (DOS: 21) by Dr. Arlette Barthel. Pt has been c/o 10/10 abdominal pain since surgery--per RN, Dr. Arlette Barthel and Dr. Vika Berg, may be due to pt with no flatus or bowel movements post-op. Restrictions/Precautions:  Restrictions/Precautions: General Precautions, Fall Risk     SUBJECTIVE: Pt. RN approved OT treatment. Pt. In bed upon arrival.  Pt. Reports discomfort from no BM, RN addressing. Pt. Reports fatigue although agreeable to OT treatment. PAIN: 10/10: abdomen    Vitals: Vitals not assessed per clinical judgement, see nursing flowsheet    COGNITION: WFL    ADL:   Grooming: Contact Guard Assistance, with set-up and with verbal cues . Toileting: Stand By Assistance and [address]. Toilet Transfer: Contact Guard Assistance. Linda Warren BALANCE:  Sitting Balance:  Stand By Assistance. Standing Balance: Contact Guard Assistance, with cues for safety. BED MOBILITY:  Supine to Sit: Stand By Assistance      TRANSFERS:  Sit to Stand:  Stand By Assistance, [address], cues for hand placement. Stand to Sit: Stand By Assistance, [address], with increased time for completion, cues for hand placement. FUNCTIONAL MOBILITY:  Assistive Device: Rolling Walker  Assist Level:  Contact Guard Assistance. Distance: To and from bathroom and and in room.   No LOB fatigues fairly easy. ADDITIONAL ACTIVITIES:  Patient completed BUE strengthening exercises with skilled education on HEP: completed x reps x1 set with AROM in all joints and all planes in order to improve UE strength and activity tolerance required for BADL routine and toilet / shower transfers. Patient tolerated fair, requiring brief rest breaks. Patient also required verbal and visual cues for technique. ASSESSMENT:     Activity Tolerance:  Patient tolerance of  treatment: fair. Discharge Recommendations: Continue to assess pending progress, Patient would benefit from continued therapy after discharge (Pt will be able to dc home with assist prn once pain better under control)     Equipment Recommendations: Equipment Needed: No (continue to monitor for needs)  Plan: Times per week: 5x  Current Treatment Recommendations: Endurance Training, Functional Mobility Training, Strengthening, Self-Care / ADL    Patient Education  Patient Education: ADL's, Home Exercise Program, Importance of Increasing Activity and Assistive Device Safety    Goals  Short term goals  Time Frame for Short term goals: By discharge  Short term goal 1: Patient to increase activity tolerance to/from  and [facility] distances with SBA to increase indep in home environment. Short term goal 2: Patient to complete BADL routine including toileting with SBA to increase indep in self care completion. Short term goal 3: Patient to tolerate dynamic standing task >3 minutes with SBA and 2UE release to increase indep bathing and toileting tasks. Short term goal 4: Patient to complete simple homemaking task with SBA to increase indep in making lunch at home. Following session, patient left in safe position with all fall risk precautions in place.

## 2021-09-13 NOTE — PROGRESS NOTES
750 mL of soap suds enema given. Patient tolerated well. Patient instructed to call when needs to use bathroom. Patient verbalized understanding.

## 2021-09-13 NOTE — CARE COORDINATION
9/13/21, 12:24 PM EDT    DISCHARGE PLANNING EVALUATION       Patient plans home with family, requests [facility].   Will need home health orders to complete referral.

## 2021-09-13 NOTE — PROGRESS NOTES
[facility]  INPATIENT PHYSICAL THERAPY  DAILY NOTE  [facility] ORTHOPEDICS 7K - 7K-16/016-A    Time In: 1450  Time Out: 1514  Timed Code Treatment Minutes: 24 Minutes  Minutes: 24          Date: 2021  Patient Name: Chad Owen,  Gender:  male        MRN: [id number]  : 1955  (61 y.o.)     Referring Practitioner: Leanna Beckwith PA-C  Diagnosis: chronic pain syndrome  Additional Pertinent Hx: Pt with hx of failed back syndrome and succesful spinal cord simulator trial is now s/p THORACIC LAMINECTOMY FOR PLACEMENT OF PERMANENT SPINAL CORD STMULATOR AND LEFT FLANK INTERNAL PULSE GENERATOR (DOS: 21) by Dr. Avis King. Pt has been c/o 10/10 abdominal pain since surgery--per chart review doctors are suspecting neurological origin. Prior Level of Function:  Lives With: Spouse  Type of Home: House  Home Layout: One level  Home Access: Stairs to enter with rails  Entrance Stairs - Number of Steps: 2 [place]; a couple steps within the house to access various rooms  Home Equipment: [employer], 4 wheeled walker (did not use DME PTA)   Bathroom Shower/Tub: Tub/Shower unit  Bathroom Toilet: Standard  Bathroom Equipment:  (none PTA)  Bathroom Accessibility: Walker accessible    ADL Assistance: Independent  Homemaking Assistance: Independent  Homemaking Responsibilities: Yes (shares responsibilities with wife)  Ambulation Assistance: Independent  Transfer Assistance: Independent  Active : Yes  IADL Comments: Ind with IADLs PTA including grocery shopping - wife and pt share responsibilities  Additional Comments: wife works full time, so will not be available all the time, daughters can help some--pt will be at home alone some    Restrictions/Precautions:  Restrictions/Precautions: General Precautions, Fall Risk     SUBJECTIVE: Pt supine in bed, states he just returned to bed after being in the chair for 45 minutes. Pt states he is in a lot of pain. RN approved session.    PAIN: 10/10: Localized to abdomen, with report of pulling in L hip region with exercises. Education to encourage increased breathing with exercises and mobility and to maintain all exercises within a tolerable range. Vitals: Vitals not assessed per clinical judgement, see nursing flowsheet    OBJECTIVE:  Bed Mobility:  Supine to Sit: Stand By Assistance, with head of bed raised, with rail  Sit to Supine: Stand By Assistance, with head of bed raised, with rail    *HOB raised ~25 degrees, increased time for completion secondary to pain behaviors, but safe technique observed    Transfers:  Sit to Stand: [employer], cues for hand placement  Stand to Sit:Contact [employer], cues for hand placement   *Min verbal cues for safe hand placement with transfers, pt requiring a brief standing break upon standing prior to mobility to let pain ease. Slow completion secondary to increased pain. Ambulation:  Contact Guard Assistance, with increased time for completion  Distance: 30'  Surface: Level Tile  Device:Rolling Walker  Gait Deviations: Forward Flexed Posture, Slow Vi, Decreased Step Length Bilaterally, Narrow Base of Support and Mild Path Deviations  *Verbal cues to bring line of sight superiorly from the floor, and to increase base of support. PT report of minor L hip pulling sensation with step, education to maintain step length within a tolerable range. One brief standing rest break to complete distance. Balance:  Static Sitting Balance:  Stand By Assistance  Static Standing Balance: Contact Guard Assistance    Exercise:  Patient was guided in 1 set(s) 10 reps of exercise to both lower extremities. Ankle pumps, Glut sets, Quad sets, Heelslides, Short arc quads and Hip abduction/adduction. Exercises were completed for increased independence with functional mobility. *Verbal cues to encourage breathing and to maintain within a tolerable range.  Decreased ROM noted on L LE d/t increased pain in abdomen and pulling sensation to hip flexor region. Improved tolerance of exercises following feedback . x3 rest breaks required to complete exercise completion. Functional Outcome Measures: Completed  AM-PAC Inpatient Mobility Raw Score : 16  AM-PAC Inpatient T-Scale Score : 40.78    ASSESSMENT:  Assessment: Patient progressing toward established goals. Activity Tolerance:  Patient tolerance of  treatment: fair. 10/10 pain during treatment, however still willing to participate. Equipment Recommendations:Equipment Needed: No  Discharge Recommendations:  Continue to assess pending progress, Patient would benefit from continued therapy after discharge (once pt's pain is under control, anticipate he will be appropriate for home with assist prn)    Plan: Times per week: 6-7x O  Times per day: Daily  Current Treatment Recommendations: Strengthening, Gait Training, Stair training, Patient/Caregiver Education & Training, Balance Training, Functional Mobility Training, Endurance Training, Transfer Training, Home Exercise Program, Safety Education & Training    Patient Education  Patient Education: [employer] Mobility, Gait, Verbal Exercise Instruction    Goals:  Patient goals : decrease pain, move better  Short term goals  Time Frame for Short term goals: by hospital discharge  Short term goal 1: Supine to/from sit with HOB flat with modified independence for ease of transfers at discharge site. Short term goal 2: Sit to/from stand with modified independence for ease of transfers at discharge site. Short term goal 3: Ambulate 200' with LRD and modified independence for community distance ambulation. Short term goal 4: Ascend/descend 2 steps with HR and modified independence for entry into home. Long term goals  Time Frame for Long term goals : N/A due to short ELOS. Following session, patient left in safe position with all fall risk precautions in place.

## 2021-09-13 NOTE — PROGRESS NOTES
Torin Murillo  Daily Progress Note    Pt Name: Reese Baxter  Medical Record Number: [id number]  Date of Birth 1955   Today's Date: 9/13/2021  Chief complaint: Abdominal Pain  ASSESSMENT:   1. Hospital day # 4  2. Acute abdominal pain post laminectomy and spinal cord stimulator placement   3. Ileus 9/12 paralytic ileus involving mostly the colon   4. KUB 9/13 persistent gaseous distention of multiple small bowel loops, overall improved, diffuse ileus   5. Small BM yesterday   3. Has a past medical history of Back pain, Depression, GERD (gastroesophageal reflux disease), Headache(784.0), Migraines, ALICIA treated with BiPAP, Pneumonia, Pulmonary embolism ([facility] [address] S/P cardiac catheterization: 7/31/2017: No obstructive lasions. , and Seizures ([facility] [address] RECOMMENDATIONS:   1. IV hydration  2. Analgesics and antiemetics as ordered  3. NPO for ileus   4. CT and CTA demonstrated no surgical process responsible for abdominal pain, lactic acid normal. Suspect neurological source of abdominal pain. Labs and KUB reviewed. Discussed case with Dr. Tony Storm. 5. Not a surgical abdomen, continue to follow  SUBJECTIVE:   Zully Ordonez is still experiencing severe abdominal pain. Chart reviewed. Updated by nursing staff. He denies nausea or vomiting, has had a very small BM and is passing flatus. Currently NPO. Still having significant pain on abdominal wall. Even to light touch. Patient feels the pain is both abdominal wall and deep intra-abdominal as area of pain. Has been in constant pain since procedure. CT and CTA has been within normal limits. No significant lactic acidosis. Etiology unknown so far.   MEDICATIONS   Scheduled Meds:   docusate sodium  100 mg Oral BID    senna  2 tablet Oral Nightly    pregabalin  75 mg Oral BID    DULoxetine  20 mg Oral Daily    polyethylene glycol  17 g Oral Daily    divalproex  500 mg Oral Daily    lamoTRIgine  200 mg Oral BID    dexamethasone 4 mg IntraVENous Q6H     Continuous Infusions:   sodium chloride 75 mL/hr at 21 0925     PRN Meds:.magnesium citrate, diazePAM, LORazepam, dicyclomine, morphine **OR** morphine, ketorolac, HYDROcodone 5 mg - acetaminophen, diphenhydrAMINE, ondansetron  OBJECTIVE   CURRENT VITALS:  height is 5' 8.5\" (1.74 m) and weight is 237 lb 4.8 oz (107.6 kg). His oral temperature is 98.1 °F (36.7 °C). His blood pressure is 165/89 (abnormal) and his pulse is 59. His respiration is 16 and oxygen saturation is 94%. Temperature Range (24h):Temp: 98.1 °F (36.7 °C) Temp  Av °F (36.7 °C)  Min: 97.7 °F (36.5 °C)  Max: 98.2 °F (36.8 °C)  BP Range (01L): Systolic (73DAR), EJJ:123 , Min:164 , CSZ:530     Diastolic (36QEM), SZN:84, Min:89, Max:96    Pulse Range (24h): Pulse  Av.5  Min: 59  Max: 65  Respiration Range (24h): Resp  Av  Min: 16  Max: 18  Current Pulse Ox (24h):  SpO2: 94 %  Pulse Ox Range (24h):  SpO2  Av.5 %  Min: 92 %  Max: 96 %  Oxygen Amount and Delivery: O2 Flow Rate (L/min): 1 L/min  Incentive Spirometry Tx:            GENERAL: alert, cooperative, no distress   LUNGS: clear to ausculation, without wheezes, rales or rhonci  HEART: normal rate and regular rhythm  ABDOMEN: tenderness present- diffusely, beginning at umbilicus up to RUQ and LUQ  with rebound and guarding. Tenderness even to light touch. EXTREMITY: no cyanosis, clubbing or edema  In: 1305.6 [P.O.:90; I.V.:1215.6]  Out: 875 [Urine:875]     Date 21 0000 - 21   Shift 4450-2603 1949-7536 9602-2359 24 Hour Total   INTAKE   P.O.  90  90   I. V.(mL/kg/hr) 838.1(1)   838.1   Shift Total(mL/kg) 838.1(7.8) 90(0.8)  928.1(8.6)   OUTPUT   Urine(mL/kg/hr) 675(0.8) 200  875   Shift Total(mL/kg) 675(6.3) 200(1.9)  875(8.1)   Weight (kg) 107.6 107.6 107.6 107.6     LABS     Recent Labs     21  0808 21  0948 21  1333 21  0934   WBC 10.9* 11.0*  --  11.0*   HGB 13.3* 13.5*  --  13.6*   HCT 42.1 41.0*  -- 41.4*    344  --  364   NA  --   --  138 138   K  --   --  3.8 4.6   CL  --   --  104 105   CO2  --   --  26 26   BUN  --   --  20 21   CREATININE  --   --  0.8 0.8   MG  --   --   --  2.3   PHOS  --   --  2.2* 3.3   CALCIUM  --   --  8.4* 8.4*      No results for input(s): PTT, INR in the last 72 hours. Invalid input(s): PT  Recent Labs     09/10/21  1502 09/11/21  0808 09/12/21  0948 09/12/21  1333 09/13/21  0934   AST  --   --   --  30 23   ALT  --   --   --  23 23   BILITOT  --   --   --  0.4 0.4   LACTA 1.0 1.2 2.2*  --   --      No results for input(s): TROPONINT in the last 72 hours. RADIOLOGY     XR ABDOMEN (KUB) (SINGLE AP VIEW)   Final Result   1. Persistent gaseous distention of multiple small bowel loops are seen. However, the overall distention appears improved when compared to the prior examination. This could represent possible diffuse ileus. **This report has been created using voice recognition software. It may contain minor errors which are inherent in voice recognition technology. **      Final report electronically signed by Dr. Chris Foster on 9/13/2021 8:48 AM      XR ABDOMEN (KUB) (SINGLE AP VIEW)   Final Result   Findings of paralytic ileus involving predominantly the colon. **This report has been created using voice recognition software. It may contain minor errors which are inherent in voice recognition technology. **         Final report electronically signed by Dr. Jayashree Morton on 9/12/2021 10:48 AM      CTA ABDOMEN PELVIS W WO CONTRAST   Final Result       1. No evidence of acute intra-abdominal or intrapelvic abnormality. 2. Cholelithiasis. **This report has been created using voice recognition software. It may contain minor errors which are inherent in voice recognition technology. **      Final report electronically signed by Dr. Loenel Menendez MD on 9/10/2021 8:34 PM      CT ABDOMEN PELVIS W WO CONTRAST Additional Contrast?

## 2021-09-13 NOTE — PROGRESS NOTES
Hospitalist Progress Note      Patient:  Mk Covington    Unit/Bed:7K-16/016-A  YOB: 1955  MRN: [id number]   Acct: [de-identified]   PCP: Shubham Parada MD  Date of Admission: 9/9/2021    Assessment/Plan:    1. Paralytic ileus:  a. Severe periumbilical and right lower quadrant abdominal pain with hyper algesia/hyperesthesia. b. No relief with Cymbalta or lyrica added yesterday. Minimize narcotics/opioids given ileus  c. CT A/P and CTA A/P on 9/10 without acute pathology. d. Encourage out of bed as able and PT/OT as tolerated. e. N.p.o. with sips of water for medicines. f. Repeat KUB pending for this morning as follow-up for ileus  g. We will give magnesium citrate, MiraLAX, and enema again today.  h. General surgery and neurosurgery following. 2. Postoperative hypoxia: Resolved  a. Likely contributing factor of splinting due to severe abdominal pain. b. Patient is been weaned to room air. 3. Seizure disorder:   a. Continue Depakote and Lamictal.  b. Seizure precautions in place. c. As needed Ativan for seizure-like activity. 4. Depression:   a. Zoloft on hold. Would not resume while patient is receiving Cymbalta. The Cymbalta does not improve patient's pain, may resume home Zoloft and discontinue Cymbalta. Disposition: Unknown at this time. Patient continues to have severe abdominal pain. Disposition per neurosurgery. Chief Complaint: Severe abdominal pain    Hospital Course:    9/9: Underwent thoracic spine laminectomy with placement of permanent paddle spinal cord stimulator at T7-8    9/10: Postop, patient having severe abdominal pain. General surgery and hospitalist consulted for evaluation and management. CT abdomen pelvis without any acute pathology. Significant for gallstones. CT angiogram of abdomen pelvis completed with no evidence of occlusion.   Patient's pain nonresponsive to opioids, muscle relaxers, benzodiazepines. 9/12: Patient with worsening abdominal distention and severe pain persists. KUB shows paralytic ileus. Patient transition to n.p.o.  Received bowel regiment and enema with only small bowel movement. Subjective (past 24 hours):   Patient states he was only able to have a small bowel movement yesterday. Continues to have pretty severe abdominal pain. States abdominal distention is mildly improved today. Does have a history of chronic constipation for which he takes over-the-counter treatments. Past medical history, family history, social history and allergies reviewed again and is unchanged since admission. ROS (12 point review of systems completed. Pertinent positives noted. Otherwise ROS is negative)     Medications:  Reviewed    Infusion Medications    sodium chloride 75 mL/hr at 09/13/21 4337     Scheduled Medications    docusate sodium  100 mg Oral BID    senna  2 tablet Oral Nightly    pregabalin  75 mg Oral BID    DULoxetine  20 mg Oral Daily    polyethylene glycol  17 g Oral Daily    divalproex  500 mg Oral Daily    lamoTRIgine  200 mg Oral BID    dexamethasone  4 mg IntraVENous Q6H     PRN Meds: magnesium citrate, diazePAM, LORazepam, dicyclomine, morphine **OR** morphine, ketorolac, HYDROcodone 5 mg - acetaminophen, diphenhydrAMINE, ondansetron      Intake/Output Summary (Last 24 hours) at 9/13/2021 1152  Last data filed at 9/13/2021 1058  Gross per 24 hour   Intake 3126.69 ml   Output 875 ml   Net 2251.69 ml       Diet:  Diet NPO Exceptions are: Sips of Water with Meds    Exam:  BP (!) 165/89   Pulse 59   Temp 98.1 °F (36.7 °C) (Oral)   Resp 16   Ht 5' 8.5\" (1.74 m)   Wt 237 lb 4.8 oz (107.6 kg)   SpO2 94%   BMI 35.56 kg/m²   General appearance: Elderly male resting in bed. Appears uncomfortable. HEENT: Pupils equal, round, and reactive to light. Conjunctivae/corneas clear. Neck: Supple, with full range of motion. No jugular venous distention.  Trachea midline. Respiratory: Shallow respirations. Mild tachypnea. Lungs are clear to auscultation without wheezes, rales, rhonchi. Cardiovascular: Regular rate and rhythm with normal S1/S2 without murmurs, rubs or gallops. Abdomen: Minimal abdominal distention. Normal bowel sounds. Pretty severe tenderness in the periumbilical and right lower quadrants to palpation   Musculoskeletal: passive and active ROM x 4 extremities. Skin: No active skin lesions. No discoloration. Clean and dry dressings over the patient's thoracic spine following spinal cord stimulator placement. Neurologic:  Neurovascularly intact without any focal sensory/motor deficits. Cranial nerves: II-XII intact, grossly non-focal.  Hyperalgesia and hyperesthesia noted the patient's abdomen and right lower quadrant. Psychiatric: Alert and oriented, thought content appropriate, normal insight  Capillary Refill: Brisk,< 3 seconds   Peripheral Pulses: +2 palpable, equal bilaterally     Labs:   Recent Labs     09/11/21  0808 09/12/21  0948 09/13/21  0934   WBC 10.9* 11.0* 11.0*   HGB 13.3* 13.5* 13.6*   HCT 42.1 41.0* 41.4*    344 364     Recent Labs     09/12/21  1333 09/13/21  0934    138   K 3.8 4.6    105   CO2 26 26   BUN 20 21   CREATININE 0.8 0.8   CALCIUM 8.4* 8.4*   PHOS 2.2* 3.3     Recent Labs     09/12/21  1333 09/13/21  0934   AST 30 23   ALT 23 23   BILITOT 0.4 0.4   ALKPHOS 69 70     No results for input(s): INR in the last 72 hours. No results for input(s): Aga Caridad in the last 72 hours.     Microbiology:    Blood culture #1: No results found for: BC    Blood culture #2:No results found for: Jeff Mackay    Organism:No results found for: ORG    No results found for: LABGRAM    MRSA culture only:No results found for: [address] [place] [address]    Urine culture: No results found for: LABURIN    Respiratory culture: No results found for: CULTRESP    Aerobic and Anaerobic :  No results found for: LABAERO  No results found for: LABANAE    Urinalysis:      Lab Results   Component Value Date    NITRU NEGATIVE 11/25/2016    WBCUA NONE 11/25/2016    BACTERIA NONE 11/25/2016    RBCUA NONE 11/25/2016    BLOODU NEGATIVE 11/25/2016    SPECGRAV 1.015 11/25/2016    GLUCOSEU neg 03/11/2015    GLUCOSEU NEGATIVE 06/11/2013       Radiology:  XR ABDOMEN (KUB) (SINGLE AP VIEW)   Final Result   1. Persistent gaseous distention of multiple small bowel loops are seen. However, the overall distention appears improved when compared to the prior examination. This could represent possible diffuse ileus. **This report has been created using voice recognition software. It may contain minor errors which are inherent in voice recognition technology. **      Final report electronically signed by Dr. Keya Rocha on 9/13/2021 8:48 AM      XR ABDOMEN (KUB) (SINGLE AP VIEW)   Final Result   Findings of paralytic ileus involving predominantly the colon. **This report has been created using voice recognition software. It may contain minor errors which are inherent in voice recognition technology. **         Final report electronically signed by Dr. Brenda Wang on 9/12/2021 10:48 AM      CTA ABDOMEN PELVIS W WO CONTRAST   Final Result       1. No evidence of acute intra-abdominal or intrapelvic abnormality. 2. Cholelithiasis. **This report has been created using voice recognition software. It may contain minor errors which are inherent in voice recognition technology. **      Final report electronically signed by Dr. Mervin Monique MD on 9/10/2021 8:34 PM      CT ABDOMEN PELVIS W WO CONTRAST Additional Contrast? Radiologist Recommendation   Final Result      No acute inflammatory or infectious process in the abdomen or pelvis. No evidence of bowel obstruction. **This report has been created using voice recognition software. It may contain minor errors which are inherent in voice recognition technology. **      Final report electronically signed by Dr. Shwetha Villatoro on 9/10/2021 1:02 PM      FLUORO FOR SURGICAL PROCEDURES   Final Result      XR THORACIC SPINE (3 VIEWS)   Final Result   Status post neurostimulator insertion. **This report has been created using voice recognition software. It may contain minor errors which are inherent in voice recognition technology. **      Final report electronically signed by Dr. Serenity Hoyos on 9/9/2021 4:44 PM        CT ABDOMEN PELVIS W WO CONTRAST Additional Contrast? Radiologist Recommendation    Result Date: 9/10/2021  PROCEDURE: CT ABDOMEN PELVIS W WO CONTRAST CLINICAL INFORMATION: severe localized abdominal pain at the mid abdominal wall, r/o any pathology including incarcerated hernia/fecal impaction/obestruction . COMPARISON: None. TECHNIQUE: 5 mm axial CT images were obtained through the abdomen and pelvis before and after the administration of intravenous and oral contrast. Coronal and sagittal reconstructions were obtained. Isovue-370 IV and 40 oral contrast All CT scans at this facility use dose modulation, iterative reconstruction, and/or weight-based dosing when appropriate to reduce radiation dose to as low as reasonably achievable. FINDINGS: Lower chest: Bibasilar atelectasis. Lingular and left lower lobe Liver: The entire liver is not included on this exam. Elevated right hemidiaphragm and a portion the liver is excluded from this imaging the liver is otherwise unremarkable in appearance Gallbladder/Biliary tree: There are gallstones present. There is no ductal dilatation or pericholecystic edema. Spleen: Unremarkable. No splenomegaly. Pancreas: Unremarkable. No mass or pancreatic ductal dilatation. No findings to suggest acute pancreatitis. Adrenal glands: Unremarkable. No mass. Kidneys and ureters: Unremarkable. No hydroureteronephrosis, mass, or cyst. No renal or ureteral calculi. Stomach, small bowel, and colon: Stomach and duodenum are unremarkable.  Small bowel and colon are unremarkable. No bowel wall thickening or bowel obstruction. Appendix: Prior appendectomy. Omentum and mesentery: Unremarkable Aorta, vascular: No aortic aneurysm or dissection. No significant vascular abnormality. Reproductive: Unremarkable Bladder: Unremarkable. No wall thickening or obvious mass. No stones. Intraperitoneal/retroperitoneal Space: There is no ascites, abscess, adenopathy, or mass. No pneumoperitoneum. Abdominal and pelvic body wall soft tissues: Prominent fat-containing left inguinal hernia. Musculoskeletal structures: Postsurgical changes lumbar spine with laminectomies and L3-5. Recently placed thoracic spinal stimulator     No acute inflammatory or infectious process in the abdomen or pelvis. No evidence of bowel obstruction. **This report has been created using voice recognition software. It may contain minor errors which are inherent in voice recognition technology. ** Final report electronically signed by Dr. Tani Salazar on 9/10/2021 1:02 PM    XR THORACIC SPINE (3 VIEWS)    Result Date: 9/9/2021  THORACIC SPINE 4 VIEWS: CLINICAL INFORMATION: surgery TECHNIQUE: 4 fluoroscopic spot films of the thoracic spine were obtained during neurostimulator insertion by Dr. Gregoria Hamilton. The actual fluoroscopy time is 33.3 seconds. FINDINGS: Metallic instrument is present particularly lower thoracic spine. Neurostimulator leads have been inserted and appear to be located at about the T7-8 level. Status post neurostimulator insertion. **This report has been created using voice recognition software. It may contain minor errors which are inherent in voice recognition technology. ** Final report electronically signed by Dr. Myranda Baxter on 9/9/2021 4:44 PM    FLUORO FOR SURGICAL PROCEDURES    Result Date: 9/9/2021  Radiology exam is complete. No Radiologist dictation. Please follow up with ordering provider.      CTA ABDOMEN PELVIS W WO CONTRAST    Result Date: 9/10/2021  PROCEDURE: CTA ABDOMEN PELVIS W WO CONTRAST CLINICAL INFORMATION: severe abd pain . Severe abdominal pain after spinal cord stimulator and internal pulse generator placement. Pain increased since previous CT this morning. COMPARISON: CT abdomen pelvis from the same date at 12:23 PM. TECHNIQUE: Helical CT of the abdomen and pelvis during intravenous administration of 80 mL Isovue-370 injected in the right forearm with multiplanar volumetric maximum intensity projection reconstructions. All CT scans at this facility use dose modulation, iterative reconstruction, and/or weight-based dosing when appropriate to reduce radiation dose to as low as reasonably achievable. FINDINGS: There is atelectasis at the bilateral lung bases, stable compared to prior exam. The visualized portion of the heart is unremarkable. The ascending aorta is normal in caliber without focal aneurysm or stenosis. The iliac vessels are patent and normal in appearance. The celiac, mesenteric and renal arteries are patent without flow-limiting stenosis. The liver is unremarkable. There is contrast within the gallbladder. There is a hyperdense stone at the gallbladder neck, stable compared to prior exam. Adrenal glands are unremarkable. The kidneys are unremarkable. The spleen and pancreas are unremarkable. No retroperitoneal or mesenteric lymphadenopathy is identified. The large bowel appears within normal in its. The appendix is normal in appearance. Small bowel appears within normal limits. The bladder is unremarkable without focal filling defect. The prostate is mildly enlarged, stable compared to prior exam. No free fluid is identified. Redemonstration of laminectomies at L3-L5. There is a stable spinal stimulator generator in the subcutis fat overlying the left back muscles. The stimulator lead enters the spinal canal at the T9 level, stable compared to prior exam.      1. No evidence of acute intra-abdominal or intrapelvic abnormality. 2. Cholelithiasis.  **This report has been created using voice recognition software. It may contain minor errors which are inherent in voice recognition technology. ** Final report electronically signed by Dr. Tristen Shaikh MD on 9/10/2021 8:34 PM      Electronically signed by Lalita Baez DO on 9/13/2021 at 11:52 AM

## 2021-09-13 NOTE — PROGRESS NOTES
Heart tones are regular S1S2. Breathing is unlabored. Lung sounds clear and equal anteriorly and posteriorly bilaterally. Lower back dressing clean, dry and intact. Midline back dressing clean, dry and intact.

## 2021-09-14 ENCOUNTER — APPOINTMENT (OUTPATIENT)
Dept: GENERAL RADIOLOGY | Age: 66
DRG: 981 | End: 2021-09-14
Attending: NEUROLOGICAL SURGERY
Payer: MEDICARE

## 2021-09-14 LAB
ANION GAP SERPL CALCULATED.3IONS-SCNC: 10 MEQ/L (ref 8–16)
BUN BLDV-MCNC: 20 MG/DL (ref 7–22)
CALCIUM SERPL-MCNC: 8.5 MG/DL (ref 8.5–10.5)
CHLORIDE BLD-SCNC: 103 MEQ/L (ref 98–111)
CO2: 23 MEQ/L (ref 23–33)
CREAT SERPL-MCNC: 0.8 MG/DL (ref 0.4–1.2)
GFR SERPL CREATININE-BSD FRML MDRD: > 90 ML/MIN/1.73M2
GLUCOSE BLD-MCNC: 108 MG/DL (ref 70–108)
MAGNESIUM: 2.4 MG/DL (ref 1.6–2.4)
PHOSPHORUS: 3.6 MG/DL (ref 2.4–4.7)
POTASSIUM SERPL-SCNC: 4.6 MEQ/L (ref 3.5–5.2)
SODIUM BLD-SCNC: 136 MEQ/L (ref 135–145)

## 2021-09-14 PROCEDURE — 6360000002 HC RX W HCPCS: Performed by: INTERNAL MEDICINE

## 2021-09-14 PROCEDURE — 83735 ASSAY OF MAGNESIUM: CPT

## 2021-09-14 PROCEDURE — 99024 POSTOP FOLLOW-UP VISIT: CPT | Performed by: NEUROLOGICAL SURGERY

## 2021-09-14 PROCEDURE — 6370000000 HC RX 637 (ALT 250 FOR IP): Performed by: STUDENT IN AN ORGANIZED HEALTH CARE EDUCATION/TRAINING PROGRAM

## 2021-09-14 PROCEDURE — 84100 ASSAY OF PHOSPHORUS: CPT

## 2021-09-14 PROCEDURE — 99232 SBSQ HOSP IP/OBS MODERATE 35: CPT | Performed by: STUDENT IN AN ORGANIZED HEALTH CARE EDUCATION/TRAINING PROGRAM

## 2021-09-14 PROCEDURE — 2580000003 HC RX 258: Performed by: STUDENT IN AN ORGANIZED HEALTH CARE EDUCATION/TRAINING PROGRAM

## 2021-09-14 PROCEDURE — 74018 RADEX ABDOMEN 1 VIEW: CPT

## 2021-09-14 PROCEDURE — 97110 THERAPEUTIC EXERCISES: CPT

## 2021-09-14 PROCEDURE — 6370000000 HC RX 637 (ALT 250 FOR IP): Performed by: PHYSICIAN ASSISTANT

## 2021-09-14 PROCEDURE — APPSS30 APP SPLIT SHARED TIME 16-30 MINUTES: Performed by: PHYSICIAN ASSISTANT

## 2021-09-14 PROCEDURE — 97535 SELF CARE MNGMENT TRAINING: CPT

## 2021-09-14 PROCEDURE — 1200000000 HC SEMI PRIVATE

## 2021-09-14 PROCEDURE — APPSS30 APP SPLIT SHARED TIME 16-30 MINUTES: Performed by: NURSE PRACTITIONER

## 2021-09-14 PROCEDURE — 2580000003 HC RX 258: Performed by: FAMILY MEDICINE

## 2021-09-14 PROCEDURE — 6370000000 HC RX 637 (ALT 250 FOR IP): Performed by: INTERNAL MEDICINE

## 2021-09-14 PROCEDURE — 6360000002 HC RX W HCPCS: Performed by: PHYSICIAN ASSISTANT

## 2021-09-14 PROCEDURE — 99232 SBSQ HOSP IP/OBS MODERATE 35: CPT | Performed by: SURGERY

## 2021-09-14 PROCEDURE — 97116 GAIT TRAINING THERAPY: CPT

## 2021-09-14 PROCEDURE — 80048 BASIC METABOLIC PNL TOTAL CA: CPT

## 2021-09-14 PROCEDURE — 6370000000 HC RX 637 (ALT 250 FOR IP): Performed by: FAMILY MEDICINE

## 2021-09-14 PROCEDURE — 36415 COLL VENOUS BLD VENIPUNCTURE: CPT

## 2021-09-14 RX ORDER — NEOSTIGMINE METHYLSULFATE 1 MG/ML
1 INJECTION, SOLUTION INTRAVENOUS EVERY 4 HOURS
Status: DISPENSED | OUTPATIENT
Start: 2021-09-14 | End: 2021-09-15

## 2021-09-14 RX ADMIN — DEXAMETHASONE SODIUM PHOSPHATE 4 MG: 4 INJECTION, SOLUTION INTRA-ARTICULAR; INTRALESIONAL; INTRAMUSCULAR; INTRAVENOUS; SOFT TISSUE at 21:21

## 2021-09-14 RX ADMIN — DEXAMETHASONE SODIUM PHOSPHATE 4 MG: 4 INJECTION, SOLUTION INTRA-ARTICULAR; INTRALESIONAL; INTRAMUSCULAR; INTRAVENOUS; SOFT TISSUE at 01:01

## 2021-09-14 RX ADMIN — NEOSTIGMINE METHYLSULFATE 1 MG: 1 INJECTION, SOLUTION INTRAVENOUS at 21:24

## 2021-09-14 RX ADMIN — SODIUM CHLORIDE: 9 INJECTION, SOLUTION INTRAVENOUS at 12:31

## 2021-09-14 RX ADMIN — WATER: 100 IRRIGANT IRRIGATION at 13:41

## 2021-09-14 RX ADMIN — DOCUSATE SODIUM 100 MG: 100 CAPSULE ORAL at 21:22

## 2021-09-14 RX ADMIN — DOCUSATE SODIUM 100 MG: 100 CAPSULE ORAL at 10:51

## 2021-09-14 RX ADMIN — POLYETHYLENE GLYCOL 3350 17 G: 17 POWDER, FOR SOLUTION ORAL at 10:51

## 2021-09-14 RX ADMIN — DEXAMETHASONE SODIUM PHOSPHATE 4 MG: 4 INJECTION, SOLUTION INTRA-ARTICULAR; INTRALESIONAL; INTRAMUSCULAR; INTRAVENOUS; SOFT TISSUE at 12:24

## 2021-09-14 RX ADMIN — DIVALPROEX SODIUM 500 MG: 500 TABLET, EXTENDED RELEASE ORAL at 10:49

## 2021-09-14 RX ADMIN — KETOROLAC TROMETHAMINE 15 MG: 30 INJECTION, SOLUTION INTRAMUSCULAR; INTRAVENOUS at 01:06

## 2021-09-14 RX ADMIN — KETOROLAC TROMETHAMINE 15 MG: 30 INJECTION, SOLUTION INTRAMUSCULAR; INTRAVENOUS at 21:31

## 2021-09-14 RX ADMIN — NEOSTIGMINE METHYLSULFATE 1 MG: 1 INJECTION, SOLUTION INTRAVENOUS at 16:34

## 2021-09-14 RX ADMIN — DEXAMETHASONE SODIUM PHOSPHATE 4 MG: 4 INJECTION, SOLUTION INTRA-ARTICULAR; INTRALESIONAL; INTRAMUSCULAR; INTRAVENOUS; SOFT TISSUE at 06:30

## 2021-09-14 RX ADMIN — LAMOTRIGINE 200 MG: 100 TABLET ORAL at 21:22

## 2021-09-14 RX ADMIN — SENNOSIDES 17.2 MG: 8.6 TABLET, COATED ORAL at 21:25

## 2021-09-14 RX ADMIN — POLYETHYLENE GLYCOL 3350 17 G: 17 POWDER, FOR SOLUTION ORAL at 21:25

## 2021-09-14 RX ADMIN — NEOSTIGMINE METHYLSULFATE 1 MG: 1 INJECTION, SOLUTION INTRAVENOUS at 12:22

## 2021-09-14 RX ADMIN — LAMOTRIGINE 200 MG: 100 TABLET ORAL at 10:49

## 2021-09-14 RX ADMIN — POLYETHYLENE GLYCOL 3350 17 G: 17 POWDER, FOR SOLUTION ORAL at 16:34

## 2021-09-14 ASSESSMENT — PAIN DESCRIPTION - DESCRIPTORS: DESCRIPTORS: CONSTANT;TENDER

## 2021-09-14 ASSESSMENT — ENCOUNTER SYMPTOMS
ABDOMINAL PAIN: 1
APNEA: 0
CHEST TIGHTNESS: 0
ABDOMINAL DISTENTION: 1
SHORTNESS OF BREATH: 0

## 2021-09-14 ASSESSMENT — PAIN SCALES - GENERAL
PAINLEVEL_OUTOF10: 10
PAINLEVEL_OUTOF10: 6
PAINLEVEL_OUTOF10: 10

## 2021-09-14 ASSESSMENT — PAIN DESCRIPTION - ONSET: ONSET: ON-GOING

## 2021-09-14 ASSESSMENT — PAIN DESCRIPTION - PROGRESSION: CLINICAL_PROGRESSION: NOT CHANGED

## 2021-09-14 ASSESSMENT — PAIN - FUNCTIONAL ASSESSMENT: PAIN_FUNCTIONAL_ASSESSMENT: PREVENTS OR INTERFERES SOME ACTIVE ACTIVITIES AND ADLS

## 2021-09-14 ASSESSMENT — PAIN DESCRIPTION - FREQUENCY: FREQUENCY: CONTINUOUS

## 2021-09-14 ASSESSMENT — PAIN DESCRIPTION - LOCATION
LOCATION: ABDOMEN
LOCATION: ABDOMEN

## 2021-09-14 ASSESSMENT — PAIN DESCRIPTION - PAIN TYPE
TYPE: ACUTE PAIN
TYPE: ACUTE PAIN

## 2021-09-14 ASSESSMENT — PAIN DESCRIPTION - ORIENTATION
ORIENTATION: MID
ORIENTATION: RIGHT;MID

## 2021-09-14 NOTE — PROGRESS NOTES
[address] [address]  INPATIENT PHYSICAL THERAPY  DAILY NOTE  [facility] ORTHOPEDICS 7K - 7K-16/016-A  Time In: 6741  Time Out: 0929  Timed Code Treatment Minutes: 26 Minutes  Minutes: 26          Date: 2021  Patient Name: Edie Dumont,  Gender:  male        MRN: [id number]  : 1955  (61 y.o.)     Referring Practitioner: Sheila Luciano PA-C  Diagnosis: chronic pain syndrome  Additional Pertinent Hx: Pt with hx of failed back syndrome and succesful spinal cord simulator trial is now s/p THORACIC LAMINECTOMY FOR PLACEMENT OF PERMANENT SPINAL CORD STMULATOR AND LEFT FLANK INTERNAL PULSE GENERATOR (DOS: 21) by Dr. Rachna Marshall. Pt has been c/o 10/10 abdominal pain since surgery--per chart review doctors are suspecting neurological origin. Prior Level of Function:  Lives With: Spouse  Type of Home: House  Home Layout: One level  Home Access: Stairs to enter with rails  Entrance Stairs - Number of Steps: 2 BRITTON; a couple steps within the house to access various rooms  Home Equipment: [employer], 4 wheeled walker (did not use DME PTA)   Bathroom Shower/Tub: Tub/Shower unit  Bathroom Toilet: Standard  Bathroom Equipment:  (none PTA)  Bathroom Accessibility: Walker accessible    ADL Assistance: Independent  Homemaking Assistance: Independent  Homemaking Responsibilities: Yes (shares responsibilities with wife)  Ambulation Assistance: Independent  Transfer Assistance: Independent  Active : Yes  IADL Comments: Ind with IADLs PTA including grocery shopping - wife and pt share responsibilities  Additional Comments: wife works full time, so will not be available all the time, daughters can help some--pt will be at home alone some    Restrictions/Precautions:  Restrictions/Precautions: General Precautions, Fall Risk     SUBJECTIVE: Ok to see pt per nursing. Pt at x-ray first attempt, pt agreeable to PT session 2nd attempt.      PAIN: 10/10: lower abdomen    Vitals: Vitals not assessed per clinical judgement, see nursing flowsheet    OBJECTIVE:  Bed Mobility:  Supine to Sit: Stand By Assistance, X 1, with head of bed raised, with rail, with verbal cues   Good demo noted, able to self complete  Transfers:  Sit to Stand: Stand By Assistance, X 1, cues for hand placement, with verbal cues  Stand to Sit:Stand By Assistance, X 1, X 2, cues for hand placement, with verbal cues  Completed from various surfaces and heights with use of RW for support, cues for safety, no LOB with completion  Ambulation:  Stand By Assistance, Contact Guard Assistance, X 1, with cues for safety, with verbal cues , with increased time for completion  Distance: 200 ft  Surface: Level Tile  Device:Rolling Walker  Gait Deviations:  Decreased Step Length Bilaterally and Decreased Gait Speed  Cues for improved safety with use of AD for completion, no LOB noted, good safety with turns and maintaining RW in close proximity to self at all times  Balance:  Static Standing Balance: Stand By Assistance, X 1, with verbal cues   Dynamic Standing Balance: Stand By Assistance, X 1, with cues for safety  To complete hand washing in bathroom, cues for proper placement of RW for support, no LOB noted  Exercise:  Patient was guided in 1 set(s) 20 reps of exercise to both lower extremities. Seated marches, Seated hamstring curls, Seated heel/toe raises, Long arc quads, Seated isometric hip adduction and Seated abduction/adduction. Exercises were completed for increased independence with functional mobility. VC and visual cues for proper technique of exercises for completion, good demo and good control throughout    Functional Outcome Measures: Completed  AM-PAC Inpatient Mobility Raw Score : 20  AM-PAC Inpatient T-Scale Score : 47.67    ASSESSMENT:  Assessment: Patient progressing toward established goals. Activity Tolerance:  Patient tolerance of  treatment: fair.  Increased pain     Equipment Recommendations:Equipment Needed: No  Discharge Recommendations: Continue to assess pending progress, Patient would benefit from continued therapy after discharge (once pt's pain is under control, anticipate he will be appropriate for home with assist prn)    Plan: Times per week: 6-7x O  Times per day: Daily  Current Treatment Recommendations: Strengthening, Gait Training, Stair training, Patient/Caregiver Education & Training, Balance Training, Functional Mobility Training, Endurance Training, Transfer Training, Home Exercise Program, Safety Education & Training    Patient Education  Patient Education: Plan of Care, Home Exercise Program, [employer] Mobility, Transfers, Gait, Use of Gait [place], Verbal Exercise Instruction,  - Patient Verbalized Understanding, - Patient Requires Continued Education    Goals:  Patient goals : decrease pain, move better  Short term goals  Time Frame for Short term goals: by hospital discharge  Short term goal 1: Supine to/from sit with HOB flat with modified independence for ease of transfers at discharge site. Short term goal 2: Sit to/from stand with modified independence for ease of transfers at discharge site. Short term goal 3: Ambulate 200' with LRD and modified independence for community distance ambulation. Short term goal 4: Ascend/descend 2 steps with HR and modified independence for entry into home. Long term goals  Time Frame for Long term goals : N/A due to short ELOS. Following session, patient left in safe position with all fall risk precautions in place. Pt left in bedside chair with all needs and call light in reach following session, chair alarm on.

## 2021-09-14 NOTE — PROGRESS NOTES
Postsurgical changes lumbar spine. Spinal stimulator generator at the L3 level leads extending to the thoracic level skin staples over the generator and the thoracic level leads    Impression  Significantly improving appearance of the abdomen        **This report has been created using voice recognition software. It may contain minor errors which are inherent in voice recognition technology. **    Final report electronically signed by Dr. eJrel Manuel on 9/14/2021 3:50 PM    Results for orders placed during the hospital encounter of 02/02/18    CT ABDOMEN PELVIS W IV CONTRAST Additional Contrast? Oral    Narrative  CT ABDOMEN AND PELVIS WITH CONTRAST ENHANCEMENT:    CLINICAL INFORMATION: Right lower quadrant abdominal pain    COMPARISON: 8/18/2016    TECHNIQUE: Multiple axial 5 mm images of the abdomen, pelvis, and lung bases were obtained following the administration of oral and intravenous contrast material (ISOVUE). Computer generated sagittal and coronal images of the abdomen and pelvis were also  reconstructed. ALL CT SCANS AT THIS FACILITY use dose modulation, iterative reconstruction, and/or weight-based dosing when appropriate to reduce radiation dose to as low as reasonably achievable. FINDINGS:    Lung bases: Mild atelectatic/fibrotic stranding both lung bases. Liver: Hepatic steatosis. Gallbladder unremarkable    Pancreas, spleen and adrenal glands: Unremarkable    Kidneys:  Unremarkable. No renal calculi. No cysts. No mass lesions. No hydronephrosis. .    Bowel/Peritoneum: Bowel unremarkable. No abnormally dilated bowel loops are seen. No free air. No free fluid in the abdomen. No abnormally enlarged para-aortic lymph nodes are seen. . There are a few small mesenteric lymph nodes, consistent with  mesenteric adenitis. The appendix is not specifically seen but the region of the appendix is unremarkable. Bones : Prior lower lumbar fusion L3-S1.  Mild degenerative changes upper lumbar and lower thoracic spine. Pelvis: Urinary bladder unremarkable. Small inguinal hernia left side it contains only adipose tissue. Impression  No acute findings in the abdomen or pelvis. Several nonemergent findings discussed above. **This report has been created using voice recognition software. It may contain minor errors which are inherent in voice recognition technology. **    Final report electronically signed by Dr. Nahum Cagle on 2/2/2018 9:32 AM    No results found for this or any previous visit. No results found for this or any previous visit. Endoscopy Finding:      Objective:   Vitals: /87   Pulse 59   Temp 98.8 °F (37.1 °C) (Oral)   Resp 16   Ht 5' 8.5\" (1.74 m)   Wt 237 lb 4.8 oz (107.6 kg)   SpO2 95%   BMI 35.56 kg/m²     Intake/Output Summary (Last 24 hours) at 9/14/2021 1633  Last data filed at 9/14/2021 1331  Gross per 24 hour   Intake 1351.54 ml   Output 3050 ml   Net -1698.46 ml     General appearance: alert and cooperative with exam  Lungs: clear to auscultation bilaterally  Heart: regular rate and rhythm, S1, S2 normal, no murmur, click, rub or gallop  Abdomen: Distended abdomen hypoactive bowel sounds  Extremities: extremities normal, atraumatic, no cyanosis or edema    Assessment and Plan:   1. Postop ileus improving on neostigmine every 4 hours  2. Given 1 dose of Relistor yesterday  3. Imaging study showed improvement will give clear liquid diet repeat lab KUB in the morning      Follow up in GI Clinic after discharge in 2 weeks week(s)    Patient Active Problem List:     Headache     SOB (shortness of breath)     Dyspnea     LV dysfunction     Non-restorative sleep     Snoring     Obesity (BMI 30-39. 9)     Physical deconditioning     Obstructive sleep apnea of adult     Restless sleeper     Intractable headache     Seizure disorder (HCC)     Benign hypertension     Nausea & vomiting     Leukocytosis     Shortness of breath     Acute chest pain     Coronary artery disease involving native coronary artery without angina pectoris     Gastroesophageal reflux disease without esophagitis     ALICIA treated with BiPAP     Intractable migraine with aura without status migrainosus     Ventral hernia without obstruction or gangrene     Moderate episode of recurrent major depressive disorder ([facility] [address]     Unstable angina (HCC)     S/P cardiac catheterization: 7/31/2017: No obstructive lasions.      Bradycardia, drug induced     Cervical spinal stenosis     Angina pectoris, unspecified (HCC)     Lumbar post-laminectomy syndrome     Lumbar radiculitis     Chronic pain syndrome     S/P insertion of spinal cord stimulator     Lumbosacral spinal stenosis      Electronically signed by Chris Arnold MD on 9/14/2021 at 4:33 PM

## 2021-09-14 NOTE — PROGRESS NOTES
after discharge (Pt will be able to dc home with assist prn once pain better under control)   Equipment Recommendations: Equipment Needed: No (continue to monitor for needs)  Plan: Times per week: 5x  Current Treatment Recommendations: Endurance Training, Functional Mobility Training, Strengthening, Self-Care / ADL    Patient Education  Patient Education: ADL's and Precautions    Goals  Short term goals  Time Frame for Short term goals: By discharge  Short term goal 1: Patient to increase activity tolerance to/from  and [facility] distances with SBA to increase indep in home environment. Short term goal 2: Patient to complete BADL routine including toileting with SBA to increase indep in self care completion. Short term goal 3: Patient to tolerate dynamic standing task >3 minutes with SBA and 2UE release to increase indep bathing and toileting tasks. Short term goal 4: Patient to complete simple homemaking task with SBA to increase indep in making lunch at home. Following session, patient left in safe position with all fall risk precautions in place.

## 2021-09-14 NOTE — PROGRESS NOTES
senna  2 tablet Oral Nightly    pregabalin  75 mg Oral BID    DULoxetine  20 mg Oral Daily    divalproex  500 mg Oral Daily    lamoTRIgine  200 mg Oral BID    dexamethasone  4 mg IntraVENous Q6H     Continuous Infusions:   sodium chloride 75 mL/hr at 09/13/21 2236     PRN Meds:magnesium citrate, diazePAM, LORazepam, dicyclomine, morphine **OR** morphine, ketorolac, HYDROcodone 5 mg - acetaminophen, diphenhydrAMINE, ondansetron    Objective: Patient is observed lying in bed with the head of the bed elevated approximately 20 degrees with pain poorly controlled. Patient complains of intense abdominal discomfort rated at between 9 and 10 out of 10 on exam with tenderness to touch. He is otherwise stable and intact for strength and sensation bilaterally and symmetrically. Patient is noted to be passing gas with aggressive bowel protocol in place. Vitals: BP (!) 177/86   Pulse 51   Temp 97.8 °F (36.6 °C) (Oral)   Resp 15   Ht 5' 8.5\" (1.74 m)   Wt 237 lb 4.8 oz (107.6 kg)   SpO2 96%   BMI 35.56 kg/m²     Physical Exam     Physical Exam:  Alert and attentive. Language appropriate, with no aphasia. Pupils equal.  Facial strength symmetric. Review of Systems   Constitutional: Negative for activity change. Respiratory: Negative for apnea, chest tightness and shortness of breath. Cardiovascular: Negative for chest pain. Gastrointestinal: Positive for abdominal distention and abdominal pain. Genitourinary: Negative for difficulty urinating. Neurological: Positive for headaches. Negative for dizziness, weakness and numbness. Psychiatric/Behavioral: Negative for agitation, behavioral problems, confusion and decreased concentration. 24 hour intake/output:    Intake/Output Summary (Last 24 hours) at 9/14/2021 0720  Last data filed at 9/14/2021 0431  Gross per 24 hour   Intake 2726.66 ml   Output 3325 ml   Net -598.34 ml     Last 3 weights:   Wt Readings from Last 3 Encounters:   09/11/21 237 lb 4.8 oz (107.6 kg)   09/02/21 214 lb 1.1 oz (97.1 kg)   08/19/21 214 lb (97.1 kg)         CBC:   Recent Labs     09/11/21  0808 09/12/21  0948 09/13/21  0934   WBC 10.9* 11.0* 11.0*   HGB 13.3* 13.5* 13.6*    344 364     BMP:    Recent Labs     09/12/21  1333 09/13/21  0934    138   K 3.8 4.6    105   CO2 26 26   BUN 20 21   CREATININE 0.8 0.8   GLUCOSE 117* 106     Calcium:  Recent Labs     09/13/21  0934   CALCIUM 8.4*     Magnesium:  Recent Labs     09/13/21  0934   MG 2.3     Glucose:No results for input(s): POCGLU in the last 72 hours. HgbA1C: No results for input(s): LABA1C in the last 72 hours. Lipids: No results for input(s): CHOL, TRIG, HDL, LDLCALC in the last 72 hours. Invalid input(s): LDL    Radiology reports as per the Radiologist  Radiology: CT ABDOMEN PELVIS W WO CONTRAST Additional Contrast? Radiologist Recommendation    Result Date: 9/10/2021  PROCEDURE: CT ABDOMEN PELVIS W WO CONTRAST CLINICAL INFORMATION: severe localized abdominal pain at the mid abdominal wall, r/o any pathology including incarcerated hernia/fecal impaction/obestruction . COMPARISON: None. TECHNIQUE: 5 mm axial CT images were obtained through the abdomen and pelvis before and after the administration of intravenous and oral contrast. Coronal and sagittal reconstructions were obtained. Isovue-370 IV and 40 oral contrast All CT scans at this facility use dose modulation, iterative reconstruction, and/or weight-based dosing when appropriate to reduce radiation dose to as low as reasonably achievable. FINDINGS: Lower chest: Bibasilar atelectasis. Lingular and left lower lobe Liver: The entire liver is not included on this exam. Elevated right hemidiaphragm and a portion the liver is excluded from this imaging the liver is otherwise unremarkable in appearance Gallbladder/Biliary tree: There are gallstones present. There is no ductal dilatation or pericholecystic edema. Spleen: Unremarkable.  No splenomegaly. Pancreas: Unremarkable. No mass or pancreatic ductal dilatation. No findings to suggest acute pancreatitis. Adrenal glands: Unremarkable. No mass. Kidneys and ureters: Unremarkable. No hydroureteronephrosis, mass, or cyst. No renal or ureteral calculi. Stomach, small bowel, and colon: Stomach and duodenum are unremarkable. Small bowel and colon are unremarkable. No bowel wall thickening or bowel obstruction. Appendix: Prior appendectomy. Omentum and mesentery: Unremarkable Aorta, vascular: No aortic aneurysm or dissection. No significant vascular abnormality. Reproductive: Unremarkable Bladder: Unremarkable. No wall thickening or obvious mass. No stones. Intraperitoneal/retroperitoneal Space: There is no ascites, abscess, adenopathy, or mass. No pneumoperitoneum. Abdominal and pelvic body wall soft tissues: Prominent fat-containing left inguinal hernia. Musculoskeletal structures: Postsurgical changes lumbar spine with laminectomies and L3-5. Recently placed thoracic spinal stimulator     No acute inflammatory or infectious process in the abdomen or pelvis. No evidence of bowel obstruction. **This report has been created using voice recognition software. It may contain minor errors which are inherent in voice recognition technology. ** Final report electronically signed by Dr. Veronika Stanley on 9/10/2021 1:02 PM    XR THORACIC SPINE (3 VIEWS)    Result Date: 9/9/2021  THORACIC SPINE 4 VIEWS: CLINICAL INFORMATION: surgery TECHNIQUE: 4 fluoroscopic spot films of the thoracic spine were obtained during neurostimulator insertion by Dr. Gilmar Mart. The actual fluoroscopy time is 33.3 seconds. FINDINGS: Metallic instrument is present particularly lower thoracic spine. Neurostimulator leads have been inserted and appear to be located at about the T7-8 level. Status post neurostimulator insertion. **This report has been created using voice recognition software.   It may contain minor errors which are inherent in voice recognition technology. ** Final report electronically signed by Dr. Eunice Salas on 9/9/2021 4:44 PM    FLUORO FOR SURGICAL PROCEDURES    Result Date: 9/9/2021  Radiology exam is complete. No Radiologist dictation. Please follow up with ordering provider. CTA ABDOMEN PELVIS W WO CONTRAST    Result Date: 9/10/2021  PROCEDURE: CTA ABDOMEN PELVIS W WO CONTRAST CLINICAL INFORMATION: severe abd pain . Severe abdominal pain after spinal cord stimulator and internal pulse generator placement. Pain increased since previous CT this morning. COMPARISON: CT abdomen pelvis from the same date at 12:23 PM. TECHNIQUE: Helical CT of the abdomen and pelvis during intravenous administration of 80 mL Isovue-370 injected in the right forearm with multiplanar volumetric maximum intensity projection reconstructions. All CT scans at this facility use dose modulation, iterative reconstruction, and/or weight-based dosing when appropriate to reduce radiation dose to as low as reasonably achievable. FINDINGS: There is atelectasis at the bilateral lung bases, stable compared to prior exam. The visualized portion of the heart is unremarkable. The ascending aorta is normal in caliber without focal aneurysm or stenosis. The iliac vessels are patent and normal in appearance. The celiac, mesenteric and renal arteries are patent without flow-limiting stenosis. The liver is unremarkable. There is contrast within the gallbladder. There is a hyperdense stone at the gallbladder neck, stable compared to prior exam. Adrenal glands are unremarkable. The kidneys are unremarkable. The spleen and pancreas are unremarkable. No retroperitoneal or mesenteric lymphadenopathy is identified. The large bowel appears within normal in its. The appendix is normal in appearance. Small bowel appears within normal limits. The bladder is unremarkable without focal filling defect.  The prostate is mildly enlarged, stable compared to prior exam. No free fluid is identified. Redemonstration of laminectomies at L3-L5. There is a stable spinal stimulator generator in the subcutis fat overlying the left back muscles. The stimulator lead enters the spinal canal at the T9 level, stable compared to prior exam.      1. No evidence of acute intra-abdominal or intrapelvic abnormality. 2. Cholelithiasis. **This report has been created using voice recognition software. It may contain minor errors which are inherent in voice recognition technology. ** Final report electronically signed by Dr. Leonel Menendez MD on 9/10/2021 8:34 PM                   Assessment: Inractable abdominal pain postoperative day 5 from thoracic laminectomy and placement of spinal cord stimulator and left flank internal pulse generator performed by Dr. Coral Hebert    Active Problems:    Chronic pain syndrome    S/P insertion of spinal cord stimulator    Lumbosacral spinal stenosis  Resolved Problems:    * No resolved hospital problems. *        Plan: Patient is seen and evaluated on the floor with evaluation exam findings reviewed discussed with Dr. Coral Hebert with nursing. Patient remains postoperative day #5 from thoracic laminectomy and placement of spinal cord stimulator with left flank internal pulse generator performed by Dr. Coral Hebert. Patient continues with intractable abdominal pain and discomfort with the pain rated at between 9 and 10 out of 10 on exam today with tenderness to touch. Of note: Patient is passing gas with aggressive bowel protocol in place. Incisions remain flat and dry with dressings recently changed. GI, general surgery and hospitalist services are following with possible colonoscopy anticipated for today. Neurosurgery recommends continuing pain control with PT and OT as tolerated.   Neurosurgery to follow      Electronically signed by Lilliana Claire PA-C on 9/14/2021 at 7:20 AM    Neurosurgery

## 2021-09-14 NOTE — PROGRESS NOTES
Tello Br[facility]ila  Daily Progress Note    Pt Name: Chad Owen  Medical Record Number: [id number]  Date of Birth 1955   Today's Date: 9/14/2021  Chief complaint: Abdominal Pain  ASSESSMENT:   1. Hospital day # 5  2. Acute abdominal pain post laminectomy and spinal cord stimulator placement very slightly improved. 3. Ileus 9/12 paralytic ileus involving mostly the colon   4. KUB 9/13 persistent gaseous distention of multiple small bowel loops, overall improved, diffuse ileus   5. KUB  today. Report below  6. Small BM yesterday   3. Has a past medical history of Back pain, Depression, GERD (gastroesophageal reflux disease), Headache(784.0), Migraines, ALICIA treated with BiPAP, Pneumonia, Pulmonary embolism ([address] S/P cardiac catheterization: 7/31/2017: No obstructive lasions. , and Seizures ([address] RECOMMENDATIONS:   1. IV hydration  2. Analgesics and antiemetics as ordered  3. NPO for ileus   4. CT and CTA demonstrated no surgical process responsible for abdominal pain, lactic acid normal. Suspect neurological source of abdominal pain. Labs and KUB reviewed. Discussed case with Dr. Erasto Salazar. 5. Not a surgical abdomen, GI following, general surgery signing off  SUBJECTIVE:   Patti Henry is still experiencing severe abdominal pain, slightly improved. Chart reviewed. Updated by nursing staff. He has  nausea denies  vomiting, has had a very small BM and is passing flatus. Currently NPO. Still having significant pain on abdominal wall. Even to light touch. Patient feels the pain is both abdominal wall and deep intra-abdominal as area of pain. Has been in constant pain since procedure. CT and CTA has been within normal limits. No significant lactic acidosis. Etiology unknown so far.   MEDICATIONS   Scheduled Meds:   polyethylene glycol  17 g Oral TID    docusate sodium  100 mg Oral BID    senna  2 tablet Oral Nightly    pregabalin  75 mg Oral BID    DULoxetine  20 mg 09/12/21  1333 09/13/21  0934 09/14/21  0742   WBC 11.0*  --  11.0*  --    HGB 13.5*  --  13.6*  --    HCT 41.0*  --  41.4*  --      --  364  --    NA  --  138 138 136   K  --  3.8 4.6 4.6   CL  --  104 105 103   CO2  --  26 26 23   BUN  --  20 21 20   CREATININE  --  0.8 0.8 0.8   MG  --   --  2.3 2.4   PHOS  --  2.2* 3.3 3.6   CALCIUM  --  8.4* 8.4* 8.5      No results for input(s): PTT, INR in the last 72 hours. Invalid input(s): PT  Recent Labs     09/12/21  0948 09/12/21  1333 09/13/21  0934   AST  --  30 23   ALT  --  23 23   BILITOT  --  0.4 0.4   LACTA 2.2*  --   --      No results for input(s): TROPONINT in the last 72 hours. RADIOLOGY     XR ABDOMEN (KUB) (SINGLE AP VIEW)   Final Result   1. Persistent gaseous distention of multiple small bowel loops are seen. However, the overall distention appears improved when compared to the prior examination. This could represent possible diffuse ileus. **This report has been created using voice recognition software. It may contain minor errors which are inherent in voice recognition technology. **      Final report electronically signed by Dr. Keya Rocha on 9/13/2021 8:48 AM      XR ABDOMEN (KUB) (SINGLE AP VIEW)   Final Result   Findings of paralytic ileus involving predominantly the colon. **This report has been created using voice recognition software. It may contain minor errors which are inherent in voice recognition technology. **         Final report electronically signed by Dr. Brenda Wang on 9/12/2021 10:48 AM      CTA ABDOMEN PELVIS W WO CONTRAST   Final Result       1. No evidence of acute intra-abdominal or intrapelvic abnormality. 2. Cholelithiasis. **This report has been created using voice recognition software. It may contain minor errors which are inherent in voice recognition technology. **      Final report electronically signed by Dr. Mervin Monique MD on 9/10/2021 8:34 PM      CT ABDOMEN PELVIS W WO CONTRAST Additional Contrast? Radiologist Recommendation   Final Result      No acute inflammatory or infectious process in the abdomen or pelvis. No evidence of bowel obstruction. **This report has been created using voice recognition software. It may contain minor errors which are inherent in voice recognition technology. **      Final report electronically signed by Dr. Gregory Hernandez on 9/10/2021 1:02 PM      FLUORO FOR SURGICAL PROCEDURES   Final Result      XR THORACIC SPINE (3 VIEWS)   Final Result   Status post neurostimulator insertion. **This report has been created using voice recognition software. It may contain minor errors which are inherent in voice recognition technology. **      Final report electronically signed by Dr. Rhoda Serrano on 9/9/2021 4:44 PM      XR ABDOMEN (KUB) (SINGLE AP VIEW)    (Results Pending)     XR ABDOMEN (KUB) (SINGLE AP VIEW) [[id number]]    Resulted: 09/14/21 1550    Updated: 09/14/21 1552    Narrative:     PROCEDURE: XR ABDOMEN (KUB) (SINGLE AP VIEW)     CLINICAL INFORMATION: ileus; compare with recent series. .     COMPARISON: 9/13/2021     TECHNIQUE: 2 supine projections of the abdomen     FINDINGS: There are a few scattered gas-filled loops of small bowel which are not distended on the current exam. There is contrast throughout much of the colon: There is mildly gas distended. No evidence of obstruction. Properitoneal fat stripes are   preserved. No pathologic calcifications identified. Postsurgical changes lumbar spine. Spinal stimulator generator at the L3 level leads extending to the thoracic level skin staples over the generator and the thoracic level leads    Impression:     Significantly improving appearance of the abdomen         **This report has been created using voice recognition software.  It may contain minor errors which are inherent in voice recognition technology. **     Final report electronically signed by Dr. Gregory Hernandez

## 2021-09-14 NOTE — PROGRESS NOTES
Hospitalist Progress Note      Patient:  Leon Cast    Unit/Bed:7K-16/016-A  YOB: 1955  MRN: [id number]   Acct: [de-identified]   PCP: Hanh Quezada MD  Date of Admission: 9/9/2021    Assessment/Plan:    1. Paralytic ileus:  a. Severe periumbilical and right lower quadrant abdominal pain with hyper algesia/hyperesthesia following SCS placement with neurosurgery. b. CT A/P and CTA A/P on 9/10 without acute pathology. c. Encourage out of bed as able and PT/OT as tolerated. d. N.p.o. with sips of water for medicines. e. Devonda Ask on 9/12 showed evidence of ileus. Repeat on 9/13 showed mild improvement. f. Received multiple enemas on 9/13 without bowel movement. Also received neostigmine and methylnaltrexone. g. Will give lactulose enema today. Has prn bentyl and Mg citrate  h. Continue TID miralax, Daily Senna, and BID docusate  i. General surgery, GI, and neurosurgery following. .  2. Abdominal pain  a. Likely secondary to #1  b. Concern for possible neuropathic pain. Given trial of cymbalta and lyrica without improvement. Will discontinue today. 3. Postoperative hypoxia: Resolved  a. Likely contributing factor of splinting due to severe abdominal pain. b. Patient is been weaned to room air. 4. Seizure disorder:   a. Continue Depakote and Lamictal.  b. Seizure precautions in place. c. As needed Ativan for seizure-like activity. 5. Depression:   a. May resume zoloft once tolerating PO medications and ileus resolved. Disposition: Unknown at this time. Patient continues to have severe abdominal pain. Disposition per neurosurgery. Chief Complaint: Severe abdominal pain    Hospital Course:    9/9: Underwent thoracic spine laminectomy with placement of permanent paddle spinal cord stimulator at T7-8    9/10: Postop, patient having severe abdominal pain. General surgery and hospitalist consulted for evaluation and management.   CT abdomen pelvis without any acute pathology. Significant for gallstones. CT angiogram of abdomen pelvis completed with no evidence of occlusion. Patient's pain nonresponsive to opioids, muscle relaxers, benzodiazepines. 9/12: Patient with worsening abdominal distention and severe pain persists. KUB shows paralytic ileus. Patient transition to n.p.o.  Received bowel regiment and enema with only small bowel movement. 9/12: Repeat KUB shows mild improvement in ileus compared to yesterday. 9/13: evaluated by GI. Given neostigmine and methylnaltrexone in addition to multiple enemas. Very small bowel movement. Subjective (past 24 hours):   Patient's bowels have not moved significantly. Continues to have 10 out of 10 abdominal pain. Was able to ambulate the hallways with assistance of physical therapy. Denies any nausea or vomiting. [place] fevers or chills. Admits to passing flatus. Past medical history, family history, social history and allergies reviewed again and is unchanged since admission. ROS (12 point review of systems completed. Pertinent positives noted.  Otherwise ROS is negative)     Medications:  Reviewed    Infusion Medications    sodium chloride 75 mL/hr at 09/13/21 2236     Scheduled Medications    neostigmine  1 mg IntraMUSCular Q4H    lactulose enema   Rectal Once    polyethylene glycol  17 g Oral TID    docusate sodium  100 mg Oral BID    senna  2 tablet Oral Nightly    pregabalin  75 mg Oral BID    DULoxetine  20 mg Oral Daily    divalproex  500 mg Oral Daily    lamoTRIgine  200 mg Oral BID    dexamethasone  4 mg IntraVENous Q6H     PRN Meds: magnesium citrate, diazePAM, LORazepam, dicyclomine, morphine **OR** morphine, ketorolac, HYDROcodone 5 mg - acetaminophen, diphenhydrAMINE, ondansetron      Intake/Output Summary (Last 24 hours) at 9/14/2021 1042  Last data filed at 9/14/2021 0431  Gross per 24 hour   Intake 1798.54 ml   Output 2650 ml   Net -851.46 ml Diet:  Diet NPO Exceptions are: Sips of Water with Meds    Exam:  BP (!) 177/86   Pulse 51   Temp 97.8 °F (36.6 °C) (Oral)   Resp 15   Ht 5' 8.5\" (1.74 m)   Wt 237 lb 4.8 oz (107.6 kg)   SpO2 96%   BMI 35.56 kg/m²   General appearance: Elderly male resting in bed. Appears uncomfortable. HEENT: Pupils equal, round, and reactive to light. Conjunctivae/corneas clear. Neck: Supple, with full range of motion. No jugular venous distention. Trachea midline. Respiratory: Shallow respirations. Mild tachypnea. Lungs are clear to auscultation without wheezes, rales, rhonchi. Cardiovascular: Regular rate and rhythm with normal S1/S2 without murmurs, rubs or gallops. Abdomen: Minimal abdominal distention. Hypoactive bowel sounds. Moderate to severe tenderness in the periumbilical region to light palpation. Hyperesthesia and hyperalgesia noted. Musculoskeletal: passive and active ROM x 4 extremities. Skin: No active skin lesions. No discoloration. Clean and dry dressings over the patient's thoracic spine following spinal cord stimulator placement. Neurologic:  Neurovascularly intact without any focal sensory/motor deficits. Cranial nerves: II-XII intact, grossly non-focal.  Hyperalgesia and hyperesthesia noted the patient's abdomen and right lower quadrant.   Psychiatric: Alert and oriented, thought content appropriate, normal insight  Capillary Refill: Brisk,< 3 seconds   Peripheral Pulses: +2 palpable, equal bilaterally     Labs:   Recent Labs     09/12/21  0948 09/13/21  0934   WBC 11.0* 11.0*   HGB 13.5* 13.6*   HCT 41.0* 41.4*    364     Recent Labs     09/12/21  1333 09/13/21  0934 09/14/21  0742    138 136   K 3.8 4.6 4.6    105 103   CO2 26 26 23   BUN 20 21 20   CREATININE 0.8 0.8 0.8   CALCIUM 8.4* 8.4* 8.5   PHOS 2.2* 3.3 3.6     Recent Labs     09/12/21  1333 09/13/21  0934   AST 30 23   ALT 23 23   BILITOT 0.4 0.4   ALKPHOS 69 70     No results for input(s): INR in the last 72 hours. No results for input(s): Salena Salcido in the last 72 hours. Microbiology:    Blood culture #1: No results found for: BC    Blood culture #2:No results found for: Sheri Sutton    Organism:No results found for: ORG    No results found for: LABGRAM    MRSA culture only:No results found for: [facility]    Urine culture: No results found for: LABURIN    Respiratory culture: No results found for: CULTRESP    Aerobic and Anaerobic :  No results found for: LABAERO  No results found for: LABANAE    Urinalysis:      Lab Results   Component Value Date    NITRU NEGATIVE 11/25/2016    WBCUA NONE 11/25/2016    BACTERIA NONE 11/25/2016    RBCUA NONE 11/25/2016    BLOODU NEGATIVE 11/25/2016    SPECGRAV 1.015 11/25/2016    GLUCOSEU neg 03/11/2015    GLUCOSEU NEGATIVE 06/11/2013       Radiology:  XR ABDOMEN (KUB) (SINGLE AP VIEW)   Final Result   1. Persistent gaseous distention of multiple small bowel loops are seen. However, the overall distention appears improved when compared to the prior examination. This could represent possible diffuse ileus. **This report has been created using voice recognition software. It may contain minor errors which are inherent in voice recognition technology. **      Final report electronically signed by Dr. Chris Foster on 9/13/2021 8:48 AM      XR ABDOMEN (KUB) (SINGLE AP VIEW)   Final Result   Findings of paralytic ileus involving predominantly the colon. **This report has been created using voice recognition software. It may contain minor errors which are inherent in voice recognition technology. **         Final report electronically signed by Dr. Jayashree Morton on 9/12/2021 10:48 AM      CTA ABDOMEN PELVIS W WO CONTRAST   Final Result       1. No evidence of acute intra-abdominal or intrapelvic abnormality. 2. Cholelithiasis. **This report has been created using voice recognition software.  It may contain minor errors which are inherent in voice recognition technology. **      Final report electronically signed by Dr. Leonel Menendez MD on 9/10/2021 8:34 PM      CT ABDOMEN PELVIS W WO CONTRAST Additional Contrast? Radiologist Recommendation   Final Result      No acute inflammatory or infectious process in the abdomen or pelvis. No evidence of bowel obstruction. **This report has been created using voice recognition software. It may contain minor errors which are inherent in voice recognition technology. **      Final report electronically signed by Dr. Jerel Manuel on 9/10/2021 1:02 PM      FLUORO FOR SURGICAL PROCEDURES   Final Result      XR THORACIC SPINE (3 VIEWS)   Final Result   Status post neurostimulator insertion. **This report has been created using voice recognition software. It may contain minor errors which are inherent in voice recognition technology. **      Final report electronically signed by Dr. Jayashree Morton on 9/9/2021 4:44 PM      XR ABDOMEN (KUB) (SINGLE AP VIEW)    (Results Pending)     CT ABDOMEN PELVIS W WO CONTRAST Additional Contrast? Radiologist Recommendation    Result Date: 9/10/2021  PROCEDURE: CT ABDOMEN PELVIS W WO CONTRAST CLINICAL INFORMATION: severe localized abdominal pain at the mid abdominal wall, r/o any pathology including incarcerated hernia/fecal impaction/obestruction . COMPARISON: None. TECHNIQUE: 5 mm axial CT images were obtained through the abdomen and pelvis before and after the administration of intravenous and oral contrast. Coronal and sagittal reconstructions were obtained. Isovue-370 IV and 40 oral contrast All CT scans at this facility use dose modulation, iterative reconstruction, and/or weight-based dosing when appropriate to reduce radiation dose to as low as reasonably achievable. FINDINGS: Lower chest: Bibasilar atelectasis. Lingular and left lower lobe Liver:  The entire liver is not included on this exam. Elevated right hemidiaphragm and a portion the liver is inserted and appear to be located at about the T7-8 level. Status post neurostimulator insertion. **This report has been created using voice recognition software. It may contain minor errors which are inherent in voice recognition technology. ** Final report electronically signed by Dr. Myranda Baxter on 9/9/2021 4:44 PM    FLUORO FOR SURGICAL PROCEDURES    Result Date: 9/9/2021  Radiology exam is complete. No Radiologist dictation. Please follow up with ordering provider. CTA ABDOMEN PELVIS W WO CONTRAST    Result Date: 9/10/2021  PROCEDURE: CTA ABDOMEN PELVIS W WO CONTRAST CLINICAL INFORMATION: severe abd pain . Severe abdominal pain after spinal cord stimulator and internal pulse generator placement. Pain increased since previous CT this morning. COMPARISON: CT abdomen pelvis from the same date at 12:23 PM. TECHNIQUE: Helical CT of the abdomen and pelvis during intravenous administration of 80 mL Isovue-370 injected in the right forearm with multiplanar volumetric maximum intensity projection reconstructions. All CT scans at this facility use dose modulation, iterative reconstruction, and/or weight-based dosing when appropriate to reduce radiation dose to as low as reasonably achievable. FINDINGS: There is atelectasis at the bilateral lung bases, stable compared to prior exam. The visualized portion of the heart is unremarkable. The ascending aorta is normal in caliber without focal aneurysm or stenosis. The iliac vessels are patent and normal in appearance. The celiac, mesenteric and renal arteries are patent without flow-limiting stenosis. The liver is unremarkable. There is contrast within the gallbladder. There is a hyperdense stone at the gallbladder neck, stable compared to prior exam. Adrenal glands are unremarkable. The kidneys are unremarkable. The spleen and pancreas are unremarkable. No retroperitoneal or mesenteric lymphadenopathy is identified. The large bowel appears within normal in its. The appendix is normal in appearance. Small bowel appears within normal limits. The bladder is unremarkable without focal filling defect. The prostate is mildly enlarged, stable compared to prior exam. No free fluid is identified. Redemonstration of laminectomies at L3-L5. There is a stable spinal stimulator generator in the subcutis fat overlying the left back muscles. The stimulator lead enters the spinal canal at the T9 level, stable compared to prior exam.      1. No evidence of acute intra-abdominal or intrapelvic abnormality. 2. Cholelithiasis. **This report has been created using voice recognition software. It may contain minor errors which are inherent in voice recognition technology. ** Final report electronically signed by Dr. Linnea Floyd MD on 9/10/2021 8:34 PM      Electronically signed by Arias Coker DO on 9/14/2021 at 10:42 AM

## 2021-09-15 ENCOUNTER — APPOINTMENT (OUTPATIENT)
Dept: GENERAL RADIOLOGY | Age: 66
DRG: 981 | End: 2021-09-15
Attending: NEUROLOGICAL SURGERY
Payer: MEDICARE

## 2021-09-15 ENCOUNTER — TELEPHONE (OUTPATIENT)
Dept: FAMILY MEDICINE CLINIC | Age: 66
End: 2021-09-15

## 2021-09-15 VITALS
SYSTOLIC BLOOD PRESSURE: 158 MMHG | WEIGHT: 237.3 LBS | RESPIRATION RATE: 16 BRPM | DIASTOLIC BLOOD PRESSURE: 85 MMHG | BODY MASS INDEX: 35.15 KG/M2 | HEIGHT: 69 IN | HEART RATE: 58 BPM | TEMPERATURE: 98.5 F | OXYGEN SATURATION: 98 %

## 2021-09-15 LAB
ANION GAP SERPL CALCULATED.3IONS-SCNC: 11 MEQ/L (ref 8–16)
BASOPHILS # BLD: 0.3 %
BASOPHILS ABSOLUTE: 0 THOU/MM3 (ref 0–0.1)
BUN BLDV-MCNC: 18 MG/DL (ref 7–22)
CALCIUM SERPL-MCNC: 8.7 MG/DL (ref 8.5–10.5)
CHLORIDE BLD-SCNC: 102 MEQ/L (ref 98–111)
CO2: 24 MEQ/L (ref 23–33)
CREAT SERPL-MCNC: 0.8 MG/DL (ref 0.4–1.2)
EOSINOPHIL # BLD: 0 %
EOSINOPHILS ABSOLUTE: 0 THOU/MM3 (ref 0–0.4)
ERYTHROCYTE [DISTWIDTH] IN BLOOD BY AUTOMATED COUNT: 13.1 % (ref 11.5–14.5)
ERYTHROCYTE [DISTWIDTH] IN BLOOD BY AUTOMATED COUNT: 44.7 FL (ref 35–45)
GFR SERPL CREATININE-BSD FRML MDRD: > 90 ML/MIN/1.73M2
GLUCOSE BLD-MCNC: 116 MG/DL (ref 70–108)
HCT VFR BLD CALC: 44 % (ref 42–52)
HEMOGLOBIN: 14.6 GM/DL (ref 14–18)
IMMATURE GRANS (ABS): 0.16 THOU/MM3 (ref 0–0.07)
IMMATURE GRANULOCYTES: 1.5 %
LYMPHOCYTES # BLD: 9.7 %
LYMPHOCYTES ABSOLUTE: 1.1 THOU/MM3 (ref 1–4.8)
MAGNESIUM: 2.2 MG/DL (ref 1.6–2.4)
MCH RBC QN AUTO: 31.5 PG (ref 26–33)
MCHC RBC AUTO-ENTMCNC: 33.2 GM/DL (ref 32.2–35.5)
MCV RBC AUTO: 94.8 FL (ref 80–94)
MONOCYTES # BLD: 7.6 %
MONOCYTES ABSOLUTE: 0.8 THOU/MM3 (ref 0.4–1.3)
NUCLEATED RED BLOOD CELLS: 0 /100 WBC
PLATELET # BLD: 412 THOU/MM3 (ref 130–400)
PMV BLD AUTO: 9.8 FL (ref 9.4–12.4)
POTASSIUM SERPL-SCNC: 4.5 MEQ/L (ref 3.5–5.2)
RBC # BLD: 4.64 MILL/MM3 (ref 4.7–6.1)
SEG NEUTROPHILS: 80.9 %
SEGMENTED NEUTROPHILS ABSOLUTE COUNT: 8.8 THOU/MM3 (ref 1.8–7.7)
SODIUM BLD-SCNC: 137 MEQ/L (ref 135–145)
WBC # BLD: 10.9 THOU/MM3 (ref 4.8–10.8)

## 2021-09-15 PROCEDURE — 97110 THERAPEUTIC EXERCISES: CPT

## 2021-09-15 PROCEDURE — 6360000002 HC RX W HCPCS: Performed by: INTERNAL MEDICINE

## 2021-09-15 PROCEDURE — 6370000000 HC RX 637 (ALT 250 FOR IP): Performed by: FAMILY MEDICINE

## 2021-09-15 PROCEDURE — 85025 COMPLETE CBC W/AUTO DIFF WBC: CPT

## 2021-09-15 PROCEDURE — 6370000000 HC RX 637 (ALT 250 FOR IP): Performed by: NEUROLOGICAL SURGERY

## 2021-09-15 PROCEDURE — 99024 POSTOP FOLLOW-UP VISIT: CPT | Performed by: NEUROLOGICAL SURGERY

## 2021-09-15 PROCEDURE — 6370000000 HC RX 637 (ALT 250 FOR IP): Performed by: PHYSICIAN ASSISTANT

## 2021-09-15 PROCEDURE — 80048 BASIC METABOLIC PNL TOTAL CA: CPT

## 2021-09-15 PROCEDURE — 6360000002 HC RX W HCPCS: Performed by: PHYSICIAN ASSISTANT

## 2021-09-15 PROCEDURE — 6370000000 HC RX 637 (ALT 250 FOR IP): Performed by: INTERNAL MEDICINE

## 2021-09-15 PROCEDURE — 36415 COLL VENOUS BLD VENIPUNCTURE: CPT

## 2021-09-15 PROCEDURE — 83735 ASSAY OF MAGNESIUM: CPT

## 2021-09-15 PROCEDURE — 2580000003 HC RX 258: Performed by: FAMILY MEDICINE

## 2021-09-15 PROCEDURE — APPSS30 APP SPLIT SHARED TIME 16-30 MINUTES: Performed by: PHYSICIAN ASSISTANT

## 2021-09-15 PROCEDURE — 74018 RADEX ABDOMEN 1 VIEW: CPT

## 2021-09-15 PROCEDURE — 90792 PSYCH DIAG EVAL W/MED SRVCS: CPT | Performed by: PSYCHIATRY & NEUROLOGY

## 2021-09-15 PROCEDURE — 97116 GAIT TRAINING THERAPY: CPT

## 2021-09-15 RX ORDER — HYDROCODONE BITARTRATE AND ACETAMINOPHEN 5; 325 MG/1; MG/1
1 TABLET ORAL EVERY 6 HOURS PRN
Qty: 28 TABLET | Refills: 0 | Status: SHIPPED | OUTPATIENT
Start: 2021-09-15 | End: 2021-09-18

## 2021-09-15 RX ORDER — PANTOPRAZOLE SODIUM 40 MG/1
40 TABLET, DELAYED RELEASE ORAL
Status: DISCONTINUED | OUTPATIENT
Start: 2021-09-15 | End: 2021-09-15 | Stop reason: HOSPADM

## 2021-09-15 RX ORDER — DIAZEPAM 5 MG/1
5 TABLET ORAL EVERY 8 HOURS PRN
Qty: 28 TABLET | Refills: 0 | Status: SHIPPED | OUTPATIENT
Start: 2021-09-15 | End: 2021-09-22

## 2021-09-15 RX ORDER — SERTRALINE HYDROCHLORIDE 100 MG/1
100 TABLET, FILM COATED ORAL 2 TIMES DAILY
Status: DISCONTINUED | OUTPATIENT
Start: 2021-09-15 | End: 2021-09-15 | Stop reason: HOSPADM

## 2021-09-15 RX ADMIN — SERTRALINE 100 MG: 100 TABLET, FILM COATED ORAL at 10:20

## 2021-09-15 RX ADMIN — DIVALPROEX SODIUM 500 MG: 500 TABLET, EXTENDED RELEASE ORAL at 10:19

## 2021-09-15 RX ADMIN — DIAZEPAM 5 MG: 5 TABLET ORAL at 02:33

## 2021-09-15 RX ADMIN — DOCUSATE SODIUM 100 MG: 100 CAPSULE ORAL at 10:19

## 2021-09-15 RX ADMIN — PANTOPRAZOLE SODIUM 40 MG: 40 TABLET, DELAYED RELEASE ORAL at 10:18

## 2021-09-15 RX ADMIN — LAMOTRIGINE 200 MG: 100 TABLET ORAL at 10:19

## 2021-09-15 RX ADMIN — SODIUM CHLORIDE: 9 INJECTION, SOLUTION INTRAVENOUS at 04:13

## 2021-09-15 RX ADMIN — DEXAMETHASONE SODIUM PHOSPHATE 4 MG: 4 INJECTION, SOLUTION INTRA-ARTICULAR; INTRALESIONAL; INTRAMUSCULAR; INTRAVENOUS; SOFT TISSUE at 06:18

## 2021-09-15 RX ADMIN — POLYETHYLENE GLYCOL 3350 17 G: 17 POWDER, FOR SOLUTION ORAL at 10:18

## 2021-09-15 RX ADMIN — DEXAMETHASONE SODIUM PHOSPHATE 4 MG: 4 INJECTION, SOLUTION INTRA-ARTICULAR; INTRALESIONAL; INTRAMUSCULAR; INTRAVENOUS; SOFT TISSUE at 13:09

## 2021-09-15 RX ADMIN — NEOSTIGMINE METHYLSULFATE 1 MG: 1 INJECTION, SOLUTION INTRAVENOUS at 02:28

## 2021-09-15 RX ADMIN — DEXAMETHASONE SODIUM PHOSPHATE 4 MG: 4 INJECTION, SOLUTION INTRA-ARTICULAR; INTRALESIONAL; INTRAMUSCULAR; INTRAVENOUS; SOFT TISSUE at 02:28

## 2021-09-15 RX ADMIN — NEOSTIGMINE METHYLSULFATE 1 MG: 1 INJECTION, SOLUTION INTRAVENOUS at 05:22

## 2021-09-15 ASSESSMENT — PAIN SCALES - GENERAL: PAINLEVEL_OUTOF10: 0

## 2021-09-15 NOTE — PROGRESS NOTES
Addendum by Dr. Felix Cole MD:  I have seen and examined the patient independently. Face to face evaluation and examination was performed. The below evaluation and note have been reviewed. Labs and radiographs were reviewed. I Have discussed with Neurosurgery PA about this patient in detail. The below assessment and plan have been reviewed. Please see my modifications mentioned below. My additional comments and modifications:  -Postop day 6 (placement of permanent spinal cord stimulator system). -Patient feels that his abdomen abdominal pain is better today. -He was sitting in his chair comfortably.  -He stated that he passed some gases. - The last KUP showed improvement in pateint's ileus.   -Abdominal CT and CTA were negative.  -No indication for any acute surgical intervention at this time per general surgery.  -I believe one of  possible explanation for patient severe abdominal pain postoperatively is somatization disorder (given patient previous history of depression and anxiety, as well as patient's postoperative course and the findings of all of his investigations). -Patient can be discharged from neuro perspective as his his pain is under control. To follow-up with neurosurgery outpatient clinic after 12 days for sutures removal and with GI outpatient clinic as scheduled.  -I review the neurosurgery recommendation and the treatment plan with the patient and his wife. All questions and concerns were addressed and answered    Felix Cole MD       Neurosurgery Progress Note    Patient:  Elias Be      Unit/Bed:7K-16/016-A    YOB: 1955    MRN: [id number]     Acct: [de-identified]     Admit date: 9/9/2021    No chief complaint on file. Patient Seen, Chart, Physician notes, Labs, Radiology studies reviewed. Subjective: Patient is seen and evaluated on the floor with evaluation exam findings reviewed and discussed with Dr. Vanda Dee and with nursing.   Patient continues with 8 out of 10 abdominal pain on exam today. Past, Family, Social History unchanged from admission. Diet:  ADULT DIET; Clear Liquid    Medications:  Scheduled Meds:   neostigmine  1 mg IntraMUSCular Q4H    polyethylene glycol  17 g Oral TID    docusate sodium  100 mg Oral BID    senna  2 tablet Oral Nightly    divalproex  500 mg Oral Daily    lamoTRIgine  200 mg Oral BID    dexamethasone  4 mg IntraVENous Q6H     Continuous Infusions:   sodium chloride 75 mL/hr at 09/15/21 0413     PRN Meds:magnesium citrate, diazePAM, LORazepam, dicyclomine, morphine **OR** morphine, HYDROcodone 5 mg - acetaminophen, diphenhydrAMINE, ondansetron    Objective: Patient is observed lying in bed with the head of the bed mildly elevated. He continues with abdominal pain rated at 8 out of 10 on exam this morning. Incisions remain flat and dry with dressings recently changed at the spinal cord stimulator and internal pulse generator sites. He is otherwise stable and intact neurologically with no additional significant changes noted the patient chart overnight. Vitals: BP (!) 153/97   Pulse 50   Temp 97.8 °F (36.6 °C) (Oral)   Resp 16   Ht 5' 8.5\" (1.74 m)   Wt 237 lb 4.8 oz (107.6 kg)   SpO2 94%   BMI 35.56 kg/m²     Physical Exam     Physical Exam:  Alert and attentive. Language appropriate, with no aphasia. Pupils equal.  Facial strength symmetric. Review of Systems   Patient continues with intense abdominal discomfort rated at 8 out of 10 on exam this morning. He denies nausea vomiting, headache, chest pain or shortness of breath. Constitutional: Negative for activity change. Respiratory: Negative for apnea, chest tightness and shortness of breath. Cardiovascular: Negative for chest pain. Gastrointestinal: Positive for abdominal distention and abdominal pain. Genitourinary: Negative for difficulty urinating. Neurological: Positive for headaches.  Negative for dizziness, weakness and numbness. Psychiatric/Behavioral: Negative for agitation, behavioral problems, confusion and decreased concentration. 24 hour intake/output:    Intake/Output Summary (Last 24 hours) at 9/15/2021 0710  Last data filed at 9/15/2021 0556  Gross per 24 hour   Intake 1549.12 ml   Output 2125 ml   Net -575.88 ml     Last 3 weights: Wt Readings from Last 3 Encounters:   09/11/21 237 lb 4.8 oz (107.6 kg)   09/02/21 214 lb 1.1 oz (97.1 kg)   08/19/21 214 lb (97.1 kg)         CBC:   Recent Labs     09/12/21  0948 09/13/21  0934   WBC 11.0* 11.0*   HGB 13.5* 13.6*    364     BMP:    Recent Labs     09/12/21  1333 09/13/21  0934 09/14/21  0742    138 136   K 3.8 4.6 4.6    105 103   CO2 26 26 23   BUN 20 21 20   CREATININE 0.8 0.8 0.8   GLUCOSE 117* 106 108     Calcium:  Recent Labs     09/14/21  0742   CALCIUM 8.5     Magnesium:  Recent Labs     09/14/21  0742   MG 2.4     Glucose:No results for input(s): POCGLU in the last 72 hours. HgbA1C: No results for input(s): LABA1C in the last 72 hours. Lipids: No results for input(s): CHOL, TRIG, HDL, LDLCALC in the last 72 hours. Invalid input(s): LDL    Radiology reports as per the Radiologist  Radiology: CT ABDOMEN PELVIS W WO CONTRAST Additional Contrast? Radiologist Recommendation    Result Date: 9/10/2021  PROCEDURE: CT ABDOMEN PELVIS W WO CONTRAST CLINICAL INFORMATION: severe localized abdominal pain at the mid abdominal wall, r/o any pathology including incarcerated hernia/fecal impaction/obestruction . COMPARISON: None. TECHNIQUE: 5 mm axial CT images were obtained through the abdomen and pelvis before and after the administration of intravenous and oral contrast. Coronal and sagittal reconstructions were obtained. Isovue-370 IV and 40 oral contrast All CT scans at this facility use dose modulation, iterative reconstruction, and/or weight-based dosing when appropriate to reduce radiation dose to as low as reasonably achievable. FINDINGS: Lower chest: Bibasilar atelectasis. Lingular and left lower lobe Liver: The entire liver is not included on this exam. Elevated right hemidiaphragm and a portion the liver is excluded from this imaging the liver is otherwise unremarkable in appearance Gallbladder/Biliary tree: There are gallstones present. There is no ductal dilatation or pericholecystic edema. Spleen: Unremarkable. No splenomegaly. Pancreas: Unremarkable. No mass or pancreatic ductal dilatation. No findings to suggest acute pancreatitis. Adrenal glands: Unremarkable. No mass. Kidneys and ureters: Unremarkable. No hydroureteronephrosis, mass, or cyst. No renal or ureteral calculi. Stomach, small bowel, and colon: Stomach and duodenum are unremarkable. Small bowel and colon are unremarkable. No bowel wall thickening or bowel obstruction. Appendix: Prior appendectomy. Omentum and mesentery: Unremarkable Aorta, vascular: No aortic aneurysm or dissection. No significant vascular abnormality. Reproductive: Unremarkable Bladder: Unremarkable. No wall thickening or obvious mass. No stones. Intraperitoneal/retroperitoneal Space: There is no ascites, abscess, adenopathy, or mass. No pneumoperitoneum. Abdominal and pelvic body wall soft tissues: Prominent fat-containing left inguinal hernia. Musculoskeletal structures: Postsurgical changes lumbar spine with laminectomies and L3-5. Recently placed thoracic spinal stimulator     No acute inflammatory or infectious process in the abdomen or pelvis. No evidence of bowel obstruction. **This report has been created using voice recognition software. It may contain minor errors which are inherent in voice recognition technology. ** Final report electronically signed by Dr. Tani Salazar on 9/10/2021 1:02 PM    XR THORACIC SPINE (3 VIEWS)    Result Date: 9/9/2021  THORACIC SPINE 4 VIEWS: CLINICAL INFORMATION: surgery TECHNIQUE: 4 fluoroscopic spot films of the thoracic spine were obtained during neurostimulator insertion by Dr. Lisa Chiu. The actual fluoroscopy time is 33.3 seconds. FINDINGS: Metallic instrument is present particularly lower thoracic spine. Neurostimulator leads have been inserted and appear to be located at about the T7-8 level. Status post neurostimulator insertion. **This report has been created using voice recognition software. It may contain minor errors which are inherent in voice recognition technology. ** Final report electronically signed by Dr. Renata Mora on 9/9/2021 4:44 PM    FLUORO FOR SURGICAL PROCEDURES    Result Date: 9/9/2021  Radiology exam is complete. No Radiologist dictation. Please follow up with ordering provider. CTA ABDOMEN PELVIS W WO CONTRAST    Result Date: 9/10/2021  PROCEDURE: CTA ABDOMEN PELVIS W WO CONTRAST CLINICAL INFORMATION: severe abd pain . Severe abdominal pain after spinal cord stimulator and internal pulse generator placement. Pain increased since previous CT this morning. COMPARISON: CT abdomen pelvis from the same date at 12:23 PM. TECHNIQUE: Helical CT of the abdomen and pelvis during intravenous administration of 80 mL Isovue-370 injected in the right forearm with multiplanar volumetric maximum intensity projection reconstructions. All CT scans at this facility use dose modulation, iterative reconstruction, and/or weight-based dosing when appropriate to reduce radiation dose to as low as reasonably achievable. FINDINGS: There is atelectasis at the bilateral lung bases, stable compared to prior exam. The visualized portion of the heart is unremarkable. The ascending aorta is normal in caliber without focal aneurysm or stenosis. The iliac vessels are patent and normal in appearance. The celiac, mesenteric and renal arteries are patent without flow-limiting stenosis. The liver is unremarkable. There is contrast within the gallbladder. There is a hyperdense stone at the gallbladder neck, stable compared to prior exam. Adrenal glands are unremarkable.  The kidneys are unremarkable. The spleen and pancreas are unremarkable. No retroperitoneal or mesenteric lymphadenopathy is identified. The large bowel appears within normal in its. The appendix is normal in appearance. Small bowel appears within normal limits. The bladder is unremarkable without focal filling defect. The prostate is mildly enlarged, stable compared to prior exam. No free fluid is identified. Redemonstration of laminectomies at L3-L5. There is a stable spinal stimulator generator in the subcutis fat overlying the left back muscles. The stimulator lead enters the spinal canal at the T9 level, stable compared to prior exam.      1. No evidence of acute intra-abdominal or intrapelvic abnormality. 2. Cholelithiasis. **This report has been created using voice recognition software. It may contain minor errors which are inherent in voice recognition technology. ** Final report electronically signed by Dr. Lisa Guido MD on 9/10/2021 8:34 PM                   Assessment: Inractable abdominal pain postoperative day 6 from thoracic laminectomy and placement of spinal cord stimulator and left flank internal pulse generator performed by Dr. Josafat Grace    Active Problems:    Chronic pain syndrome    S/P insertion of spinal cord stimulator    Lumbosacral spinal stenosis  Resolved Problems:    * No resolved hospital problems. *        Plan: Patient is seen and evaluated on the floor with evaluation exam findings reviewed and discussed with Dr. Josafat Grace and with nursing. He remains postoperative day #6 following thoracic laminectomy and placement of spinal cord stimulator left leg internal pulse generator performed by Dr. Josafat Grace. Patient continues with what is described as intense midline abdominal pain rated at an 8 out of 10 on exam this morning. Patient is noted to be moving bowels with positive bowel sounds noted.   This is a significant improvement and with adequate pain control patient can be considered for discharge planning. PT and OT today as tolerated with up to a chair as tolerated and continued pain control.   Neurosurgery to follow      Electronically signed by Avelina Baumgarten, PA-C on 9/15/2021 at 7:10 AM    Neurosurgery

## 2021-09-15 NOTE — PROGRESS NOTES
Gastroenterology  Progress Note    9/15/2021 10:39 AM  Subjective:   Admit Date: 9/9/2021    Interval History: Patient with history of postop ileus imaging studies today show significant improvement according to the radiologist able to pass small amount of bowel movement given 1 dose of Relistor and  neostigmine every 4 hours for 24 hours clinically show some improvement will repeat lab and KUB in the morning  9/15 significant improvement with the KUB of the contrast results abdomen less distended plan for the patient today can be discharged home might need elective colonoscopy as an outpatient . Diet: ADULT DIET; Dysphagia - Soft and Bite Sized    Medications:   Scheduled Meds:    sertraline  100 mg Oral BID    pantoprazole  40 mg Oral QAM AC    neostigmine  1 mg IntraMUSCular Q4H    polyethylene glycol  17 g Oral TID    docusate sodium  100 mg Oral BID    senna  2 tablet Oral Nightly    divalproex  500 mg Oral Daily    lamoTRIgine  200 mg Oral BID    dexamethasone  4 mg IntraVENous Q6H     Continuous Infusions:    sodium chloride 75 mL/hr at 09/15/21 0413       CBC:   Recent Labs     09/13/21  0934 09/15/21  0704   WBC 11.0* 10.9*   HGB 13.6* 14.6    412*     BMP:    Recent Labs     09/13/21  0934 09/14/21  0742 09/15/21  0704    136 137   K 4.6 4.6 4.5    103 102   CO2 26 23 24   BUN 21 20 18   CREATININE 0.8 0.8 0.8   GLUCOSE 106 108 116*     Hepatic:   Recent Labs     09/12/21  1333 09/13/21  0934   AST 30 23   ALT 23 23   BILITOT 0.4 0.4   ALKPHOS 69 70     INR: No results for input(s): INR in the last 72 hours. Imaging:  Results for orders placed during the hospital encounter of 09/09/21    XR ABDOMEN (KUB) (SINGLE AP VIEW)    Narrative  PROCEDURE: XR ABDOMEN (KUB) (SINGLE AP VIEW)    CLINICAL INFORMATION: ileus; compare with recent series.  .    COMPARISON: 9/13/2021    TECHNIQUE: 2 supine projections of the abdomen    FINDINGS: There are a few scattered gas-filled loops of small bowel which are not distended on the current exam. There is contrast throughout much of the colon: There is mildly gas distended. No evidence of obstruction. Properitoneal fat stripes are  preserved. No pathologic calcifications identified. Postsurgical changes lumbar spine. Spinal stimulator generator at the L3 level leads extending to the thoracic level skin staples over the generator and the thoracic level leads    Impression  Significantly improving appearance of the abdomen        **This report has been created using voice recognition software. It may contain minor errors which are inherent in voice recognition technology. **    Final report electronically signed by Dr. Hayden Reyes on 9/14/2021 3:50 PM    Results for orders placed during the hospital encounter of 02/02/18    CT ABDOMEN PELVIS W IV CONTRAST Additional Contrast? Oral    Narrative  CT ABDOMEN AND PELVIS WITH CONTRAST ENHANCEMENT:    CLINICAL INFORMATION: Right lower quadrant abdominal pain    COMPARISON: 8/18/2016    TECHNIQUE: Multiple axial 5 mm images of the abdomen, pelvis, and lung bases were obtained following the administration of oral and intravenous contrast material (ISOVUE). Computer generated sagittal and coronal images of the abdomen and pelvis were also  reconstructed. ALL CT SCANS AT THIS FACILITY use dose modulation, iterative reconstruction, and/or weight-based dosing when appropriate to reduce radiation dose to as low as reasonably achievable. FINDINGS:    Lung bases: Mild atelectatic/fibrotic stranding both lung bases. Liver: Hepatic steatosis. Gallbladder unremarkable    Pancreas, spleen and adrenal glands: Unremarkable    Kidneys:  Unremarkable. No renal calculi. No cysts. No mass lesions. No hydronephrosis. .    Bowel/Peritoneum: Bowel unremarkable. No abnormally dilated bowel loops are seen. No free air. No free fluid in the abdomen. No abnormally enlarged para-aortic lymph nodes are seen. . There are a few small mesenteric lymph nodes, consistent with  mesenteric adenitis. The appendix is not specifically seen but the region of the appendix is unremarkable. Bones : Prior lower lumbar fusion L3-S1. Mild degenerative changes upper lumbar and lower thoracic spine. Pelvis: Urinary bladder unremarkable. Small inguinal hernia left side it contains only adipose tissue. Impression  No acute findings in the abdomen or pelvis. Several nonemergent findings discussed above. **This report has been created using voice recognition software. It may contain minor errors which are inherent in voice recognition technology. **    Final report electronically signed by Dr. Murali Rodriges on 2/2/2018 9:32 AM    No results found for this or any previous visit. No results found for this or any previous visit.       Endoscopy Finding:      Objective:   Vitals: BP (!) 158/85   Pulse 58   Temp 98.5 °F (36.9 °C) (Oral)   Resp 16   Ht 5' 8.5\" (1.74 m)   Wt 237 lb 4.8 oz (107.6 kg)   SpO2 98%   BMI 35.56 kg/m²     Intake/Output Summary (Last 24 hours) at 9/15/2021 1039  Last data filed at 9/15/2021 0556  Gross per 24 hour   Intake 1549.12 ml   Output 2125 ml   Net -575.88 ml     General appearance: alert and cooperative with exam  Lungs: clear to auscultation bilaterally  Heart: regular rate and rhythm, S1, S2 normal, no murmur, click, rub or gallop  Abdomen: Distended abdomen hypoactive bowel sounds  Extremities: extremities normal, atraumatic, no cyanosis or edema    Assessment and Plan:   Postop ileus improving on neostigmine every 4 hours  Given 1 dose of Relistor yesterday  Imaging study showed significant improvement will advance the diet if tolerated can be discharged home   Will need elective colonoscopy as outpatient      Follow up in GI Clinic after discharge in 2 weeks week(s)    Patient Active Problem List:     Headache     SOB (shortness of breath)     Dyspnea     LV dysfunction     Non-restorative sleep     Snoring Obesity (BMI 30-39. 9)     Physical deconditioning     Obstructive sleep apnea of adult     Restless sleeper     Intractable headache     Seizure disorder (HCC)     Benign hypertension     Nausea & vomiting     Leukocytosis     Shortness of breath     Acute chest pain     Coronary artery disease involving native coronary artery without angina pectoris     Gastroesophageal reflux disease without esophagitis     ALICIA treated with BiPAP     Intractable migraine with aura without status migrainosus     Ventral hernia without obstruction or gangrene     Moderate episode of recurrent major depressive disorder ([address]     Unstable angina ([address]     S/P cardiac catheterization: 7/31/2017: No obstructive lasions.      Bradycardia, drug induced     Cervical spinal stenosis     Angina pectoris, unspecified (HCC)     Lumbar post-laminectomy syndrome     Lumbar radiculitis     Chronic pain syndrome     S/P insertion of spinal cord stimulator     Lumbosacral spinal stenosis      Electronically signed by Kel Crane MD on 9/15/2021 at 10:39 AM

## 2021-09-15 NOTE — PROGRESS NOTES
All consults okay with patient being discharged. Discharge instructions reviewed with patient and spouse; patient verbalized understanding. Patient given scripts. Patient discharged to main entrance.

## 2021-09-15 NOTE — TELEPHONE ENCOUNTER
Received call from [facility] to schedule patient in 7-10 days following a hospital stay for a  spinal cord stimulator placement and an IIeus - I do not see anything available in that allotted time- where would you like for me to put patient?     Please notify patient with date and time as he is being D/C from hospital today

## 2021-09-15 NOTE — PROGRESS NOTES
[address] [address]  INPATIENT PHYSICAL THERAPY  DAILY NOTE  [facility] ORTHOPEDICS 7K - 7K-16/016-A  Time In: 0732  Time Out: 9417  Timed Code Treatment Minutes: 26 Minutes  Minutes: 26          Date: 9/15/2021  Patient Name: Marco A Jordan,  Gender:  male        MRN: [id number]  : 1955  (61 y.o.)     Referring Practitioner: Chase Dwyer PA-C  Diagnosis: chronic pain syndrome  Additional Pertinent Hx: Pt with hx of failed back syndrome and succesful spinal cord simulator trial is now s/p THORACIC LAMINECTOMY FOR PLACEMENT OF PERMANENT SPINAL CORD STMULATOR AND LEFT FLANK INTERNAL PULSE GENERATOR (DOS: 21) by Dr. Gary Leggett. Pt has been c/o 10/10 abdominal pain since surgery--per chart review doctors are suspecting neurological origin. Prior Level of Function:  Lives With: Spouse  Type of Home: House  Home Layout: One level  Home Access: Stairs to enter with rails  Entrance Stairs - Number of Steps: 2 BRITTON; a couple steps within the house to access various rooms  Home Equipment: Serrano Chava, 4 wheeled walker (did not use DME PTA)   Bathroom Shower/Tub: Tub/Shower unit  Bathroom Toilet: Standard  Bathroom Equipment:  (none PTA)  Bathroom Accessibility: Walker accessible    ADL Assistance: Independent  Homemaking Assistance: Independent  Homemaking Responsibilities: Yes (shares responsibilities with wife)  Ambulation Assistance: Independent  Transfer Assistance: Independent  Active : Yes  IADL Comments: Ind with IADLs PTA including grocery shopping - wife and pt share responsibilities  Additional Comments: wife works full time, so will not be available all the time, daughters can help some--pt will be at home alone some    Restrictions/Precautions:  Restrictions/Precautions: General Precautions, Fall Risk     SUBJECTIVE: Ok to see pt per nursing. Pt in bed when therapy arrived, agreeable to PT session at this time.  Pt agreeable to amb this AM    PAIN: 10/10: abdomen    Vitals: Vitals not assessed per clinical judgement, see nursing flowsheet    OBJECTIVE:  Bed Mobility:  Supine to Sit: Modified Independent  Good safety with good demo of completion  Transfers:  Sit to Stand: Stand By Assistance, X 1, cues for hand placement, with verbal cues  Stand to Sit:Supervision, X 1, cues for hand placement, with verbal cues  Use of RW for support, no LOB noted with completion, fair safety with cues for hand placement with completion  Ambulation:  Supervision, Stand By Assistance  Distance: 300 ft  Surface: Level Tile  Device:Rolling Walker  Gait Deviations:  Decreased Step Length Bilaterally and Decreased Gait Speed   Cues for safety with completion, no LOB noted, max A for IV pole management    Stairs:  Stand By Assistance, X 1, with cues for safety, with verbal cues , with increased time for completion  Number of Steps: 4  Height: 6\" step with Bilateral Handrails  Cues for proper technique of completion, good demo, good sequencing with no LOB, reciprocal step pattern with ascend and descend  Balance:  Static Standing Balance: Supervision, X 1, with cues for safety    Exercise:  Patient was guided in 1 set(s) 15 reps of exercise to both lower extremities. Standing heel/toe raises, Standing marches, Standing hip abduction/adduction, Standing hip extension, Standing hamstring curls, Standing hip flexion and Mini squats. Exercises were completed for increased independence with functional mobility. VC and visual cues for proper technique of completion, good demo noted, 1 rest break required with standing    Functional Outcome Measures: Completed  AM-PAC Inpatient Mobility Raw Score : 22  AM-PAC Inpatient T-Scale Score : 53.28    ASSESSMENT:  Assessment: Patient progressing toward established goals. Activity Tolerance:  Patient tolerance of  treatment: good.      Equipment Recommendations:Equipment Needed: No  Discharge Recommendations: Continue to assess pending progress, Patient would benefit from continued therapy after discharge (once pt's pain is under control, anticipate he will be appropriate for home with assist prn)    Plan: Times per week: 3-5x O  Times per day: Daily  Current Treatment Recommendations: Strengthening, Gait Training, Stair training, Patient/Caregiver Education & Training, Balance Training, Functional Mobility Training, Endurance Training, Transfer Training, Home Exercise Program, Safety Education & Training    Patient Education  Patient Education: Plan of Care, Home Exercise Program, [employer] Mobility, Equipment Education, Transfers, Gait, Stairs, Use of Gait [place], Verbal Exercise Instruction,  - Patient Verbalized Understanding, - Patient Requires Continued Education    Goals:  Patient goals : decrease pain, move better  Short term goals  Time Frame for Short term goals: by hospital discharge  Short term goal 1: Supine to/from sit with HOB flat with modified independence for ease of transfers at discharge site. Short term goal 2: Sit to/from stand with modified independence for ease of transfers at discharge site. Short term goal 3: Ambulate 200' with LRD and modified independence for community distance ambulation. Short term goal 4: Ascend/descend 2 steps with HR and modified independence for entry into home. Long term goals  Time Frame for Long term goals : N/A due to short ELOS. Following session, patient left in safe position with all fall risk precautions in place. Pt left in bedside chair with all needs and call light in reach following session, chair alarm on.

## 2021-09-15 NOTE — CARE COORDINATION
9/15/21, 10:31 AM EDT    DISCHARGE PLANNING [address] [place] [address] day: 5  Location: -16/016-A Reason for admit: Chronic pain syndrome [G89.4]  S/P insertion of spinal cord stimulator [Z96.89]  Lumbosacral spinal stenosis [M48.07]   Procedure:   09/09/21  surgery by Dr Chris Mercado  Thoracic spine  laminectomy (T9)  Placement of permanent paddle of spinal cord stimulator at level of T7-T8 (AfterSteps). Placement of battery (pulse generator ) of spinal cord stimulator in left flank area. 9/13/21 KUB: Persistent gaseous distention of multiple small bowel loops are seen. However, the overall distention appears improved when compared to the prior examination. This could represent possible diffuse ileus. 09/15 KUB:   Nonobstructive bowel gas pattern. Barriers to Discharge: + watery diarrhea, GI follows,abdomen remains distended. PT/OT. Follow labs. PCP: Nikita Cho MD  Readmission Risk Score: 12%  Patient Goals/Plan/Treatment Preferences: from home with spouse; plans [facility]. Plans discharge today. [facility] follows for [place].

## 2021-09-15 NOTE — CONSULTS
Department of Psychiatry  Consult Service   Psychiatric Assessment      Thank you very much for allowing us to participate in the care of this patient. Reason for Consult:  \"hx of depression; somatication and converstion disorder\"    HISTORY OF PRESENT ILLNESS:          The patient is a 61 y.o. male with significant history of MDD, Anxiety, Chronic back pain, Seizure,Migraine, who is admitted medically for surgical installation of Spinal cord Stimulator T7-8 for chronic back pain with resulting complication of atypical severe acute abdominal pain likely secondary to paralytic ileus. Patient seen alone at bedside, up in chair relaxing. Pt POD 6 s/p SCS placement T7-8. Pt endorses feeling better since they restarted his zoloft. Does not complain of abdominal pain, states mood is \"Good. Ever since they restarted my medication\". Pt home meds include: Sertaline 100mg BID,  Lamotrigine 200mg QD, Depakote 500mg QD. Sertraline was held on Sept/11 as Pt was given trial of pregabalin 75mg BID & duloxetine 20mg QD for post-surgical acute abdominal pain suspected to be neurologic in origin. Pt failed pain control with Duloxetine which was discontinued and Sertraline restarted on Sept/14. Since then, Pt Denies Depressed mood at this time, admits to some sleep distrubance secondary to back pain, denies loss of interest, appetite improving s/p sx, denies loss of interest in activities, SI, HI, AVH. Pt states occasional depressive episodes with chronic back pain which started in 2013 as a contributer, however long hx of depression since mother's passing and divorce from first spouse. Denies episodes of elevated mood, hyper productivity, impulsivity, shopping sprees, reduced sleep. Pt notes strong family hx of depression, however no known fam hx of psychiatric illness, movement disorders. Pt lives with spouse at home, able to do ADL's including finances, cooking, cleaning.  Pt is hopeful and doing well since SCS is relieving pain, future-oriented to seeing grandkids. . Pt is pending discharge today and has been cleared medically for DC.    PSYCHIATRIC HISTORY:      · Outpatient psychiatric provider:  Dr. Opal Major  · Suicide attempts: None  · Inpatient psychiatric admissions: None    Past psychiatric medications includes:     Rizatriptan 10mg PRN 2020  Desvenlafaxine 50mg PRN 2019  Nortriptyline 25mg TID 6868-2854  Hydroxyzine 50mg PRNTID 4404-6397  Eletriptan 40mg PRN 2013      Adverse reactions from psychotropic medications:    None Known      Lifetime Psychiatric Review of Systems    ·    Obsessions and Compulsions: Denies    ·    Reba or Hypomania: Denies  ·    Hallucinations: Denies  ·    Panic Attacks:  Denies  ·    Delusions:  Denies  ·    Phobias:  Denies  ·    Trauma: Denies    Prior to Admission medications    Medication Sig Start Date End Date Taking? Authorizing Provider   HYDROcodone-acetaminophen (NORCO) 5-325 MG per tablet Take 1 tablet by mouth every 6 hours as needed for Pain for up to 3 days. 9/15/21 9/18/21 Yes Justin Sifuentes PA-C   diazePAM (VALIUM) 5 MG tablet Take 1 tablet by mouth every 8 hours as needed for Anxiety (abdominal spasm) for up to 7 days.  9/15/21 9/22/21 Yes Justin Sifuentes PA-C   aspirin-acetaminophen-caffeine (EXCEDRIN MIGRAINE) 602-080-38 MG per tablet Take 1 tablet by mouth every 6 hours as needed for Headaches   Yes Historical Provider, MD   sulfamethoxazole-trimethoprim (BACTRIM) 400-80 MG per tablet Take 1 tablet by mouth daily for 10 days 9/9/21 9/19/21 Yes Justin Sifeuntes PA-C   omeprazole (PRILOSEC) 40 MG delayed release capsule Take 1 capsule by mouth daily 7/29/21  Yes YUSEF Fuentes CNP   sertraline (ZOLOFT) 100 MG tablet Take 2 tablets by mouth daily 7/29/21  Yes YUSEF Fuentes CNP   lamoTRIgine (LAMICTAL) 200 MG tablet TAKE 1 TABLET BY MOUTH TWO  TIMES DAILY 1/28/19  Yes Shaunna Dillon MD   divalproex (DEPAKOTE ER) 500 MG extended release tablet Take by mouth daily 3 tab   Yes Historical Provider, MD   butorphanol (STADOL) 10 MG/ML nasal spray 1 spray by Nasal route every 4 hours as needed for Pain   Yes Historical Provider, MD   EPINEPHrine (EPIPEN) 0.3 MG/0.3ML SOAJ injection Inject 0.3 mg into the muscle as needed Use as directed for allergic reaction    Historical Provider, MD        Medications:    Current Facility-Administered Medications: sertraline (ZOLOFT) tablet 100 mg, 100 mg, Oral, BID  pantoprazole (PROTONIX) tablet 40 mg, 40 mg, Oral, QAM AC  polyethylene glycol (GLYCOLAX) packet 17 g, 17 g, Oral, TID  docusate sodium (COLACE) capsule 100 mg, 100 mg, Oral, BID  magnesium citrate solution 296 mL, 296 mL, Oral, Daily PRN  senna (SENOKOT) tablet 17.2 mg, 2 tablet, Oral, Nightly  diazePAM (VALIUM) tablet 5 mg, 5 mg, Oral, Q8H PRN  divalproex (DEPAKOTE ER) extended release tablet 500 mg, 500 mg, Oral, Daily  0.9 % sodium chloride infusion, , IntraVENous, Continuous  lamoTRIgine (LAMICTAL) tablet 200 mg, 200 mg, Oral, BID  LORazepam (ATIVAN) injection 1 mg, 1 mg, IntraVENous, Q6H PRN  dicyclomine (BENTYL) injection 10 mg, 10 mg, IntraMUSCular, 4x Daily PRN  morphine (PF) injection 2 mg, 2 mg, IntraVENous, Q2H PRN **OR** morphine injection 4 mg, 4 mg, IntraVENous, Q2H PRN  HYDROcodone-acetaminophen (NORCO) 5-325 MG per tablet 1 tablet, 1 tablet, Oral, Q4H PRN  dexamethasone (DECADRON) injection 4 mg, 4 mg, IntraVENous, Q6H  diphenhydrAMINE (BENADRYL) injection 25 mg, 25 mg, IntraVENous, Q6H PRN  ondansetron (ZOFRAN) injection 4 mg, 4 mg, IntraVENous, Q6H PRN     Past Medical History:        Diagnosis Date    Back pain     Depression     GERD (gastroesophageal reflux disease)     Headache(784.0) 9/22/2013    Migraines     ALICIA treated with BiPAP 2013    doesn't wear Bipap    Pneumonia     Pulmonary embolism ([address] 2/25/2021    S/P cardiac catheterization: 7/31/2017: No obstructive lasions. 7/31/2017    7/31/2017: No obstructive lasions.  Dr. Garcia Harsh Seizures ([facility] [address]        Past Surgical History:        Procedure Laterality Date    [place]OMY  2012    [facility]    BACK SURGERY  12/18/15    l3-s1 decompression, posterior fusion   [address]  2013    CERVICAL SPINE SURGERY  10-16-15    C4-6 ACDF    COLONOSCOPY      ENDOSCOPY, COLON, DIAGNOSTIC      HERNIA REPAIR  1996    [place]    HERN[place] REPAIR  2012    [facility]    KNEE SURGERY  1976    Rt     LUMBAR SPINE SURGERY  08/26/2014     L3-5 decompression, Dr. Ashkan Lemus, [address] SURGERY  10/19/2018    OTHER SURGICAL HISTORY  06/02/2017    Diagnostic laparoscopy, Laparoscopic Ventral hernia Repair with Mesh by Dr Jesus Greenberg OFFICE/OUTPT VISIT,PROCEDURE ONLY N/A 10/19/2018    C3-4 AND C6-7 ACDF performed by Kinga Alamo MD at [address] [place] [address] N/A 3/13/2020    L3-S1 HARDWARE REMOVAL performed by Kinga Alamo MD at [facility]    Rt Rotator cuff, Dr. Chava Paz N/A 7/8/2021    SCS trial  entrance L1 tip T7 bilaterally [facility] [address]  [facility] 9/9/2021    THORACIC LAMINECTOMY FOR PLACEMENT OF PERMANENT SPINAL CORD STMULATOR AND LEFT FLANK INTERNAL PULSE GENERATOR performed by Negrita Mccrary MD at [address] [place] [address]: Bee venom, Lisinopril, Dilaudid [hydromorphone hcl], Nitrofuran derivatives, and Nitroglycerin      Social History:      RESIDENCE: Home with Spouse, able to complete ADL's  : , 2nd Wife    CHILDREN: 7 total, 14 grandkids. OCCUPATION: Retired, former  , Civilian construction. EDUCATION: [employer]    SUBSTANCE USE HISTORY:    Denies Tobacco, EtOH, Illicit drug use. Family Medical and Psychiatric History:     Family Hx of Sister, Maternal, and Grandmother depression. Denies Hx of early onset dementia, denies any other known psychiatric family hx.          Problem Relation Age of Onset    Arthritis Father     Heart Disease Father     Other Father         COPD    Lupus Mother     Other Mother         Lupus    Depression Sister     Depression Child     Arthritis Maternal Grandmother     Stroke Maternal Grandmother     High Blood Pressure Maternal Grandmother     Arthritis Maternal Grandfather     Diabetes Maternal Grandfather     Heart Disease Maternal Grandfather     Asthma Paternal Grandmother     Stroke Paternal Grandmother     High Blood Pressure Paternal Grandmother     Asthma Paternal Grandfather          Physical  BP (!) 158/85   Pulse 58   Temp 98.5 °F (36.9 °C) (Oral)   Resp 16   Ht 5' 8.5\" (1.74 m)   Wt 237 lb 4.8 oz (107.6 kg)   SpO2 98%   BMI 35.56 kg/m²       Mental Status Examination:  Level of consciousness:  Within normal limits  Appearance: hospital attire, lying in bed, fair grooming  Behavior/Motor: Some noted fidgeting and repeatative tapping with right hand, otherwise no abnormalities noted  Attitude toward examiner:  cooperative, attentive and good eye contact  Speech:  Spontaneous, normal rate and volume  Mood:  \"Good\"  Affect: mood congruent  Thought processes:  Linear, goal directed, coherent  Thought content: Denies suicidal ideations   denies homicidal ideations    denies hallucinations   Negative for delusions  Cognition:  Oriented to self, situation, location, date  Concentration clinically adequate  Memory age appropriate  Insight & Judgment:  fair    DSM-5 DIAGNOSIS:      Major Depressive Disorder: hx of MDD, on Zoloft regiment which was stopped as Pt was started on SNRI for possible neuropathic causes of post-op Abdominal pain. Pt states improvement of mood since Zoloft restarted. Pt denies SI, HI, AVH. Follow ups with PCP for psych management. Stressors     Severity of stressors is mild  Source of stressors include: Other psychosocial and environmental stressors    PLAN:      Pain Control per surgical team.  Pt pending Discharge Today.   Continue Home meds:  Sertraline 100 BID  Depakote 500 QD  Lamotrigine 200 BID  F/U Opt PCP  Additional recommendations will follow the clinical course. Psych to Sign off. Thank you very much for allowing us to participate in the care of this patient. Time spent 30 min.       Electronically signed by Carl Zaidi MD on 9/15/21 at 2:12 PM EDT

## 2021-09-15 NOTE — DISCHARGE SUMMARY
Physician Discharge Summary     Patient ID:  Elias Be  [id number]  22 y.o.  1955    Admit date: 9/9/2021    Discharge date and time: No discharge date for patient encounter. Admitting Physician: Felix Cole MD     Discharge Physician: Felix Cole MD     Admission Diagnoses: Chronic pain syndrome [G89.4]  S/P insertion of spinal cord stimulator [Z96.89]  Lumbosacral spinal stenosis [M48.07]    Discharge Diagnoses: Chronic pain syndrome [G89.4]  S/P insertion of spinal cord stimulator [Z96.89]  Lumbosacral spinal stenosis [M48.07]      Admission Condition: good    Discharged Condition: good    Indication for Admission: Thoracic laminectomy for placement of permanent spinal cord stimulator    Hospital Course: Patient required a six night stay following a procedure to address chronic pain. Following the procedure the patient experienced intractable abdominal pain secondary to paralytic Ileus. General surgery, Gastroenterology were consulted to follow with ensuing tests ruling out significant abnormalities requiring surgical intervention. With improvement in pain control along with a return to bowel movement and the ability to pass gas, Neurosurgery is discharging the patient home today with self care.       Consults: general surgery, gastroenterology    Significant Diagnostic Studies: KUB     Treatments: surgery: Thoracic laminectomy for spinal cord placement    Discharge Exam:  BP (!) 158/85   Pulse 58   Temp 98.5 °F (36.9 °C) (Oral)   Resp 16   Ht 5' 8.5\" (1.74 m)   Wt 237 lb 4.8 oz (107.6 kg)   SpO2 98%   BMI 35.56 kg/m²     General Appearance:    Alert, cooperative, no distress, appears stated age   Head:    Normocephalic, without obvious abnormality, atraumatic   Eyes:    PERRL, conjunctiva/corneas clear, EOM's intact, fundi     benign, both eyes        Ears:    Normal TM's and external ear canals, both ears   Nose:   Nares normal, septum midline, mucosa normal, no drainage    or sinus tenderness   Throat:   Lips, mucosa, and tongue normal; teeth and gums normal   Neck:   Supple, symmetrical, trachea midline, no adenopathy;        thyroid:  No enlargement/tenderness/nodules; no carotid    bruit or JVD   Back:     Symmetric, no curvature, ROM normal, no CVA tenderness   Lungs:     Clear to auscultation bilaterally, respirations unlabored   Chest wall:    No tenderness or deformity   Heart:    Regular rate and rhythm, S1 and S2 normal, no murmur, rub   or gallop   Abdomen:     Soft, non-tender, bowel sounds active all four quadrants,     no masses, no organomegaly   Genitalia:    Normal male without lesion, discharge or tenderness   Rectal:    Normal tone, normal prostate, no masses or tenderness;    guaiac negative stool   Extremities:   Extremities normal, atraumatic, no cyanosis or edema   Pulses:   2+ and symmetric all extremities   Skin:   Skin color, texture, turgor normal, no rashes or lesions   Lymph nodes:   Cervical, supraclavicular, and axillary nodes normal   Neurologic:   CNII-XII intact. Normal strength, sensation and reflexes       throughout       Disposition: home    In process/preliminary results:  Outstanding Order Results     No orders found from 8/11/2021 to 9/10/2021. Patient Instructions:   Current Discharge Medication List      START taking these medications    Details   HYDROcodone-acetaminophen (NORCO) 5-325 MG per tablet Take 1 tablet by mouth every 6 hours as needed for Pain for up to 3 days. Qty: 28 tablet, Refills: 0    Comments: Reduce doses taken as pain becomes manageable  Associated Diagnoses: Chronic pain syndrome      diazePAM (VALIUM) 5 MG tablet Take 1 tablet by mouth every 8 hours as needed for Anxiety (abdominal spasm) for up to 7 days.   Qty: 28 tablet, Refills: 0    Associated Diagnoses: Chronic pain syndrome      sulfamethoxazole-trimethoprim (BACTRIM) 400-80 MG per tablet Take 1 tablet by mouth daily for 10 days  Qty: 10 tablet, Refills: 0 CONTINUE these medications which have NOT CHANGED    Details   aspirin-acetaminophen-caffeine (EXCEDRIN MIGRAINE) 250-250-65 MG per tablet Take 1 tablet by mouth every 6 hours as needed for Headaches      omeprazole (PRILOSEC) 40 MG delayed release capsule Take 1 capsule by mouth daily  Qty: 90 capsule, Refills: 3    Comments: Requesting 1 year supply  Associated Diagnoses: Gastroesophageal reflux disease without esophagitis      sertraline (ZOLOFT) 100 MG tablet Take 2 tablets by mouth daily  Qty: 180 tablet, Refills: 3    Associated Diagnoses: Moderate episode of recurrent major depressive disorder (HCC)      lamoTRIgine (LAMICTAL) 200 MG tablet TAKE 1 TABLET BY MOUTH TWO  TIMES DAILY  Qty: 180 tablet, Refills: 3    Associated Diagnoses: Moderate episode of recurrent major depressive disorder ([address]  Intractable migraine with aura without status migrainosus      divalproex (DEPAKOTE ER) 500 MG extended release tablet Take by mouth daily 3 tab      butorphanol (STADOL) 10 MG/ML nasal spray 1 spray by Nasal route every 4 hours as needed for Pain      EPINEPHrine (EPIPEN) 0.3 MG/0.3ML SOAJ injection Inject 0.3 mg into the muscle as needed Use as directed for allergic reaction         STOP taking these medications       traMADol (ULTRAM) 50 MG tablet Comments:   Reason for Stopping:             Activity: activity as tolerated  Diet: regular diet  Wound Care: keep wound clean and dry    Follow-up with Neurosurgery  in 8 days for suture removal.    Signed:  Brien Vallejo PA-C  9/15/2021  12:23 PM

## 2021-09-16 NOTE — TELEPHONE ENCOUNTER
[facility] Transitions Initial Follow Up Call    Outreach made within 2 business days of discharge: Yes    Patient: Sandoval Tompkins Patient : 1955   MRN: [id number]  Reason for Admission: There are no discharge diagnoses documented for the most recent discharge. Discharge Date: 9/15/21       Spoke with: Patient    Discharge department/facility: [facility]    TCM Interactive Patient Contact:  Was patient able to fill all prescriptions: Yes  Was patient instructed to bring all medications to the follow-up visit: Yes  Is patient taking all medications as directed in the discharge summary?  Yes  Does patient understand their discharge instructions: Yes  Does patient have questions or concerns that need addressed prior to 7-14 day follow up office visit: no    Scheduled appointment with PCP within 7-14 days    Follow Up  Future Appointments   Date Time Provider Ciera Doshi   2021  1:00 PM Gregory Byrd MD [place] [facility] [place]   2021  3:30 PM Lilliana Claire, [address] [place] [address]   10/6/2021  2:00 PM Dasia Candelaria MD [facility]   2022 12:00 PM Franco De Oliveira MD [facility] [address]

## 2021-09-22 ENCOUNTER — OFFICE VISIT (OUTPATIENT)
Dept: NEUROSURGERY | Age: 66
End: 2021-09-22

## 2021-09-22 ENCOUNTER — OFFICE VISIT (OUTPATIENT)
Dept: FAMILY MEDICINE CLINIC | Age: 66
End: 2021-09-22
Payer: MEDICARE

## 2021-09-22 VITALS
DIASTOLIC BLOOD PRESSURE: 88 MMHG | OXYGEN SATURATION: 97 % | BODY MASS INDEX: 31.32 KG/M2 | TEMPERATURE: 97.3 F | SYSTOLIC BLOOD PRESSURE: 130 MMHG | WEIGHT: 209 LBS | RESPIRATION RATE: 18 BRPM | HEART RATE: 70 BPM

## 2021-09-22 VITALS
WEIGHT: 212.8 LBS | SYSTOLIC BLOOD PRESSURE: 136 MMHG | HEART RATE: 87 BPM | BODY MASS INDEX: 32.25 KG/M2 | HEIGHT: 68 IN | DIASTOLIC BLOOD PRESSURE: 80 MMHG

## 2021-09-22 DIAGNOSIS — K91.89 ILEUS, POSTOPERATIVE (HCC): Primary | ICD-10-CM

## 2021-09-22 DIAGNOSIS — K21.9 GASTROESOPHAGEAL REFLUX DISEASE WITHOUT ESOPHAGITIS: ICD-10-CM

## 2021-09-22 DIAGNOSIS — G89.4 CHRONIC PAIN SYNDROME: Primary | ICD-10-CM

## 2021-09-22 DIAGNOSIS — K56.7 ILEUS, POSTOPERATIVE (HCC): Primary | ICD-10-CM

## 2021-09-22 DIAGNOSIS — M54.40 BACK PAIN OF LUMBAR REGION WITH SCIATICA: ICD-10-CM

## 2021-09-22 PROCEDURE — 1111F DSCHRG MED/CURRENT MED MERGE: CPT | Performed by: FAMILY MEDICINE

## 2021-09-22 PROCEDURE — 1036F TOBACCO NON-USER: CPT | Performed by: PHYSICIAN ASSISTANT

## 2021-09-22 PROCEDURE — 99495 TRANSJ CARE MGMT MOD F2F 14D: CPT | Performed by: FAMILY MEDICINE

## 2021-09-22 PROCEDURE — 1111F DSCHRG MED/CURRENT MED MERGE: CPT | Performed by: PHYSICIAN ASSISTANT

## 2021-09-22 PROCEDURE — G8427 DOCREV CUR MEDS BY ELIG CLIN: HCPCS | Performed by: PHYSICIAN ASSISTANT

## 2021-09-22 PROCEDURE — 3017F COLORECTAL CA SCREEN DOC REV: CPT | Performed by: PHYSICIAN ASSISTANT

## 2021-09-22 PROCEDURE — 99024 POSTOP FOLLOW-UP VISIT: CPT | Performed by: PHYSICIAN ASSISTANT

## 2021-09-22 PROCEDURE — G8417 CALC BMI ABV UP PARAM F/U: HCPCS | Performed by: PHYSICIAN ASSISTANT

## 2021-09-22 RX ORDER — TRAMADOL HYDROCHLORIDE 50 MG/1
TABLET ORAL
COMMUNITY
Start: 2021-09-16 | End: 2021-09-22 | Stop reason: ALTCHOICE

## 2021-09-22 SDOH — ECONOMIC STABILITY: FOOD INSECURITY: WITHIN THE PAST 12 MONTHS, YOU WORRIED THAT YOUR FOOD WOULD RUN OUT BEFORE YOU GOT MONEY TO BUY MORE.: NEVER TRUE

## 2021-09-22 SDOH — ECONOMIC STABILITY: FOOD INSECURITY: WITHIN THE PAST 12 MONTHS, THE FOOD YOU BOUGHT JUST DIDN'T LAST AND YOU DIDN'T HAVE MONEY TO GET MORE.: NEVER TRUE

## 2021-09-22 ASSESSMENT — ENCOUNTER SYMPTOMS
SORE THROAT: 0
RHINORRHEA: 0
CONSTIPATION: 1
WHEEZING: 0
ABDOMINAL PAIN: 0
SHORTNESS OF BREATH: 0
DIARRHEA: 0
NAUSEA: 1
COUGH: 0
BACK PAIN: 1

## 2021-09-22 ASSESSMENT — SOCIAL DETERMINANTS OF HEALTH (SDOH): HOW HARD IS IT FOR YOU TO PAY FOR THE VERY BASICS LIKE FOOD, HOUSING, MEDICAL CARE, AND HEATING?: NOT HARD AT ALL

## 2021-09-22 NOTE — PROGRESS NOTES
[address] [facility]  Dept: [phone]  Dept Fax: [phone]  Loc: [place] 1[address] Follow Visit  Visit Date: 9/22/2021      Tatianna Garvey  is a 61 y.o. male who is returning to the office today for a post-op follow-up visit. He had surgery on 9/9/2021 with Dr. Coral Hebert for thoracic laminectomy and placement of [employer] spinal cord stimulator and left flank internal pulse generator. He arrives today for his post discharge hospital follow-up principally for suture removal.  Of note: Patient did require an extended stay in the hospital following the procedure to address intractable abdominal pain. He does relate the ability to pass stool and gas. He presents today when he by his wife with incisions that are healing nicely enough to warrant removal of the surgical staples and application of Steri-Strips. He is encouraged to keep and maintain the Steri-Strips for 3 to 5 days removing any residual Steri-Strips after 7 days. Is been asked to avoid getting the incision sites wet for 2 days and not to submerge the incision sites for an additional week. Of note: He continues with abdominal discomfort but relates follow-up appointments with primary care and with gastroenterology to address these concerns. He is encouraged to keep and maintain those appointments as well as appointments in contact with the [facility] for programming concerns. We will follow-up with him in 4 weeks to ascertain continued healing of the incision and progress towards resolution of the abdominal discomfort. He and his wife remain very happy with the surgery and with his care and with today's appointment having a question concerns addressed and answered.     · Patient was evaluated today and is doing well overall. · No new complaints were voiced.   · Patient  lives with their spouse  · Wound: wound healing as expected  · Follow-up Studies: No orders of the defined types were placed in this encounter. ·      Assessment/Plan:  · Status Post final cord stimulator implantation  · Doing well overall  · Encouraged gradual increase in physical and mental activity. · Fall precaution and home safety education provided to patient. · Follow-up: 4-week follow-up to ascertain continued healing of the incision site with encouragement to reach out to and maintain contact with spinal cord stimulator representation for programming concerns.       Electronically signed by Lam Quach PA-C on 9/22/21 at 3:21 PM EDT

## 2021-09-28 NOTE — PROGRESS NOTES
Physician Progress Note      PATIENT:               Florencia Dennison  CSN #:                  [id number]  :                       1955  ADMIT DATE:       2021 10:20 AM  DISCH DATE:        9/15/2021 2:56 PM  RESPONDING  PROVIDER #:        Tito Castro PA-C          QUERY TEXT:    This patient underwent a thoracic laminectomy and neurostimulator implant. ?   The patient had severe abdominal pain afterward. ? CT abdomen was negative. ?   KUB showed an ileus. ? There was documentation of abdominal pain due to   postoperative ileus and there was documentation also stating \"I?believe one of   ?possible explanation for patient severe abdominal pain postoperatively is   somatization disorder?(given patient previous history of depression and   anxiety,?as well as patient's postoperative course and the findings of all of   his investigations). \"? Can you please clarify the etio      The medical record reflects the following:  Risk Factors: Depression  Clinical Indicators: KUB on  -Findings of paralytic ileus involving   predominantly the colon, C/O abdominal pain  Treatment: GI consulted, relistor, imaging    Thank You! Kishore Kirk RN, CRCR  RN Clinical   (B) [phone] (D) [phone]  Options provided:  -- Abdominal pain due to postoperative ileus confirmed  -- Abdominal pain due to somatization disorder confirmed  -- Other - I will add my own diagnosis  -- Disagree - Not applicable / Not valid  -- Disagree - Clinically unable to determine / Unknown  -- Refer to Clinical Documentation Reviewer    PROVIDER RESPONSE TEXT:    After study, abdominal pain due to postoperative ileus confirmed.     Query created by: Yoshi Barnes on 2021 6:53 AM      Electronically signed by:  Tito Castro PA-C 2021 7:58 AM

## 2021-10-13 ENCOUNTER — NURSE TRIAGE (OUTPATIENT)
Dept: OTHER | Facility: CLINIC | Age: 66
End: 2021-10-13

## 2021-10-13 ENCOUNTER — OFFICE VISIT (OUTPATIENT)
Dept: FAMILY MEDICINE CLINIC | Age: 66
End: 2021-10-13
Payer: MEDICARE

## 2021-10-13 VITALS
SYSTOLIC BLOOD PRESSURE: 130 MMHG | DIASTOLIC BLOOD PRESSURE: 86 MMHG | BODY MASS INDEX: 31.52 KG/M2 | HEART RATE: 72 BPM | TEMPERATURE: 97.2 F | HEIGHT: 68 IN | OXYGEN SATURATION: 96 % | WEIGHT: 208 LBS

## 2021-10-13 DIAGNOSIS — M70.22 OLECRANON BURSITIS OF LEFT ELBOW: Primary | ICD-10-CM

## 2021-10-13 PROCEDURE — 1036F TOBACCO NON-USER: CPT | Performed by: FAMILY MEDICINE

## 2021-10-13 PROCEDURE — G8417 CALC BMI ABV UP PARAM F/U: HCPCS | Performed by: FAMILY MEDICINE

## 2021-10-13 PROCEDURE — G8484 FLU IMMUNIZE NO ADMIN: HCPCS | Performed by: FAMILY MEDICINE

## 2021-10-13 PROCEDURE — 1111F DSCHRG MED/CURRENT MED MERGE: CPT | Performed by: FAMILY MEDICINE

## 2021-10-13 PROCEDURE — 99214 OFFICE O/P EST MOD 30 MIN: CPT | Performed by: FAMILY MEDICINE

## 2021-10-13 PROCEDURE — 3017F COLORECTAL CA SCREEN DOC REV: CPT | Performed by: FAMILY MEDICINE

## 2021-10-13 PROCEDURE — G8427 DOCREV CUR MEDS BY ELIG CLIN: HCPCS | Performed by: FAMILY MEDICINE

## 2021-10-13 RX ORDER — METHYLPREDNISOLONE 4 MG/1
TABLET ORAL
Qty: 1 KIT | Refills: 0 | Status: SHIPPED | OUTPATIENT
Start: 2021-10-13 | End: 2021-10-19

## 2021-10-13 ASSESSMENT — ENCOUNTER SYMPTOMS
CONSTIPATION: 0
NAUSEA: 0
COUGH: 0
RHINORRHEA: 0
SHORTNESS OF BREATH: 0
ABDOMINAL PAIN: 1
SORE THROAT: 0
DIARRHEA: 0
WHEEZING: 0

## 2021-10-13 NOTE — TELEPHONE ENCOUNTER
Received call from Vinnie Szymanski at [facility] with Red Flag Complaint. Brief description of triage: See below    Triage indicates for patient to see PCP in the office today. Advised [facility] if no available appts. Care advice provided, patient verbalizes understanding; denies any other questions or concerns; instructed to call back for any new or worsening symptoms. Writer provided warm transfer to [facility] for appointment scheduling. Attention Provider: Thank you for allowing me to participate in the care of your patient. The patient was connected to triage in response to information provided to the ECC/PSC. Please do not respond through this encounter as the response is not directed to a shared pool. Reason for Disposition   Swollen joint    Answer Assessment - Initial Assessment Questions  1. ONSET: \"When did the pain start? \"      August 2021, worsening in September 2021    2. LOCATION: \"Where is the pain located? \"      L elbow    3. PAIN: \"How bad is the pain? \" (Scale 1-10; or mild, moderate, severe)  - MILD - doesn't interfere with normal activities  - MODERATE - interferes with normal activities (e.g., work or school) or awakens from sleep  - SEVERE - excruciating pain, unable to do use arm at all      Mild, 3/10    4. WORK OR EXERCISE: \"Has there been any recent work or exercise that involved this part of the body? \"      Denies    5. CAUSE: \"What do you think is causing the elbow pain? \"      Unsure    6. OTHER SYMPTOMS: \"Do you have any other symptoms? \" (e.g., neck pain, elbow swelling, rash, fever)      Elbow swelling,  continued abdominal pain since September 2021    Protocols used: ELBOW PAIN-ADULT-OH

## 2021-10-13 NOTE — PROGRESS NOTES
[address] [facility]  [address] [place] [address]  Dept: [phone]  Loc: [address] (:  1955) is a 61 y.o. male, here for evaluation of the following chief complaint(s):  Elbow Pain (left side, swelling, ongoing since August)      ASSESSMENT/PLAN:  1. Olecranon bursitis of left elbow  -     methylPREDNISolone (MEDROL DOSEPACK) 4 MG tablet; Take by mouth., Disp-1 kit, R-0Normal    Try steroid pack. If not improved, will do aspiration  Return in about 2 weeks (around 10/27/2021) for procedure, olecrenon bursitis aspiration. SUBJECTIVE/OBJECTIVE:  Arm Pain   The incident occurred more than 1 week ago. There was no injury mechanism. The pain is present in the left elbow. The quality of the pain is described as aching. The pain does not radiate. The pain is moderate. The pain has been fluctuating since the incident. Pertinent negatives include no chest pain, muscle weakness, numbness or tingling. The symptoms are aggravated by palpation. He has tried NSAIDs, ice and rest for the symptoms. The treatment provided no relief. Review of Systems   Constitutional: Negative for chills, fatigue and fever. HENT: Negative for congestion, rhinorrhea and sore throat. Respiratory: Negative for cough, shortness of breath and wheezing. Cardiovascular: Negative for chest pain and palpitations. Gastrointestinal: Positive for abdominal pain (from recent surgery). Negative for constipation, diarrhea and nausea. Genitourinary: Negative for hematuria. Musculoskeletal: Positive for arthralgias and joint swelling. Negative for myalgias. Neurological: Negative for dizziness, tingling, numbness and headaches. Psychiatric/Behavioral: Negative for sleep disturbance. The patient is not nervous/anxious. Physical Exam  Vitals and nursing note reviewed. Constitutional:       Appearance: He is well-developed. HENT:      Head: Normocephalic and atraumatic. Eyes:      General: No scleral icterus. Right eye: No discharge. Left eye: No discharge. Conjunctiva/sclera: Conjunctivae normal.   Cardiovascular:      Rate and Rhythm: Normal rate and regular rhythm. Heart sounds: Normal heart sounds. Pulmonary:      Effort: Pulmonary effort is normal.      Breath sounds: Normal breath sounds. No wheezing. Abdominal:      Tenderness: There is abdominal tenderness (near well healing incision). Musculoskeletal:      Left elbow: Swelling present. Tenderness present. Skin:     General: Skin is warm and dry. Neurological:      Mental Status: He is alert and oriented to person, place, and time. Mental status is at baseline. Psychiatric:         Behavior: Behavior normal.         Thought Content: Thought content normal.         Judgment: Judgment normal.         Vitals:    10/13/21 1011   BP: 130/86   Pulse: 72   Temp: 97.2 °F (36.2 °C)   SpO2: 96%              An electronic signature was used to authenticate this note.     --Darrell Walker MD

## 2021-10-20 ENCOUNTER — OFFICE VISIT (OUTPATIENT)
Dept: NEUROSURGERY | Age: 66
End: 2021-10-20

## 2021-10-20 VITALS
HEIGHT: 67 IN | WEIGHT: 214 LBS | BODY MASS INDEX: 33.59 KG/M2 | SYSTOLIC BLOOD PRESSURE: 144 MMHG | HEART RATE: 62 BPM | DIASTOLIC BLOOD PRESSURE: 85 MMHG

## 2021-10-20 DIAGNOSIS — G89.4 CHRONIC PAIN SYNDROME: Primary | ICD-10-CM

## 2021-10-20 DIAGNOSIS — M54.16 LUMBAR RADICULITIS: ICD-10-CM

## 2021-10-20 PROCEDURE — G8417 CALC BMI ABV UP PARAM F/U: HCPCS | Performed by: PHYSICIAN ASSISTANT

## 2021-10-20 PROCEDURE — 99024 POSTOP FOLLOW-UP VISIT: CPT | Performed by: PHYSICIAN ASSISTANT

## 2021-10-20 PROCEDURE — G8484 FLU IMMUNIZE NO ADMIN: HCPCS | Performed by: PHYSICIAN ASSISTANT

## 2021-10-20 PROCEDURE — 1036F TOBACCO NON-USER: CPT | Performed by: PHYSICIAN ASSISTANT

## 2021-10-20 PROCEDURE — G8427 DOCREV CUR MEDS BY ELIG CLIN: HCPCS | Performed by: PHYSICIAN ASSISTANT

## 2021-10-20 PROCEDURE — 3017F COLORECTAL CA SCREEN DOC REV: CPT | Performed by: PHYSICIAN ASSISTANT

## 2021-10-20 NOTE — PROGRESS NOTES
healing as expected  · Follow-up Studies: No orders of the defined types were placed in this encounter. ·      Assessment/Plan:  · Status Post spinal cord stimulator placement follow-up  · Doing very well overall  · Encouraged gradual increase in physical and mental activity. · Fall precaution and home safety education provided to patient.   · Follow-up: As needed      Electronically signed by Zonia Araujo PA-C on 10/20/21 at 1:23 PM EDT

## 2021-11-03 ENCOUNTER — PROCEDURE VISIT (OUTPATIENT)
Dept: FAMILY MEDICINE CLINIC | Age: 66
End: 2021-11-03
Payer: MEDICARE

## 2021-11-03 VITALS
SYSTOLIC BLOOD PRESSURE: 136 MMHG | OXYGEN SATURATION: 96 % | HEART RATE: 73 BPM | RESPIRATION RATE: 18 BRPM | TEMPERATURE: 97.9 F | DIASTOLIC BLOOD PRESSURE: 80 MMHG

## 2021-11-03 DIAGNOSIS — M70.22 OLECRANON BURSITIS OF LEFT ELBOW: Primary | ICD-10-CM

## 2021-11-03 PROCEDURE — 20605 DRAIN/INJ JOINT/BURSA W/O US: CPT | Performed by: FAMILY MEDICINE

## 2021-11-03 NOTE — PATIENT INSTRUCTIONS
Patient Education        Bursitis of the Elbow: Care Instructions  Your Care Instructions  Bursitis is pain and swelling of the bursae. These are sacs of fluid that help your joints move smoothly. Olecranon bursitis is a type of bursitis that affects the back of the elbow. This is sometimes called Franklin elbow because the bump that develops looks like the cartoon character Franklin's elbow. Injury, overuse, or prolonged pressure on your elbow can cause this form of bursitis. Sometimes it happens when people have arthritis. It also can occur for unknown reasons. Treatment may include draining fluid from the bursa with a needle. If your doctor thought there was infection, he or she may have prescribed antibiotics. You also may get shots of medicine into the bursa to help the swelling go down. Your elbow should get better in a few days or weeks. Follow-up care is a key part of your treatment and safety. Be sure to make and go to all appointments, and call your doctor if you are having problems. It's also a good idea to know your test results and keep a list of the medicines you take. How can you care for yourself at home? · Take pain medicines exactly as directed. ? If the doctor gave you a prescription medicine for pain, take it as prescribed. ? If you are not taking a prescription pain medicine, ask your doctor if you can take an over-the-counter medicine. ? Do not take two or more pain medicines at the same time unless the doctor told you to. Many pain medicines have acetaminophen, which is Tylenol. Too much acetaminophen (Tylenol) can be harmful. · If your doctor prescribed antibiotics, take them as directed. Do not stop taking them just because you feel better. You need to take the full course of antibiotics. · If your doctor gave you a sling, an elastic bandage, or a compression sleeve, wear it exactly as instructed. · Put ice or a cold pack on your elbow for 10 to 20 minutes at a time.  Try to do this every 1 to 2 hours for the next 3 days (when you are awake) or until the swelling goes down. Put a thin cloth between the ice and your skin. · After 3 days, you can try heat, or alternate heat and ice. · Rest your elbow. Try to stop or reduce any activity that causes pain. · Wear elbow pads during physical activity to prevent injury. · Do not lean your elbows on tables or armrests. When should you call for help? Call your doctor now or seek immediate medical care if:    · You have new or worse symptoms of infection, such as:  ? Increased pain, swelling, warmth, or redness. ? Red streaks leading from the area. ? Pus draining from the area. ? A fever. Watch closely for changes in your health, and be sure to contact your doctor if:    · You do not get better as expected. Where can you learn more? Go to https://"[employer]"pepiceweb.UserApp. org and sign in to your [employer] account. Enter  in the [employer] box to learn more about \"Bursitis of the Elbow: Care Instructions. \"     If you do not have an account, please click on the \"Sign Up Now\" link. Current as of: July 1, 2021               Content Version: 13.0  © 2006-2021 Healthwise, Incorporated. Care instructions adapted under license by [facility] ([facility]). If you have questions about a medical condition or this instruction, always ask your healthcare professional. [place] [address] any warranty or liability for your use of this information.

## 2021-11-04 ENCOUNTER — HOSPITAL ENCOUNTER (OUTPATIENT)
Age: 66
Discharge: HOME | End: 2021-11-04
Payer: MEDICARE

## 2021-11-04 LAB — CRYSTALS, FLUID: NORMAL

## 2021-11-06 LAB
Lab: NORMAL
URIC ACID, BODY FLUID: 5.8 MG/DL

## 2021-12-02 ENCOUNTER — TELEPHONE (OUTPATIENT)
Dept: FAMILY MEDICINE CLINIC | Age: 66
End: 2021-12-02

## 2021-12-02 DIAGNOSIS — Z20.822 EXPOSURE TO COVID-19 VIRUS: Primary | ICD-10-CM

## 2022-01-03 ENCOUNTER — OFFICE VISIT (OUTPATIENT)
Dept: FAMILY MEDICINE CLINIC | Age: 67
End: 2022-01-03
Payer: MEDICARE

## 2022-01-03 VITALS
WEIGHT: 211.4 LBS | TEMPERATURE: 97.5 F | SYSTOLIC BLOOD PRESSURE: 138 MMHG | HEART RATE: 71 BPM | OXYGEN SATURATION: 95 % | HEIGHT: 67 IN | DIASTOLIC BLOOD PRESSURE: 96 MMHG | BODY MASS INDEX: 33.18 KG/M2 | RESPIRATION RATE: 18 BRPM

## 2022-01-03 DIAGNOSIS — F33.0 MAJOR DEPRESSIVE DISORDER, RECURRENT, MILD (HCC): ICD-10-CM

## 2022-01-03 DIAGNOSIS — G40.909 NONINTRACTABLE EPILEPSY WITHOUT STATUS EPILEPTICUS, UNSPECIFIED EPILEPSY TYPE (HCC): ICD-10-CM

## 2022-01-03 DIAGNOSIS — R73.01 ELEVATED FASTING GLUCOSE: ICD-10-CM

## 2022-01-03 DIAGNOSIS — I10 PRIMARY HYPERTENSION: ICD-10-CM

## 2022-01-03 DIAGNOSIS — M50.30 DDD (DEGENERATIVE DISC DISEASE), CERVICAL: ICD-10-CM

## 2022-01-03 DIAGNOSIS — M17.11 PRIMARY OSTEOARTHRITIS OF RIGHT KNEE: ICD-10-CM

## 2022-01-03 DIAGNOSIS — Z00.00 ROUTINE GENERAL MEDICAL EXAMINATION AT A HEALTH CARE FACILITY: Primary | ICD-10-CM

## 2022-01-03 LAB — HBA1C MFR BLD: 5.6 %

## 2022-01-03 PROCEDURE — G8484 FLU IMMUNIZE NO ADMIN: HCPCS | Performed by: FAMILY MEDICINE

## 2022-01-03 PROCEDURE — G0439 PPPS, SUBSEQ VISIT: HCPCS | Performed by: FAMILY MEDICINE

## 2022-01-03 PROCEDURE — 3017F COLORECTAL CA SCREEN DOC REV: CPT | Performed by: FAMILY MEDICINE

## 2022-01-03 PROCEDURE — 96372 THER/PROPH/DIAG INJ SC/IM: CPT | Performed by: FAMILY MEDICINE

## 2022-01-03 PROCEDURE — 83036 HEMOGLOBIN GLYCOSYLATED A1C: CPT | Performed by: FAMILY MEDICINE

## 2022-01-03 RX ORDER — KETOROLAC TROMETHAMINE 30 MG/ML
60 INJECTION, SOLUTION INTRAMUSCULAR; INTRAVENOUS ONCE
Status: COMPLETED | OUTPATIENT
Start: 2022-01-03 | End: 2022-01-03

## 2022-01-03 RX ORDER — AMLODIPINE BESYLATE 5 MG/1
5 TABLET ORAL DAILY
Qty: 90 TABLET | Refills: 3 | OUTPATIENT
Start: 2022-01-03 | End: 2022-02-14

## 2022-01-03 RX ADMIN — KETOROLAC TROMETHAMINE 60 MG: 30 INJECTION, SOLUTION INTRAMUSCULAR; INTRAVENOUS at 11:22

## 2022-01-03 ASSESSMENT — PATIENT HEALTH QUESTIONNAIRE - PHQ9
2. FEELING DOWN, DEPRESSED OR HOPELESS: 0
SUM OF ALL RESPONSES TO PHQ QUESTIONS 1-9: 0
SUM OF ALL RESPONSES TO PHQ QUESTIONS 1-9: 0
1. LITTLE INTEREST OR PLEASURE IN DOING THINGS: 0
SUM OF ALL RESPONSES TO PHQ QUESTIONS 1-9: 0
SUM OF ALL RESPONSES TO PHQ QUESTIONS 1-9: 0
SUM OF ALL RESPONSES TO PHQ9 QUESTIONS 1 & 2: 0

## 2022-01-03 ASSESSMENT — LIFESTYLE VARIABLES: HOW OFTEN DO YOU HAVE A DRINK CONTAINING ALCOHOL: 0

## 2022-01-03 NOTE — PATIENT INSTRUCTIONS
Learning About Living [place]  What is a living will? A living will, also called a declaration, is a legal form. It tells your family and your doctor your wishes when you can't speak for yourself. It's used by the health professionals who will treat you as you near the end of your life or if you get seriously hurt or ill. If you put your wishes in writing, your loved ones and others will know what kind of care you want. They won't need to guess. This can ease your mind and be helpful to others. And you can change or cancel your living will at any time. A living will is not the same as an estate or property will. An estate will explains what you want to happen with your money and property after you die. How do you use it? A living will is used to describe the kinds of treatment or life support you want as you near the end of your life or if you get seriously hurt or ill. Keep these facts in mind about living [place]. · Your living will is used only if you can't speak or make decisions for yourself. Most often, one or more doctors must certify that you can't speak or decide for yourself before your living will takes effect. · If you get better and can speak for yourself again, you can accept or refuse any treatment. It doesn't matter what you said in your living will. · Some states may limit your right to refuse treatment in certain cases. For example, you may need to clearly state in your living will that you don't want artificial hydration and nutrition, such as being fed through a tube. Is a living will a legal document? A living will is a legal document. Each state has its own laws about living [place]. And a living will may be called something else in your state. Here are some things to know about living [place]. · You don't need an  to complete a living will. But legal advice can be helpful if your state's laws are unclear.  It can also help if your health history is complicated or your family can't agree on what should be in your living will. · You can change your living will at any time. Some people find that their wishes about end-of-life care change as their health changes. If you make big changes to your living will, complete a new form. · If you move to another state, make sure that your living will is legal in the state where you now live. In most cases, doctors will respect your wishes even if you have a form from a different state. · You might use a universal form that has been approved by many states. This kind of form can sometimes be filled out and stored online. Your digital copy will then be available wherever you have a connection to the internet. The doctors and nurses who need to treat you can find it right away. · Your state may offer an online registry. This is another place where you can store your living will online. · It's a good idea to get your living will notarized. This means using a person called a  to watch two people sign, or witness, your living will. What should you know when you create a living will? Here are some questions to ask yourself as you make your living will:  · Do you know enough about life support methods that might be used? If not, talk to your doctor so you know what might be done if you can't breathe on your own, your heart stops, or you can't swallow. · What things would you still want to be able to do after you receive life-support methods? Would you want to be able to walk? To speak? To eat on your own? To live without the help of machines? · Do you want certain Rastafarian practices performed if you become very ill? · If you have a choice, where do you want to be cared for? In your home? At a hospital or nursing home? · If you have a choice at the end of your life, where would you prefer to die? At home? In a hospital or nursing home? Somewhere else? · Would you prefer to be buried or cremated?   · Do you want your organs to be donated after you die? What should you do with your living will? · Make sure that your family members and your health care agent have copies of your living will (also called a declaration). · Give your doctor a copy of your living will. Ask him or her to keep it as part of your medical record. If you have more than one doctor, make sure that each one has a copy. · Put a copy of your living will where it can be easily found. For example, some people may put a copy on their refrigerator door. If you are using a digital copy, be sure your doctor, family members, and health care agent know how to find and access it. Where can you learn more? Go to https://PublicStuffpeganttoeweb.Clouli. org and sign in to your [employer] account. Enter P812 in the [employer] box to learn more about \"Learning About Living [place]. \"     If you do not have an account, please click on the \"Sign Up Now\" link. Current as of: March 17, 2021               Content Version: 13.1  © 2006-2021 [employer]. Care instructions adapted under license by [facility] ([facility]). If you have questions about a medical condition or this instruction, always ask your healthcare professional. [address] any warranty or liability for your use of this information. Eating Healthy Foods: Care Instructions  Your Care Instructions     Eating healthy foods can help lower your risk for disease. Healthy food gives you energy and keeps your heart strong, your brain active, your muscles working, and your bones strong. A healthy diet includes a variety of foods from the basic food groups: grains, vegetables, fruits, milk and milk products, and meat and beans. Some people may eat more of their favorite foods from only one food group and, as a result, miss getting the nutrients they need. So, it is important to pay attention not only to what you eat but also to what you are missing from your diet.  You can eat a healthy, You might feel that you are missing out on your favorite foods and then be more likely to fail. · Start slowly, and gradually change your habits. Try some of the following:  ? Use whole wheat bread instead of white bread. ? Use nonfat or low-fat milk instead of whole milk. ? Eat brown rice instead of white rice, and eat whole wheat pasta instead of white-flour pasta. ? Try low-fat cheeses and low-fat yogurt. ? Add more fruits and vegetables to meals and have them for snacks. ? Add lettuce, tomato, cucumber, and onion to sandwiches. ? Add fruit to yogurt and cereal.  Enjoy food  · You can still eat your favorite foods. You just may need to eat less of them. If your favorite foods are high in fat, salt, and sugar, limit how often you eat them, but do not cut them out entirely. · Eat a wide variety of foods. Make healthy choices when eating out  · The type of restaurant you choose can help you make healthy choices. Even fast-food chains are now offering more low-fat or healthier choices on the menu. · Choose smaller portions, or take half of your meal home. · When eating out, try:  ? A veggie pizza with a whole wheat crust or grilled chicken (instead of sausage or pepperoni). ? Pasta with roasted vegetables, grilled chicken, or marinara sauce instead of cream sauce. ? A vegetable wrap or grilled chicken wrap. ? Broiled or poached food instead of fried or breaded items. Make healthy choices easy  · Buy packaged, prewashed, ready-to-eat fresh vegetables and fruits, such as baby carrots, salad mixes, and chopped or shredded broccoli and cauliflower. · Buy packaged, presliced fruits, such as melon or pineapple. · Choose 100% fruit or vegetable juice instead of soda. Limit juice intake to 4 to 6 oz (½ to ¾ cup) a day. · Blend low-fat yogurt, fruit juice, and canned or frozen fruit to make a smoothie for breakfast or a snack. Where can you learn more? Go to https://petra.health-partners. org and sign in to your [facility] account. Enter P616 in the [facility] box to learn more about \"Eating Healthy Foods: Care Instructions. \"     If you do not have an account, please click on the \"Sign Up Now\" link. Current as of: September 8, 2021               Content Version: 13.1  © 7576-9534 Healthwise, [employer]. Care instructions adapted under license by [facility] ([facility]). If you have questions about a medical condition or this instruction, always ask your healthcare professional. [place] [address] any warranty or liability for your use of this information. Personalized Preventive Plan for Allyssa Wardbinder - 1/3/2022  Medicare offers a range of preventive health benefits. Some of the tests and screenings are paid in full while other may be subject to a deductible, co-insurance, and/or copay. Some of these benefits include a comprehensive review of your medical history including lifestyle, illnesses that may run in your family, and various assessments and screenings as appropriate. After reviewing your medical record and screening and assessments performed today your provider may have ordered immunizations, labs, imaging, and/or referrals for you. A list of these orders (if applicable) as well as your Preventive Care list are included within your After Visit Summary for your review. Other Preventive Recommendations:    · A preventive eye exam performed by an eye specialist is recommended every 1-2 years to screen for glaucoma; cataracts, macular degeneration, and other eye disorders. · A preventive dental visit is recommended every 6 months. · Try to get at least 150 minutes of exercise per week or 10,000 steps per day on a pedometer . · Order or download the FREE \"Exercise & Physical Activity: Your Everyday Guide\" from [employer]. Call 6-940.590.5307 or search [employer] online.   · You need 7254-7877 mg of calcium and 8917-5099 IU of vitamin D per day. It is possible to meet your calcium requirement with diet alone, but a vitamin D supplement is usually necessary to meet this goal.  · When exposed to the sun, use a sunscreen that protects against both UVA and UVB radiation with an SPF of 30 or greater. Reapply every 2 to 3 hours or after sweating, drying off with a towel, or swimming. · Always wear a seat belt when traveling in a car. Always wear a helmet when riding a bicycle or motorcycle. Stable

## 2022-01-03 NOTE — PROGRESS NOTES
Medicare Annual Wellness Visit  Name: Malia Christian Date: 1/3/2022   MRN: [id number] Sex: Male   Age: 61 y.o. Ethnicity: Non- / Non    : 1955 Race: White (non-)      Elizabeth Vickers is here for Medicare AWV, Joint Pain (right arm, numbness/ tingling, shaking), and Knee Pain (right knee, ongoing at least 6 months, getting worse)    Screenings for behavioral, psychosocial and functional/safety risks, and cognitive dysfunction are all negative except as indicated below. These results, as well as other patient data from the [address] form, are documented in Flowsheets linked to this Encounter. Allergies   Allergen Reactions    Bee Venom Anaphylaxis    Hydrochlorothiazide Other (See Comments)    Lisinopril Swelling    Other Hives    Dilaudid [Hydromorphone Hcl] Nausea And Vomiting and Rash    Nitrofuran Derivatives Nausea And Vomiting     Severe headache    Nitroglycerin Nausea And Vomiting     migraine         Prior to Visit Medications    Medication Sig Taking?  Authorizing Provider   amLODIPine (NORVASC) 5 MG tablet Take 1 tablet by mouth daily Yes Pablo Griffin MD   omeprazole (PRILOSEC) 40 MG delayed release capsule Take 1 capsule by mouth daily Yes YUSEF Villagomez CNP   sertraline (ZOLOFT) 100 MG tablet Take 2 tablets by mouth daily Yes YUSEF Villagomez CNP   lamoTRIgine (LAMICTAL) 200 MG tablet TAKE 1 TABLET BY MOUTH TWO  TIMES DAILY Yes Pablo Griffin MD   divalproex (DEPAKOTE ER) 500 MG extended release tablet Take by mouth daily 3 tab Yes Historical Provider, MD   aspirin-acetaminophen-caffeine ([address] [place] [address]) [phone] MG per tablet Take 1 tablet by mouth every 6 hours as needed for Headaches   Patient not taking: Reported on 11/3/2021  Historical Provider, MD   EPINEPHrine (EPIPEN) 0.3 MG/0.3ML SOAJ injection Inject 0.3 mg into the muscle as needed Use as directed for allergic reaction  Patient not taking: Reported on 11/3/2021 Historical Provider, MD   butorphanol (STADOL) 10 MG/ML nasal spray 1 spray by Nasal route every 4 hours as needed for Pain  Patient not taking: Reported on 11/3/2021  Historical Provider, MD         Past Medical History:   Diagnosis Date    Back pain     Depression     GERD (gastroesophageal reflux disease)     Headache(784.0) 9/22/2013    Migraines     ALICIA treated with BiPAP 2013    doesn't wear Bipap    Pneumonia     Pulmonary embolism ([address] 2/25/2021    S/P cardiac catheterization: 7/31/2017: No obstructive lasions. 7/31/2017 7/31/2017: No obstructive lasions.  Dr. Jossie Marie Seizures [facility])        Past Surgical History:   Procedure Laterality Date    [facility]OMY  2012    [facility]    BACK SURGERY  12/18/15    l3-s1 decompression, posterior fusion   [address]  2013    CERVICAL SPINE SURGERY  10-16-15    C4-6 ACDF    COLONOSCOPY      ENDOSCOPY, COLON, DIAGNOSTIC      [facility]    [facility]    KNEE SURGERY  1976    Rt     LUMBAR SPINE SURGERY  08/26/2014     L3-5 decompression, Dr. Haily Zamora, [address] SURGERY  10/19/2018    OTHER SURGICAL HISTORY  06/02/2017    Diagnostic laparoscopy, Laparoscopic Ventral hernia Repair with Mesh by Dr Varela Search OFFICE/OUTPT VISIT,PROCEDURE ONLY N/A 10/19/2018    C3-4 AND C6-7 ACDF performed by Broderick Winters MD at [address] [facility] N/A 3/13/2020    L3-S1 HARDWARE REMOVAL performed by Broderick Winters MD at [address] [facility]    Rt Rotator cuff, Dr. Darnell Vallejo N/A 7/8/2021    SCS trial  entrance L1 tip T7 bilaterally performed by Kvng Richmond MD at [address] Left 9/9/2021    THORACIC LAMINECTOMY FOR PLACEMENT OF PERMANENT SPINAL CORD STMULATOR AND LEFT FLANK INTERNAL PULSE GENERATOR performed by Ana Dash MD at [address] History   Problem Relation Age of Onset    Arthritis Father     Heart Disease Father     Other Father         COPD    Lupus Mother     Other Mother         Lupus    Depression Sister     Depression Child     Arthritis Maternal Grandmother     Stroke Maternal Grandmother     High Blood Pressure Maternal Grandmother     Arthritis Maternal Grandfather     Diabetes Maternal Grandfather     Heart Disease Maternal Grandfather     Asthma Paternal Grandmother     Stroke Paternal Grandmother     High Blood Pressure Paternal Grandmother     Asthma Paternal Grandfather        CareTeam (Including outside providers/suppliers regularly involved in providing care):   Patient Care Team:  Corinna Perkins MD as PCP - General  Corinna Perkins [facility]  Delfino Cheung PA-C as Physician Assistant (Pulmonology)  Sammy Vega MD as Consulting Physician (Cardiology)  Waqas Luna MD as Consulting Physician (Neurology)  Azeb Guajardo MD as Consulting Physician (Gastroenterology)  Luanne Echevarria MD as Consulting Physician (Sleep Medicine)  Dennis Singh (Physician Assistant)    Wt Readings from Last 3 Encounters:   01/03/22 211 lb 6.4 oz (95.9 kg)   10/20/21 214 lb (97.1 kg)   10/13/21 208 lb (94.3 kg)     Vitals:    01/03/22 1051 01/03/22 1114   BP: (!) 150/100 (!) 138/96   Site: Left Upper Arm    Position: Sitting    Cuff Size: Large Adult    Pulse: 71    Resp: 18    Temp: 97.5 °F (36.4 °C)    TempSrc: Temporal    SpO2: 95%    Weight: 211 lb 6.4 oz (95.9 kg)    Height: 5' 7.01\" (1.702 m)      Body mass index is 33.1 kg/m². Based upon direct observation of the patient, evaluation of cognition reveals recent and remote memory intact.     Pulmonary/Chest: clear to auscultation bilaterally- no wheezes, rales or rhonchi, normal air movement, no respiratory distress  Cardiovascular: normal rate, normal S1 and S2, no gallops and intact distal pulses  Extremities: no cyanosis, no clubbing and decreased ROM of right knee, crepitus present  Neurologic: gait and coordination normal, speech normal and new tremor, right hand    Patient's complete Health Risk Assessment and screening values have been reviewed and are found in Flowsheets. The following problems were reviewed today and where indicated follow up appointments were made and/or referrals ordered.       Positive Risk Factor Screenings with Interventions:             Health Habits/Nutrition:  Health Habits/Nutrition  Do you exercise for at least 20 minutes 2-3 times per week?: (!) No  Have you lost any weight without trying in the past 3 months?: No  Do you eat only one meal per day?: No  Body mass index: (!) 33.1  Health Habits/Nutrition Interventions:  · Inadequate physical activity:  patient agrees to exercise for at least 150 minutes/week, patient is not ready to increase his/her physical activity level at this time    Hearing/Vision:  No exam data present  Hearing/Vision  Do you or your family notice any trouble with your hearing that hasn't been managed with hearing aids?: No  Do you have difficulty driving, watching TV, or doing any of your daily activities because of your eyesight?: No  Have you had an eye exam within the past year?: (!) No  Hearing/Vision Interventions:  · Vision concerns:  patient encouraged to make appointment with his/her eye specialist        Personalized Preventive Plan   Current Health Maintenance Status  Immunization History   Administered Date(s) Administered    Tdap (Boostrix, Adacel) 11/20/2018        Health Maintenance   Topic Date Due    Depression Monitoring  Never done    Flu vaccine (1) 09/22/2022 (Originally 9/1/2021)    Shingles Vaccine (1 of 2) 09/22/2022 (Originally 5/9/2007)    COVID-19 Vaccine (1) 09/22/2022 (Originally 5/9/1962)    Lipid screen  07/29/2022    Potassium monitoring  09/15/2022    Creatinine monitoring  09/15/2022    Diabetes screen  01/03/2025    DTaP/Tdap/Td vaccine (2 - Td or Tdap) 11/20/2028    Colon cancer screen colonoscopy  11/11/2031    Hepatitis C screen  Completed    HIV screen  Completed    Hepatitis A vaccine  Aged Out    Hepatitis B vaccine  Aged Out    Hib vaccine  Aged Out    Meningococcal (ACWY) vaccine  Aged Out    Pneumococcal 0-64 years Vaccine  Aged Out     Recommendations for coin4ce Due: see orders and patient instructions/AVS.  . Recommended screening schedule for the next 5-10 years is provided to the patient in written form: see Patient Nubia Toussaint was seen today for medicare awv, joint pain and knee pain. Diagnoses and all orders for this visit:    Routine general medical examination at a health care facility    Elevated fasting glucose  -     POCT glycosylated hemoglobin (Hb A1C)  Controlled. Without meds  DDD (degenerative disc disease), cervical  -     XR CERVICAL SPINE (2-3 VIEWS); Future  -     ketorolac (TORADOL) injection 60 mg    Major depressive disorder, recurrent, mild (HCC)  -stable, controlled on meds  Nonintractable epilepsy without status epilepticus, unspecified epilepsy type ([facility]  -follow with neuro  Primary osteoarthritis of right knee  -     AFL - Chris Cardona MD, Orthopedic Surgery, [facility]    Primary hypertension  -     amLODIPine (NORVASC) 5 MG tablet;  Take 1 tablet by mouth daily      Electronically signed by Author MD Roxi on 1/3/2022 at 12:40 PM

## 2022-01-03 NOTE — PROGRESS NOTES
Administrations This Visit     ketorolac (TORADOL) injection 60 mg     Admin Date  01/03/2022  11:22 Action  Given Dose  60 mg Route  IntraMUSCular Site  Dorsogluteal Left Administered By  Rush Angelo LPN    Ordering Provider: Corinna Perkins MD    NDC: 7815-6720-63    Lot#: [id number]    : Brittney Jeronimo    Patient Supplied?: No                Patient instructed to report any adverse reaction to me immediately.

## 2022-01-04 ENCOUNTER — HOSPITAL ENCOUNTER (OUTPATIENT)
Dept: GENERAL RADIOLOGY | Age: 67
Discharge: HOME | End: 2022-01-04
Payer: MEDICARE

## 2022-01-04 ENCOUNTER — HOSPITAL ENCOUNTER (OUTPATIENT)
Age: 67
Discharge: HOME | End: 2022-01-04
Payer: MEDICARE

## 2022-01-04 DIAGNOSIS — M50.30 DDD (DEGENERATIVE DISC DISEASE), CERVICAL: ICD-10-CM

## 2022-01-04 PROCEDURE — 72040 X-RAY EXAM NECK SPINE 2-3 VW: CPT

## 2022-01-17 ENCOUNTER — TELEPHONE (OUTPATIENT)
Dept: CARDIOLOGY CLINIC | Age: 67
End: 2022-01-17

## 2022-01-17 ENCOUNTER — OFFICE VISIT (OUTPATIENT)
Dept: FAMILY MEDICINE CLINIC | Age: 67
End: 2022-01-17
Payer: MEDICARE

## 2022-01-17 VITALS
HEIGHT: 67 IN | HEART RATE: 68 BPM | SYSTOLIC BLOOD PRESSURE: 130 MMHG | BODY MASS INDEX: 33.37 KG/M2 | OXYGEN SATURATION: 95 % | TEMPERATURE: 98.1 F | WEIGHT: 212.6 LBS | DIASTOLIC BLOOD PRESSURE: 84 MMHG | RESPIRATION RATE: 18 BRPM

## 2022-01-17 DIAGNOSIS — I25.10 CORONARY ARTERY DISEASE INVOLVING NATIVE CORONARY ARTERY OF NATIVE HEART WITHOUT ANGINA PECTORIS: ICD-10-CM

## 2022-01-17 DIAGNOSIS — M17.11 PRIMARY OSTEOARTHRITIS OF RIGHT KNEE: Primary | ICD-10-CM

## 2022-01-17 PROBLEM — I20.9 ANGINA PECTORIS, UNSPECIFIED (HCC): Status: RESOLVED | Noted: 2020-03-13 | Resolved: 2022-01-17

## 2022-01-17 PROBLEM — I20.0 UNSTABLE ANGINA (HCC): Status: RESOLVED | Noted: 2017-07-31 | Resolved: 2022-01-17

## 2022-01-17 PROCEDURE — G8417 CALC BMI ABV UP PARAM F/U: HCPCS | Performed by: FAMILY MEDICINE

## 2022-01-17 PROCEDURE — G8484 FLU IMMUNIZE NO ADMIN: HCPCS | Performed by: FAMILY MEDICINE

## 2022-01-17 PROCEDURE — 3017F COLORECTAL CA SCREEN DOC REV: CPT | Performed by: FAMILY MEDICINE

## 2022-01-17 PROCEDURE — 99214 OFFICE O/P EST MOD 30 MIN: CPT | Performed by: FAMILY MEDICINE

## 2022-01-17 PROCEDURE — 1036F TOBACCO NON-USER: CPT | Performed by: FAMILY MEDICINE

## 2022-01-17 PROCEDURE — G8427 DOCREV CUR MEDS BY ELIG CLIN: HCPCS | Performed by: FAMILY MEDICINE

## 2022-01-17 RX ORDER — POLYETHYLENE GLYCOL 3350, SODIUM SULFATE, SODIUM CHLORIDE, POTASSIUM CHLORIDE, ASCORBIC ACID, SODIUM ASCORBATE 140-9-5.2G
KIT ORAL
COMMUNITY
Start: 2021-11-05 | End: 2022-08-31

## 2022-01-17 ASSESSMENT — ENCOUNTER SYMPTOMS
ABDOMINAL PAIN: 0
COUGH: 0
DIARRHEA: 0
RHINORRHEA: 0
SORE THROAT: 0
WHEEZING: 0
SHORTNESS OF BREATH: 0
NAUSEA: 0
CONSTIPATION: 0

## 2022-01-17 NOTE — PROGRESS NOTES
[address] [facility]  [address] [place] [address]  Dept: [phone]  Loc: [address] [facility] (:  1955) is a 61 y.o. male, here for evaluation of the following chief complaint(s):  Pre-op Exam (- roght total knee, will have testing done on 22)      ASSESSMENT/PLAN:  1. Primary osteoarthritis of right knee  2. Coronary artery disease involving native coronary artery of native heart without angina pectoris  Will await preop testing results. Does follow with cardiology and was cleared for surgery 6 months ago. Last stress test in  was okay. Denies symptoms today. No follow-ups on file. SUBJECTIVE/OBJECTIVE:  Patient presents for preop clearance prior to his right total knee replacement which will be done early next month. He has had 13 surgeries in the past and has not had issues with anesthesia. Has tried and failed conservative measures of his knee and is now decided to go through with knee replacement. He denies any chest pain or shortness of breath. States that he can walk a flight of stairs without getting short of breath but would have knee pain. States he could vacuum her room without shortness of breath. He is scheduled to have labs done next week for preop testing. Review of Systems   Constitutional: Negative for chills, fatigue and fever. HENT: Negative for congestion, rhinorrhea and sore throat. Respiratory: Negative for cough, shortness of breath and wheezing. Cardiovascular: Negative for chest pain and palpitations. Gastrointestinal: Negative for abdominal pain, constipation, diarrhea and nausea. Genitourinary: Negative for dysuria and hematuria. Musculoskeletal: Positive for arthralgias and gait problem. Negative for myalgias. Neurological: Negative for dizziness and headaches. Psychiatric/Behavioral: Negative for sleep disturbance.  The patient is not nervous/anxious. Physical Exam  Vitals and nursing note reviewed. Constitutional:       General: He is not in acute distress. Appearance: He is well-developed. HENT:      Head: Normocephalic and atraumatic. Right Ear: Hearing, tympanic membrane, ear canal and external ear normal.      Left Ear: Hearing, tympanic membrane, ear canal and external ear normal.      Nose:      Right Sinus: No maxillary sinus tenderness or frontal sinus tenderness. Left Sinus: No maxillary sinus tenderness or frontal sinus tenderness. Mouth/Throat:      Pharynx: No oropharyngeal exudate or posterior oropharyngeal erythema. Comments: eudentulous  Eyes:      General: No scleral icterus. Right eye: No discharge. Left eye: No discharge. Conjunctiva/sclera: Conjunctivae normal.      Pupils: Pupils are equal, round, and reactive to light. Cardiovascular:      Rate and Rhythm: Normal rate and regular rhythm. Heart sounds: Normal heart sounds. Pulmonary:      Effort: Pulmonary effort is normal. No respiratory distress. Breath sounds: Normal breath sounds. No wheezing. Abdominal:      General: Bowel sounds are normal. There is no distension. Palpations: Abdomen is soft. Tenderness: There is no abdominal tenderness. Musculoskeletal:         General: No tenderness. Normal range of motion. Cervical back: Normal range of motion and neck supple. Lymphadenopathy:      Cervical: No cervical adenopathy. Skin:     General: Skin is warm and dry. Findings: No rash. Neurological:      Mental Status: He is alert and oriented to person, place, and time. Motor: No abnormal muscle tone. Coordination: Coordination normal.   Psychiatric:         Behavior: Behavior normal.         Thought Content:  Thought content normal.         Judgment: Judgment normal.         Vitals:    01/17/22 0753   BP: 130/84   Pulse: 68   Resp: 18   Temp: 98.1 °F (36.7 °C)   SpO2: 95%              An electronic signature was used to authenticate this note.     --Sherry Auguste MD

## 2022-01-17 NOTE — TELEPHONE ENCOUNTER
Pre op Risk Assessment     Procedure Right Knee Replacement  Physician Dr. Elin Mcgarry  Date of surgery/procedure 2-7-22    Last OV 6-1-21  Last Stress 11-6-20  Last Echo 11-6-20  Last Cath 7-28-17    Fax- [phone]

## 2022-01-25 LAB
ALBUMIN SERPL-MCNC: 4.5 G/DL
ALP BLD-CCNC: 74 U/L
ALT SERPL-CCNC: 16 U/L
ANION GAP SERPL CALCULATED.3IONS-SCNC: 13 MMOL/L
AST SERPL-CCNC: 17 U/L
BILIRUB SERPL-MCNC: 0.2 MG/DL (ref 0.1–1.4)
BUN BLDV-MCNC: 12 MG/DL
CALCIUM SERPL-MCNC: 8.9 MG/DL
CHLORIDE BLD-SCNC: 105 MMOL/L
CO2: 25 MMOL/L
CREAT SERPL-MCNC: 1 MG/DL
GFR CALCULATED: 75
GLUCOSE BLD-MCNC: 95 MG/DL
POTASSIUM SERPL-SCNC: 4.9 MMOL/L
SODIUM BLD-SCNC: 143 MMOL/L
TOTAL PROTEIN: 7.2

## 2022-01-31 ENCOUNTER — OFFICE VISIT (OUTPATIENT)
Dept: FAMILY MEDICINE CLINIC | Age: 67
End: 2022-01-31
Payer: MEDICARE

## 2022-01-31 VITALS
HEART RATE: 72 BPM | TEMPERATURE: 96.6 F | DIASTOLIC BLOOD PRESSURE: 82 MMHG | HEIGHT: 67 IN | SYSTOLIC BLOOD PRESSURE: 126 MMHG | WEIGHT: 212 LBS | OXYGEN SATURATION: 97 % | BODY MASS INDEX: 33.27 KG/M2 | RESPIRATION RATE: 20 BRPM

## 2022-01-31 DIAGNOSIS — I10 PRIMARY HYPERTENSION: ICD-10-CM

## 2022-01-31 DIAGNOSIS — I25.10 CORONARY ARTERY DISEASE INVOLVING NATIVE CORONARY ARTERY OF NATIVE HEART WITHOUT ANGINA PECTORIS: ICD-10-CM

## 2022-01-31 DIAGNOSIS — M17.11 PRIMARY OSTEOARTHRITIS OF RIGHT KNEE: Primary | ICD-10-CM

## 2022-01-31 PROCEDURE — 3017F COLORECTAL CA SCREEN DOC REV: CPT | Performed by: FAMILY MEDICINE

## 2022-01-31 PROCEDURE — G8427 DOCREV CUR MEDS BY ELIG CLIN: HCPCS | Performed by: FAMILY MEDICINE

## 2022-01-31 PROCEDURE — 99214 OFFICE O/P EST MOD 30 MIN: CPT | Performed by: FAMILY MEDICINE

## 2022-01-31 PROCEDURE — G8484 FLU IMMUNIZE NO ADMIN: HCPCS | Performed by: FAMILY MEDICINE

## 2022-01-31 PROCEDURE — 1036F TOBACCO NON-USER: CPT | Performed by: FAMILY MEDICINE

## 2022-01-31 PROCEDURE — G8417 CALC BMI ABV UP PARAM F/U: HCPCS | Performed by: FAMILY MEDICINE

## 2022-01-31 ASSESSMENT — ENCOUNTER SYMPTOMS
BACK PAIN: 1
WHEEZING: 0
SHORTNESS OF BREATH: 0
SORE THROAT: 0
RHINORRHEA: 0
ABDOMINAL PAIN: 0
DIARRHEA: 0
CONSTIPATION: 0
COUGH: 0
NAUSEA: 0

## 2022-01-31 NOTE — PROGRESS NOTES
[address] [facility]  [address] [place] [address]  Dept: [phone]  [facility] [address] [facility] (:  1955) is a 61 y.o. male, here for evaluation of the following chief complaint(s):  Pre-op Exam      ASSESSMENT/PLAN:  1. Primary osteoarthritis of right knee  2. Coronary artery disease involving native coronary artery of native heart without angina pectoris  3. Primary hypertension  discussed risk vs benefit of surgery. Labs/stress/ekg reviewed. Cleared for surgery. Cont med management. Pre op form completed and faxed to surgeon office  No follow-ups on file. SUBJECTIVE/OBJECTIVE:  Pt presents for pre op clearance. Has hx of CAD and HTN and is going in for total right knee replacement. Stress test in , negative for ischemia. States he could walk a flight of stairs or vacuum a room without getting short of breath. bp is controlled. Denies cp or soa. Had labs and ekg done for preop which were reviewed and essentially wnl. No hx of issues with anesthesia. Feeling well today. Review of Systems   Constitutional: Negative for chills, fatigue and fever. HENT: Negative for congestion, rhinorrhea and sore throat. Respiratory: Negative for cough, shortness of breath and wheezing. Cardiovascular: Negative for chest pain and palpitations. Gastrointestinal: Negative for abdominal pain, constipation, diarrhea and nausea. Genitourinary: Negative for dysuria and hematuria. Musculoskeletal: Positive for arthralgias and back pain. Negative for myalgias. Neurological: Positive for headaches. Negative for dizziness. Psychiatric/Behavioral: Negative for sleep disturbance. The patient is not nervous/anxious. Physical Exam  Vitals and nursing note reviewed. Constitutional:       Appearance: He is well-developed. HENT:      Head: Normocephalic and atraumatic.    Eyes:      General: No scleral icterus. Right eye: No discharge. Left eye: No discharge. Conjunctiva/sclera: Conjunctivae normal.   Cardiovascular:      Rate and Rhythm: Normal rate and regular rhythm. Heart sounds: Normal heart sounds. Pulmonary:      Effort: Pulmonary effort is normal.      Breath sounds: Normal breath sounds. No wheezing. Skin:     General: Skin is warm and dry. Neurological:      Mental Status: He is alert and oriented to person, place, and time. Mental status is at baseline. Psychiatric:         Behavior: Behavior normal.         Thought Content: Thought content normal.         Judgment: Judgment normal.         Vitals:    01/31/22 1031   BP: 126/82   Pulse: 72   Resp: 20   Temp: 96.6 °F (35.9 °C)   SpO2: 97%        On this date 1/31/2022 I have spent 35 minutes reviewing previous notes, test results and face to face with the patient discussing the diagnosis and importance of compliance with the treatment plan as well as documenting on the day of the visit. An electronic signature was used to authenticate this note.     --Gene Mortensen MD

## 2022-02-14 ENCOUNTER — HOSPITAL ENCOUNTER (EMERGENCY)
Age: 67
Discharge: HOME | End: 2022-02-14
Attending: EMERGENCY MEDICINE
Payer: MEDICARE

## 2022-02-14 VITALS
DIASTOLIC BLOOD PRESSURE: 78 MMHG | WEIGHT: 209 LBS | SYSTOLIC BLOOD PRESSURE: 149 MMHG | OXYGEN SATURATION: 96 % | BODY MASS INDEX: 30.96 KG/M2 | RESPIRATION RATE: 18 BRPM | HEIGHT: 69 IN | TEMPERATURE: 97.2 F | HEART RATE: 75 BPM

## 2022-02-14 DIAGNOSIS — R22.0 LIP SWELLING: Primary | ICD-10-CM

## 2022-02-14 DIAGNOSIS — T78.3XXA ANGIOEDEMA, INITIAL ENCOUNTER: ICD-10-CM

## 2022-02-14 PROCEDURE — 96374 THER/PROPH/DIAG INJ IV PUSH: CPT

## 2022-02-14 PROCEDURE — 99284 EMERGENCY DEPT VISIT MOD MDM: CPT

## 2022-02-14 PROCEDURE — 96375 TX/PRO/DX INJ NEW DRUG ADDON: CPT

## 2022-02-14 PROCEDURE — 2500000003 HC RX 250 WO HCPCS: Performed by: EMERGENCY MEDICINE

## 2022-02-14 PROCEDURE — 6360000002 HC RX W HCPCS: Performed by: EMERGENCY MEDICINE

## 2022-02-14 RX ORDER — DIPHENHYDRAMINE HYDROCHLORIDE 50 MG/ML
25 INJECTION INTRAMUSCULAR; INTRAVENOUS ONCE
Status: COMPLETED | OUTPATIENT
Start: 2022-02-14 | End: 2022-02-14

## 2022-02-14 RX ORDER — METHYLPREDNISOLONE SODIUM SUCCINATE 125 MG/2ML
125 INJECTION, POWDER, LYOPHILIZED, FOR SOLUTION INTRAMUSCULAR; INTRAVENOUS ONCE
Status: COMPLETED | OUTPATIENT
Start: 2022-02-14 | End: 2022-02-14

## 2022-02-14 RX ORDER — METHYLPREDNISOLONE SODIUM SUCCINATE 125 MG/2ML
125 INJECTION, POWDER, LYOPHILIZED, FOR SOLUTION INTRAMUSCULAR; INTRAVENOUS ONCE
Status: DISCONTINUED | OUTPATIENT
Start: 2022-02-14 | End: 2022-02-14

## 2022-02-14 RX ADMIN — METHYLPREDNISOLONE SODIUM SUCCINATE 125 MG: 125 INJECTION, POWDER, FOR SOLUTION INTRAMUSCULAR; INTRAVENOUS at 14:41

## 2022-02-14 RX ADMIN — FAMOTIDINE 20 MG: 10 INJECTION, SOLUTION INTRAVENOUS at 14:41

## 2022-02-14 RX ADMIN — DIPHENHYDRAMINE HYDROCHLORIDE 25 MG: 50 INJECTION, SOLUTION INTRAMUSCULAR; INTRAVENOUS at 14:41

## 2022-02-14 ASSESSMENT — PAIN DESCRIPTION - LOCATION
LOCATION: HEAD
LOCATION_2: KNEE

## 2022-02-14 ASSESSMENT — ENCOUNTER SYMPTOMS
EYE PAIN: 0
EYE DISCHARGE: 0
DIARRHEA: 0
VOMITING: 0
WHEEZING: 0
ABDOMINAL PAIN: 0
SORE THROAT: 0
BLOOD IN STOOL: 0
TROUBLE SWALLOWING: 0
VOICE CHANGE: 0
FACIAL SWELLING: 1
SHORTNESS OF BREATH: 0

## 2022-02-14 ASSESSMENT — PAIN DESCRIPTION - DESCRIPTORS
DESCRIPTORS: THROBBING;CONSTANT
DESCRIPTORS_2: THROBBING;CONSTANT

## 2022-02-14 ASSESSMENT — PAIN DESCRIPTION - FREQUENCY: FREQUENCY: CONTINUOUS

## 2022-02-14 ASSESSMENT — PAIN DESCRIPTION - ONSET
ONSET_2: GRADUAL
ONSET: GRADUAL

## 2022-02-14 ASSESSMENT — PAIN DESCRIPTION - ORIENTATION: ORIENTATION_2: RIGHT

## 2022-02-14 ASSESSMENT — PAIN DESCRIPTION - DURATION: DURATION_2: CONTINUOUS

## 2022-02-14 ASSESSMENT — PAIN SCALES - GENERAL: PAINLEVEL_OUTOF10: 8

## 2022-02-14 ASSESSMENT — PAIN DESCRIPTION - PAIN TYPE
TYPE: ACUTE PAIN
TYPE_2: ACUTE PAIN;SURGICAL

## 2022-02-14 ASSESSMENT — PAIN DESCRIPTION - INTENSITY: RATING_2: 10

## 2022-02-14 NOTE — ED TRIAGE NOTES
Arrives to [facility] for the evaluation of oral/facial swelling and HTN. Patient states the swelling started this morning around 0600 and is progressively worsening. Did administer dose of an Epi pen prior to arrival.  Denies any tongue swelling, all swelling is in lips and left cheek. No SOB, difficulty breathing or swallowing. Patient was seen today at home by therapist and there was also concern of elevated BP. Patient had a right knee surgery 1 week ago. Patient now takes amlodipine that was started prior to surgery 1 week ago. Also on Percocet since surgery. Unsure at this time if swelling is related to new medications. Does ambulate with a walker. Afebrile. Wife at bedside. Call light in reach. Will monitor.

## 2022-02-14 NOTE — ED NOTES
Reassessed patient post medication administration. Swelling remains unchanged.       Chrys Cowden, RN  02/14/22 6723

## 2022-02-14 NOTE — ED PROVIDER NOTES
[address]  [address]  Phone: [phone]    eMERGENCY dEPARTMENT eNCOUnter           [address] [facility]       Chief Complaint   Patient presents with    Oral Swelling    Hypertension       Nurses Notes reviewed and I agree except as noted in the HPI. HISTORY OF PRESENT ILLNESS    Jennifer Knutson is a 61 y.o. male who presented via private vehicle with above-mentioned complaints. He is accompanied by his wife. He woke up with lips swelling this morning. He stated that it became progressively worse until he was given an EpiPen by his daughter. He has extensive history of allergies. He has history of angioedema due to lisinopril which was stopped a few weeks ago. He was started on amlodipine 10 days ago. He denies difficulty breathing or swallowing. REVIEW OF SYSTEMS     Review of Systems   Constitutional: Negative for chills and fever. HENT: Positive for facial swelling. Negative for sore throat, trouble swallowing and voice change. Eyes: Negative for pain and discharge. Respiratory: Negative for shortness of breath and wheezing. Cardiovascular: Negative for chest pain and palpitations. Gastrointestinal: Negative for abdominal pain, blood in stool, diarrhea and vomiting. Genitourinary: Negative for dysuria and hematuria. Musculoskeletal: Negative for neck pain and neck stiffness. Neurological: Negative for seizures, syncope and headaches. Psychiatric/Behavioral: Negative for confusion. PAST MEDICAL HISTORY    has a past medical history of Back pain, Depression, GERD (gastroesophageal reflux disease), Headache(784.0), Migraines, ALICIA treated with BiPAP, Pneumonia, Pulmonary embolism ([address] S/P cardiac catheterization: 7/31/2017: No obstructive lasions. , and Seizures ([address] SURGICAL HISTORY      has a past surgical history that includes knee surgery (1976);  Appendectomy (2012); hernia repair (1996); hernia repair (2012); shoulder surgery (2001); Cervical spine surgery (10-16-15); Cardiac catheterization (2013); Lumbar spine surgery (08/26/2014 ); back surgery (12/18/15); other surgical history (06/02/2017); Colonoscopy; Endoscopy, colon, diagnostic; pr office/outpt visit,procedure only (N/A, 10/19/2018); Neck surgery (10/19/2018); REMOVE HARDWARE SPINE (N/A, 3/13/2020); Stimulator Surgery (N/A, 7/8/2021); and Stimulator Surgery (Left, 9/9/2021). CURRENT MEDICATIONS       Current Discharge Medication List      CONTINUE these medications which have NOT CHANGED    Details   EPINEPHrine (EPIPEN) 0.3 MG/0.3ML SOAJ injection Inject 0.3 mg into the muscle as needed Use as directed for allergic reaction      PLENVU 140 g SOLR use as directed      aspirin-acetaminophen-caffeine (EXCEDRIN MIGRAINE) 250-250-65 MG per tablet Take 1 tablet by mouth every 6 hours as needed for Headaches       omeprazole (PRILOSEC) 40 MG delayed release capsule Take 1 capsule by mouth daily  Qty: 90 capsule, Refills: 3    Comments: Requesting 1 year supply  Associated Diagnoses: Gastroesophageal reflux disease without esophagitis      sertraline (ZOLOFT) 100 MG tablet Take 2 tablets by mouth daily  Qty: 180 tablet, Refills: 3    Associated Diagnoses: Moderate episode of recurrent major depressive disorder ([facility])      lamoTRIgine (LAMICTAL) 200 MG tablet TAKE 1 TABLET BY MOUTH TWO  TIMES DAILY  Qty: 180 tablet, Refills: 3    Associated Diagnoses: Moderate episode of recurrent major depressive disorder ([facility] Intractable migraine with aura without status migrainosus      divalproex (DEPAKOTE ER) 500 MG extended release tablet Take by mouth daily 3 tab      butorphanol (STADOL) 10 MG/ML nasal spray 1 spray by Nasal route every 4 hours as needed for Pain             ALLERGIES     is allergic to bee venom, dilaudid [hydromorphone], hydrochlorothiazide, lisinopril, other, dilaudid [hydromorphone hcl], nitrofuran derivatives, and nitroglycerin.     FAMILY HISTORY     He indicated that his mother is . He indicated that his father is . He indicated that both of his sisters are alive. He indicated that his brother is alive. He indicated that the status of his maternal grandmother is unknown. He indicated that the status of his maternal grandfather is unknown. He indicated that the status of his paternal grandmother is unknown. He indicated that the status of his paternal grandfather is unknown. He indicated that all of his four children are alive. family history includes Arthritis in his father, maternal grandfather, and maternal grandmother; Asthma in his paternal grandfather and paternal grandmother; Depression in his child and sister; Diabetes in his maternal grandfather; Heart Disease in his father and maternal grandfather; High Blood Pressure in his maternal grandmother and paternal grandmother; Lupus in his mother; Other in his father and mother; Stroke in his maternal grandmother and paternal grandmother. SOCIAL HISTORY      reports that he has never smoked. He has never used smokeless tobacco. He reports that he does not drink alcohol and does not use drugs. PHYSICAL EXAM     INITIAL VITALS:  height is 5' 8.5\" (1.74 m) and weight is 209 lb (94.8 kg). His temporal temperature is 97.2 °F (36.2 °C). His blood pressure is 158/95 (abnormal) and his pulse is 89. His respiration is 18 and oxygen saturation is 94%. Physical Exam  Vitals and nursing note reviewed. Constitutional:       General: He is in acute distress. Appearance: He is well-developed. Comments: He looks anxious   HENT:      Head: Atraumatic. Mouth/Throat:      Mouth: Mucous membranes are moist.      Pharynx: Oropharynx is clear. Comments: Has severe swelling of both upper and lower lips but not of the tongue or the pharynx. Eyes:      Conjunctiva/sclera: Conjunctivae normal.      Pupils: Pupils are equal, round, and reactive to light.    Neck: Thyroid: No thyromegaly. Vascular: No JVD. Trachea: No tracheal deviation. Cardiovascular:      Rate and Rhythm: Normal rate and regular rhythm. Heart sounds: No murmur heard. No friction rub. No gallop. Pulmonary:      Effort: Pulmonary effort is normal. No respiratory distress. Breath sounds: Normal breath sounds. No stridor. No wheezing. Abdominal:      General: Bowel sounds are normal.      Palpations: Abdomen is soft. Tenderness: There is no abdominal tenderness. Musculoskeletal:      Cervical back: Neck supple. Right lower leg: No edema. Left lower leg: No edema. Skin:     Findings: No rash. Neurological:      Mental Status: He is alert. DIFFERENTIAL DIAGNOSIS:       DIAGNOSTIC RESULTS       LABS:   Labs Reviewed - No data to display    EMERGENCY DEPARTMENT COURSE:   Vitals:    Vitals:    02/14/22 1426 02/14/22 1501   BP: (!) 136/97 (!) 158/95   Pulse: 89    Resp: 18    Temp: 97.2 °F (36.2 °C)    TempSrc: Temporal    SpO2: 96% 94%   Weight: 209 lb (94.8 kg)    Height: 5' 8.5\" (1.74 m)      Received Solu-Medrol, Benadryl and Pepcid IV. Reevaluation at 3:35 PM:  He was resting comfortably without respiratory distress. His lips swelling has improved slightly. He had no tongue or pharyngeal swelling. He has no air way compromise. He is a good candidate for outpatient treatment. His symptoms are worse most likely related to amlodipine. I advised him to stop taking that medicine. FINAL IMPRESSION      1. Lip swelling    2. Angioedema, initial encounter          DISPOSITION/PLAN   Discharged home in good condition.     PATIENT REFERRED TO:  Kaye Barba MD  [address] [place] [facility] [address] [phone]    Call today        DISCHARGE MEDICATIONS:  Current Discharge Medication List          (Please note that portions of this note were completed with a voice recognition program.  Efforts were made to edit the dictations but occasionally

## 2022-02-15 ENCOUNTER — TELEPHONE (OUTPATIENT)
Dept: FAMILY MEDICINE CLINIC | Age: 67
End: 2022-02-15

## 2022-02-21 ENCOUNTER — TELEPHONE (OUTPATIENT)
Dept: FAMILY MEDICINE CLINIC | Age: 67
End: 2022-02-21

## 2022-02-21 DIAGNOSIS — I10 PRIMARY HYPERTENSION: Primary | ICD-10-CM

## 2022-02-21 RX ORDER — METOPROLOL SUCCINATE 25 MG/1
25 TABLET, EXTENDED RELEASE ORAL DAILY
Qty: 30 TABLET | Refills: 1 | Status: SHIPPED | OUTPATIENT
Start: 2022-02-21 | End: 2022-06-06 | Stop reason: SDUPTHER

## 2022-02-21 NOTE — TELEPHONE ENCOUNTER
Low-dose metoprolol sent in for blood pressure. Keep BP log and follow-up in clinic in 3 to 4 weeks to recheck blood pressure.

## 2022-02-21 NOTE — TELEPHONE ENCOUNTER
Called spoke to [facility] with home health who stated that the pt went to the ER on 2-14-22 due to a reaction from his BP med of facial swelling and lips swelling. Pt was taken off his BP meds on 2-14-22 and has not had any since. Pts BP today at 10:20am was 150/90 on Friday before patricia left BP was 155/97. Pt denies any symptoms like a headache or other issues from BP being elevated.  Please advise    Call the pt at [phone]

## 2022-02-21 NOTE — TELEPHONE ENCOUNTER
----- Message from Shahida Omalley sent at 2/18/2022 11:47 AM EST -----  Subject: Message to Provider    QUESTIONS  Information for Provider? Kwame Fragoso from [address]   calling to inform PCP of currently bp reading for patient of 160/102.  ---------------------------------------------------------------------------  --------------  CALL BACK INFO  What is the best way for the office to contact you? OK to leave message on   voicemail  Preferred Call Back Phone Number? [phone]  ---------------------------------------------------------------------------  --------------  SCRIPT ANSWERS  Relationship to Patient? Third Party  Representative Name?  [address]

## 2022-04-20 ENCOUNTER — HOSPITAL ENCOUNTER (OUTPATIENT)
Dept: PHYSICAL THERAPY | Age: 67
Setting detail: THERAPIES SERIES
Discharge: HOME | End: 2022-04-20
Payer: MEDICARE

## 2022-04-20 PROCEDURE — 97162 PT EVAL MOD COMPLEX 30 MIN: CPT

## 2022-04-20 PROCEDURE — 97140 MANUAL THERAPY 1/> REGIONS: CPT

## 2022-04-20 NOTE — FLOWSHEET NOTE
** PLEASE SIGN, DATE AND TIME CERTIFICATION BELOW AND RETURN TO [facility] OUTPATIENT REHABILITATION (FAX #: [phone]). ATTEST/CO-SIGN IF ACCESSING VIA [employer]. THANK YOU.**    I certify that I have examined the patient below and determined that Physical Medicine and Rehabilitation service is necessary and that I approve the established plan of care for up to 90 days or as specifically noted. Attestation, signature or co-signature of physician indicates approval of certification requirements.    ________________________ ____________ __________  Physician Signature   Date   Time  [address] [facility]  PHYSICAL THERAPY  [x] EVALUATION  [] DAILY NOTE (LAND) [] DAILY NOTE (AQUATIC ) [] PROGRESS NOTE [] DISCHARGE NOTE    [] [address] [facility]   [] [facility] [address]    [x] [address] [facility]   [] Merlinda Box    Date: 2022  Patient Name:  Frandy Ellsworth  : 1955  MRN: [id number]  CSN: [id number]    Referring Practitioner Alyssia Cancino MD   Diagnosis Unilateral primary osteoarthritis, right knee [M17.11]    Treatment Diagnosis Right knee pain, Abnormal gait, right leg weakness, Balance deficits   Date of Evaluation 22    Additional Pertinent History Arthritis, Seizure - last one was 4-5 years ago and is on meds, Severe migraines      Functional Outcome Measure Used KOOS    Functional Outcome Score 28 (22)       Insurance: Primary: Payor: Kevin Engle /  /  / ,   Secondary:    Authorization Information: Unlimited visits. Aquatics and modalities except Ionto and HP/CP covered,   Visit # 1, 1/10 for progress note   Visits Allowed: Unlimited   Recertification Date:    Physician Follow-Up: May 5, 2022   Physician Orders:    History of Present Illness: Patient reports had TKR on 2022. States no therapy in the hospital. Reports had home health up until 2-3 weeks ago so now is being sent for OP therapy.  Reports was supposed to have surgery 2 years ago but could not due to COVID and cannot straighten or fully bend his knee. SUBJECTIVE: Patient reports is still having a lot of pain in his knee and is not sleeping well at night.  saw the doctor a few weeks ago and was told just to keep working with it.  has a TENS unit and it does not help.  was told will see how things go until follow up in May with surgeon. Social/Functional History and Current Status:  Medications and Allergies have been reviewed and are listed on Medical History Questionnaire. Sidney Hidalgo lives with spouse in a single story home with steps in and out of living room with stairs and a handrail to enter. Task Previous Current   ADLs  Independent Independent   IADL's Independent Modified Independent   Ambulation Independent Modified Independent Uses a cane   Transfers Independent Independent   Recreation Independent Modified Independent   Community Integration Independent Modified Independent   Driving Active  Drives with self-imposed restrictions - has not been given permission to drive yet   Work On Disability  On Disability     OBJECTIVE:    Pain: 10/10 right knee - is worse with sleeping and trying to straighten it   Palpation Mild to moderate tenderness bilat sides of right knee   Observation Mild to moderate swelling right knee. Incision fully healed - still puffy at top of incision   Posture Fair       Range of Motion Left knee 0-132 degrees. Right knee 36 degrees from neutral to 122 degrees. Strength Left leg 5/5 throughout. Right leg 3+ to 4-/5 with pain during all testing. Coordination    Sensation  has a history of sciatic nerve pain   Bed Mobility    Transfers    Ambulation Decreased speed, uneven step length, no full TKE on right   Stairs Has to use non-reciprocal pattern   Balance Unsteady but no LOB at therapy - had 2 falls this last weekend and did land on right knee.    Special Tests          TREATMENT Precautions: No driving   Pain: 32/74 right knee    X in shaded column indicates activity completed today   Modalities Parameters/  Location  Notes                     Manual Therapy Time/Technique  Notes   Manual deep tissue work to anterior, medial and lateral right thigh with knees on bolster  X Patient with significant tightness throughout entire right thigh               Exercise/Intervention   Notes   Discussed HEP and patient demonstrated what has been doing for HEP   X                                                                            Specific Interventions Next Treatment: manual therapy, Hawk , ASTYM, ROM/stretching, US, strengthening, gait and balance    Activity/Treatment Tolerance:  [x]  Patient tolerated treatment well  []  Patient limited by fatigue  [x]  Patient limited by pain   []  Patient limited by medical complications  []  Other:     Assessment: Patient needing knee surgery over 2 years ago and so possibly developed tightness and lack of ROM in right thigh over the two years before was able to have surgery. Patient had surgery in Feb 2022 and now has full knee flexion but cannot straighten his leg and has significant loss of knee extension. This is causing patient to walk with with a limp. PT cannot work on gait or progress strength until patient has full ROM. Patient will benefit from therapy to work on loosening up muscle tightness to gain back ROM. Body Structures/Functions/Activity Limitations: edema, impaired activity tolerance, impaired balance, impaired endurance, impaired ROM, impaired sensation, impaired strength, pain, abnormal gait and abnormal posture  Prognosis: fair    GOALS:  Patient Goal: To be able to get his leg straight again and be able to move like could before had to have surgery.  To not have to go back into surgery    Short Term Goals:  Time Frame: 4 weeks  1) Patient to demonstrate continued knee flexion >120 degrees and knee extension <10 degrees from neutral for ease with dressing  2) Patient to have 25-50% decrease right thigh tightness to allow for increased knee extension  3) Patient to ambulate with improved TKE on right with use of AD as needed  4) Patient to be able to progress to strength program once gains back ROM to provide stability of knee for all activity  5) Patient to be able to progress to balance activity once gains back ROM for safety and to reduce chance of falling. Long Term Goals:  Time Frame: 12 weeks  1) Patient to be independent with home program and have full knee ROM for return to all prior activity with minimal to no difficulty      Patient Education:   [x]  HEP/Education Completed: Plan of Care, Goals, HEP above   [address] Access Code:  []  No new Education completed  [x]  Reviewed Prior HEP      [x]  Patient verbalized and/or demonstrated understanding of education provided. []  Patient unable to verbalize and/or demonstrate understanding of education provided. Will continue education. []  Barriers to learning: None    PLAN:  Treatment Recommendations: Strengthening, Range of Motion, Functional Mobility Training, Endurance Training, Gait Training, Stair Training, Neuromuscular Re-education, Manual Therapy - Soft Tissue Mobilization, Pain Management, Home Exercise Program, Patient Education, Integrative Dry Needling, Aquatics and Modalities    [x]  Plan of care initiated. Plan to see patient 3 times per week for 12 weeks to address the treatment planned outlined above.   []  Continue with current plan of care  []  Modify plan of care as follows:    []  Hold pending physician visit  []  Discharge    Time In 0900   Time Out 1000   Timed Code Minutes: 10 min   Total Treatment Time: 60 min       Electronically Signed by: [place] [id number]

## 2022-04-22 ENCOUNTER — HOSPITAL ENCOUNTER (OUTPATIENT)
Dept: PHYSICAL THERAPY | Age: 67
Setting detail: THERAPIES SERIES
Discharge: HOME | End: 2022-04-22
Payer: MEDICARE

## 2022-04-22 PROCEDURE — 97110 THERAPEUTIC EXERCISES: CPT

## 2022-04-22 PROCEDURE — 97140 MANUAL THERAPY 1/> REGIONS: CPT

## 2022-04-22 NOTE — PROGRESS NOTES
[address] [facility]  PHYSICAL THERAPY  [] EVALUATION  [x] DAILY NOTE (LAND) [] DAILY NOTE (AQUATIC ) [] PROGRESS NOTE [] DISCHARGE NOTE    [] [address] [facility]   [] [address]    [x] [facility]   [] [facility]    Date: 2022  Patient Name:  Fadumo Andino  : 1955  MRN: [id number]  CSN: [id number]    Referring Practitioner Audrey Hawkins MD   Diagnosis Unilateral primary osteoarthritis, right knee [M17.11]    Treatment Diagnosis Right knee pain, Abnormal gait, right leg weakness, Balance deficits   Date of Evaluation 22    Additional Pertinent History Arthritis, Seizure - last one was 4-5 years ago and is on meds, Severe migraines      Functional Outcome Measure Used KOOS Jr   Functional Outcome Score 28 (22)       Insurance: Primary: Payor: Ibis Linder /  /  / ,   Secondary:    Authorization Information: Unlimited visits. Aquatics and modalities except Ionto and HP/CP covered,   Visit # 2, 2/10 for progress note   Visits Allowed: Unlimited   Recertification Date:    Physician Follow-Up: May 5, 2022   Physician Orders:    History of Present Illness: Patient reports had TKR on 2022. States no therapy in the hospital. Reports had home health up until 2-3 weeks ago so now is being sent for OP therapy. Reports was supposed to have surgery 2 years ago but could not due to COVID and cannot straighten or fully bend his knee. SUBJECTIVE: Patient reports no changes in anything today. TREATMENT   Precautions: No driving   Pain: 39/38 right knee    X in shaded column indicates activity completed today   Modalities Parameters/  Location  Notes                     Manual Therapy Time/Technique  Notes   Manual deep tissue work to anterior, medial and lateral right thigh with knees on bolster. Use of Hawk  tool throughout med/lat/ant thigh right.  15  min X Patient with significant tightness throughout entire right thigh               Exercise/Intervention   Notes   Sci-Fit  Level 1 5 min X    Knee extension stretch at steps 3x 15 sec X                                                                     Specific Interventions Next Treatment: manual therapy, Hawk , ASTYM, ROM/stretching, US, strengthening, gait and balance    Activity/Treatment Tolerance:  [x]  Patient tolerated treatment well  []  Patient limited by fatigue  [x]  Patient limited by pain   []  Patient limited by medical complications  []  Other:     Assessment: Patient started therapy today with some ROM and stretching and then PT focused on manual therapy. Patient with significant tightness throughout entire right thigh. Patient is going to need increased work and stretching for right thigh to try and gain back full ROM and help to decrease pain. Educated on use of heat at home but not at same time as use of [employer] and to work on massage and ROM. GOALS:  Patient Goal: To be able to get his leg straight again and be able to move like could before had to have surgery. To not have to go back into surgery    Short Term Goals:  Time Frame: 4 weeks  1) Patient to demonstrate continued knee flexion >120 degrees and knee extension <10 degrees from neutral for ease with dressing  2) Patient to have 25-50% decrease right thigh tightness to allow for increased knee extension  3) Patient to ambulate with improved TKE on right with use of AD as needed  4) Patient to be able to progress to strength program once gains back ROM to provide stability of knee for all activity  5) Patient to be able to progress to balance activity once gains back ROM for safety and to reduce chance of falling.       Long Term Goals:  Time Frame: 12 weeks  1) Patient to be independent with home program and have full knee ROM for return to all prior activity with minimal to no difficulty      Patient Education:   [x]  HEP/Education Completed: See how feels after treatment today. Keep working on massage at home.  [employer] Access Code:  []  No new Education completed  [x]  Reviewed Prior HEP      [x]  Patient verbalized and/or demonstrated understanding of education provided. []  Patient unable to verbalize and/or demonstrate understanding of education provided. Will continue education. []  Barriers to learning: None    PLAN:  []  Plan of care initiated. Plan to see patient 3 times per week for 12 weeks to address the treatment planned outlined above.   [x]  Continue with current plan of care  []  Modify plan of care as follows:    []  Hold pending physician visit  []  Discharge    Time In 0830   Time Out 0900   Timed Code Minutes: 30 min   Total Treatment Time: 30 min       Electronically Signed by: [place] [id number]

## 2022-04-27 ENCOUNTER — HOSPITAL ENCOUNTER (OUTPATIENT)
Dept: PHYSICAL THERAPY | Age: 67
Setting detail: THERAPIES SERIES
Discharge: HOME | End: 2022-04-27
Payer: MEDICARE

## 2022-04-27 PROCEDURE — 97110 THERAPEUTIC EXERCISES: CPT

## 2022-04-27 PROCEDURE — 97140 MANUAL THERAPY 1/> REGIONS: CPT

## 2022-04-27 NOTE — PROGRESS NOTES
[address] [facility]  PHYSICAL THERAPY  [] EVALUATION  [x] DAILY NOTE (LAND) [] DAILY NOTE (AQUATIC ) [] PROGRESS NOTE [] DISCHARGE NOTE    [] [facility]   [] Marcel[address]    [x] 6[address] [facility]   [] [facility]    Date: 2022  Patient Name:  Nish Thacker  : 1955  MRN: [id number]  CSN: [id number]    Referring Practitioner Jaqueline Mazariegos MD   Diagnosis Unilateral primary osteoarthritis, right knee [M17.11]    Treatment Diagnosis Right knee pain, Abnormal gait, right leg weakness, Balance deficits   Date of Evaluation 22    Additional Pertinent History Arthritis, Seizure - last one was 4-5 years ago and is on meds, Severe migraines      Functional Outcome Measure Used KOOS Jr   Functional Outcome Score 28 (22)       Insurance: Primary: Payor: Sarah Bautista /  /  / ,   Secondary:    Authorization Information: Unlimited visits. Aquatics and modalities except Ionto and HP/CP covered,   Visit # 3, 3/10 for progress note   Visits Allowed: Unlimited   Recertification Date:    Physician Follow-Up: May 5, 2022   Physician Orders:    History of Present Illness: Patient reports had TKR on 2022. States no therapy in the hospital. Reports had home health up until 2-3 weeks ago so now is being sent for OP therapy. Reports was supposed to have surgery 2 years ago but could not due to COVID and cannot straighten or fully bend his knee. SUBJECTIVE: Patient reports has had a horrible last week. States has had horrible pain. Reports has been trying to work on his muscles and trying to straighten it out but it just hurts and he is having trouble sleeping at night.     TREATMENT   Precautions: No driving   Pain: 75/94 right knee    X in shaded column indicates activity completed today   Modalities Parameters/  Location  Notes                     Manual Therapy Time/Technique  Notes   Manual deep tissue work to anterior, medial and lateral right thigh with knees on bolster. Then had move into prone position and put heat on HS while PT performed PROM for knee flex/ext followed by manual therapy to medial HS 28  min X Patient with significant tightness throughout entire right thigh and medial HS. Exercise/Intervention   Notes   Sci-Fit  Level 1 8 min X    Knee extension stretch at steps 3x 15 sec X                                                                     Specific Interventions Next Treatment: manual therapy, Hawk , ASTYM, ROM/stretching, US, strengthening, gait and balance    Activity/Treatment Tolerance:  [x]  Patient tolerated treatment well  []  Patient limited by fatigue  [x]  Patient limited by pain   []  Patient limited by medical complications  []  Other:     Assessment: Patient still with increased pain and significant tightness throughout entire right thigh and into knee. With the amount of tightness it is going to take increased time to try and work out the tnesion in the muscles. PT will continue to use all avenues to try and help patient decrease the tension to gain back ROM. GOALS:  Patient Goal: To be able to get his leg straight again and be able to move like could before had to have surgery. To not have to go back into surgery    Short Term Goals:  Time Frame: 4 weeks  1) Patient to demonstrate continued knee flexion >120 degrees and knee extension <10 degrees from neutral for ease with dressing  2) Patient to have 25-50% decrease right thigh tightness to allow for increased knee extension  3) Patient to ambulate with improved TKE on right with use of AD as needed  4) Patient to be able to progress to strength program once gains back ROM to provide stability of knee for all activity  5) Patient to be able to progress to balance activity once gains back ROM for safety and to reduce chance of falling.       Long Term Goals:  Time Frame: 12 weeks  1) Patient to be independent with home program and have full knee ROM for return to all prior activity with minimal to no difficulty      Patient Education:   []  HEP/Education Completed: See how feels after treatment today. Keep working on massage at home.  [employer] Access Code:  [x]  No new Education completed  [x]  Reviewed Prior HEP      [x]  Patient verbalized and/or demonstrated understanding of education provided. []  Patient unable to verbalize and/or demonstrate understanding of education provided. Will continue education. []  Barriers to learning: None    PLAN:  []  Plan of care initiated. Plan to see patient 3 times per week for 12 weeks to address the treatment planned outlined above.   [x]  Continue with current plan of care  []  Modify plan of care as follows:    []  Hold pending physician visit  []  Discharge    Time In 0845   Time Out 0930   Timed Code Minutes: 45 min   Total Treatment Time: 45 min       Electronically Signed by: [place] [id number]

## 2022-04-28 ENCOUNTER — HOSPITAL ENCOUNTER (OUTPATIENT)
Dept: PHYSICAL THERAPY | Age: 67
Setting detail: THERAPIES SERIES
Discharge: HOME | End: 2022-04-28
Payer: MEDICARE

## 2022-04-28 PROCEDURE — 97140 MANUAL THERAPY 1/> REGIONS: CPT

## 2022-04-28 PROCEDURE — 97110 THERAPEUTIC EXERCISES: CPT

## 2022-04-28 NOTE — PROGRESS NOTES
[facility]  PH[facility]ICAL [facility]APY  [] EVALUATION  [x] DAILY NOTE (LAND) [] DAILY NOTE (AQUATIC ) [] PROGRESS NOTE [] DISCHARGE NOTE    [] 615 [facility]   [] Marcel[address]    [x] 645 [facility]   [] Gloria Betancourt    Date: 2022  Patient Name:  Tate Denney  : 1955  MRN: [id number]  CSN: [id number]    Referring Practitioner Nelson Jesus MD   Diagnosis Unilateral primary osteoarthritis, right knee [M17.11]    Treatment Diagnosis Right knee pain, Abnormal gait, right leg weakness, Balance deficits   Date of Evaluation 22    Additional Pertinent History Arthritis, Seizure - last one was 4-5 years ago and is on meds, Severe migraines      Functional Outcome Measure Used KOOS Jr   Functional Outcome Score 28 (22)       Insurance: Primary: Payor: Bess Krause /  /  / ,   Secondary:    Authorization Information: Unlimited visits. Aquatics and modalities except Ionto and HP/CP covered,   Visit # 4, 4/10 for progress note   Visits Allowed: Unlimited   Recertification Date:    Physician Follow-Up: May 5, 2022   Physician Orders:    History of Present Illness: Patient reports had TKR on 2022. States no therapy in the hospital. Reports had home health up until 2-3 weeks ago so now is being sent for OP therapy. Reports was supposed to have surgery 2 years ago but could not due to COVID and cannot straighten or fully bend his knee. SUBJECTIVE: Patient reports is not feeling any better. States same pain and same tightness. TREATMENT   Precautions: No driving   Pain:  right knee    X in shaded column indicates activity completed today   Modalities Parameters/  Location  Notes                     Manual Therapy Time/Technique  Notes   Manual deep tissue work to anterior, medial and lateral right thigh with knees on bolster. Then had move into left sidelying with pillow between knees.  Same manual work to lateral HS and IT band. Use of 3 different Hawk  tools for deep tissue work. 28  min X Patient with significant tightness throughout entire right thigh, IT band and medial HS. Exercise/Intervention   Notes   Sci-Fit  Level 2 8 min X    Knee extension stretch at steps 3x 15-20 sec X    PROM right knee extension in supine with right knee on bolster 5x 15 sec X                                                              Specific Interventions Next Treatment: manual therapy, Hawk , ASTYM, ROM/stretching, US, strengthening, gait and balance    Activity/Treatment Tolerance:  [x]  Patient tolerated treatment well  []  Patient limited by fatigue  [x]  Patient limited by pain   []  Patient limited by medical complications  []  Other:     Assessment: Patient still with increased pain and significant tightness throughout entire right thigh and into knee. With the amount of tightness it is going to take increased time to try and work out the tnesion in the muscles. PT will continue to use all avenues to try and help patient decrease the tension to gain back ROM. May try some dry needling next session. Will see how feels with use of more Hawk  tool today for IASTYM. Patient reported no change in pain but maybe felt a little better after therapy today. GOALS:  Patient Goal: To be able to get his leg straight again and be able to move like could before had to have surgery.  To not have to go back into surgery    Short Term Goals:  Time Frame: 4 weeks  1) Patient to demonstrate continued knee flexion >120 degrees and knee extension <10 degrees from neutral for ease with dressing  2) Patient to have 25-50% decrease right thigh tightness to allow for increased knee extension  3) Patient to ambulate with improved TKE on right with use of AD as needed  4) Patient to be able to progress to strength program once gains back ROM to provide stability of knee for all activity  5) Patient to be able to progress to balance activity once gains back ROM for safety and to reduce chance of falling. Long Term Goals:  Time Frame: 12 weeks  1) Patient to be independent with home program and have full knee ROM for return to all prior activity with minimal to no difficulty      Patient Education:   []  HEP/Education Completed: See how feels after treatment today. Keep working on massage at home.  [employer] Access Code:  [x]  No new Education completed  [x]  Reviewed Prior HEP      [x]  Patient verbalized and/or demonstrated understanding of education provided. []  Patient unable to verbalize and/or demonstrate understanding of education provided. Will continue education. []  Barriers to learning: None    PLAN:  []  Plan of care initiated. Plan to see patient 3 times per week for 12 weeks to address the treatment planned outlined above.   [x]  Continue with current plan of care  []  Modify plan of care as follows:    []  Hold pending physician visit  []  Discharge    Time In 1045   Time Out 1130   Timed Code Minutes: 45 min   Total Treatment Time: 45 min       Electronically Signed by: [place] [id number]

## 2022-04-29 ENCOUNTER — HOSPITAL ENCOUNTER (OUTPATIENT)
Dept: PHYSICAL THERAPY | Age: 67
Setting detail: THERAPIES SERIES
Discharge: HOME | End: 2022-04-29
Payer: MEDICARE

## 2022-04-29 PROCEDURE — 97140 MANUAL THERAPY 1/> REGIONS: CPT

## 2022-04-29 PROCEDURE — 97110 THERAPEUTIC EXERCISES: CPT

## 2022-04-29 NOTE — PROGRESS NOTES
[address] [facility]  PHYSICAL THERAPY  [] EVALUATION  [x] DAILY NOTE (LAND) [] DAILY NOTE (AQUATIC ) [] PROGRESS NOTE [] DISCHARGE NOTE    [] 615 [facility]   [] Marcel[address]    [x] 645 [facility]   [] Toyin Porras    Date: 2022  Patient Name:  Jj Dobbins  : 1955  MRN: [id number]  CSN: [id number]    Referring Practitioner Fredy Hernandes MD   Diagnosis Unilateral primary osteoarthritis, right knee [M17.11]    Treatment Diagnosis Right knee pain, Abnormal gait, right leg weakness, Balance deficits   Date of Evaluation 22    Additional Pertinent History Arthritis, Seizure - last one was 4-5 years ago and is on meds, Severe migraines      Functional Outcome Measure Used KOOS Jr   Functional Outcome Score 28 (22)       Insurance: Primary: Payor: Lakesha Charlton /  /  / ,   Secondary:    Authorization Information: Unlimited visits. Aquatics and modalities except Ionto and HP/CP covered,   Visit # 5, 5/10 for progress note   Visits Allowed: Unlimited   Recertification Date:    Physician Follow-Up: May 5, 2022   Physician Orders:    History of Present Illness: Patient reports had TKR on 2022. States no therapy in the hospital. Reports had home health up until 2-3 weeks ago so now is being sent for OP therapy. Reports was supposed to have surgery 2 years ago but could not due to COVID and cannot straighten or fully bend his knee. SUBJECTIVE: Patient reports no changes in anything. TREATMENT   Precautions: No driving   Pain: 63/58 right knee    X in shaded column indicates activity completed today   Modalities Parameters/  Location  Notes                     Manual Therapy Time/Technique  Notes   Manual deep tissue work to anterior, medial and lateral right thigh with knees on bolster. Then had move into left sidelying with pillow between knees. Same manual work to lateral HS and IT band.  No [email or web address] today. 20  min X Patient with significant tightness throughout entire right thigh, IT band and medial HS. DN to right thigh - see below  X          Exercise/Intervention   Notes   Sci-Fit  Level 2 8 min X    Knee extension stretch at steps 3x 15-20 sec     PROM right knee extension in supine with right knee on bolster   X                                                              Specific Interventions Next Treatment: manual therapy, Hawk , ASTYM, ROM/stretching, US, strengthening, gait and balance    Dry needling manual therapy: consisted on the placement of 8 needles in the following muscles: IT band, medial quad, lateral quad. A 30 mm needle to med/lat quad and 40 mm needle in IT band was inserted, piston, rotated, and coned to produce intramuscular mobilization. These techniques were used to restore functional range of motion, reduce muscle spasm and induce healing in the corresponding musculature. (00819)  Clean Technique was utilized today while applying Dry needling treatment. The treatment sites where cleaned with 70% solution of isopropyl alcohol . The PT washed their hands and utilized treatment gloves along with hand  prior to inserting the needles. All needles where removed and discarded in the appropriate sharps container. Activity/Treatment Tolerance:  [x]  Patient tolerated treatment well  []  Patient limited by fatigue  [x]  Patient limited by pain   []  Patient limited by medical complications  []  Other:     Assessment: Patient still with increased pain and significant tightness and soreness throughout right thigh with more noted in medial thigh. Started dry needling today to see if able to change any of the tightness in the muscle tissue. Patient reported feeling good when left therapy and skin was intact and no bleeding. Will see how patient does after new treatment today and follow up with patient next week. Patient also sees doctor next week.     GOALS:  Patient Goal: To be able to get his leg straight again and be able to move like could before had to have surgery. To not have to go back into surgery    Short Term Goals:  Time Frame: 4 weeks  1) Patient to demonstrate continued knee flexion >120 degrees and knee extension <10 degrees from neutral for ease with dressing  2) Patient to have 25-50% decrease right thigh tightness to allow for increased knee extension  3) Patient to ambulate with improved TKE on right with use of AD as needed  4) Patient to be able to progress to strength program once gains back ROM to provide stability of knee for all activity  5) Patient to be able to progress to balance activity once gains back ROM for safety and to reduce chance of falling. Long Term Goals:  Time Frame: 12 weeks  1) Patient to be independent with home program and have full knee ROM for return to all prior activity with minimal to no difficulty      Patient Education:   [x]  HEP/Education Completed: See how feels after use of dry needling today. Keep working on massage at home.  [employer] Access Code:  []  No new Education completed  [x]  Reviewed Prior HEP      [x]  Patient verbalized and/or demonstrated understanding of education provided. []  Patient unable to verbalize and/or demonstrate understanding of education provided. Will continue education. []  Barriers to learning: None    PLAN:  []  Plan of care initiated. Plan to see patient 3 times per week for 12 weeks to address the treatment planned outlined above.   [x]  Continue with current plan of care  []  Modify plan of care as follows:    []  Hold pending physician visit  []  Discharge    Time In 0805   Time Out 0850   Timed Code Minutes: 45 min   Total Treatment Time: 45 min       Electronically Signed by: [place] [id number]

## 2022-05-04 ENCOUNTER — HOSPITAL ENCOUNTER (OUTPATIENT)
Dept: PHYSICAL THERAPY | Age: 67
Setting detail: THERAPIES SERIES
Discharge: HOME | End: 2022-05-04
Payer: MEDICARE

## 2022-05-04 PROCEDURE — 97110 THERAPEUTIC EXERCISES: CPT

## 2022-05-04 PROCEDURE — 97140 MANUAL THERAPY 1/> REGIONS: CPT

## 2022-05-04 NOTE — PROGRESS NOTES
[address] [facility]  PHYSICAL THERAPY  [] EVALUATION  [x] DAILY NOTE (LAND) [] DAILY NOTE (AQUATIC ) [] PROGRESS NOTE [] DISCHARGE NOTE    [] 615 [facility]   [] [address]    [x] 645 [facility]   [] [facility] [employer]    Date: 2022  Patient Name:  Sidney Hidalgo  : 1955  MRN: [id number]  CSN: [id number]    Referring Practitioner Ruth Feldman MD   Diagnosis Unilateral primary osteoarthritis, right knee [M17.11]    Treatment Diagnosis Right knee pain, Abnormal gait, right leg weakness, Balance deficits   Date of Evaluation 22    Additional Pertinent History Arthritis, Seizure - last one was 4-5 years ago and is on meds, Severe migraines      Functional Outcome Measure Used KOOS Jr   Functional Outcome Score 28 (22)       Insurance: Primary: Payor: Freddy Osborn /  /  / ,   Secondary:    Authorization Information: Unlimited visits. Aquatics and modalities except Ionto and HP/CP covered,   Visit # 6, 6/10 for progress note   Visits Allowed: Unlimited   Recertification Date:    Physician Follow-Up: May 5, 2022   Physician Orders:    History of Present Illness: Patient reports had TKR on 2022. States no therapy in the hospital. Janet had home health up until 2-3 weeks ago so now is being sent for OP therapy. Janet was supposed to have surgery 2 years ago but could not due to COVID and cannot straighten or fully bend his knee. SUBJECTIVE: Patient reports no changes in anything. States no change with dry needling last session. Reports is starting to having some increased back pain due to the way he is walking. States has an internal stimulator for his back and he has been trying to use it to help with the pain. States the unit is fully charged but when he turns it on, he does not feel anything and it is not working - this just started happening after he had the epidural for his knee surgery.  States right leg still feels swollen and that the swelling is not getting better. States sees doctor tomorrow. TREATMENT   Precautions: No driving   Pain: 96/74 right knee    X in shaded column indicates activity completed today   Modalities Parameters/  Location  Notes                     Manual Therapy Time/Technique  Notes   Manual deep tissue work to anterior, medial and lateral right thigh with knees on bolster. Then had move into left sidelying with pillow between knees. Same manual work to lateral HS and IT band. No Hawk  today. 25  min X Patient with significant tightness throughout entire right thigh, IT band and medial HS/quad. DN to right thigh - see below            Exercise/Intervention   Notes   Sci-Fit  Level 2 7 min X    Knee extension stretch at steps 3x 15-20 sec     PROM right knee extension in supine with right knee on bolster. PROM right knee extension without bolster under knee   X    X Unable to straighten leg and feels like a hard end feel. Specific Interventions Next Treatment: manual therapy, Hawk , ASTYM, ROM/stretching, US, strengthening, gait and balance    Activity/Treatment Tolerance:  [x]  Patient tolerated treatment well  []  Patient limited by fatigue  [x]  Patient limited by pain   []  Patient limited by medical complications  []  Other:     Assessment: Patient still with increased pain and significant tightness and soreness throughout right thigh (anterior and posterior) with increased tension/knotting in medial thigh. PT has used manual therapy, Hawk  tools, Dry needling, stretching and bike to help work out tension and improve ROM and nothing seems to be making any changes. PT also unsure why patient's spinal stimulator is not working. PT unsure what more to do at this time so will see what doctor says tomorrow and then adjust therapy as needed at appt scheduled for Friday.     GOALS:  Patient Goal: To be able to get his leg straight again and be able to move like could before had to have surgery. To not have to go back into surgery    Short Term Goals:  Time Frame: 4 weeks  1) Patient to demonstrate continued knee flexion >120 degrees and knee extension <10 degrees from neutral for ease with dressing  2) Patient to have 25-50% decrease right thigh tightness to allow for increased knee extension  3) Patient to ambulate with improved TKE on right with use of AD as needed  4) Patient to be able to progress to strength program once gains back ROM to provide stability of knee for all activity  5) Patient to be able to progress to balance activity once gains back ROM for safety and to reduce chance of falling. Long Term Goals:  Time Frame: 12 weeks  1) Patient to be independent with home program and have full knee ROM for return to all prior activity with minimal to no difficulty      Patient Education:   [x]  HEP/Education Completed:Keep working on range at home. Let know what doctor says tomorrow.  [employer] Access Code:  []  No new Education completed  [x]  Reviewed Prior HEP      [x]  Patient verbalized and/or demonstrated understanding of education provided. []  Patient unable to verbalize and/or demonstrate understanding of education provided. Will continue education. []  Barriers to learning: None    PLAN:  []  Plan of care initiated. Plan to see patient 3 times per week for 12 weeks to address the treatment planned outlined above.   [x]  Continue with current plan of care  []  Modify plan of care as follows:    []  Hold pending physician visit  []  Discharge    Time In 0915   Time Out 1000   Timed Code Minutes: 45 min   Total Treatment Time: 45 min       Electronically Signed by: [place] [id number]

## 2022-05-06 ENCOUNTER — HOSPITAL ENCOUNTER (OUTPATIENT)
Dept: PHYSICAL THERAPY | Age: 67
Setting detail: THERAPIES SERIES
Discharge: HOME | End: 2022-05-06
Payer: MEDICARE

## 2022-05-06 PROCEDURE — 97110 THERAPEUTIC EXERCISES: CPT

## 2022-05-06 ASSESSMENT — KOOS JR
GOING UP OR DOWN STAIRS: 2
TWISING OR PIVOTING ON KNEE: 4
STANDING UPRIGHT: 3
RISING FROM SITTING: 4
HOW SEVERE IS YOUR KNEE STIFFNESS AFTER FIRST WAKING IN MORNING: 4
STRAIGHTENING KNEE FULLY: 4
BENDING TO THE FLOOR TO PICK UP OBJECT: 4
KOOS JR TOTAL INTERVAL SCORE: 20.941

## 2022-05-06 NOTE — DISCHARGE SUMMARY
[address] [facility]  PHYSICAL THERAPY  [] EVALUATION  [] DAILY NOTE (LAND) [] DAILY NOTE (AQUATIC ) [] PROGRESS NOTE [x] DISCHARGE NOTE    [] [facility]   [] [address]    [x] [facility]   [] Camilo Linear    Date: 2022  Patient Name:  Olman Kiran  : 1955  MRN: [id number]  CSN: [id number]    Referring Practitioner Dada Anderson MD   Diagnosis Unilateral primary osteoarthritis, right knee [M17.11]    Treatment Diagnosis Right knee pain, Abnormal gait, right leg weakness, Balance deficits   Date of Evaluation 22    Additional Pertinent History Arthritis, Seizure - last one was 4-5 years ago and is on meds, Severe migraines      Functional Outcome Measure Used KOOS Jr   Functional Outcome Score 28 (22), Discharge 25 (22)      Insurance: Primary: Payor: [employer] /  /  / ,   Secondary:    Authorization Information: Unlimited visits. Aquatics and modalities except Ionto and HP/CP covered,   Visit # 7, 7/10 for progress note   Visits Allowed: Unlimited   Recertification Date:    Physician Follow-Up: May 5, 2022   Physician Orders:    History of Present Illness: Patient reports had TKR on 2022. States no therapy in the hospital. Reports had home health up until 2-3 weeks ago so now is being sent for OP therapy. Reports was supposed to have surgery 2 years ago but could not due to COVID and cannot straighten or fully bend his knee. SUBJECTIVE: Patient reports saw Dr. Nakia Saez yesterday. Patient states asked all his questions he had and the doctor just told him that his knee looked normal and nothing was wrong and may just take a year for everything to heal. States was told the swelling is normal and will just take time to go away. Reports doctor did not set up and follow up with him and told him that did not need to do any more therapy right now.  States had an x-ray and was told all looked normal.  States was not happy with the answers given and is still having increased pain and tightness and is not sure what to do now. TREATMENT   Precautions: No driving   Pain: 56/66 right knee    X in shaded column indicates activity completed today   Modalities Parameters/  Location  Notes                     Manual Therapy Time/Technique  Notes   Manual deep tissue work to anterior, medial and lateral right thigh with knees on bolster. Then had move into left sidelying with pillow between knees. Same manual work to lateral HS and IT band. No Hawk  today. 25  min  Patient with significant tightness throughout entire right thigh, IT band and medial HS/quad. DN to right thigh - see below            Exercise/Intervention   Notes   Sci-Fit  Level 2 7 min X    Knee extension stretch at steps 3x 15-20 sec X    PROM right knee extension in supine with right knee on bolster. PROM right knee extension without bolster under knee   X    X Unable to straighten leg and feels like a hard end feel. Specific Interventions Next Treatment: manual therapy, Hawk , ASTYM, ROM/stretching, US, strengthening, gait and balance    Activity/Treatment Tolerance:  [x]  Patient tolerated treatment well  []  Patient limited by fatigue  [x]  Patient limited by pain   []  Patient limited by medical complications  []  Other:     Assessment: Patient has made very little progress with therapy. His knee extension has gone from 36 to 25 degrees from neutral but PT is getting a hard end feel now as to where patient is not having increased pain with PT stretching knee but PT cannot get knee to move into any further extension. Otherwise his swelling, tightness and pain are not changing. He is still walking with a limp and cannot fully extend his knee.  PT has not been able to progress strengthening or balance due to patient still having 10/10 pain and not able to fully stand on right carmen due to lack of extension. PT has used manual therapy, IASTYM, dry needling and stretching to try and decrease the tension/tightness and increase his range. Patient has severe tightness all throughout entire right thigh and it is not changing. PT is unsure what more to do and feels patient needs to have someone further address his problems because patient's function is not improving. No more therapy ordered from doctor at this time and also patient not showing enough significant progress to warrant continuation at this time. GOALS:  Patient Goal: To be able to get his leg straight again and be able to move like could before had to have surgery. To not have to go back into surgery    Short Term Goals:  Time Frame: 4 weeks  1) Patient to demonstrate continued knee flexion >120 degrees and knee extension <10 degrees from neutral for ease with dressing  [] Goal Met [x] Goal Not Met [] Continue Goal [x] Discontinue Goal  [] Revise Goal  Goal Assessment: Patient with 122 degrees of flexion but 25 degrees from neutral for extension  2) Patient to have 25-50% decrease right thigh tightness to allow for increased knee extension  [] Goal Met [x] Goal Not Met [] Continue Goal [x] Discontinue Goal  [] Revise Goal  Goal Assessment: Patient has not made any gains since starting therapy.   3) Patient to ambulate with improved TKE on right with use of AD as needed  [] Goal Met [x] Goal Not Met [] Continue Goal [x] Discontinue Goal  [] Revise Goal  Goal Assessment: Patient is still walking with lack of full TKE and has a limp and uneven steps  4) Patient to be able to progress to strength program once gains back ROM to provide stability of knee for all activity  [] Goal Met [x] Goal Not Met [] Continue Goal [x] Discontinue Goal  [] Revise Goal  Goal Assessment: Have not been able to progress to strengthening because is not gaining back ROM  5) Patient to be able to progress to balance activity once gains back ROM for safety and to reduce chance of falling. [] Goal Met [x] Goal Not Met [] Continue Goal [x] Discontinue Goal  [] Revise Goal  Goal Assessment: Have not been able to progress due to cannot stand straight on leg      Long Term Goals:  Time Frame: 12 weeks  1) Patient to be independent with home program and have full knee ROM for return to all prior activity with minimal to no difficulty  [] Goal Met [x] Goal Not Met [] Continue Goal [x] Discontinue Goal  [] Revise Goal  Goal Assessment: Patient can be independent with home program but has not gained any more mobility due to still with severe tightness in leg, increased pain and no gain in ROM      Patient Education:   [x]  HEP/Education Completed:Keep working on range at home. Do what strengthening can with HEP given in past. Keep walking but be careful how much due to strain on leg.  [email or web address] Access Code:  []  No new Education completed  [x]  Reviewed Prior HEP      [x]  Patient verbalized and/or demonstrated understanding of education provided. []  Patient unable to verbalize and/or demonstrate understanding of education provided. Will continue education. []  Barriers to learning: None    PLAN:  []  Plan of care initiated. Plan to see patient 3 times per week for 12 weeks to address the treatment planned outlined above.   []  Continue with current plan of care  []  Modify plan of care as follows:    []  Hold pending physician visit  [x]  Discharge    Time In 1000   Time Out 1040   Timed Code Minutes: 40 min   Total Treatment Time: 40 min       Electronically Signed by: [place] [id number]

## 2022-05-12 ENCOUNTER — OFFICE VISIT (OUTPATIENT)
Dept: FAMILY MEDICINE CLINIC | Age: 67
End: 2022-05-12
Payer: MEDICARE

## 2022-05-12 VITALS
HEIGHT: 69 IN | HEART RATE: 66 BPM | RESPIRATION RATE: 20 BRPM | DIASTOLIC BLOOD PRESSURE: 86 MMHG | WEIGHT: 210 LBS | OXYGEN SATURATION: 96 % | TEMPERATURE: 97.7 F | BODY MASS INDEX: 31.1 KG/M2 | SYSTOLIC BLOOD PRESSURE: 132 MMHG

## 2022-05-12 DIAGNOSIS — M25.561 ACUTE PAIN OF RIGHT KNEE: Primary | ICD-10-CM

## 2022-05-12 PROCEDURE — G8417 CALC BMI ABV UP PARAM F/U: HCPCS | Performed by: NURSE PRACTITIONER

## 2022-05-12 PROCEDURE — 3017F COLORECTAL CA SCREEN DOC REV: CPT | Performed by: NURSE PRACTITIONER

## 2022-05-12 PROCEDURE — 1123F ACP DISCUSS/DSCN MKR DOCD: CPT | Performed by: NURSE PRACTITIONER

## 2022-05-12 PROCEDURE — G8427 DOCREV CUR MEDS BY ELIG CLIN: HCPCS | Performed by: NURSE PRACTITIONER

## 2022-05-12 PROCEDURE — 99213 OFFICE O/P EST LOW 20 MIN: CPT | Performed by: NURSE PRACTITIONER

## 2022-05-12 PROCEDURE — 4040F PNEUMOC VAC/ADMIN/RCVD: CPT | Performed by: NURSE PRACTITIONER

## 2022-05-12 PROCEDURE — 1036F TOBACCO NON-USER: CPT | Performed by: NURSE PRACTITIONER

## 2022-05-12 RX ORDER — HYDROCODONE BITARTRATE AND ACETAMINOPHEN 5; 325 MG/1; MG/1
1 TABLET ORAL EVERY 6 HOURS PRN
Qty: 28 TABLET | Refills: 0 | Status: SHIPPED | OUTPATIENT
Start: 2022-05-12 | End: 2022-05-19

## 2022-05-12 RX ORDER — METHYLPREDNISOLONE 4 MG/1
TABLET ORAL
Qty: 1 KIT | Refills: 0 | Status: SHIPPED | OUTPATIENT
Start: 2022-05-12 | End: 2022-05-18

## 2022-05-12 NOTE — PROGRESS NOTES
Olman Kiran (:  1955) is a 74 y.o. male,Established patient, here for evaluation of the following chief complaint(s):  Knee Pain (pain and swelling; oio released him )         ASSESSMENT/PLAN:  1. Acute pain of right knee  -     HYDROcodone-acetaminophen (NORCO) 5-325 MG per tablet; Take 1 tablet by mouth every 6 hours as needed for Pain for up to 7 days. Intended supply: 7 days. Take lowest dose possible to manage pain, Disp-28 tablet, R-0Normal    Medrol dose pack  norco for more severe pain  Continue with otc pain relievers    Return if symptoms worsen or fail to improve. Subjective   SUBJECTIVE/OBJECTIVE:  HPI    Right knee pain and swelling. Had replacement in February. Surgeon last week released him and said will take up to a year. Is taking mobic. Cant straighten. Review of Systems   Constitutional: Negative for activity change, appetite change, chills, diaphoresis, fatigue, fever and unexpected weight change. HENT: Negative for congestion, ear pain, postnasal drip, sinus pressure and sore throat. Eyes: Negative for visual disturbance. Respiratory: Negative for cough, chest tightness, shortness of breath, wheezing and stridor. Cardiovascular: Negative for chest pain, palpitations and leg swelling. Gastrointestinal: Negative for abdominal distention, abdominal pain, constipation, diarrhea, nausea and vomiting. Endocrine: Negative for polydipsia, polyphagia and polyuria. Genitourinary: Negative for decreased urine volume, difficulty urinating, dysuria, flank pain, frequency, hematuria and urgency. Musculoskeletal: Positive for arthralgias. Negative for back pain, gait problem, joint swelling, myalgias and neck pain. Skin: Negative for color change, pallor and rash. Neurological: Negative for dizziness, syncope, weakness, light-headedness, numbness and headaches. Hematological: Negative for adenopathy.    Psychiatric/Behavioral: Negative for behavioral problems, self-injury and sleep disturbance. The patient is not nervous/anxious. Objective   Physical Exam  Constitutional:       Appearance: Normal appearance. HENT:      Head: Normocephalic and atraumatic. Cardiovascular:      Rate and Rhythm: Normal rate. Pulmonary:      Effort: Pulmonary effort is normal.   Musculoskeletal:         General: Swelling and tenderness present. Cervical back: Normal range of motion. Skin:     General: Skin is dry. Neurological:      Mental Status: He is alert and oriented to person, place, and time. On this date 5/12/2022 I have spent 25 minutes reviewing previous notes, test results and face to face with the patient discussing the diagnosis and importance of compliance with the treatment plan as well as documenting on the day of the visit. An electronic signature was used to authenticate this note.     --Mcclure Grade, APRN - CNP

## 2022-05-18 ASSESSMENT — ENCOUNTER SYMPTOMS
WHEEZING: 0
DIARRHEA: 0
ABDOMINAL PAIN: 0
ABDOMINAL DISTENTION: 0
SHORTNESS OF BREATH: 0
NAUSEA: 0
COLOR CHANGE: 0
SORE THROAT: 0
SINUS PRESSURE: 0
COUGH: 0
CONSTIPATION: 0
BACK PAIN: 0
STRIDOR: 0
VOMITING: 0
CHEST TIGHTNESS: 0

## 2022-05-29 DIAGNOSIS — F33.1 MODERATE EPISODE OF RECURRENT MAJOR DEPRESSIVE DISORDER (HCC): ICD-10-CM

## 2022-05-30 RX ORDER — SERTRALINE HYDROCHLORIDE 100 MG/1
200 TABLET, FILM COATED ORAL DAILY
Qty: 180 TABLET | Refills: 3 | Status: SHIPPED | OUTPATIENT
Start: 2022-05-30

## 2022-06-06 ENCOUNTER — OFFICE VISIT (OUTPATIENT)
Dept: CARDIOLOGY CLINIC | Age: 67
End: 2022-06-06
Payer: MEDICARE

## 2022-06-06 VITALS
DIASTOLIC BLOOD PRESSURE: 98 MMHG | WEIGHT: 208 LBS | BODY MASS INDEX: 30.81 KG/M2 | SYSTOLIC BLOOD PRESSURE: 144 MMHG | HEIGHT: 69 IN | HEART RATE: 64 BPM

## 2022-06-06 DIAGNOSIS — I25.10 CORONARY ARTERY DISEASE INVOLVING NATIVE CORONARY ARTERY OF NATIVE HEART WITHOUT ANGINA PECTORIS: ICD-10-CM

## 2022-06-06 DIAGNOSIS — I10 PRIMARY HYPERTENSION: Primary | ICD-10-CM

## 2022-06-06 PROBLEM — I50.22 CHRONIC SYSTOLIC (CONGESTIVE) HEART FAILURE (HCC): Status: ACTIVE | Noted: 2022-06-06

## 2022-06-06 PROCEDURE — 99213 OFFICE O/P EST LOW 20 MIN: CPT | Performed by: NUCLEAR MEDICINE

## 2022-06-06 PROCEDURE — 3017F COLORECTAL CA SCREEN DOC REV: CPT | Performed by: NUCLEAR MEDICINE

## 2022-06-06 PROCEDURE — G8417 CALC BMI ABV UP PARAM F/U: HCPCS | Performed by: NUCLEAR MEDICINE

## 2022-06-06 PROCEDURE — G8427 DOCREV CUR MEDS BY ELIG CLIN: HCPCS | Performed by: NUCLEAR MEDICINE

## 2022-06-06 PROCEDURE — 1123F ACP DISCUSS/DSCN MKR DOCD: CPT | Performed by: NUCLEAR MEDICINE

## 2022-06-06 PROCEDURE — 1036F TOBACCO NON-USER: CPT | Performed by: NUCLEAR MEDICINE

## 2022-06-06 RX ORDER — METOPROLOL SUCCINATE 25 MG/1
25 TABLET, EXTENDED RELEASE ORAL DAILY
Qty: 90 TABLET | Refills: 3 | Status: SHIPPED | OUTPATIENT
Start: 2022-06-06

## 2022-06-06 NOTE — PROGRESS NOTES
[address] [facility] [address]  [address]  [facility] [address]  Dept: [phone]  Dept Fax: [phone]  Loc: [phone]    Visit Date: 6/6/2022    Glo Georges is a 74 y.o. male who presents todayfor:  Chief Complaint   Patient presents with    Follow-up    Hypertension    Coronary Artery Disease    Shortness of Breath     High BP  Not taking the meds like should be   Off of norvasc  Not taking metoprolol the last couple days  Limited by the back  Cath 2017   Mild CAD  Baseline dyspnea  No chest pain   No dizziness  No syncope        HPI:  HPI  Past Medical History:   Diagnosis Date    Back pain     Depression     GERD (gastroesophageal reflux disease)     Headache(784.0) 9/22/2013    Migraines     ALICIA treated with BiPAP 2013    doesn't wear Bipap    Pneumonia     Pulmonary embolism ([address] 2/25/2021    S/P cardiac catheterization: 7/31/2017: No obstructive lasions. 7/31/2017 7/31/2017: No obstructive lasions.  Dr. Duana Fothergill [facility]iz[facility])       Past Surgical History:   Procedure Laterality Date    [facility]    BACK SURGERY  12/18/15    l3-s1 decompression, posterior fusion   [address]  2013    CERVICAL SPINE SURGERY  10-16-15    C4-6 ACDF    COLONOSCOPY      ENDOSCOPY, COLON, DIAGNOSTIC      HERNIA REPAIR  1996    [place]    [facility]    KNEE SURGERY  1976    Rt     LUMBAR SPINE SURGERY  08/26/2014     L3-5 decompression, Dr. Claudette Coup, [address] SURGERY  10/19/2018    OTHER SURGICAL HISTORY  06/02/2017    Diagnostic laparoscopy, Laparoscopic Ventral hernia Repair with Mesh by Dr Hadley Letters OFFICE/OUTPT VISIT,PROCEDURE ONLY N/A 10/19/2018    C3-4 AND C6-7 ACDF performed by Seymour Lo MD at [address] N/A 3/13/2020    L3-S1 HARDWARE REMOVAL performed by Seymour Lo MD at [address] [facility] Rotator cuff, Dr. Nick Yanez N/A 7/8/2021    SCS trial  entrance L1 tip T7 bilaterally performed by Rashid Argueta MD at [address] Left 9/9/2021    THORACIC LAMINECTOMY FOR PLACEMENT OF PERMANENT SPINAL CORD STMULATOR AND LEFT FLANK INTERNAL PULSE GENERATOR performed by Richard Sampson MD at [address] [facility] History   Problem Relation Age of Onset    Arthritis Father     Heart Disease Father     Other Father         COPD   Daniel Southern Lupus Mother     Other Mother         Lupus    Depression Sister     Depression Child     Arthritis Maternal Grandmother     Stroke Maternal Grandmother     High Blood Pressure Maternal Grandmother     Arthritis Maternal Grandfather     Diabetes Maternal Grandfather     Heart Disease Maternal Grandfather     Asthma Paternal Grandmother     Stroke Paternal Grandmother     High Blood Pressure Paternal Grandmother     Asthma Paternal Grandfather      Social History     Tobacco Use    Smoking status: Never Smoker    Smokeless tobacco: Never Used   Substance Use Topics    Alcohol use: No      Current Outpatient Medications   Medication Sig Dispense Refill    sertraline (ZOLOFT) 100 MG tablet TAKE 2 TABLETS BY MOUTH  DAILY 180 tablet 3    metoprolol succinate (TOPROL XL) 25 MG extended release tablet Take 1 tablet by mouth daily 30 tablet 1    PLENVU 140 g SOLR use as directed      aspirin-acetaminophen-caffeine (EXCEDRIN MIGRAINE) 250-250-65 MG per tablet Take 1 tablet by mouth every 6 hours as needed for Headaches       omeprazole (PRILOSEC) 40 MG delayed release capsule Take 1 capsule by mouth daily 90 capsule 3    EPINEPHrine (EPIPEN) 0.3 MG/0.3ML SOAJ injection Inject 0.3 mg into the muscle as needed Use as directed for allergic reaction      lamoTRIgine (LAMICTAL) 200 MG tablet TAKE 1 TABLET BY MOUTH TWO  TIMES DAILY 180 tablet 3    divalproex (DEPAKOTE ER) 500 MG extended release tablet Take by mouth daily 3 tab      butorphanol (STADOL) 10 MG/ML nasal spray 1 spray by Nasal route every 4 hours as needed for Pain. No current facility-administered medications for this visit. Allergies   Allergen Reactions    Bee Venom Anaphylaxis    Dilaudid [Hydromorphone] Other (See Comments)    Hydrochlorothiazide Other (See Comments)    Lisinopril Swelling    Other Hives    Dilaudid [Hydromorphone Hcl] Nausea And Vomiting and Rash    Nitrofuran Derivatives Nausea And Vomiting     Severe headache    Nitroglycerin Nausea And Vomiting     migraine     Health Maintenance   Topic Date Due    Prostate Specific Antigen (PSA) Screening or Monitoring  05/09/2013    Pneumococcal 65+ years Vaccine (1 - PCV) Never done    Flu vaccine (Season Ended) 09/22/2022 (Originally 9/1/2022)    Shingles vaccine (1 of 2) 09/22/2022 (Originally 5/9/2007)    COVID-19 Vaccine (1) 09/22/2022 (Originally 5/9/1960)    Lipids  07/29/2022    Depression Monitoring  01/03/2023    Annual Wellness Visit (AWV)  01/04/2023    DTaP/Tdap/Td vaccine (2 - Td or Tdap) 11/20/2028    Colorectal Cancer Screen  11/11/2031    Hepatitis C screen  Completed    HIV screen  Completed    Hepatitis A vaccine  Aged Out    Hepatitis B vaccine  Aged Out    Hib vaccine  Aged Out    Meningococcal (ACWY) vaccine  Aged Out       Subjective:  Review of Systems  General:   No fever, no chills, some fatigue or weight loss  Pulmonary:    some dyspnea, no wheezing  Cardiac:    Denies recent chest pain,   GI:     No nausea or vomiting, no abdominal pain  Neuro:    No dizziness or light headedness,   Musculoskeletal:  No recent active issues  Extremities:   No edema, no obvious claudication       Objective:  Physical Exam  BP (!) 162/104   Pulse 64   Ht 5' 8.5\" (1.74 m)   Wt 208 lb (94.3 kg)   BMI 31.17 kg/m²   General:   Well developed, well nourished  Lungs:   Clear to auscultation  Heart:    Normal S1 S2, Slight murmur.  no rubs, no gallops  Abdomen:   Soft, non tender, no organomegalies, positive bowel sounds  Extremities:   No edema, no cyanosis, good peripheral pulses  Neurological:   Awake, alert, oriented. No obvious focal deficits  Musculoskelatal:  No obvious deformities    Assessment:      Diagnosis Orders   1. Primary hypertension     2. Coronary artery disease involving native coronary artery of native heart without angina pectoris     as above  Cardiac seems fair  High BP  ?? Compliance issues     Plan:  No follow-ups on file. Discussed  Resume toprol   Monitor the BP  Continue risk factor modification and medical management  Thank you for allowing me to participate in the care of your patient. Please don't hesitate to contact me regarding any further issues related to the patient care    Orders Placed:  No orders of the defined types were placed in this encounter. Medications Prescribed:  No orders of the defined types were placed in this encounter. Discussed use, benefit, and side effects of prescribed medications. All patient questions answered. Pt voicedunderstanding. Instructed to continue current medications, diet and exercise. Continue risk factor modification and medical management. Patient agreed with treatment plan. Follow up as directed.     Electronically signedby Romero Aguilar MD on 6/6/2022 at 11:51 AM

## 2022-06-13 ENCOUNTER — TELEPHONE (OUTPATIENT)
Dept: CARDIOLOGY CLINIC | Age: 67
End: 2022-06-13

## 2022-06-24 NOTE — ANESTHESIA PRE PROCEDURE
09/26/2018    MCV 90.4 09/26/2018    RDW 13.9 03/05/2018     09/26/2018       CMP:   Lab Results   Component Value Date     09/26/2018    K 4.2 09/26/2018     09/26/2018    CO2 21 09/26/2018    BUN 15 09/26/2018    CREATININE 1.0 09/26/2018    LABGLOM 76 09/26/2018    GLUCOSE 115 09/26/2018    GLUCOSE 103 05/09/2012    PROT 8.1 03/05/2018    CALCIUM 9.6 09/26/2018    BILITOT 0.3 03/05/2018    ALKPHOS 99 03/05/2018    AST 17 03/05/2018    ALT 15 03/05/2018       POC Tests: No results for input(s): POCGLU, POCNA, POCK, POCCL, POCBUN, POCHEMO, POCHCT in the last 72 hours. Coags:   Lab Results   Component Value Date    INR 1.03 09/26/2018    APTT 28.1 09/26/2018       HCG (If Applicable): No results found for: PREGTESTUR, PREGSERUM, HCG, HCGQUANT     ABGs: No results found for: PHART, PO2ART, UQC6ZDN, XQM8GKX, BEART, Q3WEOIWP     Type & Screen (If Applicable):  Lab Results   Component Value Date    LABRH NEG 07/31/2017       Anesthesia Evaluation  Patient summary reviewed and Nursing notes reviewed  Airway: Mallampati: II  TM distance: >3 FB   Neck ROM: full  Mouth opening: > = 3 FB Dental:          Pulmonary: breath sounds clear to auscultation  (+) pneumonia:  shortness of breath:  sleep apnea:                             Cardiovascular:  Exercise tolerance: good (>4 METS),   (+) hypertension:, angina:, CAD:,       ECG reviewed  Rhythm: regular  Rate: normal                    Neuro/Psych:   (+) headaches:, psychiatric history:            GI/Hepatic/Renal:   (+) GERD:,           Endo/Other:                     Abdominal:       Abdomen: soft. Vascular:                                        Anesthesia Plan      general     ASA 3       Induction: intravenous. MIPS: Postoperative opioids intended and Prophylactic antiemetics administered. Anesthetic plan and risks discussed with patient and spouse.       Plan discussed with CRNA, attending and surgical team.                  Azeb Adams, Simple: Patient demonstrates quick and easy understanding

## 2022-06-26 ENCOUNTER — APPOINTMENT (OUTPATIENT)
Dept: GENERAL RADIOLOGY | Age: 67
End: 2022-06-26
Payer: MEDICARE

## 2022-06-26 ENCOUNTER — HOSPITAL ENCOUNTER (OUTPATIENT)
Age: 67
Setting detail: OBSERVATION
Discharge: HOME | End: 2022-06-27
Attending: NURSE PRACTITIONER
Payer: MEDICARE

## 2022-06-26 DIAGNOSIS — U07.1 COVID-19: Primary | ICD-10-CM

## 2022-06-26 LAB
ALBUMIN SERPL-MCNC: 4.6 G/DL (ref 3.5–5.1)
ALP BLD-CCNC: 83 U/L (ref 38–126)
ALT SERPL-CCNC: 17 U/L (ref 11–66)
ANION GAP SERPL CALCULATED.3IONS-SCNC: 11 MEQ/L (ref 8–16)
AST SERPL-CCNC: 23 U/L (ref 5–40)
BASOPHILS # BLD: 0.5 %
BASOPHILS ABSOLUTE: 0 THOU/MM3 (ref 0–0.1)
BILIRUB SERPL-MCNC: 0.2 MG/DL (ref 0.3–1.2)
BILIRUBIN DIRECT: < 0.2 MG/DL (ref 0–0.3)
BUN BLDV-MCNC: 14 MG/DL (ref 7–22)
C-REACTIVE PROTEIN: 1.11 MG/DL (ref 0–1)
CALCIUM SERPL-MCNC: 9 MG/DL (ref 8.5–10.5)
CHLORIDE BLD-SCNC: 100 MEQ/L (ref 98–111)
CO2: 21 MEQ/L (ref 23–33)
CREAT SERPL-MCNC: 0.8 MG/DL (ref 0.4–1.2)
D-DIMER QUANTITATIVE: 980 NG/ML FEU (ref 0–500)
EKG ATRIAL RATE: 81 BPM
EKG P AXIS: 62 DEGREES
EKG P-R INTERVAL: 158 MS
EKG Q-T INTERVAL: 380 MS
EKG QRS DURATION: 114 MS
EKG QTC CALCULATION (BAZETT): 441 MS
EKG R AXIS: -42 DEGREES
EKG T AXIS: 69 DEGREES
EKG VENTRICULAR RATE: 81 BPM
EOSINOPHIL # BLD: 0.5 %
EOSINOPHILS ABSOLUTE: 0 THOU/MM3 (ref 0–0.4)
ERYTHROCYTE [DISTWIDTH] IN BLOOD BY AUTOMATED COUNT: 14.2 % (ref 11.5–14.5)
ERYTHROCYTE [DISTWIDTH] IN BLOOD BY AUTOMATED COUNT: 48.4 FL (ref 35–45)
FERRITIN: 71 NG/ML (ref 22–322)
FLU A ANTIGEN: NEGATIVE
FLU B ANTIGEN: NEGATIVE
GFR SERPL CREATININE-BSD FRML MDRD: > 90 ML/MIN/1.73M2
GLUCOSE BLD-MCNC: 117 MG/DL (ref 70–108)
HCT VFR BLD CALC: 47.2 % (ref 42–52)
HEMOGLOBIN: 14.6 GM/DL (ref 14–18)
IMMATURE GRANS (ABS): 0.05 THOU/MM3 (ref 0–0.07)
IMMATURE GRANULOCYTES: 0.8 %
LIPASE: 22.1 U/L (ref 5.6–51.3)
LYMPHOCYTES # BLD: 8.6 %
LYMPHOCYTES ABSOLUTE: 0.6 THOU/MM3 (ref 1–4.8)
MCH RBC QN AUTO: 29.2 PG (ref 26–33)
MCHC RBC AUTO-ENTMCNC: 30.9 GM/DL (ref 32.2–35.5)
MCV RBC AUTO: 94.4 FL (ref 80–94)
MONOCYTES # BLD: 27.4 %
MONOCYTES ABSOLUTE: 1.8 THOU/MM3 (ref 0.4–1.3)
NUCLEATED RED BLOOD CELLS: 0 /100 WBC
OSMOLALITY CALCULATION: 266 MOSMOL/KG (ref 275–300)
PLATELET # BLD: 305 THOU/MM3 (ref 130–400)
PMV BLD AUTO: 10.3 FL (ref 9.4–12.4)
POTASSIUM SERPL-SCNC: 4.8 MEQ/L (ref 3.5–5.2)
PROCALCITONIN: 0.13 NG/ML (ref 0.01–0.09)
RBC # BLD: 5 MILL/MM3 (ref 4.7–6.1)
SARS-COV-2, NAAT: DETECTED
SEG NEUTROPHILS: 62.2 %
SEGMENTED NEUTROPHILS ABSOLUTE COUNT: 4 THOU/MM3 (ref 1.8–7.7)
SODIUM BLD-SCNC: 132 MEQ/L (ref 135–145)
TOTAL PROTEIN: 7.6 G/DL (ref 6.1–8)
TROPONIN T: < 0.01 NG/ML
WBC # BLD: 6.5 THOU/MM3 (ref 4.8–10.8)

## 2022-06-26 PROCEDURE — 82248 BILIRUBIN DIRECT: CPT

## 2022-06-26 PROCEDURE — G0378 HOSPITAL OBSERVATION PER HR: HCPCS

## 2022-06-26 PROCEDURE — 71045 X-RAY EXAM CHEST 1 VIEW: CPT

## 2022-06-26 PROCEDURE — 99220 PR INITIAL OBSERVATION CARE/DAY 70 MINUTES: CPT | Performed by: NURSE PRACTITIONER

## 2022-06-26 PROCEDURE — 85379 FIBRIN DEGRADATION QUANT: CPT

## 2022-06-26 PROCEDURE — 6360000002 HC RX W HCPCS: Performed by: NURSE PRACTITIONER

## 2022-06-26 PROCEDURE — 36415 COLL VENOUS BLD VENIPUNCTURE: CPT

## 2022-06-26 PROCEDURE — 86140 C-REACTIVE PROTEIN: CPT

## 2022-06-26 PROCEDURE — 96375 TX/PRO/DX INJ NEW DRUG ADDON: CPT

## 2022-06-26 PROCEDURE — 93005 ELECTROCARDIOGRAM TRACING: CPT | Performed by: NURSE PRACTITIONER

## 2022-06-26 PROCEDURE — 96372 THER/PROPH/DIAG INJ SC/IM: CPT

## 2022-06-26 PROCEDURE — 96374 THER/PROPH/DIAG INJ IV PUSH: CPT

## 2022-06-26 PROCEDURE — 84145 PROCALCITONIN (PCT): CPT

## 2022-06-26 PROCEDURE — 87635 SARS-COV-2 COVID-19 AMP PRB: CPT

## 2022-06-26 PROCEDURE — 94761 N-INVAS EAR/PLS OXIMETRY MLT: CPT

## 2022-06-26 PROCEDURE — 94669 MECHANICAL CHEST WALL OSCILL: CPT

## 2022-06-26 PROCEDURE — 2580000003 HC RX 258: Performed by: NURSE PRACTITIONER

## 2022-06-26 PROCEDURE — 96361 HYDRATE IV INFUSION ADD-ON: CPT

## 2022-06-26 PROCEDURE — 82728 ASSAY OF FERRITIN: CPT

## 2022-06-26 PROCEDURE — 80053 COMPREHEN METABOLIC PANEL: CPT

## 2022-06-26 PROCEDURE — 93010 ELECTROCARDIOGRAM REPORT: CPT | Performed by: NUCLEAR MEDICINE

## 2022-06-26 PROCEDURE — 83690 ASSAY OF LIPASE: CPT

## 2022-06-26 PROCEDURE — 99285 EMERGENCY DEPT VISIT HI MDM: CPT

## 2022-06-26 PROCEDURE — 84484 ASSAY OF TROPONIN QUANT: CPT

## 2022-06-26 PROCEDURE — 87804 INFLUENZA ASSAY W/OPTIC: CPT

## 2022-06-26 PROCEDURE — 6370000000 HC RX 637 (ALT 250 FOR IP): Performed by: NURSE PRACTITIONER

## 2022-06-26 PROCEDURE — 85025 COMPLETE CBC W/AUTO DIFF WBC: CPT

## 2022-06-26 RX ORDER — METOCLOPRAMIDE HYDROCHLORIDE 5 MG/ML
10 INJECTION INTRAMUSCULAR; INTRAVENOUS ONCE
Status: COMPLETED | OUTPATIENT
Start: 2022-06-26 | End: 2022-06-27

## 2022-06-26 RX ORDER — ONDANSETRON 2 MG/ML
4 INJECTION INTRAMUSCULAR; INTRAVENOUS ONCE
Status: DISCONTINUED | OUTPATIENT
Start: 2022-06-26 | End: 2022-06-26

## 2022-06-26 RX ORDER — ACETAMINOPHEN 650 MG/1
650 SUPPOSITORY RECTAL EVERY 6 HOURS PRN
Status: DISCONTINUED | OUTPATIENT
Start: 2022-06-26 | End: 2022-06-27 | Stop reason: HOSPADM

## 2022-06-26 RX ORDER — ONDANSETRON 4 MG/1
4 TABLET, ORALLY DISINTEGRATING ORAL EVERY 8 HOURS PRN
Status: DISCONTINUED | OUTPATIENT
Start: 2022-06-26 | End: 2022-06-27 | Stop reason: HOSPADM

## 2022-06-26 RX ORDER — DIPHENHYDRAMINE HYDROCHLORIDE 50 MG/ML
25 INJECTION INTRAMUSCULAR; INTRAVENOUS ONCE
Status: COMPLETED | OUTPATIENT
Start: 2022-06-26 | End: 2022-06-27

## 2022-06-26 RX ORDER — POLYETHYLENE GLYCOL 3350 17 G/17G
17 POWDER, FOR SOLUTION ORAL DAILY PRN
Status: DISCONTINUED | OUTPATIENT
Start: 2022-06-26 | End: 2022-06-27 | Stop reason: HOSPADM

## 2022-06-26 RX ORDER — VITAMIN B COMPLEX
1000 TABLET ORAL DAILY
Status: DISCONTINUED | OUTPATIENT
Start: 2022-06-26 | End: 2022-06-27 | Stop reason: HOSPADM

## 2022-06-26 RX ORDER — DIVALPROEX SODIUM 500 MG/1
500 TABLET, EXTENDED RELEASE ORAL DAILY
Status: DISCONTINUED | OUTPATIENT
Start: 2022-06-26 | End: 2022-06-26

## 2022-06-26 RX ORDER — SODIUM CHLORIDE 0.9 % (FLUSH) 0.9 %
5-40 SYRINGE (ML) INJECTION EVERY 12 HOURS SCHEDULED
Status: DISCONTINUED | OUTPATIENT
Start: 2022-06-26 | End: 2022-06-27 | Stop reason: HOSPADM

## 2022-06-26 RX ORDER — ZINC GLUCONATE 50 MG
50 TABLET ORAL DAILY
Status: DISCONTINUED | OUTPATIENT
Start: 2022-06-26 | End: 2022-06-26 | Stop reason: SDUPTHER

## 2022-06-26 RX ORDER — MELOXICAM 7.5 MG/1
7.5 TABLET ORAL DAILY
COMMUNITY

## 2022-06-26 RX ORDER — ASCORBIC ACID 500 MG
1000 TABLET ORAL DAILY
Status: DISCONTINUED | OUTPATIENT
Start: 2022-06-26 | End: 2022-06-27 | Stop reason: HOSPADM

## 2022-06-26 RX ORDER — SODIUM CHLORIDE 0.9 % (FLUSH) 0.9 %
5-40 SYRINGE (ML) INJECTION PRN
Status: DISCONTINUED | OUTPATIENT
Start: 2022-06-26 | End: 2022-06-27 | Stop reason: HOSPADM

## 2022-06-26 RX ORDER — ACETAMINOPHEN 500 MG
1000 TABLET ORAL ONCE
Status: COMPLETED | OUTPATIENT
Start: 2022-06-26 | End: 2022-06-27

## 2022-06-26 RX ORDER — SODIUM CHLORIDE 9 MG/ML
INJECTION, SOLUTION INTRAVENOUS PRN
Status: DISCONTINUED | OUTPATIENT
Start: 2022-06-26 | End: 2022-06-27 | Stop reason: HOSPADM

## 2022-06-26 RX ORDER — SODIUM CHLORIDE 9 MG/ML
INJECTION, SOLUTION INTRAVENOUS CONTINUOUS
Status: DISCONTINUED | OUTPATIENT
Start: 2022-06-26 | End: 2022-06-27 | Stop reason: HOSPADM

## 2022-06-26 RX ORDER — PROCHLORPERAZINE EDISYLATE 5 MG/ML
10 INJECTION INTRAMUSCULAR; INTRAVENOUS ONCE
Status: COMPLETED | OUTPATIENT
Start: 2022-06-26 | End: 2022-06-26

## 2022-06-26 RX ORDER — DIPHENHYDRAMINE HYDROCHLORIDE 50 MG/ML
25 INJECTION INTRAMUSCULAR; INTRAVENOUS ONCE
Status: COMPLETED | OUTPATIENT
Start: 2022-06-26 | End: 2022-06-26

## 2022-06-26 RX ORDER — 0.9 % SODIUM CHLORIDE 0.9 %
1000 INTRAVENOUS SOLUTION INTRAVENOUS ONCE
Status: COMPLETED | OUTPATIENT
Start: 2022-06-26 | End: 2022-06-26

## 2022-06-26 RX ORDER — PANTOPRAZOLE SODIUM 40 MG/1
40 TABLET, DELAYED RELEASE ORAL
Status: DISCONTINUED | OUTPATIENT
Start: 2022-06-26 | End: 2022-06-27 | Stop reason: HOSPADM

## 2022-06-26 RX ORDER — ENOXAPARIN SODIUM 100 MG/ML
30 INJECTION SUBCUTANEOUS 2 TIMES DAILY
Status: DISCONTINUED | OUTPATIENT
Start: 2022-06-26 | End: 2022-06-27 | Stop reason: HOSPADM

## 2022-06-26 RX ORDER — SERTRALINE HYDROCHLORIDE 100 MG/1
200 TABLET, FILM COATED ORAL DAILY
Status: DISCONTINUED | OUTPATIENT
Start: 2022-06-26 | End: 2022-06-27 | Stop reason: HOSPADM

## 2022-06-26 RX ORDER — ONDANSETRON 2 MG/ML
4 INJECTION INTRAMUSCULAR; INTRAVENOUS EVERY 6 HOURS PRN
Status: DISCONTINUED | OUTPATIENT
Start: 2022-06-26 | End: 2022-06-27 | Stop reason: HOSPADM

## 2022-06-26 RX ORDER — 0.9 % SODIUM CHLORIDE 0.9 %
500 INTRAVENOUS SOLUTION INTRAVENOUS ONCE
Status: COMPLETED | OUTPATIENT
Start: 2022-06-26 | End: 2022-06-27

## 2022-06-26 RX ORDER — KETOROLAC TROMETHAMINE 30 MG/ML
15 INJECTION, SOLUTION INTRAMUSCULAR; INTRAVENOUS ONCE
Status: COMPLETED | OUTPATIENT
Start: 2022-06-26 | End: 2022-06-26

## 2022-06-26 RX ORDER — LAMOTRIGINE 100 MG/1
200 TABLET ORAL 2 TIMES DAILY
Status: DISCONTINUED | OUTPATIENT
Start: 2022-06-26 | End: 2022-06-27 | Stop reason: HOSPADM

## 2022-06-26 RX ORDER — METOPROLOL SUCCINATE 25 MG/1
25 TABLET, EXTENDED RELEASE ORAL DAILY
Status: DISCONTINUED | OUTPATIENT
Start: 2022-06-26 | End: 2022-06-27 | Stop reason: HOSPADM

## 2022-06-26 RX ORDER — AMLODIPINE BESYLATE 5 MG/1
5 TABLET ORAL DAILY
Status: DISCONTINUED | OUTPATIENT
Start: 2022-06-26 | End: 2022-06-26

## 2022-06-26 RX ORDER — DIVALPROEX SODIUM 500 MG/1
1500 TABLET, EXTENDED RELEASE ORAL DAILY
Status: DISCONTINUED | OUTPATIENT
Start: 2022-06-26 | End: 2022-06-27 | Stop reason: HOSPADM

## 2022-06-26 RX ORDER — ACETAMINOPHEN 325 MG/1
650 TABLET ORAL EVERY 6 HOURS PRN
Status: DISCONTINUED | OUTPATIENT
Start: 2022-06-26 | End: 2022-06-27 | Stop reason: HOSPADM

## 2022-06-26 RX ORDER — ZINC SULFATE 50(220)MG
50 CAPSULE ORAL DAILY
Status: DISCONTINUED | OUTPATIENT
Start: 2022-06-26 | End: 2022-06-27 | Stop reason: HOSPADM

## 2022-06-26 RX ORDER — DEXAMETHASONE SODIUM PHOSPHATE 4 MG/ML
10 INJECTION, SOLUTION INTRA-ARTICULAR; INTRALESIONAL; INTRAMUSCULAR; INTRAVENOUS; SOFT TISSUE ONCE
Status: COMPLETED | OUTPATIENT
Start: 2022-06-26 | End: 2022-06-26

## 2022-06-26 RX ADMIN — SODIUM CHLORIDE, PRESERVATIVE FREE 10 ML: 5 INJECTION INTRAVENOUS at 12:32

## 2022-06-26 RX ADMIN — ENOXAPARIN SODIUM 30 MG: 100 INJECTION SUBCUTANEOUS at 21:53

## 2022-06-26 RX ADMIN — PROCHLORPERAZINE EDISYLATE 10 MG: 5 INJECTION INTRAMUSCULAR; INTRAVENOUS at 07:54

## 2022-06-26 RX ADMIN — LAMOTRIGINE 200 MG: 100 TABLET ORAL at 14:31

## 2022-06-26 RX ADMIN — LAMOTRIGINE 200 MG: 100 TABLET ORAL at 21:53

## 2022-06-26 RX ADMIN — KETOROLAC TROMETHAMINE 15 MG: 30 INJECTION, SOLUTION INTRAMUSCULAR; INTRAVENOUS at 07:54

## 2022-06-26 RX ADMIN — SODIUM CHLORIDE 1000 ML: 9 INJECTION, SOLUTION INTRAVENOUS at 07:55

## 2022-06-26 RX ADMIN — Medication 1000 UNITS: at 12:30

## 2022-06-26 RX ADMIN — DIVALPROEX SODIUM 1500 MG: 500 TABLET, EXTENDED RELEASE ORAL at 14:31

## 2022-06-26 RX ADMIN — SODIUM CHLORIDE: 9 INJECTION, SOLUTION INTRAVENOUS at 16:08

## 2022-06-26 RX ADMIN — METOPROLOL SUCCINATE 25 MG: 25 TABLET, FILM COATED, EXTENDED RELEASE ORAL at 14:31

## 2022-06-26 RX ADMIN — OXYCODONE HYDROCHLORIDE AND ACETAMINOPHEN 1000 MG: 500 TABLET ORAL at 12:30

## 2022-06-26 RX ADMIN — ACETAMINOPHEN 650 MG: 325 TABLET ORAL at 12:29

## 2022-06-26 RX ADMIN — Medication 50 MG: at 14:33

## 2022-06-26 RX ADMIN — SERTRALINE 200 MG: 100 TABLET, FILM COATED ORAL at 14:31

## 2022-06-26 RX ADMIN — DIPHENHYDRAMINE HYDROCHLORIDE 25 MG: 50 INJECTION, SOLUTION INTRAMUSCULAR; INTRAVENOUS at 07:54

## 2022-06-26 RX ADMIN — DEXAMETHASONE SODIUM PHOSPHATE 10 MG: 4 INJECTION, SOLUTION INTRAMUSCULAR; INTRAVENOUS at 10:47

## 2022-06-26 ASSESSMENT — PAIN DESCRIPTION - LOCATION
LOCATION: HEAD
LOCATION: GENERALIZED
LOCATION: GENERALIZED;HEAD

## 2022-06-26 ASSESSMENT — ENCOUNTER SYMPTOMS
PHOTOPHOBIA: 1
TROUBLE SWALLOWING: 0
SHORTNESS OF BREATH: 0
CHEST TIGHTNESS: 0
VOMITING: 1
ABDOMINAL PAIN: 0
NAUSEA: 1
DIARRHEA: 1
RHINORRHEA: 0
CONSTIPATION: 0
COUGH: 0
BLOOD IN STOOL: 0

## 2022-06-26 ASSESSMENT — PAIN SCALES - GENERAL
PAINLEVEL_OUTOF10: 2
PAINLEVEL_OUTOF10: 10
PAINLEVEL_OUTOF10: 6
PAINLEVEL_OUTOF10: 2
PAINLEVEL_OUTOF10: 10
PAINLEVEL_OUTOF10: 3
PAINLEVEL_OUTOF10: 10
PAINLEVEL_OUTOF10: 3
PAINLEVEL_OUTOF10: 1
PAINLEVEL_OUTOF10: 10
PAINLEVEL_OUTOF10: 3

## 2022-06-26 ASSESSMENT — PAIN - FUNCTIONAL ASSESSMENT
PAIN_FUNCTIONAL_ASSESSMENT: 0-10
PAIN_FUNCTIONAL_ASSESSMENT: 0-10
PAIN_FUNCTIONAL_ASSESSMENT: ACTIVITIES ARE NOT PREVENTED
PAIN_FUNCTIONAL_ASSESSMENT: 0-10
PAIN_FUNCTIONAL_ASSESSMENT: 0-10
PAIN_FUNCTIONAL_ASSESSMENT: ACTIVITIES ARE NOT PREVENTED

## 2022-06-26 ASSESSMENT — PAIN DESCRIPTION - DESCRIPTORS
DESCRIPTORS: ACHING
DESCRIPTORS: POUNDING
DESCRIPTORS: POUNDING

## 2022-06-26 ASSESSMENT — PAIN DESCRIPTION - ORIENTATION
ORIENTATION: ANTERIOR
ORIENTATION: ANTERIOR

## 2022-06-26 ASSESSMENT — PAIN DESCRIPTION - FREQUENCY: FREQUENCY: CONTINUOUS

## 2022-06-26 ASSESSMENT — PAIN DESCRIPTION - ONSET: ONSET: ON-GOING

## 2022-06-26 ASSESSMENT — LIFESTYLE VARIABLES: HOW OFTEN DO YOU HAVE A DRINK CONTAINING ALCOHOL: NEVER

## 2022-06-26 ASSESSMENT — PAIN DESCRIPTION - PAIN TYPE: TYPE: ACUTE PAIN

## 2022-06-26 NOTE — ED NOTES
While ambulating O2 dropped to 89%. Felizardo Boas, NP notified.      Noni Robledo RN  06/26/22 2843

## 2022-06-26 NOTE — H&P
History & Physical    Patient:  Nany Lott  YOB: 1955  Date of Service: 6/26/2022  MRN: [id number]   Acct:  [de-identified]   Primary Care Physician: Des Henning MD    Chief Complaint:Fatigue and generalized pain. Assessment / Plan:    1. COVID 19. No hypoxia. S/P Dexamethasone in ED. Hold off on additional doses unless he requires oxygen. Check Ferritin, CRP Ddimer. Encourage Acapella/Incentive spirometer for pulmonary hygiene. Start Vit D, Vit C and Zinc. Lovenox 30 mg BID for DVT prophylaxis. 2. Dehydration with Hyponatremia and mild non anion gap acidosis. S/P fluid bolus in ED. Continue maintenance hydration. 3. History of seizures. Home medications resumed. 4. Depression. 5. Hx of PE.   6. ALICIA noncompliant with BiPAP. History of Present Illness:   History obtained from chart review and the patient. The patient is a 74 y.o. male who presents with fatigue and generalized pain. Reports being in his normal state of health until last evening. Had a headache. Started with n/v/d. Today with body aches, fever and chills. He is not vaccinated for COVID 19. Denies CP/SOB/dizziness. Tested for COVID 19 in ED and noted to be positive. He was not hypoxic. Pulse ox did drop to 89% with ambulation. He will be admitted in obs status with likely discharge home tomorrow if clinical course remains stable. Past Medical History:        Diagnosis Date    Back pain     Depression     GERD (gastroesophageal reflux disease)     Headache(784.0) 9/22/2013    Migraines     ALICIA treated with BiPAP 2013    doesn't wear Bipap    Pneumonia     Pulmonary embolism ([address] 2/25/2021    S/P cardiac catheterization: 7/31/2017: No obstructive lasions. 7/31/2017 7/31/2017: No obstructive lasions.  Dr. Hali Holter Seizures [facility])        Past Surgical History:        Procedure Laterality Date    [facility]  2012    [facility]    BACK SURGERY  12/18/15    l3-s1 decompression, posterior fusion  CARDIAC CATHETERIZATION  2013    CERVICAL SPINE SURGERY  10-16-15    C4-6 ACDF    COLONOSCOPY      ENDOSCOPY, COLON, DIAGNOSTIC      [place] REPAIR  [place]    [place]    [facility] REPAIR  2012    [facility]    KNEE SURGERY  1976    Rt     LUMBAR SPINE SURGERY  08/26/2014     L3-5 decompression, Dr. Mindi Eisenmenger, [address] SURGERY  10/19/2018    OTHER SURGICAL HISTORY  06/02/2017    Diagnostic laparoscopy, Laparoscopic Ventral hernia Repair with Mesh by Dr Lizzy Velázquez OFFICE/OUTPT VISIT,PROCEDURE ONLY N/A 10/19/2018    C3-4 AND C6-7 ACDF performed by Hortensia Smith MD at [address]  N/A 3/13/2020    L3-S1 HARDWARE REMOVAL performed by Hortensia Smith MD at [facility]    Rt Rotator cuff, Dr. Meme Galdamez N/A 7/8/2021    SCS trial  entrance L1 tip T7 bilaterally performed by Michael Rawls MD at [address] Left 9/9/2021    THORACIC LAMINECTOMY FOR PLACEMENT OF PERMANENT SPINAL CORD STMULATOR AND LEFT FLANK INTERNAL PULSE GENERATOR performed by Darian Horton MD at [facility] Medications:   No current facility-administered medications on file prior to encounter.      Current Outpatient Medications on File Prior to Encounter   Medication Sig Dispense Refill    meloxicam (MOBIC) 7.5 MG tablet Take 7.5 mg by mouth daily      metoprolol succinate (TOPROL XL) 25 MG extended release tablet Take 1 tablet by mouth daily 90 tablet 3    sertraline (ZOLOFT) 100 MG tablet TAKE 2 TABLETS BY MOUTH  DAILY 180 tablet 3    PLENVU 140 g SOLR use as directed      [DISCONTINUED] amLODIPine (NORVASC) 5 MG tablet Take 1 tablet by mouth daily 90 tablet 3    aspirin-acetaminophen-caffeine (EXCEDRIN MIGRAINE) 250-250-65 MG per tablet Take 1 tablet by mouth every 6 hours as needed for Headaches       omeprazole (PRILOSEC) 40 MG delayed release capsule Take 1 capsule by mouth daily 90 capsule 3    EPINEPHrine (EPIPEN) 0.3 MG/0.3ML SOAJ injection Inject 0.3 mg into the muscle as needed Use as directed for allergic reaction      lamoTRIgine (LAMICTAL) 200 MG tablet TAKE 1 TABLET BY MOUTH TWO  TIMES DAILY 180 tablet 3    divalproex (DEPAKOTE ER) 500 MG extended release tablet Take 1,500 mg by mouth daily (3 tab)      butorphanol (STADOL) 10 MG/ML nasal spray 1 spray by Nasal route every 4 hours as needed for Pain. Allergies:  Bee venom, Dilaudid [hydromorphone], Hydrochlorothiazide, Lisinopril, Other, Dilaudid [hydromorphone hcl], Nitrofuran derivatives, and Nitroglycerin    Social History:    reports that he has never smoked. He has never used smokeless tobacco. He reports that he does not drink alcohol and does not use drugs. Family History:       Problem Relation Age of Onset    Arthritis Father     Heart Disease Father     Other Father         COPD    Lupus Mother     Other Mother         Lupus    Depression Sister     Depression Child     Arthritis Maternal Grandmother     Stroke Maternal Grandmother     High Blood Pressure Maternal Grandmother     Arthritis Maternal Grandfather     Diabetes Maternal Grandfather     Heart Disease Maternal Grandfather     Asthma Paternal Grandmother     Stroke Paternal Grandmother     High Blood Pressure Paternal Grandmother     Asthma Paternal Grandfather        Review of systems:  Constitutional: no fever, no night sweats, + fatigue  Head: + headache, no head injury, no migranes. Eye: no blurring of vision, no double vision.   Ears: no hearing difficulty, no tinnitus  Mouth/throat: no ulceration, dental caries, dysphagia  Lungs: no cough, no shortness of breath, no wheeze  CVS: no palpitation, no chest pain, no shortness of breath  GI: no abdominal pain, + nausea , + vomiting, + constipation  EDMUND: no dysuria, frequency and urgency, no hematuria, no kidney stones  Musculoskeletal: no joint pain, swelling , stiffness  Endocrine: no polyuria, polydypsia, no cold or heat intolerence  Hematology: no anemia, no easy brusing or bleeding, no hx of clotting disorder  Dermatology: no skin rash, no eczema, no prurities,  Psychiatry: no depression, no anxiety,no panic attacks, no suicide ideation  Neurology: no syncope, no seizures, no numbness or tingling of hands, no numbness or tingling of feet, no paresis    10 point review of systems completed, all other than noted above are negative. Vitals:   Vitals:    06/26/22 1406   BP:    Pulse: 72   Resp: 16   Temp:    SpO2: 93%      BMI: Body mass index is 32.54 kg/m². Exam:  Physical Examination: General appearance - alert, ill appearing, and in no distress. Dry MM.   Mental status - alert, oriented to person, place, and time  Neck - supple, no significant adenopathy, no JVD, or carotid bruits  Chest - clear to auscultation, no wheezes, rales or rhonchi, symmetric air entry  Heart - normal rate, regular rhythm, normal S1, S2, no murmurs, rubs, clicks or gallops  Abdomen - soft, nontender, nondistended, no masses or organomegaly  Neurological - alert, oriented, normal speech, no focal findings or movement disorder noted  Musculoskeletal - no joint tenderness, deformity or swelling  Extremities - peripheral pulses normal, no pedal edema, no clubbing or cyanosis  Skin - normal coloration and turgor, no rashes, no suspicious skin lesions noted      Review of Labs and Diagnostic Testing:    Recent Results (from the past 24 hour(s))   EKG Dizziness    Collection Time: 06/26/22  7:13 AM   Result Value Ref Range    Ventricular Rate 81 BPM    Atrial Rate 81 BPM    P-R Interval 158 ms    QRS Duration 114 ms    Q-T Interval 380 ms    QTc Calculation (Bazett) 441 ms    P Axis 62 degrees    R Axis -42 degrees    T Axis 69 degrees   CBC with Auto Differential    Collection Time: 06/26/22  7:38 AM   Result Value Ref Range    WBC 6.5 4.8 - 10.8 thou/mm3    RBC 5.00 4.70 - 6.10 mill/mm3    Hemoglobin 14.6 14.0 - 18.0 gm/dl Hematocrit 47.2 42.0 - 52.0 %    MCV 94.4 (H) 80.0 - 94.0 fL    MCH 29.2 26.0 - 33.0 pg    MCHC 30.9 (L) 32.2 - 35.5 gm/dl    RDW-CV 14.2 11.5 - 14.5 %    RDW-SD 48.4 (H) 35.0 - 45.0 fL    Platelets 949 248 - 265 thou/mm3    MPV 10.3 9.4 - 12.4 fL    Seg Neutrophils 62.2 %    Lymphocytes 8.6 %    Monocytes 27.4 %    Eosinophils 0.5 %    Basophils 0.5 %    Immature Granulocytes 0.8 %    Segs Absolute 4.0 1.8 - 7.7 thou/mm3    Lymphocytes Absolute 0.6 (L) 1.0 - 4.8 thou/mm3    Monocytes Absolute 1.8 (H) 0.4 - 1.3 thou/mm3    Eosinophils Absolute 0.0 0.0 - 0.4 thou/mm3    Basophils Absolute 0.0 0.0 - 0.1 thou/mm3    Immature Grans (Abs) 0.05 0.00 - 0.07 thou/mm3    nRBC 0 /100 wbc   Basic Metabolic Panel    Collection Time: 06/26/22  7:38 AM   Result Value Ref Range    Sodium 132 (L) 135 - 145 meq/L    Potassium 4.8 3.5 - 5.2 meq/L    Chloride 100 98 - 111 meq/L    CO2 21 (L) 23 - 33 meq/L    Glucose 117 (H) 70 - 108 mg/dL    BUN 14 7 - 22 mg/dL    CREATININE 0.8 0.4 - 1.2 mg/dL    Calcium 9.0 8.5 - 10.5 mg/dL   Troponin    Collection Time: 06/26/22  7:38 AM   Result Value Ref Range    Troponin T < 0.010 ng/ml   Hepatic Function Panel    Collection Time: 06/26/22  7:38 AM   Result Value Ref Range    Albumin 4.6 3.5 - 5.1 g/dL    Total Bilirubin 0.2 (L) 0.3 - 1.2 mg/dL    Bilirubin, Direct <0.2 0.0 - 0.3 mg/dL    Alkaline Phosphatase 83 38 - 126 U/L    AST 23 5 - 40 U/L    ALT 17 11 - 66 U/L    Total Protein 7.6 6.1 - 8.0 g/dL   Lipase    Collection Time: 06/26/22  7:38 AM   Result Value Ref Range    Lipase 22.1 5.6 - 51.3 U/L   Osmolality    Collection Time: 06/26/22  7:38 AM   Result Value Ref Range    Osmolality Calc 266.0 (L) 275.0 - 300.0 mOsmol/kg   Anion Gap    Collection Time: 06/26/22  7:38 AM   Result Value Ref Range    Anion Gap 11.0 8.0 - 16.0 meq/L   Glomerular Filtration Rate, Estimated    Collection Time: 06/26/22  7:38 AM   Result Value Ref Range    Est, Glom Filt Rate >90 ml/min/1.73m2   COVID-19, Rapid    Collection Time: 06/26/22  7:55 AM    Specimen: Nasopharyngeal Swab   Result Value Ref Range    SARS-CoV-2, NAAT DETECTED (AA) NOT DETECTED   Rapid influenza A/B antigens    Collection Time: 06/26/22  7:55 AM    Specimen: Nasopharyngeal   Result Value Ref Range    Flu A Antigen Negative NEGATIVE    Flu B Antigen Negative NEGATIVE   Ferritin    Collection Time: 06/26/22 11:55 AM   Result Value Ref Range    Ferritin 71 22 - 322 ng/mL   C-Reactive Protein    Collection Time: 06/26/22 11:55 AM   Result Value Ref Range    CRP 1.11 (H) 0.00 - 1.00 mg/dl   Procalcitonin    Collection Time: 06/26/22 11:55 AM   Result Value Ref Range    Procalcitonin 0.13 (H) 0.01 - 0.09 ng/mL   D-Dimer, Quantitative    Collection Time: 06/26/22 11:55 AM   Result Value Ref Range    D-Dimer, Quant 980.00 (H) 0.00 - 500.00 ng/ml FEU       Radiology:     XR CHEST PORTABLE    Result Date: 6/26/2022  PROCEDURE: XR CHEST PORTABLE CLINICAL INFORMATION: SOB. Shortness of breath. Chest pain. COMPARISON: Chest x-ray 9/2/2021. TECHNIQUE: AP upright view of the chest. FINDINGS: The heart size is normal.The mediastinum is not widened. There is prominence of the pulmonary interstitium. There is a possible infiltrate in the medial right lung base. The pulmonary vessels appear full. There are no pleural effusions. The patient has had a prior cervical fusion. Prominence of pulmonary interstitium with a possible infiltrate in the medial right lung base. **This report has been created using voice recognition software. It may contain minor errors which are inherent in voice recognition technology. ** Final report electronically signed by Dr. Gracia Trujillo on 6/26/2022 9:10 AM        EKG: NSR        DVT prophylaxis: [x] Lovenox                                 [] SCDs                                 [] SQ Heparin                                 [] Encourage ambulation, low risk for DVT, no chemical or mechanical prophylaxis necessary              [] Already on Anticoagulation                Anticipated Disposition upon discharge: [x] Home                                                                         [] Home with Home Health                                                                         [] [facility]                                                                         [] [address]          Electronically signed by YUSEF Knox CNP on 6/26/2022 at 2:53 PM

## 2022-06-26 NOTE — ED TRIAGE NOTES
Pt presents to the ED via EMS from home for fatigue that started yesterday. Pt is complaining of generalized 10/10 pain. Pt denies taking medication for the pain. Pt states he has vomited \"7 or 8 time this morning and last night quite a bit\". Pt is alert and oriented.

## 2022-06-26 NOTE — ED NOTES
ED to inpatient nurses report    Chief Complaint   Patient presents with    Fatigue      Present to ED from home  LOC: alert and orientated to name, place, date  Vital signs   Vitals:    06/26/22 0721 06/26/22 0758 06/26/22 0905   BP: 122/61     Pulse: 86 77 99   Resp: 18 16 20   Temp: 98.2 °F (36.8 °C)     TempSrc: Oral     SpO2: 94% 92% 93%   Weight: 214 lb (97.1 kg)     Height: 5' 8\" (1.727 m)        Oxygen Baseline room air    Current needs required room air Bipap/Cpap No  LDAs:   Peripheral IV 06/26/22 Left Antecubital (Active)   Site Assessment Clean, dry & intact 06/26/22 0905   Line Status Infusing;Normal saline locked 06/26/22 0905   Dressing Status Clean, dry & intact 06/26/22 1477     Mobility: Independent  Pending ED orders: complete  Present condition: stable    Electronically signed by Shadi Salter RN on 6/26/2022 at 10:26 AM       Shadi Salter RN  06/26/22 [id number]

## 2022-06-26 NOTE — ED PROVIDER NOTES
[facility] Emergency Department    CHIEF COMPLAINT       Chief Complaint   Patient presents with    Fatigue       Nurses Notes reviewed and I agree except as noted in the HPI. HISTORY OF PRESENT ILLNESS    Leon Csat kareem 74 y.o. male who presents to the ED for evaluation of generalized body aches and fatigue. Patient states that his symptoms began last evening with vomiting and diarrhea. Reports several episodes of each, but denies hematemesis or hematochezia/melena. States that his body aches all over, has had subjective fever and chills. Also notes that he has a headache that began last evening, described as generalized throbbing and associated with photophobia. Patient has history of seizures, but has not had one in several years and is compliant with his medications. He denies chest pain, shortness of breath, and recent URI symptoms. He is not vaccinated for COVID. Pain description:  Onset: 1 day ago  Location: generalized  Duration: 1 day  Character: aching   Aggravating factors: none  Radiation: none  Timing: constant  Severity: 10/10    Experienced previously: no    HPI was provided by the patient. REVIEW OF SYSTEMS     Review of Systems   Constitutional: Positive for chills, fatigue and fever. Negative for diaphoresis. HENT: Negative for congestion, rhinorrhea and trouble swallowing. Eyes: Positive for photophobia. Negative for visual disturbance. Respiratory: Negative for cough, chest tightness and shortness of breath. Cardiovascular: Negative for chest pain. Gastrointestinal: Positive for diarrhea, nausea and vomiting. Negative for abdominal pain, blood in stool and constipation. Genitourinary: Negative for difficulty urinating, dysuria and hematuria. Musculoskeletal: Positive for arthralgias and myalgias. Skin: Negative for rash and wound. Neurological: Positive for weakness and headaches. Negative for dizziness, seizures and light-headedness. Psychiatric/Behavioral: Negative for confusion. PAST MEDICAL HISTORY     Past Medical History:   Diagnosis Date    Back pain     Depression     GERD (gastroesophageal reflux disease)     Headache(784.0) 9/22/2013    Migraines     ALICIA treated with BiPAP 2013    doesn't wear Bipap    Pneumonia     Pulmonary embolism ([facility] [address] 2/25/2021    S/P cardiac catheterization: 7/31/2017: No obstructive lasions. 7/31/2017 7/31/2017: No obstructive lasions. Dr. Zaira Petty Seizures [facility])        SURGICALHISTORY      has a past surgical history that includes knee surgery (1976); Appendectomy (2012); hernia repair (1996); hernia repair (2012); shoulder surgery (2001); Cervical spine surgery (10-16-15); Cardiac catheterization (2013); Lumbar spine surgery (08/26/2014 ); back surgery (12/18/15); other surgical history (06/02/2017); Colonoscopy; Endoscopy, colon, diagnostic; pr office/outpt visit,procedure only (N/A, 10/19/2018); Neck surgery (10/19/2018); REMOVE HARDWARE SPINE (N/A, 3/13/2020); Stimulator Surgery (N/A, 7/8/2021); and Stimulator Surgery (Left, 9/9/2021).     CURRENT MEDICATIONS       Discharge Medication List as of 6/27/2022  1:22 PM      CONTINUE these medications which have NOT CHANGED    Details   meloxicam (MOBIC) 7.5 MG tablet Take 7.5 mg by mouth dailyHistorical Med      metoprolol succinate (TOPROL XL) 25 MG extended release tablet Take 1 tablet by mouth daily, Disp-90 tablet, R-3Normal      sertraline (ZOLOFT) 100 MG tablet TAKE 2 TABLETS BY MOUTH  DAILY, Disp-180 tablet, R-3Requesting 1 year supplyNormal      PLENVU 140 g SOLR use as directed, DAWHistorical Med      aspirin-acetaminophen-caffeine (EXCEDRIN MIGRAINE) 250-250-65 MG per tablet Take 1 tablet by mouth every 6 hours as needed for Headaches Historical Med      omeprazole (PRILOSEC) 40 MG delayed release capsule Take 1 capsule by mouth daily, Disp-90 capsule, R-3Requesting 1 year supplyNormal      EPINEPHrine (EPIPEN) 0.3 MG/0.3ML SOAJ injection Inject 0.3 mg into the muscle as needed Use as directed for allergic reactionHistorical Med      lamoTRIgine (LAMICTAL) 200 MG tablet TAKE 1 TABLET BY MOUTH TWO  TIMES DAILY, Disp-180 tablet, R-3Normal      divalproex (DEPAKOTE ER) 500 MG extended release tablet Take 1,500 mg by mouth daily (3 tab)Historical Med      butorphanol (STADOL) 10 MG/ML nasal spray 1 spray by Nasal route every 4 hours as needed for Pain. Historical Med             ALLERGIES     is allergic to bee venom, dilaudid [hydromorphone], hydrochlorothiazide, lisinopril, other, dilaudid [hydromorphone hcl], nitrofuran derivatives, and nitroglycerin. FAMILY HISTORY     He indicated that his mother is . He indicated that his father is . He indicated that both of his sisters are alive. He indicated that his brother is alive. He indicated that the status of his maternal grandmother is unknown. He indicated that the status of his maternal grandfather is unknown. He indicated that the status of his paternal grandmother is unknown. He indicated that the status of his paternal grandfather is unknown. He indicated that all of his four children are alive. family history includes Arthritis in his father, maternal grandfather, and maternal grandmother; Asthma in his paternal grandfather and paternal grandmother; Depression in his child and sister; Diabetes in his maternal grandfather; Heart Disease in his father and maternal grandfather; High Blood Pressure in his maternal grandmother and paternal grandmother; Lupus in his mother; Other in his father and mother; Stroke in his maternal grandmother and paternal grandmother.     SOCIAL HISTORY       Social History     Socioeconomic History    Marital status:      Spouse name: Romana Marino Number of children: 3    Years of education: Not on file    Highest education level: Not on file   Occupational History    Not on file   Tobacco Use    Smoking status: Never Smoker    Smokeless tobacco: Never Used   Vaping Use    Vaping Use: Never used   Substance and Sexual Activity    Alcohol use: No    Drug use: No    Sexual activity: Yes     Partners: Female   Other Topics Concern    Not on file   Social History Narrative    Not on file     Social Determinants of Health     Financial Resource Strain: Low Risk     Difficulty of Paying Living Expenses: Not hard at all   Food Insecurity: No Food Insecurity    Worried About [address] in the Last Year: Never true    [address] [facility] [address] in the Last Year: Never true   Transportation Needs:     Lack of Transportation (Medical): Not on file    Lack of Transportation (Non-Medical): Not on file   Physical Activity:     Days of Exercise per Week: Not on file    Minutes of Exercise per Session: Not on file   Stress:     Feeling of Stress : Not on file   Social Connections:     Frequency of Communication with Friends and Family: Not on file    Frequency of Social Gatherings with Friends and Family: Not on file    Attends Zoroastrianism Services: Not on file    Active Member of [address] [place] [address] or Organizations: Not on file    Attends Club or Organization Meetings: Not on file    Marital Status: Not on file   Intimate Partner Violence:     Fear of Current or Ex-Partner: Not on file    Emotionally Abused: Not on file    Physically Abused: Not on file    Sexually Abused: Not on file   Housing Stability:     Unable to Pay for Housing in the Last Year: Not on file    Number of [place] in the Last Year: Not on file    Unstable Housing in the Last Year: Not on file       PHYSICAL EXAM     INITIAL VITALS:  height is 5' 8\" (1.727 m) and weight is 214 lb (97.1 kg). His oral temperature is 98.6 °F (37 °C). His blood pressure is 110/62 and his pulse is 61. His respiration is 18 and oxygen saturation is 94%. Physical Exam  Constitutional:       Appearance: He is ill-appearing. He is not diaphoretic.    HENT:      Head: Normocephalic and atraumatic. Nose: Nose normal.      Mouth/Throat:      Mouth: Mucous membranes are moist.      Pharynx: No oropharyngeal exudate or posterior oropharyngeal erythema. Cardiovascular:      Rate and Rhythm: Normal rate. Heart sounds: Normal heart sounds. Pulmonary:      Effort: Pulmonary effort is normal.      Breath sounds: Normal breath sounds. Abdominal:      Palpations: Abdomen is soft. Tenderness: There is abdominal tenderness (mild generalized). Musculoskeletal:      Cervical back: Normal range of motion. Right lower leg: No edema. Left lower leg: No edema. Skin:     General: Skin is warm and dry. Neurological:      General: No focal deficit present. Mental Status: He is alert and oriented to person, place, and time. Psychiatric:         Mood and Affect: Mood normal.         Behavior: Behavior normal.         DIFFERENTIAL DIAGNOSIS:   Gastroenteritis, COVID, ACS    DIAGNOSTIC RESULTS     EKG: All EKG's are interpreted by the Emergency Department Physician who eithersigns or Co-signs this chart in the absence of a cardiologist.    EKG read and interpreted by myself with comparison to March 11, 2021 gives impression of normal sinus rhythm with heart rate of 81; interval 158; ;QTc 441; axis P-62, R--42, T-69. RADIOLOGY: non-plainfilm images(s) such as CT, Ultrasound and MRI are read by the radiologist.  Plain radiographic images are visualized and preliminarily interpreted by the emergency physician unless otherwise stated below. XR CHEST PORTABLE   Final Result      Prominence of pulmonary interstitium with a possible infiltrate in the medial right lung base. **This report has been created using voice recognition software. It may contain minor errors which are inherent in voice recognition technology. **      Final report electronically signed by Dr. Brandon Benavides on 6/26/2022 9:10 AM            LABS:   Labs Reviewed   COVID-19, RAPID - Abnormal; Notable for the following components:       Result Value    SARS-CoV-2, NAAT DETECTED (*)     All other components within normal limits   CBC WITH AUTO DIFFERENTIAL - Abnormal; Notable for the following components:    MCV 94.4 (*)     MCHC 30.9 (*)     RDW-SD 48.4 (*)     Lymphocytes Absolute 0.6 (*)     Monocytes Absolute 1.8 (*)     All other components within normal limits   BASIC METABOLIC PANEL - Abnormal; Notable for the following components:    Sodium 132 (*)     CO2 21 (*)     Glucose 117 (*)     All other components within normal limits   HEPATIC FUNCTION PANEL - Abnormal; Notable for the following components: Total Bilirubin 0.2 (*)     All other components within normal limits   OSMOLALITY - Abnormal; Notable for the following components:    Osmolality Calc 266.0 (*)     All other components within normal limits   C-REACTIVE PROTEIN - Abnormal; Notable for the following components:    CRP 1.11 (*)     All other components within normal limits   PROCALCITONIN - Abnormal; Notable for the following components:    Procalcitonin 0.13 (*)     All other components within normal limits   D-DIMER, QUANTITATIVE - Abnormal; Notable for the following components:    D-Dimer, Quant 980.00 (*)     All other components within normal limits   COMPREHENSIVE METABOLIC PANEL W/ REFLEX TO MG FOR LOW K - Abnormal; Notable for the following components:     Total Bilirubin 0.2 (*)     All other components within normal limits   CBC WITH AUTO DIFFERENTIAL - Abnormal; Notable for the following components:    RBC 4.57 (*)     Hemoglobin 13.5 (*)     Hematocrit 41.4 (*)     RDW-SD 46.8 (*)     Monocytes Absolute 1.8 (*)     All other components within normal limits   GLOMERULAR FILTRATION RATE, ESTIMATED - Abnormal; Notable for the following components:    Est, Glom Filt Rate 75 (*)     All other components within normal limits   RAPID INFLUENZA A/B ANTIGENS   TROPONIN   LIPASE   ANION GAP   GLOMERULAR FILTRATION RATE, ESTIMATED FERRITIN   ANION GAP       EMERGENCY DEPARTMENT COURSE:   Vitals:    Vitals:    06/26/22 2145 06/27/22 0415 06/27/22 0750 06/27/22 1104   BP: 130/67 118/62 136/73 110/62   Pulse: 71 64 66 61   Resp: 16 18 16 18   Temp: 98.8 °F (37.1 °C) 98.7 °F (37.1 °C) 99.3 °F (37.4 °C) 98.6 °F (37 °C)   TempSrc: Oral Oral Oral Oral   SpO2: 92% 93% 95% 94%   Weight:       Height:           MDM      Patient was seen and evaluated in the emergency department, patient appeared to be in no acute distress, vital signs reviewed, no significant findings were noted. Physical exam was completed, he had an ill appearance, had generalized abdominal tenderness. Labs were obtained, COVID-19 positive, no other acute findings noted. Upon repeat evaluation the patient had a O2 saturation of 90% while resting. At the nursing staff ambulate the patient, he dropped down to 89%. Chest x-ray was obtained, shows possible right lower lobe pneumonia, consistent with COVID-pneumonia, patient given 1 dose of dexamethasone. Patient would benefit from observation, and repeat evaluation. Discussed with the patient and his wife they are amenable with this plan of care. Discussed case with hospital service who accepts patient for admission.   Medications   0.9 % sodium chloride bolus (0 mLs IntraVENous Stopped 6/26/22 1047)   ketorolac (TORADOL) injection 15 mg (15 mg IntraVENous Given 6/26/22 0754)   prochlorperazine (COMPAZINE) injection 10 mg (10 mg IntraVENous Given 6/26/22 0754)   diphenhydrAMINE (BENADRYL) injection 25 mg (25 mg IntraVENous Given 6/26/22 0754)   Dexamethasone Sodium Phosphate injection 10 mg (10 mg IntraVENous Given 6/26/22 1047)   diphenhydrAMINE (BENADRYL) injection 25 mg (25 mg IntraVENous Given 6/27/22 0008)   metoclopramide (REGLAN) injection 10 mg (10 mg IntraVENous Given 6/27/22 0009)   0.9 % sodium chloride bolus (0 mLs IntraVENous Stopped 6/27/22 0215)   acetaminophen (TYLENOL) tablet 1,000 mg (1,000 mg Oral Given

## 2022-06-26 NOTE — ED NOTES
Pt given medication per orders. Lights off for comfort. Pt swabbed for covid and flu. Wife at bedside.      Arlene Orlnado RN  06/26/22 4803

## 2022-06-27 VITALS
OXYGEN SATURATION: 94 % | BODY MASS INDEX: 32.43 KG/M2 | HEART RATE: 61 BPM | WEIGHT: 214 LBS | RESPIRATION RATE: 18 BRPM | SYSTOLIC BLOOD PRESSURE: 110 MMHG | TEMPERATURE: 98.6 F | DIASTOLIC BLOOD PRESSURE: 62 MMHG | HEIGHT: 68 IN

## 2022-06-27 LAB
ALBUMIN SERPL-MCNC: 4.2 G/DL (ref 3.5–5.1)
ALP BLD-CCNC: 70 U/L (ref 38–126)
ALT SERPL-CCNC: 19 U/L (ref 11–66)
ANION GAP SERPL CALCULATED.3IONS-SCNC: 10 MEQ/L (ref 8–16)
AST SERPL-CCNC: 24 U/L (ref 5–40)
BASOPHILS # BLD: 0.2 %
BASOPHILS ABSOLUTE: 0 THOU/MM3 (ref 0–0.1)
BILIRUB SERPL-MCNC: 0.2 MG/DL (ref 0.3–1.2)
BUN BLDV-MCNC: 15 MG/DL (ref 7–22)
CALCIUM SERPL-MCNC: 8.7 MG/DL (ref 8.5–10.5)
CHLORIDE BLD-SCNC: 101 MEQ/L (ref 98–111)
CO2: 27 MEQ/L (ref 23–33)
CREAT SERPL-MCNC: 1 MG/DL (ref 0.4–1.2)
EOSINOPHIL # BLD: 0 %
EOSINOPHILS ABSOLUTE: 0 THOU/MM3 (ref 0–0.4)
ERYTHROCYTE [DISTWIDTH] IN BLOOD BY AUTOMATED COUNT: 14.2 % (ref 11.5–14.5)
ERYTHROCYTE [DISTWIDTH] IN BLOOD BY AUTOMATED COUNT: 46.8 FL (ref 35–45)
GFR SERPL CREATININE-BSD FRML MDRD: 75 ML/MIN/1.73M2
GLUCOSE BLD-MCNC: 99 MG/DL (ref 70–108)
HCT VFR BLD CALC: 41.4 % (ref 42–52)
HEMOGLOBIN: 13.5 GM/DL (ref 14–18)
IMMATURE GRANS (ABS): 0.02 THOU/MM3 (ref 0–0.07)
IMMATURE GRANULOCYTES: 0.4 %
LYMPHOCYTES # BLD: 31.6 %
LYMPHOCYTES ABSOLUTE: 1.8 THOU/MM3 (ref 1–4.8)
MCH RBC QN AUTO: 29.5 PG (ref 26–33)
MCHC RBC AUTO-ENTMCNC: 32.6 GM/DL (ref 32.2–35.5)
MCV RBC AUTO: 90.6 FL (ref 80–94)
MONOCYTES # BLD: 31.8 %
MONOCYTES ABSOLUTE: 1.8 THOU/MM3 (ref 0.4–1.3)
NUCLEATED RED BLOOD CELLS: 0 /100 WBC
PLATELET # BLD: 286 THOU/MM3 (ref 130–400)
PMV BLD AUTO: 10.3 FL (ref 9.4–12.4)
POTASSIUM REFLEX MAGNESIUM: 5.1 MEQ/L (ref 3.5–5.2)
RBC # BLD: 4.57 MILL/MM3 (ref 4.7–6.1)
SEG NEUTROPHILS: 36 %
SEGMENTED NEUTROPHILS ABSOLUTE COUNT: 2.1 THOU/MM3 (ref 1.8–7.7)
SODIUM BLD-SCNC: 138 MEQ/L (ref 135–145)
TOTAL PROTEIN: 6.8 G/DL (ref 6.1–8)
WBC # BLD: 5.7 THOU/MM3 (ref 4.8–10.8)

## 2022-06-27 PROCEDURE — 96372 THER/PROPH/DIAG INJ SC/IM: CPT

## 2022-06-27 PROCEDURE — 96376 TX/PRO/DX INJ SAME DRUG ADON: CPT

## 2022-06-27 PROCEDURE — 2580000003 HC RX 258: Performed by: NURSE PRACTITIONER

## 2022-06-27 PROCEDURE — 85025 COMPLETE CBC W/AUTO DIFF WBC: CPT

## 2022-06-27 PROCEDURE — 36415 COLL VENOUS BLD VENIPUNCTURE: CPT

## 2022-06-27 PROCEDURE — 99217 PR OBSERVATION CARE DISCHARGE MANAGEMENT: CPT | Performed by: NURSE PRACTITIONER

## 2022-06-27 PROCEDURE — G0378 HOSPITAL OBSERVATION PER HR: HCPCS

## 2022-06-27 PROCEDURE — 6360000002 HC RX W HCPCS

## 2022-06-27 PROCEDURE — 2580000003 HC RX 258

## 2022-06-27 PROCEDURE — 6370000000 HC RX 637 (ALT 250 FOR IP): Performed by: NURSE PRACTITIONER

## 2022-06-27 PROCEDURE — 80053 COMPREHEN METABOLIC PANEL: CPT

## 2022-06-27 PROCEDURE — 96361 HYDRATE IV INFUSION ADD-ON: CPT

## 2022-06-27 PROCEDURE — 6360000002 HC RX W HCPCS: Performed by: NURSE PRACTITIONER

## 2022-06-27 PROCEDURE — 6370000000 HC RX 637 (ALT 250 FOR IP)

## 2022-06-27 RX ORDER — ZINC SULFATE 50(220)MG
50 CAPSULE ORAL DAILY
Qty: 30 CAPSULE | Refills: 0 | COMMUNITY
Start: 2022-06-28 | End: 2022-08-31

## 2022-06-27 RX ORDER — SUMATRIPTAN 50 MG/1
50 TABLET, FILM COATED ORAL ONCE
Status: COMPLETED | OUTPATIENT
Start: 2022-06-27 | End: 2022-06-27

## 2022-06-27 RX ORDER — VITAMIN B COMPLEX
1000 TABLET ORAL DAILY
Qty: 30 TABLET | Refills: 0 | COMMUNITY
Start: 2022-06-28 | End: 2022-08-31

## 2022-06-27 RX ADMIN — PANTOPRAZOLE SODIUM 40 MG: 40 TABLET, DELAYED RELEASE ORAL at 07:00

## 2022-06-27 RX ADMIN — LAMOTRIGINE 200 MG: 100 TABLET ORAL at 10:01

## 2022-06-27 RX ADMIN — SODIUM CHLORIDE 500 ML: 9 INJECTION, SOLUTION INTRAVENOUS at 00:14

## 2022-06-27 RX ADMIN — Medication 1000 UNITS: at 09:59

## 2022-06-27 RX ADMIN — OXYCODONE HYDROCHLORIDE AND ACETAMINOPHEN 1000 MG: 500 TABLET ORAL at 10:01

## 2022-06-27 RX ADMIN — SUMATRIPTAN SUCCINATE 50 MG: 50 TABLET ORAL at 14:56

## 2022-06-27 RX ADMIN — ACETAMINOPHEN 1000 MG: 500 TABLET ORAL at 00:09

## 2022-06-27 RX ADMIN — METOCLOPRAMIDE 10 MG: 5 INJECTION, SOLUTION INTRAMUSCULAR; INTRAVENOUS at 00:09

## 2022-06-27 RX ADMIN — SODIUM CHLORIDE: 9 INJECTION, SOLUTION INTRAVENOUS at 09:59

## 2022-06-27 RX ADMIN — ENOXAPARIN SODIUM 30 MG: 100 INJECTION SUBCUTANEOUS at 09:59

## 2022-06-27 RX ADMIN — Medication 50 MG: at 09:59

## 2022-06-27 RX ADMIN — SERTRALINE 200 MG: 100 TABLET, FILM COATED ORAL at 09:59

## 2022-06-27 RX ADMIN — ACETAMINOPHEN 650 MG: 325 TABLET ORAL at 13:28

## 2022-06-27 RX ADMIN — DIVALPROEX SODIUM 1500 MG: 500 TABLET, EXTENDED RELEASE ORAL at 09:59

## 2022-06-27 RX ADMIN — METOPROLOL SUCCINATE 25 MG: 25 TABLET, FILM COATED, EXTENDED RELEASE ORAL at 09:59

## 2022-06-27 RX ADMIN — DIPHENHYDRAMINE HYDROCHLORIDE 25 MG: 50 INJECTION, SOLUTION INTRAMUSCULAR; INTRAVENOUS at 00:08

## 2022-06-27 ASSESSMENT — PAIN - FUNCTIONAL ASSESSMENT
PAIN_FUNCTIONAL_ASSESSMENT: ACTIVITIES ARE NOT PREVENTED
PAIN_FUNCTIONAL_ASSESSMENT: ACTIVITIES ARE NOT PREVENTED

## 2022-06-27 ASSESSMENT — PAIN SCALES - GENERAL
PAINLEVEL_OUTOF10: 1
PAINLEVEL_OUTOF10: 4
PAINLEVEL_OUTOF10: 7
PAINLEVEL_OUTOF10: 0
PAINLEVEL_OUTOF10: 1

## 2022-06-27 ASSESSMENT — PAIN DESCRIPTION - ORIENTATION
ORIENTATION: ANTERIOR
ORIENTATION: RIGHT;ANTERIOR;LEFT

## 2022-06-27 ASSESSMENT — PAIN DESCRIPTION - LOCATION
LOCATION: HEAD
LOCATION: HEAD

## 2022-06-27 ASSESSMENT — PAIN DESCRIPTION - DESCRIPTORS
DESCRIPTORS: ACHING
DESCRIPTORS: ACHING

## 2022-06-27 NOTE — FLOWSHEET NOTE
Discharge teaching and instructions for diagnosis/procedure of COVID19 completed with patient using teachback method. AVS reviewed. Printed prescriptions given to patient. Patient voiced understanding regarding prescriptions, follow up appointments, and care of self at home. Discharged in a wheelchair to  independent living per family.

## 2022-06-27 NOTE — PROGRESS NOTES
Hospitalist Progress Note    Patient:  Glorine Nyhan      Unit/Bed:8B-35/035-A    YOB: 1955    MRN: [id number]       Acct: [de-identified]     PCP: Clint Aguilar MD    Date of Admission: 6/26/2022    Assessment/Plan:    1. COVID 19. No hypoxia. S/P Dexamethasone in ED. CRP 1.11, Ferritin 71, Ddimer 980. Did not qualify for any treatment options but supportive care. Encourage Acapella/Incentive spirometer for pulmonary hygiene. Continue Vit D, Vit C and Zinc. Lovenox 30 mg BID for DVT prophylaxis. 2. Dehydration with Hyponatremia and mild non anion gap acidosis, resolved. S/P fluid bolus in ED and maintenance hydration. Push oral intake at home. 3. Acute on chronic Migraine. Given cocktail last night with some improvement. Trial Imitrex x 1. Ok to resume home regimen on discharge. 4. History of seizures. Home medications resumed. 5. Depression. 6. Hx of PE.   7. ALICIA noncompliant with BiPAP. Addendum:  Home today. Discharge time 35 min  Condition stable      Chief Complaint: Fatigue and generalized pain    Hospital Course: The patient is a 74 y.o. male who presents with fatigue and generalized pain. Reports being in his normal state of health until last evening. Had a headache. Started with n/v/d. Today with body aches, fever and chills. He is not vaccinated for COVID 19. Denies CP/SOB/dizziness. Tested for COVID 19 in ED and noted to be positive. He was not hypoxic. Pulse ox did drop to 89% with ambulation. He will be admitted in obs status with likely discharge home tomorrow if clinical course remains stable. Subjective: Migraine is present but better. Reports chronic migraines at home and this is typical for him. Has a cough. No SOB. Fatigued.        Medications:  Reviewed    Infusion Medications    sodium chloride      sodium chloride 100 mL/hr at 06/26/22 1608     Scheduled Medications    lamoTRIgine  200 mg Oral BID    metoprolol succinate  25 mg Oral Daily    pantoprazole  40 mg Oral QAM AC    sertraline  200 mg Oral Daily    sodium chloride flush  5-40 mL IntraVENous 2 times per day    enoxaparin  30 mg SubCUTAneous BID    Vitamin D  1,000 Units Oral Daily    ascorbic acid  1,000 mg Oral Daily    zinc sulfate  50 mg Oral Daily    divalproex  1,500 mg Oral Daily     PRN Meds: sodium chloride flush, sodium chloride, ondansetron **OR** ondansetron, polyethylene glycol, acetaminophen **OR** acetaminophen      Intake/Output Summary (Last 24 hours) at 6/27/2022 0851  Last data filed at 6/26/2022 2047  Gross per 24 hour   Intake 160 ml   Output 400 ml   Net -240 ml       Diet:  ADULT DIET; Regular    Exam:  /73   Pulse 66   Temp 99.3 °F (37.4 °C) (Oral)   Resp 16   Ht 5' 8\" (1.727 m)   Wt 214 lb (97.1 kg)   SpO2 95%   BMI 32.54 kg/m²     General appearance: No apparent distress, appears stated age and cooperative. HEENT: Pupils equal, round, and reactive to light. Conjunctivae/corneas clear. Neck: Supple, with full range of motion. No jugular venous distention. Trachea midline. Respiratory:  Normal respiratory effort. Clear to auscultation, bilaterally without Rales/Wheezes/Rhonchi. Cardiovascular: Regular rate and rhythm with normal S1/S2 without murmurs, rubs or gallops. Abdomen: Soft, non-tender, non-distended with normal bowel sounds. Musculoskeletal: passive and active ROM x 4 extremities. Skin: Skin color, texture, turgor normal.  No rashes or lesions. Neurologic:  Neurovascularly intact without any focal sensory/motor deficits.  Cranial nerves: II-XII intact, grossly non-focal.  Psychiatric: Alert and oriented, thought content appropriate, normal insight  Capillary Refill: Brisk,< 3 seconds   Peripheral Pulses: +2 palpable, equal bilaterally       Labs:   Recent Labs     06/26/22  0738 06/27/22  0631   WBC 6.5 5.7   HGB 14.6 13.5*   HCT 47.2 41.4*    286     Recent Labs     06/26/22  0738 06/27/22  0631   * 138   K 4.8 5.1   CL 100 101   CO2 21* 27   BUN 14 15   CREATININE 0.8 1.0   CALCIUM 9.0 8.7     Recent Labs     06/26/22  0738 06/27/22  0631   AST 23 24   ALT 17 19   BILIDIR <0.2  --    BILITOT 0.2* 0.2*   ALKPHOS 83 70     No results for input(s): INR in the last 72 hours. No results for input(s): Joyice Dryden in the last 72 hours. Urinalysis:      Lab Results   Component Value Date    NITRU NEGATIVE 11/25/2016    WBCUA NONE 11/25/2016    BACTERIA NONE 11/25/2016    RBCUA NONE 11/25/2016    BLOODU NEGATIVE 11/25/2016    SPECGRAV 1.015 11/25/2016    GLUCOSEU neg 03/11/2015    GLUCOSEU NEGATIVE 06/11/2013       Radiology:  XR CHEST PORTABLE   Final Result      Prominence of pulmonary interstitium with a possible infiltrate in the medial right lung base. **This report has been created using voice recognition software. It may contain minor errors which are inherent in voice recognition technology. **      Final report electronically signed by Dr. Jocelin Lino on 6/26/2022 9:10 AM          Diet: ADULT DIET;  Regular    DVT prophylaxis: [x] Lovenox                                 [] SCDs                                 [] SQ Heparin                                 [] Encourage ambulation           [] Already on Anticoagulation     Disposition:    [x] Home       [] TCU       [] Rehab       [] Psych       [] SNF       [] [place]       [] Other-    Code Status: Full Code            Electronically signed by YUSEF Arellano CNP on 6/27/2022 at 8:51 AM

## 2022-06-27 NOTE — PLAN OF CARE
Problem: Discharge Planning  Goal: Discharge to home or other facility with appropriate resources  Outcome: Completed     Problem: Pain  Goal: Verbalizes/displays adequate comfort level or baseline comfort level  Outcome: Completed     Problem: Chronic Conditions and Co-morbidities  Goal: Patient's chronic conditions and co-morbidity symptoms are monitored and maintained or improved  Outcome: Completed     Problem: ABCDS Injury Assessment  Goal: Absence of physical injury  Outcome: Progressing  Flowsheets (Taken 6/27/2022 1107)  Absence of Physical Injury: Implement safety measures based on patient assessment  Note: Free from injury   Care plan reviewed with patient. Patient verbalize understanding of the plan of care and contribute to goal setting.

## 2022-06-27 NOTE — CARE COORDINATION
6/27/22, 11:33 AM EDT  DISCHARGE PLANNING EVALUATION:    Elias Be       Admitted: 6/26/2022/ [facility] day: 0   Location: [address] [place] [address] Reason for admit: COVID-19 [U07.1]   PMH:  has a past medical history of Back pain, Depression, GERD (gastroesophageal reflux disease), Headache(784.0), Migraines, ALICIA treated with BiPAP, Pneumonia, Pulmonary embolism ([facility] [address] S/P cardiac catheterization: 7/31/2017: No obstructive lasions. , and Seizures ([facility] [address] Procedure: No.   Barriers to Discharge: To ER with fatigue. Found Covid +. Afebrile. Room air. IVF. Zinc. Potential discharge today. PCP: Rosa Horn MD  Patient's Healthcare Decision Maker: Named in [address] [place] [address]    Patient Goals/Plan/Treatment Preferences: Met with Kailash Navarro this afternoon. He is from home with spouse. No home services. He uses no DME but has a cane and walker available if ever needed. He has a PCP, he drives, he is insured and he has no issues obtaining medications. Transportation/Food Security/Housekeeping Addressed:  No issues identified. 6/27/22, 2:03 PM EDT    Patient goals/plan/ treatment preferences discussed by  and . Patient goals/plan/ treatment preferences reviewed with patient/ family. Patient/ family verbalize understanding of discharge plan and are in agreement with goal/plan/treatment preferences. Understanding was demonstrated using the teach back method. AVS provided by RN at time of discharge, which includes all necessary medical information pertaining to the patients current course of illness, treatment, post-discharge goals of care, and treatment preferences. Services At/After Discharge: None       IMM Letter  Observation Status Letter date given[de-identified] 06/26/22  Observation Status Letter time given[de-identified] 1013  Observation Status Letter given to Patient/Family/Significant other/Guardian/POA/by[de-identified] Pt Access. Discharge order in place. Pt has transportation home.

## 2022-06-28 ENCOUNTER — CARE COORDINATION (OUTPATIENT)
Dept: CASE MANAGEMENT | Age: 67
End: 2022-06-28

## 2022-06-28 ENCOUNTER — TELEPHONE (OUTPATIENT)
Dept: FAMILY MEDICINE CLINIC | Age: 67
End: 2022-06-28

## 2022-06-28 DIAGNOSIS — U07.1 COVID-19: Primary | ICD-10-CM

## 2022-06-28 PROCEDURE — 1111F DSCHRG MED/CURRENT MED MERGE: CPT | Performed by: FAMILY MEDICINE

## 2022-06-28 NOTE — TELEPHONE ENCOUNTER
[facility] Trans[facility] Initial Follow Up Call    Outreach made within 2 business days of discharge: Yes    Patient: Russell Maldonado Patient : 1955   MRN: [id number]  Reason for Admission: There are no discharge diagnoses documented for the most recent discharge. Discharge Date: 22       Spoke with: Stas Palacio      Discharge department/facility; [facility]    TCM Interactive Patient Contact:  Was patient able to fill all prescriptions: Yes  Was patient instructed to bring all medications to the follow-up visit: Yes  Is patient taking all medications as directed in the discharge summary?  No  Does patient understand their discharge instructions: Yes  Does patient have questions or concerns that need addressed prior to 7-14 day follow up office visit: no    Scheduled appointment with PCP within 7-14 days    Follow Up

## 2022-06-28 NOTE — CARE COORDINATION
have an Advance Directive:  reviewed and current. Educated patient about risk for severe COVID-19 due to risk factors according to CDC guidelines. CTN reviewed discharge instructions, medical action plan and red flag symptoms with the patient who verbalized understanding. Discussed COVID vaccination status: Yes. Education provided on COVID-19 vaccination as appropriate. Discussed exposure protocols and quarantine with CDC Guidelines. Patient was given an opportunity to verbalize any questions and concerns and agrees to contact CTN or health care provider for questions related to their healthcare. Reviewed and educated patient on any new and changed medications related to discharge diagnosis     Was patient discharged with a pulse oximeter? No     CTN provided contact information. Plan for follow-up call in 7-10 days based on severity of symptoms and risk factors.

## 2022-06-30 ENCOUNTER — TELEPHONE (OUTPATIENT)
Dept: FAMILY MEDICINE CLINIC | Age: 67
End: 2022-06-30

## 2022-06-30 NOTE — TELEPHONE ENCOUNTER
Wife Josr Binta calling in stating he is really very tired and had mouth sores. Wife is unsure what she needs to do. They are following the vitamin regimen at this time.

## 2022-06-30 NOTE — TELEPHONE ENCOUNTER
It looks like he has covid. Fatigue is normal.  Monitor o2 sats. Recommend vit c, d, and zinc.  Push fluids. Try biotene OTC mouth rinse.

## 2022-07-05 ENCOUNTER — OFFICE VISIT (OUTPATIENT)
Dept: FAMILY MEDICINE CLINIC | Age: 67
End: 2022-07-05
Payer: MEDICARE

## 2022-07-05 VITALS
TEMPERATURE: 97.3 F | WEIGHT: 197.2 LBS | SYSTOLIC BLOOD PRESSURE: 132 MMHG | RESPIRATION RATE: 20 BRPM | HEART RATE: 64 BPM | HEIGHT: 68 IN | BODY MASS INDEX: 29.89 KG/M2 | OXYGEN SATURATION: 97 % | DIASTOLIC BLOOD PRESSURE: 74 MMHG

## 2022-07-05 DIAGNOSIS — M25.561 CHRONIC PAIN OF RIGHT KNEE: ICD-10-CM

## 2022-07-05 DIAGNOSIS — Z09 HOSPITAL DISCHARGE FOLLOW-UP: ICD-10-CM

## 2022-07-05 DIAGNOSIS — I25.10 CORONARY ARTERY DISEASE INVOLVING NATIVE CORONARY ARTERY OF NATIVE HEART WITHOUT ANGINA PECTORIS: ICD-10-CM

## 2022-07-05 DIAGNOSIS — U07.1 COVID-19: Primary | ICD-10-CM

## 2022-07-05 DIAGNOSIS — I50.22 CHRONIC SYSTOLIC (CONGESTIVE) HEART FAILURE (HCC): ICD-10-CM

## 2022-07-05 DIAGNOSIS — G89.29 CHRONIC PAIN OF RIGHT KNEE: ICD-10-CM

## 2022-07-05 PROCEDURE — 1111F DSCHRG MED/CURRENT MED MERGE: CPT | Performed by: FAMILY MEDICINE

## 2022-07-05 PROCEDURE — 99495 TRANSJ CARE MGMT MOD F2F 14D: CPT | Performed by: FAMILY MEDICINE

## 2022-07-05 RX ORDER — PRIMIDONE 50 MG/1
TABLET ORAL
COMMUNITY
Start: 2022-06-27 | End: 2022-08-31

## 2022-07-05 RX ORDER — CANDESARTAN 8 MG/1
TABLET ORAL
COMMUNITY
Start: 2022-07-02

## 2022-07-05 NOTE — PROGRESS NOTES
Post-Discharge Transitional Care  Follow Up      Song Elizondo   YOB: 1955    Date of Office Visit:  7/5/2022  Date of Hospital Admission: 6/26/22  Date of Hospital Discharge: 6/27/22  Risk of hospital readmission (high >=14%. Medium >=10%) :Readmission Risk Score: 12      Care management risk score Rising risk (score 2-5) and Complex Care (Scores >=6): 2     Non face to face  following discharge, date last encounter closed (first attempt may have been earlier): 6/28/2022  4:34 PM    Call initiated 2 business days of discharge: Yes    ASSESSMENT/PLAN:   COVID-19  Coronary artery disease involving native coronary artery of native heart without angina pectoris  -     Lipid Panel; Future  Chronic systolic (congestive) heart failure (HCC)  Chronic pain of right knee  -     Uric Acid; Future  Hospital discharge follow-up  -     MS DISCHARGE MEDS RECONCILED W/ CURRENT OUTPATIENT MED LIST      Medical Decision Making: moderate complexity  Return if symptoms worsen or fail to improve. Subjective:   HPI:  Follow up of Hospital problems/diagnosis(es): Patient presents for hospital follow-up. He was admitted with fatigue, weakness, cough and found to be COVID-positive. He was admitted and symptoms did improve. He was started on vitamins but denies antiviral treatment. He states he is still fatigued but feeling much better than when he was in the hospital.  Today he does still complain of right knee pain. States this is chronic and he has seen Ortho for this but was told his recent Ortho surgery had done the job and the pain should improve within a year. He also has a history of CHF but denies any lower extremity swelling and states that shortness of breath is much better. Last echo showed an EF of 50%. Does have coronary artery disease and is due for blood work but denies any chest pain. Inpatient course: Discharge summary reviewed- see chart.     Interval history/Current status: see HPI    Patient Active Problem List   Diagnosis    Headache    SOB (shortness of breath)    Dyspnea    LV dysfunction    Non-restorative sleep    Snoring    Obesity (BMI 30-39. 9)    Physical deconditioning    Obstructive sleep apnea of adult    Restless sleeper    Intractable headache    Seizure disorder (HCC)    Benign hypertension    Nausea & vomiting    Leukocytosis    Shortness of breath    Acute chest pain    Coronary artery disease involving native coronary artery without angina pectoris    Gastroesophageal reflux disease without esophagitis    ALICIA treated with BiPAP    Intractable migraine with aura without status migrainosus    Ventral hernia without obstruction or gangrene    Moderate episode of recurrent major depressive disorder ([facility] [address]    S/P cardiac catheterization: 7/31/2017: No obstructive lasions.  Bradycardia, drug induced    Cervical spinal stenosis    Lumbar post-laminectomy syndrome    Lumbar radiculitis    Chronic pain syndrome    S/P insertion of spinal cord stimulator    Lumbosacral spinal stenosis    MDD (major depressive disorder), recurrent episode, mild (HCC)    Elective surgery    Chronic systolic (congestive) heart failure    COVID-19       Medications listed as ordered at the time of discharge from hospital     Medication List          Accurate as of July 5, 2022 12:54 PM. If you have any questions, ask your nurse or doctor.             CONTINUE taking these medications    ascorbic acid 1000 MG tablet  Commonly known as: VITAMIN C  Take 1 tablet by mouth daily     aspirin-acetaminophen-caffeine 250-250-65 MG per tablet  Commonly known as: EXCEDRIN MIGRAINE     butorphanol 10 MG/ML nasal spray  Commonly known as: STADOL     candesartan 8 MG tablet  Commonly known as: ATACAND     divalproex 500 MG extended release tablet  Commonly known as: DEPAKOTE ER     EPINEPHrine 0.3 MG/0.3ML Soaj injection  Commonly known as: EPIPEN     lamoTRIgine 200 MG tablet  Commonly known as: LAMICTAL  TAKE 1 TABLET BY MOUTH TWO  TIMES DAILY     meloxicam 7.5 MG tablet  Commonly known as: MOBIC     metoprolol succinate 25 MG extended release tablet  Commonly known as: TOPROL XL  Take 1 tablet by mouth daily     omeprazole 40 MG delayed release capsule  Commonly known as: PRILOSEC  Take 1 capsule by mouth daily     Plenvu 140 g Solr  Generic drug: PEG-[id number]-NaSulf-Na Asc-C     primidone 50 MG tablet  Commonly known as:  MYSOLINE     sertraline 100 MG tablet  Commonly known as: ZOLOFT  TAKE 2 TABLETS BY MOUTH  DAILY     Vitamin D 25 MCG (1000 UT) Tabs tablet  Commonly known as: CHOLECALCIFEROL  Take 1 tablet by mouth daily     zinc sulfate 220 (50 Zn) MG capsule  Commonly known as: ZINCATE  Take 1 capsule by mouth daily              Medications marked \"taking\" at this time  Outpatient Medications Marked as Taking for the 7/5/22 encounter (Office Visit) with Jaime Etienne MD   Medication Sig Dispense Refill    primidone (MYSOLINE) 50 MG tablet       candesartan (ATACAND) 8 MG tablet       zinc sulfate (ZINCATE) 220 (50 Zn) MG capsule Take 1 capsule by mouth daily 30 capsule 0    ascorbic acid (VITAMIN C) 1000 MG tablet Take 1 tablet by mouth daily 30 tablet 0    Vitamin D (CHOLECALCIFEROL) 25 MCG (1000 UT) TABS tablet Take 1 tablet by mouth daily 30 tablet 0    meloxicam (MOBIC) 7.5 MG tablet Take 7.5 mg by mouth daily      metoprolol succinate (TOPROL XL) 25 MG extended release tablet Take 1 tablet by mouth daily 90 tablet 3    sertraline (ZOLOFT) 100 MG tablet TAKE 2 TABLETS BY MOUTH  DAILY 180 tablet 3    omeprazole (PRILOSEC) 40 MG delayed release capsule Take 1 capsule by mouth daily 90 capsule 3    lamoTRIgine (LAMICTAL) 200 MG tablet TAKE 1 TABLET BY MOUTH TWO  TIMES DAILY 180 tablet 3    divalproex (DEPAKOTE ER) 500 MG extended release tablet Take 1,500 mg by mouth daily (3 tab)          Medications patient taking as of now reconciled against medications ordered at time of hospital discharge: Yes    A comprehensive review of systems was negative except for what was noted in the HPI. Objective:    /74 (Site: Left Upper Arm, Position: Sitting, Cuff Size: Medium Adult)   Pulse 64   Temp 97.3 °F (36.3 °C) (Temporal)   Resp 20   Ht 5' 7.99\" (1.727 m)   Wt 197 lb 3.2 oz (89.4 kg)   SpO2 97%   BMI 29.99 kg/m²   Pulmonary/Chest: clear to auscultation bilaterally- no wheezes, rales or rhonchi, normal air movement, no respiratory distress  Cardiovascular: normal rate, normal S1 and S2, no gallops, intact distal pulses and no carotid bruits  Extremities: no cyanosis, no clubbing and no edema      An electronic signature was used to authenticate this note.   --Cleia Chamberlain MD

## 2022-07-07 ENCOUNTER — CARE COORDINATION (OUTPATIENT)
Dept: CASE MANAGEMENT | Age: 67
End: 2022-07-07

## 2022-07-07 NOTE — CARE COORDINATION
Covid-19 Final Follow Up Call    You Patient resolved from the Care Transitions episode on 7/7/22  Discussed COVID-19 related testing which was available at this time. Test results were positive. Patient informed of results, if available? Yes    Patient/family has been provided the following resources and education related to COVID-19:                         Signs, symptoms and red flags related to COVID-19            [facility] exposure and quarantine guidelines            [facility] exposure contact - [phone]            Contact for their local Department of Health                 Patient currently reports that the following symptoms have improved:  fever, fatigue, pain or aching joints, cough, shortness of breath, confusion or unusual change in mental status, chills or shaking, sweating, fast heart rate, fast breathing, dizziness/lightheadedness, less urine output, cold, clammy, and pale skin, low body temperature and no new/worsening symptoms     Spoke with Isabela Plummer, said he is starting to get some energy back. Denies fever, pain, n/v/d, cough. Still has a metallic taste in his mouth but is eating and drinking. Saw PCP couple days ago and went well. Had some mouth sores but are getting better. Denies needs. No other questions or concerns at this time. No further outreach scheduled with this CTN/ACM. Episode of Care resolved. Patient has this CTN/ACM contact information if future needs arise.

## 2022-07-18 DIAGNOSIS — K21.9 GASTROESOPHAGEAL REFLUX DISEASE WITHOUT ESOPHAGITIS: ICD-10-CM

## 2022-07-18 RX ORDER — OMEPRAZOLE 40 MG/1
40 CAPSULE, DELAYED RELEASE ORAL DAILY
Qty: 90 CAPSULE | Refills: 3 | Status: SHIPPED | OUTPATIENT
Start: 2022-07-18

## 2022-07-20 ENCOUNTER — HOSPITAL ENCOUNTER (OUTPATIENT)
Age: 67
Discharge: HOME | End: 2022-07-20
Payer: MEDICARE

## 2022-07-20 DIAGNOSIS — G89.29 CHRONIC PAIN OF RIGHT KNEE: ICD-10-CM

## 2022-07-20 DIAGNOSIS — M25.561 CHRONIC PAIN OF RIGHT KNEE: ICD-10-CM

## 2022-07-20 DIAGNOSIS — I25.10 CORONARY ARTERY DISEASE INVOLVING NATIVE CORONARY ARTERY OF NATIVE HEART WITHOUT ANGINA PECTORIS: ICD-10-CM

## 2022-07-20 LAB
CHOLESTEROL, TOTAL: 200 MG/DL (ref 100–199)
HDLC SERPL-MCNC: 40 MG/DL
LDL CHOLESTEROL CALCULATED: 139 MG/DL
TRIGL SERPL-MCNC: 105 MG/DL (ref 0–199)
URIC ACID: 4.3 MG/DL (ref 3.7–7)

## 2022-07-20 PROCEDURE — 36415 COLL VENOUS BLD VENIPUNCTURE: CPT

## 2022-07-20 PROCEDURE — 80061 LIPID PANEL: CPT

## 2022-07-20 PROCEDURE — 84550 ASSAY OF BLOOD/URIC ACID: CPT

## 2022-08-31 ENCOUNTER — OFFICE VISIT (OUTPATIENT)
Dept: FAMILY MEDICINE CLINIC | Age: 67
End: 2022-08-31
Payer: MEDICARE

## 2022-08-31 VITALS
WEIGHT: 207 LBS | HEART RATE: 54 BPM | DIASTOLIC BLOOD PRESSURE: 88 MMHG | SYSTOLIC BLOOD PRESSURE: 136 MMHG | RESPIRATION RATE: 22 BRPM | BODY MASS INDEX: 31.48 KG/M2 | OXYGEN SATURATION: 96 %

## 2022-08-31 DIAGNOSIS — R25.1 TREMOR: Primary | ICD-10-CM

## 2022-08-31 DIAGNOSIS — R26.89 SHUFFLING GAIT: ICD-10-CM

## 2022-08-31 PROCEDURE — 1036F TOBACCO NON-USER: CPT | Performed by: FAMILY MEDICINE

## 2022-08-31 PROCEDURE — 3017F COLORECTAL CA SCREEN DOC REV: CPT | Performed by: FAMILY MEDICINE

## 2022-08-31 PROCEDURE — 99213 OFFICE O/P EST LOW 20 MIN: CPT | Performed by: FAMILY MEDICINE

## 2022-08-31 PROCEDURE — G8417 CALC BMI ABV UP PARAM F/U: HCPCS | Performed by: FAMILY MEDICINE

## 2022-08-31 PROCEDURE — 1123F ACP DISCUSS/DSCN MKR DOCD: CPT | Performed by: FAMILY MEDICINE

## 2022-08-31 PROCEDURE — G8427 DOCREV CUR MEDS BY ELIG CLIN: HCPCS | Performed by: FAMILY MEDICINE

## 2022-08-31 ASSESSMENT — ENCOUNTER SYMPTOMS
RHINORRHEA: 0
DIARRHEA: 0
COUGH: 0
SHORTNESS OF BREATH: 0
WHEEZING: 0
ABDOMINAL PAIN: 0
SORE THROAT: 0
NAUSEA: 0
CONSTIPATION: 0

## 2022-08-31 NOTE — PROGRESS NOTES
[id number]  [address] [place] [address]  Dept: [phone]  Loc: [address] [facility] (:  1955) is a 74 y.o. male, here for evaluation of the following chief complaint(s): Other (Parkinsons )      ASSESSMENT/PLAN:  1. Tremor  2. Shuffling gait    Concern for PD. Sinemet challenge? Patient follows up with neuro next month. Return in about 6 months (around 2023) for follow up. SUBJECTIVE/OBJECTIVE:  Other  Associated symptoms include arthralgias and fatigue. Pertinent negatives include no abdominal pain, chest pain, chills, congestion, coughing, fever, headaches, myalgias, nausea or sore throat. Presents with complaints of worsening tremor, unsteady gait with multiple falls, worsening memory issues, and increased depression. He states that his right hand tremors and the tremor seems to go up into his arm as well. He tremors at rest as well as with attention. States that used to be more of just a shakiness but it seems to have changed in nature. He also has had several falls and feels he is unsteady on his feet. States he forgets that he is holding things or doing things and does not feel quite as clear either. Does also have depression which she states seems a little worse lately as he is got so much going on medically. Denies any thoughts of hurting himself and does take his antidepressants as prescribed but family is concerned of possible Parkinson's. Review of Systems   Constitutional:  Positive for activity change and fatigue. Negative for chills and fever. HENT:  Negative for congestion, rhinorrhea and sore throat. Respiratory:  Negative for cough, shortness of breath and wheezing. Cardiovascular:  Negative for chest pain and palpitations. Gastrointestinal:  Negative for abdominal pain, constipation, diarrhea and nausea.    Genitourinary:  Negative for dysuria and hematuria. Musculoskeletal:  Positive for arthralgias and gait problem. Negative for myalgias. Neurological:  Positive for tremors, seizures and facial asymmetry. Negative for dizziness and headaches. Psychiatric/Behavioral:  Positive for dysphoric mood. Negative for sleep disturbance. The patient is not nervous/anxious. Physical Exam  Vitals and nursing note reviewed. Constitutional:       Appearance: He is well-developed. HENT:      Head: Normocephalic and atraumatic. Eyes:      General: No scleral icterus. Right eye: No discharge. Left eye: No discharge. Conjunctiva/sclera: Conjunctivae normal.   Cardiovascular:      Rate and Rhythm: Normal rate and regular rhythm. Heart sounds: Normal heart sounds. Pulmonary:      Effort: Pulmonary effort is normal.      Breath sounds: Normal breath sounds. No wheezing. Skin:     General: Skin is warm and dry. Neurological:      Mental Status: He is alert and oriented to person, place, and time. Mental status is at baseline. Cranial Nerves: Facial asymmetry present. Motor: Tremor (pill rolling right hand) present. No atrophy or seizure activity. Gait: Gait abnormal (shuffling, no arm swing). Psychiatric:         Mood and Affect: Affect is flat. Behavior: Behavior normal.         Thought Content: Thought content normal.         Judgment: Judgment normal.       Vitals:    08/31/22 0950   BP: 136/88   Pulse: 54   Resp: 22   SpO2: 96%              An electronic signature was used to authenticate this note.     --Zohreh Durán MD

## 2022-09-28 ENCOUNTER — OFFICE VISIT (OUTPATIENT)
Dept: FAMILY MEDICINE CLINIC | Age: 67
End: 2022-09-28
Payer: MEDICARE

## 2022-09-28 VITALS
WEIGHT: 212 LBS | OXYGEN SATURATION: 97 % | RESPIRATION RATE: 22 BRPM | BODY MASS INDEX: 32.24 KG/M2 | HEART RATE: 86 BPM | DIASTOLIC BLOOD PRESSURE: 88 MMHG | SYSTOLIC BLOOD PRESSURE: 138 MMHG

## 2022-09-28 DIAGNOSIS — T78.3XXA ANGIOEDEMA, INITIAL ENCOUNTER: Primary | ICD-10-CM

## 2022-09-28 PROCEDURE — 99213 OFFICE O/P EST LOW 20 MIN: CPT | Performed by: NURSE PRACTITIONER

## 2022-09-28 PROCEDURE — G8417 CALC BMI ABV UP PARAM F/U: HCPCS | Performed by: NURSE PRACTITIONER

## 2022-09-28 PROCEDURE — 96372 THER/PROPH/DIAG INJ SC/IM: CPT | Performed by: NURSE PRACTITIONER

## 2022-09-28 PROCEDURE — 1123F ACP DISCUSS/DSCN MKR DOCD: CPT | Performed by: NURSE PRACTITIONER

## 2022-09-28 PROCEDURE — 3017F COLORECTAL CA SCREEN DOC REV: CPT | Performed by: NURSE PRACTITIONER

## 2022-09-28 PROCEDURE — G8427 DOCREV CUR MEDS BY ELIG CLIN: HCPCS | Performed by: NURSE PRACTITIONER

## 2022-09-28 PROCEDURE — 1036F TOBACCO NON-USER: CPT | Performed by: NURSE PRACTITIONER

## 2022-09-28 RX ORDER — TRIAMCINOLONE ACETONIDE 40 MG/ML
80 INJECTION, SUSPENSION INTRA-ARTICULAR; INTRAMUSCULAR ONCE
Status: COMPLETED | OUTPATIENT
Start: 2022-09-28 | End: 2022-09-28

## 2022-09-28 RX ADMIN — TRIAMCINOLONE ACETONIDE 80 MG: 40 INJECTION, SUSPENSION INTRA-ARTICULAR; INTRAMUSCULAR at 13:25

## 2022-09-28 SDOH — ECONOMIC STABILITY: FOOD INSECURITY: WITHIN THE PAST 12 MONTHS, YOU WORRIED THAT YOUR FOOD WOULD RUN OUT BEFORE YOU GOT MONEY TO BUY MORE.: NEVER TRUE

## 2022-09-28 SDOH — ECONOMIC STABILITY: FOOD INSECURITY: WITHIN THE PAST 12 MONTHS, THE FOOD YOU BOUGHT JUST DIDN'T LAST AND YOU DIDN'T HAVE MONEY TO GET MORE.: NEVER TRUE

## 2022-09-28 ASSESSMENT — ENCOUNTER SYMPTOMS
ABDOMINAL DISTENTION: 0
FACIAL SWELLING: 1
ABDOMINAL PAIN: 0
COLOR CHANGE: 0
STRIDOR: 0
VOMITING: 0
WHEEZING: 0
CHEST TIGHTNESS: 0
COUGH: 0
BACK PAIN: 0
SHORTNESS OF BREATH: 0
NAUSEA: 0
CONSTIPATION: 0
DIARRHEA: 0
SINUS PRESSURE: 0
SORE THROAT: 0

## 2022-09-28 ASSESSMENT — SOCIAL DETERMINANTS OF HEALTH (SDOH): HOW HARD IS IT FOR YOU TO PAY FOR THE VERY BASICS LIKE FOOD, HOUSING, MEDICAL CARE, AND HEATING?: NOT HARD AT ALL

## 2022-09-28 NOTE — PROGRESS NOTES
After obtaining consent, and per orders of JACOB Rodriguez, injection  by Jessica Amador MA. Patient instructed to remain in clinic for 20 minutes afterwards, and to report any adverse reaction to me immediately. Administrations This Visit       triamcinolone acetonide (KENALOG-40) injection 80 mg       Admin Date  09/28/2022  13:25 Action  Given Dose  80 mg Route  IntraMUSCular Site  Dorsogluteal Left Administered By  Jessica Amador MA    Ordering Provider: YUSEF Bess CNP    NDC: 7624-8302-23    Lot#: [id number]    : [employer] [place] (PRIMARY CARE)    Patient Supplied?: No                    Patient tolerated well.

## 2022-09-28 NOTE — PROGRESS NOTES
Flor Kaba (:  1955) is a 74 y.o. male,Established patient, here for evaluation of the following chief complaint(s):  Edema (Left face swelling at lips)         ASSESSMENT/PLAN:  1. Angioedema, initial encounter  -     triamcinolone acetonide (KENALOG-40) injection 80 mg; 80 mg, IntraMUSCular, ONCE, 1 dose, On 22 at 1345    At this time I will give him Kenalog 80 mg IM injection. He is to monitor for any tongue or throat swelling. If he has any or has any difficult breathing he is to go to ER. He is to call back to the office with his meds. Return if symptoms worsen or fail to improve. Subjective   SUBJECTIVE/OBJECTIVE:  HPI    This is a 62-year-old male who I am seeing today for lip swelling and facial swelling. He states this started this morning and progressed rapidly. He has had this happen multiple times in the past.  He has gone to ER for and received a steroid can injection. He was on blood pressure meds in the past could cause this. He was on candesartan but was stopped at some point in the past.  He is unsure what he is taking currently. Denies any tongue swelling or throat swelling. He is breathing normally normally. He denies any pain. Review of Systems   Constitutional:  Negative for activity change, appetite change, chills, diaphoresis, fatigue, fever and unexpected weight change. HENT:  Positive for facial swelling. Negative for congestion, ear pain, postnasal drip, sinus pressure and sore throat. Lip swelling   Eyes:  Negative for visual disturbance. Respiratory:  Negative for cough, chest tightness, shortness of breath, wheezing and stridor. Cardiovascular:  Negative for chest pain, palpitations and leg swelling. Gastrointestinal:  Negative for abdominal distention, abdominal pain, constipation, diarrhea, nausea and vomiting. Endocrine: Negative for polydipsia, polyphagia and polyuria.    Genitourinary:  Negative for decreased urine volume, difficulty urinating, dysuria, flank pain, frequency, hematuria and urgency. Musculoskeletal:  Negative for arthralgias, back pain, gait problem, joint swelling, myalgias and neck pain. Skin:  Negative for color change, pallor and rash. Neurological:  Negative for dizziness, syncope, weakness, light-headedness, numbness and headaches. Hematological:  Negative for adenopathy. Psychiatric/Behavioral:  Negative for behavioral problems, self-injury and sleep disturbance. The patient is not nervous/anxious. Objective   Physical Exam  Vitals reviewed. Constitutional:       General: He is not in acute distress. Appearance: Normal appearance. He is well-developed. HENT:      Head: Normocephalic. Right Ear: External ear normal.      Left Ear: External ear normal.      Nose: Nose normal.      Right Sinus: No maxillary sinus tenderness. Left Sinus: No maxillary sinus tenderness. Mouth/Throat:      Mouth: Mucous membranes are moist.      Pharynx: Oropharynx is clear. Uvula midline. No oropharyngeal exudate or posterior oropharyngeal erythema. Comments: Angioedema present. No tongue swelling and throat clear. Neck:      Trachea: Trachea normal.   Cardiovascular:      Rate and Rhythm: Normal rate and regular rhythm. Heart sounds: Normal heart sounds. No murmur heard. Pulmonary:      Effort: Pulmonary effort is normal. No respiratory distress. Breath sounds: Normal breath sounds. No decreased breath sounds, wheezing, rhonchi or rales. Chest:      Chest wall: No tenderness. Abdominal:      General: There is no distension. Palpations: Abdomen is soft. There is no mass. Tenderness: There is no abdominal tenderness. Musculoskeletal:         General: No tenderness or deformity. Normal range of motion. Cervical back: Normal range of motion and neck supple. Lymphadenopathy:      Cervical: No cervical adenopathy.    Skin:     General: Skin is warm and dry.   Neurological:      Mental Status: He is alert and oriented to person, place, and time. Motor: No abnormal muscle tone. Coordination: Coordination normal.      Gait: Gait normal.   Psychiatric:         Mood and Affect: Mood normal.         Behavior: Behavior normal.         Thought Content: Thought content normal.         Judgment: Judgment normal.          On this date 9/28/2022 I have spent 25 minutes reviewing previous notes, test results and face to face with the patient discussing the diagnosis and importance of compliance with the treatment plan as well as documenting on the day of the visit. An electronic signature was used to authenticate this note.     --YUSEF Zaragoza - CNP

## 2022-10-26 ENCOUNTER — HOSPITAL ENCOUNTER (OUTPATIENT)
Age: 67
Discharge: HOME | End: 2022-10-26
Payer: MEDICARE

## 2022-10-26 LAB
BASOPHILS # BLD: 0.6 %
BASOPHILS ABSOLUTE: 0 THOU/MM3 (ref 0–0.1)
EOSINOPHIL # BLD: 3.1 %
EOSINOPHILS ABSOLUTE: 0.2 THOU/MM3 (ref 0–0.4)
ERYTHROCYTE [DISTWIDTH] IN BLOOD BY AUTOMATED COUNT: 13.4 % (ref 11.5–14.5)
ERYTHROCYTE [DISTWIDTH] IN BLOOD BY AUTOMATED COUNT: 49.3 FL (ref 35–45)
HCT VFR BLD CALC: 47.7 % (ref 42–52)
HEMOGLOBIN: 14.7 GM/DL (ref 14–18)
IMMATURE GRANS (ABS): 0.03 THOU/MM3 (ref 0–0.07)
IMMATURE GRANULOCYTES: 0.4 %
LYMPHOCYTES # BLD: 26.7 %
LYMPHOCYTES ABSOLUTE: 1.8 THOU/MM3 (ref 1–4.8)
MCH RBC QN AUTO: 30.6 PG (ref 26–33)
MCHC RBC AUTO-ENTMCNC: 30.8 GM/DL (ref 32.2–35.5)
MCV RBC AUTO: 99.4 FL (ref 80–94)
MONOCYTES # BLD: 11.3 %
MONOCYTES ABSOLUTE: 0.8 THOU/MM3 (ref 0.4–1.3)
NUCLEATED RED BLOOD CELLS: 0 /100 WBC
PLATELET # BLD: 327 THOU/MM3 (ref 130–400)
PMV BLD AUTO: 10.8 FL (ref 9.4–12.4)
RBC # BLD: 4.8 MILL/MM3 (ref 4.7–6.1)
SEG NEUTROPHILS: 57.9 %
SEGMENTED NEUTROPHILS ABSOLUTE COUNT: 3.9 THOU/MM3 (ref 1.8–7.7)
WBC # BLD: 6.8 THOU/MM3 (ref 4.8–10.8)

## 2022-10-26 PROCEDURE — 84450 TRANSFERASE (AST) (SGOT): CPT

## 2022-10-26 PROCEDURE — 85025 COMPLETE CBC W/AUTO DIFF WBC: CPT

## 2022-10-26 PROCEDURE — 84460 ALANINE AMINO (ALT) (SGPT): CPT

## 2022-10-26 PROCEDURE — 80175 DRUG SCREEN QUAN LAMOTRIGINE: CPT

## 2022-10-26 PROCEDURE — 80164 ASSAY DIPROPYLACETIC ACD TOT: CPT

## 2022-10-26 PROCEDURE — 36415 COLL VENOUS BLD VENIPUNCTURE: CPT

## 2022-10-27 LAB
ALT SERPL-CCNC: 6 U/L (ref 11–66)
AST SERPL-CCNC: 17 U/L (ref 5–40)
VALPROIC ACID LEVEL: 23.6 UG/ML (ref 50–100)

## 2022-10-28 LAB — LAMOTRIGINE LEVEL: 9.5 UG/ML (ref 3–15)

## 2022-12-24 ENCOUNTER — HOSPITAL ENCOUNTER (EMERGENCY)
Age: 67
Discharge: HOME | End: 2022-12-24
Attending: STUDENT IN AN ORGANIZED HEALTH CARE EDUCATION/TRAINING PROGRAM
Payer: MEDICARE

## 2022-12-24 ENCOUNTER — APPOINTMENT (OUTPATIENT)
Dept: CT IMAGING | Age: 67
End: 2022-12-24
Payer: MEDICARE

## 2022-12-24 ENCOUNTER — APPOINTMENT (OUTPATIENT)
Dept: GENERAL RADIOLOGY | Age: 67
End: 2022-12-24
Payer: MEDICARE

## 2022-12-24 VITALS
HEART RATE: 67 BPM | SYSTOLIC BLOOD PRESSURE: 147 MMHG | TEMPERATURE: 98.2 F | WEIGHT: 214 LBS | DIASTOLIC BLOOD PRESSURE: 92 MMHG | HEIGHT: 68 IN | OXYGEN SATURATION: 97 % | BODY MASS INDEX: 32.43 KG/M2 | RESPIRATION RATE: 16 BRPM

## 2022-12-24 DIAGNOSIS — G43.809 OTHER MIGRAINE WITHOUT STATUS MIGRAINOSUS, NOT INTRACTABLE: ICD-10-CM

## 2022-12-24 DIAGNOSIS — M54.10 RADICULOPATHY, UNSPECIFIED SPINAL REGION: ICD-10-CM

## 2022-12-24 DIAGNOSIS — R20.2 PARESTHESIAS: Primary | ICD-10-CM

## 2022-12-24 LAB
ALBUMIN SERPL-MCNC: 5.1 G/DL (ref 3.5–5.1)
ALP BLD-CCNC: 75 U/L (ref 38–126)
ALT SERPL-CCNC: 17 U/L (ref 11–66)
ANION GAP SERPL CALCULATED.3IONS-SCNC: 13 MEQ/L (ref 8–16)
AST SERPL-CCNC: 20 U/L (ref 5–40)
BASOPHILS # BLD: 0.5 %
BASOPHILS ABSOLUTE: 0 THOU/MM3 (ref 0–0.1)
BILIRUB SERPL-MCNC: 0.2 MG/DL (ref 0.3–1.2)
BILIRUBIN URINE: NEGATIVE
BLOOD, URINE: NEGATIVE
BUN BLDV-MCNC: 16 MG/DL (ref 7–22)
CALCIUM SERPL-MCNC: 8.6 MG/DL (ref 8.5–10.5)
CHARACTER, URINE: CLEAR
CHLORIDE BLD-SCNC: 104 MEQ/L (ref 98–111)
CO2: 22 MEQ/L (ref 23–33)
COLOR: YELLOW
CREAT SERPL-MCNC: 0.9 MG/DL (ref 0.4–1.2)
EOSINOPHIL # BLD: 3.3 %
EOSINOPHILS ABSOLUTE: 0.3 THOU/MM3 (ref 0–0.4)
ERYTHROCYTE [DISTWIDTH] IN BLOOD BY AUTOMATED COUNT: 13.7 % (ref 11.5–14.5)
ERYTHROCYTE [DISTWIDTH] IN BLOOD BY AUTOMATED COUNT: 46.6 FL (ref 35–45)
GFR SERPL CREATININE-BSD FRML MDRD: > 60 ML/MIN/1.73M2
GLUCOSE BLD-MCNC: 120 MG/DL (ref 70–108)
GLUCOSE BLD-MCNC: 121 MG/DL (ref 70–108)
GLUCOSE URINE: NEGATIVE MG/DL
HCT VFR BLD CALC: 45.3 % (ref 42–52)
HEMOGLOBIN: 15 GM/DL (ref 14–18)
IMMATURE GRANS (ABS): 0.04 THOU/MM3 (ref 0–0.07)
IMMATURE GRANULOCYTES: 0.5 %
INFLUENZA A: NOT DETECTED
INFLUENZA B: NOT DETECTED
KETONES, URINE: NEGATIVE
LEUKOCYTE ESTERASE, URINE: NEGATIVE
LYMPHOCYTES # BLD: 31.6 %
LYMPHOCYTES ABSOLUTE: 2.7 THOU/MM3 (ref 1–4.8)
MCH RBC QN AUTO: 30.8 PG (ref 26–33)
MCHC RBC AUTO-ENTMCNC: 33.1 GM/DL (ref 32.2–35.5)
MCV RBC AUTO: 93 FL (ref 80–94)
MONOCYTES # BLD: 11.2 %
MONOCYTES ABSOLUTE: 1 THOU/MM3 (ref 0.4–1.3)
NITRITE, URINE: NEGATIVE
NUCLEATED RED BLOOD CELLS: 0 /100 WBC
OSMOLALITY CALCULATION: 280 MOSMOL/KG (ref 275–300)
PH UA: 7.5 (ref 5–9)
PLATELET # BLD: 388 THOU/MM3 (ref 130–400)
PMV BLD AUTO: 10.1 FL (ref 9.4–12.4)
POTASSIUM SERPL-SCNC: 4.2 MEQ/L (ref 3.5–5.2)
PROTEIN UA: NEGATIVE
RBC # BLD: 4.87 MILL/MM3 (ref 4.7–6.1)
SARS-COV-2 RNA, RT PCR: NOT DETECTED
SEG NEUTROPHILS: 52.9 %
SEGMENTED NEUTROPHILS ABSOLUTE COUNT: 4.6 THOU/MM3 (ref 1.8–7.7)
SODIUM BLD-SCNC: 139 MEQ/L (ref 135–145)
SPECIFIC GRAVITY, URINE: 1.01 (ref 1–1.03)
TOTAL PROTEIN: 7.3 G/DL (ref 6.1–8)
TROPONIN T: < 0.01 NG/ML
UROBILINOGEN, URINE: 0.2 EU/DL (ref 0–1)
WBC # BLD: 8.7 THOU/MM3 (ref 4.8–10.8)

## 2022-12-24 PROCEDURE — 99285 EMERGENCY DEPT VISIT HI MDM: CPT

## 2022-12-24 PROCEDURE — 6360000002 HC RX W HCPCS: Performed by: STUDENT IN AN ORGANIZED HEALTH CARE EDUCATION/TRAINING PROGRAM

## 2022-12-24 PROCEDURE — 70450 CT HEAD/BRAIN W/O DYE: CPT

## 2022-12-24 PROCEDURE — 70498 CT ANGIOGRAPHY NECK: CPT

## 2022-12-24 PROCEDURE — 80053 COMPREHEN METABOLIC PANEL: CPT

## 2022-12-24 PROCEDURE — 2580000003 HC RX 258: Performed by: STUDENT IN AN ORGANIZED HEALTH CARE EDUCATION/TRAINING PROGRAM

## 2022-12-24 PROCEDURE — 87636 SARSCOV2 & INF A&B AMP PRB: CPT

## 2022-12-24 PROCEDURE — 36415 COLL VENOUS BLD VENIPUNCTURE: CPT

## 2022-12-24 PROCEDURE — 6370000000 HC RX 637 (ALT 250 FOR IP): Performed by: STUDENT IN AN ORGANIZED HEALTH CARE EDUCATION/TRAINING PROGRAM

## 2022-12-24 PROCEDURE — 71045 X-RAY EXAM CHEST 1 VIEW: CPT

## 2022-12-24 PROCEDURE — 81003 URINALYSIS AUTO W/O SCOPE: CPT

## 2022-12-24 PROCEDURE — 85025 COMPLETE CBC W/AUTO DIFF WBC: CPT

## 2022-12-24 PROCEDURE — 96374 THER/PROPH/DIAG INJ IV PUSH: CPT

## 2022-12-24 PROCEDURE — 82948 REAGENT STRIP/BLOOD GLUCOSE: CPT

## 2022-12-24 PROCEDURE — 70496 CT ANGIOGRAPHY HEAD: CPT

## 2022-12-24 PROCEDURE — 93005 ELECTROCARDIOGRAM TRACING: CPT | Performed by: STUDENT IN AN ORGANIZED HEALTH CARE EDUCATION/TRAINING PROGRAM

## 2022-12-24 PROCEDURE — 84484 ASSAY OF TROPONIN QUANT: CPT

## 2022-12-24 PROCEDURE — 6360000004 HC RX CONTRAST MEDICATION: Performed by: EMERGENCY MEDICINE

## 2022-12-24 RX ORDER — GABAPENTIN 300 MG/1
300 CAPSULE ORAL ONCE
Status: COMPLETED | OUTPATIENT
Start: 2022-12-24 | End: 2022-12-24

## 2022-12-24 RX ORDER — ACETAMINOPHEN 500 MG
1000 TABLET ORAL ONCE
Status: COMPLETED | OUTPATIENT
Start: 2022-12-24 | End: 2022-12-24

## 2022-12-24 RX ORDER — CYCLOBENZAPRINE HCL 10 MG
10 TABLET ORAL 3 TIMES DAILY PRN
Qty: 9 TABLET | Refills: 0 | Status: SHIPPED | OUTPATIENT
Start: 2022-12-24 | End: 2022-12-27

## 2022-12-24 RX ORDER — 0.9 % SODIUM CHLORIDE 0.9 %
1000 INTRAVENOUS SOLUTION INTRAVENOUS ONCE
Status: COMPLETED | OUTPATIENT
Start: 2022-12-24 | End: 2022-12-24

## 2022-12-24 RX ORDER — PROCHLORPERAZINE EDISYLATE 5 MG/ML
10 INJECTION INTRAMUSCULAR; INTRAVENOUS ONCE
Status: COMPLETED | OUTPATIENT
Start: 2022-12-24 | End: 2022-12-24

## 2022-12-24 RX ORDER — CYCLOBENZAPRINE HCL 10 MG
10 TABLET ORAL ONCE
Status: COMPLETED | OUTPATIENT
Start: 2022-12-24 | End: 2022-12-24

## 2022-12-24 RX ADMIN — GABAPENTIN 300 MG: 300 CAPSULE ORAL at 21:33

## 2022-12-24 RX ADMIN — ACETAMINOPHEN 1000 MG: 500 TABLET ORAL at 17:45

## 2022-12-24 RX ADMIN — ALUMINUM HYDROXIDE, MAGNESIUM HYDROXIDE, AND SIMETHICONE: 200; 200; 20 SUSPENSION ORAL at 19:01

## 2022-12-24 RX ADMIN — CYCLOBENZAPRINE 10 MG: 10 TABLET, FILM COATED ORAL at 20:57

## 2022-12-24 RX ADMIN — PROCHLORPERAZINE EDISYLATE 10 MG: 5 INJECTION INTRAMUSCULAR; INTRAVENOUS at 19:00

## 2022-12-24 RX ADMIN — IOPAMIDOL 80 ML: 755 INJECTION, SOLUTION INTRAVENOUS at 19:16

## 2022-12-24 RX ADMIN — SODIUM CHLORIDE 1000 ML: 9 INJECTION, SOLUTION INTRAVENOUS at 17:34

## 2022-12-24 ASSESSMENT — PAIN - FUNCTIONAL ASSESSMENT: PAIN_FUNCTIONAL_ASSESSMENT: 0-10

## 2022-12-24 ASSESSMENT — PAIN SCALES - GENERAL
PAINLEVEL_OUTOF10: 9
PAINLEVEL_OUTOF10: 2
PAINLEVEL_OUTOF10: 10

## 2022-12-24 ASSESSMENT — PAIN DESCRIPTION - LOCATION
LOCATION: ARM
LOCATION: HEAD;ARM

## 2022-12-24 ASSESSMENT — ENCOUNTER SYMPTOMS
SORE THROAT: 0
ABDOMINAL PAIN: 0
COUGH: 0
CONSTIPATION: 0
VOMITING: 0
NAUSEA: 0
SHORTNESS OF BREATH: 0

## 2022-12-24 ASSESSMENT — PAIN DESCRIPTION - ORIENTATION: ORIENTATION: LEFT

## 2022-12-24 NOTE — ED NOTES
Pt ambulatory to bathroom. Urine specimen collected. Pt c/o indigestion and migraine. Dr Lilly Zaidi informed.      Tessa Pino RN  12/24/22 [id number]

## 2022-12-24 NOTE — ED TRIAGE NOTES
Pt ambulatory from lobby with c/o intermittent left arm pain and numbness. Pt states sx began 1.5 weeks ago but worsened this morning when he woke up ~0700. Pt reports feeling forgetful and having increased confusion.  NIH score = 3

## 2022-12-24 NOTE — ED PROVIDER NOTES
[place] ENCOUNTER          Pt Name: Blair Brock  MRN: [id number]  [place] 1955  Date of evaluation: 12/24/2022  Treating Resident Physician: Krishan Lubin MD  Supervising Physician: Lexi Dickson DO    CHIEF COMPLAINT       Chief Complaint   Patient presents with    Migraine    Numbness     History obtained from the patient and wife    HISTORY OF PRESENT ILLNESS    HPI  Blair Brock is a 74 y.o. male with PMHx of obesity, seizure, ALICIA, CAD, cervical spinal stenosis who presents to the emergency department for evaluation of migraine, numbness. Patient to ED due to chief complaint of intermittent left arm pain and numbness for the past week and a half. Patient states that he has been having increased numbness to the arm but worsened this morning when he woke up at approximately 7 AM.  Patient also reporting increased confusion and forgetfulness. Patient also has a notable tremor in the right hand. States that he has been getting worked up for possible Parkinson's disease. Patient has some sensory deficits in right face, right upper extremity, partial gaze palsy, some difficulties with some possible limb ataxia but may be secondary to tremors/ ?  Parkinsonism. NIH 3 at this time. Patient also complaining of a headache as well as some feelings of gastric reflux at this time. The patient has no other acute complaints at this time. REVIEW OF SYSTEMS   Review of Systems   Constitutional:  Positive for activity change. Negative for appetite change, chills, fatigue, fever and unexpected weight change. HENT:  Negative for congestion and sore throat. Respiratory:  Negative for cough and shortness of breath. Gastrointestinal:  Negative for abdominal pain, constipation, nausea and vomiting. Musculoskeletal:  Positive for arthralgias and myalgias. Neurological:  Positive for tremors, numbness and headaches.  Negative for dizziness, syncope, weakness and light-headedness. Psychiatric/Behavioral:  Positive for confusion and decreased concentration. PAST MEDICAL AND SURGICAL HISTORY     Past Medical History:   Diagnosis Date    Back pain     Depression     GERD (gastroesophageal reflux disease)     Headache(784.0) 9/22/2013    Migraines     ALICIA treated with BiPAP 2013    doesn't wear Bipap    Pneumonia     Pulmonary embolism ([facility] [address] 2/25/2021    S/P cardiac catheterization: 7/31/2017: No obstructive lasions. 7/31/2017 7/31/2017: No obstructive lasions. Dr. Leeroy Castleman    Seizures [facility])      Past Surgical History:   Procedure Laterality Date    APPENDECTOMY  2012    [facility]    BACK SURGERY  12/18/15    l3-s1 decompression, posterior fusion    CARDIAC CATHETERIZATION  2013    CERVICAL SPINE SURGERY  10-16-15    C4-6 ACDF    COLONOSCOPY      ENDOSCOPY, COLON, DIAGNOSTIC      HERNIA REPAIR  1996    [facility]    HERNIA REPAIR  2012    [address]     LUMBAR SPINE SURGERY  08/26/2014     L3-5 decompression, Dr. Layvonne Merlin, [address] SURGERY  10/19/2018    OTHER SURGICAL HISTORY  06/02/2017    Diagnostic laparoscopy, Laparoscopic Ventral hernia Repair with Mesh by Dr Selvin Joshi OFFICE/OUTPT VISIT,PROCEDURE ONLY N/A 10/19/2018    C3-4 AND C6-7 ACDF performed by Any Paniagua MD at [facility] 3/13/2020    L3-S1 HARDWARE REMOVAL performed by Any Paniagua MD at [address] [place] [address] cuff, Dr. Karen Valadez N/A 7/8/2021    SCS trial  entrance L1 tip T7 bilaterally performed by Macey Estrada MD at [address] Left 9/9/2021    THORACIC LAMINECTOMY FOR PLACEMENT OF PERMANENT SPINAL CORD STMULATOR AND LEFT FLANK INTERNAL PULSE GENERATOR performed by Dean Solorzano MD at [address]   No current facility-administered medications for this encounter.     Current Outpatient Medications: cyclobenzaprine (FLEXERIL) 10 MG tablet, Take 1 tablet by mouth 3 times daily as needed for Muscle spasms, Disp: 9 tablet, Rfl: 0    omeprazole (PRILOSEC) 40 MG delayed release capsule, TAKE 1 CAPSULE BY MOUTH  DAILY, Disp: 90 capsule, Rfl: 3    candesartan (ATACAND) 8 MG tablet, , Disp: , Rfl:     meloxicam (MOBIC) 7.5 MG tablet, Take 7.5 mg by mouth daily, Disp: , Rfl:     metoprolol succinate (TOPROL XL) 25 MG extended release tablet, Take 1 tablet by mouth daily, Disp: 90 tablet, Rfl: 3    sertraline (ZOLOFT) 100 MG tablet, TAKE 2 TABLETS BY MOUTH  DAILY, Disp: 180 tablet, Rfl: 3    aspirin-acetaminophen-caffeine (EXCEDRIN MIGRAINE) 250-250-65 MG per tablet, Take 1 tablet by mouth every 6 hours as needed for Headaches, Disp: , Rfl:     lamoTRIgine (LAMICTAL) 200 MG tablet, TAKE 1 TABLET BY MOUTH TWO  TIMES DAILY, Disp: 180 tablet, Rfl: 3    divalproex (DEPAKOTE ER) 500 MG extended release tablet, Take 1,500 mg by mouth daily (3 tab), Disp: , Rfl:     butorphanol (STADOL) 10 MG/ML nasal spray, 1 spray by Nasal route every 4 hours as needed for Pain., Disp: , Rfl:     SOCIAL HISTORY     Social History     Social History Narrative    Not on file     Social History     Tobacco Use    Smoking status: Never    Smokeless tobacco: Never   Vaping Use    Vaping Use: Never used   Substance Use Topics    Alcohol use: No    Drug use: No       ALLERGIES     Allergies   Allergen Reactions    Bee Venom Anaphylaxis    Dilaudid [Hydromorphone] Other (See Comments)    Hydrochlorothiazide Other (See Comments)    Lisinopril Swelling    Other Hives    Dilaudid [Hydromorphone Hcl] Nausea And Vomiting and Rash    Nitrofuran Derivatives Nausea And Vomiting     Severe headache    Nitroglycerin Nausea And Vomiting     migraine       FAMILY HISTORY     Family History   Problem Relation Age of Onset    Arthritis Father     Heart Disease Father     Other Father         COPD    Lupus Mother     Other Mother         Lupus    Depression Sister Depression Child     Arthritis Maternal Grandmother     Stroke Maternal Grandmother     High Blood Pressure Maternal Grandmother     Arthritis Maternal Grandfather     Diabetes Maternal Grandfather     Heart Disease Maternal Grandfather     Asthma Paternal Grandmother     Stroke Paternal Grandmother     High Blood Pressure Paternal Grandmother     Asthma Paternal Grandfather        PREVIOUS RECORDS   Previous records reviewed: I reviewed the patient's past medical records including relevant labs, imaging and procedures. Patient last seen by family medicine on 8/31/2022 for tremor, shuffling gait    PHYSICAL EXAM     ED Triage Vitals [12/24/22 1627]   BP Temp Temp Source Heart Rate Resp SpO2 Height Weight   (!) 162/104 98.2 °F (36.8 °C) Oral 59 18 97 % 5' 8\" (1.727 m) 214 lb (97.1 kg)     Initial vital signs and nursing assessment reviewed and abnormal from hypertension . Body mass index is 32.54 kg/m². Pulsoximetry is normal per my interpretation. Additional Vital Signs:  Vitals:    12/24/22 2058   BP: (!) 147/92   Pulse: 67   Resp: 16   Temp:    SpO2: 97%       Physical Exam  Constitutional:       General: He is not in acute distress. Appearance: Normal appearance. He is obese. He is not ill-appearing. HENT:      Head: Normocephalic and atraumatic. Right Ear: External ear normal.      Left Ear: External ear normal.      Nose: Nose normal. No congestion. Mouth/Throat:      Mouth: Mucous membranes are moist.   Eyes:      Pupils: Pupils are equal, round, and reactive to light. Comments: 4mm b/l   Cardiovascular:      Rate and Rhythm: Normal rate and regular rhythm. Pulmonary:      Effort: Pulmonary effort is normal.      Breath sounds: Normal breath sounds. Abdominal:      General: Abdomen is flat. Palpations: Abdomen is soft. Musculoskeletal:         General: Tenderness present. Normal range of motion. Cervical back: Normal range of motion and neck supple.    Skin: General: Skin is warm. Neurological:      Mental Status: He is alert. Sensory: Sensory deficit present. Psychiatric:         Mood and Affect: Mood normal.         Behavior: Behavior normal.     ED RESULTS   Laboratory results:  Labs Reviewed   CBC WITH AUTO DIFFERENTIAL - Abnormal; Notable for the following components:       Result Value    RDW-SD 46.6 (*)     All other components within normal limits   COMPREHENSIVE METABOLIC PANEL - Abnormal; Notable for the following components:    Glucose 121 (*)     CO2 22 (*)     Total Bilirubin 0.2 (*)     All other components within normal limits   POCT GLUCOSE - Abnormal; Notable for the following components:    POC Glucose 120 (*)     All other components within normal limits   COVID-19 & INFLUENZA COMBO   TROPONIN   URINALYSIS WITH REFLEX TO CULTURE   GLOMERULAR FILTRATION RATE, ESTIMATED   OSMOLALITY   ANION GAP       Radiologic studies results:  CT HEAD WO CONTRAST   Final Result   1. No acute intracranial hemorrhage. **This report has been created using voice recognition software. It may contain minor errors which are inherent in voice recognition technology. **      Final report electronically signed by Dr Lyndsey Bennett on 12/24/2022 7:55 PM      CTA HEAD W WO CONTRAST   Final Result      CTA NECK W WO CONTRAST   Final Result      XR CHEST PORTABLE   Final Result   1. No acute cardiopulmonary finding. **This report has been created using voice recognition software. It may contain minor errors which are inherent in voice recognition technology. **      Final report electronically signed by Dr Lyndsey Bennett on 12/24/2022 5:29 PM          ED Medications administered this visit:   Medications   0.9 % sodium chloride bolus (0 mLs IntraVENous Stopped 12/24/22 3756)   acetaminophen (TYLENOL) tablet 1,000 mg (1,000 mg Oral Given 12/24/22 0915)   aluminum & magnesium hydroxide-simethicone (MAALOX) 30 mL, lidocaine viscous hcl (XYLOCAINE) 5 mL (GI COCKTAIL) ( Oral Given 12/24/22 1901)   prochlorperazine (COMPAZINE) injection 10 mg (10 mg IntraVENous Given 12/24/22 1900)   iopamidol (ISOVUE-370) 76 % injection 80 mL (80 mLs IntraVENous Given 12/24/22 1916)   cyclobenzaprine (FLEXERIL) tablet 10 mg (10 mg Oral Given 12/24/22 2057)   gabapentin (NEURONTIN) capsule 300 mg (300 mg Oral Given 12/24/22 2133)       ED COURSE     ED Course as of 12/24/22 2205   Sat Dec 24, 2022   1733 XR CHEST PORTABLE  IMPRESSION:  1. No acute cardiopulmonary finding. [EL]   2015 CT HEAD WO CONTRAST  IMPRESSION:  1. No acute intracranial hemorrhage. [EL]      ED Course User Index  [EL] Rosalba Tena MD       MEDICAL DECISION MAKING   Given the patient's above chief complaint and findings on history and physical examination, I thought it was appropriate to consider the following emergency medical conditions:  Stroke, seizure, intracranial process, carotid artery stenosis, parkinsonism, radiculopathy    Although some of these diagnoses are unlikely, they were considered in my medical decision making. 81-year-old male chief complaint migraine and numbness to his left extremity. Ongoing for the past week and a half. On arrival, patient appears anxious, NIH is 3 as there is sensory deficits as well as partial gaze palsy and limb ataxia. CT head was negative. CTA head and neck was obtained which was also negative. Rest of the work-up is rather benign. On reevaluation of patient after IV fluids, patient states that he feels a little bit better and is able to communicate more clearly to me at this time. Patient states that he is also complaining of right-sided shoulder pain in his arm. On reassessment, patient likely has a radiculopathy as there is limited range of motion secondary to pain. Patient was given some Flexeril as well as gabapentin with extreme symptomatic relief of his left arm pain and numbness. Patient is a patient of [facility] and is very familiar with them.   At this time, advised patient to follow-up with neurology regarding his previous work-up for parkinsonism. Advised patient to follow-up with [facility] regarding a possible radiculopathy. We will give patient 3 days of Flexeril as this did help with his pain. Strict return precautions and follow up instructions were discussed with the patient prior to discharge, with which the patient agrees. MEDICATION CHANGES     Discharge Medication List as of 12/24/2022  9:42 PM        START taking these medications    Details   cyclobenzaprine (FLEXERIL) 10 MG tablet Take 1 tablet by mouth 3 times daily as needed for Muscle spasms, Disp-9 tablet, R-0Normal             FINAL DISPOSITION     Final diagnoses:   Paresthesias   Radiculopathy, unspecified spinal region   Other migraine without status migrainosus, not intractable     Condition: condition: stable  Dispo: Discharge to home    This transcription was electronically signed. Parts of this transcriptions may have been dictated by use of voice recognition software and electronically transcribed, and parts may have been transcribed with the assistance of an ED scribe. The transcription may contain errors not detected in proofreading. Please refer to my supervising physician's documentation if my documentation differs.     Electronically Signed: Valentino Nevarez MD, 12/24/22, 10:05 Arely Mulligan MD  Resident  12/24/22 3737

## 2022-12-24 NOTE — ED NOTES
RN called CT for update regarding scans. CT states pt is next.       Priscila Lemus RN  12/24/22 0562

## 2022-12-25 LAB
EKG ATRIAL RATE: 272 BPM
EKG Q-T INTERVAL: 410 MS
EKG QRS DURATION: 118 MS
EKG QTC CALCULATION (BAZETT): 435 MS
EKG R AXIS: -39 DEGREES
EKG T AXIS: 72 DEGREES
EKG VENTRICULAR RATE: 68 BPM

## 2022-12-25 NOTE — DISCHARGE INSTRUCTIONS
You were seen today in the ED for headache and numbness in your arm   Your CT head and CTA head and neck were negative for signs of a stroke at this time     You may have a pinched nerve in your shoulder. We have sent some muscle relaxers to your pharmacy for a couple days. Please follow up with [facility] regarding this     Please continue to follow up with neurology regarding your parkinson's workup.

## 2022-12-27 ENCOUNTER — TELEPHONE (OUTPATIENT)
Dept: FAMILY MEDICINE CLINIC | Age: 67
End: 2022-12-27

## 2023-01-04 ENCOUNTER — OFFICE VISIT (OUTPATIENT)
Dept: FAMILY MEDICINE CLINIC | Age: 68
End: 2023-01-04
Payer: MEDICARE

## 2023-01-04 VITALS
DIASTOLIC BLOOD PRESSURE: 88 MMHG | SYSTOLIC BLOOD PRESSURE: 118 MMHG | HEIGHT: 68 IN | TEMPERATURE: 97.6 F | BODY MASS INDEX: 32.37 KG/M2 | HEART RATE: 64 BPM | RESPIRATION RATE: 22 BRPM | OXYGEN SATURATION: 94 % | WEIGHT: 213.6 LBS

## 2023-01-04 DIAGNOSIS — G89.29 CHRONIC PAIN OF RIGHT KNEE: ICD-10-CM

## 2023-01-04 DIAGNOSIS — F33.1 MODERATE EPISODE OF RECURRENT MAJOR DEPRESSIVE DISORDER (HCC): ICD-10-CM

## 2023-01-04 DIAGNOSIS — M25.561 CHRONIC PAIN OF RIGHT KNEE: ICD-10-CM

## 2023-01-04 DIAGNOSIS — Z00.00 MEDICARE ANNUAL WELLNESS VISIT, SUBSEQUENT: Primary | ICD-10-CM

## 2023-01-04 PROCEDURE — 3074F SYST BP LT 130 MM HG: CPT | Performed by: FAMILY MEDICINE

## 2023-01-04 PROCEDURE — G0439 PPPS, SUBSEQ VISIT: HCPCS | Performed by: FAMILY MEDICINE

## 2023-01-04 PROCEDURE — 1123F ACP DISCUSS/DSCN MKR DOCD: CPT | Performed by: FAMILY MEDICINE

## 2023-01-04 PROCEDURE — G8484 FLU IMMUNIZE NO ADMIN: HCPCS | Performed by: FAMILY MEDICINE

## 2023-01-04 PROCEDURE — 3017F COLORECTAL CA SCREEN DOC REV: CPT | Performed by: FAMILY MEDICINE

## 2023-01-04 PROCEDURE — 3079F DIAST BP 80-89 MM HG: CPT | Performed by: FAMILY MEDICINE

## 2023-01-04 RX ORDER — DESVENLAFAXINE 25 MG/1
25 TABLET, EXTENDED RELEASE ORAL DAILY
Qty: 30 TABLET | Refills: 1 | Status: SHIPPED | OUTPATIENT
Start: 2023-01-04

## 2023-01-04 ASSESSMENT — PATIENT HEALTH QUESTIONNAIRE - PHQ9
3. TROUBLE FALLING OR STAYING ASLEEP: 3
6. FEELING BAD ABOUT YOURSELF - OR THAT YOU ARE A FAILURE OR HAVE LET YOURSELF OR YOUR FAMILY DOWN: 0
8. MOVING OR SPEAKING SO SLOWLY THAT OTHER PEOPLE COULD HAVE NOTICED. OR THE OPPOSITE, BEING SO FIGETY OR RESTLESS THAT YOU HAVE BEEN MOVING AROUND A LOT MORE THAN USUAL: 0
5. POOR APPETITE OR OVEREATING: 0
SUM OF ALL RESPONSES TO PHQ QUESTIONS 1-9: 6
2. FEELING DOWN, DEPRESSED OR HOPELESS: 1
SUM OF ALL RESPONSES TO PHQ QUESTIONS 1-9: 6
9. THOUGHTS THAT YOU WOULD BE BETTER OFF DEAD, OR OF HURTING YOURSELF: 0
SUM OF ALL RESPONSES TO PHQ QUESTIONS 1-9: 6
SUM OF ALL RESPONSES TO PHQ9 QUESTIONS 1 & 2: 2
7. TROUBLE CONCENTRATING ON THINGS, SUCH AS READING THE NEWSPAPER OR WATCHING TELEVISION: 0
SUM OF ALL RESPONSES TO PHQ QUESTIONS 1-9: 6
4. FEELING TIRED OR HAVING LITTLE ENERGY: 1
1. LITTLE INTEREST OR PLEASURE IN DOING THINGS: 1
10. IF YOU CHECKED OFF ANY PROBLEMS, HOW DIFFICULT HAVE THESE PROBLEMS MADE IT FOR YOU TO DO YOUR WORK, TAKE CARE OF THINGS AT HOME, OR GET ALONG WITH OTHER PEOPLE: 0

## 2023-01-04 ASSESSMENT — LIFESTYLE VARIABLES
HOW OFTEN DO YOU HAVE A DRINK CONTAINING ALCOHOL: NEVER
HOW MANY STANDARD DRINKS CONTAINING ALCOHOL DO YOU HAVE ON A TYPICAL DAY: PATIENT DOES NOT DRINK

## 2023-01-04 NOTE — PROGRESS NOTES
Medicare Annual Wellness Visit    Joel Bermudez is here for Medicare AWV (Labs completed 12/24/22-)    Assessment & Plan   Medicare annual wellness visit, subsequent  Moderate episode of recurrent major depressive disorder (HCC)  -     desvenlafaxine succinate (PRISTIQ) 25 MG TB24 extended release tablet; Take 1 tablet by mouth daily, Disp-30 tablet, R-1Normal  Chronic pain of right knee  -     External Referral To Pain Clinic   Wean from zoloft as discussed.  Instructions given to patient.  Start pristiq in 2 wks. Follow up in 6 wks   Recommendations for Preventive Services Due: see orders and patient instructions/AVS.  Recommended screening schedule for the next 5-10 years is provided to the patient in written form: see Patient Instructions/AVS.     Return for follow up, mood.     Subjective       Patient's complete Health Risk Assessment and screening values have been reviewed and are found in Flowsheets. The following problems were reviewed today and where indicated follow up appointments were made and/or referrals ordered.    Positive Risk Factor Screenings with Interventions:    Fall Risk:  Do you feel unsteady or are you worried about falling? : (!) yes (pt does current ambulate with a walker)  2 or more falls in past year?: (!) yes  Fall with injury in past year?: (!) yes     Interventions:    Referral to pain management for knee pain. Safety tips provided     Depression:  PHQ-2 Score: 2  PHQ-9 Total Score: 6    Interpretation:   1-4 = minimal  5-9 = mild  10-14 = moderate  15-19 = moderately severe  20-27 = severe  Interventions:  Medications adjusted: wean from zoloft. Start pristiq. Follow up in 6 wks.            Opioid Risk: (Low risk score <55) Opioid risk score: 24    Patient is low risk for opioid use disorder or overdose.  Last PDMP Raul as Reviewed:  Review User Review Instant Review Result   KRISHNA HEDRICK 3/11/2021 11:51 AM     Reviewed PDMP [1]           Weight and Activity:  Physical  Activity: Inactive    Days of Exercise per Week: 0 days    Minutes of Exercise per Session: 0 min     On average, how many days per week do you engage in moderate to strenuous exercise (like a brisk walk)?: 0 days  Have you lost any weight without trying in the past 3 months?: No  Body mass index: (!) 32.48      Inactivity Interventions:  Patient declined any further interventions or treatment  Obesity Interventions:  Patient declines any further evaluation or treatment            Vision Screen:  Do you have difficulty driving, watching TV, or doing any of your daily activities because of your eyesight?: No  Have you had an eye exam within the past year?: (!) No  No results found. Interventions:   See AVS for additional education material  See A/P for any pertinent orders                      Objective   Vitals:    01/04/23 0849   BP: 118/88   Site: Left Upper Arm   Position: Sitting   Cuff Size: Medium Adult   Pulse: 64   Resp: 22   Temp: 97.6 °F (36.4 °C)   TempSrc: Temporal   SpO2: 94%   Weight: 213 lb 9.6 oz (96.9 kg)   Height: 5' 7.99\" (1.727 m)      Body mass index is 32.49 kg/m². Allergies   Allergen Reactions    Bee Venom Anaphylaxis    Dilaudid [Hydromorphone] Other (See Comments)    Hydrochlorothiazide Other (See Comments)    Lisinopril Swelling    Other Hives    Dilaudid [Hydromorphone Hcl] Nausea And Vomiting and Rash    Nitrofuran Derivatives Nausea And Vomiting     Severe headache    Nitroglycerin Nausea And Vomiting     migraine     Prior to Visit Medications    Medication Sig Taking?  Authorizing Provider   desvenlafaxine succinate (PRISTIQ) 25 MG TB24 extended release tablet Take 1 tablet by mouth daily Yes Zohreh Durán MD   omeprazole (PRILOSEC) 40 MG delayed release capsule TAKE 1 CAPSULE BY MOUTH  DAILY Yes Zohreh Durán MD   candesartan (ATACAND) 8 MG tablet  Yes Historical Provider, MD   meloxicam (MOBIC) 7.5 MG tablet Take 7.5 mg by mouth daily Yes Historical Provider, MD metoprolol succinate (TOPROL XL) 25 MG extended release tablet Take 1 tablet by mouth daily Yes Natasha Adler MD   lamoTRIgine (LAMICTAL) 200 MG tablet TAKE 1 TABLET BY MOUTH TWO  TIMES DAILY Yes Reynold Adam MD   divalproex (DEPAKOTE ER) 500 MG extended release tablet Take 1,500 mg by mouth daily (3 tab) Yes Historical Provider, MD   amLODIPine (NORVASC) 5 MG tablet Take 1 tablet by mouth daily  Reynold Adam MD   aspirin-acetaminophen-caffeine (EXCEDRIN MIGRAINE) 005-583-68 MG per tablet Take 1 tablet by mouth every 6 hours as needed for Headaches  Patient not taking: Reported on 1/4/2023  Historical Provider, MD   butorphanol (STADOL) 10 MG/ML nasal spray 1 spray by Nasal route every 4 hours as needed for Pain.   Patient not taking: Reported on 1/4/2023  Historical Provider, MD Rose (Including outside providers/suppliers regularly involved in providing care):   Patient Care Team:  Reynold Adam MD as PCP - Raúl Mary MD as PCP - [facility]  Ashley Davis PA-C as Physician Assistant (Pulmonology)  Andrea Rodriguez MD as Consulting Physician (Cardiology)  Reyes Hench, MD as Consulting Physician (Neurology)  Maricruz Berry MD as Consulting Physician (Gastroenterology)  Nkechi Velazquez MD as Consulting Physician ([facility])  Javier Arias (Physician Assistant)  Natasha Adler MD as Cardiologist (Cardiology)     Reviewed and updated this visit:  Tobacco  Allergies  Meds  Med Hx  Surg Hx  Soc Hx  Fam Hx           Electronically signed by Reynold Adam MD on 1/4/2023 at 9:25 AM

## 2023-01-04 NOTE — PATIENT INSTRUCTIONS
Zoloft 100mg daily x 14 days, then 50 mg daily x 14 days, then 50 mg every other day x 14 days. Then discontinue zoloft. Start pristiq 50 mg daily in 2 wks. Take every day. Preventing Falls: Care Instructions  Overview     Getting around your home safely can be a challenge if you have injuries or health problems that make it easy for you to fall. Loose rugs and furniture in walkways are among the dangers for many older people who have problems walking or who have poor eyesight. People who have conditions such as arthritis, osteoporosis, or dementia also have to be careful not to fall. You can make your home safer with a few simple measures. Follow-up care is a key part of your treatment and safety. Be sure to make and go to all appointments, and call your doctor if you are having problems. It's also a good idea to know your test results and keep a list of the medicines you take. How can you care for yourself at home? Taking care of yourself  · Exercise regularly to improve your strength, muscle tone, and balance. Walk if you can. Swimming may be a good choice if you cannot walk easily. · Have your vision and hearing checked each year or any time you notice a change. If you have trouble seeing and hearing, you might not be able to avoid objects and could lose your balance. · Know the side effects of the medicines you take. Ask your doctor or pharmacist whether the medicines you take can affect your balance. Sleeping pills or sedatives can affect your balance. · Limit the amount of alcohol you drink. Alcohol can impair your balance and other senses. · Ask your doctor whether calluses or corns on your feet need to be removed. If you wear loose-fitting shoes because of calluses or corns, you can lose your balance and fall. · Talk to your doctor if you have numbness in your feet. · You may get dizzy if you do not drink enough water. To prevent dehydration, drink plenty of fluids.  Choose water and other clear liquids. If you have kidney, heart, or liver disease and have to limit fluids, talk with your doctor before you increase the amount of fluids you drink. Preventing falls at home  · Remove raised doorway thresholds, throw rugs, and clutter. Repair loose carpet or raised areas in the floor. · Move furniture and electrical cords to keep them out of walking paths. · Use nonskid floor wax, and wipe up spills right away, especially on ceramic tile floors. · If you use a walker or cane, put rubber tips on it. If you use crutches, clean the bottoms of them regularly with an abrasive pad, such as steel wool. · Keep your house well lit, especially Tawanda Chant, and outside walkways. Use night-lights in areas such as hallways and bathrooms. Add extra light switches or use remote switches (such as switches that go on or off when you clap your hands) to make it easier to turn lights on if you have to get up during the night. · Install sturdy handrails on stairways. · Move items in your cabinets so that the things you use a lot are on the lower shelves (about waist level). · Keep a cordless phone and a flashlight with new batteries by your bed. If possible, put a phone in each of the main rooms of your house, or carry a cell phone in case you fall and cannot reach a phone. Or, you can wear a device around your neck or wrist. You push a button that sends a signal for help. · Wear low-heeled shoes that fit well and give your feet good support. Use footwear with nonskid soles. Check the heels and soles of your shoes for wear. Repair or replace worn heels or soles. · Do not wear socks without shoes on smooth floors, such as wood. · Walk on the grass when the sidewalks are slippery. If you live in an area that gets snow and ice in the winter, sprinkle salt on slippery steps and sidewalks. Or ask a family member or friend to do this for you.   Preventing falls in the bath  · Install grab bars and nonskid mats inside and outside your shower or tub and near the toilet and sinks. · Use shower chairs and bath benches. · Use a hand-held shower head that will allow you to sit while showering. · Get into a tub or shower by putting the weaker leg in first. Get out of a tub or shower with your strong side first.  · Repair loose toilet seats and consider installing a raised toilet seat to make getting on and off the toilet easier. · Keep your bathroom door unlocked while you are in the shower. Where can you learn more? Go to http://www.summers.com/ and enter G117 to learn more about \"Preventing Falls: Care Instructions. \"  Current as of: May 4, 2022               Content Version: 13.5  © 6770-5728 Healthwise, Incorporated. Care instructions adapted under license by [facility] ([facility]). If you have questions about a medical condition or this instruction, always ask your healthcare professional. [address] any warranty or liability for your use of this information. Learning About Mindfulness for Stress  What are mindfulness and stress? Stress is what you feel when you have to handle more than you are used to. A lot of things can cause stress. You may feel stress when you go on a job interview, take a test, or run a race. This kind of short-term stress is normal and even useful. It can help you if you need to work hard or react quickly. Stress also can last a long time. Long-term stress is caused by stressful situations or events. Examples of long-term stress include long-term health problems, ongoing problems at work, and conflicts in your family. Long-term stress can harm your health. Mindfulness is a focus only on things happening in the present moment. It's a process of purposefully paying attention to and being aware of your surroundings, your emotions, your thoughts, and how your body feels.  You are aware of these things, but you aren't judging these experiences as \"good\" or \"bad. \" Mindfulness can help you learn to calm your mind and body to help you cope with illness, pain, and stress. How does mindfulness help to relieve stress? Mindfulness can help quiet your mind and relax your body. Studies show that it can help some people sleep better, feel less anxious, and bring their blood pressure down. And it's been shown to help some people live and cope better with certain health problems like heart disease, depression, chronic pain, and cancer. How do you practice mindfulness? To be mindful is to pay attention, to be present, and to be accepting. · When you're mindful, you do just one thing and you pay close attention to that one thing. For example, you may sit quietly and notice your emotions or how your food tastes and smells. · When you're present, you focus on the things that are happening right now. You let go of your thoughts about the past and the future. When you dwell on the past or the future, you miss moments that can heal and strengthen you. You may miss moments like hearing a child laugh or seeing a friendly face when you think you're all alone. · When you're accepting, you don't  the present moment. Instead you accept your thoughts and feelings as they come. You can practice anytime, anywhere, and in any way you choose. You can practice in many ways. Here are a few ideas:  · While doing your chores, like washing the dishes, let your mind focus on what's in your hand. What does the dish feel like? Is the water warm or cold? · Go outside and take a few deep breaths. What is the air like? Is it warm or cold? · When you can, take some time at the start of your day to sit alone and think. · Take a slow walk by yourself. Count your steps while you breathe in and out. · Try yoga breathing exercises, stretches, and poses to strengthen and relax your muscles. · At work, if you can, try to stop for a few moments each hour. Note how your body feels.  Let yourself regroup and let your mind settle before you return to what you were doing. · If you struggle with anxiety or \"worry thoughts,\" imagine your mind as a blue shashi and your worry thoughts as clouds. Now imagine those worry thoughts floating across your mind's shashi. Just let them pass by as you watch. Follow-up care is a key part of your treatment and safety. Be sure to make and go to all appointments, and call your doctor if you are having problems. It's also a good idea to know your test results and keep a list of the medicines you take. Where can you learn more? Go to http://www.summers.com/ and enter M676 to learn more about \"Learning About Mindfulness for Stress. \"  Current as of: February 9, 2022               Content Version: 13.5  © 9937-2561 Healthwise, Incorporated. Care instructions adapted under license by [facility] ([facility]). If you have questions about a medical condition or this instruction, always ask your healthcare professional. [address] any warranty or liability for your use of this information. Learning About Being Active as an Older Adult  Why is being active important as you get older? Being active is one of the best things you can do for your health. And it's never too late to start. Being active--or getting active, if you aren't already--has definite benefits. It can:  · Give you more energy,  · Keep your mind sharp. · Improve balance to reduce your risk of falls. · Help you manage chronic illness with fewer medicines. No matter how old you are, how fit you are, or what health problems you have, there is a form of activity that will work for you. And the more physical activity you can do, the better your overall health will be. What kinds of activity can help you stay healthy? Being more active will make your daily activities easier. Physical activity includes planned exercise and things you do in daily life.  There are four types of activity:  Aerobic. Doing aerobic activity makes your heart and lungs strong. · Includes walking, dancing, and gardening. · Aim for at least 2½ hours spread throughout the week. · It improves your energy and can help you sleep better. Muscle-strengthening. This type of activity can help maintain muscle and strengthen bones. · Includes climbing stairs, using resistance bands, and lifting or carrying heavy loads. · Aim for at least twice a week. · It can help protect the knees and other joints. Stretching. Stretching gives you better range of motion in joints and muscles. · Includes upper arm stretches, calf stretches, and gentle yoga. · Aim for at least twice a week, preferably after your muscles are warmed up from other activities. · It can help you function better in daily life. Balancing. This helps you stay coordinated and have good posture. · Includes heel-to-toe walking, sulema chi, and certain types of yoga. · Aim for at least 3 days a week. · It can reduce your risk of falling. Even if you have a hard time meeting the recommendations, it's better to be more active than less active. All activity done in each category counts toward your weekly total. You'd be surprised how daily things like carrying groceries, keeping up with grandchildren, and taking the stairs can add up. What keeps you from being active? If you've had a hard time being more active, you're not alone. Maybe you remember being able to do more. Or maybe you've never thought of yourself as being active. It's frustrating when you can't do the things you want. Being more active can help. What's holding you back? Getting started. Have a goal, but break it into easy tasks. Small steps build into big accomplishments. Staying motivated. If you feel like skipping your activity, remember your goal. Maybe you want to move better and stay independent. Every activity gets you one step closer.   Not feeling your best.  Start with 5 minutes of an activity you enjoy. Prove to yourself you can do it. As you get comfortable, increase your time. You may not be where you want to be. But you're in the process of getting there. Everyone starts somewhere. How can you find safe ways to stay active? Talk with your doctor about any physical challenges you're facing. Make a plan with your doctor if you have a health problem or aren't sure how to get started with activity. If you're already active, ask your doctor if there is anything you should change to stay safe as your body and health change. If you tend to feel dizzy after you take medicine, avoid activity at that time. Try being active before you take your medicine. This will reduce your risk of falls. If you plan to be active at home, make sure to clear your space before you get started. Remove things like TV cords, coffee tables, and throw rugs. It's safest to have plenty of space to move freely. The key to getting more active is to take it slow and steady. Try to improve only a little bit at a time. Pick just one area to improve on at first. And if an activity hurts, stop and talk to your doctor. Where can you learn more? Go to http://www.summers.com/ and enter P600 to learn more about \"Learning About Being Active as an Older Adult. \"  Current as of: October 10, 2022               Content Version: 13.5  © 8503-2904 Healthwise, Incorporated. Care instructions adapted under license by [facility] [place] ([facility]). If you have questions about a medical condition or this instruction, always ask your healthcare professional. [address] any warranty or liability for your use of this information. Hearing Loss: Care Instructions  Overview     Hearing loss is a sudden or slow decrease in how well you hear. It can range from mild to severe. Permanent hearing loss can occur with aging. It also can happen when you are exposed long-term to loud noise.  Examples include listening to loud music, riding motorcycles, or being around other loud machines. Hearing loss can affect your work and home life. It can make you feel lonely or depressed. You may feel that you have lost your independence. But hearing aids and other devices can help you hear better and feel connected to others. Follow-up care is a key part of your treatment and safety. Be sure to make and go to all appointments, and call your doctor if you are having problems. It's also a good idea to know your test results and keep a list of the medicines you take. How can you care for yourself at home? · Avoid loud noises whenever possible. This helps keep your hearing from getting worse. · Always wear hearing protection around loud noises. · Wear a hearing aid as directed. See a professional who can help you pick a hearing aid that fits you. · Have hearing tests as your doctor suggests. They can show whether your hearing has changed. Your hearing aid may need to be adjusted. · Use other devices as needed. These may include:  ? Telephone amplifiers and hearing aids that can connect to a television, stereo, radio, or microphone. ? Devices that use lights or vibrations. These alert you to the doorbell, a ringing telephone, or a baby monitor. ? Television closed-captioning. This shows the words at the bottom of the screen. Most new TVs can do this. ? TTY (text telephone). This lets you type messages back and forth on the telephone instead of talking or listening. These devices are also called TDD. When messages are typed on the keyboard, they are sent over the phone line to a receiving TTY. The message is shown on a monitor. · Use text messaging, social media, and email if it is hard for you to communicate by telephone. · Try to learn a listening technique called speechreading. It is not lipreading. You pay attention to people's gestures, expressions, posture, and tone of voice.  These clues can help you understand what a person is saying. Face the person you are talking to, and have them face you. Make sure the lighting is good. You need to see the other person's face clearly. · Think about counseling if you need help to adjust to your hearing loss. When should you call for help? Watch closely for changes in your health, and be sure to contact your doctor if:    · You think your hearing is getting worse.     · You have new symptoms, such as dizziness or nausea. Where can you learn more? Go to http://Innolight.summers.com/ and enter R798 to learn more about \"Hearing Loss: Care Instructions. \"  Current as of: May 4, 2022               Content Version: 13.5  © 2006-2022 [employer]. Care instructions adapted under license by [facility] ([facility]). If you have questions about a medical condition or this instruction, always ask your healthcare professional. [place] [address] any warranty or liability for your use of this information. Learning About Vision Tests  What are vision tests? The four most common vision tests are visual acuity tests, refraction, visual field tests, and color vision tests. Visual acuity (sharpness) tests  These tests are used:  · To see if you need glasses or contact lenses. · To monitor an eye problem. · To check an eye injury. Visual acuity tests are done as part of routine exams. You may also have this test when you get your 's license or apply for some types of jobs. Visual field tests  These tests are used:  · To check for vision loss in any area of your range of vision. · To screen for certain eye diseases. · To look for nerve damage after a stroke, head injury, or other problem that could reduce blood flow to the brain. Refraction and color tests  · A refraction test is done to find the right prescription for glasses and contact lenses. · A color vision test is done to check for color blindness.   Color vision is often tested as part of a routine exam. You may also have this test when you apply for a job where recognizing different colors is important, such as , electronics, or the [employer]. How are vision tests done? Visual acuity test   · You cover one eye at a time. · You read aloud from a wall chart across the room. · You read aloud from a small card that you hold in your hand. Refraction   · You look into a special device. · The device puts lenses of different strengths in front of each eye to see how strong your glasses or contact lenses need to be. Visual field tests   · Your doctor may have you look through special machines. · Or your doctor may simply have you stare straight ahead while they move a finger into and out of your field of vision. Color vision test   · You look at pieces of printed test patterns in various colors. You say what number or symbol you see. · Your doctor may have you trace the number or symbol using a pointer. How do these tests feel? There is very little chance of having a problem from this test. If dilating drops are used for a vision test, they may make the eyes sting and cause a medicine taste in the mouth. Follow-up care is a key part of your treatment and safety. Be sure to make and go to all appointments, and call your doctor if you are having problems. It's also a good idea to know your test results and keep a list of the medicines you take. Where can you learn more? Go to http://www.summers.com/ and enter G551 to learn more about \"Learning About Vision Tests. \"  Current as of: October 12, 2022               Content Version: 13.5  © 2006-2022 Healthwise, Incorporated. Care instructions adapted under license by [facility] ([facility]). If you have questions about a medical condition or this instruction, always ask your healthcare professional. [place] [address] any warranty or liability for your use of this information.            Advance Directives: Care Instructions  Overview  An advance directive is a legal way to state your wishes at the end of your life. It tells your family and your doctor what to do if you can't say what you want. There are two main types of advance directives. You can change them any time your wishes change. Living will. This form tells your family and your doctor your wishes about life support and other treatment. The form is also called a declaration. Medical power of . This form lets you name a person to make treatment decisions for you when you can't speak for yourself. This person is called a health care agent (health care proxy, health care surrogate). The form is also called a durable power of  for health care. If you do not have an advance directive, decisions about your medical care may be made by a family member, or by a doctor or a  who doesn't know you. It may help to think of an advance directive as a gift to the people who care for you. If you have one, they won't have to make tough decisions by themselves. For more information, including forms for your state, see the [address] website (www.caringinfo.org/planning/advance-directives/). Follow-up care is a key part of your treatment and safety. Be sure to make and go to all appointments, and call your doctor if you are having problems. It's also a good idea to know your test results and keep a list of the medicines you take. What should you include in an advance directive? Many states have a unique advance directive form. (It may ask you to address specific issues.) Or you might use a universal form that's approved by many states. If your form doesn't tell you what to address, it may be hard to know what to include in your advance directive. Use the questions below to help you get started. · Who do you want to make decisions about your medical care if you are not able to?   · What life-support measures do you want if you have a serious illness that gets worse over time or can't be cured? · What are you most afraid of that might happen? (Maybe you're afraid of having pain, losing your independence, or being kept alive by machines.)  · Where would you prefer to die? (Your home? A hospital? A nursing home?)  · Do you want to donate your organs when you die? · Do you want certain [employer] practices performed before you die? When should you call for help? Be sure to contact your doctor if you have any questions. Where can you learn more? Go to http://www.summers.com/ and enter R264 to learn more about \"Advance Directives: Care Instructions. \"  Current as of: June 16, 2022               Content Version: 13.5  © 0670-3242 Healthwise, Incorporated. Care instructions adapted under license by [facility] [address] If you have questions about a medical condition or this instruction, always ask your healthcare professional. [place] [address] any warranty or liability for your use of this information. Personalized Preventive Plan for Corrin Schilder - 1/4/2023  Medicare offers a range of preventive health benefits. Some of the tests and screenings are paid in full while other may be subject to a deductible, co-insurance, and/or copay. Some of these benefits include a comprehensive review of your medical history including lifestyle, illnesses that may run in your family, and various assessments and screenings as appropriate. After reviewing your medical record and screening and assessments performed today your provider may have ordered immunizations, labs, imaging, and/or referrals for you. A list of these orders (if applicable) as well as your Preventive Care list are included within your After Visit Summary for your review.     Other Preventive Recommendations:    A preventive eye exam performed by an eye specialist is recommended every 1-2 years to screen for glaucoma; cataracts, macular degeneration, and other eye disorders. A preventive dental visit is recommended every 6 months. Try to get at least 150 minutes of exercise per week or 10,000 steps per day on a pedometer . Order or download the FREE \"Exercise & Physical Activity: Your Everyday Guide\" from [employer]. Call 7-714.478.4946 or search [employer] online. You need 0916-5961 mg of calcium and 1838-6959 IU of vitamin D per day. It is possible to meet your calcium requirement with diet alone, but a vitamin D supplement is usually necessary to meet this goal.  When exposed to the sun, use a sunscreen that protects against both UVA and UVB radiation with an SPF of 30 or greater. Reapply every 2 to 3 hours or after sweating, drying off with a towel, or swimming. Always wear a seat belt when traveling in a car. Always wear a helmet when riding a bicycle or motorcycle.

## 2023-01-30 ENCOUNTER — OFFICE VISIT (OUTPATIENT)
Dept: FAMILY MEDICINE CLINIC | Age: 68
End: 2023-01-30

## 2023-01-30 ENCOUNTER — HOSPITAL ENCOUNTER (EMERGENCY)
Age: 68
Discharge: PSYCHIATRIC OR SUBSTANCE USE FACILITY | End: 2023-01-31
Attending: EMERGENCY MEDICINE
Payer: MEDICARE

## 2023-01-30 VITALS
HEART RATE: 60 BPM | TEMPERATURE: 97.8 F | OXYGEN SATURATION: 93 % | SYSTOLIC BLOOD PRESSURE: 130 MMHG | DIASTOLIC BLOOD PRESSURE: 66 MMHG | RESPIRATION RATE: 16 BRPM

## 2023-01-30 VITALS
SYSTOLIC BLOOD PRESSURE: 136 MMHG | HEART RATE: 65 BPM | WEIGHT: 212.8 LBS | RESPIRATION RATE: 24 BRPM | BODY MASS INDEX: 32.36 KG/M2 | OXYGEN SATURATION: 97 % | DIASTOLIC BLOOD PRESSURE: 88 MMHG

## 2023-01-30 DIAGNOSIS — R45.851 DEPRESSION WITH SUICIDAL IDEATION: Primary | ICD-10-CM

## 2023-01-30 DIAGNOSIS — F33.3 SEVERE EPISODE OF RECURRENT MAJOR DEPRESSIVE DISORDER, WITH PSYCHOTIC FEATURES (HCC): Primary | ICD-10-CM

## 2023-01-30 DIAGNOSIS — F32.A DEPRESSION WITH SUICIDAL IDEATION: Primary | ICD-10-CM

## 2023-01-30 LAB
ALBUMIN SERPL BCG-MCNC: 4.4 G/DL (ref 3.5–5.1)
ALP SERPL-CCNC: 68 U/L (ref 38–126)
ALT SERPL W/O P-5'-P-CCNC: 15 U/L (ref 11–66)
AMPHETAMINES UR QL SCN: NEGATIVE
ANION GAP SERPL CALC-SCNC: 10 MEQ/L (ref 8–16)
AST SERPL-CCNC: 23 U/L (ref 5–40)
BARBITURATES UR QL SCN: NEGATIVE
BASOPHILS ABSOLUTE: 0.1 THOU/MM3 (ref 0–0.1)
BASOPHILS NFR BLD AUTO: 0.9 %
BENZODIAZ UR QL SCN: NEGATIVE
BILIRUB SERPL-MCNC: 0.2 MG/DL (ref 0.3–1.2)
BUN SERPL-MCNC: 11 MG/DL (ref 7–22)
BZE UR QL SCN: NEGATIVE
CALCIUM SERPL-MCNC: 9.1 MG/DL (ref 8.5–10.5)
CANNABINOIDS UR QL SCN: NEGATIVE
CHLORIDE SERPL-SCNC: 104 MEQ/L (ref 98–111)
CO2 SERPL-SCNC: 27 MEQ/L (ref 23–33)
CREAT SERPL-MCNC: 1 MG/DL (ref 0.4–1.2)
DEPRECATED RDW RBC AUTO: 44.2 FL (ref 35–45)
EOSINOPHIL NFR BLD AUTO: 3.5 %
EOSINOPHILS ABSOLUTE: 0.3 THOU/MM3 (ref 0–0.4)
ERYTHROCYTE [DISTWIDTH] IN BLOOD BY AUTOMATED COUNT: 12.9 % (ref 11.5–14.5)
ETHANOL SERPL-MCNC: < 0.01 %
FENTANYL: NEGATIVE
FLUAV RNA RESP QL NAA+PROBE: NOT DETECTED
FLUBV RNA RESP QL NAA+PROBE: NOT DETECTED
GFR SERPL CREATININE-BSD FRML MDRD: > 60 ML/MIN/1.73M2
GLUCOSE SERPL-MCNC: 96 MG/DL (ref 70–108)
HCT VFR BLD AUTO: 45.9 % (ref 42–52)
HGB BLD-MCNC: 15 GM/DL (ref 14–18)
IMM GRANULOCYTES # BLD AUTO: 0.05 THOU/MM3 (ref 0–0.07)
IMM GRANULOCYTES NFR BLD AUTO: 0.6 %
LYMPHOCYTES ABSOLUTE: 3.2 THOU/MM3 (ref 1–4.8)
LYMPHOCYTES NFR BLD AUTO: 38.9 %
MCH RBC QN AUTO: 30.7 PG (ref 26–33)
MCHC RBC AUTO-ENTMCNC: 32.7 GM/DL (ref 32.2–35.5)
MCV RBC AUTO: 94.1 FL (ref 80–94)
MONOCYTES ABSOLUTE: 0.8 THOU/MM3 (ref 0.4–1.3)
MONOCYTES NFR BLD AUTO: 10 %
NEUTROPHILS NFR BLD AUTO: 46.1 %
NRBC BLD AUTO-RTO: 0 /100 WBC
OPIATES UR QL SCN: NEGATIVE
OSMOLALITY SERPL CALC.SUM OF ELEC: 280.5 MOSMOL/KG (ref 275–300)
OXYCODONE: NEGATIVE
PCP UR QL SCN: NEGATIVE
PLATELET # BLD AUTO: 368 THOU/MM3 (ref 130–400)
PMV BLD AUTO: 10 FL (ref 9.4–12.4)
POTASSIUM SERPL-SCNC: 4.8 MEQ/L (ref 3.5–5.2)
PROT SERPL-MCNC: 7.8 G/DL (ref 6.1–8)
RBC # BLD AUTO: 4.88 MILL/MM3 (ref 4.7–6.1)
SARS-COV-2 RNA RESP QL NAA+PROBE: NOT DETECTED
SEGMENTED NEUTROPHILS ABSOLUTE COUNT: 3.8 THOU/MM3 (ref 1.8–7.7)
SODIUM SERPL-SCNC: 141 MEQ/L (ref 135–145)
WBC # BLD AUTO: 8.2 THOU/MM3 (ref 4.8–10.8)

## 2023-01-30 PROCEDURE — 80307 DRUG TEST PRSMV CHEM ANLYZR: CPT

## 2023-01-30 PROCEDURE — 36415 COLL VENOUS BLD VENIPUNCTURE: CPT

## 2023-01-30 PROCEDURE — 85025 COMPLETE CBC W/AUTO DIFF WBC: CPT

## 2023-01-30 PROCEDURE — 82077 ASSAY SPEC XCP UR&BREATH IA: CPT

## 2023-01-30 PROCEDURE — 80053 COMPREHEN METABOLIC PANEL: CPT

## 2023-01-30 PROCEDURE — 87636 SARSCOV2 & INF A&B AMP PRB: CPT

## 2023-01-30 PROCEDURE — 99285 EMERGENCY DEPT VISIT HI MDM: CPT

## 2023-01-30 ASSESSMENT — ENCOUNTER SYMPTOMS
SHORTNESS OF BREATH: 0
ABDOMINAL PAIN: 0
SINUS PRESSURE: 0
ABDOMINAL DISTENTION: 0
SORE THROAT: 0
VOMITING: 0
BACK PAIN: 0
COLOR CHANGE: 0
WHEEZING: 0
CONSTIPATION: 0
NAUSEA: 0
COUGH: 0
STRIDOR: 0
DIARRHEA: 0
CHEST TIGHTNESS: 0

## 2023-01-30 ASSESSMENT — SLEEP AND FATIGUE QUESTIONNAIRES
DO YOU HAVE DIFFICULTY SLEEPING: NO
DO YOU USE A SLEEP AID: NO
AVERAGE NUMBER OF SLEEP HOURS: 8

## 2023-01-30 ASSESSMENT — PATIENT HEALTH QUESTIONNAIRE - PHQ9: SUM OF ALL RESPONSES TO PHQ QUESTIONS 1-9: 27

## 2023-01-30 ASSESSMENT — LIFESTYLE VARIABLES
HOW MANY STANDARD DRINKS CONTAINING ALCOHOL DO YOU HAVE ON A TYPICAL DAY: PATIENT DOES NOT DRINK
HOW OFTEN DO YOU HAVE A DRINK CONTAINING ALCOHOL: NEVER

## 2023-01-30 ASSESSMENT — PAIN - FUNCTIONAL ASSESSMENT
PAIN_FUNCTIONAL_ASSESSMENT: NONE - DENIES PAIN

## 2023-01-30 NOTE — ED NOTES
Pt to ED c/o depression and suicidal thoughts. Pt states he has taken zoloft for years and recently started feeling more depressed. Pt states he was given a new medication to try and his depression and suicidal thoughts have been worse recently. Pt states yesterday was \"really bad\" and he took a loaded gun and put it on the table. Pt denies and homicidal thoughts. Pt states recently having a lot of medical problems. Wife at bedside. Patient placed in room that is ligature resistant. Sitter at bedside. Provider notified, requested an assessment by behavioral health . Patient belongings secured in a bag. Explained suicide prevention precautions to the patient including constant observer.       Alexa Farr, RN  01/30/23 9535

## 2023-01-30 NOTE — ED PROVIDER NOTES
[address] [facility] [address] [address] EMERGENCY DEPT      EMERGENCY MEDICINE     Pt Name: Rita Yu  MRN: [id number]  [place] 1955  Date of evaluation: 1/30/2023  Provider: Nathaly Skaggs MD    CHIEF COMPLAINT       Chief Complaint   Patient presents with    Suicidal    Depression     HISTORY OF PRESENT ILLNESS   Rita Yu is a pleasant 74 y.o. male who presents to the emergency department from from home, by private vehicle for evaluation of depression and suicidal thoughts. Patient states that he was originally on Zoloft. However he began having more symptoms of depression and his primary care doctor weaned him off of the Zoloft. He was started on Pristiq for depression and has been taking this for the past several weeks. He states that he does not feel that he is having any relief with this medication. He states yesterday everything felt very overwhelming. He felt he did not want to be alive anymore. He did pull out his gun and his plan was to shoot himself. He has since given the gun to his son-in-law. Patient denies any alcohol or drug use. PASTMEDICAL HISTORY     Past Medical History:   Diagnosis Date    Back pain     Depression     GERD (gastroesophageal reflux disease)     Headache(784.0) 9/22/2013    Migraines     ALICIA treated with BiPAP 2013    doesn't wear Bipap    Pneumonia     Pulmonary embolism ([address] 2/25/2021    S/P cardiac catheterization: 7/31/2017: No obstructive lasions. 7/31/2017 7/31/2017: No obstructive lasions. Dr. Jacoby Koroma    Seizures [facility])        Patient Active Problem List   Diagnosis Code    Headache R51.9    SOB (shortness of breath) R06.02    Dyspnea R06.00    LV dysfunction I51.9    Non-restorative sleep G47.8    Snoring R06.83    Obesity (BMI 30-39. 9) E66.9    Physical deconditioning R53.81    Obstructive sleep apnea of adult G47.33    Restless sleeper G47.9    Intractable headache R51.9    Seizure disorder (HCC) G40.909    Benign hypertension I10    Nausea & vomiting R11.2 Leukocytosis D72.829    Shortness of breath R06.02    Acute chest pain R07.9    Coronary artery disease involving native coronary artery without angina pectoris I25.10    Gastroesophageal reflux disease without esophagitis K21.9    ALICIA treated with BiPAP G47.33    Intractable migraine with aura without status migrainosus G43. 119    Ventral hernia without obstruction or gangrene K43.9    Moderate episode of recurrent major depressive disorder ([address] F33.1    S/P cardiac catheterization: 7/31/2017: No obstructive lasions.  Z98.890    Bradycardia, drug induced R00.1, T50.905A    Cervical spinal stenosis M48.02    Lumbar post-laminectomy syndrome M96.1    Lumbar radiculitis M54.16    Chronic pain syndrome G89.4    S/P insertion of spinal cord stimulator Z96.89    Lumbosacral spinal stenosis M48.07    MDD (major depressive disorder), recurrent episode, mild (HCC) F33.0    Elective surgery Z41.9    Chronic systolic (congestive) heart failure I50.22    COVID-19 U07.1     SURGICAL HISTORY       Past Surgical History:   Procedure Laterality Date    APPENDECTOMY  2012    [facility]    BACK SURGERY  12/18/15    l3-s1 decompression, posterior fusion    CARDIAC CATHETERIZATION  2013    CERVICAL SPINE SURGERY  10-16-15    C4-6 ACDF    COLONOSCOPY      ENDOSCOPY, COLON, DIAGNOSTIC      HERNIA [address] [address]  2012    [address]     LUMBAR SPINE SURGERY  08/26/2014     L3-5 decompression, Dr. Alexander Camacho, [address] [address] SURGERY  10/19/2018    OTHER SURGICAL HISTORY  06/02/2017    Diagnostic laparoscopy, Laparoscopic Ventral hernia Repair with Mesh by Dr [place] OFFICE/OUTPT VISIT,PROCEDURE ONLY N/A 10/19/2018    C3-4 AND C6-7 ACDF performed by Shekhar Garrison MD at [facility] 3/13/2020    L3-S1 HARDWARE REMOVAL performed by Shekhar Garrison MD at [address], Dr. Tasha Argueta N/A 7/8/2021 SCS trial  entrance L1 tip T7 bilaterally performed by Lanie Miller MD at  [address] 2021    THORACIC LAMINECTOMY FOR PLACEMENT OF PERMANENT SPINAL CORD STMULATOR AND LEFT FLANK INTERNAL PULSE GENERATOR performed by Jeremías Barreto MD at [facility]       Previous Medications    ASPIRIN-ACETAMINOPHEN-CAFFEINE (EXCEDRIN MIGRAINE) 250-250-65 MG PER TABLET    Take 1 tablet by mouth every 6 hours as needed for Headaches    BUTORPHANOL (STADOL) 10 MG/ML NASAL SPRAY    1 spray by Nasal route every 4 hours as needed for Pain. CANDESARTAN (ATACAND) 8 MG TABLET        DESVENLAFAXINE SUCCINATE (PRISTIQ) 25 MG TB24 EXTENDED RELEASE TABLET    Take 1 tablet by mouth daily    DIVALPROEX (DEPAKOTE ER) 500 MG EXTENDED RELEASE TABLET    Take 1,500 mg by mouth daily (3 tab)    LAMOTRIGINE (LAMICTAL) 200 MG TABLET    TAKE 1 TABLET BY MOUTH TWO  TIMES DAILY    MELOXICAM (MOBIC) 7.5 MG TABLET    Take 7.5 mg by mouth daily    METOPROLOL SUCCINATE (TOPROL XL) 25 MG EXTENDED RELEASE TABLET    Take 1 tablet by mouth daily    OMEPRAZOLE (PRILOSEC) 40 MG DELAYED RELEASE CAPSULE    TAKE 1 CAPSULE BY MOUTH  DAILY       ALLERGIES     is allergic to bee venom, dilaudid [hydromorphone], hydrochlorothiazide, lisinopril, other, dilaudid [hydromorphone hcl], nitrofuran derivatives, and nitroglycerin. FAMILY HISTORY     He indicated that his mother is . He indicated that his father is . He indicated that both of his sisters are alive. He indicated that his brother is alive. He indicated that the status of his maternal grandmother is unknown. He indicated that the status of his maternal grandfather is unknown. He indicated that the status of his paternal grandmother is unknown. He indicated that the status of his paternal grandfather is unknown. He indicated that all of his four children are alive.        SOCIAL HISTORY       Social History     Tobacco Use Smoking status: Never    Smokeless tobacco: Never   Vaping Use    Vaping Use: Never used   Substance Use Topics    Alcohol use: No    Drug use: No       PHYSICAL EXAM       ED Triage Vitals [01/30/23 1615]   BP Temp Temp Source Heart Rate Resp SpO2 Height Weight   (!) 189/102 98.2 °F (36.8 °C) Oral 61 16 96 % -- --       Additional Vital Signs:  Vitals:    01/30/23 2341   BP: 130/66   Pulse: 60   Resp: 16   Temp:    SpO2: 93%     Physical Exam  Vitals and nursing note reviewed. Constitutional:       General: He is not in acute distress. Appearance: He is normal weight. He is not ill-appearing. HENT:      Head: Normocephalic and atraumatic. Mouth/Throat:      Mouth: Mucous membranes are moist.      Pharynx: Oropharynx is clear. Eyes:      Extraocular Movements: Extraocular movements intact. Pupils: Pupils are equal, round, and reactive to light. Cardiovascular:      Rate and Rhythm: Normal rate and regular rhythm. Heart sounds: No murmur heard. Pulmonary:      Effort: Pulmonary effort is normal. No respiratory distress. Breath sounds: Normal breath sounds. No decreased breath sounds. Abdominal:      General: Bowel sounds are normal.      Palpations: Abdomen is soft. Tenderness: There is no abdominal tenderness. There is no guarding or rebound. Musculoskeletal:      Cervical back: Neck supple. Right lower leg: No edema. Left lower leg: No edema. Skin:     General: Skin is warm and dry. Capillary Refill: Capillary refill takes less than 2 seconds. Neurological:      General: No focal deficit present. Mental Status: He is alert. Psychiatric:         Attention and Perception: Attention and perception normal.         Mood and Affect: Mood is depressed. Affect is flat. Speech: Speech normal.         Behavior: Behavior normal. Behavior is cooperative. Thought Content: Thought content includes suicidal ideation.  Thought content includes suicidal plan. Cognition and Memory: Cognition and memory normal.         Judgment: Judgment normal.       FORMAL DIAGNOSTIC RESULTS     RADIOLOGY: Interpretation per the Radiologist below, if available at the time of this note (none if blank): No orders to display       LABS: (none if blank)  Labs Reviewed   CBC WITH AUTO DIFFERENTIAL - Abnormal; Notable for the following components:       Result Value    MCV 94.1 (*)     All other components within normal limits   COMPREHENSIVE METABOLIC PANEL - Abnormal; Notable for the following components: Total Bilirubin 0.2 (*)     All other components within normal limits   COVID-19 & INFLUENZA COMBO   ETHANOL   URINE DRUG SCREEN   ANION GAP   GLOMERULAR FILTRATION RATE, ESTIMATED   OSMOLALITY       (Any cultures that may have been sent were not resulted at the time of this patient visit)    [facility] / ED COURSE:     1) Number and Complexity of Problems            Problem List This Visit:         Chief Complaint   Patient presents with    Suicidal    Depression            Differential Diagnosis includes (but not limited to):  Depression, medication reaction, suicidal ideation, anxiety        Diagnoses Considered but I have low suspicion of:   Hypothyroidism, electrolyte abnormality, intoxication             Pertinent Comorbid Conditions:    Depression, hypertension, CAD    2)  Data Reviewed (none if left blank)          My Independent interpretations:         Labs:      No leukocytosis, normal hemoglobin, electrolytes within normal limits, no evidence of COVID or influenza. External Documentation Reviewed:         Previous patient encounter documents & history available on EMR was reviewed              See Formal Diagnostic Results above for the lab and radiology tests and orders. 3)  Treatment and Disposition         ED Reassessment: Patient is medically clear for psychiatric evaluation.   He is comfortable and resting quietly         Case discussed with consulting clinician: Behavioral health social work         Shared Decision-Making was performed and disposition discussed with the        Patient/Family and questions answered          Social determinants of health impacting treatment or disposition: None         Code Status: Full code      Summary of Patient Presentation:      MDM  /74year-old male presents emergency room with complaint of depression and suicidal ideation. His plan was to shoot himself with a gun in which he did have access to. Currently he has given his guns to his son-in-law. Patient has known history of depression and they have been weaning him off Zoloft and trying a new medication. Will medically clear the patient and have social work evaluate the patient. I feel he would be beneficial for inpatient care. ED Course as of 01/31/23 0103   Mon Jan 30, 2023   9745 Patient is medically clear for psychiatric evaluation. Tanesha Ngo, , has evaluated the patient. He is going to be transferred to geriatric psych. [DD]      ED Course User Index  [DD] Beatris De León MD     Vitals Reviewed:    Vitals:    01/30/23 1615 01/30/23 1939 01/30/23 2341   BP: (!) 189/102 133/78 130/66   Pulse: 61 55 60   Resp: 16 18 16   Temp: 98.2 °F (36.8 °C) 97.8 °F (36.6 °C)    TempSrc: Oral Oral    SpO2: 96% 94% 93%       The patient was seen and examined. Appropriate diagnostic testing was performed and results reviewed with the patient. The results of pertinent diagnostic studies and exam findings were discussed. The patients provisional diagnosis and plan of care were discussed with the patient and present family who expressed understanding. Any medications were reviewed and indications and risks of medications were discussed with the patient /family present.    ED Medications administered this visit:  (None if blank)  Medications - No data to display      PROCEDURES: (None if blank)  Procedures:     CRITICAL CARE: (None if blank)      DISCHARGE PRESCRIPTIONS: (None if blank)  New Prescriptions    No medications on file       FINAL IMPRESSION      1. Depression with suicidal ideation          DISPOSITION/PLAN   DISPOSITION Decision To Transfer 01/30/2023 07:22:05 PM      OUTPATIENT FOLLOW UP THE PATIENT:  No follow-up provider specified.     MD Brenda Johnson MD  01/31/23 1952

## 2023-01-30 NOTE — ED NOTES
Pt moved to room 26 at this time. Pt is cooperative. Patient placed in safe room that is ligature resistant with continuous monitoring in place. Provider notified, requested an assessment by behavioral health . Patient belongings secured in a locked lockers outside of the room. Explained suicide prevention precautions to the patient including constant observer.       Blade Troy RN  01/30/23 7869
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
## 2023-01-31 NOTE — PROGRESS NOTES
Chief Complaint:   Suicidal      Provisional Diagnosis: major depressive disorder per chart     Risk, Psychosocial and Contextual Factors: (homeless, lack of social support etc.): medication adjustment, psych history, pain      Current MH Treatment: Yes, PCP       Present Suicidal Behavior:    Verbal:yes, yesterday     Attempt:self aborted attempt    Access to Weapons: yes, firearms    C-SSRS Current Suicide Risk: Low, Moderate or High:low         Past Suicidal Behavior:    Verbal:yes     Attempts:denied    Self-Injurious/Self-Mutilation: (Specify)denied    Traumatic Event Within Past 2 Weeks: (Specify) denied    Current Abuse:  (Specify) denied     Legal: (Specify)denied    Violence: (Specify) denied    Protective Factors:  wife, family support    Housing:Patient lives with his wife in their own home     Clinical Summary:    Patient is a 74year old male who presents to the ED voluntarily reporting yesterday patient was suicidal with a plan to shoot himself in the head. Patient reports that he had a gun. Patient denies homicidal thoughts and or plans. Patient denies hallucinations or delusions. AOD denied     Level of Care Disposition:      Consulted with medical provider. Patient is medically stabilized. Consulted with patients RN about abnormalities or medical concerns. No abnormalities or medical concerns noted. Consulted with .  Patient to be transferred

## 2023-01-31 NOTE — ED NOTES
This RN called nursing report to [facility] at this time.       John Martinez RN  01/31/23 7608

## 2023-01-31 NOTE — PROGRESS NOTES
Patient is accepted to [address] slip to be faxed to :  [phone]  01:17 [place] slip is faxed. 01:42 Patient is accepted to the care of [facility],[address] at [facility].   4th Floor  [phone] (Nurse to Nurse number)    ER Charge Nurse updated on plan of care.

## 2023-01-31 NOTE — ED NOTES
Pt reassessed at this time. Pt resting quietly in bed. No distress noted. Lights dimmed for comfort. Sitter at bedside for pt safety.       Gail Nuñez RN  01/30/23 9184

## 2023-01-31 NOTE — ED NOTES
PT resting quietly with eyes closed. Constant observation in place for pt safety. No distress noted.      Dari Jason RN  01/31/23 0047

## 2023-01-31 NOTE — ED NOTES
Pt sitting at bedside with eyes open. Pt request to have something to eat a this time. This nurse assured pt that he will follow up on that. No further assistance requested at this time.       Marco Peters  01/30/23 1943

## 2023-02-01 ENCOUNTER — TELEPHONE (OUTPATIENT)
Dept: FAMILY MEDICINE CLINIC | Age: 68
End: 2023-02-01

## 2023-02-14 ENCOUNTER — TELEPHONE (OUTPATIENT)
Dept: FAMILY MEDICINE CLINIC | Age: 68
End: 2023-02-14

## 2023-02-14 ENCOUNTER — OFFICE VISIT (OUTPATIENT)
Dept: FAMILY MEDICINE CLINIC | Age: 68
End: 2023-02-14

## 2023-02-14 VITALS
DIASTOLIC BLOOD PRESSURE: 84 MMHG | OXYGEN SATURATION: 96 % | SYSTOLIC BLOOD PRESSURE: 128 MMHG | HEART RATE: 56 BPM | HEIGHT: 68 IN | WEIGHT: 206 LBS | RESPIRATION RATE: 18 BRPM | BODY MASS INDEX: 31.22 KG/M2 | TEMPERATURE: 97.3 F

## 2023-02-14 DIAGNOSIS — F33.1 MODERATE EPISODE OF RECURRENT MAJOR DEPRESSIVE DISORDER (HCC): Primary | ICD-10-CM

## 2023-02-14 DIAGNOSIS — Z09 HOSPITAL DISCHARGE FOLLOW-UP: ICD-10-CM

## 2023-02-14 DIAGNOSIS — G40.909 NONINTRACTABLE EPILEPSY WITHOUT STATUS EPILEPTICUS, UNSPECIFIED EPILEPSY TYPE (HCC): ICD-10-CM

## 2023-02-14 RX ORDER — KETOROLAC TROMETHAMINE 30 MG/ML
60 INJECTION, SOLUTION INTRAMUSCULAR; INTRAVENOUS ONCE
Status: COMPLETED | OUTPATIENT
Start: 2023-02-14 | End: 2023-02-14

## 2023-02-14 RX ORDER — DESVENLAFAXINE 50 MG/1
50 TABLET, EXTENDED RELEASE ORAL DAILY
Qty: 90 TABLET | Refills: 1 | Status: SHIPPED | OUTPATIENT
Start: 2023-02-14

## 2023-02-14 RX ADMIN — KETOROLAC TROMETHAMINE 60 MG: 30 INJECTION, SOLUTION INTRAMUSCULAR; INTRAVENOUS at 16:09

## 2023-02-14 SDOH — ECONOMIC STABILITY: INCOME INSECURITY: HOW HARD IS IT FOR YOU TO PAY FOR THE VERY BASICS LIKE FOOD, HOUSING, MEDICAL CARE, AND HEATING?: NOT VERY HARD

## 2023-02-14 SDOH — ECONOMIC STABILITY: HOUSING INSECURITY
IN THE LAST 12 MONTHS, WAS THERE A TIME WHEN YOU DID NOT HAVE A STEADY PLACE TO SLEEP OR SLEPT IN A SHELTER (INCLUDING NOW)?: NO

## 2023-02-14 SDOH — ECONOMIC STABILITY: FOOD INSECURITY: WITHIN THE PAST 12 MONTHS, THE FOOD YOU BOUGHT JUST DIDN'T LAST AND YOU DIDN'T HAVE MONEY TO GET MORE.: NEVER TRUE

## 2023-02-14 SDOH — ECONOMIC STABILITY: FOOD INSECURITY: WITHIN THE PAST 12 MONTHS, YOU WORRIED THAT YOUR FOOD WOULD RUN OUT BEFORE YOU GOT MONEY TO BUY MORE.: NEVER TRUE

## 2023-02-14 ASSESSMENT — ENCOUNTER SYMPTOMS
RHINORRHEA: 0
SHORTNESS OF BREATH: 0
CONSTIPATION: 0
ABDOMINAL PAIN: 0
DIARRHEA: 0
NAUSEA: 0
WHEEZING: 0
SORE THROAT: 0
COUGH: 0

## 2023-02-14 NOTE — PROGRESS NOTES
Administrations This Visit       ketorolac (TORADOL) injection 60 mg       Admin Date  02/14/2023  16:09 Action  Given Dose  60 mg Route  IntraMUSCular Site  Dorsogluteal Left Administered By  Muna Lott LPN    Ordering Provider: Celia Chamberlain MD    [address]: [id number]    Lot#: [id number]    : [employer] [facility]    Patient Supplied?: No                    Patient instructed to report any adverse reaction to me immediately.

## 2023-02-14 NOTE — TELEPHONE ENCOUNTER
Care Transitions Initial Follow Up Call    Outreach made within 2 business days of discharge: Yes    Patient: Abena Singleton Patient : 1955   MRN: [id number]  Reason for Admission: There are no discharge diagnoses documented for the most recent discharge. Discharge Date: 23       Spoke with: patient    Discharge department/facility: [facility]    TCM Interactive Patient Contact:  Was patient able to fill all prescriptions: Yes  Was patient instructed to bring all medications to the follow-up visit: Yes  Is patient taking all medications as directed in the discharge summary?  Yes  Does patient understand their discharge instructions: Yes  Does patient have questions or concerns that need addressed prior to 7-14 day follow up office visit: no    Scheduled appointment with PCP within 7-14 days    Follow Up  Future Appointments   Date Time Provider Ciera Doshi   2023  4:00 PM Silvestre Egan MD [address] [facility] [address]   2023 12:00 PM Wilton Downing MD [facility], LPN

## 2023-02-14 NOTE — PROGRESS NOTES
[address] [facility]  [address] [place] [address]  Dept: [phone]  [facility] [address] [facility] [address] (:  1955) is a 74 y.o. male, here for evaluation of the following chief complaint(s):  Follow-Up from Hospital      ASSESSMENT/PLAN:  1. Moderate episode of recurrent major depressive disorder (HCC)  -     sertraline (ZOLOFT) 50 MG tablet; Take 1 tablet by mouth daily, Disp-90 tablet, R-1NO PRINT  -     desvenlafaxine succinate (PRISTIQ) 50 MG TB24 extended release tablet; Take 1 tablet by mouth daily, Disp-90 tablet, R-1Normal  2. Nonintractable epilepsy without status epilepticus, unspecified epilepsy type ([facility] [address]  3. Hospital discharge follow-up  -     MA DISCHARGE MEDS RECONCILED W/ CURRENT OUTPATIENT MED LIST  Much improved, continue current medications of Zoloft 50 mg and Pristiq 50 mg. Follows up with neurology for epilepsy, has been seizure-free. Follow-up in 3 months or as needed. Return in 3 months (on 2023). SUBJECTIVE/OBJECTIVE:  HPI  Patient presents for hospital follow-up. He was admitted with severe depression and suicidal ideation. He states that his Pristiq was increased to 50 mg and he is feeling much better. He states that it was a culmination of a lot of life stressors and things have started to calm down. He denies any thoughts of hurting himself but states that they did rid of the gun in their home. His appetite is good and he has been sleeping fairly well except for the pain. He does complain of a headache today but this is common for him. Otherwise doing well without complaints. Review of Systems   Constitutional:  Negative for appetite change, chills, fatigue and fever. HENT:  Negative for congestion, rhinorrhea and sore throat. Respiratory:  Negative for cough, shortness of breath and wheezing. Cardiovascular:  Negative for chest pain and palpitations.    Gastrointestinal: Negative for abdominal pain, constipation, diarrhea and nausea. Genitourinary:  Negative for dysuria and hematuria. Musculoskeletal:  Positive for arthralgias, gait problem and myalgias. Neurological:  Positive for headaches. Negative for dizziness. Psychiatric/Behavioral:  Positive for sleep disturbance (2/2 pain). Negative for decreased concentration, dysphoric mood, self-injury and suicidal ideas. The patient is not nervous/anxious. Physical Exam  Vitals and nursing note reviewed. Constitutional:       Appearance: He is well-developed. HENT:      Head: Normocephalic and atraumatic. Eyes:      General: No scleral icterus. Right eye: No discharge. Left eye: No discharge. Conjunctiva/sclera: Conjunctivae normal.   Cardiovascular:      Rate and Rhythm: Normal rate and regular rhythm. Heart sounds: Normal heart sounds. Pulmonary:      Effort: Pulmonary effort is normal.      Breath sounds: Normal breath sounds. No wheezing. Skin:     General: Skin is warm and dry. Neurological:      Mental Status: He is alert and oriented to person, place, and time. Mental status is at baseline. Motor: Tremor present. Psychiatric:         Behavior: Behavior normal.         Thought Content: Thought content normal.         Judgment: Judgment normal.       Vitals:    02/14/23 1546   BP: 128/84   Pulse: 56   Resp: 18   Temp: 97.3 °F (36.3 °C)   SpO2: 96%              An electronic signature was used to authenticate this note.     --Maria De Jesus Juarez MD

## 2023-02-24 ENCOUNTER — HOSPITAL ENCOUNTER (OUTPATIENT)
Dept: CT IMAGING | Age: 68
Discharge: HOME | End: 2023-02-24
Payer: MEDICARE

## 2023-02-24 ENCOUNTER — HOSPITAL ENCOUNTER (OUTPATIENT)
Dept: MRI IMAGING | Age: 68
Discharge: HOME | End: 2023-02-24
Payer: MEDICARE

## 2023-02-24 DIAGNOSIS — M54.12 RADICULOPATHY, CERVICAL REGION: ICD-10-CM

## 2023-02-24 DIAGNOSIS — M54.2 CERVICAL PAIN: ICD-10-CM

## 2023-02-24 DIAGNOSIS — Z98.1 ARTHRODESIS STATUS: ICD-10-CM

## 2023-02-24 PROCEDURE — 72125 CT NECK SPINE W/O DYE: CPT

## 2023-02-24 PROCEDURE — 72141 MRI NECK SPINE W/O DYE: CPT

## 2023-03-04 DIAGNOSIS — F33.1 MODERATE EPISODE OF RECURRENT MAJOR DEPRESSIVE DISORDER (HCC): ICD-10-CM

## 2023-03-06 RX ORDER — DESVENLAFAXINE 25 MG/1
TABLET, EXTENDED RELEASE ORAL
Qty: 30 TABLET | Refills: 1 | OUTPATIENT
Start: 2023-03-06

## 2023-03-07 ENCOUNTER — TELEPHONE (OUTPATIENT)
Dept: CARDIOLOGY CLINIC | Age: 68
End: 2023-03-07

## 2023-03-07 NOTE — TELEPHONE ENCOUNTER
Kaitlyn Matthews from Dr. Lara Estrada office LM stating patient needs cardiac clearance for a procedure on 4-5-23. LM for patient to call office back.   Can patient come in on 3-13-23 at 10:45 am?    Once patient confirms appointment call Kaitlyn Matthews at [phone] ext [phone]

## 2023-03-13 ENCOUNTER — OFFICE VISIT (OUTPATIENT)
Dept: CARDIOLOGY CLINIC | Age: 68
End: 2023-03-13
Payer: MEDICARE

## 2023-03-13 VITALS
DIASTOLIC BLOOD PRESSURE: 80 MMHG | HEIGHT: 68 IN | SYSTOLIC BLOOD PRESSURE: 132 MMHG | WEIGHT: 204 LBS | HEART RATE: 58 BPM | BODY MASS INDEX: 30.92 KG/M2

## 2023-03-13 DIAGNOSIS — I25.10 CORONARY ARTERY DISEASE INVOLVING NATIVE CORONARY ARTERY OF NATIVE HEART WITHOUT ANGINA PECTORIS: ICD-10-CM

## 2023-03-13 DIAGNOSIS — I10 PRIMARY HYPERTENSION: Primary | ICD-10-CM

## 2023-03-13 DIAGNOSIS — Z01.818 PRE-OP EXAM: ICD-10-CM

## 2023-03-13 PROCEDURE — 93000 ELECTROCARDIOGRAM COMPLETE: CPT | Performed by: NUCLEAR MEDICINE

## 2023-03-13 PROCEDURE — G8428 CUR MEDS NOT DOCUMENT: HCPCS | Performed by: NUCLEAR MEDICINE

## 2023-03-13 PROCEDURE — G8484 FLU IMMUNIZE NO ADMIN: HCPCS | Performed by: NUCLEAR MEDICINE

## 2023-03-13 PROCEDURE — 99213 OFFICE O/P EST LOW 20 MIN: CPT | Performed by: NUCLEAR MEDICINE

## 2023-03-13 PROCEDURE — 1036F TOBACCO NON-USER: CPT | Performed by: NUCLEAR MEDICINE

## 2023-03-13 PROCEDURE — 3017F COLORECTAL CA SCREEN DOC REV: CPT | Performed by: NUCLEAR MEDICINE

## 2023-03-13 PROCEDURE — 3075F SYST BP GE 130 - 139MM HG: CPT | Performed by: NUCLEAR MEDICINE

## 2023-03-13 PROCEDURE — G8417 CALC BMI ABV UP PARAM F/U: HCPCS | Performed by: NUCLEAR MEDICINE

## 2023-03-13 PROCEDURE — 1123F ACP DISCUSS/DSCN MKR DOCD: CPT | Performed by: NUCLEAR MEDICINE

## 2023-03-13 PROCEDURE — 3079F DIAST BP 80-89 MM HG: CPT | Performed by: NUCLEAR MEDICINE

## 2023-03-13 NOTE — PROGRESS NOTES
[address] [facility] [address]  [facility] [address]  Dept: [phone]  Dept Fax: [phone]  Loc: [phone]    Visit Date: 3/13/2023    Abbie Ugalde is a 74 y.o. male who presents todayfor:  Chief Complaint   Patient presents with    Follow-up    Coronary Artery Disease    Hypertension     Here for back surgery pre op clearance   Cath 2017   Mild CAD  No chest pain  No changes in breathing  BP is stable   No dizziness  No syncope      HPI:  HPI  Past Medical History:   Diagnosis Date    Back pain     Depression     GERD (gastroesophageal reflux disease)     Headache(784.0) 9/22/2013    Migraines     ALICIA treated with BiPAP 2013    doesn't wear Bipap    Pneumonia     Pulmonary embolism ([address] 2/25/2021    S/P cardiac catheterization: 7/31/2017: No obstructive lasions. 7/31/2017 7/31/2017: No obstructive lasions.  Dr. Juan F Florentino    Seizures [facility])       Past Surgical History:   Procedure Laterality Date    APPENDECTOMY  2012    [facility]    BACK SURGERY  12/18/15    l3-s1 decompression, posterior fusion    CARDIAC CATHETERIZATION  2013    CERVICAL SPINE SURGERY  10-16-15    C4-6 ACDF    COLONOSCOPY      ENDOSCOPY, COLON, DIAGNOSTIC      HERNIA REPAIR  1996    [place]    HERNIA REPAIR  2012    [address]     LUMBAR SPINE SURGERY  08/26/2014     L3-5 decompression, Dr. Yris Curry, [address] SURGERY  10/19/2018    OTHER SURGICAL HISTORY  06/02/2017    Diagnostic laparoscopy, Laparoscopic Ventral hernia Repair with Mesh by Dr Rader Farm OFFICE/OUTPT VISIT,PROCEDURE ONLY N/A 10/19/2018    C3-4 AND C6-7 ACDF performed by Susan Lane MD at [facility] 3/13/2020    L3-S1 HARDWARE REMOVAL performed by Susan Lane MD at [address] [facility] [address] cuff, Dr. Sonia Rosales N/A 7/8/2021    SCS trial  entrance L1 tip T7 bilaterally performed by Jarad Baez MD at [address] 9/9/2021    THORACIC LAMINECTOMY FOR PLACEMENT OF PERMANENT SPINAL CORD STMULATOR AND LEFT FLANK INTERNAL PULSE GENERATOR performed by Royce Stark MD at [address] [facility] History   Problem Relation Age of Onset    Arthritis Father     Heart Disease Father     Other Father         COPD    Lupus Mother     Other Mother         Lupus    Depression Sister     Depression Child     Arthritis Maternal Grandmother     Stroke Maternal Grandmother     High Blood Pressure Maternal Grandmother     Arthritis Maternal Grandfather     Diabetes Maternal Grandfather     Heart Disease Maternal Grandfather     Asthma Paternal Grandmother     Stroke Paternal Grandmother     High Blood Pressure Paternal Grandmother     Asthma Paternal Grandfather      Social History     Tobacco Use    Smoking status: Never    Smokeless tobacco: Never   Substance Use Topics    Alcohol use: No      Current Outpatient Medications   Medication Sig Dispense Refill    sertraline (ZOLOFT) 50 MG tablet Take 1 tablet by mouth daily 90 tablet 1    desvenlafaxine succinate (PRISTIQ) 50 MG TB24 extended release tablet Take 1 tablet by mouth daily 90 tablet 1    omeprazole (PRILOSEC) 40 MG delayed release capsule TAKE 1 CAPSULE BY MOUTH  DAILY 90 capsule 3    metoprolol succinate (TOPROL XL) 25 MG extended release tablet Take 1 tablet by mouth daily 90 tablet 3    lamoTRIgine (LAMICTAL) 200 MG tablet TAKE 1 TABLET BY MOUTH TWO  TIMES DAILY 180 tablet 3    divalproex (DEPAKOTE ER) 500 MG extended release tablet Take 1,500 mg by mouth daily (3 tab)      butorphanol (STADOL) 10 MG/ML nasal spray 1 spray by Nasal route every 4 hours as needed for Pain.      meloxicam (MOBIC) 7.5 MG tablet Take 7.5 mg by mouth daily (Patient not taking: No sig reported)      aspirin-acetaminophen-caffeine (EXCEDRIN MIGRAINE) 250-250-65 MG per tablet Take 1 tablet by mouth every 6 hours as needed for Headaches (Patient not taking: No sig reported)       No current facility-administered medications for this visit. Allergies   Allergen Reactions    Bee Venom Anaphylaxis    Dilaudid [Hydromorphone] Other (See Comments)    Hydrochlorothiazide Other (See Comments)    Lisinopril Swelling    Other Hives    Dilaudid [Hydromorphone Hcl] Nausea And Vomiting and Rash    Nitrofuran Derivatives Nausea And Vomiting     Severe headache    Nitroglycerin Nausea And Vomiting     migraine     Health Maintenance   Topic Date Due    Flu vaccine (1) 06/30/2023 (Originally 8/1/2022)    Shingles vaccine (1 of 2) 01/03/2024 (Originally 5/9/2007)    COVID-19 Vaccine (1) 01/03/2024 (Originally 1955)    Pneumococcal 65+ years Vaccine (1 - PCV) 01/04/2024 (Originally 5[birth year]9/1961)    Depression Monitoring  01/04/2024    Annual Wellness Visit (AWV)  01/05/2024    Lipids  07/20/2027    DTaP/Tdap/Td vaccine (2 - Td or Tdap) 11/20/2028    Colorectal Cancer Screen  11/11/2031    Hepatitis C screen  Completed    HIV screen  Completed    Hepatitis A vaccine  Aged Out    Hib vaccine  Aged Out    Meningococcal (ACWY) vaccine  Aged Out       Subjective:  General:   No fever, no chills, some fatigue or weight loss  Pulmonary:    some dyspnea, no wheezing  Cardiac:    Denies recent chest pain,   GI:     No nausea or vomiting, no abdominal pain  Neuro:    No dizziness or light headedness,   Musculoskeletal:  No recent active issues  Extremities:   No edema, no obvious claudication       Objective:  General:   Well developed, well nourished  Lungs:   Clear to auscultation  Heart:    Normal S1 S2, Slight murmur. no rubs, no gallops  Abdomen:   Soft, non tender, no organomegalies, positive bowel sounds  Extremities:   No edema, no cyanosis, good peripheral pulses  Neurological:   Awake, alert, oriented.  No obvious focal deficits  Musculoskelatal:  No obvious deformities   /80   Pulse 58   Ht 5' 8\" (1.727 m)   Wt 204 lb (92.5 kg)   BMI 31.02 kg/m² Assessment:      Diagnosis Orders   1. Primary hypertension  EKG 12 lead      2. Coronary artery disease involving native coronary artery of native heart without angina pectoris  EKG 12 lead      3. Pre-op exam  EKG 12 lead      As above  Cardiac seems stable for now  ECG in office was done today. I reviewed the ECG. No acute findings    Plan:  No follow-ups on file. Clear for surgery   Moderate risk   Continue risk factor modification and medical management    Thank you for allowing me to participate in the care of your patient. Please don't hesitate to contact me regarding any further issues related to the patient care    Orders Placed:  Orders Placed This Encounter   Procedures    EKG 12 lead     Order Specific Question:   Reason for Exam?     Answer: Other       Prescribed:  No orders of the defined types were placed in this encounter. Discussed use, benefit, and side effects of prescribed medications. All patient questions answered. Pt voicedunderstanding. Instructed to continue current medications, diet and exercise. Continue risk factor modification and medical management. Patient agreed with treatment plan. Follow up as directed.     Electronically signedby Emilee Adrian MD on 3/13/2023 at 10:48 AM

## 2023-03-21 ENCOUNTER — OFFICE VISIT (OUTPATIENT)
Dept: FAMILY MEDICINE CLINIC | Age: 68
End: 2023-03-21
Payer: MEDICARE

## 2023-03-21 VITALS
HEART RATE: 51 BPM | BODY MASS INDEX: 31.63 KG/M2 | OXYGEN SATURATION: 97 % | RESPIRATION RATE: 18 BRPM | WEIGHT: 208 LBS | DIASTOLIC BLOOD PRESSURE: 86 MMHG | SYSTOLIC BLOOD PRESSURE: 138 MMHG

## 2023-03-21 DIAGNOSIS — I25.10 CORONARY ARTERY DISEASE INVOLVING NATIVE CORONARY ARTERY OF NATIVE HEART WITHOUT ANGINA PECTORIS: ICD-10-CM

## 2023-03-21 DIAGNOSIS — I50.22 CHRONIC SYSTOLIC (CONGESTIVE) HEART FAILURE (HCC): ICD-10-CM

## 2023-03-21 DIAGNOSIS — I10 PRIMARY HYPERTENSION: ICD-10-CM

## 2023-03-21 DIAGNOSIS — Z01.818 PREOP EXAMINATION: Primary | ICD-10-CM

## 2023-03-21 DIAGNOSIS — F33.3 SEVERE EPISODE OF RECURRENT MAJOR DEPRESSIVE DISORDER, WITH PSYCHOTIC FEATURES (HCC): ICD-10-CM

## 2023-03-21 PROCEDURE — 1123F ACP DISCUSS/DSCN MKR DOCD: CPT | Performed by: NURSE PRACTITIONER

## 2023-03-21 PROCEDURE — G8484 FLU IMMUNIZE NO ADMIN: HCPCS | Performed by: NURSE PRACTITIONER

## 2023-03-21 PROCEDURE — 3075F SYST BP GE 130 - 139MM HG: CPT | Performed by: NURSE PRACTITIONER

## 2023-03-21 PROCEDURE — G8417 CALC BMI ABV UP PARAM F/U: HCPCS | Performed by: NURSE PRACTITIONER

## 2023-03-21 PROCEDURE — 1036F TOBACCO NON-USER: CPT | Performed by: NURSE PRACTITIONER

## 2023-03-21 PROCEDURE — 99214 OFFICE O/P EST MOD 30 MIN: CPT | Performed by: NURSE PRACTITIONER

## 2023-03-21 PROCEDURE — 3017F COLORECTAL CA SCREEN DOC REV: CPT | Performed by: NURSE PRACTITIONER

## 2023-03-21 PROCEDURE — G8427 DOCREV CUR MEDS BY ELIG CLIN: HCPCS | Performed by: NURSE PRACTITIONER

## 2023-03-21 PROCEDURE — 3079F DIAST BP 80-89 MM HG: CPT | Performed by: NURSE PRACTITIONER

## 2023-03-21 RX ORDER — SERTRALINE HYDROCHLORIDE 100 MG/1
TABLET, FILM COATED ORAL
COMMUNITY
Start: 2023-03-01

## 2023-03-21 ASSESSMENT — ENCOUNTER SYMPTOMS
VOMITING: 0
WHEEZING: 0
CHEST TIGHTNESS: 0
CONSTIPATION: 0
SINUS PRESSURE: 0
ABDOMINAL DISTENTION: 0
DIARRHEA: 0
BACK PAIN: 1
COLOR CHANGE: 0
NAUSEA: 0
SHORTNESS OF BREATH: 0
STRIDOR: 0
SORE THROAT: 0
ABDOMINAL PAIN: 0
COUGH: 0

## 2023-03-21 NOTE — PROGRESS NOTES
deformity or signs of injury. Normal range of motion. Cervical back: Normal range of motion and neck supple. Lymphadenopathy:      Cervical: No cervical adenopathy. Skin:     General: Skin is warm and dry. Neurological:      Mental Status: He is alert and oriented to person, place, and time. Motor: No abnormal muscle tone. Coordination: Coordination normal.      Gait: Gait normal.   Psychiatric:         Mood and Affect: Mood normal.         Behavior: Behavior normal.         Thought Content: Thought content normal.         Judgment: Judgment normal.          On this date 3/21/2023 I have spent 36 minutes reviewing previous notes, test results and face to face with the patient discussing the diagnosis and importance of compliance with the treatment plan as well as documenting on the day of the visit. An electronic signature was used to authenticate this note.     --YUSEF Hernandez - CNP

## 2023-03-29 ENCOUNTER — HOSPITAL ENCOUNTER (OUTPATIENT)
Dept: CT IMAGING | Age: 68
Discharge: HOME | End: 2023-03-29
Payer: MEDICARE

## 2023-03-29 DIAGNOSIS — Z96.651 PRESENCE OF RIGHT ARTIFICIAL KNEE JOINT: ICD-10-CM

## 2023-03-29 PROCEDURE — 73700 CT LOWER EXTREMITY W/O DYE: CPT

## 2023-04-06 ENCOUNTER — TELEPHONE (OUTPATIENT)
Dept: CARDIOLOGY CLINIC | Age: 68
End: 2023-04-06

## 2023-04-06 NOTE — TELEPHONE ENCOUNTER
Please see My Chart Message:     [facility] Clinical Staff (supporting Yasmine Alex MD) 11 minutes ago (12:57 PM)       This is  Shaggy Mcmanus,. I forgot  to get that over the counter med, you told me to get, I  had so much on my mind before surgery. So if you could send me the name of it I would appricate it.                                                                                                Thank you                                                                                                                Shaggy Mcmanus

## 2023-04-07 NOTE — TELEPHONE ENCOUNTER
[id number] [facility] Clinical Staff (supporting Laine Guaman MD) 13 minutes ago (10:14 AM)       Sorry this was sent to the wrong DR. Anthony Loyd

## 2023-04-26 RX ORDER — SERTRALINE HYDROCHLORIDE 100 MG/1
TABLET, FILM COATED ORAL
Qty: 180 TABLET | Refills: 3 | Status: SHIPPED | OUTPATIENT
Start: 2023-04-26

## 2023-05-17 ENCOUNTER — OFFICE VISIT (OUTPATIENT)
Dept: FAMILY MEDICINE CLINIC | Age: 68
End: 2023-05-17

## 2023-05-17 VITALS
HEIGHT: 68 IN | HEART RATE: 68 BPM | SYSTOLIC BLOOD PRESSURE: 130 MMHG | OXYGEN SATURATION: 95 % | TEMPERATURE: 97.4 F | DIASTOLIC BLOOD PRESSURE: 88 MMHG | RESPIRATION RATE: 18 BRPM | BODY MASS INDEX: 31.28 KG/M2 | WEIGHT: 206.4 LBS

## 2023-05-17 DIAGNOSIS — I10 PRIMARY HYPERTENSION: ICD-10-CM

## 2023-05-17 DIAGNOSIS — M25.511 CHRONIC RIGHT SHOULDER PAIN: Primary | ICD-10-CM

## 2023-05-17 DIAGNOSIS — I25.10 CORONARY ARTERY DISEASE INVOLVING NATIVE CORONARY ARTERY OF NATIVE HEART WITHOUT ANGINA PECTORIS: ICD-10-CM

## 2023-05-17 DIAGNOSIS — F33.1 MODERATE EPISODE OF RECURRENT MAJOR DEPRESSIVE DISORDER (HCC): ICD-10-CM

## 2023-05-17 DIAGNOSIS — G89.29 CHRONIC RIGHT SHOULDER PAIN: Primary | ICD-10-CM

## 2023-05-17 RX ORDER — ACETAMINOPHEN AND CODEINE PHOSPHATE 300; 30 MG/1; MG/1
1 TABLET ORAL EVERY 6 HOURS PRN
COMMUNITY
Start: 2023-04-05

## 2023-05-17 RX ORDER — CYCLOBENZAPRINE HCL 10 MG
10 TABLET ORAL 3 TIMES DAILY PRN
COMMUNITY
Start: 2023-04-04

## 2023-05-17 ASSESSMENT — ENCOUNTER SYMPTOMS
COUGH: 0
NAUSEA: 0
DIARRHEA: 0
WHEEZING: 0
CONSTIPATION: 0
SHORTNESS OF BREATH: 0
RHINORRHEA: 0
ABDOMINAL PAIN: 0
SORE THROAT: 0

## 2023-05-17 NOTE — PROGRESS NOTES
[address] [facility]  [address] [place] [address]  Dept: [phone]  [facility] [address] [facility]  1955) is a 79 y.o. male, here for evaluation of the following chief complaint(s):  3 Month Follow-Up (F/u on mood- last seen 23/Labs completed- 22 & 23) and Shoulder Pain (Right shoulder, pain ongoing x 1 month or so. Feels that he may have a torn rotator cuff. Has not had any imaging done at this time. ROM limited d/t onogin discomfort, unknown injury )      ASSESSMENT/PLAN:  1. Chronic right shoulder pain  -     XR SHOULDER RIGHT 1 VW; Future  2. Coronary artery disease involving native coronary artery of native heart without angina pectoris  -     Lipid Panel; Future  3. Moderate episode of recurrent major depressive disorder ([facility] [address]  4. Primary hypertension  Start with x-ray of right shoulder and consider MRI if needed. We will get fasting labs prior to next visit. Continue with Zoloft and Pristiq for mood. Blood pressure controlled, continue current regimen    Return in about 3 months (around 2023) for follow up. SUBJECTIVE/OBJECTIVE:  Shoulder Pain   Pertinent negatives include no fever. Patient presents for 3-month follow-up of chronic medical conditions including depression, hypertension, CAD, chronic pain. States his mood has been overall controlled with his current regimen of Pristiq and Zoloft. He denies any thoughts of hurting himself. He does still feel frustrated and depressed mainly because he hurts. Is following up with neurology but admits that tremor is getting worse which bothers him some as well. Appetite is good and sleeps well for the most part. Denies any chest pain or shortness of breath. Is due for fasting labs which she agrees to have done this summer. Blood pressures been well controlled on his current regimen.   He does get migraines occasionally but these are

## 2023-05-19 ENCOUNTER — HOSPITAL ENCOUNTER (OUTPATIENT)
Dept: GENERAL RADIOLOGY | Age: 68
Discharge: HOME | End: 2023-05-19
Payer: MEDICARE

## 2023-05-19 ENCOUNTER — HOSPITAL ENCOUNTER (OUTPATIENT)
Age: 68
Discharge: HOME | End: 2023-05-19
Payer: MEDICARE

## 2023-05-19 DIAGNOSIS — M25.511 CHRONIC RIGHT SHOULDER PAIN: ICD-10-CM

## 2023-05-19 DIAGNOSIS — I25.10 CORONARY ARTERY DISEASE INVOLVING NATIVE CORONARY ARTERY OF NATIVE HEART WITHOUT ANGINA PECTORIS: ICD-10-CM

## 2023-05-19 DIAGNOSIS — G89.29 CHRONIC RIGHT SHOULDER PAIN: ICD-10-CM

## 2023-05-19 LAB
CHOLEST SERPL-MCNC: 218 MG/DL (ref 100–199)
HDLC SERPL-MCNC: 41 MG/DL
LDLC SERPL CALC-MCNC: 156 MG/DL
TRIGL SERPL-MCNC: 104 MG/DL (ref 0–199)

## 2023-05-19 PROCEDURE — 80061 LIPID PANEL: CPT

## 2023-05-19 PROCEDURE — 73030 X-RAY EXAM OF SHOULDER: CPT

## 2023-05-22 DIAGNOSIS — M19.011 PRIMARY OSTEOARTHRITIS OF RIGHT SHOULDER: ICD-10-CM

## 2023-05-22 DIAGNOSIS — E78.2 MIXED HYPERLIPIDEMIA: Primary | ICD-10-CM

## 2023-05-22 DIAGNOSIS — G89.29 CHRONIC RIGHT SHOULDER PAIN: Primary | ICD-10-CM

## 2023-05-22 DIAGNOSIS — M25.511 CHRONIC RIGHT SHOULDER PAIN: Primary | ICD-10-CM

## 2023-05-22 RX ORDER — ATORVASTATIN CALCIUM 20 MG/1
20 TABLET, FILM COATED ORAL DAILY
Qty: 90 TABLET | Refills: 1 | Status: SHIPPED | OUTPATIENT
Start: 2023-05-22

## 2023-06-09 DIAGNOSIS — F33.1 MODERATE EPISODE OF RECURRENT MAJOR DEPRESSIVE DISORDER (HCC): ICD-10-CM

## 2023-06-09 RX ORDER — DESVENLAFAXINE SUCCINATE 50 MG/1
50 TABLET, EXTENDED RELEASE ORAL DAILY
Qty: 90 TABLET | Refills: 1 | Status: SHIPPED | OUTPATIENT
Start: 2023-06-09

## 2023-06-19 DIAGNOSIS — K21.9 GASTROESOPHAGEAL REFLUX DISEASE WITHOUT ESOPHAGITIS: ICD-10-CM

## 2023-06-19 RX ORDER — OMEPRAZOLE 40 MG/1
40 CAPSULE, DELAYED RELEASE ORAL DAILY
Qty: 90 CAPSULE | Refills: 3 | Status: SHIPPED | OUTPATIENT
Start: 2023-06-19

## 2023-06-19 NOTE — TELEPHONE ENCOUNTER
Last visit- 5/17/2023  Next visit- 8/17/2023    Requested Prescriptions     Pending Prescriptions Disp Refills    omeprazole (PRILOSEC) 40 MG delayed release capsule [Pharmacy Med Name: Omeprazole 40 MG Oral Capsule Delayed Release] 90 capsule 3     Sig: TAKE 1 CAPSULE BY MOUTH  DAILY

## 2023-06-21 ENCOUNTER — HOSPITAL ENCOUNTER (OUTPATIENT)
Dept: OCCUPATIONAL THERAPY | Age: 68
Setting detail: THERAPIES SERIES
Discharge: HOME | End: 2023-06-21
Payer: MEDICARE

## 2023-06-21 PROCEDURE — 97165 OT EVAL LOW COMPLEX 30 MIN: CPT

## 2023-06-21 PROCEDURE — 97110 THERAPEUTIC EXERCISES: CPT

## 2023-06-21 NOTE — PROGRESS NOTES
difficulty. Report being able to sleep through night with waking no more than 2 times due to shoulder pain. Patient Education:   [x]  HEP/Education Completed: Plan of Care, Goals, HEP - standing dowel for shoulder extension and IR up back, supine dowel for shoulder flexion and chest press, scapular retraction, backward shoulder circles  []  No new Education completed  []  Reviewed Prior HEP      [x]  Patient verbalized and/or demonstrated understanding of education provided. []  Patient unable to verbalize and/or demonstrate understanding of education provided. Will continue education. [x]  Barriers to learning: none    PLAN:  Treatment Recommendations: Strengthening, Range of Motion, Manual Therapy - Soft Tissue Mobilization, Pain Management, Home Exercise Program, Patient Education, and Self-Care Education and Training    [x]  Plan of care initiated. Plan to see patient 2 times per week for 12 weeks to address the treatment planned outlined above.   []  Continue with current plan of care  []  Modify plan of care as follows:    []  Hold pending physician visit  []  Discharge    Time In 1315   Time Out 1400   Timed Code Minutes: 25 min   Total Treatment Time: 45 min       Francisco Haile OTR/L #[id number]

## 2023-06-26 ENCOUNTER — HOSPITAL ENCOUNTER (OUTPATIENT)
Dept: OCCUPATIONAL THERAPY | Age: 68
Setting detail: THERAPIES SERIES
Discharge: HOME | End: 2023-06-26
Payer: MEDICARE

## 2023-06-26 PROCEDURE — 97110 THERAPEUTIC EXERCISES: CPT

## 2023-06-28 ENCOUNTER — HOSPITAL ENCOUNTER (OUTPATIENT)
Dept: OCCUPATIONAL THERAPY | Age: 68
Setting detail: THERAPIES SERIES
Discharge: HOME | End: 2023-06-28
Payer: MEDICARE

## 2023-06-28 PROCEDURE — 97110 THERAPEUTIC EXERCISES: CPT

## 2023-06-30 ENCOUNTER — HOSPITAL ENCOUNTER (OUTPATIENT)
Dept: OCCUPATIONAL THERAPY | Age: 68
Setting detail: THERAPIES SERIES
Discharge: HOME | End: 2023-06-30
Payer: MEDICARE

## 2023-06-30 PROCEDURE — 97110 THERAPEUTIC EXERCISES: CPT

## 2023-07-03 ENCOUNTER — HOSPITAL ENCOUNTER (OUTPATIENT)
Dept: OCCUPATIONAL THERAPY | Age: 68
Setting detail: THERAPIES SERIES
Discharge: HOME | End: 2023-07-03
Payer: MEDICARE

## 2023-07-03 PROCEDURE — 97110 THERAPEUTIC EXERCISES: CPT

## 2023-07-03 NOTE — PROGRESS NOTES
[address] [facility] THERAPY  [] EVALUATION  [x] DAILY NOTE (LAND) [] DAILY NOTE (AQUATIC ) [] PROGRESS NOTE [] DISCHARGE NOTE    [] [address] [facility] [facility] [address] [place]   [] [address] [facility]    [x] [facility]   [] Junior Profit    Date: 7/3/2023  Patient Name:  Arun Paez  : 1955  MRN: [id number]  CSN: [id number]    Referring Practitioner Pablo Castanon MD   Diagnosis Other shoulder lesions, right shoulder [M75.81]    Treatment Diagnosis Right shoulder pain; left shoulder pain   Date of Evaluation 23      Functional Outcome Measure Used UEFS   Functional Outcome Score 40/80 (23)       Insurance: Primary: Payor: Pete Donnelly /  /  / ,   Secondary:    Authorization Information: PRECERTIFICATION REQUIRED:  No  INSURANCE THERAPY BENEFIT:  Patient has unlimited visits based on medical necessity  Benefit will not cover maintenance or preventative treatment. AQUATIC THERAPY COVERED:   Yes  MODALITIES COVERED:  Yes, with the exception of iontophoresis and hot packs/cold packs  TELEHEALTH COVERED: Yes   Visit # 5, 5/10 for progress note   Visits Allowed: unlimited   Recertification Date: 22   Physician Follow-Up: 23   Physician Orders: Reid Arias, ATUL, heat/US/massage/iontophoresis; increase strength and ROM 2-3 x week x 3-4 weeks   Pertinent History: Patient reports that he started having pain in both shoulders around the time he had neck surgery in May 2023. Patient relates that he had tingling in both arms prior to his neck surgery but no pain. Relates that he wore a neck brace \"for a while. \" Patient reports that he had home health therapy following his neck surgery but relates that his shoulders were not addressed at that time. States that he went to see Dr. Thomas Tate earlier this month due to pain in both shoulders. States that he had xrays done by his family physician.  States that his PCP ordered a MRI of his right shoulder but

## 2023-07-05 ENCOUNTER — HOSPITAL ENCOUNTER (OUTPATIENT)
Dept: OCCUPATIONAL THERAPY | Age: 68
Setting detail: THERAPIES SERIES
Discharge: HOME | End: 2023-07-05
Payer: MEDICARE

## 2023-07-05 PROCEDURE — 97110 THERAPEUTIC EXERCISES: CPT

## 2023-07-05 NOTE — PROGRESS NOTES
[address] [facility] THERAPY  [] EVALUATION  [x] DAILY NOTE (LAND) [] DAILY NOTE (AQUATIC ) [] PROGRESS NOTE [] DISCHARGE NOTE    [] [address] [facility] [place]   [] [address] [facility]    [x] [facility]   [] Joey Moritz    Date: 2023  Patient Name:  Nicolasa Sparks  : 1955  MRN: [id number]  CSN: [id number]    Referring Practitioner Nabil Guajardo MD   Diagnosis Other shoulder lesions, right shoulder [M75.81]    Treatment Diagnosis Right shoulder pain; left shoulder pain   Date of Evaluation 23      Functional Outcome Measure Used UEFS   Functional Outcome Score 40/80 (23)       Insurance: Primary: Payor: Amina Pan /  /  / ,   Secondary:    Authorization Information: PRECERTIFICATION REQUIRED:  No  INSURANCE THERAPY BENEFIT:  Patient has unlimited visits based on medical necessity  Benefit will not cover maintenance or preventative treatment. AQUATIC THERAPY COVERED:   Yes  MODALITIES COVERED:  Yes, with the exception of iontophoresis and hot packs/cold packs  TELEHEALTH COVERED: Yes   Visit # 6, 6/10 for progress note   Visits Allowed: unlimited   Recertification Date:    Physician Follow-Up: 23   Physician Orders: Steph Lawrence, ATUL, heat/US/massage/iontophoresis; increase strength and ROM 2-3 x week x 3-4 weeks   Pertinent History: Patient reports that he started having pain in both shoulders around the time he had neck surgery in May 2023. Patient relates that he had tingling in both arms prior to his neck surgery but no pain. Relates that he wore a neck brace \"for a while. \" Patient reports that he had home health therapy following his neck surgery but relates that his shoulders were not addressed at that time. States that he went to see Dr. Jahaira Vega earlier this month due to pain in both shoulders. States that he had xrays done by his family physician.  States that his PCP ordered a MRI of his right shoulder but

## 2023-07-10 ENCOUNTER — HOSPITAL ENCOUNTER (OUTPATIENT)
Dept: OCCUPATIONAL THERAPY | Age: 68
Setting detail: THERAPIES SERIES
Discharge: HOME | End: 2023-07-10
Payer: MEDICARE

## 2023-07-10 PROCEDURE — 97110 THERAPEUTIC EXERCISES: CPT

## 2023-07-10 NOTE — PROGRESS NOTES
[address] [facility] THERAPY  [] EVALUATION  [x] DAILY NOTE (LAND) [] DAILY NOTE (AQUATIC ) [] PROGRESS NOTE [] DISCHARGE NOTE    [] [address] [facility] [address] [place]   [] [address] [facility]    [x] [facility]   [] Erika Husbands    Date: 7/10/2023  Patient Name:  Gilmar Red  : 1955  MRN: [id number]  CSN: [id number]    Referring Practitioner Milena Hughes MD   Diagnosis Other shoulder lesions, right shoulder [M75.81]    Treatment Diagnosis Right shoulder pain; left shoulder pain   Date of Evaluation 23      Functional Outcome Measure Used UEFS   Functional Outcome Score 40/80 (23)       Insurance: Primary: Payor: Terrance Jack /  /  / ,   Secondary:    Authorization Information: PRECERTIFICATION REQUIRED:  No  INSURANCE THERAPY BENEFIT:  Patient has unlimited visits based on medical necessity  Benefit will not cover maintenance or preventative treatment. AQUATIC THERAPY COVERED:   Yes  MODALITIES COVERED:  Yes, with the exception of iontophoresis and hot packs/cold packs  TELEHEALTH COVERED: Yes   Visit # 7, 7/10 for progress note   Visits Allowed: unlimited   Recertification Date:    Physician Follow-Up: 23   Physician Orders: ATUL Sherwood, heat/US/massage/iontophoresis; increase strength and ROM 2-3 x week x 3-4 weeks   Pertinent History: Patient reports that he started having pain in both shoulders around the time he had neck surgery in May 2023. Patient relates that he had tingling in both arms prior to his neck surgery but no pain. Relates that he wore a neck brace \"for a while. \" Patient reports that he had home health therapy following his neck surgery but relates that his shoulders were not addressed at that time. States that he went to see Dr. Nury Finley earlier this month due to pain in both shoulders. States that he had xrays done by his family physician.  States that his PCP ordered a MRI of his right shoulder but

## 2023-07-12 ENCOUNTER — HOSPITAL ENCOUNTER (OUTPATIENT)
Dept: OCCUPATIONAL THERAPY | Age: 68
Setting detail: THERAPIES SERIES
Discharge: HOME | End: 2023-07-12
Payer: MEDICARE

## 2023-07-12 PROCEDURE — 97110 THERAPEUTIC EXERCISES: CPT

## 2023-07-12 NOTE — DISCHARGE SUMMARY
10  \"This is hurting worse than it ever did to\"    Backward shoulder circles 1 10     Bilateral table slides for shoulder flexion 1 15  Verbal cues to avoid shoulder hiking   Pulleys for bilateral shoulder flexion 1 10     Biodex at 120 speed x 3 minutes forward and 3 minutes backward    X Several pain behaviors present during this activity   Active motion  1 1  Completed in supine  Approx 90 degrees of flexion in bilateral shoulder s   Supine PROM to both shoulder in all planes to patient tolerance   x Demonstrated resistance to movement this date - more in left shoulder than right shoulder   Bilateral table slides - side to side 1 15     Bilateral table slides - circles on table 1 15 each direction     Activities Time    Notes/Comments                       RIGHT UPPER EXTREMITY  RANGE OF MOTION    AROM PROM COMMENTS         Shoulder Flexion 55 152    Shoulder Extension 23 132    Shoulder Abduction 68     Shoulder External Rotation 72 at side     Shoulder Internal Rotation Can get right thumb to waistband and 2\" to right of spine       LEFT UPPER EXTREMITY  RANGE OF MOTION    AROM PROM COMMENTS         Shoulder Flexion 88 100    Shoulder Extension 25 100    Shoulder Abduction 48     Shoulder External Rotation 60 at side     Shoulder Internal Rotation Can get left thumb to waistband behind back at spine level               Specific Interventions Next Treatment: PROM, AAROM, STM, scapular mobilizations, eventual strengthening    Activity/Treatment Tolerance:  []  Patient tolerated treatment well  []  Patient limited by fatigue  [x]  Patient limited by pain   []  Patient limited by other medical complications  []  Other:     Assessment: Patient has made very minimal progress toward goals. Patient has attempted to complete basic HEP but relates that this is difficult due to the pain.  Patient does continue to demonstrate fluctuating amount of tremors in both UE which this therapist feels as though the tremors could be

## 2023-07-14 ENCOUNTER — APPOINTMENT (OUTPATIENT)
Dept: OCCUPATIONAL THERAPY | Age: 68
End: 2023-07-14
Payer: MEDICARE

## 2023-07-17 ENCOUNTER — APPOINTMENT (OUTPATIENT)
Dept: OCCUPATIONAL THERAPY | Age: 68
End: 2023-07-17
Payer: MEDICARE

## 2023-07-19 ENCOUNTER — APPOINTMENT (OUTPATIENT)
Dept: OCCUPATIONAL THERAPY | Age: 68
End: 2023-07-19
Payer: MEDICARE

## 2023-07-26 ENCOUNTER — PATIENT MESSAGE (OUTPATIENT)
Dept: FAMILY MEDICINE CLINIC | Age: 68
End: 2023-07-26

## 2023-07-26 NOTE — TELEPHONE ENCOUNTER
From: Malcolm Cazares  To: Dr. Weaver Matter: 7/26/2023 8:55 AM EDT  Subject: NEW MED    I was wondering if there is a generic med for PRISTIQ 50 MG, I cannt keep paying $130. monthly.  or if there is another med that can take its place(GENERIC) [id number] [id number]

## 2023-07-29 ENCOUNTER — APPOINTMENT (OUTPATIENT)
Dept: GENERAL RADIOLOGY | Age: 68
End: 2023-07-29
Payer: MEDICARE

## 2023-07-29 ENCOUNTER — APPOINTMENT (OUTPATIENT)
Dept: CT IMAGING | Age: 68
End: 2023-07-29
Payer: MEDICARE

## 2023-07-29 ENCOUNTER — HOSPITAL ENCOUNTER (OUTPATIENT)
Age: 68
Setting detail: OBSERVATION
Discharge: HOME | End: 2023-07-31
Attending: EMERGENCY MEDICINE | Admitting: STUDENT IN AN ORGANIZED HEALTH CARE EDUCATION/TRAINING PROGRAM
Payer: MEDICARE

## 2023-07-29 DIAGNOSIS — R55 SYNCOPE AND COLLAPSE: Primary | ICD-10-CM

## 2023-07-29 PROBLEM — R56.9 SEIZURE (HCC): Status: ACTIVE | Noted: 2023-07-29

## 2023-07-29 LAB
ALBUMIN SERPL BCG-MCNC: 4.5 G/DL (ref 3.5–5.1)
ALP SERPL-CCNC: 74 U/L (ref 38–126)
ALT SERPL W/O P-5'-P-CCNC: 20 U/L (ref 11–66)
ANION GAP SERPL CALC-SCNC: 10 MEQ/L (ref 8–16)
AST SERPL-CCNC: 22 U/L (ref 5–40)
BASOPHILS ABSOLUTE: 0 THOU/MM3 (ref 0–0.1)
BASOPHILS NFR BLD AUTO: 0.5 %
BILIRUB SERPL-MCNC: 0.2 MG/DL (ref 0.3–1.2)
BILIRUB UR QL STRIP.AUTO: NEGATIVE
BUN SERPL-MCNC: 16 MG/DL (ref 7–22)
CALCIUM SERPL-MCNC: 9.2 MG/DL (ref 8.5–10.5)
CHARACTER UR: CLEAR
CHLORIDE SERPL-SCNC: 99 MEQ/L (ref 98–111)
CO2 SERPL-SCNC: 26 MEQ/L (ref 23–33)
COLOR: YELLOW
CREAT SERPL-MCNC: 1.1 MG/DL (ref 0.4–1.2)
DEPRECATED RDW RBC AUTO: 45.5 FL (ref 35–45)
EOSINOPHIL NFR BLD AUTO: 2.6 %
EOSINOPHILS ABSOLUTE: 0.2 THOU/MM3 (ref 0–0.4)
ERYTHROCYTE [DISTWIDTH] IN BLOOD BY AUTOMATED COUNT: 12.6 % (ref 11.5–14.5)
GFR SERPL CREATININE-BSD FRML MDRD: > 60 ML/MIN/1.73M2
GLUCOSE BLD STRIP.AUTO-MCNC: 92 MG/DL (ref 70–108)
GLUCOSE SERPL-MCNC: 94 MG/DL (ref 70–108)
GLUCOSE UR QL STRIP.AUTO: NEGATIVE MG/DL
HCT VFR BLD AUTO: 47.1 % (ref 42–52)
HGB BLD-MCNC: 15.2 GM/DL (ref 14–18)
HGB UR QL STRIP.AUTO: NEGATIVE
IMM GRANULOCYTES # BLD AUTO: 0.06 THOU/MM3 (ref 0–0.07)
IMM GRANULOCYTES NFR BLD AUTO: 0.6 %
KETONES UR QL STRIP.AUTO: NEGATIVE
LYMPHOCYTES ABSOLUTE: 2.8 THOU/MM3 (ref 1–4.8)
LYMPHOCYTES NFR BLD AUTO: 29.3 %
MCH RBC QN AUTO: 31.3 PG (ref 26–33)
MCHC RBC AUTO-ENTMCNC: 32.3 GM/DL (ref 32.2–35.5)
MCV RBC AUTO: 97.1 FL (ref 80–94)
MONOCYTES ABSOLUTE: 1.2 THOU/MM3 (ref 0.4–1.3)
MONOCYTES NFR BLD AUTO: 12.4 %
NEUTROPHILS NFR BLD AUTO: 54.6 %
NITRITE UR QL STRIP: NEGATIVE
NRBC BLD AUTO-RTO: 0 /100 WBC
OSMOLALITY SERPL CALC.SUM OF ELEC: 271 MOSMOL/KG (ref 275–300)
PH UR STRIP.AUTO: 8 [PH] (ref 5–9)
PLATELET # BLD AUTO: 385 THOU/MM3 (ref 130–400)
PMV BLD AUTO: 9.7 FL (ref 9.4–12.4)
POTASSIUM SERPL-SCNC: 5.2 MEQ/L (ref 3.5–5.2)
PROLACTIN SERPL-MCNC: 18.1 NG/ML
PROT SERPL-MCNC: 7.9 G/DL (ref 6.1–8)
PROT UR STRIP.AUTO-MCNC: NEGATIVE MG/DL
RBC # BLD AUTO: 4.85 MILL/MM3 (ref 4.7–6.1)
SEGMENTED NEUTROPHILS ABSOLUTE COUNT: 5.2 THOU/MM3 (ref 1.8–7.7)
SODIUM SERPL-SCNC: 135 MEQ/L (ref 135–145)
SP GR UR REFRACT.AUTO: 1.02 (ref 1–1.03)
TROPONIN, HIGH SENSITIVITY: 22 NG/L (ref 0–12)
UROBILINOGEN, URINE: 0.2 EU/DL (ref 0–1)
VALPROATE SERPL-MCNC: 74.3 UG/ML (ref 50–100)
WBC # BLD AUTO: 9.6 THOU/MM3 (ref 4.8–10.8)
WBC #/AREA URNS HPF: NEGATIVE /[HPF]

## 2023-07-29 PROCEDURE — 84146 ASSAY OF PROLACTIN: CPT

## 2023-07-29 PROCEDURE — G0378 HOSPITAL OBSERVATION PER HR: HCPCS

## 2023-07-29 PROCEDURE — 93005 ELECTROCARDIOGRAM TRACING: CPT

## 2023-07-29 PROCEDURE — 73502 X-RAY EXAM HIP UNI 2-3 VIEWS: CPT

## 2023-07-29 PROCEDURE — 84484 ASSAY OF TROPONIN QUANT: CPT

## 2023-07-29 PROCEDURE — 96374 THER/PROPH/DIAG INJ IV PUSH: CPT

## 2023-07-29 PROCEDURE — 72125 CT NECK SPINE W/O DYE: CPT

## 2023-07-29 PROCEDURE — 96375 TX/PRO/DX INJ NEW DRUG ADDON: CPT

## 2023-07-29 PROCEDURE — 70450 CT HEAD/BRAIN W/O DYE: CPT

## 2023-07-29 PROCEDURE — 80175 DRUG SCREEN QUAN LAMOTRIGINE: CPT

## 2023-07-29 PROCEDURE — 82948 REAGENT STRIP/BLOOD GLUCOSE: CPT

## 2023-07-29 PROCEDURE — 80053 COMPREHEN METABOLIC PANEL: CPT

## 2023-07-29 PROCEDURE — 80164 ASSAY DIPROPYLACETIC ACD TOT: CPT

## 2023-07-29 PROCEDURE — 99223 1ST HOSP IP/OBS HIGH 75: CPT | Performed by: NURSE PRACTITIONER

## 2023-07-29 PROCEDURE — 36415 COLL VENOUS BLD VENIPUNCTURE: CPT

## 2023-07-29 PROCEDURE — 99285 EMERGENCY DEPT VISIT HI MDM: CPT

## 2023-07-29 PROCEDURE — 6360000002 HC RX W HCPCS

## 2023-07-29 PROCEDURE — 81003 URINALYSIS AUTO W/O SCOPE: CPT

## 2023-07-29 PROCEDURE — 73564 X-RAY EXAM KNEE 4 OR MORE: CPT

## 2023-07-29 PROCEDURE — 85025 COMPLETE CBC W/AUTO DIFF WBC: CPT

## 2023-07-29 RX ORDER — DIVALPROEX SODIUM 500 MG/1
1500 TABLET, EXTENDED RELEASE ORAL DAILY
Status: DISCONTINUED | OUTPATIENT
Start: 2023-07-30 | End: 2023-07-31 | Stop reason: HOSPADM

## 2023-07-29 RX ORDER — DIPHENHYDRAMINE HYDROCHLORIDE 50 MG/ML
25 INJECTION INTRAMUSCULAR; INTRAVENOUS ONCE
Status: COMPLETED | OUTPATIENT
Start: 2023-07-29 | End: 2023-07-29

## 2023-07-29 RX ORDER — PANTOPRAZOLE SODIUM 40 MG/1
40 TABLET, DELAYED RELEASE ORAL
Status: DISCONTINUED | OUTPATIENT
Start: 2023-07-30 | End: 2023-07-31 | Stop reason: HOSPADM

## 2023-07-29 RX ORDER — ATORVASTATIN CALCIUM 20 MG/1
20 TABLET, FILM COATED ORAL DAILY
Status: DISCONTINUED | OUTPATIENT
Start: 2023-07-30 | End: 2023-07-31 | Stop reason: HOSPADM

## 2023-07-29 RX ORDER — LAMOTRIGINE 100 MG/1
200 TABLET ORAL 2 TIMES DAILY
Status: DISCONTINUED | OUTPATIENT
Start: 2023-07-29 | End: 2023-07-31 | Stop reason: HOSPADM

## 2023-07-29 RX ORDER — MORPHINE SULFATE 4 MG/ML
4 INJECTION, SOLUTION INTRAMUSCULAR; INTRAVENOUS EVERY 4 HOURS PRN
Status: DISCONTINUED | OUTPATIENT
Start: 2023-07-29 | End: 2023-07-30

## 2023-07-29 RX ORDER — METOCLOPRAMIDE HYDROCHLORIDE 5 MG/ML
10 INJECTION INTRAMUSCULAR; INTRAVENOUS ONCE
Status: COMPLETED | OUTPATIENT
Start: 2023-07-29 | End: 2023-07-29

## 2023-07-29 RX ORDER — SERTRALINE HYDROCHLORIDE 100 MG/1
100 TABLET, FILM COATED ORAL DAILY
Status: DISCONTINUED | OUTPATIENT
Start: 2023-07-30 | End: 2023-07-31 | Stop reason: HOSPADM

## 2023-07-29 RX ORDER — OMEPRAZOLE 20 MG/1
40 CAPSULE, DELAYED RELEASE ORAL DAILY
Status: DISCONTINUED | OUTPATIENT
Start: 2023-07-30 | End: 2023-07-29 | Stop reason: CLARIF

## 2023-07-29 RX ORDER — METOPROLOL SUCCINATE 25 MG/1
25 TABLET, EXTENDED RELEASE ORAL DAILY
Status: DISCONTINUED | OUTPATIENT
Start: 2023-07-30 | End: 2023-07-30

## 2023-07-29 RX ORDER — ONDANSETRON 2 MG/ML
4 INJECTION INTRAMUSCULAR; INTRAVENOUS ONCE
Status: COMPLETED | OUTPATIENT
Start: 2023-07-29 | End: 2023-07-29

## 2023-07-29 RX ADMIN — METOCLOPRAMIDE 10 MG: 5 INJECTION, SOLUTION INTRAMUSCULAR; INTRAVENOUS at 20:16

## 2023-07-29 RX ADMIN — DIPHENHYDRAMINE HYDROCHLORIDE 25 MG: 50 INJECTION, SOLUTION INTRAMUSCULAR; INTRAVENOUS at 20:15

## 2023-07-29 RX ADMIN — MORPHINE SULFATE 4 MG: 4 INJECTION, SOLUTION INTRAMUSCULAR; INTRAVENOUS at 21:33

## 2023-07-29 RX ADMIN — ONDANSETRON 4 MG: 2 INJECTION INTRAMUSCULAR; INTRAVENOUS at 21:33

## 2023-07-29 ASSESSMENT — PAIN SCALES - GENERAL
PAINLEVEL_OUTOF10: 10
PAINLEVEL_OUTOF10: 7
PAINLEVEL_OUTOF10: 10
PAINLEVEL_OUTOF10: 7
PAINLEVEL_OUTOF10: 10

## 2023-07-29 ASSESSMENT — ENCOUNTER SYMPTOMS
VOMITING: 0
ABDOMINAL PAIN: 0
SHORTNESS OF BREATH: 0
NAUSEA: 0

## 2023-07-29 ASSESSMENT — PAIN - FUNCTIONAL ASSESSMENT
PAIN_FUNCTIONAL_ASSESSMENT: PREVENTS OR INTERFERES SOME ACTIVE ACTIVITIES AND ADLS
PAIN_FUNCTIONAL_ASSESSMENT: 0-10

## 2023-07-29 ASSESSMENT — PAIN DESCRIPTION - LOCATION: LOCATION: HEAD

## 2023-07-29 ASSESSMENT — PAIN DESCRIPTION - DESCRIPTORS: DESCRIPTORS: STABBING;SHARP

## 2023-07-29 NOTE — ED PROVIDER NOTES
[facility] DEPT  EMERGENCY DEPARTMENT ENCOUNTER          Pt Name: Leah Woods  MRN: [id number]  [address] 1955  Date of evaluation: 7/29/2023  Physician: Heriberto Alva MD  Supervising Attending Physician: Dr Kamlesh Beckham       Chief Complaint   Patient presents with    [facility]    HPI  Leah Woods is a 79 y.o. male who presents to the emergency department from home, brought in by EMS for evaluation of a fall. Per the patient's family, the patient was in the shower when he heard a loud bang. Family went up and saw him laying outside of the shower. Family reports that the patient was altered at this time. Family reports patient he has a history of seizures however he has not had one in 3 to 4 years. Patient does not member the fall. Is unknown if the patient had LOC. Patient is not any blood thinners. Patient is unaware of anything that led to his fall. Patient reports pain to the back of his head at a 10/10. Patient denies any chest pain or shortness of breath. Patient denies any nausea or vomiting. The patient has no other acute complaints at this time. REVIEW OF SYSTEMS   Review of Systems   Constitutional:  Positive for activity change. Negative for chills and fever. Respiratory:  Negative for shortness of breath. Cardiovascular:  Negative for chest pain. Gastrointestinal:  Negative for abdominal pain, nausea and vomiting. Musculoskeletal:  Positive for arthralgias. PAST MEDICAL AND SURGICAL HISTORY     Past Medical History:   Diagnosis Date    Back pain     Depression     GERD (gastroesophageal reflux disease)     Headache(784.0) 9/22/2013    Migraines     ALICIA treated with BiPAP 2013    doesn't wear Bipap    Pneumonia     Pulmonary embolism ([address]) 2/25/2021    S/P cardiac catheterization: 7/31/2017: No obstructive lasions. 7/31/2017 7/31/2017: No obstructive lasions.  Dr. Marian Duffy    Seizures

## 2023-07-29 NOTE — ED NOTES
Pt to the ED via EMS. Patient presents with complaints of fall and pain to the back of his head (C-Collar in place upon arrival). Patient states that he fell in the shower is unsure if he lost consciousness from the fall. EKG done, IV inserted via EMS. Patient is alert and oriented x 4. Respirations are regular and unlabored. Patient provided blanket. Family at the bedside and call light within reach.      Shannon Delgado RN  07/29/23 [id number]

## 2023-07-30 ENCOUNTER — APPOINTMENT (OUTPATIENT)
Dept: CT IMAGING | Age: 68
End: 2023-07-30
Payer: MEDICARE

## 2023-07-30 ENCOUNTER — APPOINTMENT (OUTPATIENT)
Dept: MRI IMAGING | Age: 68
End: 2023-07-30
Payer: MEDICARE

## 2023-07-30 PROBLEM — R00.1 BRADYCARDIA, DRUG INDUCED: Status: RESOLVED | Noted: 2017-11-06 | Resolved: 2023-07-30

## 2023-07-30 PROBLEM — M48.02 CERVICAL SPINAL STENOSIS: Status: RESOLVED | Noted: 2018-10-19 | Resolved: 2023-07-30

## 2023-07-30 PROBLEM — U07.1 COVID-19: Status: RESOLVED | Noted: 2022-06-26 | Resolved: 2023-07-30

## 2023-07-30 PROBLEM — G43.119 INTRACTABLE MIGRAINE WITH AURA WITHOUT STATUS MIGRAINOSUS: Status: RESOLVED | Noted: 2017-04-06 | Resolved: 2023-07-30

## 2023-07-30 PROBLEM — M48.07 LUMBOSACRAL SPINAL STENOSIS: Status: RESOLVED | Noted: 2021-09-10 | Resolved: 2023-07-30

## 2023-07-30 PROBLEM — T50.905A BRADYCARDIA, DRUG INDUCED: Status: RESOLVED | Noted: 2017-11-06 | Resolved: 2023-07-30

## 2023-07-30 PROBLEM — K43.9 VENTRAL HERNIA WITHOUT OBSTRUCTION OR GANGRENE: Status: RESOLVED | Noted: 2017-04-06 | Resolved: 2023-07-30

## 2023-07-30 LAB
TROPONIN, HIGH SENSITIVITY: 21 NG/L (ref 0–12)
TROPONIN, HIGH SENSITIVITY: 22 NG/L (ref 0–12)
VALPROATE SERPL-MCNC: 67.2 UG/ML (ref 50–100)

## 2023-07-30 PROCEDURE — 6360000002 HC RX W HCPCS

## 2023-07-30 PROCEDURE — 70496 CT ANGIOGRAPHY HEAD: CPT

## 2023-07-30 PROCEDURE — 6360000002 HC RX W HCPCS: Performed by: INTERNAL MEDICINE

## 2023-07-30 PROCEDURE — 6360000004 HC RX CONTRAST MEDICATION: Performed by: INTERNAL MEDICINE

## 2023-07-30 PROCEDURE — G0378 HOSPITAL OBSERVATION PER HR: HCPCS

## 2023-07-30 PROCEDURE — 84484 ASSAY OF TROPONIN QUANT: CPT

## 2023-07-30 PROCEDURE — 70551 MRI BRAIN STEM W/O DYE: CPT

## 2023-07-30 PROCEDURE — 6370000000 HC RX 637 (ALT 250 FOR IP): Performed by: NURSE PRACTITIONER

## 2023-07-30 PROCEDURE — 93010 ELECTROCARDIOGRAM REPORT: CPT | Performed by: INTERNAL MEDICINE

## 2023-07-30 PROCEDURE — 70498 CT ANGIOGRAPHY NECK: CPT

## 2023-07-30 PROCEDURE — 96376 TX/PRO/DX INJ SAME DRUG ADON: CPT

## 2023-07-30 PROCEDURE — 36415 COLL VENOUS BLD VENIPUNCTURE: CPT

## 2023-07-30 RX ORDER — MORPHINE SULFATE 2 MG/ML
1 INJECTION, SOLUTION INTRAMUSCULAR; INTRAVENOUS EVERY 4 HOURS PRN
Status: DISCONTINUED | OUTPATIENT
Start: 2023-07-30 | End: 2023-07-31 | Stop reason: HOSPADM

## 2023-07-30 RX ORDER — METOPROLOL SUCCINATE 25 MG/1
12.5 TABLET, EXTENDED RELEASE ORAL DAILY
Status: DISCONTINUED | OUTPATIENT
Start: 2023-07-31 | End: 2023-07-31 | Stop reason: HOSPADM

## 2023-07-30 RX ADMIN — MORPHINE SULFATE 4 MG: 4 INJECTION, SOLUTION INTRAMUSCULAR; INTRAVENOUS at 06:26

## 2023-07-30 RX ADMIN — MORPHINE SULFATE 1 MG: 2 INJECTION, SOLUTION INTRAMUSCULAR; INTRAVENOUS at 19:51

## 2023-07-30 RX ADMIN — ATORVASTATIN CALCIUM 20 MG: 20 TABLET, FILM COATED ORAL at 07:40

## 2023-07-30 RX ADMIN — MORPHINE SULFATE 4 MG: 4 INJECTION, SOLUTION INTRAMUSCULAR; INTRAVENOUS at 02:15

## 2023-07-30 RX ADMIN — PANTOPRAZOLE SODIUM 40 MG: 40 TABLET, DELAYED RELEASE ORAL at 06:25

## 2023-07-30 RX ADMIN — LAMOTRIGINE 200 MG: 100 TABLET ORAL at 19:51

## 2023-07-30 RX ADMIN — DIVALPROEX SODIUM 1500 MG: 500 TABLET, EXTENDED RELEASE ORAL at 07:40

## 2023-07-30 RX ADMIN — SERTRALINE HYDROCHLORIDE 100 MG: 100 TABLET ORAL at 07:40

## 2023-07-30 RX ADMIN — MORPHINE SULFATE 1 MG: 2 INJECTION, SOLUTION INTRAMUSCULAR; INTRAVENOUS at 12:19

## 2023-07-30 RX ADMIN — IOPAMIDOL 80 ML: 755 INJECTION, SOLUTION INTRAVENOUS at 17:37

## 2023-07-30 RX ADMIN — LAMOTRIGINE 200 MG: 100 TABLET ORAL at 00:45

## 2023-07-30 RX ADMIN — MORPHINE SULFATE 1 MG: 2 INJECTION, SOLUTION INTRAMUSCULAR; INTRAVENOUS at 16:08

## 2023-07-30 RX ADMIN — LAMOTRIGINE 200 MG: 100 TABLET ORAL at 07:40

## 2023-07-30 ASSESSMENT — PAIN - FUNCTIONAL ASSESSMENT
PAIN_FUNCTIONAL_ASSESSMENT: ACTIVITIES ARE NOT PREVENTED

## 2023-07-30 ASSESSMENT — PAIN DESCRIPTION - DESCRIPTORS
DESCRIPTORS: ACHING;SHARP
DESCRIPTORS: ACHING;THROBBING
DESCRIPTORS: ACHING;THROBBING
DESCRIPTORS: SHARP;THROBBING
DESCRIPTORS: ACHING;SHARP
DESCRIPTORS: ACHING;THROBBING
DESCRIPTORS: SHARP;THROBBING
DESCRIPTORS: ACHING;THROBBING

## 2023-07-30 ASSESSMENT — ENCOUNTER SYMPTOMS
COUGH: 0
SHORTNESS OF BREATH: 0
RHINORRHEA: 0
SORE THROAT: 0
PHOTOPHOBIA: 0
NAUSEA: 0
VOMITING: 0
ABDOMINAL PAIN: 0

## 2023-07-30 ASSESSMENT — PAIN DESCRIPTION - LOCATION
LOCATION: HEAD
LOCATION: HEAD;NECK
LOCATION: HEAD;NECK
LOCATION: HEAD

## 2023-07-30 ASSESSMENT — PAIN SCALES - GENERAL
PAINLEVEL_OUTOF10: 7
PAINLEVEL_OUTOF10: 8
PAINLEVEL_OUTOF10: 6
PAINLEVEL_OUTOF10: 7
PAINLEVEL_OUTOF10: 6
PAINLEVEL_OUTOF10: 7
PAINLEVEL_OUTOF10: 8
PAINLEVEL_OUTOF10: 7
PAINLEVEL_OUTOF10: 7
PAINLEVEL_OUTOF10: 6
PAINLEVEL_OUTOF10: 7

## 2023-07-30 ASSESSMENT — PAIN DESCRIPTION - ORIENTATION
ORIENTATION: MID

## 2023-07-30 ASSESSMENT — PAIN DESCRIPTION - ONSET
ONSET: ON-GOING
ONSET: ON-GOING

## 2023-07-30 ASSESSMENT — PAIN DESCRIPTION - PAIN TYPE
TYPE: ACUTE PAIN
TYPE: ACUTE PAIN

## 2023-07-30 ASSESSMENT — PAIN DESCRIPTION - FREQUENCY
FREQUENCY: CONTINUOUS
FREQUENCY: CONTINUOUS

## 2023-07-30 NOTE — H&P
symmetric, no tenderness/mass/nodules  Lungs: clear to auscultation bilaterally  Heart: regular rate and rhythm, S1, S2 normal, no murmur, click, rub or gallop  Abdomen: soft, non-tender; bowel sounds normal; no masses,  no organomegaly  Extremities: extremities normal, atraumatic, no cyanosis or edema. Positive for bilateral arm tremors. Neurologic: Mental status: Alert, oriented, thought content appropriate, Positive for right sided facial droop-baseline and lip swelling. Positive for forgetfulness baseline. CBC:   Recent Labs     07/29/23 1942   WBC 9.6   HGB 15.2        BMP:    Recent Labs     07/29/23 1942      K 5.2   CL 99   CO2 26   BUN 16   CREATININE 1.1   GLUCOSE 94     Hepatic:   Recent Labs     07/29/23 1942   AST 22   ALT 20   BILITOT 0.2*   ALKPHOS 74     Troponin: No results for input(s): TROPONINI in the last 72 hours. BNP: No results for input(s): BNP in the last 72 hours. Lipids: No results for input(s): CHOL, HDL in the last 72 hours. Invalid input(s): LDLCALCU  ABGs: No results found for: PHART, PO2ART, KCO4ENI  INR: No results for input(s): INR in the last 72 hours. -----------------------------------------------------------------    EKG: NSR with left axis deviation. HR 74  QTc 430    Assessment and Plan   Seizure vs Syncope: Patient is questioning if he just lost balance and fell. While taking a shower, positive for fall with concern of LOC. No incontinence of urine or tongue laceration. Positive for history of Parkinson's disease and seizure. Family identifying last seizure was 3-4 years ago currently on Lamictal and Depakote. Established Neurology Dr. Erika Todd. Family have verbalized concerns of progression of Parkinson's disease with recently noted forgetfulness, sleeping more and dysphagia. MRI and EEG will be ordered and consult to Neurology. Parkinson's disease: Family identify diagnosis approximately 8 months to 1 year ago.  Noted symptoms of

## 2023-07-30 NOTE — ED NOTES
Patient transported to   by cart in stable condition. Patient monitored on cardiac telemetry. IV  line is patent.         Shakir Avila, EMT-P  07/29/23 [id number]

## 2023-07-30 NOTE — ED NOTES
Pt medicated per MAR. Tolerated well. VSS with telemetry in place. Family at bedside.       Johnson Ocampo RN  07/29/23 5570

## 2023-07-30 NOTE — CONSULTS
contain minor errors which are inherent in voice recognition technology. ** Final report electronically signed by Dr. 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 There has been decompressive laminectomy in the lumbar spine. Neurostimulator device is noted. No acute fracture or dislocation. Joint spaces are well-maintained. Bone mineralization is unremarkable. No significant soft tissue abnormalities. 1. No acute bony abnormality 2. Other findings as described above. **This report has been created using voice recognition software. It may contain minor errors which are inherent in voice recognition technology. ** Final report electronically signed by Dr Rogelio Gusman on 7/29/2023 8:19 PM     Assessment and Plan:        Unwitnessed fall, possible LOC - likely syncopal episode secondary to autonomic dysfunction and progressive Parkinson's disease  CTH WO: Negative for acute intracranial abnormalities  CTA head and neck W/WO: Negative for hemodynamically significant stenosis, full read above. MRI Brain WO: Negative for evidence of acute infarct, global volume loss  Continue home Depakote 1.5 g daily and Lamictal 200 mg twice daily. Valproic acid level therapeutic  Lamotrigine level in process   Recommend evaluation by PT/OT for recommendations regarding assistive devices for ambulation. Recommend SLP for cognitive evaluation, as well as swallow eval.   Recommend complete infectious/metabolic work-up, per primary team.  Outpatient follow-up with established neurologist, Dr. Enid Miranda, as scheduled at the end of the month.    Consider deep brain stimulator as possible treatment, as patient has failed previous trials of oral

## 2023-07-30 NOTE — ED NOTES
ED to inpatient nurses report    Chief Complaint   Patient presents with    Fall      Present to ED from home  LOC: alert and orientated to name, place, date  Vital signs   Vitals:    07/29/23 1916 07/29/23 2022 07/29/23 2100 07/29/23 2133   BP: 139/80 (!) 152/116 (!) 134/104 (!) 126/91   Pulse: 69 71 69 62   Resp: 16 16 16 18   Temp:       TempSrc:       SpO2: 94% 95% 95% 94%      Oxygen Baseline room air    Current needs required none   LDAs:   Peripheral IV 07/29/23 Left Antecubital (Active)   Site Assessment Clean, dry & intact 07/29/23 2134   Line Status Blood return noted;Normal saline locked; Flushed 07/29/23 2100   Line Care Connections checked and tightened 07/29/23 2100   Phlebitis Assessment No symptoms 07/29/23 2134   Infiltration Assessment 0 07/29/23 2134   Dressing Status Clean, dry & intact 07/29/23 2100   Dressing Type Securing device;Transparent 07/29/23 2100     Mobility: Requires assistance * 2  Pending ED orders: none  Present condition: stable      C-SSRS    Swallow Screening    Preferred Language: [place]     Electronically signed by Shayy Rodriguez RN on 7/29/2023 at 10:08 PM      Shayy Rodriguez RN  07/29/23 220

## 2023-07-31 VITALS
DIASTOLIC BLOOD PRESSURE: 82 MMHG | BODY MASS INDEX: 30.57 KG/M2 | TEMPERATURE: 98.4 F | HEIGHT: 68 IN | HEART RATE: 73 BPM | RESPIRATION RATE: 16 BRPM | SYSTOLIC BLOOD PRESSURE: 122 MMHG | OXYGEN SATURATION: 95 % | WEIGHT: 201.72 LBS
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
## 2023-07-31 LAB
LV EF: 55 %
LVEF MODALITY: NORMAL
TROPONIN, HIGH SENSITIVITY: 22 NG/L (ref 0–12)

## 2023-07-31 PROCEDURE — 6370000000 HC RX 637 (ALT 250 FOR IP): Performed by: INTERNAL MEDICINE

## 2023-07-31 PROCEDURE — 95819 EEG AWAKE AND ASLEEP: CPT

## 2023-07-31 PROCEDURE — 93270 REMOTE 30 DAY ECG REV/REPORT: CPT

## 2023-07-31 PROCEDURE — 36415 COLL VENOUS BLD VENIPUNCTURE: CPT

## 2023-07-31 PROCEDURE — 97116 GAIT TRAINING THERAPY: CPT

## 2023-07-31 PROCEDURE — 6370000000 HC RX 637 (ALT 250 FOR IP): Performed by: NURSE PRACTITIONER

## 2023-07-31 PROCEDURE — 93005 ELECTROCARDIOGRAM TRACING: CPT | Performed by: INTERNAL MEDICINE

## 2023-07-31 PROCEDURE — 99239 HOSP IP/OBS DSCHRG MGMT >30: CPT | Performed by: INTERNAL MEDICINE

## 2023-07-31 PROCEDURE — G0378 HOSPITAL OBSERVATION PER HR: HCPCS

## 2023-07-31 PROCEDURE — 93306 TTE W/DOPPLER COMPLETE: CPT

## 2023-07-31 PROCEDURE — 84484 ASSAY OF TROPONIN QUANT: CPT

## 2023-07-31 PROCEDURE — 95819 EEG AWAKE AND ASLEEP: CPT | Performed by: PSYCHIATRY & NEUROLOGY

## 2023-07-31 PROCEDURE — 93010 ELECTROCARDIOGRAM REPORT: CPT | Performed by: INTERNAL MEDICINE

## 2023-07-31 PROCEDURE — 97530 THERAPEUTIC ACTIVITIES: CPT

## 2023-07-31 PROCEDURE — 6370000000 HC RX 637 (ALT 250 FOR IP): Performed by: PHYSICIAN ASSISTANT

## 2023-07-31 PROCEDURE — 97162 PT EVAL MOD COMPLEX 30 MIN: CPT

## 2023-07-31 PROCEDURE — 97166 OT EVAL MOD COMPLEX 45 MIN: CPT

## 2023-07-31 RX ORDER — SUMATRIPTAN 50 MG/1
50 TABLET, FILM COATED ORAL ONCE
Status: COMPLETED | OUTPATIENT
Start: 2023-07-31 | End: 2023-07-31

## 2023-07-31 RX ADMIN — SUMATRIPTAN SUCCINATE 50 MG: 50 TABLET ORAL at 03:57

## 2023-07-31 RX ADMIN — SERTRALINE HYDROCHLORIDE 100 MG: 100 TABLET ORAL at 09:12

## 2023-07-31 RX ADMIN — ATORVASTATIN CALCIUM 20 MG: 20 TABLET, FILM COATED ORAL at 09:12

## 2023-07-31 RX ADMIN — PANTOPRAZOLE SODIUM 40 MG: 40 TABLET, DELAYED RELEASE ORAL at 05:43

## 2023-07-31 RX ADMIN — METOPROLOL SUCCINATE 12.5 MG: 25 TABLET, EXTENDED RELEASE ORAL at 10:02

## 2023-07-31 RX ADMIN — LAMOTRIGINE 200 MG: 100 TABLET ORAL at 09:12

## 2023-07-31 RX ADMIN — DIVALPROEX SODIUM 1500 MG: 500 TABLET, EXTENDED RELEASE ORAL at 09:12

## 2023-07-31 ASSESSMENT — PAIN DESCRIPTION - FREQUENCY: FREQUENCY: CONTINUOUS

## 2023-07-31 ASSESSMENT — PAIN DESCRIPTION - PAIN TYPE: TYPE: CHRONIC PAIN

## 2023-07-31 ASSESSMENT — PAIN SCALES - GENERAL
PAINLEVEL_OUTOF10: 0
PAINLEVEL_OUTOF10: 8
PAINLEVEL_OUTOF10: 0

## 2023-07-31 ASSESSMENT — PAIN DESCRIPTION - ONSET: ONSET: ON-GOING

## 2023-07-31 ASSESSMENT — PAIN DESCRIPTION - DESCRIPTORS: DESCRIPTORS: ACHING;THROBBING

## 2023-07-31 ASSESSMENT — PAIN - FUNCTIONAL ASSESSMENT: PAIN_FUNCTIONAL_ASSESSMENT: ACTIVITIES ARE NOT PREVENTED

## 2023-07-31 ASSESSMENT — PAIN DESCRIPTION - ORIENTATION: ORIENTATION: MID

## 2023-07-31 ASSESSMENT — PAIN DESCRIPTION - LOCATION: LOCATION: HEAD

## 2023-07-31 NOTE — DISCHARGE SUMMARY
Resident Discharge Summary (Hospitalist)      Patient Identification:   Cecilia Laurent   : 1955  MRN: [id number]   Account: [de-identified]   Patient's PCP: Darryl Garcia MD    Admit Date: 2023   Discharge Date:   2023    Admitting Physician: Anna Hsieh DO  Discharge Physician: Maria Eugenia Bone MD       Discharge Diagnoses:    Suspected syncopal episode 2/2 autonomic dysfunction vs progression of Parkinson's disease: Pt was in shower, had sudden onset of fall, LOC. No prodromal symptoms. Hit back of head. CT-H: Negative for acute intracranial abnormality, CTA HN: Negative for LVO or significant stenosis noted. EKG on arrival: NSR. MRI brain: Negative for acute infarct. EEG done : Normal awake and sleep EEG. .  TTE pending . Follow up with cardio. Neurology signed off: Recommend continuing Depakote and Lamictal.  Event monitor on discharge with followup with Dr. Mario Pagan in 1 month. Follow-up with Dr. Nubia Marroquin in 1 month: Consider the brain stimulator for possible treatment, as failed oral treatment. Chronic:   Hx of Parkinson's: Dx , Has symptoms of shuffling gait and resting UE tremors. Unable to tolerate meds. Not on any. Follows Dr. Dano Garay. Hx of seizures: Dx . Last seizure . On Lamictal 200 mg bid + Depakote 1500 mg, levels within normal range on arrival, continue. 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 GDMT: Toprol-XL, continue. Follows Dr. Mario Pagan. Nonobstructive CAD: Cardiac cath . Not on ASA. On statin therapy, continue. HTN: Home med of Toprol-XL 25 mg, continue. GERD: Home med of Prilosec 40 mg, continue PPI therapy. HLD: Home med of Lipitor PRN, continue. Unspecified depression: On Desvenlafaxine, Zoloft 100 mg,  continue. Sees Veto Nicole. Hx of Migraines: Noted. On Excedrin PRN + Butorphanol PRN. Hx of falls: Noted in chart.    Chronic Back Pain s/p thoracic

## 2023-07-31 NOTE — PROCEDURES
Date: 7/31/2023  Referring physician: YUSEF Gil - CNP    Indication  Patient aged 79 y with encephalopathy. EEG done to assess for epileptiform activity. Introduction  This routine 20-minute EEG was recorded using the International 10-20 System on a [employer] workstation at 256 samples/s. Automated spike and seizure detection algorithms were applied. Description  During the maximal alert state, a well-regulated, symmetric, and reactive 8-9 Hz posterior dominant rhythm was seen. No consistent focal slowing or interhemispheric asymmetry was noted. Stage I and stage II sleep were observed. There were no interictal epileptiform discharges or electrographic seizures. Activations  Hyperventilation was not performed. Intermittent photic stimulation was performed and demonstrated no posterior driving response. Impression  Normal awake and sleep EEG. EKG lead did not show clear arrhythmia, if still in concern consider formal EKG or correlation with telemetry. No epileptiform discharges were identified. Please note the absence of such activity on this record cannot conclusively rule out an epileptic disorder. If such is still clinically suspected, a repeat study with sleep deprivation and/or prolonged sampling may be helpful. Jeni Mendez MD  Epilepsy Board Certified. Neurology Board Certified.     Electronically Signed

## 2023-07-31 NOTE — CARE COORDINATION
Case Management Assessment  Initial Evaluation    Date/Time of Evaluation: 7/31/2023 12:23 PM  Assessment Completed by: Asuncion Robbins RN    If patient is discharged prior to next notation, then this note serves as note for discharge by case management. Patient Name: Klarissa Talley                   YOB: 1955  Diagnosis: Seizure [facility]) [R56.9]                   Date / Time: 7/29/2023  6:55 PM  Location: [address] [place] [address]     Patient Admission Status: Observation   Readmission Risk Low 0-14, Mod 15-19), High > 20: No data recorded  Current PCP: Angel Leiva MD  PCP verified by CM? Yes    Chart Reviewed: Yes      History Provided by: Patient  Patient Orientation: Alert and Oriented    Patient Cognition: Alert    Hospitalization in the last 30 days (Readmission):  No    If yes, Readmission Assessment in CM Navigator will be completed. Advance Directives:      Code Status: Prior   Patient's Primary Decision Maker is: Named in [address] [place] [address]    Primary Decision Maker: Trev Garrison - Spouse - [phone]    Discharge Planning:    Patient lives with: Spouse/Significant Other Type of Home: House  Primary Care Giver:    Patient Support Systems include: Spouse/Significant Other   Current Financial resources: Medicare  Current community resources: None  Current services prior to admission: None            Current DME:              Type of Home Care services:  None    ADLS  Prior functional level: Independent in ADLs/IADLs  Current functional level: Independent in ADLs/IADLs    Family can provide assistance at DC: Yes  Would you like Case Management to discuss the discharge plan with any other family members/significant others, and if so, who?  No  Plans to Return to Present Housing: Yes  Other Identified Issues/Barriers to RETURNING to current housing: medical complications  Potential Assistance needed at discharge: N/A            Potential DME:    Patient expects to discharge to:

## 2023-07-31 NOTE — PROGRESS NOTES
[address] [facility] Laboratory Technician Worksheet      EEG Date: 2023    Name: Eloy Laureano   : 1955   Age: 79 y.o. SEX: male    ROOM: [address] [facility] MRN: [id number]           CSN: [id number]      Ordering Provider: DENNY Sorto  EEG Number: [id number] Time of Test:  0831    Hand: Right   Sedation: no    H.V. Done: No  age protocol  Photic: Yes    Sleep: Yes  Drowsy: Yes   Sleep Deprived: Yes    Seizures observed: no    Mentality: alert      Clinical History:pt has a HX of seizures w/ DR. Nina in [place] and migraines. Typical seizures if Generalized Ton Clonic jerking and LOC. Last seizure 4-5 years ago; On DEPAKOTE and LAMICTAL taking regularly. Here for a fall w/ posterior head hit. MRI of the brain 2023  IMPRESSION:     1. Global volume loss. 2. Mild severity chronic small vessel ischemic changes. CTA of the head 2023  IMPRESSION:     1. Negative CTA of the head and neck. CT of the head 2023  IMPRESSION:  1. No acute intracranial abnormality. Past Medical History:       Diagnosis Date    Back pain     Depression     GERD (gastroesophageal reflux disease)     Headache(784.0) 2013    Migraines     ALICIA treated with BiPAP     doesn't wear Bipap    Pneumonia     Pulmonary embolism ([address]) 2021    S/P cardiac catheterization: 2017: No obstructive lasions. 2017: No obstructive lasions.  Dr. Chopra Brothers    Seizures [facility])        Scheduled Meds:   metoprolol succinate  12.5 mg Oral Daily    atorvastatin  20 mg Oral Daily    lamoTRIgine  200 mg Oral BID    divalproex  1,500 mg Oral Daily    sertraline  100 mg Oral Daily    pantoprazole  40 mg Oral QAM AC     Continuous Infusions:  PRN Meds:.morphine    Technician: David Maldonado 2023
Discharge teaching and instructions for diagnosis/procedure of observation completed with patient using teachback method. AVS reviewed. Printed prescriptions given to patient. Patient voiced understanding regarding prescriptions, follow up appointments, and care of self at home.  Discharged in a wheelchair to  home with support per family
Echocardiogram complete at bedside.
[facility] OCCUPATIONAL THERAPY  [facility] NEUROSCIENCES 4A  EVALUATION    Time:    Time In: 1106  Time Out: 1129  Timed Code Treatment Minutes: 13 Minutes  Minutes: 23          Date: 2023  Patient Name: Adali Manley,   Gender: male      MRN: [id number]  : 1955  (79 y.o.)  Referring Practitioner: Sherrie Runner, MD  Diagnosis: Seizure  Additional Pertinent Hx: 79year old gentlemen presented to [facility] ER 2023 via EMS with complaint of fall and pain to the back of his head. Patient had stated that he fell in the shower is unsure if he lost consciousness from the fall. Patient has a past medical history significant for lifetime nonsmoker, ALICIA noncompliant with Bipap, CAD [facility] 2017-nonobstructive disease, GERD, Migraines, Depression, PE, Seizures, Angioedema felt to be secondary to Lisinopril, Parkinson's Disease, Back stimulator present. Restrictions/Precautions:  Restrictions/Precautions: General Precautions, Fall Risk    Subjective  Chart Reviewed: Yes, Orders, Progress Notes, History and Physical  Patient assessed for rehabilitation services?: Yes  Family / Caregiver Present: Yes    Subjective: Nurse approved OT evalution. Pt was sitting in recliner upon therapist arrival. Pt was pleasant and agreeable to therapy. Pain: Pt reported mild pain in R knee, which is normal for him.     Vitals: Heart Rate: 76    Social/Functional History:  Lives With: Spouse  Type of Home: House  Home Layout: One level  Home Access: Stairs to enter with rails  Entrance Stairs - Number of Steps: 2  Home Equipment: Hermann Ore, rolling   Bathroom Shower/Tub: Tub/Shower unit  Bathroom Toilet: Handicap height  Bathroom Accessibility: Walker accessible       ADL Assistance: Independent  Homemaking Assistance: Independent  Homemaking Responsibilities: Yes  Ambulation Assistance: Independent  Transfer Assistance: Independent    Active : Yes  Mode of Transportation: Car  Occupation:
Patient admitted to 4A Room 19 from ED for seizure/syncope. No complaints upon arrival to the room. IV site free of s/s of infection or infiltration. Vital signs obtained. Assessment and data collection initiated. Oriented to room. Policies and procedures for 4A explained All questions answered with no further questions at this time. Fall prevention and safety brochure discussed with patient. 2 person skin check completed with Damaso Segundo RN. Patient declines PCP notification. Patient declines family notification.
[facility]  INPATIENT PHYSICAL THERAPY  EVALUATION  [facility] 4A - [id number]    Time In: 7124  Time Out: 3727  Timed Code Treatment Minutes: 8 Minutes  Minutes: 13          Date: 2023  Patient Name: Carolin Lockhart,  Gender:  male        MRN: [id number]  : 1955  (79 y.o.)      Referring Practitioner: Danisha Pruitt MD  Diagnosis: Seizure [facility])  Additional Pertinent Hx: 79year old gentlemen presented to [facility] ER 2023 via EMS with complaint of fall and pain to the back of his head. Patient had stated that he fell in the shower is unsure if he lost consciousness from the fall. Patient has a past medical history significant for lifetime nonsmoker, ALICIA noncompliant with Bipap, CAD [facility] 2017-nonobstructive disease, GERD, Migraines, Depression, PE, Seizures, Angioedema felt to be secondary to Lisinopril, Parkinson's Disease, Back stimulator present. Restrictions/Precautions:  Restrictions/Precautions: General Precautions, Fall Risk    Subjective:  Chart Reviewed: Yes  Patient assessed for rehabilitation services?: Yes  Family / Caregiver Present: No  Subjective: RN approved session.  Pt pleasant and agreeable to therapy    General:  Overall Orientation Status: Within Functional Limits  Vision: Within Functional Limits  Hearing: Within functional limits       Pain: 0/10: denies pain     Vitals: Vitals not assessed per clinical judgement, see nursing flowsheet    Social/Functional History:    Lives With: Spouse  Type of Home: House  Home Layout: One level  Home Access: Stairs to enter with rails  Entrance Stairs - Number of Steps: 2  Home Equipment: Avila Stair, rolling     Bathroom Shower/Tub: Tub/Shower unit  Bathroom Toilet: Handicap height       ADL Assistance: Independent  Homemaking Assistance: Independent  Ambulation Assistance: Independent  Transfer Assistance: Independent          Additional Comments: occasionaly will use cane if outside of home; normally ind with
This Virtual RN completed admission requirements with patient at bedside. This nurse educated patient to use the call light to report any change in condition to beside nursing staff. Patient has call light within reach at this time. Bedside RN Gemma Zapata updated via [employer] admission is complete at this time.
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 There has been decompressive laminectomy in the lumbar spine. Neurostimulator device is noted. No acute fracture or dislocation. Joint spaces are well-maintained. Bone mineralization is unremarkable. No significant soft tissue abnormalities. 1. No acute bony abnormality 2. Other findings as described above. **This report has been created using voice recognition software. It may contain minor errors which are inherent in voice recognition technology. ** Final report electronically signed by Dr Arnav Monteiro on 7/29/2023 8:19 PM      Electronically signed by Amadeo Howe MD on 7/30/2023 at 6:57 PM

## 2023-07-31 NOTE — PLAN OF CARE
Problem: Discharge Planning  Goal: Discharge to home or other facility with appropriate resources  7/31/2023 0957 by Sophia Mason RN  Outcome: Pelon Fontanez (Taken 7/30/2023 2149 by John Gibson, RN)  Discharge to home or other facility with appropriate resources:   Identify barriers to discharge with patient and caregiver   Arrange for needed discharge resources and transportation as appropriate   Identify discharge learning needs (meds, wound care, etc)   Refer to discharge planning if patient needs post-hospital services based on physician order or complex needs related to functional status, cognitive ability or social support system     Problem: Pain  Goal: Verbalizes/displays adequate comfort level or baseline comfort level  7/31/2023 0957 by Sophia Mason RN  Outcome: Progressing  Flowsheets (Taken 7/30/2023 2149 by John Gibson, RN)  Verbalizes/displays adequate comfort level or baseline comfort level:   Assess pain using appropriate pain scale   Administer analgesics based on type and severity of pain and evaluate response   Encourage patient to monitor pain and request assistance   Implement non-pharmacological measures as appropriate and evaluate response     Problem: Safety - Adult  Goal: Free from fall injury  7/31/2023 0957 by Sophia Mason RN  Outcome: Progressing  Flowsheets (Taken 7/30/2023 0253 by Neva Agustin RN)  Free From Fall Injury:   Instruct family/caregiver on patient safety   Based on caregiver fall risk screen, instruct family/caregiver to ask for assistance with transferring infant if caregiver noted to have fall risk factors     Problem: Skin/Tissue Integrity  Goal: Absence of new skin breakdown  Description: 1. Monitor for areas of redness and/or skin breakdown  2. Assess vascular access sites hourly  3. Every 4-6 hours minimum:  Change oxygen saturation probe site  4.   Every 4-6 hours:  If on nasal continuous positive airway pressure, respiratory therapy
Problem: Discharge Planning  Goal: Discharge to home or other facility with appropriate resources  Outcome: Progressing  Flowsheets (Taken 7/30/2023 2149)  Discharge to home or other facility with appropriate resources:   Identify barriers to discharge with patient and caregiver   Arrange for needed discharge resources and transportation as appropriate   Identify discharge learning needs (meds, wound care, etc)   Refer to discharge planning if patient needs post-hospital services based on physician order or complex needs related to functional status, cognitive ability or social support system     Problem: Pain  Goal: Verbalizes/displays adequate comfort level or baseline comfort level  Outcome: Progressing  Flowsheets (Taken 7/30/2023 2149)  Verbalizes/displays adequate comfort level or baseline comfort level:   Assess pain using appropriate pain scale   Administer analgesics based on type and severity of pain and evaluate response   Encourage patient to monitor pain and request assistance   Implement non-pharmacological measures as appropriate and evaluate response     Problem: Safety - Adult  Goal: Free from fall injury  Outcome: Progressing  Note: Patient remains free from falls this shift. Fall precautions in place with bed/chair exit alarmed. Fall sign posted and fall armband in place. Nonskid footwear used with transferring. Educated patient to use call light when in need of staff assistance with transferring, ambulating, and other activities of daily living. Patient appropriately uses call light this shift. Problem: Skin/Tissue Integrity  Goal: Absence of new skin breakdown  Description: 1. Monitor for areas of redness and/or skin breakdown  2. Assess vascular access sites hourly  3. Every 4-6 hours minimum:  Change oxygen saturation probe site  4.   Every 4-6 hours:  If on nasal continuous positive airway pressure, respiratory therapy assess nares and determine need for appliance change or resting
[FreeTextEntry3] : All medical record entries made by ASTRID Yoder, acting as a scribe for this encounter under the direction of George Velasquez MD . I have reviewed the chart and agree that the record accurately reflects my personal performance of the history, physical exam, assessment and plan. I have also personally directed, reviewed, and agreed with the chart. 
safe level of function  Outcome: Progressing  Flowsheets (Taken 7/30/2023 0253)  Return ADL Status to a Safe Level of Function:   Administer medication as ordered   Assess activities of daily living deficits and provide assistive devices as needed   Care plan reviewed with patient. Patient verbalized understanding of the plan of care and contribute to goal setting.

## 2023-07-31 NOTE — PROCEDURES
12 lead EKG completed. Results handed to [facility] Letao VICTOR HUGO.  Alex Mendoza CET

## 2023-08-01 ENCOUNTER — CARE COORDINATION (OUTPATIENT)
Dept: CASE MANAGEMENT | Age: 68
End: 2023-08-01

## 2023-08-01 ENCOUNTER — TELEPHONE (OUTPATIENT)
Dept: FAMILY MEDICINE CLINIC | Age: 68
End: 2023-08-01
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
## 2023-08-01 LAB — LAMOTRIGINE SERPL-MCNC: 12.5 UG/ML (ref 3–15)

## 2023-08-01 PROCEDURE — 1111F DSCHRG MED/CURRENT MED MERGE: CPT | Performed by: FAMILY MEDICINE

## 2023-08-01 NOTE — CARE COORDINATION
opportunity to ask questions and does not have any further questions or concerns at this time. Were discharge instructions available to patient? Yes. Reviewed appropriate site of care based on symptoms and resources available to patient including: PCP  Specialist  Urgent care clinics  When to call Kit Lowe. The patient agrees to contact the PCP office for questions related to their healthcare. Advance Care Planning:   Does patient have an Advance Directive: reviewed and current. Medication reconciliation was performed with patient, who verbalizes understanding of administration of home medications. Medications reviewed, 1111F entered: yes    Was patient discharged with a pulse oximeter? no    Non-face-to-face services provided:  Scheduled appointment with PCP-8/7  Scheduled appointment with Specialist-sent message to office  Obtained and reviewed discharge summary and/or continuity of care documents    Offered patient enrollment in the Remote Patient Monitoring (RPM) program for in-home monitoring: Yes, but did not enroll at this time. Care Transitions 24 Hour Call    Do you have a copy of your discharge instructions?: Yes  Do you have all of your prescriptions and are they filled?: Yes  Have you been contacted by a [address]?: No  Have you scheduled your follow up appointment?: No  Do you feel like you have everything you need to keep you well at home?: Yes  Care Transitions Interventions     Transportation Support: Declined            Discussed follow-up appointments. If no appointment was previously scheduled, appointment scheduling offered: Yes. Is follow up appointment scheduled within 7 days of discharge? Yes.     Follow Up  Future Appointments   Date Time Provider [address]   8/7/2023 11:40 AM YUSEF Moreno - CNP [facility] - [place]   8/17/2023 10:45 AM Severiano Lesch, MD Fam Med [facility] P - [place]   3/11/2024 10:15 AM Thelma Chávez MD N [facility] P - [place]

## 2023-08-01 NOTE — TELEPHONE ENCOUNTER
Pt reached out to office. LUPILLO completed while pt was on the phone. Pt was instructed to reach out to office with any questions/concerns prior to upcoming appt.

## 2023-08-04 LAB
EKG ATRIAL RATE: 61 BPM
EKG ATRIAL RATE: 74 BPM
EKG P AXIS: 57 DEGREES
EKG P AXIS: 69 DEGREES
EKG P-R INTERVAL: 164 MS
EKG P-R INTERVAL: 176 MS
EKG Q-T INTERVAL: 388 MS
EKG Q-T INTERVAL: 422 MS
EKG QRS DURATION: 122 MS
EKG QRS DURATION: 136 MS
EKG QTC CALCULATION (BAZETT): 424 MS
EKG QTC CALCULATION (BAZETT): 430 MS
EKG R AXIS: -39 DEGREES
EKG R AXIS: -51 DEGREES
EKG T AXIS: 37 DEGREES
EKG T AXIS: 62 DEGREES
EKG VENTRICULAR RATE: 61 BPM
EKG VENTRICULAR RATE: 74 BPM

## 2023-08-07 ENCOUNTER — OFFICE VISIT (OUTPATIENT)
Dept: FAMILY MEDICINE CLINIC | Age: 68
End: 2023-08-07

## 2023-08-07 VITALS
HEART RATE: 62 BPM | DIASTOLIC BLOOD PRESSURE: 86 MMHG | SYSTOLIC BLOOD PRESSURE: 130 MMHG | HEIGHT: 68 IN | BODY MASS INDEX: 31.25 KG/M2 | RESPIRATION RATE: 18 BRPM | OXYGEN SATURATION: 96 % | TEMPERATURE: 97.6 F | WEIGHT: 206.2 LBS

## 2023-08-07 DIAGNOSIS — I25.10 CORONARY ARTERY DISEASE INVOLVING NATIVE CORONARY ARTERY OF NATIVE HEART WITHOUT ANGINA PECTORIS: ICD-10-CM

## 2023-08-07 DIAGNOSIS — Z09 HOSPITAL DISCHARGE FOLLOW-UP: ICD-10-CM

## 2023-08-07 DIAGNOSIS — G20 PARKINSON DISEASE (HCC): ICD-10-CM

## 2023-08-07 DIAGNOSIS — R55 SYNCOPE, UNSPECIFIED SYNCOPE TYPE: ICD-10-CM

## 2023-08-07 DIAGNOSIS — F33.1 MODERATE EPISODE OF RECURRENT MAJOR DEPRESSIVE DISORDER (HCC): ICD-10-CM

## 2023-08-07 DIAGNOSIS — G40.919 INTRACTABLE EPILEPSY WITHOUT STATUS EPILEPTICUS, UNSPECIFIED EPILEPSY TYPE (HCC): ICD-10-CM

## 2023-08-07 DIAGNOSIS — I10 PRIMARY HYPERTENSION: Primary | ICD-10-CM

## 2023-08-07 RX ORDER — METHYLPREDNISOLONE 4 MG/1
TABLET ORAL
COMMUNITY
Start: 2023-08-03 | End: 2023-08-09

## 2023-08-07 RX ORDER — MELOXICAM 15 MG/1
15 TABLET ORAL DAILY
COMMUNITY
Start: 2023-06-08

## 2023-08-07 NOTE — PROGRESS NOTES
ALICIA treated with BiPAP    Moderate episode of recurrent major depressive disorder (HCC)    S/P cardiac catheterization: 2017: No obstructive lasions. Chronic pain syndrome    S/P insertion of spinal cord stimulator    Chronic systolic (congestive) heart failure    Fall       Medications listed as ordered at the time of discharge from hospital     Medication List            Accurate as of 2023 11:59 PM. If you have any questions, ask your nurse or doctor.                 CHANGE how you take these medications      sertraline 100 MG tablet  Commonly known as: ZOLOFT  TAKE 2 TABLETS BY MOUTH  DAILY  What changed:   how much to take  how to take this  when to take this  additional instructions            CONTINUE taking these medications      aspirin-acetaminophen-caffeine 250-250-65 MG per tablet  Commonly known as: EXCEDRIN MIGRAINE     atorvastatin 20 MG tablet  Commonly known as: LIPITOR  Take 1 tablet by mouth daily     butorphanol 10 MG/ML nasal spray  Commonly known as: STADOL     desvenlafaxine succinate 50 MG Tb24 extended release tablet  Commonly known as: PRISTIQ  Take 1 tablet by mouth daily     divalproex 500 MG extended release tablet  Commonly known as: DEPAKOTE ER     lamoTRIgine 200 MG tablet  Commonly known as: LAMICTAL  TAKE 1 TABLET BY MOUTH TWO  TIMES DAILY     meloxicam 15 MG tablet  Commonly known as: MOBIC     methylPREDNISolone 4 MG tablet  Commonly known as: MEDROL DOSEPACK     metoprolol succinate 25 MG extended release tablet  Commonly known as: TOPROL XL  Take 1 tablet by mouth daily     omeprazole 40 MG delayed release capsule  Commonly known as: PRILOSEC  TAKE 1 CAPSULE BY MOUTH  DAILY               Medications marked \"taking\" at this time  Outpatient Medications Marked as Taking for the 23 encounter (Office Visit) with YUSEF Grigsby CNP   Medication Sig Dispense Refill    [] methylPREDNISolone (MEDROL DOSEPACK) 4 MG tablet as directed po as directed

## 2023-08-08 ENCOUNTER — CARE COORDINATION (OUTPATIENT)
Dept: CASE MANAGEMENT | Age: 68
End: 2023-08-08

## 2023-08-08 NOTE — CARE COORDINATION
Care Transitions Outreach Attempt-1st attempt    Call within 2 business days of discharge: Yes   Attempted to reach patient for subsequent transitional call. Left HIPPA compliant VM to return call directly to [phone]. Patient: Eloy Laureano Patient : 1955 MRN: [id number]    Last Discharge [address] [facility] [facility]       Date Complaint Diagnosis Description Type Department Provider    23 Fall Syncope and collapse ED to Hosp-Admission (Discharged) (ADMITTED) Erin Leon MD; HOSEA Souza.             Noted following upcoming appointments from discharge chart review:   [address] [facility],[place] follow up appointment(s):   Future Appointments   Date Time Provider [address]   2023 11:15 AM Sal Matthews MD Fam Med [facility] [place]   3/11/2024 10:15 AM Monica Lund MD N [facility] EDUARDO [place]     Non-[facility] follow up appointment(s): na

## 2023-08-09 ASSESSMENT — ENCOUNTER SYMPTOMS
SHORTNESS OF BREATH: 0
NAUSEA: 0
VOMITING: 0
COUGH: 0
ABDOMINAL PAIN: 0
SORE THROAT: 0
CONSTIPATION: 0
SINUS PRESSURE: 0
BACK PAIN: 0
WHEEZING: 0
COLOR CHANGE: 0
CHEST TIGHTNESS: 0
DIARRHEA: 0
STRIDOR: 0
ABDOMINAL DISTENTION: 0

## 2023-08-10 ENCOUNTER — CARE COORDINATION (OUTPATIENT)
Dept: CASE MANAGEMENT | Age: 68
End: 2023-08-10

## 2023-08-10 NOTE — CARE COORDINATION
[address] [facility] 250 Care Transitions Follow Up Call    Patient Current Location:  Home: [address] [place] [address]    Care Transition Nurse contacted the patient by telephone to follow up after admission on 23. Verified name and  with patient as identifiers. Patient: Suleman Villa  Patient : 1955   MRN: [id number]  Reason for Admission: Syncope and collapse  Discharge Date: 23 RARS: No data recorded    Needs to be reviewed by the provider   Additional needs identified to be addressed with provider: No  none             Method of communication with provider: none. Spoke with Paula Sanchez, said he is feeling good. Denies dizziness, chest pain, SOB/MORENO. 30 day heart monitor continues. Saw PCP this week, no changes, said everything looks good. Did not call Dr Elsy Diehl to scheduled, said he thought he had appt in a couple weeks, said looked at it wrong. Said he would call, instructed to let them know when the event monitor will be returned, will need to see after it is returned. Eating, drinking, sleeping ok. Discussed RPM but declined, said he found the BP cuff and will monitor, offered BP log, said he has a notebook he can use. Denies any other needs. No other questions or concerns at this time. Will continue to follow. Addressed changes since last contact:  none  Discussed follow-up appointments. If no appointment was previously scheduled, appointment scheduling offered: Yes. Is follow up appointment scheduled within 7 days of discharge? Yes. Follow Up  Future Appointments   Date Time Provider [address]   2023 11:15 AM Sherry Fischer MD Fam [facility] - [place]   3/11/2024 10:15 AM Shay Cedillo MD N [facility] - [place]     Non-[facility] follow up appointment(s): Dr Oralia Pathak     Care Transition Nurse reviewed medical action plan and red flags with patient and discussed any barriers to care and/or understanding of plan of care after discharge.  Discussed appropriate site

## 2023-08-17 ENCOUNTER — CARE COORDINATION (OUTPATIENT)
Dept: CASE MANAGEMENT | Age: 68
End: 2023-08-17

## 2023-08-17 NOTE — CARE COORDINATION
[facility] Care Transitions Follow Up Call    Patient Current Location:  Home: [address] [place] [address]    Care Transition Nurse contacted the patient by telephone to follow up after admission on 23. Verified name and  with patient as identifiers. Patient: Quyen Avalos  Patient : 1955   MRN: [id number]  Reason for Admission: Syncope and collapse  Discharge Date: 23 RARS: No data recorded      Needs to be reviewed by the provider   Additional needs identified to be addressed with provider: No  none             Method of communication with provider: none. Aaliyah Castaneda, goes by Stuart Lomas, returned call. Jane Rushing he is feeling well. Denies chest pain, dizziness, SOB/MORENO. Has scheduled f/u with cardio  to review cardiac event monitor. Taking medications as directed. Monitoring BP, stable. Eating, drinking, sleeping ok. Denies any other needs. No other questions or concerns at this time. Will continue to follow. Addressed changes since last contact:  none  Discussed follow-up appointments. If no appointment was previously scheduled, appointment scheduling offered: Yes. Is follow up appointment scheduled within 7 days of discharge? Yes. Follow Up  Future Appointments   Date Time Provider [address]   2023 11:15 AM Laura Templeton MD [facility]   2023 10:00 AM Alisha Huerta PA-C N [facility] - [place]   3/11/2024 10:15 AM Maricruz Hernandez [facility]     Non-[facility] follow up appointment(s): Dr Blaine Chu     Care Transition Nurse reviewed medical action plan and red flags with patient and discussed any barriers to care and/or understanding of plan of care after discharge. Discussed appropriate site of care based on symptoms and resources available to patient including: PCP  Specialist  Urgent care clinics  When to call [address] The patient agrees to contact the PCP office for questions related to their healthcare.

## 2023-08-29 ENCOUNTER — CARE COORDINATION (OUTPATIENT)
Dept: CASE MANAGEMENT | Age: 68
End: 2023-08-29

## 2023-08-29 RX ORDER — ROPINIROLE 0.25 MG/1
0.25 TABLET, FILM COATED ORAL 3 TIMES DAILY
COMMUNITY

## 2023-08-29 NOTE — CARE COORDINATION
No further follow-up call indicated based on severity of symptoms and risk factors.   Plan for next call: referral to ambulatory care manager-Naomie Caldwell RN
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
## 2023-08-31 ENCOUNTER — CARE COORDINATION (OUTPATIENT)
Dept: CARE COORDINATION | Age: 68
End: 2023-08-31

## 2023-08-31 SDOH — ECONOMIC STABILITY: INCOME INSECURITY: HOW HARD IS IT FOR YOU TO PAY FOR THE VERY BASICS LIKE FOOD, HOUSING, MEDICAL CARE, AND HEATING?: SOMEWHAT HARD

## 2023-08-31 SDOH — ECONOMIC STABILITY: INCOME INSECURITY: IN THE LAST 12 MONTHS, WAS THERE A TIME WHEN YOU WERE NOT ABLE TO PAY THE MORTGAGE OR RENT ON TIME?: NO

## 2023-08-31 SDOH — SOCIAL STABILITY: SOCIAL NETWORK
IN A TYPICAL WEEK, HOW MANY TIMES DO YOU TALK ON THE PHONE WITH FAMILY, FRIENDS, OR NEIGHBORS?: MORE THAN THREE TIMES A WEEK

## 2023-08-31 SDOH — SOCIAL STABILITY: SOCIAL NETWORK: HOW OFTEN DO YOU ATTENT MEETINGS OF THE CLUB OR ORGANIZATION YOU BELONG TO?: NEVER

## 2023-08-31 SDOH — SOCIAL STABILITY: SOCIAL NETWORK: ARE YOU MARRIED, WIDOWED, DIVORCED, SEPARATED, NEVER MARRIED, OR LIVING WITH A PARTNER?: MARRIED

## 2023-08-31 SDOH — SOCIAL STABILITY: SOCIAL NETWORK: HOW OFTEN DO YOU GET TOGETHER WITH FRIENDS OR RELATIVES?: THREE TIMES A WEEK

## 2023-08-31 SDOH — ECONOMIC STABILITY: FOOD INSECURITY: WITHIN THE PAST 12 MONTHS, THE FOOD YOU BOUGHT JUST DIDN'T LAST AND YOU DIDN'T HAVE MONEY TO GET MORE.: NEVER TRUE

## 2023-08-31 SDOH — HEALTH STABILITY: MENTAL HEALTH: HOW OFTEN DO YOU HAVE A DRINK CONTAINING ALCOHOL?: NEVER

## 2023-08-31 SDOH — ECONOMIC STABILITY: FOOD INSECURITY: WITHIN THE PAST 12 MONTHS, YOU WORRIED THAT YOUR FOOD WOULD RUN OUT BEFORE YOU GOT MONEY TO BUY MORE.: SOMETIMES TRUE

## 2023-08-31 SDOH — HEALTH STABILITY: MENTAL HEALTH
STRESS IS WHEN SOMEONE FEELS TENSE, NERVOUS, ANXIOUS, OR CAN'T SLEEP AT NIGHT BECAUSE THEIR MIND IS TROUBLED. HOW STRESSED ARE YOU?: ONLY A LITTLE

## 2023-08-31 SDOH — SOCIAL STABILITY: SOCIAL NETWORK
DO YOU BELONG TO ANY CLUBS OR ORGANIZATIONS SUCH AS CHURCH GROUPS UNIONS, FRATERNAL OR ATHLETIC GROUPS, OR SCHOOL GROUPS?: NO

## 2023-08-31 SDOH — HEALTH STABILITY: MENTAL HEALTH: HOW MANY STANDARD DRINKS CONTAINING ALCOHOL DO YOU HAVE ON A TYPICAL DAY?: PATIENT DOES NOT DRINK

## 2023-08-31 SDOH — ECONOMIC STABILITY: HOUSING INSECURITY: IN THE LAST 12 MONTHS, HOW MANY PLACES HAVE YOU LIVED?: 1

## 2023-08-31 SDOH — SOCIAL STABILITY: SOCIAL NETWORK: HOW OFTEN DO YOU ATTEND CHURCH OR RELIGIOUS SERVICES?: MORE THAN 4 TIMES PER YEAR

## 2023-08-31 SDOH — HEALTH STABILITY: PHYSICAL HEALTH: ON AVERAGE, HOW MANY DAYS PER WEEK DO YOU ENGAGE IN MODERATE TO STRENUOUS EXERCISE (LIKE A BRISK WALK)?: 0 DAYS

## 2023-08-31 NOTE — CARE COORDINATION
bathing safety education was reviewed. Patient was also educated on the importance of using DME as directed. Patient verbalized understanding. Patient denied any other questions, concerns, or needs and he was encouraged to call with any that may develop. Patient verbalized agreement to [facility] following up with him again in the future. Actions:   - Reviewed Care Coordination program  - Completed initial eval/SDOH assessment   - Educated on importance of routine daily weight monitoring   - Educated on CHF zone information   - Educated on signs/symptoms to report and importance of early symptom recognition and follow up  - Educated on Low Na diet and need for ongoing adherence   - Patient declines dietician consult at this time  - Monitored for medication questions/concerns  - Educated on fall prevention/home safety/bathing safety education  - Completed Med Rec  - Monitored for ADL, DME, and transportation needs  - Monitored for questions re: recent appointments   - Monitored for additional needs          Plan of Care:   - Continue with Care Coordination f/u calls for assistance with the management of his CHF, hypertension, and healthcare needs   - Complete CHF education - In Process   - Complete Fall Prevention/Home Safety Education - In Process  - Educate on signs/symptoms to report and importance of early symptom recognition and follow up   - Educate on the availability of same day appointments, urgent care/walk in clinic options, and after hours on call resources   - Discuss Remote Patient Monitoring (RPM) program   - Review and [facility] [place] information   - Patient is current with Medicare AWV (Jan. 2023)  - Monitor for additional needs   - Monitor for readiness to discharge from [facility] patient enrollment in the Remote Patient Monitoring (RPM) program for in-home monitoring: Will monitor for possible enrollment .     Ambulatory Care Coordination Assessment

## 2023-09-01 ENCOUNTER — CARE COORDINATION (OUTPATIENT)
Dept: CARE COORDINATION | Age: 68
End: 2023-09-01

## 2023-09-01 NOTE — CARE COORDINATION
SW called pt to discuss needs. Left vm referring pt to [facility] and . Advised a call back to [phone].

## 2023-09-08 ENCOUNTER — CARE COORDINATION (OUTPATIENT)
Dept: CARE COORDINATION | Age: 68
End: 2023-09-08

## 2023-09-08 NOTE — CARE COORDINATION
Ambulatory Care Coordination Note  2023    Patient Current Location: [place]     ACM contacted the patient by telephone. Verified name and  with patient as identifiers. Provided introduction to self, and explanation of the ACM role. Challenges to be reviewed by the provider   Additional needs identified to be addressed with provider: Yes    Patient with 6 pound weight gain over the last week. Patient denies any SOB or swelling being present. Patient admits to \"falling off of his diet\" d/t some stressors. Pt. educated on importance of daily weight monitoring and signs/symptoms to report. Method of communication with provider: chart routing. ACM: Larry Jane RN    Patient was called for continued Care Coordination follow up and education re: the management of his CHF, hypertension, and healthcare needs. Patient shared he is doing \"ok\" and he denied any c/o increased SOB or swelling being present. Patient admits to 6 pound weight gain over the last week and \"falling of his diet\" d/t some life stressors. PCP updated. Patient was educated on the importance of ongoing daily weight monitoring as well as signs/symptoms to report and when to seek medical attention. Patient verbalized understanding. CHF education and zone information was also reviewed. Patient was educated on the importance of Low Na diet adherence and need to return to improved eating habits. Patient acknowledged understanding. Patient was educated on the availability of same day appointments, urgent care/walk in clinic options and after hours on call resources. Patient verbalized understanding. Remote Patient Monitoring (RPM) program reviewed and offered to patient. Patient would like to consider options. Patient denied any other questions, concerns, or needs and he was encouraged to call with any that may develop.           Actions:   - Educated on CHF education and zone information   - Educated on importance of ongoing

## 2023-09-11 ENCOUNTER — PATIENT MESSAGE (OUTPATIENT)
Dept: FAMILY MEDICINE CLINIC | Age: 68
End: 2023-09-11

## 2023-09-11 RX ORDER — EPINEPHRINE 0.3 MG/.3ML
0.3 INJECTION SUBCUTANEOUS ONCE
Qty: 1 EACH | Refills: 0 | Status: SHIPPED | OUTPATIENT
Start: 2023-09-11 | End: 2023-09-11

## 2023-09-11 NOTE — TELEPHONE ENCOUNTER
From: Corry Pérez  To: Dr. Love Le: 9/11/2023 4:10 PM EDT  Subject: EPI PEN    Can you please send a epi pen prescription to [facility].  Thank you Corry Pérez  1955

## 2023-09-13 ENCOUNTER — CARE COORDINATION (OUTPATIENT)
Dept: CARE COORDINATION | Age: 68
End: 2023-09-13

## 2023-09-13 NOTE — CARE COORDINATION
Ambulatory Care Coordination Note  2023    Patient Current Location:  Home: [address] [place] [address]      ACM contacted the patient by telephone. Verified name and  with patient as identifiers. Provided introduction to self, and explanation of the ACM role. Challenges to be reviewed by the provider   Additional needs identified to be addressed with provider: No    none         Method of communication with provider: none. ACM: Brandan Mann RN    Patient was called for continued Care Coordination follow up and education re: the management of his CHF and healthcare needs. Patient reported he is doing \"ok\" but is suffering from a \"slight migraine\" headache today. Migraine headache education reviewed with patient and patient verbalized understanding. Patient denied any c/o increased SOB or swelling being present. Patient stated he has been monitoring his weight and weight has slightly improved to 208 today. Importance of daily weight monitoring and what to report/when to follow up reviewed in detail with patient. Low Na diet education and need for ongoing diet adherence also reviewed. Patient verbalized understanding. CHF education and zone information reviewed. ACM reviewed signs/symptoms patient should report and the importance of early symptom recognition and follow up. Patient acknowledged understanding. Patient denied any other questions, concerns, or needs and he was encouraged to call with any that may develop. Patient is scheduled for PCP f/u on 2023.           Actions:   - Reviewed CHF education and zone information   - Reviewed migraine headache education   - Reviewed importance of daily weight monitoring & what to report/when to follow up   - Reviewed Low Na diet education & need for ongoing diet adherence   - Reviewed signs/symptoms to report  - Reviewed importance of early symptom recognition and follow up  - Monitored for additional needs          Plan of Care:   -

## 2023-09-14 ENCOUNTER — CARE COORDINATION (OUTPATIENT)
Dept: CARE COORDINATION | Age: 68
End: 2023-09-14

## 2023-09-15 ENCOUNTER — CARE COORDINATION (OUTPATIENT)
Dept: CARE COORDINATION | Age: 68
End: 2023-09-15

## 2023-09-15 NOTE — CARE COORDINATION
SW mailed out food pantry list and [email or web address] along with HEAP application to pt. Will follow up and make sure he receives.

## 2023-09-18 NOTE — PROGRESS NOTES
[facility] PROFESSIONAL SERVICES  [facility] [address] [address]   Dept: [phone]   Dept Fax: [phone]   Loc: [phone]      Chief Complaint   Patient presents with    Follow-up    Dizziness    Migraine     States he gets them everyday      Cardiologist:  Dr. Jannie Camarillo  80 yo male presents for f/u of event monitor. Hx of syncope 2/2 autonomic vs parkinson's, seizures, chronic systolic CHF 36%, non obs CAD,  HTN, GERD, HLD, ALICIA non compliant with pap. No chest pain or sob. Not much palpitations. Dealing with parkinson's and migraines, are his 2 biggest issues. No swelling or weight gain lately. No n/v. Appetite is okay. Tolerating metoprolol and statin well. General:   No fever, no chills, no weight loss, no fatigue  Pulmonary:    No dyspnea, no wheezing  Cardiac:    Denies recent chest pain   GI:     No nausea or vomiting, no abdominal pain  Neuro:     No dizziness or light headedness  Musculoskeletal:  No recent active issues  Extremities:   No edema      Past Medical History:   Diagnosis Date    Back pain     Depression     GERD (gastroesophageal reflux disease)     Headache(784.0) 9/22/2013    Migraines     ALICIA treated with BiPAP 2013    doesn't wear Bipap    Pneumonia     Pulmonary embolism ([address]) 2/25/2021    S/P cardiac catheterization: 7/31/2017: No obstructive lasions. 7/31/2017 7/31/2017: No obstructive lasions.  Dr. Shea Ladd    Seizures [facility])        Allergies   Allergen Reactions    Bee Venom Anaphylaxis    Dilaudid [Hydromorphone] Other (See Comments)    Hydrochlorothiazide Other (See Comments)    Lisinopril Swelling    Other Hives    Dilaudid [Hydromorphone Hcl] Nausea And Vomiting and Rash    Nitrofuran Derivatives Nausea And Vomiting     Severe headache    Nitroglycerin Nausea And Vomiting     migraine       Current Outpatient Medications   Medication Sig Dispense Refill    rOPINIRole (REQUIP) 0.25 MG tablet Take 1 tablet by mouth 3

## 2023-09-21 ENCOUNTER — OFFICE VISIT (OUTPATIENT)
Dept: CARDIOLOGY CLINIC | Age: 68
End: 2023-09-21
Payer: MEDICARE

## 2023-09-21 VITALS
DIASTOLIC BLOOD PRESSURE: 92 MMHG | HEART RATE: 63 BPM | BODY MASS INDEX: 32.8 KG/M2 | SYSTOLIC BLOOD PRESSURE: 138 MMHG | HEIGHT: 67 IN | WEIGHT: 209 LBS

## 2023-09-21 DIAGNOSIS — I10 BENIGN HYPERTENSION: ICD-10-CM

## 2023-09-21 DIAGNOSIS — I50.22 CHRONIC SYSTOLIC (CONGESTIVE) HEART FAILURE (HCC): Primary | ICD-10-CM

## 2023-09-21 DIAGNOSIS — I25.10 CORONARY ARTERY DISEASE INVOLVING NATIVE CORONARY ARTERY OF NATIVE HEART WITHOUT ANGINA PECTORIS: ICD-10-CM

## 2023-09-21 PROCEDURE — G8417 CALC BMI ABV UP PARAM F/U: HCPCS | Performed by: STUDENT IN AN ORGANIZED HEALTH CARE EDUCATION/TRAINING PROGRAM

## 2023-09-21 PROCEDURE — 3075F SYST BP GE 130 - 139MM HG: CPT | Performed by: STUDENT IN AN ORGANIZED HEALTH CARE EDUCATION/TRAINING PROGRAM

## 2023-09-21 PROCEDURE — G8427 DOCREV CUR MEDS BY ELIG CLIN: HCPCS | Performed by: STUDENT IN AN ORGANIZED HEALTH CARE EDUCATION/TRAINING PROGRAM

## 2023-09-21 PROCEDURE — 3017F COLORECTAL CA SCREEN DOC REV: CPT | Performed by: STUDENT IN AN ORGANIZED HEALTH CARE EDUCATION/TRAINING PROGRAM

## 2023-09-21 PROCEDURE — 3080F DIAST BP >= 90 MM HG: CPT | Performed by: STUDENT IN AN ORGANIZED HEALTH CARE EDUCATION/TRAINING PROGRAM

## 2023-09-21 PROCEDURE — 1036F TOBACCO NON-USER: CPT | Performed by: STUDENT IN AN ORGANIZED HEALTH CARE EDUCATION/TRAINING PROGRAM

## 2023-09-21 PROCEDURE — 99214 OFFICE O/P EST MOD 30 MIN: CPT | Performed by: STUDENT IN AN ORGANIZED HEALTH CARE EDUCATION/TRAINING PROGRAM

## 2023-09-21 PROCEDURE — 1123F ACP DISCUSS/DSCN MKR DOCD: CPT | Performed by: STUDENT IN AN ORGANIZED HEALTH CARE EDUCATION/TRAINING PROGRAM

## 2023-09-22 ENCOUNTER — CARE COORDINATION (OUTPATIENT)
Dept: CARE COORDINATION | Age: 68
End: 2023-09-22

## 2023-09-22 NOTE — CARE COORDINATION
Attempted to reach patient for continued Care Coordination follow up and education. Patient was unavailable at the time of my call, and a generic voicemail message was left asking patient to return my call at [phone].

## 2023-09-25 ENCOUNTER — CARE COORDINATION (OUTPATIENT)
Dept: CARE COORDINATION | Age: 68
End: 2023-09-25

## 2023-09-25 DIAGNOSIS — I50.22 CHRONIC SYSTOLIC (CONGESTIVE) HEART FAILURE (HCC): Primary | ICD-10-CM

## 2023-09-25 NOTE — CARE COORDINATION
Remote Patient Kit Ordering Note      Date/Time:  9/25/2023 1:06 PM      [x] [facility]SS confirmed patient shipping address  [x] Patient will receive package over the next 1-3 business days. Someone 21 years or older must be present to sign for UPS delivery. [x] HRS will contact patient within 24 hours, an [address] [place] [address] will call the patient directly: If the patient does not answer, HRS will follow up with the clinical team notifying them about the unsuccessful attempt to contact the patient. HRS will make three call attempts to the patient. Provide patient with [facility] Virtual install number is: [phone]. [x] LPN will contact patient once equipment is active to welcome them to the program.                                                         [x] Hours of RPM monitoring - Monday-Friday 4667-9637; encourage patient to get vitals entered by [facility] each day to have the alert addressed same day. [x]CCSS mailed RPM Patient flyer to patient. All questions answered at this time. LPN made aware the RPM kit has been ordered. EMTP notified patient of RPM equipment order.

## 2023-09-25 NOTE — PROGRESS NOTES
Remote Patient Monitoring Treatment Plan    Received request from ACM/STACEY Mccrary RN  to order remote patient monitoring for in home monitoring of CHF and order completed. Patient will be monitoring blood pressure   pulse ox   weight. Patient will engage in Remote Patient Monitoring each day to develop the skills necessary for self management. RPM Care Team Responsibilities:   Alerts will be reviewed daily and addressed within 2-4 hours during operational hours (Monday -Friday 9 am-4 pm)  Alert response and intervention documented in patient medical record  Alert response escalated to PCP per protocol and documented in patient medical record  Patient monitored over approximately  days  Discharge from program based on self-management readiness    See care coordination encounters for additional details.

## 2023-09-25 NOTE — CARE COORDINATION
Ambulatory Care Coordination Note  2023    Patient Current Location:  Home: [address] [place] [address]     ACM contacted the patient by telephone. Verified name and  with patient as identifiers. Provided introduction to self, and explanation of the ACM role. Challenges to be reviewed by the provider   Additional needs identified to be addressed with provider: No    none         Method of communication with provider: none. ACM: Kait Villeda RN    Patient was called for continued Care Coordination follow up and education re: the management of his CHF education and zone information. Patient shared he has been doing \"ok\" but he was told BP was elevated at recent cardiology appointment. Patient denied any hypertension symptoms, increased headaches or dizziness, being present. ACM reviewed signs/symptoms to report and the need to call for earlier appointment with any change/new/worsening of symptoms. Patient verbalized understanding. Patient denied any c/o increased SOB or swelling being present. CHF education and zone information was reviewed with patient, including the importance of daily weight monitoring and what to report/when to follow up. Patient verbalized understanding. Remote Patient Monitoring (RPM) program reviewed with patient and he is agreeable to enrollment - referral placed. ACM also reveiwed theimportance of routine PCP f/u and reviewed that patient is due for PCP f/u in early 2023. PCP appt scheduled per patient's request for 2023. Patient denied any other questions, concerns, or needs and he was encouraged to call with any that may develop.           Actions:   - Reviewed hypertension and CHF education   - Reviewed signs/symptoms to report and importance of early symptom recognition and follow up  - Reviewed importance of daily weight and BP monitoring   - Enrolled in RPM program   - Reviewed importance of following up routinely with PCP  - Scheduled

## 2023-09-27 ENCOUNTER — CARE COORDINATION (OUTPATIENT)
Dept: CARE COORDINATION | Age: 68
End: 2023-09-27

## 2023-10-02 ENCOUNTER — CARE COORDINATION (OUTPATIENT)
Dept: CARE COORDINATION | Age: 68
End: 2023-10-02

## 2023-10-02 NOTE — CARE COORDINATION
SW arevalo dpt to follow up with ACP documents and resources sent to him. Pt stated they did receive and are looking over paperwork. No food needed at this time but are aware of resources available. Pt is not ready to complete ACP documents but stated he will call me if he needs assistance. Active listening provided.

## 2023-10-09 ENCOUNTER — CARE COORDINATION (OUTPATIENT)
Dept: CARE COORDINATION | Age: 68
End: 2023-10-09

## 2023-10-09 NOTE — CARE COORDINATION
Ambulatory Care Coordination Note  10/9/2023    Patient Current Location:  Home: [address] [place] [address]     ACM contacted the patient by telephone. Verified name and  with patient as identifiers. Provided introduction to self, and explanation of the ACM role. Challenges to be reviewed by the provider   Additional needs identified to be addressed with provider: No  none        Method of communication with provider: none. ACM: Hilda Salinas RN    Patient was called for continued Care Coordination follow up and education re: the  management of his CHF, hypertension, and healthcare needs. Patient shared he has been doing well and he denied any c/o increased SOB, cough or swelling being present. Patient continues to monitor weight and VS routinely with RPM and recent RPM results reviewed - 131/79-56-97%-205.8. Importance of routine VS and weight monitoring and what to report/when to follow up reviewed with patient along with CHF zone education. Patient acknowledged understanding. Patient denied any other questions, concerns, or needs and he was encouraged to call with any that may develop.            Actions:   - Reviewed CHF education and zone information   - Reviewed importance of routine weight and VS monitoring   - Reviewed signs/symptoms to report and importance of early symptom recognition/follow up   - Monitored for additional needs          Plan of Care:   - Continue with Care Coordination f/u calls for assistance with the management of his CHF, hypertension, and healthcare needs   - Complete CHF education - In Process   - Complete Fall Prevention/Home Safety Education - In Process  - Educate on signs/symptoms to report and importance of early symptom recognition and follow up - Ongoing  - Educate on the availability of same day appointments, urgent care/walk in clinic options, and after hours on call resources - Completed   - Discuss Remote Patient Monitoring (RPM) program - Enrolled

## 2023-10-12 ENCOUNTER — CARE COORDINATION (OUTPATIENT)
Dept: CARE COORDINATION | Age: 68
End: 2023-10-12

## 2023-10-17 ENCOUNTER — CARE COORDINATION (OUTPATIENT)
Dept: CARE COORDINATION | Age: 68
End: 2023-10-17

## 2023-10-17 NOTE — CARE COORDINATION
Remote Alert Monitoring Note  Rpm alert to be reviewed by the provider   red alert   weight (increase of 5 lbs x 7 days)   Additional needs to be addressed by N/A: No      Date/Time:  10/17/2023 9:38 AM  [facility] Verified patients name and  as identifiers. Background: Pt enrolled in RPM r/t CHF. Refer to 911 immediately if:  Patient unresponsive or unable to provide history  Change in cognition or sudden confusion  Patient unable to respond in complete sentences  Intense chest pain/tightness  Any concern for any clinical emergency  Red Alert: Provider response time of 1 hr required for any red alert requiring intervention  Yellow Alert: Provider response time of 3hr required for any escalated yellow alert  Weight Scale Triage  Was your weight obtained upon rising/waking today? no   Was your weight obtained after voiding and/or use of the bathroom today? yes   Did you weigh yourself in the same amount of clothing today, compared to how you typically do? yes   Was the scale bumped or moved prior to today's weight? no   Is your scale on a flat/hard surface? yes   Did you obtain your weight with shoes on? no   If yes, is this something you normally do during your daily weights? NA   Were you standing up straight on the scale today? yes   Were you leaning on anything while obtaining your weight today? no   Clinical Interventions: Reviewed and followed up on alerts and treatments-Spoke with pt who is in no apparent distress. States he dressed prior to weighing today because he had to take his grand daughter to school. Reviewed lower weights on 10/13/23 and 10/11/23 and pt states he was not dressed on those days. Educated pt that he should weigh in like clothing daily. Verbalizes understanding. Denies any SOB/dyspnea, new/increasing edema. Plan/Follow Up:  Will continue to review, monitor and address alerts with follow up based on severity of

## 2023-10-23 ENCOUNTER — CARE COORDINATION (OUTPATIENT)
Dept: CARE COORDINATION | Age: 68
End: 2023-10-23

## 2023-10-23 NOTE — CARE COORDINATION
Ambulatory Care Coordination Note  10/23/2023    Patient Current Location:  Home: [address] [place] [id number]     ACM contacted the patient by telephone. Verified name and  with patient as identifiers. Provided introduction to self, and explanation of the ACM role. Challenges to be reviewed by the provider   Additional needs identified to be addressed with provider: Yes    Recent positive home COVID-19 test.  Symptoms started Thursday and increased on Saturday. Not feeling much better today with ongoing c/o weakness, N/V, abdominal discomfort and headaches. Declines appt for evaluation. Educated on need for rest, increased fluids, and cough/deep breathing. Method of communication with provider: chart routing. ACM: Christy Poole RN    Patient was called for continued Care Coordination follow up and education re: the management of his CHF, hypertension, and healthcare needs. Patient shared he has not been feeling well as his wife recently tested positive for COVID-19 and was evaluated at [facility] and patient has since also developed symptoms and had positive home test.  Patient shared he continues with c/o increased fatigue/weakness, N/V, abdominal discomfort, and headaches. COVID-19 education was reviewed with patient in detail. Patient was encouraged to push fluids and to break activities up with frequent rest breaks. Signs/symptoms to report, when to seek medical attention, and the importance of early symptom recognition and follow up were all reviewed with patient and patient verbalized understanding. Patient was also educated on the importance of routine VS/RPM monitoring. ACM reviewed pulse ox education and what to report/when to follow up with patient and he verbalized understanding. Self isolation education was also reviewed with patient. Patient denied any c/o increased SOB, swelling, or worsening cough being present.   CHF education and zone information was also reviewed

## 2023-10-27 DIAGNOSIS — I10 PRIMARY HYPERTENSION: ICD-10-CM

## 2023-10-27 RX ORDER — METOPROLOL SUCCINATE 25 MG/1
25 TABLET, EXTENDED RELEASE ORAL DAILY
Qty: 90 TABLET | Refills: 1 | Status: SHIPPED | OUTPATIENT
Start: 2023-10-27

## 2023-10-30 ENCOUNTER — CARE COORDINATION (OUTPATIENT)
Dept: CARE COORDINATION | Age: 68
End: 2023-10-30

## 2023-10-30 NOTE — CARE COORDINATION
write down weights  I will notify my provider of any increase in weight by 3 or more pounds in 2 days OR 5 or more pounds in a week.     None Recently Recorded    Barriers: lack of support, overwhelmed by complexity of regimen, and lack of education  Plan for overcoming my barriers: Continue to provide ongoing education to patient and monitor for additional needs   Confidence: 8/10  Anticipated Goal Completion Date: 11/30/2023                Future Appointments   Date Time Provider [address]  [facility]   11/8/2023 10:30 AM Westley Hilliard MD [facility]   3/11/2024 10:15 AM Marcio Vaughan MD N [facility]   ,   Congestive Heart Failure Assessment    Do you understand a low sodium diet?: Yes  Do you understand how to read food labels?: No  How many restaurant meals do you eat per week?: 1-2  Do you salt your food before tasting it?: Yes     No patient-reported symptoms      Symptoms:  CHF associated dyspnea on exertion: Pos (Comment: remains at baseline)      Symptom course: stable  Patient-reported weight (lb): 199.6  Weight trend: fluctuating minimally  Salt intake watch compared to last visit: improved     ,   Hypertension - Encounter Level    Symptom course: stable     ,   General Assessment    Do you have any symptoms that are causing concern?: No     , and No linked episodes

## 2023-10-30 NOTE — CARE COORDINATION
Remote Alert Monitoring Note  Rpm alert to be reviewed by the provider   red alert   weight (increase of 5 lbs x 7 days)   Additional needs to be addressed by N/A: No      Date/Time:  10/30/2023 10:12 AM  [facility] Verified patients name and  as identifiers. Background: Pt enrolled in RPM r/t CHF. Refer to 911 immediately if:  Patient unresponsive or unable to provide history  Change in cognition or sudden confusion  Patient unable to respond in complete sentences  Intense chest pain/tightness  Any concern for any clinical emergency  Red Alert: Provider response time of 1 hr required for any red alert requiring intervention  Yellow Alert: Provider response time of 3hr required for any escalated yellow alert  Weight Scale Triage  Was your weight obtained upon rising/waking today? yes   Was your weight obtained after voiding and/or use of the bathroom today? yes   Did you weigh yourself in the same amount of clothing today, compared to how you typically do? yes   Was the scale bumped or moved prior to today's weight? Yes, puts on shelf   Is your scale on a flat/hard surface? yes   Did you obtain your weight with shoes on? no   If yes, is this something you normally do during your daily weights? NA   Were you standing up straight on the scale today? yes   Were you leaning on anything while obtaining your weight today? no   Clinical Interventions: Reviewed and followed up on alerts and treatments-Spoke with pt who is in no apparent distress. Denies any SOB/dyspnea, new/increasing edema. Pt does move scale daily and puts on a shelf so he does not trip. He states he sometimes has to weigh himself more than once, due to difficulty getting on scale r/t his Parkinson's. Pt had 2 weights recorded this morning. First weight was not an alert, but second weight which was 0.2 lbs higher was alert. Weight today is up 4.2 lbs from 7 day look back.     Plan/Follow

## 2023-11-02 ENCOUNTER — CARE COORDINATION (OUTPATIENT)
Dept: CARE COORDINATION | Age: 68
End: 2023-11-02

## 2023-11-02 NOTE — CARE COORDINATION
Remote Alert Monitoring Note  Rpm alert to be reviewed by the provider   red alert   weight (increase of 3 lbs x 1 day, 5 lbs x 7 days)   Additional needs to be addressed by N/A: No      Date/Time:  2023 9:20 AM  Patient Current Location: [place]  LPN contacted patient by telephone. Verified patients name and  as identifiers. Background: Pt enrolled in RPM r/t CHF. Refer to 911 immediately if:  Patient unresponsive or unable to provide history  Change in cognition or sudden confusion  Patient unable to respond in complete sentences  Intense chest pain/tightness  Any concern for any clinical emergency  Red Alert: Provider response time of 1 hr required for any red alert requiring intervention  Yellow Alert: Provider response time of 3hr required for any escalated yellow alert  Weight Scale Triage  Was your weight obtained upon rising/waking today? No   Was your weight obtained after voiding and/or use of the bathroom today? yes   Did you weigh yourself in the same amount of clothing today, compared to how you typically do? no   Was the scale bumped or moved prior to today's weight? Moves daily to shelf   Is your scale on a flat/hard surface? yes   Did you obtain your weight with shoes on? no   If yes, is this something you normally do during your daily weights? NA   Were you standing up straight on the scale today? yes   Were you leaning on anything while obtaining your weight today? no   Clinical Interventions: Reviewed and followed up on alerts and treatments-Spoke with pt who is in no apparent distress. Denies any SOB/dyspnea, new/increasing edema. States he forgot to obtain weight prior to dressing this morning because he had to be out of the house early. Educated pt to obtain weight upon rising, after voiding and prior to dressing. Pt verbalizes understanding. Plan/Follow Up: Will continue to review, monitor and address alerts with follow up based on severity of symptoms and risk factors.

## 2023-11-08 ENCOUNTER — CARE COORDINATION (OUTPATIENT)
Dept: CARE COORDINATION | Age: 68
End: 2023-11-08

## 2023-11-08 ENCOUNTER — OFFICE VISIT (OUTPATIENT)
Dept: FAMILY MEDICINE CLINIC | Age: 68
End: 2023-11-08
Payer: MEDICARE

## 2023-11-08 VITALS
DIASTOLIC BLOOD PRESSURE: 86 MMHG | WEIGHT: 204 LBS | RESPIRATION RATE: 18 BRPM | SYSTOLIC BLOOD PRESSURE: 134 MMHG | BODY MASS INDEX: 31.95 KG/M2 | TEMPERATURE: 97.3 F | HEART RATE: 56 BPM | OXYGEN SATURATION: 98 %

## 2023-11-08 DIAGNOSIS — F33.1 MODERATE EPISODE OF RECURRENT MAJOR DEPRESSIVE DISORDER (HCC): Primary | ICD-10-CM

## 2023-11-08 DIAGNOSIS — I10 PRIMARY HYPERTENSION: ICD-10-CM

## 2023-11-08 DIAGNOSIS — G20.A2 PARKINSON'S DISEASE WITH FLUCTUATING MANIFESTATIONS, UNSPECIFIED WHETHER DYSKINESIA PRESENT: ICD-10-CM

## 2023-11-08 PROCEDURE — 1036F TOBACCO NON-USER: CPT | Performed by: FAMILY MEDICINE

## 2023-11-08 PROCEDURE — 3074F SYST BP LT 130 MM HG: CPT | Performed by: FAMILY MEDICINE

## 2023-11-08 PROCEDURE — G8484 FLU IMMUNIZE NO ADMIN: HCPCS | Performed by: FAMILY MEDICINE

## 2023-11-08 PROCEDURE — G8427 DOCREV CUR MEDS BY ELIG CLIN: HCPCS | Performed by: FAMILY MEDICINE

## 2023-11-08 PROCEDURE — 99214 OFFICE O/P EST MOD 30 MIN: CPT | Performed by: FAMILY MEDICINE

## 2023-11-08 PROCEDURE — G8417 CALC BMI ABV UP PARAM F/U: HCPCS | Performed by: FAMILY MEDICINE

## 2023-11-08 PROCEDURE — 3017F COLORECTAL CA SCREEN DOC REV: CPT | Performed by: FAMILY MEDICINE

## 2023-11-08 PROCEDURE — 1123F ACP DISCUSS/DSCN MKR DOCD: CPT | Performed by: FAMILY MEDICINE

## 2023-11-08 PROCEDURE — 3079F DIAST BP 80-89 MM HG: CPT | Performed by: FAMILY MEDICINE

## 2023-11-08 RX ORDER — SERTRALINE HYDROCHLORIDE 100 MG/1
100 TABLET, FILM COATED ORAL DAILY
Qty: 90 TABLET | Refills: 3
Start: 2023-11-08

## 2023-11-08 ASSESSMENT — ENCOUNTER SYMPTOMS
CONSTIPATION: 0
DIARRHEA: 0
ABDOMINAL PAIN: 0
WHEEZING: 0
RHINORRHEA: 0
NAUSEA: 0
SORE THROAT: 0
SHORTNESS OF BREATH: 0
COUGH: 0

## 2023-11-08 NOTE — CARE COORDINATION
11/08/23 9:34 AM Patient is currently enrolled in Remote Patient Monitoring for CHF. Pt has a scheduled office visit today at 10:30 am.  RPM metrics added for review.

## 2023-11-08 NOTE — CARE COORDINATION
11/08/23 12:34 PM Patient is currently enrolled in Remote Patient Monitoring for CHF. Pt has not returned call as of this time, but noted oxygen heart rate during office visit with PCP was 56. Metric now within RPM parameters. Plan/Follow Up: Will continue to review, monitor and address alerts with follow up based on severity of symptoms and risk factors.

## 2023-11-08 NOTE — CARE COORDINATION
Date/Time:  11/8/2023 8:23 AM  LPN attempted to reach patient by telephone regarding red alert in remote patient monitoring program. Left HIPPA compliant message requesting a return call. Will attempt to reach patient again.

## 2023-11-13 ENCOUNTER — CARE COORDINATION (OUTPATIENT)
Dept: CARE COORDINATION | Age: 68
End: 2023-11-13

## 2023-11-13 DIAGNOSIS — E78.2 MIXED HYPERLIPIDEMIA: ICD-10-CM

## 2023-11-13 RX ORDER — ATORVASTATIN CALCIUM 20 MG/1
20 TABLET, FILM COATED ORAL DAILY
Qty: 90 TABLET | Refills: 1 | Status: SHIPPED | OUTPATIENT
Start: 2023-11-13

## 2023-11-13 NOTE — CARE COORDINATION
Remote Patient Monitoring Note  Date/Time:  2023 1:54 PM  Patient Current Location: [place]  LPN contacted patient by telephone regarding yellow alert received for no BP or weight x > 2 days. Verified patients name and  as identifiers. Background: Pt enrolled in RPM r/t CHF. Clinical Interventions: Reviewed and followed up on alerts and treatments-Spoke with pt who states he thought he was only supposed to use the equipment for a month. He has packed equipment up and tried to take it back to [facility]. Educated pt that RPM is a 60-90 day program and ACM will reach out to him prior to discontinuing program.  He is going to set equipment back up and continue monitoring. Plan/Follow Up: Will continue to review, monitor and address alerts with follow up based on severity of symptoms and risk factors.
and risk factors.

## 2023-11-21 ENCOUNTER — CARE COORDINATION (OUTPATIENT)
Dept: CARE COORDINATION | Age: 68
End: 2023-11-21

## 2023-11-21 NOTE — CARE COORDINATION
Ambulatory Care Coordination Note  2023    Patient Current Location: [place]     ACM contacted the patient by telephone. Verified name and  with patient as identifiers. Provided introduction to self, and explanation of the ACM role. Challenges to be reviewed by the provider   Additional needs identified to be addressed with provider: Yes    Pt with c/o increased migraine frequency recently. Pt stated can only take stadol 1 x a month and Excedrin migraine helps to take \"the edge\" off. Pt. denied any dizziness, numbness/tingling, or weakness being present. Pt also had recent episode of \"chest tightness\" that lasted a \"couple of days\" and then resolved on it's own. Pt denied any c/o SOB, radiation of pain, or increased HR being present. Pt declined earlier appt (currently scheduled for ). Please advise if needs to be seen earlier or any other change in POC. Thank you! Method of communication with provider: chart routing. ACM: Patria Mccrary RN    Patient returned call from earlier for continued Care Coordination follow up and education re: the management of his CHF and healthcare needs. Patient shared he has had increased migraine symptoms recently as well as an episode of \"chest tightness\" that resolved on it's own - see above. Patient declined appt for evaluation at this time. ACM educated patient on signs/symptoms to report and when to seek medical attention. Pt was also educated on the need to call for earlier appt with any new/change/worsening of symptoms. Patient verbalized understanding. Patient denied any recent c/o increased SOB or swelling being present. Patient remains current with RPM program and recent readings reviewed: 111/78-78-96%-200.8 pounds. Importance of continued monitoring and what to report/when to follow up was also reviewed with patient.   ACM educated patient on CHF zone information, CHF Tuck In education, and diet education and need for ongoing

## 2023-11-21 NOTE — CARE COORDINATION
Attempted to reach patient for continued Care Coordination follow up and education re: the management of his CHF and healthcare needs. Patient was unavailable at the time of ACM call, and generic voicemail message was left asking patient to please return call to [facility] direct number. Will continue to work to f/u with patient in the future.

## 2023-11-22 ENCOUNTER — CARE COORDINATION (OUTPATIENT)
Dept: CARE COORDINATION | Age: 68
End: 2023-11-22

## 2023-11-22 NOTE — CARE COORDINATION
Patient returned call and was updated of provider recommendations. Patient verbalized understanding and denied any other questions, concerns, or needs. ACM again reviewed need to call for earlier appointment with any new/change/worsening of symptoms.

## 2023-11-22 NOTE — CARE COORDINATION
Remote Alert Monitoring Note  Rpm alert to be reviewed by the provider   red alert   pulse ox reading (89)   Additional needs to be addressed by N/A: No                    Date/Time:  2023 10:42 AM  Patient Current Location: Home: [address] [place], [place] [address]  [id number]  LPN contacted patient by telephone. Verified patients name and  as identifiers. Background: CHF  Refer to 911 immediately if:  Patient unresponsive or unable to provide history  Change in cognition or sudden confusion  Patient unable to respond in complete sentences  Intense chest pain/tightness  Any concern for any clinical emergency  Red Alert: Provider response time of 1 hr required for any red alert requiring intervention  Yellow Alert: Provider response time of 3hr required for any escalated yellow alert    O2 Triage  Are you having any Chest Pain? no   Are you having any Shortness of Breath? no   Swelling in your hands or feet? no     Are you having any other health concerns or issues? no      Clinical Interventions: Reviewed and followed up on alerts and treatments-Spoke with Pt for P/Ox reading of 89. Pt asymptomatic, speaking in complete sentences. Denies CP, SOB, cough, new/increased edema. Upon recheck P/Ox 95 % RA. Patient instructed to call PCP, or present to ED if symptoms occur, or become acutely worse. Pt v/u. Plan/Follow Up: Will continue to review, monitor and address alerts with follow up based on severity of symptoms and risk factors.

## 2023-11-22 NOTE — CARE COORDINATION
Nelson Boothe, APRN - CNP  You 16 hours ago (4:05 PM)     Okay for waiting for 12/4 appointment unless symptoms continue or worsen then he should be seen. Thanks. Attempted to f/u with patient but patient was not available at the time of [facility] call and generic voicemail message was left asking patient to please return call to [facility] direct number. Will continue to work to f/u with patient in the future.

## 2023-11-28 ENCOUNTER — CARE COORDINATION (OUTPATIENT)
Dept: CARE COORDINATION | Age: 68
End: 2023-11-28

## 2023-11-28 NOTE — CARE COORDINATION
Attempted to reach patient for continued Care Coordination follow up and education. Patient was unavailable at the time of my call, and a generic voicemail message was left asking patient to return my call at [phone].

## 2023-11-28 NOTE — CARE COORDINATION
Ambulatory Care Coordination Note  2023    Patient Current Location: [place]     ACM contacted the patient by telephone. Verified name and  with patient as identifiers. Provided introduction to self, and explanation of the ACM role. Challenges to be reviewed by the provider   Additional needs identified to be addressed with provider: No  none         Method of communication with provider: none. ACM: Anali Chan RN    Patient returned call to [employer] from message earlier today for continued Care Coordination follow up and education re: the management of his CHF, hypertension, and healthcare needs. Patient shared he has been doing well and he denied any further c/o chest tightness being present. Patient stated his breathing remains at his baseline and he denied any c/o increased swelling or worsening SOB being present. Patient shared he did have one recent migraine since we last spoke and migraine symptoms remained at his baseline. ACM again reviewed signs/symptoms to report and when to follow up/seek medical attention. Patient verbalized understanding. CHF education and zone information was also reviewed with patient. ACM reviewed signs/symptoms to report and the importance of early symptom recognition and follow up. Patient verbalized understanding. Patient remains current with RPM program and recent readings reviewed: 129/74-55-98%, and weight 200.2. Importance of ongoing monitoring and following up as directed reviewed with patient and he verbalized understanding. Patient denied any other questions, concerns, or needs and he was encouraged to call with any that may develop.           Actions:   - Reviewed CHF zone education   - Reviewed signs/symptoms to report  - Reviewed importance of early symptom recognition/follow up/when to seek medical attention  - Reviewed recent RPM readings  - Reviewed importance of ongoing monitoring   - Monitored for additional needs         Plan of Care:   -

## 2023-12-01 VITALS
BODY MASS INDEX: 31.86 KG/M2 | HEART RATE: 59 BPM | WEIGHT: 203.4 LBS | OXYGEN SATURATION: 93 % | DIASTOLIC BLOOD PRESSURE: 79 MMHG | SYSTOLIC BLOOD PRESSURE: 113 MMHG

## 2023-12-04 ENCOUNTER — CARE COORDINATION (OUTPATIENT)
Dept: CARE COORDINATION | Age: 68
End: 2023-12-04

## 2023-12-04 ENCOUNTER — OFFICE VISIT (OUTPATIENT)
Dept: FAMILY MEDICINE CLINIC | Age: 68
End: 2023-12-04
Payer: MEDICARE

## 2023-12-04 VITALS
WEIGHT: 206 LBS | HEIGHT: 67 IN | BODY MASS INDEX: 32.33 KG/M2 | TEMPERATURE: 97.7 F | RESPIRATION RATE: 16 BRPM | OXYGEN SATURATION: 96 % | DIASTOLIC BLOOD PRESSURE: 80 MMHG | HEART RATE: 55 BPM | SYSTOLIC BLOOD PRESSURE: 122 MMHG

## 2023-12-04 DIAGNOSIS — R00.1 SYMPTOMATIC BRADYCARDIA: ICD-10-CM

## 2023-12-04 DIAGNOSIS — I10 PRIMARY HYPERTENSION: ICD-10-CM

## 2023-12-04 DIAGNOSIS — F33.1 MODERATE EPISODE OF RECURRENT MAJOR DEPRESSIVE DISORDER (HCC): Primary | ICD-10-CM

## 2023-12-04 PROCEDURE — G8427 DOCREV CUR MEDS BY ELIG CLIN: HCPCS | Performed by: FAMILY MEDICINE

## 2023-12-04 PROCEDURE — 3079F DIAST BP 80-89 MM HG: CPT | Performed by: FAMILY MEDICINE

## 2023-12-04 PROCEDURE — 1123F ACP DISCUSS/DSCN MKR DOCD: CPT | Performed by: FAMILY MEDICINE

## 2023-12-04 PROCEDURE — G8484 FLU IMMUNIZE NO ADMIN: HCPCS | Performed by: FAMILY MEDICINE

## 2023-12-04 PROCEDURE — 1036F TOBACCO NON-USER: CPT | Performed by: FAMILY MEDICINE

## 2023-12-04 PROCEDURE — G8417 CALC BMI ABV UP PARAM F/U: HCPCS | Performed by: FAMILY MEDICINE

## 2023-12-04 PROCEDURE — 99214 OFFICE O/P EST MOD 30 MIN: CPT | Performed by: FAMILY MEDICINE

## 2023-12-04 PROCEDURE — 3074F SYST BP LT 130 MM HG: CPT | Performed by: FAMILY MEDICINE

## 2023-12-04 PROCEDURE — 3017F COLORECTAL CA SCREEN DOC REV: CPT | Performed by: FAMILY MEDICINE

## 2023-12-04 RX ORDER — DESVENLAFAXINE SUCCINATE 50 MG/1
50 TABLET, EXTENDED RELEASE ORAL DAILY
Qty: 90 TABLET | Refills: 3 | Status: SHIPPED | OUTPATIENT
Start: 2023-12-04

## 2023-12-04 RX ORDER — METOPROLOL SUCCINATE 25 MG/1
12.5 TABLET, EXTENDED RELEASE ORAL DAILY
Qty: 45 TABLET | Refills: 1
Start: 2023-12-04

## 2023-12-04 ASSESSMENT — ENCOUNTER SYMPTOMS
RHINORRHEA: 0
WHEEZING: 0
COUGH: 0
DIARRHEA: 0
SHORTNESS OF BREATH: 0
CONSTIPATION: 0
NAUSEA: 0
ABDOMINAL PAIN: 0
SORE THROAT: 0

## 2023-12-04 NOTE — CARE COORDINATION
Remote Patient Monitoring Note  Date/Time:  12/4/2023 8:58 AM  LPN reviewed patients reported daily Remote Patient Monitoring metrics. All reported metrics are within alert parameters. Pt enrolled in RPM r/t CHF. Pt has scheduled office visit today at 2:30 pm.  RPM metrics added for review. Plan/Follow Up: Will continue to review, monitor and address alerts with follow up based on severity of symptoms and risk factors.

## 2023-12-04 NOTE — PROGRESS NOTES
[facility] MEDICINE  [address] [place] [facility] [address]  Dept: [phone]  Loc: [address] Misha Salter (:  1955) is a 79 y.o. male, here for evaluation of the following chief complaint(s):  Follow-up (Last seen 23) and Dizziness (Started Friday, constant dizziness)      ASSESSMENT/PLAN:  1. Moderate episode of recurrent major depressive disorder (HCC)  -     desvenlafaxine succinate (PRISTIQ) 50 MG TB24 extended release tablet; Take 1 tablet by mouth daily, Disp-90 tablet, R-3Normal  2. Primary hypertension  -     metoprolol succinate (TOPROL XL) 25 MG extended release tablet; Take 0.5 tablets by mouth daily, Disp-45 tablet, R-1Adjust Sig  3. Symptomatic bradycardia  Will prescribe pristiq  Reviewed gene sight testing which showed zoloft not idea medication, but pristiq is one of few that would be best  Will decrease metoprolol due to symptomatic bradycardia. Monitor bp  Recommend cane to prevent fall. Fall prevention tips given    Return in about 2 months (around 2024) for follow up, mood, then AWV 4 wks later. SUBJECTIVE/OBJECTIVE:  Dizziness  Associated symptoms include fatigue. Pertinent negatives include no abdominal pain, arthralgias, chest pain, chills, congestion, coughing, fever, headaches, myalgias, nausea or sore throat. Review of Systems   Constitutional:  Positive for activity change and fatigue. Negative for chills and fever. HENT:  Negative for congestion, rhinorrhea and sore throat. Respiratory:  Negative for cough, shortness of breath and wheezing. Cardiovascular:  Negative for chest pain and palpitations. Gastrointestinal:  Negative for abdominal pain, constipation, diarrhea and nausea. Genitourinary:  Negative for dysuria and hematuria. Musculoskeletal:  Positive for gait problem. Negative for arthralgias and myalgias.    Neurological:  Positive for dizziness, tremors and

## 2023-12-13 ENCOUNTER — CARE COORDINATION (OUTPATIENT)
Dept: CARE COORDINATION | Age: 68
End: 2023-12-13

## 2023-12-26 ENCOUNTER — CARE COORDINATION (OUTPATIENT)
Dept: CARE COORDINATION | Age: 68
End: 2023-12-26

## 2023-12-26 NOTE — CARE COORDINATION
Remote Patient Monitoring Note  Date/Time:  2023 2:41 PM  Patient Current Location: [place]  LPN contacted patient by telephone regarding yellow alert received for no BP or weight x > 2 days. Verified patients name and  as identifiers. Background: Pt enrolled in RPM r/t CHF. Clinical Interventions: Reviewed and followed up on alerts and treatments-Spoke with pt who states he has been in [facility] for the holidays. He is returning this evening and will update metrics tomorrow morning. Plan/Follow Up: Will continue to review, monitor and address alerts with follow up based on severity of symptoms and risk factors.

## 2023-12-27 ENCOUNTER — CARE COORDINATION (OUTPATIENT)
Dept: CARE COORDINATION | Age: 68
End: 2023-12-27

## 2023-12-27 NOTE — CARE COORDINATION
Remote Patient Monitoring Note  Date/Time:  12/27/2023 3:37 PM  Patient Current Location: [address] [place], [place] [address] noted yellow alert received for no BP or weight x > 2 days. Background: Pt enrolled in RPM r/t CHF. Clinical Interventions: Reviewed and followed up on alerts and treatments-Spoke with pt yesterday regarding no metrics and he had stated he was out of town for holiday. Pt was to return from [facility] last night and resume monitoring this morning. No metrics entered as of this time. Will route to [facility] to update. Plan/Follow Up: Will continue to review, monitor and address alerts with follow up based on severity of symptoms and risk factors.

## 2023-12-28 ENCOUNTER — CARE COORDINATION (OUTPATIENT)
Dept: CARE COORDINATION | Age: 68
End: 2023-12-28

## 2023-12-28 DIAGNOSIS — I50.22 CHRONIC SYSTOLIC (CONGESTIVE) HEART FAILURE (HCC): Primary | ICD-10-CM

## 2024-01-02 ENCOUNTER — PATIENT MESSAGE (OUTPATIENT)
Dept: FAMILY MEDICINE CLINIC | Age: 69
End: 2024-01-02

## 2024-01-02 DIAGNOSIS — F33.1 MODERATE EPISODE OF RECURRENT MAJOR DEPRESSIVE DISORDER (HCC): ICD-10-CM

## 2024-01-02 NOTE — TELEPHONE ENCOUNTER
From: Joel Bermudez  To: Dr. Jyoti Dye  Sent: 1/2/2024 1:16 PM EST  Subject: New Mail order pharmacy, and local pharmacy    New mail order is [facility] , phone number is [phone]. and the local one is [facility]. I havent heard anything about my Perstic,and now this one [employer] wont cover this one DESVENLAFAX SUC ER TB 50 MG,IT IS $300.00 OUT OF POCKET,THEY SAID THEY MIGHT IF YOU SEND A SCRIPT IN.

## 2024-01-03 RX ORDER — DESVENLAFAXINE SUCCINATE 50 MG/1
50 TABLET, EXTENDED RELEASE ORAL DAILY
Qty: 90 TABLET | Refills: 3 | Status: SHIPPED | OUTPATIENT
Start: 2024-01-03 | End: 2024-01-07 | Stop reason: SDUPTHER

## 2024-01-03 NOTE — TELEPHONE ENCOUNTER
Pristiq is ideal for patient per gene site testing results.  if denied, please attempt PA.  Sent to [facility]. thanks

## 2024-01-05 NOTE — CARE COORDINATION
See f/u My Chart message re: new mail order pharmacy information and pristiq.   
Graduation    Date/Time:  12/28/2023 2:14 PM  Patient Current Location: [place]  Patient has graduated from the Remote Patient Monitoring program on 12/28/2023.   RPM goals have been met at this time.      Patient has been provided instruction on process to return RPM equipment and RPM will be deactivated.     Patient has ACM's contact information for any further questions, concerns, or needs.    Lab Results       None            Care Coordination Interventions    Referral from Primary Care Provider: No  Suggested Interventions and Community Resources  Fall Risk Prevention: Completed (Comment: Sept. 2023)  Medication Assistance Program: Declined (Comment: Educated to call with any changes - Aug. 2023)  Medi Set or Pill Pack: Completed (Comment: Aug. 2023)  Pharmacist: Declined (Comment: Aug. 2023)  Registered Dietician: Declined (Comment: Aug. 2023)  Social Work: Completed (Comment: Referral for resource needs - Aug. 2023)  Other Services: Completed (Comment: Remote Patient Monitoring (RPM) - Sept. 2023)  Transportation Support: Declined (Comment: Denied any additional needs - Aug. 2023)  Zone Management Tools: Completed (Comment: CHF - Sept. 2023)  Other Services or Interventions: Follows with Dr. Maico CAMPOS for neuro, [facility], and [facility]          Goals Addressed                   This Visit's Progress       Care Coordination     Self Monitoring   Improving     Daily Weights - I will weight myself as directed - Daily and write down weights  I will notify my provider of any increase in weight by 3 or more pounds in 2 days OR 5 or more pounds in a week.    None Recently Recorded    Barriers: lack of support, overwhelmed by complexity of regimen, and lack of education  Plan for overcoming my barriers: Continue to provide ongoing education to patient and monitor for additional needs   Confidence: 8/10  Anticipated Goal Completion Date: 11/30/2023                Future Appointments   Date Time Provider

## 2024-01-07 RX ORDER — DESVENLAFAXINE SUCCINATE 50 MG/1
50 TABLET, EXTENDED RELEASE ORAL DAILY
Qty: 90 TABLET | Refills: 3 | Status: SHIPPED | OUTPATIENT
Start: 2024-01-07

## 2024-01-12 ENCOUNTER — CARE COORDINATION (OUTPATIENT)
Dept: CARE COORDINATION | Age: 69
End: 2024-01-12

## 2024-01-12 NOTE — CARE COORDINATION
Attempted to reach patient for continued Care Coordination follow up and education re: the management of his CHF and healthcare needs.  Patient was unavailable at the time of ACM call, and generic voicemail message was left asking patient to please return call to [facility] direct number.  Will continue to work to f/u with patient in the future.

## 2024-01-17 ENCOUNTER — CARE COORDINATION (OUTPATIENT)
Dept: CARE COORDINATION | Age: 69
End: 2024-01-17

## 2024-01-17 NOTE — CARE COORDINATION
Attempted to reach patient for continued Care Coordination follow up and education re: the management of his CHF and healthcare needs.  Patient was unavailable at the time of ACM call, and generic voicemail message was left asking patient to please return call to [facility] direct number.  Will continue to work to f/u with patient in the future.

## 2024-01-22 ENCOUNTER — APPOINTMENT (OUTPATIENT)
Dept: CT IMAGING | Age: 69
End: 2024-01-22
Payer: MEDICARE

## 2024-01-22 ENCOUNTER — HOSPITAL ENCOUNTER (INPATIENT)
Age: 69
LOS: 2 days | Discharge: HOME | End: 2024-01-25
Attending: EMERGENCY MEDICINE
Payer: MEDICARE

## 2024-01-22 ENCOUNTER — APPOINTMENT (OUTPATIENT)
Dept: GENERAL RADIOLOGY | Age: 69
End: 2024-01-22
Payer: MEDICARE

## 2024-01-22 DIAGNOSIS — R51.9 NONINTRACTABLE EPISODIC HEADACHE, UNSPECIFIED HEADACHE TYPE: ICD-10-CM

## 2024-01-22 DIAGNOSIS — T78.3XXA ANGIOEDEMA OF LIPS, INITIAL ENCOUNTER: ICD-10-CM

## 2024-01-22 DIAGNOSIS — T78.3XXA ANGIOEDEMA, INITIAL ENCOUNTER: Primary | ICD-10-CM

## 2024-01-22 DIAGNOSIS — I10 UNCONTROLLED HYPERTENSION: ICD-10-CM

## 2024-01-22 LAB
ALBUMIN SERPL BCP-MCNC: 3.6 GM/DL (ref 3.4–5)
ALP SERPL-CCNC: 81 U/L (ref 46–116)
ALT SERPL W P-5'-P-CCNC: 17 U/L (ref 14–63)
ANION GAP SERPL CALC-SCNC: 6 MEQ/L (ref 8–16)
AST SERPL W P-5'-P-CCNC: 14 U/L (ref 15–37)
BASOPHILS # BLD: 0.3 % (ref 0–3)
BASOPHILS ABSOLUTE: 0 THOU/MM3 (ref 0–0.1)
BILIRUB SERPL-MCNC: 0.2 MG/DL (ref 0.2–1)
BUN SERPL-MCNC: 19 MG/DL (ref 7–18)
CALCIUM SERPL-MCNC: 8.5 MG/DL (ref 8.5–10.1)
CHLORIDE SERPL-SCNC: 104 MEQ/L (ref 98–107)
CO2 SERPL-SCNC: 29 MEQ/L (ref 21–32)
CREAT SERPL-MCNC: 1.1 MG/DL (ref 0.6–1.3)
EOSINOPHILS ABSOLUTE: 0.3 THOU/MM3 (ref 0–0.5)
EOSINOPHILS RELATIVE PERCENT: 3 % (ref 0–4)
GFR SERPL CREATININE-BSD FRML MDRD: > 60 ML/MIN/1.73M2
GLUCOSE SERPL-MCNC: 118 MG/DL (ref 74–106)
HCT VFR BLD CALC: 44.7 % (ref 42–52)
HEMOGLOBIN: 14.3 GM/DL (ref 14–18)
IMMATURE GRANS (ABS): 0.2 THOU/MM3 (ref 0–0.07)
IMMATURE GRANULOCYTES: 2 %
LYMPHOCYTES # BLD AUTO: 32.5 % (ref 15–47)
LYMPHOCYTES ABSOLUTE: 3.2 THOU/MM3 (ref 1–4.8)
MCH RBC QN AUTO: 30 PG (ref 26–32)
MCHC RBC AUTO-ENTMCNC: 32 GM/DL (ref 31–35)
MCV RBC AUTO: 93.9 FL (ref 80–94)
MONOCYTES: 1.2 THOU/MM3 (ref 0.3–1.3)
MONOCYTES: 11.7 % (ref 0–12)
PDW BLD-RTO: 13 % (ref 11.5–14.9)
PLATELET # BLD AUTO: 389 THOU/MM3 (ref 130–400)
PMV BLD AUTO: 9.2 FL (ref 9.4–12.4)
POTASSIUM SERPL-SCNC: 4.1 MEQ/L (ref 3.5–5.1)
PROT SERPL-MCNC: 7.8 GM/DL (ref 6.4–8.2)
RBC # BLD: 4.76 MILL/MM3 (ref 4.5–6.1)
SEG NEUTROPHILS: 50.8 % (ref 43–75)
SEGMENTED NEUTROPHILS ABSOLUTE COUNT: 5 THOU/MM3 (ref 1.8–7.7)
SODIUM SERPL-SCNC: 139 MEQ/L (ref 136–145)
WBC # BLD: 9.9 THOU/MM3 (ref 4.8–10.8)

## 2024-01-22 PROCEDURE — 93005 ELECTROCARDIOGRAM TRACING: CPT | Performed by: EMERGENCY MEDICINE

## 2024-01-22 PROCEDURE — 70490 CT SOFT TISSUE NECK W/O DYE: CPT

## 2024-01-22 PROCEDURE — 96372 THER/PROPH/DIAG INJ SC/IM: CPT

## 2024-01-22 PROCEDURE — A4216 STERILE WATER/SALINE, 10 ML: HCPCS | Performed by: EMERGENCY MEDICINE

## 2024-01-22 PROCEDURE — 80053 COMPREHEN METABOLIC PANEL: CPT

## 2024-01-22 PROCEDURE — 2580000003 HC RX 258: Performed by: EMERGENCY MEDICINE

## 2024-01-22 PROCEDURE — 99285 EMERGENCY DEPT VISIT HI MDM: CPT

## 2024-01-22 PROCEDURE — 70450 CT HEAD/BRAIN W/O DYE: CPT

## 2024-01-22 PROCEDURE — 84484 ASSAY OF TROPONIN QUANT: CPT

## 2024-01-22 PROCEDURE — 71045 X-RAY EXAM CHEST 1 VIEW: CPT

## 2024-01-22 PROCEDURE — 80164 ASSAY DIPROPYLACETIC ACD TOT: CPT

## 2024-01-22 PROCEDURE — 96375 TX/PRO/DX INJ NEW DRUG ADDON: CPT

## 2024-01-22 PROCEDURE — 96374 THER/PROPH/DIAG INJ IV PUSH: CPT

## 2024-01-22 PROCEDURE — 6360000002 HC RX W HCPCS: Performed by: EMERGENCY MEDICINE

## 2024-01-22 PROCEDURE — 85025 COMPLETE CBC W/AUTO DIFF WBC: CPT

## 2024-01-22 PROCEDURE — 2500000003 HC RX 250 WO HCPCS: Performed by: EMERGENCY MEDICINE

## 2024-01-22 RX ORDER — DIPHENHYDRAMINE HYDROCHLORIDE 50 MG/ML
25 INJECTION INTRAMUSCULAR; INTRAVENOUS ONCE
Status: COMPLETED | OUTPATIENT
Start: 2024-01-22 | End: 2024-01-22

## 2024-01-22 RX ORDER — FENTANYL CITRATE 50 UG/ML
50 INJECTION, SOLUTION INTRAMUSCULAR; INTRAVENOUS ONCE
Status: COMPLETED | OUTPATIENT
Start: 2024-01-23 | End: 2024-01-22

## 2024-01-22 RX ORDER — EPINEPHRINE 1 MG/ML
0.3 INJECTION, SOLUTION INTRAMUSCULAR; SUBCUTANEOUS ONCE
Status: COMPLETED | OUTPATIENT
Start: 2024-01-22 | End: 2024-01-22

## 2024-01-22 RX ORDER — FENTANYL CITRATE 50 UG/ML
50 INJECTION, SOLUTION INTRAMUSCULAR; INTRAVENOUS ONCE
Status: COMPLETED | OUTPATIENT
Start: 2024-01-22 | End: 2024-01-22

## 2024-01-22 RX ORDER — ONDANSETRON 2 MG/ML
4 INJECTION INTRAMUSCULAR; INTRAVENOUS ONCE
Status: DISCONTINUED | OUTPATIENT
Start: 2024-01-22 | End: 2024-01-25 | Stop reason: HOSPADM

## 2024-01-22 RX ADMIN — EPINEPHRINE 0.3 MG: 1 INJECTION INTRAMUSCULAR; INTRAVENOUS; SUBCUTANEOUS at 23:07

## 2024-01-22 RX ADMIN — WATER 125 MG: 1 INJECTION INTRAMUSCULAR; INTRAVENOUS; SUBCUTANEOUS at 23:21

## 2024-01-22 RX ADMIN — DIPHENHYDRAMINE HYDROCHLORIDE 25 MG: 50 INJECTION INTRAMUSCULAR; INTRAVENOUS at 23:09

## 2024-01-22 RX ADMIN — FENTANYL CITRATE 50 MCG: 50 INJECTION INTRAMUSCULAR; INTRAVENOUS at 23:45

## 2024-01-22 RX ADMIN — FAMOTIDINE 20 MG: 10 INJECTION, SOLUTION INTRAVENOUS at 23:13

## 2024-01-22 RX ADMIN — FENTANYL CITRATE 50 MCG: 50 INJECTION INTRAMUSCULAR; INTRAVENOUS at 23:22

## 2024-01-22 ASSESSMENT — ENCOUNTER SYMPTOMS
SHORTNESS OF BREATH: 0
WHEEZING: 0
ABDOMINAL PAIN: 0
SORE THROAT: 0
NAUSEA: 0
COUGH: 0
BLURRED VISION: 0

## 2024-01-23 PROBLEM — T78.3XXA ANGIOEDEMA OF LIPS, INITIAL ENCOUNTER: Status: ACTIVE | Noted: 2024-01-23

## 2024-01-23 LAB
TROPONIN, HIGH SENSITIVITY: 21.7 PG/ML (ref 0–76.1)
VALPROATE SERPL-MCNC: 38 UG/ML (ref 50–100)

## 2024-01-23 PROCEDURE — 93010 ELECTROCARDIOGRAM REPORT: CPT | Performed by: INTERNAL MEDICINE

## 2024-01-23 PROCEDURE — 6370000000 HC RX 637 (ALT 250 FOR IP): Performed by: PHYSICIAN ASSISTANT

## 2024-01-23 PROCEDURE — 99223 1ST HOSP IP/OBS HIGH 75: CPT | Performed by: PHYSICIAN ASSISTANT

## 2024-01-23 PROCEDURE — 2500000003 HC RX 250 WO HCPCS: Performed by: EMERGENCY MEDICINE

## 2024-01-23 PROCEDURE — 1200000003 HC TELEMETRY R&B

## 2024-01-23 PROCEDURE — 2580000003 HC RX 258: Performed by: PHYSICIAN ASSISTANT

## 2024-01-23 PROCEDURE — 6360000002 HC RX W HCPCS: Performed by: PHYSICIAN ASSISTANT

## 2024-01-23 RX ORDER — METOPROLOL SUCCINATE 25 MG/1
12.5 TABLET, EXTENDED RELEASE ORAL DAILY
Status: DISCONTINUED | OUTPATIENT
Start: 2024-01-23 | End: 2024-01-25 | Stop reason: HOSPADM

## 2024-01-23 RX ORDER — DIVALPROEX SODIUM 500 MG/1
1500 TABLET, EXTENDED RELEASE ORAL DAILY
Status: DISCONTINUED | OUTPATIENT
Start: 2024-01-23 | End: 2024-01-25 | Stop reason: HOSPADM

## 2024-01-23 RX ORDER — MAGNESIUM SULFATE IN WATER 40 MG/ML
2000 INJECTION, SOLUTION INTRAVENOUS PRN
Status: DISCONTINUED | OUTPATIENT
Start: 2024-01-23 | End: 2024-01-25 | Stop reason: HOSPADM

## 2024-01-23 RX ORDER — ATORVASTATIN CALCIUM 20 MG/1
20 TABLET, FILM COATED ORAL DAILY
Status: DISCONTINUED | OUTPATIENT
Start: 2024-01-23 | End: 2024-01-25 | Stop reason: HOSPADM

## 2024-01-23 RX ORDER — ONDANSETRON 2 MG/ML
4 INJECTION INTRAMUSCULAR; INTRAVENOUS EVERY 6 HOURS PRN
Status: DISCONTINUED | OUTPATIENT
Start: 2024-01-23 | End: 2024-01-25 | Stop reason: HOSPADM

## 2024-01-23 RX ORDER — ONDANSETRON 4 MG/1
4 TABLET, ORALLY DISINTEGRATING ORAL EVERY 8 HOURS PRN
Status: DISCONTINUED | OUTPATIENT
Start: 2024-01-23 | End: 2024-01-25 | Stop reason: HOSPADM

## 2024-01-23 RX ORDER — LAMOTRIGINE 100 MG/1
200 TABLET ORAL 2 TIMES DAILY
Status: DISCONTINUED | OUTPATIENT
Start: 2024-01-23 | End: 2024-01-25 | Stop reason: HOSPADM

## 2024-01-23 RX ORDER — TRANEXAMIC ACID 10 MG/ML
1000 INJECTION, SOLUTION INTRAVENOUS ONCE
Status: DISCONTINUED | OUTPATIENT
Start: 2024-01-23 | End: 2024-01-23

## 2024-01-23 RX ORDER — ENOXAPARIN SODIUM 100 MG/ML
40 INJECTION SUBCUTANEOUS DAILY
Status: DISCONTINUED | OUTPATIENT
Start: 2024-01-23 | End: 2024-01-25 | Stop reason: HOSPADM

## 2024-01-23 RX ORDER — SODIUM CHLORIDE 0.9 % (FLUSH) 0.9 %
5-40 SYRINGE (ML) INJECTION EVERY 12 HOURS SCHEDULED
Status: DISCONTINUED | OUTPATIENT
Start: 2024-01-23 | End: 2024-01-25 | Stop reason: HOSPADM

## 2024-01-23 RX ORDER — POLYETHYLENE GLYCOL 3350 17 G/17G
17 POWDER, FOR SOLUTION ORAL DAILY PRN
Status: DISCONTINUED | OUTPATIENT
Start: 2024-01-23 | End: 2024-01-25 | Stop reason: HOSPADM

## 2024-01-23 RX ORDER — SODIUM CHLORIDE 0.9 % (FLUSH) 0.9 %
5-40 SYRINGE (ML) INJECTION PRN
Status: DISCONTINUED | OUTPATIENT
Start: 2024-01-23 | End: 2024-01-25 | Stop reason: HOSPADM

## 2024-01-23 RX ORDER — ACETAMINOPHEN 650 MG/1
650 SUPPOSITORY RECTAL EVERY 6 HOURS PRN
Status: DISCONTINUED | OUTPATIENT
Start: 2024-01-23 | End: 2024-01-25 | Stop reason: HOSPADM

## 2024-01-23 RX ORDER — TRANEXAMIC ACID 10 MG/ML
1000 INJECTION, SOLUTION INTRAVENOUS ONCE
Status: COMPLETED | OUTPATIENT
Start: 2024-01-23 | End: 2024-01-23

## 2024-01-23 RX ORDER — ACETAMINOPHEN 325 MG/1
650 TABLET ORAL EVERY 6 HOURS PRN
Status: DISCONTINUED | OUTPATIENT
Start: 2024-01-23 | End: 2024-01-25 | Stop reason: HOSPADM

## 2024-01-23 RX ORDER — PANTOPRAZOLE SODIUM 40 MG/1
40 TABLET, DELAYED RELEASE ORAL
Status: DISCONTINUED | OUTPATIENT
Start: 2024-01-23 | End: 2024-01-25 | Stop reason: HOSPADM

## 2024-01-23 RX ORDER — BUTALBITAL, ACETAMINOPHEN AND CAFFEINE 50; 325; 40 MG/1; MG/1; MG/1
1 TABLET ORAL EVERY 4 HOURS PRN
Status: DISCONTINUED | OUTPATIENT
Start: 2024-01-23 | End: 2024-01-24

## 2024-01-23 RX ORDER — POTASSIUM CHLORIDE 20 MEQ/1
40 TABLET, EXTENDED RELEASE ORAL PRN
Status: DISCONTINUED | OUTPATIENT
Start: 2024-01-23 | End: 2024-01-25 | Stop reason: HOSPADM

## 2024-01-23 RX ORDER — SODIUM CHLORIDE 9 MG/ML
INJECTION, SOLUTION INTRAVENOUS PRN
Status: DISCONTINUED | OUTPATIENT
Start: 2024-01-23 | End: 2024-01-25 | Stop reason: HOSPADM

## 2024-01-23 RX ORDER — POTASSIUM CHLORIDE 7.45 MG/ML
10 INJECTION INTRAVENOUS PRN
Status: DISCONTINUED | OUTPATIENT
Start: 2024-01-23 | End: 2024-01-25 | Stop reason: HOSPADM

## 2024-01-23 RX ORDER — VENLAFAXINE HYDROCHLORIDE 37.5 MG/1
75 CAPSULE, EXTENDED RELEASE ORAL
Status: DISCONTINUED | OUTPATIENT
Start: 2024-01-23 | End: 2024-01-23

## 2024-01-23 RX ORDER — ROPINIROLE 0.25 MG/1
0.25 TABLET, FILM COATED ORAL 3 TIMES DAILY
Status: DISCONTINUED | OUTPATIENT
Start: 2024-01-23 | End: 2024-01-25 | Stop reason: HOSPADM

## 2024-01-23 RX ORDER — SERTRALINE HYDROCHLORIDE 100 MG/1
100 TABLET, FILM COATED ORAL DAILY
Status: DISCONTINUED | OUTPATIENT
Start: 2024-01-23 | End: 2024-01-25 | Stop reason: HOSPADM

## 2024-01-23 RX ADMIN — SERTRALINE 100 MG: 100 TABLET, FILM COATED ORAL at 08:23

## 2024-01-23 RX ADMIN — SODIUM CHLORIDE, PRESERVATIVE FREE 10 ML: 5 INJECTION INTRAVENOUS at 08:23

## 2024-01-23 RX ADMIN — LAMOTRIGINE 200 MG: 100 TABLET ORAL at 20:36

## 2024-01-23 RX ADMIN — BUTALBITAL, ACETAMINOPHEN AND CAFFEINE 1 TABLET: 325; 50; 40 TABLET ORAL at 20:36

## 2024-01-23 RX ADMIN — BUTALBITAL, ACETAMINOPHEN AND CAFFEINE 1 TABLET: 325; 50; 40 TABLET ORAL at 11:30

## 2024-01-23 RX ADMIN — ROPINIROLE HYDROCHLORIDE 0.25 MG: 0.25 TABLET, FILM COATED ORAL at 20:36

## 2024-01-23 RX ADMIN — ROPINIROLE HYDROCHLORIDE 0.25 MG: 0.25 TABLET, FILM COATED ORAL at 08:22

## 2024-01-23 RX ADMIN — ACETAMINOPHEN 650 MG: 325 TABLET ORAL at 20:36

## 2024-01-23 RX ADMIN — LAMOTRIGINE 200 MG: 100 TABLET ORAL at 08:23

## 2024-01-23 RX ADMIN — DIVALPROEX SODIUM 1500 MG: 500 TABLET, EXTENDED RELEASE ORAL at 08:23

## 2024-01-23 RX ADMIN — PANTOPRAZOLE SODIUM 40 MG: 40 TABLET, DELAYED RELEASE ORAL at 08:23

## 2024-01-23 RX ADMIN — TRANEXAMIC ACID 1000 MG: 10 INJECTION, SOLUTION INTRAVENOUS at 00:17

## 2024-01-23 RX ADMIN — METOPROLOL SUCCINATE 12.5 MG: 25 TABLET, EXTENDED RELEASE ORAL at 11:31

## 2024-01-23 RX ADMIN — ROPINIROLE HYDROCHLORIDE 0.25 MG: 0.25 TABLET, FILM COATED ORAL at 15:10

## 2024-01-23 RX ADMIN — ENOXAPARIN SODIUM 40 MG: 100 INJECTION SUBCUTANEOUS at 08:23

## 2024-01-23 RX ADMIN — SODIUM CHLORIDE, PRESERVATIVE FREE 10 ML: 5 INJECTION INTRAVENOUS at 20:36

## 2024-01-23 RX ADMIN — ACETAMINOPHEN 650 MG: 325 TABLET ORAL at 08:22

## 2024-01-23 ASSESSMENT — PAIN SCALES - GENERAL
PAINLEVEL_OUTOF10: 3
PAINLEVEL_OUTOF10: 4

## 2024-01-23 ASSESSMENT — ENCOUNTER SYMPTOMS
SHORTNESS OF BREATH: 0
STRIDOR: 0
GASTROINTESTINAL NEGATIVE: 1
FACIAL SWELLING: 1
ALLERGIC/IMMUNOLOGIC NEGATIVE: 1
WHEEZING: 0

## 2024-01-23 ASSESSMENT — PAIN DESCRIPTION - LOCATION
LOCATION: HEAD
LOCATION: HEAD

## 2024-01-23 ASSESSMENT — PAIN DESCRIPTION - DESCRIPTORS
DESCRIPTORS: ACHING
DESCRIPTORS: ACHING

## 2024-01-23 ASSESSMENT — PAIN DESCRIPTION - ORIENTATION
ORIENTATION: MID
ORIENTATION: MID

## 2024-01-23 NOTE — ED TRIAGE NOTES
Patient has left facial swelling including left side of lip.  Patient states it started around 8pm.  Denies neck swelling or shortness of breath.

## 2024-01-23 NOTE — ED NOTES
Transported patient to CT.  Patient denies needs during test.  Transported back to room without incident.  Continue to monitor.  Patient given pain medication for 8 -10 head pain.  O2 down to 88.  Placed on 2 liters of O2 Dr Notified.

## 2024-01-23 NOTE — H&P
Hospitalist - History & Physical      Patient: Joel Bermudez    Unit/Bed:-12/012-A  YOB: 1955  MRN: [id number]   Acct: [id number]   PCP: Jyoti Dye MD      Assessment and Plan:        Angioedema:   Seems to be idiopathic  Happening every 6-8 weeks  Has refused outpatient allergy testing  Had benadryl, epi, TXA, solumedrol, and pepcid prior to arrival and is improving  No new medications or foods  Headache  Patient uses Excedrin migraine and Stadol nasal spray at home  Will trial fioricet  Seizure disorder:   Continue home medications - lamotrigine  Chronic - stable  CAD:   Continue statin therapy  No daily anti-platelet therapy - please clarify  ALICIA on BiPAP:   Hold on BiPAP tonight with facial swelling  Resume when swelling is better      CC:  facial swelling    HPI: Patient transferred from [facility] for further evaluation of facial swelling.  The patient reports he went to a basketball game and when he returned home he developed left sided upper lip swelling.  The patient denies any new food or medication exposure.  He reports he drank a bottle of pop and did not eat anything at the game.  The patient reports he has these episodes every 6-8 weeks and treats them with an epi auto-injector outpatient.  Patient has been following with his PCP and was offered outpatient allergy testing but did not follow through due to the length of the test.  The patient is improving on admission but will be admitted to stepdown for further monitoring of angioedema symptoms.     ROS: Review of Systems   Constitutional: Negative.    HENT:  Positive for facial swelling.    Eyes: Negative.    Respiratory:  Negative for shortness of breath, wheezing and stridor.    Cardiovascular: Negative.    Gastrointestinal: Negative.    Endocrine: Negative.    Genitourinary: Negative.    Musculoskeletal: Negative.    Skin: Negative.    Allergic/Immunologic: Negative.    Neurological: Negative.

## 2024-01-23 NOTE — CARE COORDINATION
1/23/24, 12:04 PM EST      DISCHARGE PLANNING EVALUATION    Joel Bermudez  Admitted: 1/22/2024  Hospital Day: 0    Location: [facility]12/012A Reason for admit: Uncontrolled hypertension [I10]  Angioedema of lips, initial encounter [T78.3XXA]  Angioedema, initial encounter [T78.3XXA]  Nonintractable episodic headache, unspecified headache type [R51.9]    Past Medical History:   Diagnosis Date    Back pain     Depression     GERD (gastroesophageal reflux disease)     Headache(784.0) 9/22/2013    Migraines     ALICIA treated with BiPAP 2013    doesn't wear Bipap    Pneumonia     Pulmonary embolism (HCC) 2/25/2021    S/P cardiac catheterization: 7/31/2017: No obstructive lasions. 7/31/2017 7/31/2017: No obstructive lasions. Dr. Farris    Seizures ([facility])      Barriers to Discharge:   Angioedema  History: Parkinson/s  IV Steroids, IV PPI    PCP: Jyoti Dye MD    Readmission Risk Low 0-14, Mod 15-19), High > 20: Readmission Risk Score: 9.3      Advance Directives:      Code Status: Full Code   Patient's Primary Decision Maker is:      Primary Decision Maker: Bria Real - Spouse - [phone]    Patient Goals/Plan/Treatment Preferences: plans home w spouse/NICK Fraser, has BIPAP    Transportation/Food Security/Housekeeping Addressed: No issues identified.     If patient is discharged prior to next notation, then this note serves as note for discharge by case management.

## 2024-01-23 NOTE — ED PROVIDER NOTES
[facility]  eMERGENCY dEPARTMENT eNCOUnter             [facility] [place]    CHIEF COMPLAINT    Chief Complaint   Patient presents with    Facial Swelling       Nurses Notes reviewed and I agree except as noted in the HPI.    HPI    Joel Bermudez is a 68 y.o. male who presents with sudden onset of severe swelling of his left upper lip, some swelling of his left lower lip, occurred within an hour of arrival here today.  He has had this off and on for at least 2 years, cause unknown.  He was taken off of ACE inhibitors and ARB's.  He is taking medications as listed below.  He has never been able to figure out what brings this on.  He states his doctor is talked about having him see an allergist.  He denies any itching or swelling anywhere else, but the swelling in his lips of the left side of his mouth is extremely uncomfortable to him.  He denies any difficulty swallowing, does not feel like his tongue is swelling.  He is edentulous.  He does not wear his dentures, does not use any denture adhesives.    He has a history of hypertension, Parkinson's disease, previous pulmonary embolism, migraine, sleep apnea with BiPAP use at night, and seizure disorder.    REVIEW OF SYSTEMS      Review of Systems   Constitutional:  Negative for diaphoresis and fever.   HENT:  Negative for congestion, ear pain and sore throat.    Eyes:  Negative for blurred vision.   Respiratory:  Negative for cough, shortness of breath and wheezing.    Cardiovascular:  Negative for chest pain and palpitations.   Gastrointestinal:  Negative for abdominal pain and nausea.   Skin:  Negative for itching and rash.   Neurological:  Positive for headaches. Negative for dizziness, focal weakness and loss of consciousness.        Tremor   All other systems reviewed and are negative.        PAST MEDICAL HISTORY     has a past medical history of Back pain, Depression, GERD (gastroesophageal reflux disease), Headache(784.0),

## 2024-01-23 NOTE — ED NOTES
Patient back to the ED, moved to Trauma 2 to get him closer to the nurses' station and a bigger room.

## 2024-01-23 NOTE — ED NOTES
Patient resting with icepack on left side of face.  Denies needs at this time continue to monitor.

## 2024-01-23 NOTE — ED NOTES
Patient states it was getting hard to breathe and had some chest tightness.  Patient then began holding his head and trying to get out of bed stating his head felt like it was going to explode. -100 .  DR called to bedside.  Continue to monitor.

## 2024-01-23 NOTE — PLAN OF CARE
Problem: Discharge Planning  Goal: Discharge to home or other facility with appropriate resources  Outcome: Progressing     Problem: Chronic Conditions and Co-morbidities  Goal: Patient's chronic conditions and co-morbidity symptoms are monitored and maintained or improved  Outcome: Progressing     Problem: Pain  Goal: Verbalizes/displays adequate comfort level or baseline comfort level  Outcome: Progressing     Care plan reviewed with patient.  Patient verbalize understanding of the plan of care and contribute to goal setting.

## 2024-01-23 NOTE — ED NOTES
Patient cries out with c/o severe headache, the worst of his life. Dr Bainbridge and both nurses at patient's bedside for assessment. Vitals per flowsheet. Headache pain is all over his head, the worse is midline. Softly talked to patient to help him with his breathing and to attempt to get him to calm down. Orders received from Dr Bainbridge.

## 2024-01-24 LAB
ANION GAP SERPL CALC-SCNC: 11 MEQ/L (ref 8–16)
BASOPHILS ABSOLUTE: 0.1 THOU/MM3 (ref 0–0.1)
BASOPHILS NFR BLD AUTO: 0.9 %
BUN SERPL-MCNC: 19 MG/DL (ref 7–22)
CALCIUM SERPL-MCNC: 8.3 MG/DL (ref 8.5–10.5)
CHLORIDE SERPL-SCNC: 104 MEQ/L (ref 98–111)
CO2 SERPL-SCNC: 25 MEQ/L (ref 23–33)
CREAT SERPL-MCNC: 0.9 MG/DL (ref 0.4–1.2)
DEPRECATED RDW RBC AUTO: 45.1 FL (ref 35–45)
EKG ATRIAL RATE: 87 BPM
EKG P AXIS: 65 DEGREES
EKG P-R INTERVAL: 158 MS
EKG Q-T INTERVAL: 400 MS
EKG QRS DURATION: 132 MS
EKG QTC CALCULATION (BAZETT): 481 MS
EKG R AXIS: -37 DEGREES
EKG T AXIS: 80 DEGREES
EKG VENTRICULAR RATE: 87 BPM
EOSINOPHIL NFR BLD AUTO: 0.6 %
EOSINOPHILS ABSOLUTE: 0.1 THOU/MM3 (ref 0–0.4)
ERYTHROCYTE [DISTWIDTH] IN BLOOD BY AUTOMATED COUNT: 13.1 % (ref 11.5–14.5)
GFR SERPL CREATININE-BSD FRML MDRD: > 60 ML/MIN/1.73M2
GLUCOSE SERPL-MCNC: 147 MG/DL (ref 70–108)
HCT VFR BLD AUTO: 44.2 % (ref 42–52)
HGB BLD-MCNC: 14.1 GM/DL (ref 14–18)
IMM GRANULOCYTES # BLD AUTO: 0.19 THOU/MM3 (ref 0–0.07)
IMM GRANULOCYTES NFR BLD AUTO: 1.8 %
LYMPHOCYTES ABSOLUTE: 2.8 THOU/MM3 (ref 1–4.8)
LYMPHOCYTES NFR BLD AUTO: 26.5 %
MCH RBC QN AUTO: 30.2 PG (ref 26–33)
MCHC RBC AUTO-ENTMCNC: 31.9 GM/DL (ref 32.2–35.5)
MCV RBC AUTO: 94.6 FL (ref 80–94)
MONOCYTES ABSOLUTE: 1.2 THOU/MM3 (ref 0.4–1.3)
MONOCYTES NFR BLD AUTO: 11.6 %
NEUTROPHILS NFR BLD AUTO: 58.6 %
NRBC BLD AUTO-RTO: 0 /100 WBC
PLATELET # BLD AUTO: 409 THOU/MM3 (ref 130–400)
PMV BLD AUTO: 9.3 FL (ref 9.4–12.4)
POTASSIUM SERPL-SCNC: 4.6 MEQ/L (ref 3.5–5.2)
RBC # BLD AUTO: 4.67 MILL/MM3 (ref 4.7–6.1)
SEGMENTED NEUTROPHILS ABSOLUTE COUNT: 6.2 THOU/MM3 (ref 1.8–7.7)
SODIUM SERPL-SCNC: 140 MEQ/L (ref 135–145)
WBC # BLD AUTO: 10.5 THOU/MM3 (ref 4.8–10.8)

## 2024-01-24 PROCEDURE — 6360000002 HC RX W HCPCS: Performed by: PHYSICIAN ASSISTANT

## 2024-01-24 PROCEDURE — 99232 SBSQ HOSP IP/OBS MODERATE 35: CPT | Performed by: STUDENT IN AN ORGANIZED HEALTH CARE EDUCATION/TRAINING PROGRAM

## 2024-01-24 PROCEDURE — 1200000003 HC TELEMETRY R&B

## 2024-01-24 PROCEDURE — 6370000000 HC RX 637 (ALT 250 FOR IP): Performed by: PHYSICIAN ASSISTANT

## 2024-01-24 PROCEDURE — 36415 COLL VENOUS BLD VENIPUNCTURE: CPT

## 2024-01-24 PROCEDURE — 6370000000 HC RX 637 (ALT 250 FOR IP): Performed by: STUDENT IN AN ORGANIZED HEALTH CARE EDUCATION/TRAINING PROGRAM

## 2024-01-24 PROCEDURE — 2580000003 HC RX 258: Performed by: PHYSICIAN ASSISTANT

## 2024-01-24 PROCEDURE — 80048 BASIC METABOLIC PNL TOTAL CA: CPT

## 2024-01-24 PROCEDURE — 85025 COMPLETE CBC W/AUTO DIFF WBC: CPT

## 2024-01-24 RX ORDER — KETOROLAC TROMETHAMINE 30 MG/ML
15 INJECTION, SOLUTION INTRAMUSCULAR; INTRAVENOUS ONCE
Status: COMPLETED | OUTPATIENT
Start: 2024-01-24 | End: 2024-01-24

## 2024-01-24 RX ORDER — MAGNESIUM SULFATE IN WATER 40 MG/ML
2000 INJECTION, SOLUTION INTRAVENOUS ONCE
Status: COMPLETED | OUTPATIENT
Start: 2024-01-24 | End: 2024-01-24

## 2024-01-24 RX ORDER — DIPHENHYDRAMINE HYDROCHLORIDE 50 MG/ML
25 INJECTION INTRAMUSCULAR; INTRAVENOUS ONCE
Status: COMPLETED | OUTPATIENT
Start: 2024-01-24 | End: 2024-01-24

## 2024-01-24 RX ADMIN — MAGNESIUM SULFATE HEPTAHYDRATE 2000 MG: 40 INJECTION, SOLUTION INTRAVENOUS at 05:06

## 2024-01-24 RX ADMIN — ACETAMINOPHEN 650 MG: 325 TABLET ORAL at 04:01

## 2024-01-24 RX ADMIN — DIPHENHYDRAMINE HYDROCHLORIDE 25 MG: 50 INJECTION INTRAMUSCULAR; INTRAVENOUS at 04:56

## 2024-01-24 RX ADMIN — KETOROLAC TROMETHAMINE 15 MG: 30 INJECTION INTRAMUSCULAR; INTRAVENOUS at 04:55

## 2024-01-24 RX ADMIN — DIVALPROEX SODIUM 1500 MG: 500 TABLET, EXTENDED RELEASE ORAL at 09:11

## 2024-01-24 RX ADMIN — ROPINIROLE HYDROCHLORIDE 0.25 MG: 0.25 TABLET, FILM COATED ORAL at 20:30

## 2024-01-24 RX ADMIN — PANTOPRAZOLE SODIUM 40 MG: 40 TABLET, DELAYED RELEASE ORAL at 05:07

## 2024-01-24 RX ADMIN — ROPINIROLE HYDROCHLORIDE 0.25 MG: 0.25 TABLET, FILM COATED ORAL at 13:54

## 2024-01-24 RX ADMIN — LAMOTRIGINE 200 MG: 100 TABLET ORAL at 09:11

## 2024-01-24 RX ADMIN — ACETAMINOPHEN, ASPIRIN, CAFFEINE 1 TABLET: 250; 65; 250 TABLET, FILM COATED ORAL at 13:55

## 2024-01-24 RX ADMIN — ROPINIROLE HYDROCHLORIDE 0.25 MG: 0.25 TABLET, FILM COATED ORAL at 09:11

## 2024-01-24 RX ADMIN — ONDANSETRON 4 MG: 2 INJECTION INTRAMUSCULAR; INTRAVENOUS at 03:51

## 2024-01-24 RX ADMIN — SERTRALINE 100 MG: 100 TABLET, FILM COATED ORAL at 09:12

## 2024-01-24 RX ADMIN — ENOXAPARIN SODIUM 40 MG: 100 INJECTION SUBCUTANEOUS at 09:17

## 2024-01-24 RX ADMIN — SODIUM CHLORIDE, PRESERVATIVE FREE 10 ML: 5 INJECTION INTRAVENOUS at 09:33

## 2024-01-24 RX ADMIN — BUTALBITAL, ACETAMINOPHEN AND CAFFEINE 1 TABLET: 325; 50; 40 TABLET ORAL at 04:01

## 2024-01-24 RX ADMIN — METOPROLOL SUCCINATE 12.5 MG: 25 TABLET, EXTENDED RELEASE ORAL at 09:12

## 2024-01-24 RX ADMIN — LAMOTRIGINE 200 MG: 100 TABLET ORAL at 20:30

## 2024-01-24 ASSESSMENT — PAIN - FUNCTIONAL ASSESSMENT
PAIN_FUNCTIONAL_ASSESSMENT: PREVENTS OR INTERFERES SOME ACTIVE ACTIVITIES AND ADLS
PAIN_FUNCTIONAL_ASSESSMENT: PREVENTS OR INTERFERES SOME ACTIVE ACTIVITIES AND ADLS
PAIN_FUNCTIONAL_ASSESSMENT: ACTIVITIES ARE NOT PREVENTED
PAIN_FUNCTIONAL_ASSESSMENT: PREVENTS OR INTERFERES SOME ACTIVE ACTIVITIES AND ADLS

## 2024-01-24 ASSESSMENT — PAIN SCALES - GENERAL
PAINLEVEL_OUTOF10: 1
PAINLEVEL_OUTOF10: 6
PAINLEVEL_OUTOF10: 6
PAINLEVEL_OUTOF10: 3
PAINLEVEL_OUTOF10: 7
PAINLEVEL_OUTOF10: 6

## 2024-01-24 ASSESSMENT — PAIN DESCRIPTION - ONSET
ONSET: PROGRESSIVE

## 2024-01-24 ASSESSMENT — PAIN DESCRIPTION - ORIENTATION
ORIENTATION: RIGHT
ORIENTATION: MID
ORIENTATION: RIGHT

## 2024-01-24 ASSESSMENT — PAIN DESCRIPTION - DESCRIPTORS
DESCRIPTORS: ACHING
DESCRIPTORS: SHARP

## 2024-01-24 ASSESSMENT — PAIN DESCRIPTION - PAIN TYPE
TYPE: ACUTE PAIN

## 2024-01-24 ASSESSMENT — PAIN DESCRIPTION - LOCATION
LOCATION: EYE;HEAD
LOCATION: HEAD
LOCATION: HEAD
LOCATION: HEAD;EYE
LOCATION: HEAD

## 2024-01-24 ASSESSMENT — PAIN DESCRIPTION - FREQUENCY
FREQUENCY: INTERMITTENT

## 2024-01-24 NOTE — PLAN OF CARE
Problem: Discharge Planning  Goal: Discharge to home or other facility with appropriate resources  Outcome: Progressing  Flowsheets (Taken 1/24/2024 1244)  Discharge to home or other facility with appropriate resources:   Identify barriers to discharge with patient and caregiver   Identify discharge learning needs (meds, wound care, etc)   Arrange for interpreters to assist at discharge as needed     Problem: Chronic Conditions and Co-morbidities  Goal: Patient's chronic conditions and co-morbidity symptoms are monitored and maintained or improved  Outcome: Progressing  Flowsheets (Taken 1/24/2024 1244)  Care Plan - Patient's Chronic Conditions and Co-Morbidity Symptoms are Monitored and Maintained or Improved:   Monitor and assess patient's chronic conditions and comorbid symptoms for stability, deterioration, or improvement   Collaborate with multidisciplinary team to address chronic and comorbid conditions and prevent exacerbation or deterioration   Update acute care plan with appropriate goals if chronic or comorbid symptoms are exacerbated and prevent overall improvement and discharge     Problem: Pain  Goal: Verbalizes/displays adequate comfort level or baseline comfort level  Outcome: Progressing  Flowsheets (Taken 1/24/2024 1244)  Verbalizes/displays adequate comfort level or baseline comfort level:   Encourage patient to monitor pain and request assistance   Administer analgesics based on type and severity of pain and evaluate response   Assess pain using appropriate pain scale   Implement non-pharmacological measures as appropriate and evaluate response     Problem: Safety - Adult  Goal: Free from fall injury  Outcome: Progressing  Flowsheets (Taken 1/24/2024 1244)  Free From Fall Injury: Instruct family/caregiver on patient safety

## 2024-01-24 NOTE — PROGRESS NOTES
Head to Toe Assessment    [facility] Student Nurse  Head to Toe Assessment Performed at 0800  Skin  General skin appearance / Description: pink, warm, and dry    Neuro/Head  LOC: A+O x3  Speech: appropriate and clear  Eyes PERRLA 3 mm    Chest  Heart sounds / Apical Pulse: regular, strong  Dyspnea: no  Lung sounds: clear, symmetric movement  Cough: no    Abdomen/ Pelvis  Bowel sounds: active in all 4 quadrants every 5-15 sec.  Palpation / Inspection: non-tender and round  Last BM: yesterday, 1/23/24  Stool assessment: small amount   Any GI issues: no  Urinary issues: no  Continence: yes  Urine color / turbidity: yellow-dark yellow, clear    Extremities  Edema: absent  Altered Sensation: no  Left Upper extremity Capillary Refill: <3 sec.  Right Upper extremity Capillary Refill: <3 sec.  Left pedal pulse: present, strong Left lower extremity capillary refill: <3 sec.  Right pedal pulse: present, strong Right lower extremity capillary refill: <3 sec.  Pain: headache rated 6/10, sharp around right eye    Amanda Mayorga, RSC / SN

## 2024-01-24 NOTE — PLAN OF CARE
Problem: Discharge Planning  Goal: Discharge to home or other facility with appropriate resources  1/23/2024 2120 by Salu Angeles, RN  Outcome: Progressing  Flowsheets (Taken 1/23/2024 2000)  Discharge to home or other facility with appropriate resources:   Identify barriers to discharge with patient and caregiver   Identify discharge learning needs (meds, wound care, etc)   Arrange for interpreters to assist at discharge as needed  1/23/2024 1523 by Michael Roman, RN  Outcome: Progressing       Problem: Chronic Conditions and Co-morbidities  Goal: Patient's chronic conditions and co-morbidity symptoms are monitored and maintained or improved  1/23/2024 2120 by Saul Angeles RN  Outcome: Progressing  Flowsheets (Taken 1/23/2024 2000)  Care Plan - Patient's Chronic Conditions and Co-Morbidity Symptoms are Monitored and Maintained or Improved:   Monitor and assess patient's chronic conditions and comorbid symptoms for stability, deterioration, or improvement   Collaborate with multidisciplinary team to address chronic and comorbid conditions and prevent exacerbation or deterioration   Update acute care plan with appropriate goals if chronic or comorbid symptoms are exacerbated and prevent overall improvement and discharge     Problem: Pain  Goal: Verbalizes/displays adequate comfort level or baseline comfort level  1/23/2024 2120 by Saul Angeles, RN  Outcome: Progressing  Flowsheets (Taken 1/23/2024 2000)  Verbalizes/displays adequate comfort level or baseline comfort level:   Encourage patient to monitor pain and request assistance   Assess pain using appropriate pain scale   Administer analgesics based on type and severity of pain and evaluate response   Consider cultural and social influences on pain and pain management   Care plan reviewed with patient.  Patient verbalizes understanding of the plan of care and contribute to goal setting.

## 2024-01-24 NOTE — PROGRESS NOTES
Head to Toe Assessment    [facility] Student Nurse  Head to Toe Assessment Performed at 1145  Skin  General skin appearance / Description: pink, warm, and dry    Neuro/Head  LOC: A+O x3  Speech: appropriate, clear  Eyes PERRLA 3 mm    Chest  Heart sounds / Apical Pulse: regular, strong  Dyspnea: no  Lung sounds: clear, symmetric  Cough: no    Abdomen/ Pelvis  Bowel sounds: active in all 4 quadrants every 5-15 sec.  Palpation / Inspection: non-tender, round  Last BM: yesterday, 1/23/24  Stool assessment: small amount  Any GI issues: no  Urinary issues: no  Continence: yes  Urine color / turbidity: yellow-dark yellow, clear    Extremities  Edema: absent  Altered Sensation: no  Upper extremity push / pulls: strong, equal bilaterally  Left Upper extremity Capillary Refill: <3 sec.  Right Upper extremity Capillary Refill: <3 sec.  Lower extremity pedal push / pulls: strong, equal bilaterally  Left pedal pulse: present, strong   Right pedal pulse: present, strong    Pain: headache rated 6/10, sharp around right eye  Other issues: no    Reassessment done  Amanda Mayorga RSC / SN

## 2024-01-24 NOTE — PROGRESS NOTES
Received report from primary nurse. Patient denies any needs at this time.   - Amanda Mayorga, [facility] SN

## 2024-01-25 VITALS
SYSTOLIC BLOOD PRESSURE: 137 MMHG | WEIGHT: 212.96 LBS | RESPIRATION RATE: 18 BRPM | BODY MASS INDEX: 33.35 KG/M2 | DIASTOLIC BLOOD PRESSURE: 84 MMHG | HEART RATE: 52 BPM | OXYGEN SATURATION: 96 % | TEMPERATURE: 97.9 F

## 2024-01-25 PROCEDURE — 6360000002 HC RX W HCPCS: Performed by: PHYSICIAN ASSISTANT

## 2024-01-25 PROCEDURE — 6370000000 HC RX 637 (ALT 250 FOR IP): Performed by: PHYSICIAN ASSISTANT

## 2024-01-25 PROCEDURE — 99238 HOSP IP/OBS DSCHRG MGMT 30/<: CPT | Performed by: STUDENT IN AN ORGANIZED HEALTH CARE EDUCATION/TRAINING PROGRAM

## 2024-01-25 RX ADMIN — ROPINIROLE HYDROCHLORIDE 0.25 MG: 0.25 TABLET, FILM COATED ORAL at 10:07

## 2024-01-25 RX ADMIN — SERTRALINE 100 MG: 100 TABLET, FILM COATED ORAL at 10:07

## 2024-01-25 RX ADMIN — LAMOTRIGINE 200 MG: 100 TABLET ORAL at 10:05

## 2024-01-25 RX ADMIN — PANTOPRAZOLE SODIUM 40 MG: 40 TABLET, DELAYED RELEASE ORAL at 10:06

## 2024-01-25 RX ADMIN — DIVALPROEX SODIUM 1500 MG: 500 TABLET, EXTENDED RELEASE ORAL at 10:05

## 2024-01-25 RX ADMIN — METOPROLOL SUCCINATE 12.5 MG: 25 TABLET, EXTENDED RELEASE ORAL at 10:06

## 2024-01-25 RX ADMIN — ENOXAPARIN SODIUM 40 MG: 100 INJECTION SUBCUTANEOUS at 09:59

## 2024-01-25 ASSESSMENT — PAIN SCALES - GENERAL: PAINLEVEL_OUTOF10: 0

## 2024-01-25 NOTE — DISCHARGE SUMMARY
Hospital Medicine Discharge Summary      Patient Identification:   Joel Bermudez   : 1955  MRN: [id number]   Account: [id number]      Patient's PCP: Jyoti Dye MD    Admit Date: 2024     Discharge Date: 2024      Admitting Physician: No admitting provider for patient encounter.     Discharge Physician: Eric Nicholson MD     Discharge Diagnoses:  Angioedema: Without airway compromise.  Unclear etiology.  In the setting of recurrent episodes x ~2 years.  Reportedly was given Benadryl, epi, TXA, Solu-Medrol and Pepcid prior to arrival.  Improvement noted since admission. Will need to follow-up with outpatient for allergy testing- agreeable at this time.  Appointment made for 2024.  Follow-up with PCP.     Acute headache/ Hx migraines: on Excedrin migraine and Stadol nasal spray at home.  Resolved     Seizure disorder: on Lamictal and Depakote ER. Seizure precautions in place.   CAD/ HLD: Cardiac cath 2017 per chart. On statin and beta-blocker but no DAPT-unclear reason. Follow-up with PCP.  Depression: on Zoloft and Pristiq  ALICIA on BiPAP: Unclear if patient is compliant to BiPAP.  Follow-up outpatient.     The patient was seen and examined on day of discharge and this discharge summary is in conjunction with any daily progress note from day of discharge.    Hospital Course:   Joel Bermudez is a 68 y.o. male admitted to [facility] on 2024 for angioedema.        Presented to [facility] ambulatory care with complaints of acute left-sided facial swelling.  Patient reportedly has recurrent episodes every 6 to 8 weeks and treats with an epi autoinjector outpatient.  Has declined allergy testing outpatient previously.  After discussion about the risks and benefits of getting the allergy testing done and to avoid recurrent life-threatening episode in the future, patient is agreeable to follow-up outpatient with an allergist for testing.    Near baseline at  the time of discharge.  Headache has resolved.  No significant facial swelling at this time.     Exam:     Vitals:  Vitals:    01/24/24 1952 01/24/24 2344 01/25/24 0420 01/25/24 0949   BP: 135/86 (!) 154/78 (!) 148/80 137/84   Pulse: 64 66 65 52   Resp: 18 16 16 18   Temp: 97.5 °F (36.4 °C) 98.1 °F (36.7 °C) 98.2 °F (36.8 °C) 97.9 °F (36.6 °C)   TempSrc: Oral Oral Oral Oral   SpO2: 95% 95%  96%   Weight:         Weight: Weight - Scale: 96.6 kg (212 lb 15.4 oz)     24 hour intake/output:  Intake/Output Summary (Last 24 hours) at 1/25/2024 2100  Last data filed at 1/25/2024 0949  Gross per 24 hour   Intake 360 ml   Output 0 ml   Net 360 ml       General appearance: No apparent distress, appears stated age and cooperative.  Sleeping in bed comfortably  HEENT: Conjunctivae/corneas clear. Mild left-sided lip swelling noted- resolving.   Neck: Supple, with full range of motion. No jugular venous distention.   Respiratory:  Normal respiratory effort. Clear to auscultation, bilaterally without Rales/Wheezes/Rhonchi.  Cardiovascular: Regular rate and rhythm with normal S1/S2 without murmurs, rubs or gallops.  Abdomen: Soft, non-tender, non-distended with normal bowel sounds.  Musculoskeletal: passive and active ROM x 4 extremities.  Skin: Skin color, texture, turgor normal. No rashes or lesions.  Neurologic: Neurovascularly intact without any focal sensory/motor deficits. Cranial nerves: II-XII intact, grossly non-focal.  Psychiatric: Alert and oriented, thought content appropriate, normal insight  Capillary Refill: Brisk,< 3 seconds   Peripheral Pulses: +2 palpable, equal bilaterally      Labs: For convenience and continuity at follow-up the following most recent labs are provided:      CBC:    Lab Results   Component Value Date/Time    WBC 10.5 01/24/2024 04:24 AM    HGB 14.1 01/24/2024 04:24 AM    HCT 44.2 01/24/2024 04:24 AM     01/24/2024 04:24 AM       Renal:    Lab Results   Component Value Date/Time

## 2024-01-25 NOTE — PROGRESS NOTES
RN called allergy specialist in regards to making appointment sooner. Earliest they can get patient in is on February 22 at 1:30pm. Patient was also added to the cancellation list for any earlier appointments as well.

## 2024-01-25 NOTE — PROGRESS NOTES
Hospitalist Progress Note      Patient:  Joel Bermudez    Unit/Bed:4K-12/012-A  YOB: 1955  MRN: [id number]   Acct: [id number]   PCP: Jyoti Dye MD  Date of Admission: 1/22/2024    Assessment/Plan:  Angioedema: Without airway compromise.  Unclear etiology.  In the setting of recurrent episodes x ~2 years.  Reportedly was given Benadryl, epi, TXA, Solu-Medrol and Pepcid prior to arrival.  Improvement noted since admission. No evidence of respiratory compromise. Will need to follow-up with outpatient for allergy testing- agreeable at this time.     Acute headache/ Hx migraines: on Excedrin migraine and Stadol nasal spray at home- will resume. Monitor for changes.    Seizure disorder: on Lamictal and Depakote ER. Seizure precautions in place.     CAD/ HLD: Cardiac cath 7/2017 per chart. On statin and beta-blocker but no DAPT.  Will clarify  Depression: on Zoloft and Pristiq  ALICIA on BiPAP: Holding off on BiPAP, still has some swelling in his lips.     Of note, Home medication includes ropinirole and Norvasc.  Will clarify indications      Tele:   [x] yes             [] no    Code Status: Full Code    PT/OT: ambulates independently without issues  Fluids:  Diet:  ADULT DIET; Regular    DVT prophylaxis: [x] Lovenox                                 [] SCDs                                 [] SQ Heparin                                 [x] Encourage ambulation                                 [] Already on Anticoagulation    Disposition:      [x] TBD                             [x] Home- tomorrow if remains stable                             [] TCU                             [] Rehab                             [] Psych                             [] SNF                             [] Long Term Care Facility                             [] Other-        Chief Complaint: Facial swelling    Hospital Course:   68-year-old male with chronic history of  C6-C7. Degenerative changes in the spine. Visualized upper lungs are clear.     No acute findings. Nonemergent/incidental findings above. This document has been electronically signed by: Alvino Fernández MD on 01/23/2024 12:20 AM All CTs at this facility use dose modulation techniques and iterative reconstructions, and/or weight-based dosing when appropriate to reduce radiation to a low as reasonably achievable.          Electronically signed by Eric Nicholson MD 1/24/2024

## 2024-01-25 NOTE — PROGRESS NOTES
RN went over all discharge instructions with patient and spouse. RN answered all questions with no further questions at this time. Patient discharged with all personal belongings, discharge instructions, and prescription medications. RN informed patient on making follow-up appointments with providers. Patient acknowledged RN. Patient discharged home with spouse.

## 2024-01-26 ENCOUNTER — CARE COORDINATION (OUTPATIENT)
Dept: CASE MANAGEMENT | Age: 69
End: 2024-01-26

## 2024-01-26 DIAGNOSIS — T78.3XXA ANGIOEDEMA OF LIPS, INITIAL ENCOUNTER: Primary | ICD-10-CM

## 2024-01-26 PROCEDURE — 1111F DSCHRG MED/CURRENT MED MERGE: CPT | Performed by: FAMILY MEDICINE

## 2024-01-26 NOTE — CARE COORDINATION
Thelma Tapia MD Cardiology [phone]              Care Transition Nurse provided contact information.  No further follow-up call indicated based on severity of symptoms and risk factors.  Handing back to RITO, Naomie Alvarado RN

## 2024-01-29 ENCOUNTER — CARE COORDINATION (OUTPATIENT)
Dept: CARE COORDINATION | Age: 69
End: 2024-01-29

## 2024-01-29 ENCOUNTER — TELEPHONE (OUTPATIENT)
Dept: FAMILY MEDICINE CLINIC | Age: 69
End: 2024-01-29

## 2024-01-29 NOTE — TELEPHONE ENCOUNTER
Care Transitions Initial Follow Up Call    Outreach made within 2 business days of discharge: Yes    Patient: Joel Bermudez Patient : 1955   MRN: [id number]  Reason for Admission: There are no discharge diagnoses documented for the most recent discharge.  Discharge Date: 24       Spoke with: Patient spoke with Naomie Iqbal RN on 2024    Discharge department/facility: [facility]        Follow Up  Future Appointments   Date Time Provider Department Center   2024  1:00 PM Jyoti Dye MD [facility] [place]   3/4/2024  1:00 PM Jyoti Dye MD [facility] [place]   3/11/2024 10:15 AM Thelma Monteiro MD [facility] [place]       Damari Yu MA

## 2024-01-29 NOTE — CARE COORDINATION
Ambulatory Care Coordination Note  2024    Patient Current Location:  [place]     ACM contacted the patient by telephone. Verified name and  with patient as identifiers. Provided introduction to self, and explanation of the ACM role.     Challenges to be reviewed by the provider   Additional needs identified to be addressed with provider: Yes    Pt. Shared he had some \"slight\" edema yesterday and denied it being near the edema that was present prior to recent hospital stay.  Pt stated symptoms resolved on their own and he denied any being present today.  Pt denied any c/o wheezing, worsening swelling, increased SOB, or trouble swallowing today.  Pt scheduled to see PCP on .          Method of communication with provider: chart routing.    ACM: Naomie Iqbal RN    Patient was called for continued Care Coordination follow up and education re: the management of his CHF, healthcare needs, and ongoing f/u s/p recent hospital stay for angioedema.  Patient shared he had a migraine headache earlier today but symptoms are improving this afternoon.  ACM reviewed signs/symptoms pt should report and the importance of early symptom recognition and follow up.  Patient was also educated on when to seek medical attention and need to call for earlier appointment with any new/change/worsening of symptoms.  Patient verbalized understanding.    Patient denied any c/o swelling or worsening SOB today.  Patient admits to some \"swelling\" yesterday that resolved on it's own.  Patient denied any change in meds, activity, or diet.  ACM educated patient on signs/symptoms he should report and the need to call for an earlier appointment with any new/change/worsening of symptoms.  Patient acknowledged understanding.  Patient is scheduled to see PCP later this week and allergist on 2024.  ACM reviewed the importance of following up as directed and patient verbalized understanding.  Patient denied any concerns with transportation to

## 2024-02-01 ENCOUNTER — OFFICE VISIT (OUTPATIENT)
Dept: FAMILY MEDICINE CLINIC | Age: 69
End: 2024-02-01

## 2024-02-01 VITALS
OXYGEN SATURATION: 96 % | WEIGHT: 214 LBS | SYSTOLIC BLOOD PRESSURE: 136 MMHG | RESPIRATION RATE: 18 BRPM | DIASTOLIC BLOOD PRESSURE: 88 MMHG | BODY MASS INDEX: 33.52 KG/M2 | HEART RATE: 74 BPM | TEMPERATURE: 97.9 F

## 2024-02-01 DIAGNOSIS — Z09 HOSPITAL DISCHARGE FOLLOW-UP: Primary | ICD-10-CM

## 2024-02-01 DIAGNOSIS — F33.1 MODERATE EPISODE OF RECURRENT MAJOR DEPRESSIVE DISORDER (HCC): ICD-10-CM

## 2024-02-01 DIAGNOSIS — Z87.892 HISTORY OF ANAPHYLAXIS: ICD-10-CM

## 2024-02-01 DIAGNOSIS — R51.9 ACUTE NONINTRACTABLE HEADACHE, UNSPECIFIED HEADACHE TYPE: ICD-10-CM

## 2024-02-01 DIAGNOSIS — G20.B2 PARKINSON'S DISEASE WITH DYSKINESIA AND FLUCTUATING MANIFESTATIONS: ICD-10-CM

## 2024-02-01 DIAGNOSIS — I50.22 CHRONIC SYSTOLIC (CONGESTIVE) HEART FAILURE (HCC): ICD-10-CM

## 2024-02-01 PROBLEM — F33.3 SEVERE EPISODE OF RECURRENT MAJOR DEPRESSIVE DISORDER, WITH PSYCHOTIC FEATURES (HCC): Status: ACTIVE | Noted: 2024-02-01

## 2024-02-01 PROBLEM — F33.3 SEVERE EPISODE OF RECURRENT MAJOR DEPRESSIVE DISORDER, WITH PSYCHOTIC FEATURES (HCC): Status: RESOLVED | Noted: 2024-02-01 | Resolved: 2024-02-01

## 2024-02-01 RX ORDER — DESVENLAFAXINE SUCCINATE 50 MG/1
50 TABLET, EXTENDED RELEASE ORAL DAILY
Qty: 90 TABLET | Refills: 3 | Status: SHIPPED | OUTPATIENT
Start: 2024-02-01

## 2024-02-01 RX ORDER — KETOROLAC TROMETHAMINE 30 MG/ML
60 INJECTION, SOLUTION INTRAMUSCULAR; INTRAVENOUS ONCE
Status: COMPLETED | OUTPATIENT
Start: 2024-02-01 | End: 2024-02-01

## 2024-02-01 RX ORDER — EPINEPHRINE 0.3 MG/.3ML
0.3 INJECTION SUBCUTANEOUS ONCE
Qty: 1 EACH | Refills: 0 | Status: SHIPPED | OUTPATIENT
Start: 2024-02-01 | End: 2024-02-01

## 2024-02-01 RX ADMIN — KETOROLAC TROMETHAMINE 60 MG: 30 INJECTION, SOLUTION INTRAMUSCULAR; INTRAVENOUS at 13:31

## 2024-02-01 ASSESSMENT — PATIENT HEALTH QUESTIONNAIRE - PHQ9
9. THOUGHTS THAT YOU WOULD BE BETTER OFF DEAD, OR OF HURTING YOURSELF: 0
7. TROUBLE CONCENTRATING ON THINGS, SUCH AS READING THE NEWSPAPER OR WATCHING TELEVISION: 0
SUM OF ALL RESPONSES TO PHQ QUESTIONS 1-9: 2
8. MOVING OR SPEAKING SO SLOWLY THAT OTHER PEOPLE COULD HAVE NOTICED. OR THE OPPOSITE, BEING SO FIGETY OR RESTLESS THAT YOU HAVE BEEN MOVING AROUND A LOT MORE THAN USUAL: 0
2. FEELING DOWN, DEPRESSED OR HOPELESS: 0
SUM OF ALL RESPONSES TO PHQ9 QUESTIONS 1 & 2: 0
4. FEELING TIRED OR HAVING LITTLE ENERGY: 1
10. IF YOU CHECKED OFF ANY PROBLEMS, HOW DIFFICULT HAVE THESE PROBLEMS MADE IT FOR YOU TO DO YOUR WORK, TAKE CARE OF THINGS AT HOME, OR GET ALONG WITH OTHER PEOPLE: 0
6. FEELING BAD ABOUT YOURSELF - OR THAT YOU ARE A FAILURE OR HAVE LET YOURSELF OR YOUR FAMILY DOWN: 0
SUM OF ALL RESPONSES TO PHQ QUESTIONS 1-9: 2
3. TROUBLE FALLING OR STAYING ASLEEP: 1
SUM OF ALL RESPONSES TO PHQ QUESTIONS 1-9: 2
SUM OF ALL RESPONSES TO PHQ QUESTIONS 1-9: 2
1. LITTLE INTEREST OR PLEASURE IN DOING THINGS: 0
5. POOR APPETITE OR OVEREATING: 0

## 2024-02-01 NOTE — PROGRESS NOTES
Post-Discharge Transitional Care  Follow Up      Joel Bermudez   YOB: 1955    Date of Office Visit:  2/1/2024  Date of Hospital Admission: 1/22/24  Date of Hospital Discharge: 1/25/24  Risk of hospital readmission (high >=14%. Medium >=10%) :Readmission Risk Score: 9.4      Care management risk score Rising risk (score 2-5) and Complex Care (Scores >=6): No Risk Score On File     Non face to face  following discharge, date last encounter closed (first attempt may have been earlier): 01/29/2024    Call initiated 2 business days of discharge: Yes    ASSESSMENT/PLAN:   Moderate episode of recurrent major depressive disorder (HCC)  -     desvenlafaxine succinate (PRISTIQ) 50 MG TB24 extended release tablet; Take 1 tablet by mouth daily, Disp-90 tablet, R-3Normal  Chronic systolic (congestive) heart failure (HCC)  Parkinson's disease with dyskinesia and fluctuating manifestations  History of anaphylaxis  -     EPINEPHrine (EPIPEN) 0.3 MG/0.3ML SOAJ injection; Inject 0.3 mLs into the muscle once for 1 dose Use as directed for allergic reaction, Disp-1 each, R-0Normal  Acute nonintractable headache, unspecified headache type  -     ketorolac (TORADOL) injection 60 mg; 60 mg, IntraMUSCular, ONCE, 1 dose, On Thu 2/1/24 at 1345Do not administer for more than 5 days.  Hospital discharge follow-up  -     IN DISCHARGE MEDS RECONCILED W/ CURRENT OUTPATIENT MED LIST      Medical Decision Making: moderate complexity  Return in 3 months (on 5/1/2024).           Subjective:   HPI:  Follow up of Hospital problems/diagnosis(es): Patient was seen for hospital follow-up.  He presented to the ER with complaints of acute left-sided facial swelling.  He does have history of anaphylactic episodes and had reportedly been having to use them every 2 months or so recently.  Previously, he has declined allergy testing but the anaphylaxis and swelling is worsening.  He had no airway compromise but was admitted for further

## 2024-02-01 NOTE — PROGRESS NOTES
After obtaining consent, and per orders of Dr. Jyoti Dye, the injection of Toradol 60 mg was given by Damari Yu CMA. Patient instructed to remain in clinic for 20 minutes afterwards, and to report any adverse reaction to me immediately.

## 2024-02-05 ENCOUNTER — CARE COORDINATION (OUTPATIENT)
Dept: CARE COORDINATION | Age: 69
End: 2024-02-05

## 2024-02-05 NOTE — CARE COORDINATION
Ambulatory Care Coordination Note  2024    Patient Current Location:  Home: [address] [place] [id number]     ACM contacted the patient by telephone. Verified name and  with patient as identifiers. Provided introduction to self, and explanation of the ACM role.     Challenges to be reviewed by the provider   Additional needs identified to be addressed with provider: Yes    Pt with ongoing migraine headaches that seem to be more in frequency recently. Pt is taking migraine Excedrin and is waiting for pharmacy to refill stadol.  Educated on what to report/when to follow up/when to seek medical attention.          Method of communication with provider: chart routing.    ACM: Naomie Iqbal RN    Patient was called for continued Care Coordination follow up and education re:the management of his CHF, hypertension, and healthcare needs.  Patient shared he has not been feeling well as he continues with ongoing increased migraine headache episodes.  Patient shared he has taken his Excedrin as directed and is currently awaiting refill of his stadol.  Migraine headache education was reviewed with patient and he verbalized understanding.  Patient denied any weakness or this being \"the worst headache of his life.\"  ACM educated patient on signs/symptoms to report and when to seek medical attention.  Headache prevention education was also reviewed with patient.  Patient verbalized understanding.  ACM reviewed home medications with patient and reviewed the importance of taking medications as directed and calling with any medication questions/concerns.  Patient verbalized understanding.   Patient denied any c/o worsening/increased SOB, swelling, or chest pain being present.  CHF education and zone information was also reviewed with patient.  ACM educated patient on the importance of early symptom recognition and follow up and patient verbalized understanding.  Patient admits to not monitoring VS recently at home as

## 2024-02-07 ENCOUNTER — TELEPHONE (OUTPATIENT)
Dept: FAMILY MEDICINE CLINIC | Age: 69
End: 2024-02-07

## 2024-02-07 NOTE — TELEPHONE ENCOUNTER
Recommend benadryl and can take epi pen.  If worsens, needs to go back to ER for emergent meds. thanks

## 2024-02-07 NOTE — TELEPHONE ENCOUNTER
Pt stopped into the office stating that he has started with his mouth and bottom lip swelling. Pt hasn't taken Benadryl or anything yet. Pt has an EpiPen at home. Pt denies any difficulty swallowing or breathing. Pt wants to know what to do last time he went to ER and then Hospital this was 2 weeks ago. Told pt to go home try taking Benadryl and we will call him with the next step per Dr Dye's response. Pt seemed fine when he was in the office no issues with speech and bottom lip slightly swollen.

## 2024-02-07 NOTE — TELEPHONE ENCOUNTER
Spoke with patient he verbalized understanding. He is going to come in tomorrow and have one of the nurse practitioners check it out as well but will go to ER if it gets worse.

## 2024-02-12 ENCOUNTER — CARE COORDINATION (OUTPATIENT)
Dept: CARE COORDINATION | Age: 69
End: 2024-02-12

## 2024-02-12 NOTE — CARE COORDINATION
Attempted to reach patient for continued Care Coordination follow up and education.  Patient was unavailable at the time of my call, and a generic voicemail message was left asking patient to return my call at [phone].

## 2024-02-13 ENCOUNTER — HOSPITAL ENCOUNTER (OUTPATIENT)
Dept: GENERAL RADIOLOGY | Age: 69
Discharge: HOME | End: 2024-02-13
Payer: MEDICARE

## 2024-02-13 ENCOUNTER — HOSPITAL ENCOUNTER (OUTPATIENT)
Age: 69
Discharge: HOME | End: 2024-02-13
Payer: MEDICARE

## 2024-02-13 ENCOUNTER — OFFICE VISIT (OUTPATIENT)
Dept: FAMILY MEDICINE CLINIC | Age: 69
End: 2024-02-13
Payer: MEDICARE

## 2024-02-13 VITALS
HEIGHT: 67 IN | DIASTOLIC BLOOD PRESSURE: 68 MMHG | BODY MASS INDEX: 32.96 KG/M2 | RESPIRATION RATE: 16 BRPM | HEART RATE: 66 BPM | SYSTOLIC BLOOD PRESSURE: 118 MMHG | OXYGEN SATURATION: 97 % | WEIGHT: 210 LBS

## 2024-02-13 DIAGNOSIS — R11.2 NAUSEA AND VOMITING, UNSPECIFIED VOMITING TYPE: ICD-10-CM

## 2024-02-13 DIAGNOSIS — R10.84 GENERALIZED ABDOMINAL PAIN: ICD-10-CM

## 2024-02-13 DIAGNOSIS — K59.00 CONSTIPATION, UNSPECIFIED CONSTIPATION TYPE: Primary | ICD-10-CM

## 2024-02-13 PROCEDURE — 1123F ACP DISCUSS/DSCN MKR DOCD: CPT

## 2024-02-13 PROCEDURE — G8417 CALC BMI ABV UP PARAM F/U: HCPCS

## 2024-02-13 PROCEDURE — 3078F DIAST BP <80 MM HG: CPT

## 2024-02-13 PROCEDURE — 1036F TOBACCO NON-USER: CPT

## 2024-02-13 PROCEDURE — 3017F COLORECTAL CA SCREEN DOC REV: CPT

## 2024-02-13 PROCEDURE — 74018 RADEX ABDOMEN 1 VIEW: CPT

## 2024-02-13 PROCEDURE — G8484 FLU IMMUNIZE NO ADMIN: HCPCS

## 2024-02-13 PROCEDURE — 1111F DSCHRG MED/CURRENT MED MERGE: CPT

## 2024-02-13 PROCEDURE — G8427 DOCREV CUR MEDS BY ELIG CLIN: HCPCS

## 2024-02-13 PROCEDURE — 99213 OFFICE O/P EST LOW 20 MIN: CPT

## 2024-02-13 PROCEDURE — 3074F SYST BP LT 130 MM HG: CPT

## 2024-02-13 RX ORDER — ONDANSETRON 4 MG/1
4 TABLET, ORALLY DISINTEGRATING ORAL 3 TIMES DAILY PRN
Qty: 21 TABLET | Refills: 0 | Status: SHIPPED | OUTPATIENT
Start: 2024-02-13

## 2024-02-13 NOTE — PROGRESS NOTES
[facility]  [address]   [facility] [address]  Dept: [phone]  Loc: [phone]     Joel Bermudez (:  1955) is a 68 y.o. male, here for evaluation of the following chief complaint(s):  Nausea & Vomiting and Headache      ASSESSMENT/PLAN:  1. Constipation, unspecified constipation type  2. Nausea and vomiting, unspecified vomiting type  -     ondansetron (ZOFRAN-ODT) 4 MG disintegrating tablet; Take 1 tablet by mouth 3 times daily as needed for Nausea or Vomiting, Disp-21 tablet, R-0Normal  -     XR ABDOMEN (KUB) (SINGLE AP VIEW); Future  3. Generalized abdominal pain  -     XR ABDOMEN (KUB) (SINGLE AP VIEW); Future    KUB does show signs of constipation throughout colon. No blockage.   Recommend starting miralax.  Increase fiber in diet.  Zofran for nausea/vomiting. Instructed to restart taking medications.  Monitor for worsening moods or any seizure like activity.   Educated on signs of worsening condition to monitor for and when to follow up.     Discussed use, benefit, and side effects of prescribed medications.  Barriers to medication compliance addressed.  All patient questions answered.  Pt voiced understanding.     Return for As needed or if symptoms don't improve.    SUBJECTIVE/OBJECTIVE:  GUILHERME Adams is here today for concerns of continued nausea, vomiting, and generalized abdominal pain. His symptoms started shortly after his hospitalization on  for angioedema. He was given epi at the time and this was thought to be the cause of his nausea. Since then, he has had nausea and vomiting almost every day. He has been unable to take his pills over the last few days. He has not had any seizure like activity. He has had moods swings and been more irritable. No suicidal or homicidal thoughts. He has had decreased appetite but has been able to get water and some foods down. He denies fever. No diarrhea or constipation.

## 2024-02-20 ENCOUNTER — CARE COORDINATION (OUTPATIENT)
Dept: CARE COORDINATION | Age: 69
End: 2024-02-20

## 2024-02-20 NOTE — CARE COORDINATION
Attempted to reach patient for continued Care Coordination follow up and education re: the management of his CHF, hypertension, healthcare needs, and ongoing f/u s/p recent hospital stay for angioedema.  Patient was unavailable at the time of ACM call, and generic voicemail message was left asking patient to please return call to ACM direct number.  Will continue to work to f/u with patient in the future.

## 2024-02-23 ENCOUNTER — HOSPITAL ENCOUNTER (OUTPATIENT)
Age: 69
Discharge: HOME | End: 2024-02-23
Payer: MEDICARE

## 2024-02-23 LAB
25(OH)D3 SERPL-MCNC: 15 NG/ML (ref 30–100)
ALBUMIN SERPL BCP-MCNC: 3.8 GM/DL (ref 3.4–5)
ALP SERPL-CCNC: 73 U/L (ref 46–116)
ALT SERPL W P-5'-P-CCNC: 25 U/L (ref 14–63)
ANION GAP SERPL CALC-SCNC: 8 MEQ/L (ref 8–16)
AST SERPL W P-5'-P-CCNC: 17 U/L (ref 15–37)
BILIRUB SERPL-MCNC: 0.2 MG/DL (ref 0.2–1)
BUN SERPL-MCNC: 19 MG/DL (ref 7–18)
CALCIUM SERPL-MCNC: 8.5 MG/DL (ref 8.5–10.1)
CHLORIDE SERPL-SCNC: 106 MEQ/L (ref 98–107)
CO2 SERPL-SCNC: 28 MEQ/L (ref 21–32)
CREAT SERPL-MCNC: 1 MG/DL (ref 0.6–1.3)
ERYTHROCYTE [SEDIMENTATION RATE] IN BLOOD BY WESTERGREN METHOD: 2 MM/HR (ref 0–10)
GFR SERPL CREATININE-BSD FRML MDRD: > 60 ML/MIN/1.73M2
GLUCOSE SERPL-MCNC: 105 MG/DL (ref 74–106)
POTASSIUM SERPL-SCNC: 4.6 MEQ/L (ref 3.5–5.1)
PROT SERPL-MCNC: 7.7 GM/DL (ref 6.4–8.2)
SODIUM SERPL-SCNC: 142 MEQ/L (ref 136–145)
T4 FREE SERPL-MCNC: 1.01 NG/DL (ref 0.93–1.68)
TSH SERPL DL<=0.005 MIU/L-ACNC: 2.99 UIU/ML (ref 0.4–4.2)

## 2024-02-23 PROCEDURE — 36415 COLL VENOUS BLD VENIPUNCTURE: CPT

## 2024-02-23 PROCEDURE — 80053 COMPREHEN METABOLIC PANEL: CPT

## 2024-02-23 PROCEDURE — 85651 RBC SED RATE NONAUTOMATED: CPT

## 2024-02-24 LAB — C3C SERPL-MCNC: 124 MG/DL (ref 90–180)

## 2024-02-26 ENCOUNTER — TELEPHONE (OUTPATIENT)
Dept: FAMILY MEDICINE CLINIC | Age: 69
End: 2024-02-26

## 2024-02-26 ENCOUNTER — CARE COORDINATION (OUTPATIENT)
Dept: CARE COORDINATION | Age: 69
End: 2024-02-26

## 2024-02-26 DIAGNOSIS — I10 PRIMARY HYPERTENSION: Primary | ICD-10-CM

## 2024-02-26 LAB
CH50 SERPL-ACNC: 70.4 U/ML (ref 38.7–89.9)
NUCLEAR IGG SER QL IA: NORMAL

## 2024-02-26 RX ORDER — BLOOD PRESSURE TEST KIT
KIT MISCELLANEOUS
Qty: 1 KIT | Refills: 0 | Status: SHIPPED | OUTPATIENT
Start: 2024-02-26

## 2024-02-26 NOTE — ACP (ADVANCE CARE PLANNING)
Advance Care Planning   Healthcare Decision Maker:    Primary Decision Maker: Bria Bermudez [facility] - [phone]    Today we documented Decision Maker(s) consistent with ACP documents on file.     Healthcare Decision Maker information reviewed and verified with patient.

## 2024-02-26 NOTE — CARE COORDINATION
Patient called in follow up and was instructed on PCP and cardiology instructions re: metoprolol, neuro f/u, and home BP/HR monitoring.  ACM educated patient on the importance of routine BP/HR monitoring especially with the change in mediation and patient verbalized understanding.  Patient was educated on signs/symptoms to report and the need to call for earlier appointment with any new/change/worsening of symptoms.  Patient verbalized understanding and denied any further questions, concerns, or needs.    
patient enrollment in the Remote Patient Monitoring (RPM) program for in-home monitoring: Patient declined re-enrollment in recent past.    Lab Results       None            Care Coordination Interventions    Referral from Primary Care Provider: No  Suggested Interventions and Community Resources  Fall Risk Prevention: Completed (Comment: Sept. 2023)  Medication Assistance Program: Declined (Comment: Educated to call with any changes - Aug. 2023)  Medi Set or Pill Pack: Completed (Comment: Aug. 2023)  Pharmacist: Declined (Comment: Aug. 2023)  Registered Dietician: Declined (Comment: Aug. 2023)  Social Work: Completed (Comment: Referral for resource needs - Aug. 2023)  Other Services: Completed (Comment: Remote Patient Monitoring (RPM) - Sept. 2023)  Transportation Support: Declined (Comment: Denied any additional needs - Aug. 2023)  Zone Management Tools: Completed (Comment: CHF - Sept. 2023)  Other Services or Interventions: Follows with Dr. Maico CAMPOS for neuro, [facility], and [facility]          Goals Addressed                   This Visit's Progress       Care Coordination     Self Monitoring   Improving     Daily Weights - I will weight myself as directed - Daily and write down weights  I will notify my provider of any increase in weight by 3 or more pounds in 2 days OR 5 or more pounds in a week.    None Recently Recorded    Barriers: lack of support, overwhelmed by complexity of regimen, and lack of education  Plan for overcoming my barriers: Continue to provide ongoing education to patient and monitor for additional needs   Confidence: 8/10  Anticipated Goal Completion Date: 11/30/2023  Update: Continue to provide ongoing education to patient and monitor for additional resource needs.  New Goal Date: 3/8/2024                  Future Appointments   Date Time Provider Department Center   3/11/2024 10:15 AM Thelma Monteiro MD [facility] [place]   5/1/2024 10:30 AM Jyoti Dye MD [facility]

## 2024-02-26 NOTE — TELEPHONE ENCOUNTER
Please order home BP cuff for daily monitoring.  Please  send to [employer] if appropriate. Thank you!

## 2024-02-26 NOTE — TELEPHONE ENCOUNTER
Recommend he calls neuro to see if they have other recs regarding headache.  Otherwise cont with excedrin prn

## 2024-02-29 LAB — MISC. #1 REFERENCE GROUP TEST: NORMAL

## 2024-03-04 ENCOUNTER — CARE COORDINATION (OUTPATIENT)
Dept: CARE COORDINATION | Age: 69
End: 2024-03-04

## 2024-03-04 DIAGNOSIS — I10 BENIGN HYPERTENSION: Primary | ICD-10-CM

## 2024-03-04 LAB — MISC. #1 REFERENCE GROUP TEST: NORMAL

## 2024-03-04 RX ORDER — HYDRALAZINE HYDROCHLORIDE 25 MG/1
25 TABLET, FILM COATED ORAL 3 TIMES DAILY
Qty: 90 TABLET | Refills: 3 | Status: SHIPPED | OUTPATIENT
Start: 2024-03-04

## 2024-03-04 NOTE — CARE COORDINATION
Ambulatory Care Coordination Note  3/4/2024    Patient Current Location:  Home: [address] [place] [id number]     ACM contacted the patient by telephone. Verified name and  with patient as identifiers. Provided introduction to self, and explanation of the ACM role.     Challenges to be reviewed by the provider   Additional needs identified to be addressed with provider: Yes    Patient shared recent BP readings have been \" on average .\"  Pt stopped metoprolol on  as directed - see below for BP readings.       Per Marichuy at allergist office Metoprolol was recommended to be stopped d/t inability for epi to be used successfully if needed for anaphylactic treatment.  Pt. scheduled to see cardiology on 3/11/2024.          Method of communication with provider: chart routing.    ACM: Naomie Iqbal RN    Patient was called for continued Care Coordination follow up and education re: the management of his CHF, hypertension, and healthcare needs.  Patient shared he has been doing \"about the same\" as he continues with intermittent headaches and this is unchanged from his recent baseline.  Patient reported he has a call out to his neurology office and is awaiting return call at this time.  Patient was encouraged to discuss all concerns with them and request earlier appointment when f/u call received and patient acknowledged understanding.   Patient shared his breathing remains at his baseline and he denied any c/o worsening SOB or increased swelling being present.  Patient reported he stopped metoprolol on  as directed and has started monitoring BP BID.  Patient was encouraged to monitoring morning BP approx 1 hour after taking medications and patient verbalized understanding.  Patient stated BP readings have been as follows:   :  AM: 142/88  PM: did not check  :  AM: 160/89  PM: 155/90  :  AM: 155/99  PM: 165/95  :  AM: 160/89  PM: 168/94  3/1:    AM: 148/90  PM: 150/78  3/2:    AM:

## 2024-03-04 NOTE — CARE COORDINATION
Patient called and updated of new orders.  Patient verbalized understanding and denied any questions or concerns.

## 2024-03-06 ENCOUNTER — OFFICE VISIT (OUTPATIENT)
Dept: FAMILY MEDICINE CLINIC | Age: 69
End: 2024-03-06

## 2024-03-06 VITALS
OXYGEN SATURATION: 96 % | BODY MASS INDEX: 33.33 KG/M2 | SYSTOLIC BLOOD PRESSURE: 162 MMHG | TEMPERATURE: 97.7 F | HEIGHT: 67 IN | HEART RATE: 82 BPM | DIASTOLIC BLOOD PRESSURE: 98 MMHG | WEIGHT: 212.38 LBS | RESPIRATION RATE: 18 BRPM

## 2024-03-06 DIAGNOSIS — R51.9 ACUTE NONINTRACTABLE HEADACHE, UNSPECIFIED HEADACHE TYPE: ICD-10-CM

## 2024-03-06 DIAGNOSIS — Z87.892 HISTORY OF ANAPHYLAXIS: ICD-10-CM

## 2024-03-06 DIAGNOSIS — K59.00 CONSTIPATION, UNSPECIFIED CONSTIPATION TYPE: ICD-10-CM

## 2024-03-06 DIAGNOSIS — I10 PRIMARY HYPERTENSION: Primary | ICD-10-CM

## 2024-03-06 DIAGNOSIS — R11.2 NAUSEA AND VOMITING, UNSPECIFIED VOMITING TYPE: ICD-10-CM

## 2024-03-06 RX ORDER — HYDRALAZINE HYDROCHLORIDE 25 MG/1
25 TABLET, FILM COATED ORAL 3 TIMES DAILY
Qty: 42 TABLET | Refills: 0 | Status: SHIPPED | OUTPATIENT
Start: 2024-03-06 | End: 2024-03-20

## 2024-03-06 RX ORDER — TIZANIDINE HYDROCHLORIDE 4 MG/1
CAPSULE, GELATIN COATED ORAL
COMMUNITY
Start: 2013-10-04 | End: 2024-03-06

## 2024-03-06 ASSESSMENT — ENCOUNTER SYMPTOMS
RHINORRHEA: 0
NAUSEA: 1
COUGH: 0
DIARRHEA: 0
CHEST TIGHTNESS: 0
WHEEZING: 0
SORE THROAT: 0
SHORTNESS OF BREATH: 0
ABDOMINAL DISTENTION: 0
VOMITING: 1
BLOOD IN STOOL: 0
CONSTIPATION: 0
ABDOMINAL PAIN: 1

## 2024-03-06 NOTE — PROGRESS NOTES
Resp: 18   Temp: 97.7 °F (36.5 °C)   SpO2: 96%        On this date 3/6/2024 I have spent 40 minutes reviewing previous notes, test results and face to face with the patient discussing the diagnosis and importance of compliance with the treatment plan as well as documenting on the day of the visit.      An electronic signature was used to authenticate this note.    Yong Caceres, YUSEF - CNP

## 2024-03-06 NOTE — PATIENT INSTRUCTIONS
Stop Stadol due to constipation.  Start OTC bisacodyl suppository  And 1 Capful Miralax three times a day for next 3 days.   Goal to clean out your constipation.   Zofran for nausea.     Start hydralazine- 2 week supply sent to [facility]. Mail order for 90 day.     Call Allergist and update on symptoms.   Call Dr. Wilton SWEENEY for update as well.

## 2024-03-09 DIAGNOSIS — I10 PRIMARY HYPERTENSION: ICD-10-CM

## 2024-03-11 ENCOUNTER — OFFICE VISIT (OUTPATIENT)
Dept: CARDIOLOGY CLINIC | Age: 69
End: 2024-03-11
Payer: MEDICARE

## 2024-03-11 VITALS
HEIGHT: 69 IN | DIASTOLIC BLOOD PRESSURE: 70 MMHG | BODY MASS INDEX: 32.17 KG/M2 | WEIGHT: 217.2 LBS | HEART RATE: 68 BPM | SYSTOLIC BLOOD PRESSURE: 112 MMHG

## 2024-03-11 DIAGNOSIS — I10 PRIMARY HYPERTENSION: Primary | ICD-10-CM

## 2024-03-11 DIAGNOSIS — I25.10 CORONARY ARTERY DISEASE INVOLVING NATIVE CORONARY ARTERY OF NATIVE HEART WITHOUT ANGINA PECTORIS: ICD-10-CM

## 2024-03-11 PROCEDURE — G8484 FLU IMMUNIZE NO ADMIN: HCPCS | Performed by: NUCLEAR MEDICINE

## 2024-03-11 PROCEDURE — 1036F TOBACCO NON-USER: CPT | Performed by: NUCLEAR MEDICINE

## 2024-03-11 PROCEDURE — 99213 OFFICE O/P EST LOW 20 MIN: CPT | Performed by: NUCLEAR MEDICINE

## 2024-03-11 PROCEDURE — 3017F COLORECTAL CA SCREEN DOC REV: CPT | Performed by: NUCLEAR MEDICINE

## 2024-03-11 PROCEDURE — 3078F DIAST BP <80 MM HG: CPT | Performed by: NUCLEAR MEDICINE

## 2024-03-11 PROCEDURE — G8417 CALC BMI ABV UP PARAM F/U: HCPCS | Performed by: NUCLEAR MEDICINE

## 2024-03-11 PROCEDURE — G8428 CUR MEDS NOT DOCUMENT: HCPCS | Performed by: NUCLEAR MEDICINE

## 2024-03-11 PROCEDURE — 1123F ACP DISCUSS/DSCN MKR DOCD: CPT | Performed by: NUCLEAR MEDICINE

## 2024-03-11 PROCEDURE — 3074F SYST BP LT 130 MM HG: CPT | Performed by: NUCLEAR MEDICINE

## 2024-03-11 RX ORDER — HYDRALAZINE HYDROCHLORIDE 25 MG/1
25 TABLET, FILM COATED ORAL 3 TIMES DAILY
Qty: 42 TABLET | Refills: 0 | Status: SHIPPED | OUTPATIENT
Start: 2024-03-11

## 2024-03-11 NOTE — PROGRESS NOTES
[facility] PHYSICIANS [place] SPECIALTY  [facility] CARDIOLOGY  [address]  [address]  [facility] [address]  Dept: [phone]  Dept Fax: [phone]  Loc: [phone]    Visit Date: 3/11/2024    Joel Bermudez is a 68 y.o. male who presents todayfor:  Chief Complaint   Patient presents with    Follow-up     1 year fu appt    Hypertension     Had a swollen mouth   ACEi is stopped  Feels better  Yet still some higher BP   Some chest pain when he had his EPI shot  Chest pressure   Had a high BP that day   No changes in breathing  Some baseline  Some headaches  Cath 2017   Mild CAD      HPI:  HPI  Past Medical History:   Diagnosis Date    Back pain     Depression     GERD (gastroesophageal reflux disease)     Headache(784.0) 09/22/2013    Migraines     ALICIA treated with BiPAP 2013    doesn't wear Bipap    Pneumonia     Pulmonary embolism (HCC) 02/25/2021    S/P cardiac catheterization: 7/31/2017: No obstructive lasions. 07/31/2017 7/31/2017: No obstructive lasions. Dr. Farris    Seizures (HCC)     Severe episode of recurrent major depressive disorder, with psychotic features (HCC) 02/01/2024      Past Surgical History:   Procedure Laterality Date    APPENDECTOMY  2012    [facility]    BACK SURGERY  12/18/15    l3-s1 decompression, posterior fusion    CARDIAC CATHETERIZATION  2013    CERVICAL SPINE SURGERY  10-16-15    C4-6 ACDF    COLONOSCOPY      ENDOSCOPY, COLON, DIAGNOSTIC      HERNIA REPAIR  1996    [facility]    HERNIA REPAIR  2012    [facility]    KNEE SURGERY  1976    Rt     LUMBAR SPINE SURGERY  08/26/2014     L3-5 decompression, Dr. Braun, [facility]    NECK SURGERY  10/19/2018    OTHER SURGICAL HISTORY  06/02/2017    Diagnostic laparoscopy, Laparoscopic Ventral hernia Repair with Mesh by Dr Morton    [place] OFFICE/OUTPT VISIT,PROCEDURE ONLY N/A 10/19/2018    C3-4 AND C6-7 ACDF performed by Brynn Galvan MD at [facility] OR    REMOVE HARDWARE SPINE N/A 3/13/2020    L3-S1 HARDWARE REMOVAL

## 2024-03-13 ENCOUNTER — NURSE ONLY (OUTPATIENT)
Dept: FAMILY MEDICINE CLINIC | Age: 69
End: 2024-03-13

## 2024-03-13 ENCOUNTER — CARE COORDINATION (OUTPATIENT)
Dept: CARE COORDINATION | Age: 69
End: 2024-03-13

## 2024-03-13 VITALS — DIASTOLIC BLOOD PRESSURE: 92 MMHG | SYSTOLIC BLOOD PRESSURE: 162 MMHG

## 2024-03-13 DIAGNOSIS — I10 BENIGN HYPERTENSION: ICD-10-CM

## 2024-03-13 RX ORDER — KETOROLAC TROMETHAMINE 10 MG/1
10 TABLET, FILM COATED ORAL EVERY 6 HOURS PRN
COMMUNITY

## 2024-03-13 NOTE — PROGRESS NOTES
Discussed with Dr. Dye she reviewed patient chart and would like the patient to start taking 50 mg hydralazine 3 times daily. Do this for 1 week then come in for a recheck.     Patient is to keep monitoring BP and keep log.

## 2024-03-13 NOTE — CARE COORDINATION
Ambulatory Care Coordination Note  3/13/2024    Patient Current Location:  Home: [address] [place] [id number]     ACM contacted the patient by telephone. Verified name and  with patient as identifiers. Provided introduction to self, and explanation of the ACM role.     Challenges to be reviewed by the provider   Additional needs identified to be addressed with provider: No    none         Method of communication with provider: none.    ACM: Naomie Iqbal RN    Patient was called for continued Care Coordination follow up and education re: the management of his CHF, hypertension, and healthcare needs.  Patient shared he continues to suffer from frequent migraine headaches that are unchanged from recent baseline.  Patient stated he has been in contact with Dr. Nina and toradol was to be started but pharmacy told patient \"it was too early to fill\" and patient is unsure as to why as it is a new medication.  ACM will f/u with pharmacy per patient's request.  Migraine headache education was also reviewed as well as the importance of calling for earlier appointment with any new/change/worsening of symptoms.  Patient verbalized understanding.  Patient shared his breathing remains at his baseline and he denied any c/o increased swelling being present.  Patient denied any questions re: his recent cardiology and PCP office visits and he has started hydralazine TID as directed.  Patient reported he continues to monitor his BP daily/BID at home, but he is concerned his home monitor is not correct d/t a fluctuation with recent office visit readings.  Patient was instructed to bring home BP cuff to PCP office for correlation later today and patient verbalized understanding.  Patient shared recent home BP readings have been as follows:   3/10:  AM: 148/87  3/11:  AM: 140/86   PM: 144/92  3/12: AM: 143/93   PM: 149/93  3/13: AM: 150/84  ACM reviewed importance of continued monitoring and keeping BP log to share with

## 2024-03-19 ENCOUNTER — TELEPHONE (OUTPATIENT)
Dept: CARDIOLOGY CLINIC | Age: 69
End: 2024-03-19

## 2024-03-19 ENCOUNTER — HOSPITAL ENCOUNTER (OUTPATIENT)
Age: 69
Discharge: HOME | End: 2024-03-21
Attending: NUCLEAR MEDICINE
Payer: MEDICARE

## 2024-03-19 ENCOUNTER — HOSPITAL ENCOUNTER (OUTPATIENT)
Dept: NUCLEAR MEDICINE | Age: 69
Discharge: HOME | End: 2024-03-19
Attending: NUCLEAR MEDICINE
Payer: MEDICARE

## 2024-03-19 VITALS — BODY MASS INDEX: 33.04 KG/M2 | WEIGHT: 218 LBS | HEIGHT: 68 IN

## 2024-03-19 DIAGNOSIS — I25.10 CORONARY ARTERY DISEASE INVOLVING NATIVE CORONARY ARTERY OF NATIVE HEART WITHOUT ANGINA PECTORIS: ICD-10-CM

## 2024-03-19 DIAGNOSIS — I10 PRIMARY HYPERTENSION: ICD-10-CM

## 2024-03-19 DIAGNOSIS — R94.39 ABNORMAL STRESS TEST: ICD-10-CM

## 2024-03-19 DIAGNOSIS — I25.10 CORONARY ARTERY DISEASE INVOLVING NATIVE CORONARY ARTERY OF NATIVE HEART WITHOUT ANGINA PECTORIS: Primary | ICD-10-CM

## 2024-03-19 LAB
ECHO BSA: 2.18 M2
NUC REST EJECTION FRACTION: 51 %
NUC STRESS EJECTION FRACTION: 51 %
STRESS BASELINE DIAS BP: 85 MMHG
STRESS BASELINE HR: 64 BPM
STRESS BASELINE SYS BP: 158 MMHG
STRESS STAGE 1 BP: NORMAL MMHG
STRESS STAGE 1 DURATION: 1 MIN:SEC
STRESS STAGE 1 HR: 92 BPM
STRESS STAGE 2 BP: NORMAL MMHG
STRESS STAGE 2 DURATION: 1 MIN:SEC
STRESS STAGE 2 HR: 95 BPM
STRESS STAGE 3 BP: NORMAL MMHG
STRESS STAGE 3 DURATION: 1 MIN:SEC
STRESS STAGE 3 HR: 89 BPM
STRESS STAGE RECOVERY 1 BP: NORMAL MMHG
STRESS STAGE RECOVERY 1 DURATION: 1 MIN:SEC
STRESS STAGE RECOVERY 1 HR: 66 BPM
STRESS STAGE RECOVERY 2 BP: NORMAL MMHG
STRESS STAGE RECOVERY 2 DURATION: 1 MIN:SEC
STRESS STAGE RECOVERY 2 HR: 65 BPM
STRESS STAGE RECOVERY 3 BP: NORMAL MMHG
STRESS STAGE RECOVERY 3 DURATION: 1 MIN:SEC
STRESS STAGE RECOVERY 3 HR: 65 BPM
STRESS STAGE RECOVERY 4 BP: NORMAL MMHG
STRESS STAGE RECOVERY 4 DURATION: 2 MIN:SEC
STRESS STAGE RECOVERY 4 HR: 65 BPM
STRESS TARGET HR: 154 BPM

## 2024-03-19 PROCEDURE — A9500 TC99M SESTAMIBI: HCPCS | Performed by: NUCLEAR MEDICINE

## 2024-03-19 PROCEDURE — 6360000002 HC RX W HCPCS: Performed by: NUCLEAR MEDICINE

## 2024-03-19 PROCEDURE — 78452 HT MUSCLE IMAGE SPECT MULT: CPT | Performed by: NUCLEAR MEDICINE

## 2024-03-19 PROCEDURE — 78452 HT MUSCLE IMAGE SPECT MULT: CPT

## 2024-03-19 PROCEDURE — 3430000000 HC RX DIAGNOSTIC RADIOPHARMACEUTICAL: Performed by: NUCLEAR MEDICINE

## 2024-03-19 PROCEDURE — 93017 CV STRESS TEST TRACING ONLY: CPT

## 2024-03-19 PROCEDURE — 93018 CV STRESS TEST I&R ONLY: CPT | Performed by: NUCLEAR MEDICINE

## 2024-03-19 PROCEDURE — 93016 CV STRESS TEST SUPVJ ONLY: CPT | Performed by: NUCLEAR MEDICINE

## 2024-03-19 RX ORDER — TETRAKIS(2-METHOXYISOBUTYLISOCYANIDE)COPPER(I) TETRAFLUOROBORATE 1 MG/ML
35 INJECTION, POWDER, LYOPHILIZED, FOR SOLUTION INTRAVENOUS
Status: COMPLETED | OUTPATIENT
Start: 2024-03-19 | End: 2024-03-19

## 2024-03-19 RX ORDER — TETRAKIS(2-METHOXYISOBUTYLISOCYANIDE)COPPER(I) TETRAFLUOROBORATE 1 MG/ML
10.7 INJECTION, POWDER, LYOPHILIZED, FOR SOLUTION INTRAVENOUS
Status: COMPLETED | OUTPATIENT
Start: 2024-03-19 | End: 2024-03-19

## 2024-03-19 RX ORDER — REGADENOSON 0.08 MG/ML
0.4 INJECTION, SOLUTION INTRAVENOUS
Status: COMPLETED | OUTPATIENT
Start: 2024-03-19 | End: 2024-03-19

## 2024-03-19 RX ADMIN — Medication 10.7 MILLICURIE: at 12:59

## 2024-03-19 RX ADMIN — Medication 35 MILLICURIE: at 14:14

## 2024-03-19 RX ADMIN — REGADENOSON 0.4 MG: 0.08 INJECTION, SOLUTION INTRAVENOUS at 14:14

## 2024-03-20 ENCOUNTER — OFFICE VISIT (OUTPATIENT)
Dept: FAMILY MEDICINE CLINIC | Age: 69
End: 2024-03-20

## 2024-03-20 VITALS
OXYGEN SATURATION: 94 % | BODY MASS INDEX: 32.84 KG/M2 | WEIGHT: 216 LBS | DIASTOLIC BLOOD PRESSURE: 64 MMHG | HEART RATE: 70 BPM | SYSTOLIC BLOOD PRESSURE: 142 MMHG | RESPIRATION RATE: 16 BRPM

## 2024-03-20 DIAGNOSIS — I10 PRIMARY HYPERTENSION: Primary | ICD-10-CM

## 2024-03-20 DIAGNOSIS — R11.2 NAUSEA AND VOMITING, UNSPECIFIED VOMITING TYPE: ICD-10-CM

## 2024-03-20 DIAGNOSIS — G43.119 INTRACTABLE MIGRAINE WITH AURA WITHOUT STATUS MIGRAINOSUS: ICD-10-CM

## 2024-03-20 DIAGNOSIS — F33.1 MODERATE EPISODE OF RECURRENT MAJOR DEPRESSIVE DISORDER (HCC): ICD-10-CM

## 2024-03-20 DIAGNOSIS — K21.9 GASTROESOPHAGEAL REFLUX DISEASE WITHOUT ESOPHAGITIS: ICD-10-CM

## 2024-03-20 PROBLEM — R94.39 ABNORMAL STRESS TEST: Status: ACTIVE | Noted: 2024-03-19

## 2024-03-20 RX ORDER — DESVENLAFAXINE SUCCINATE 50 MG/1
50 TABLET, EXTENDED RELEASE ORAL DAILY
Qty: 90 TABLET | Refills: 3 | Status: SHIPPED | OUTPATIENT
Start: 2024-03-20

## 2024-03-20 RX ORDER — HYDRALAZINE HYDROCHLORIDE 50 MG/1
75 TABLET, FILM COATED ORAL 3 TIMES DAILY
Qty: 135 TABLET | Refills: 2 | Status: SHIPPED | OUTPATIENT
Start: 2024-03-20 | End: 2024-06-18

## 2024-03-20 RX ORDER — ONDANSETRON 4 MG/1
4 TABLET, ORALLY DISINTEGRATING ORAL 3 TIMES DAILY PRN
Qty: 60 TABLET | Refills: 3 | Status: SHIPPED | OUTPATIENT
Start: 2024-03-20

## 2024-03-20 RX ORDER — LAMOTRIGINE 200 MG/1
200 TABLET ORAL 2 TIMES DAILY
Qty: 180 TABLET | Refills: 3 | Status: SHIPPED | OUTPATIENT
Start: 2024-03-20

## 2024-03-20 RX ORDER — OMEPRAZOLE 40 MG/1
40 CAPSULE, DELAYED RELEASE ORAL DAILY
Qty: 90 CAPSULE | Refills: 3 | Status: SHIPPED | OUTPATIENT
Start: 2024-03-20

## 2024-03-20 ASSESSMENT — ENCOUNTER SYMPTOMS
NAUSEA: 0
CONSTIPATION: 0
DIARRHEA: 0
SORE THROAT: 0
SHORTNESS OF BREATH: 0
RHINORRHEA: 0
WHEEZING: 0
ABDOMINAL PAIN: 0
COUGH: 0

## 2024-03-20 NOTE — PROGRESS NOTES
[facility] [address] [facility]  [address]   [facility] [address]  Dept: [phone]  Loc: [phone]     Joel Bermudez (:  1955) is a 68 y.o. male, here for evaluation of the following chief complaint(s):  Hypertension (Patient states BP has been running high. Forgot log. Chronic headaches. Had stress test completed yesterday and has a heart cath scheduled for 2024.)      ASSESSMENT/PLAN:  1. Primary hypertension  2. Moderate episode of recurrent major depressive disorder (HCC)  -     lamoTRIgine (LAMICTAL) 200 MG tablet; Take 1 tablet by mouth 2 times daily, Disp-180 tablet, R-3Normal  3. Intractable migraine with aura without status migrainosus  -     lamoTRIgine (LAMICTAL) 200 MG tablet; Take 1 tablet by mouth 2 times daily, Disp-180 tablet, R-3Normal  4. Benign hypertension  -     hydrALAZINE (APRESOLINE) 50 MG tablet; Take 1.5 tablets by mouth 3 times daily, Disp-135 tablet, R-2Normal  5. Gastroesophageal reflux disease without esophagitis  -     omeprazole (PRILOSEC) 40 MG delayed release capsule; Take 1 capsule by mouth daily, Disp-90 capsule, R-3Requesting 1 year supplyNormal  6. Nausea and vomiting, unspecified vomiting type  -     ondansetron (ZOFRAN-ODT) 4 MG disintegrating tablet; Take 1 tablet by mouth 3 times daily as needed for Nausea or Vomiting, Disp-60 tablet, R-3Normal    Will increased hydralazine to 75 mg 3 times daily.  Refill meds for other chronic conditions.  Follows up with cardiology for heart cath in 2 weeks.  Warning signs discussed as when to seek urgent care.  No follow-ups on file.    SUBJECTIVE/OBJECTIVE:  HPI  Patient presents for follow-up of blood pressure.  Mentions that while at home, his blood pressure has been running high, typically between 150-160 systolic.  He states that he has had headache and felt like it is running high.  It does come down somewhat with the hydralazine and he tolerates the

## 2024-03-20 NOTE — TELEPHONE ENCOUNTER
Spoke with patient to schedule left heart cath for 4/03/24 a 12:00 pm, arrival time of 10:00 am. Patient to take all meds on day of procedure with small sip of water as usual. All instructions reviewed via phone with patient and mailed to patient this date, pt verbalized understanding. Case scheduled in cath lab with Clementina.

## 2024-03-21 ENCOUNTER — CARE COORDINATION (OUTPATIENT)
Dept: CARE COORDINATION | Age: 69
End: 2024-03-21

## 2024-03-21 ENCOUNTER — HOSPITAL ENCOUNTER (INPATIENT)
Age: 69
LOS: 2 days | Discharge: HOME | DRG: 287 | End: 2024-03-23
Attending: EMERGENCY MEDICINE | Admitting: INTERNAL MEDICINE
Payer: MEDICARE

## 2024-03-21 ENCOUNTER — APPOINTMENT (OUTPATIENT)
Dept: GENERAL RADIOLOGY | Age: 69
DRG: 287 | End: 2024-03-21
Payer: MEDICARE

## 2024-03-21 DIAGNOSIS — R07.9 CHEST PAIN: ICD-10-CM

## 2024-03-21 DIAGNOSIS — I24.9 ACS (ACUTE CORONARY SYNDROME) (HCC): Primary | ICD-10-CM

## 2024-03-21 DIAGNOSIS — R07.9 CHEST PAIN WITH HIGH RISK FOR CARDIAC ETIOLOGY: ICD-10-CM

## 2024-03-21 LAB
ANION GAP SERPL CALC-SCNC: 15 MEQ/L (ref 8–16)
APTT PPP: 29.3 SECONDS (ref 22–38)
BASOPHILS ABSOLUTE: 0.1 THOU/MM3 (ref 0–0.1)
BASOPHILS NFR BLD AUTO: 0.7 %
BUN SERPL-MCNC: 16 MG/DL (ref 7–22)
CALCIUM SERPL-MCNC: 8.8 MG/DL (ref 8.5–10.5)
CHLORIDE SERPL-SCNC: 101 MEQ/L (ref 98–111)
CO2 SERPL-SCNC: 22 MEQ/L (ref 23–33)
CREAT SERPL-MCNC: 0.9 MG/DL (ref 0.4–1.2)
DEPRECATED RDW RBC AUTO: 45.3 FL (ref 35–45)
EOSINOPHIL NFR BLD AUTO: 3.8 %
EOSINOPHILS ABSOLUTE: 0.4 THOU/MM3 (ref 0–0.4)
ERYTHROCYTE [DISTWIDTH] IN BLOOD BY AUTOMATED COUNT: 13.3 % (ref 11.5–14.5)
GFR SERPL CREATININE-BSD FRML MDRD: > 60 ML/MIN/1.73M2
GLUCOSE SERPL-MCNC: 108 MG/DL (ref 70–108)
HCT VFR BLD AUTO: 46.8 % (ref 42–52)
HGB BLD-MCNC: 15.5 GM/DL (ref 14–18)
IMM GRANULOCYTES # BLD AUTO: 0.08 THOU/MM3 (ref 0–0.07)
IMM GRANULOCYTES NFR BLD AUTO: 0.8 %
INR PPP: 0.88 (ref 0.85–1.13)
LYMPHOCYTES ABSOLUTE: 3.8 THOU/MM3 (ref 1–4.8)
LYMPHOCYTES NFR BLD AUTO: 38.6 %
MCH RBC QN AUTO: 31.2 PG (ref 26–33)
MCHC RBC AUTO-ENTMCNC: 33.1 GM/DL (ref 32.2–35.5)
MCV RBC AUTO: 94.2 FL (ref 80–94)
MONOCYTES ABSOLUTE: 1.1 THOU/MM3 (ref 0.4–1.3)
MONOCYTES NFR BLD AUTO: 11.7 %
NEUTROPHILS NFR BLD AUTO: 44.4 %
NRBC BLD AUTO-RTO: 0 /100 WBC
NT-PROBNP SERPL IA-MCNC: 107 PG/ML (ref 0–124)
OSMOLALITY SERPL CALC.SUM OF ELEC: 277.4 MOSMOL/KG (ref 275–300)
PLATELET # BLD AUTO: 374 THOU/MM3 (ref 130–400)
PMV BLD AUTO: 10.1 FL (ref 9.4–12.4)
POTASSIUM SERPL-SCNC: 4.5 MEQ/L (ref 3.5–5.2)
RBC # BLD AUTO: 4.97 MILL/MM3 (ref 4.7–6.1)
SEGMENTED NEUTROPHILS ABSOLUTE COUNT: 4.4 THOU/MM3 (ref 1.8–7.7)
SODIUM SERPL-SCNC: 138 MEQ/L (ref 135–145)
TROPONIN, HIGH SENSITIVITY: 32 NG/L (ref 0–12)
WBC # BLD AUTO: 9.8 THOU/MM3 (ref 4.8–10.8)

## 2024-03-21 PROCEDURE — 99285 EMERGENCY DEPT VISIT HI MDM: CPT

## 2024-03-21 PROCEDURE — 6360000002 HC RX W HCPCS

## 2024-03-21 PROCEDURE — 93005 ELECTROCARDIOGRAM TRACING: CPT | Performed by: EMERGENCY MEDICINE

## 2024-03-21 PROCEDURE — 36415 COLL VENOUS BLD VENIPUNCTURE: CPT

## 2024-03-21 PROCEDURE — 84484 ASSAY OF TROPONIN QUANT: CPT

## 2024-03-21 PROCEDURE — 1200000003 HC TELEMETRY R&B

## 2024-03-21 PROCEDURE — 83880 ASSAY OF NATRIURETIC PEPTIDE: CPT

## 2024-03-21 PROCEDURE — 71045 X-RAY EXAM CHEST 1 VIEW: CPT

## 2024-03-21 PROCEDURE — 6370000000 HC RX 637 (ALT 250 FOR IP): Performed by: EMERGENCY MEDICINE

## 2024-03-21 PROCEDURE — 85610 PROTHROMBIN TIME: CPT

## 2024-03-21 PROCEDURE — 85025 COMPLETE CBC W/AUTO DIFF WBC: CPT

## 2024-03-21 PROCEDURE — 80048 BASIC METABOLIC PNL TOTAL CA: CPT

## 2024-03-21 PROCEDURE — 85520 HEPARIN ASSAY: CPT

## 2024-03-21 PROCEDURE — 96374 THER/PROPH/DIAG INJ IV PUSH: CPT

## 2024-03-21 PROCEDURE — 85730 THROMBOPLASTIN TIME PARTIAL: CPT

## 2024-03-21 RX ORDER — HEPARIN SODIUM 1000 [USP'U]/ML
4000 INJECTION, SOLUTION INTRAVENOUS; SUBCUTANEOUS ONCE
Status: COMPLETED | OUTPATIENT
Start: 2024-03-21 | End: 2024-03-21

## 2024-03-21 RX ORDER — ONDANSETRON 4 MG/1
4 TABLET, ORALLY DISINTEGRATING ORAL EVERY 8 HOURS PRN
Status: DISCONTINUED | OUTPATIENT
Start: 2024-03-21 | End: 2024-03-23 | Stop reason: HOSPADM

## 2024-03-21 RX ORDER — HEPARIN SODIUM 10000 [USP'U]/100ML
5-30 INJECTION, SOLUTION INTRAVENOUS CONTINUOUS
Status: DISCONTINUED | OUTPATIENT
Start: 2024-03-21 | End: 2024-03-22 | Stop reason: ALTCHOICE

## 2024-03-21 RX ORDER — NITROGLYCERIN 20 MG/100ML
5-200 INJECTION INTRAVENOUS CONTINUOUS
Status: DISCONTINUED | OUTPATIENT
Start: 2024-03-21 | End: 2024-03-22 | Stop reason: ALTCHOICE

## 2024-03-21 RX ORDER — ASPIRIN 81 MG/1
81 TABLET, CHEWABLE ORAL DAILY
Status: DISCONTINUED | OUTPATIENT
Start: 2024-03-22 | End: 2024-03-23 | Stop reason: HOSPADM

## 2024-03-21 RX ORDER — ASPIRIN 81 MG/1
324 TABLET, CHEWABLE ORAL ONCE
Status: COMPLETED | OUTPATIENT
Start: 2024-03-21 | End: 2024-03-21

## 2024-03-21 RX ORDER — FAMOTIDINE 20 MG/1
20 TABLET, FILM COATED ORAL 2 TIMES DAILY
Status: DISCONTINUED | OUTPATIENT
Start: 2024-03-22 | End: 2024-03-23 | Stop reason: HOSPADM

## 2024-03-21 RX ORDER — POLYETHYLENE GLYCOL 3350 17 G/17G
17 POWDER, FOR SOLUTION ORAL DAILY PRN
Status: DISCONTINUED | OUTPATIENT
Start: 2024-03-21 | End: 2024-03-23 | Stop reason: HOSPADM

## 2024-03-21 RX ORDER — ATORVASTATIN CALCIUM 80 MG/1
80 TABLET, FILM COATED ORAL NIGHTLY
Status: DISCONTINUED | OUTPATIENT
Start: 2024-03-22 | End: 2024-03-23 | Stop reason: HOSPADM

## 2024-03-21 RX ORDER — SODIUM CHLORIDE 0.9 % (FLUSH) 0.9 %
5-40 SYRINGE (ML) INJECTION EVERY 12 HOURS SCHEDULED
Status: DISCONTINUED | OUTPATIENT
Start: 2024-03-22 | End: 2024-03-23 | Stop reason: HOSPADM

## 2024-03-21 RX ORDER — POTASSIUM CHLORIDE 7.45 MG/ML
10 INJECTION INTRAVENOUS PRN
Status: DISCONTINUED | OUTPATIENT
Start: 2024-03-21 | End: 2024-03-23 | Stop reason: HOSPADM

## 2024-03-21 RX ORDER — HEPARIN SODIUM 1000 [USP'U]/ML
2000 INJECTION, SOLUTION INTRAVENOUS; SUBCUTANEOUS PRN
Status: DISCONTINUED | OUTPATIENT
Start: 2024-03-21 | End: 2024-03-22 | Stop reason: ALTCHOICE

## 2024-03-21 RX ORDER — ACETAMINOPHEN 325 MG/1
650 TABLET ORAL EVERY 6 HOURS PRN
Status: DISCONTINUED | OUTPATIENT
Start: 2024-03-21 | End: 2024-03-23 | Stop reason: HOSPADM

## 2024-03-21 RX ORDER — SODIUM CHLORIDE 9 MG/ML
INJECTION, SOLUTION INTRAVENOUS CONTINUOUS
Status: DISCONTINUED | OUTPATIENT
Start: 2024-03-22 | End: 2024-03-22 | Stop reason: ALTCHOICE

## 2024-03-21 RX ORDER — ACETAMINOPHEN 650 MG/1
650 SUPPOSITORY RECTAL EVERY 6 HOURS PRN
Status: DISCONTINUED | OUTPATIENT
Start: 2024-03-21 | End: 2024-03-23 | Stop reason: HOSPADM

## 2024-03-21 RX ORDER — POTASSIUM CHLORIDE 20 MEQ/1
40 TABLET, EXTENDED RELEASE ORAL PRN
Status: DISCONTINUED | OUTPATIENT
Start: 2024-03-21 | End: 2024-03-23 | Stop reason: HOSPADM

## 2024-03-21 RX ORDER — ONDANSETRON 2 MG/ML
4 INJECTION INTRAMUSCULAR; INTRAVENOUS EVERY 6 HOURS PRN
Status: DISCONTINUED | OUTPATIENT
Start: 2024-03-21 | End: 2024-03-23 | Stop reason: HOSPADM

## 2024-03-21 RX ORDER — HEPARIN SODIUM 1000 [USP'U]/ML
4000 INJECTION, SOLUTION INTRAVENOUS; SUBCUTANEOUS PRN
Status: DISCONTINUED | OUTPATIENT
Start: 2024-03-21 | End: 2024-03-22 | Stop reason: ALTCHOICE

## 2024-03-21 RX ORDER — SODIUM CHLORIDE 9 MG/ML
INJECTION, SOLUTION INTRAVENOUS PRN
Status: DISCONTINUED | OUTPATIENT
Start: 2024-03-21 | End: 2024-03-23 | Stop reason: HOSPADM

## 2024-03-21 RX ORDER — MAGNESIUM SULFATE IN WATER 40 MG/ML
2000 INJECTION, SOLUTION INTRAVENOUS PRN
Status: DISCONTINUED | OUTPATIENT
Start: 2024-03-21 | End: 2024-03-23 | Stop reason: HOSPADM

## 2024-03-21 RX ORDER — SODIUM CHLORIDE 0.9 % (FLUSH) 0.9 %
5-40 SYRINGE (ML) INJECTION PRN
Status: DISCONTINUED | OUTPATIENT
Start: 2024-03-21 | End: 2024-03-23 | Stop reason: HOSPADM

## 2024-03-21 RX ADMIN — HEPARIN SODIUM 4000 UNITS: 1000 INJECTION INTRAVENOUS; SUBCUTANEOUS at 23:41

## 2024-03-21 RX ADMIN — NITROGLYCERIN 20 MCG/MIN: 20 INJECTION INTRAVENOUS at 23:44

## 2024-03-21 RX ADMIN — HEPARIN SODIUM 10 UNITS/KG/HR: 10000 INJECTION, SOLUTION INTRAVENOUS at 23:42

## 2024-03-21 RX ADMIN — ASPIRIN 81 MG 324 MG: 81 TABLET ORAL at 23:45

## 2024-03-21 ASSESSMENT — HEART SCORE
ECG: NORMAL
ECG: NON-SPECIFC REPOLARIZATION DISTURBANCE/LBTB/PM
ECG: NORMAL

## 2024-03-21 ASSESSMENT — PAIN - FUNCTIONAL ASSESSMENT: PAIN_FUNCTIONAL_ASSESSMENT: 0-10

## 2024-03-21 ASSESSMENT — PAIN DESCRIPTION - FREQUENCY: FREQUENCY: CONTINUOUS

## 2024-03-21 ASSESSMENT — PAIN DESCRIPTION - ONSET: ONSET: ON-GOING

## 2024-03-21 ASSESSMENT — PAIN DESCRIPTION - LOCATION: LOCATION: CHEST

## 2024-03-21 ASSESSMENT — PAIN SCALES - GENERAL: PAINLEVEL_OUTOF10: 5

## 2024-03-21 NOTE — CARE COORDINATION
overwhelmed by complexity of regimen, and lack of education  Plan for overcoming my barriers: Continue to provide ongoing education to patient and monitor for additional needs   Confidence: 8/10  Anticipated Goal Completion Date: 11/30/2023  Update: Continue to provide ongoing education to patient and monitor for additional resource needs.  New Goal Date: 3/28/2024                  Future Appointments   Date Time Provider Department Center   5/1/2024 10:30 AM Jyoti Dye MD [facility]   3/17/2025 10:15 AM Thelma Monteiro MD [facility]   ,   Congestive Heart Failure Assessment    Are you currently restricting fluids?: No Restriction  Do you understand a low sodium diet?: Yes  Do you understand how to read food labels?: No  How many restaurant meals do you eat per week?: 1-2  Do you salt your food before tasting it?: Yes     No patient-reported symptoms      Symptoms:  CHF associated dyspnea on exertion: Pos (Comment: remains at baseline)      Symptom course: stable  Weight trend: stable  Salt intake watch compared to last visit: stable     ,   Hypertension - Encounter Level    Symptoms: hypertension associated headaches: Pos  Symptom course: improving     ,   General Assessment    Do you have any symptoms that are causing concern?: Yes  Progression since Onset: Gradually Improving, Intermittent - Waxing/Waning  Reported Symptoms: Other (Comment: migraine headaches)     , and No linked episodes

## 2024-03-22 ENCOUNTER — APPOINTMENT (OUTPATIENT)
Dept: ULTRASOUND IMAGING | Age: 69
DRG: 287 | End: 2024-03-22
Payer: MEDICARE

## 2024-03-22 LAB
ALBUMIN SERPL BCG-MCNC: 4.2 G/DL (ref 3.5–5.1)
ALP SERPL-CCNC: 71 U/L (ref 38–126)
ALT SERPL W/O P-5'-P-CCNC: 30 U/L (ref 11–66)
ANION GAP SERPL CALC-SCNC: 11 MEQ/L (ref 8–16)
AST SERPL-CCNC: 27 U/L (ref 5–40)
BILIRUB CONJ SERPL-MCNC: < 0.2 MG/DL (ref 0–0.3)
BILIRUB SERPL-MCNC: 0.3 MG/DL (ref 0.3–1.2)
BUN SERPL-MCNC: 15 MG/DL (ref 7–22)
CALCIUM SERPL-MCNC: 8.3 MG/DL (ref 8.5–10.5)
CHLORIDE SERPL-SCNC: 102 MEQ/L (ref 98–111)
CHOLEST SERPL-MCNC: 176 MG/DL (ref 100–199)
CO2 SERPL-SCNC: 23 MEQ/L (ref 23–33)
CREAT SERPL-MCNC: 0.9 MG/DL (ref 0.4–1.2)
DEPRECATED RDW RBC AUTO: 46.2 FL (ref 35–45)
ECHO BSA: 2.17 M2
EKG ATRIAL RATE: 54 BPM
EKG ATRIAL RATE: 63 BPM
EKG P AXIS: 58 DEGREES
EKG P-R INTERVAL: 144 MS
EKG P-R INTERVAL: 144 MS
EKG Q-T INTERVAL: 408 MS
EKG Q-T INTERVAL: 436 MS
EKG QRS DURATION: 118 MS
EKG QRS DURATION: 120 MS
EKG QTC CALCULATION (BAZETT): 413 MS
EKG QTC CALCULATION (BAZETT): 417 MS
EKG R AXIS: -45 DEGREES
EKG R AXIS: -47 DEGREES
EKG T AXIS: -11 DEGREES
EKG T AXIS: 66 DEGREES
EKG VENTRICULAR RATE: 54 BPM
EKG VENTRICULAR RATE: 63 BPM
ERYTHROCYTE [DISTWIDTH] IN BLOOD BY AUTOMATED COUNT: 13.2 % (ref 11.5–14.5)
GFR SERPL CREATININE-BSD FRML MDRD: > 60 ML/MIN/1.73M2
GLUCOSE SERPL-MCNC: 118 MG/DL (ref 70–108)
HCT VFR BLD AUTO: 44.8 % (ref 42–52)
HDLC SERPL-MCNC: 41 MG/DL
HEPARIN UNFRACTIONATED: 0.51 U/ML (ref 0.3–0.7)
HEPARIN UNFRACTIONATED: < 0.04 U/ML (ref 0.3–0.7)
HGB BLD-MCNC: 14.7 GM/DL (ref 14–18)
LDLC SERPL CALC-MCNC: 108 MG/DL
MCH RBC QN AUTO: 31.1 PG (ref 26–33)
MCHC RBC AUTO-ENTMCNC: 32.8 GM/DL (ref 32.2–35.5)
MCV RBC AUTO: 94.9 FL (ref 80–94)
OSMOLALITY SERPL CALC.SUM OF ELEC: 273.9 MOSMOL/KG (ref 275–300)
PLATELET # BLD AUTO: 347 THOU/MM3 (ref 130–400)
PMV BLD AUTO: 10.1 FL (ref 9.4–12.4)
POTASSIUM SERPL-SCNC: 4.7 MEQ/L (ref 3.5–5.2)
PROT SERPL-MCNC: 7.4 G/DL (ref 6.1–8)
RBC # BLD AUTO: 4.72 MILL/MM3 (ref 4.7–6.1)
SODIUM SERPL-SCNC: 136 MEQ/L (ref 135–145)
TRIGL SERPL-MCNC: 137 MG/DL (ref 0–199)
TROPONIN, HIGH SENSITIVITY: 26 NG/L (ref 0–12)
TROPONIN, HIGH SENSITIVITY: 26 NG/L (ref 0–12)
WBC # BLD AUTO: 8.5 THOU/MM3 (ref 4.8–10.8)

## 2024-03-22 PROCEDURE — 85027 COMPLETE CBC AUTOMATED: CPT

## 2024-03-22 PROCEDURE — C1769 GUIDE WIRE: HCPCS | Performed by: NUCLEAR MEDICINE

## 2024-03-22 PROCEDURE — 6360000004 HC RX CONTRAST MEDICATION: Performed by: NUCLEAR MEDICINE

## 2024-03-22 PROCEDURE — C1894 INTRO/SHEATH, NON-LASER: HCPCS | Performed by: NUCLEAR MEDICINE

## 2024-03-22 PROCEDURE — B2111ZZ FLUOROSCOPY OF MULTIPLE CORONARY ARTERIES USING LOW OSMOLAR CONTRAST: ICD-10-PCS | Performed by: NUCLEAR MEDICINE

## 2024-03-22 PROCEDURE — 2709999900 HC NON-CHARGEABLE SUPPLY: Performed by: NUCLEAR MEDICINE

## 2024-03-22 PROCEDURE — 2500000003 HC RX 250 WO HCPCS: Performed by: NUCLEAR MEDICINE

## 2024-03-22 PROCEDURE — 93005 ELECTROCARDIOGRAM TRACING: CPT | Performed by: INTERNAL MEDICINE

## 2024-03-22 PROCEDURE — 6370000000 HC RX 637 (ALT 250 FOR IP): Performed by: INTERNAL MEDICINE

## 2024-03-22 PROCEDURE — 36415 COLL VENOUS BLD VENIPUNCTURE: CPT

## 2024-03-22 PROCEDURE — 99152 MOD SED SAME PHYS/QHP 5/>YRS: CPT | Performed by: NUCLEAR MEDICINE

## 2024-03-22 PROCEDURE — 4A023N7 MEASUREMENT OF CARDIAC SAMPLING AND PRESSURE, LEFT HEART, PERCUTANEOUS APPROACH: ICD-10-PCS | Performed by: NUCLEAR MEDICINE

## 2024-03-22 PROCEDURE — 76705 ECHO EXAM OF ABDOMEN: CPT

## 2024-03-22 PROCEDURE — 2580000003 HC RX 258: Performed by: INTERNAL MEDICINE

## 2024-03-22 PROCEDURE — 84484 ASSAY OF TROPONIN QUANT: CPT

## 2024-03-22 PROCEDURE — 80061 LIPID PANEL: CPT

## 2024-03-22 PROCEDURE — 6360000002 HC RX W HCPCS: Performed by: NUCLEAR MEDICINE

## 2024-03-22 PROCEDURE — 93010 ELECTROCARDIOGRAM REPORT: CPT | Performed by: INTERNAL MEDICINE

## 2024-03-22 PROCEDURE — 6360000002 HC RX W HCPCS: Performed by: INTERNAL MEDICINE

## 2024-03-22 PROCEDURE — 99223 1ST HOSP IP/OBS HIGH 75: CPT | Performed by: NUCLEAR MEDICINE

## 2024-03-22 PROCEDURE — 80048 BASIC METABOLIC PNL TOTAL CA: CPT

## 2024-03-22 PROCEDURE — 85520 HEPARIN ASSAY: CPT

## 2024-03-22 PROCEDURE — 6370000000 HC RX 637 (ALT 250 FOR IP): Performed by: NURSE PRACTITIONER

## 2024-03-22 PROCEDURE — 93452 LEFT HRT CATH W/VENTRCLGRPHY: CPT | Performed by: NUCLEAR MEDICINE

## 2024-03-22 PROCEDURE — 93458 L HRT ARTERY/VENTRICLE ANGIO: CPT | Performed by: NUCLEAR MEDICINE

## 2024-03-22 PROCEDURE — 80076 HEPATIC FUNCTION PANEL: CPT

## 2024-03-22 PROCEDURE — 1200000003 HC TELEMETRY R&B

## 2024-03-22 PROCEDURE — B2151ZZ FLUOROSCOPY OF LEFT HEART USING LOW OSMOLAR CONTRAST: ICD-10-PCS | Performed by: NUCLEAR MEDICINE

## 2024-03-22 RX ORDER — CALCIUM CARBONATE 500 MG/1
500 TABLET, CHEWABLE ORAL 3 TIMES DAILY PRN
Status: DISCONTINUED | OUTPATIENT
Start: 2024-03-22 | End: 2024-03-23 | Stop reason: HOSPADM

## 2024-03-22 RX ORDER — VENLAFAXINE HYDROCHLORIDE 37.5 MG/1
75 CAPSULE, EXTENDED RELEASE ORAL
Status: DISCONTINUED | OUTPATIENT
Start: 2024-03-22 | End: 2024-03-23 | Stop reason: HOSPADM

## 2024-03-22 RX ORDER — SUMATRIPTAN 6 MG/.5ML
6 INJECTION, SOLUTION SUBCUTANEOUS ONCE
Status: COMPLETED | OUTPATIENT
Start: 2024-03-22 | End: 2024-03-22

## 2024-03-22 RX ORDER — MORPHINE SULFATE 2 MG/ML
2 INJECTION, SOLUTION INTRAMUSCULAR; INTRAVENOUS
Status: DISCONTINUED | OUTPATIENT
Start: 2024-03-22 | End: 2024-03-23 | Stop reason: HOSPADM

## 2024-03-22 RX ORDER — ROPINIROLE 0.25 MG/1
0.25 TABLET, FILM COATED ORAL 3 TIMES DAILY
Status: DISCONTINUED | OUTPATIENT
Start: 2024-03-22 | End: 2024-03-23 | Stop reason: HOSPADM

## 2024-03-22 RX ORDER — BUTORPHANOL TARTRATE 10 MG/ML
1 SPRAY NASAL EVERY 4 HOURS PRN
COMMUNITY

## 2024-03-22 RX ORDER — LAMOTRIGINE 100 MG/1
200 TABLET ORAL 2 TIMES DAILY
Status: DISCONTINUED | OUTPATIENT
Start: 2024-03-22 | End: 2024-03-23 | Stop reason: HOSPADM

## 2024-03-22 RX ORDER — PANTOPRAZOLE SODIUM 40 MG/1
40 TABLET, DELAYED RELEASE ORAL
Status: DISCONTINUED | OUTPATIENT
Start: 2024-03-22 | End: 2024-03-23 | Stop reason: HOSPADM

## 2024-03-22 RX ORDER — SODIUM CHLORIDE 9 MG/ML
INJECTION, SOLUTION INTRAVENOUS PRN
Status: DISCONTINUED | OUTPATIENT
Start: 2024-03-22 | End: 2024-03-22 | Stop reason: ALTCHOICE

## 2024-03-22 RX ORDER — SODIUM CHLORIDE 0.9 % (FLUSH) 0.9 %
5-40 SYRINGE (ML) INJECTION EVERY 12 HOURS SCHEDULED
Status: DISCONTINUED | OUTPATIENT
Start: 2024-03-22 | End: 2024-03-22 | Stop reason: ALTCHOICE

## 2024-03-22 RX ORDER — ACETAMINOPHEN 325 MG/1
650 TABLET ORAL EVERY 4 HOURS PRN
Status: DISCONTINUED | OUTPATIENT
Start: 2024-03-22 | End: 2024-03-22 | Stop reason: ALTCHOICE

## 2024-03-22 RX ORDER — MIDAZOLAM HYDROCHLORIDE 1 MG/ML
INJECTION INTRAMUSCULAR; INTRAVENOUS PRN
Status: DISCONTINUED | OUTPATIENT
Start: 2024-03-22 | End: 2024-03-22 | Stop reason: HOSPADM

## 2024-03-22 RX ORDER — SODIUM CHLORIDE 9 MG/ML
INJECTION, SOLUTION INTRAVENOUS CONTINUOUS
Status: DISCONTINUED | OUTPATIENT
Start: 2024-03-22 | End: 2024-03-22

## 2024-03-22 RX ORDER — BUTORPHANOL TARTRATE 1 MG/ML
1 INJECTION, SOLUTION INTRAMUSCULAR; INTRAVENOUS
Status: ON HOLD | COMMUNITY
End: 2024-03-22

## 2024-03-22 RX ORDER — SODIUM CHLORIDE 0.9 % (FLUSH) 0.9 %
5-40 SYRINGE (ML) INJECTION PRN
Status: DISCONTINUED | OUTPATIENT
Start: 2024-03-22 | End: 2024-03-22 | Stop reason: ALTCHOICE

## 2024-03-22 RX ORDER — SERTRALINE HYDROCHLORIDE 100 MG/1
150 TABLET, FILM COATED ORAL DAILY
COMMUNITY

## 2024-03-22 RX ORDER — FENTANYL CITRATE 50 UG/ML
INJECTION, SOLUTION INTRAMUSCULAR; INTRAVENOUS PRN
Status: DISCONTINUED | OUTPATIENT
Start: 2024-03-22 | End: 2024-03-22 | Stop reason: HOSPADM

## 2024-03-22 RX ORDER — ATROPINE SULFATE 0.4 MG/ML
0.5 INJECTION, SOLUTION INTRAVENOUS
Status: ACTIVE | OUTPATIENT
Start: 2024-03-22 | End: 2024-03-23

## 2024-03-22 RX ORDER — DIVALPROEX SODIUM 500 MG/1
1500 TABLET, EXTENDED RELEASE ORAL DAILY
Status: DISCONTINUED | OUTPATIENT
Start: 2024-03-22 | End: 2024-03-23 | Stop reason: HOSPADM

## 2024-03-22 RX ORDER — AMLODIPINE BESYLATE 5 MG/1
5 TABLET ORAL DAILY
Status: DISCONTINUED | OUTPATIENT
Start: 2024-03-22 | End: 2024-03-23 | Stop reason: HOSPADM

## 2024-03-22 RX ORDER — BUTORPHANOL TARTRATE 1 MG/ML
1 INJECTION, SOLUTION INTRAMUSCULAR; INTRAVENOUS
Status: DISCONTINUED | OUTPATIENT
Start: 2024-03-22 | End: 2024-03-22 | Stop reason: RX

## 2024-03-22 RX ADMIN — MORPHINE SULFATE 2 MG: 2 INJECTION, SOLUTION INTRAMUSCULAR; INTRAVENOUS at 02:21

## 2024-03-22 RX ADMIN — ONDANSETRON 4 MG: 2 INJECTION INTRAMUSCULAR; INTRAVENOUS at 02:25

## 2024-03-22 RX ADMIN — MORPHINE SULFATE 2 MG: 2 INJECTION, SOLUTION INTRAMUSCULAR; INTRAVENOUS at 09:47

## 2024-03-22 RX ADMIN — DIVALPROEX SODIUM 1500 MG: 500 TABLET, EXTENDED RELEASE ORAL at 08:08

## 2024-03-22 RX ADMIN — AMLODIPINE BESYLATE 5 MG: 5 TABLET ORAL at 13:03

## 2024-03-22 RX ADMIN — ROPINIROLE HYDROCHLORIDE 0.25 MG: 0.25 TABLET, FILM COATED ORAL at 13:03

## 2024-03-22 RX ADMIN — FAMOTIDINE 20 MG: 20 TABLET, FILM COATED ORAL at 08:03

## 2024-03-22 RX ADMIN — ROPINIROLE HYDROCHLORIDE 0.25 MG: 0.25 TABLET, FILM COATED ORAL at 08:03

## 2024-03-22 RX ADMIN — ACETAMINOPHEN, ASPIRIN, CAFFEINE 1 TABLET: 250; 65; 250 TABLET, FILM COATED ORAL at 08:05

## 2024-03-22 RX ADMIN — SODIUM CHLORIDE: 9 INJECTION, SOLUTION INTRAVENOUS at 01:04

## 2024-03-22 RX ADMIN — METOPROLOL TARTRATE 25 MG: 25 TABLET, FILM COATED ORAL at 03:07

## 2024-03-22 RX ADMIN — ATORVASTATIN CALCIUM 80 MG: 80 TABLET, FILM COATED ORAL at 03:07

## 2024-03-22 RX ADMIN — LAMOTRIGINE 200 MG: 100 TABLET ORAL at 08:05

## 2024-03-22 RX ADMIN — FAMOTIDINE 20 MG: 20 TABLET, FILM COATED ORAL at 03:07

## 2024-03-22 RX ADMIN — SODIUM CHLORIDE, PRESERVATIVE FREE 10 ML: 5 INJECTION INTRAVENOUS at 20:18

## 2024-03-22 RX ADMIN — METOPROLOL TARTRATE 25 MG: 25 TABLET, FILM COATED ORAL at 22:48

## 2024-03-22 RX ADMIN — PANTOPRAZOLE SODIUM 40 MG: 40 TABLET, DELAYED RELEASE ORAL at 05:25

## 2024-03-22 RX ADMIN — ATORVASTATIN CALCIUM 80 MG: 80 TABLET, FILM COATED ORAL at 20:18

## 2024-03-22 RX ADMIN — ONDANSETRON 4 MG: 2 INJECTION INTRAMUSCULAR; INTRAVENOUS at 18:16

## 2024-03-22 RX ADMIN — FAMOTIDINE 20 MG: 20 TABLET, FILM COATED ORAL at 20:18

## 2024-03-22 RX ADMIN — METOPROLOL TARTRATE 25 MG: 25 TABLET, FILM COATED ORAL at 08:03

## 2024-03-22 RX ADMIN — MORPHINE SULFATE 2 MG: 2 INJECTION, SOLUTION INTRAMUSCULAR; INTRAVENOUS at 05:25

## 2024-03-22 RX ADMIN — VENLAFAXINE HYDROCHLORIDE 75 MG: 37.5 CAPSULE, EXTENDED RELEASE ORAL at 08:03

## 2024-03-22 RX ADMIN — ONDANSETRON 4 MG: 2 INJECTION INTRAMUSCULAR; INTRAVENOUS at 10:05

## 2024-03-22 RX ADMIN — ACETAMINOPHEN 650 MG: 325 TABLET ORAL at 00:51

## 2024-03-22 RX ADMIN — ROPINIROLE HYDROCHLORIDE 0.25 MG: 0.25 TABLET, FILM COATED ORAL at 20:18

## 2024-03-22 RX ADMIN — ANTACID TABLETS 500 MG: 500 TABLET, CHEWABLE ORAL at 18:47

## 2024-03-22 RX ADMIN — SUMATRIPTAN 6 MG: 6 INJECTION SUBCUTANEOUS at 12:00

## 2024-03-22 RX ADMIN — LAMOTRIGINE 200 MG: 100 TABLET ORAL at 22:48

## 2024-03-22 RX ADMIN — LAMOTRIGINE 200 MG: 100 TABLET ORAL at 03:07

## 2024-03-22 ASSESSMENT — PAIN DESCRIPTION - DESCRIPTORS
DESCRIPTORS_2: ACHING
DESCRIPTORS: ACHING
DESCRIPTORS_2: ACHING
DESCRIPTORS_2: ACHING
DESCRIPTORS: ACHING
DESCRIPTORS: ACHING;OTHER (COMMENT)
DESCRIPTORS: ACHING

## 2024-03-22 ASSESSMENT — PAIN - FUNCTIONAL ASSESSMENT
PAIN_FUNCTIONAL_ASSESSMENT_SITE2: PREVENTS OR INTERFERES SOME ACTIVE ACTIVITIES AND ADLS
PAIN_FUNCTIONAL_ASSESSMENT: ACTIVITIES ARE NOT PREVENTED
PAIN_FUNCTIONAL_ASSESSMENT: PREVENTS OR INTERFERES SOME ACTIVE ACTIVITIES AND ADLS
PAIN_FUNCTIONAL_ASSESSMENT: ACTIVITIES ARE NOT PREVENTED
PAIN_FUNCTIONAL_ASSESSMENT: ACTIVITIES ARE NOT PREVENTED
PAIN_FUNCTIONAL_ASSESSMENT: PREVENTS OR INTERFERES SOME ACTIVE ACTIVITIES AND ADLS
PAIN_FUNCTIONAL_ASSESSMENT: PREVENTS OR INTERFERES SOME ACTIVE ACTIVITIES AND ADLS
PAIN_FUNCTIONAL_ASSESSMENT: ACTIVITIES ARE NOT PREVENTED
PAIN_FUNCTIONAL_ASSESSMENT: PREVENTS OR INTERFERES SOME ACTIVE ACTIVITIES AND ADLS

## 2024-03-22 ASSESSMENT — PAIN DESCRIPTION - LOCATION
LOCATION: CHEST
LOCATION_2: HEAD
LOCATION: HEAD
LOCATION_2: CHEST
LOCATION: HEAD
LOCATION: HEAD
LOCATION: CHEST
LOCATION_2: HEAD
LOCATION: HEAD

## 2024-03-22 ASSESSMENT — PAIN SCALES - GENERAL
PAINLEVEL_OUTOF10: 10
PAINLEVEL_OUTOF10: 10
PAINLEVEL_OUTOF10: 5
PAINLEVEL_OUTOF10: 10
PAINLEVEL_OUTOF10: 3
PAINLEVEL_OUTOF10: 10
PAINLEVEL_OUTOF10: 0
PAINLEVEL_OUTOF10: 10
PAINLEVEL_OUTOF10: 9

## 2024-03-22 ASSESSMENT — PAIN DESCRIPTION - ONSET: ONSET: ON-GOING

## 2024-03-22 ASSESSMENT — PAIN DESCRIPTION - ORIENTATION
ORIENTATION: MID

## 2024-03-22 ASSESSMENT — PAIN DESCRIPTION - INTENSITY
RATING_2: 10
RATING_2: 10
RATING_2: 3

## 2024-03-22 ASSESSMENT — PAIN DESCRIPTION - PAIN TYPE
TYPE: CHRONIC PAIN
TYPE: ACUTE PAIN
TYPE: CHRONIC PAIN

## 2024-03-22 ASSESSMENT — PAIN DESCRIPTION - FREQUENCY: FREQUENCY: CONTINUOUS

## 2024-03-22 NOTE — PROGRESS NOTES
Pharmacy Medication History Note      List of current medications patient is taking is complete.    Source of information: patient, dispense report    Changes made to medication list:  Medications removed (include reason, ex. therapy complete or physician discontinued):  Butorphanol 1 mg/mL injection- pt taking nasal spray    Medications added/doses adjusted:  Butorphanol 10 mg/mL spray- 1 spray Q4H PRN for migraines  Epipen- as needed for anaphylaxis  Sertraline 100 mg- 1.5 tabs QD    Other notes (ex. Recent course of antibiotics, Coumadin dosing):  Denies use of other OTC or herbal medications.    Allergies reviewed    Electronically signed by Jessa Hernandez on 3/22/2024 at 11:22 AM

## 2024-03-22 NOTE — H&P
Internal Medicine  History and Physical    Patient:  Joel Bermudez  MRN: [id number]      History Obtained From:  patient  PCP: Jyoti Dye MD    CHIEF COMPLAINT:  chest pain / pressure, HA    HISTORY OF PRESENT ILLNESS:   The patient is a 68 y.o. male who presents with chest pain that started last night.  Pain  was sharp and pressure-like in nature, 5/10, radiated to left shoulder and neck. He had an abnormal stress test 3/19/2024 and was planned to have LHC on 4/3/2024.  Patient also reports headaches, h/o migraine with daily HAs, sees a Neurologist Dr Washington as OP.    Seen in ED, labs showed troponin 32, 26, 26.  CXR negative for acute cardiopulmonary process.  EKG in ED shows sinus rhythm, left anterior fascicular block, LVH.  Repeat EKG today, 3/22/2024, shows sinus bradycardia, RBBB, LAFB.       Past Medical History:        Diagnosis Date    Back pain     Depression     GERD (gastroesophageal reflux disease)     Headache(784.0) 09/22/2013    Migraines     ALICIA treated with BiPAP 2013    doesn't wear Bipap    Pneumonia     Pulmonary embolism (HCC) 02/25/2021    S/P cardiac catheterization: 7/31/2017: No obstructive lasions. 07/31/2017 7/31/2017: No obstructive lasions. Dr. Farris    Seizures ([facility])     Severe episode of recurrent major depressive disorder, with psychotic features (HCC) 02/01/2024       Past Surgical History:        Procedure Laterality Date    [facility]    BACK SURGERY  12/18/15    l3-s1 decompression, posterior fusion    CARDIAC CATHETERIZATION  2013    CERVICAL SPINE SURGERY  10-16-15    C4-6 ACDF    COLONOSCOPY      ENDOSCOPY, COLON, DIAGNOSTIC      [place]IA REPAIR  1996    [facility]    [facility]    KNEE SURGERY  1976    Rt     LUMBAR SPINE SURGERY  08/26/2014     L3-5 decompression, Dr. Braun, [facility]    NECK SURGERY  10/19/2018    OTHER SURGICAL HISTORY  06/02/2017    Diagnostic laparoscopy, Laparoscopic Ventral hernia Repair with Mesh by  Chronic scarring and/or atelectasis involving the lung bases.    No pleural effusion. No pneumothorax.    The heart size is normal and is unchanged from the prior examination.  Retro heart.  Partially visualized surgical changes overlying the lower cervical spine.  Stimulator leads terminate overlying the mid to lower aspect of the thoracic spine.   Impression:     1. Chronic coarse reticular markings with bibasilar scarring, similar to the prior examination. No acute cardiopulmonary process.     CT Head WO Contrast   COMPARISON: CT/SR - CT HEAD WO CONTRAST - 07/29/2023 07:54 PM EDT   FINDINGS:  No acute intracranial hemorrhage. No evidence of acute infarction. Mild   diffuse cortical volume loss. Mild nonspecific white matter hypodensities,   most commonly associated with chronic microangiopathic changes.  No mass-effect or midline shift.  No hydrocephalus.     Visualized orbits are normal.  Fluid in the left maxillary sinus. Small fluid in the ethmoid air cells.  Clear mastoid air cells.  No acute fracture.  Partially visualized left mid and lower facial soft tissue swelling and   edema.   IMPRESSION:  No acute intracranial findings.  Left facial soft tissue swelling and edema.  Nonemergent/incidental findings in the report.   This document has been electronically signed by: Alvino Fernández MD on   01/23/2024    MRI BRAIN WO CONTRAST 07/2023   CLINICAL INFORMATION history of parkinsons disease concerns of disease progression, seizure vs sycoses, history of back stimulator.     COMPARISON: Head CT 7/29/2023.     TECHNIQUE: Multiplanar and multiple spin echo MRI images were obtained of the brain without contrast. Coronal T2 and FLAIR sequences were also obtained through the temporal lobes.     FINDINGS:   The diffusion-weighted images are normal.  The brain volume is mildly reduced. There is a mild amount of signal hyperintensity on the FLAIR and T2-weighted sequences in the white matter of the brain. This is  consistent with mild severity chronic small   vessel ischemic changes.  Coronal images of the temporal lobes demonstrate a normal appearance to the temporal lobes. The hippocampi are symmetric. There is no abnormal signal.   There are no intra-or extra-axial collections.  There is no hydrocephalus, midline shift or mass effect.   There is no susceptibility artifact in the brain. The major intracranial vascular flow voids are present.   The midline craniocervical junction structures are normal.  The pituitary gland and brainstem are normal.   There is mild mucosal thickening in the ethmoid air cells.     IMPRESSION:   1. Global volume loss.  2. Mild severity chronic small vessel ischemic changes.         Ventricular Rate 54 BPM    Atrial Rate 54 BPM    P-R Interval 144 ms    QRS Duration 120 ms    Q-T Interval 436 ms    QTc Calculation (Bazett) 413 ms    R Axis -47 degrees    T Axis -11 degrees   Narrative:     Sinus bradycardia  Right bundle branch block  Left anterior fascicular block  Bifascicular block      Stress test  3/19/2024 Lexiscan: Positive for myocardial ischemia.  Moderate severity left ventricular stress perfusion defect that is medium in size present in the inferior segments that is predominantly reversible.  ECG demonstrates NSR.    Assessment and Plan   Chest pain / abn EKG  Abn lexiscan cardiolite stress test  HTN  Migraine HA    Cont ASA, statin,  BB  Lovenox  Had LHC-patent coronaries-verbal report  Rx Migraine.  Resume home meds.    Patient Active Problem List   Diagnosis Code    Precordial pain R07.2    Obesity (BMI 30-39.9) E66.9    Obstructive sleep apnea of adult G47.33    Seizure disorder (HCC) G40.909    Benign hypertension I10    Coronary artery disease involving native coronary artery without angina pectoris I25.10    Gastroesophageal reflux disease without esophagitis K21.9    ALICIA treated with BiPAP G47.33    Moderate episode of recurrent major depressive disorder

## 2024-03-22 NOTE — PLAN OF CARE
Problem: Discharge Planning  Goal: Discharge to home or other facility with appropriate resources  3/22/2024 1232 by Katia Plasencia  Outcome: Progressing  Flowsheets (Taken 3/22/2024 0702 by Marlene Shi, RN)  Discharge to home or other facility with appropriate resources: Identify barriers to discharge with patient and caregiver  Note: Plant to go home with wife at discharge      Problem: Pain  Goal: Verbalizes/displays adequate comfort level or baseline comfort level  3/22/2024 1232 by Katia Plasencia  Outcome: Progressing  Note: Verbalizes no pain or acceptable pain level at discharge      Problem: Safety - Adult  Goal: Free from fall injury  3/22/2024 1232 by Katia Plasencia  Outcome: Progressing     Problem: ABCDS Injury Assessment  Goal: Absence of physical injury  3/22/2024 1232 by Katia Plasencia  Outcome: Progressing     Problem: Chronic Conditions and Co-morbidities  Goal: Patient's chronic conditions and co-morbidity symptoms are monitored and maintained or improved  Outcome: Progressing  Flowsheets (Taken 3/22/2024 0709 by Marlene Shi, RN)  Care Plan - Patient's Chronic Conditions and Co-Morbidity Symptoms are Monitored and Maintained or Improved: Monitor and assess patient's chronic conditions and comorbid symptoms for stability, deterioration, or improvement     Problem: Neurosensory - Adult  Goal: Achieves stable or improved neurological status  Outcome: Progressing     Problem: Skin/Tissue Integrity - Adult  Goal: Incisions, wounds, or drain sites healing without S/S of infection  Outcome: Progressing  Note: Patients incision site for right radial cath remains free from infection     Goals reviewed with patient, patient verbalizes understanding of goals

## 2024-03-22 NOTE — CONSULTS
[facility]'s  Consult    Patient's Name/Date of Birth: Joel Bermudez / 1955 (68 y.o.)    Date: March 22, 2024     Referring Provider: Chris Nunez MD    CHIEF COMPLAINT: chest pain    PMH: Seizures, depression, ALICIA, HTN, migraines    HPI: This is a pleasant 68 y.o. male presents with chest pain that started last night.  Sharp and pressure-like in nature, 5/10, radiated to left shoulder and neck.  Abnormal stress test 3/19/2024.  [facility] was originally scheduled for 4/3/2024.    Trend troponin 32, 26, 26.  CXR negative for acute cardiopulmonary process.  EKG in ED shows sinus rhythm, left anterior fascicular block, LVH.  Repeat EKG today, 3/22/2024, shows sinus bradycardia, RBBB, LAFB.      Echo:  7/31/2023 TTE: EF 55%, no regional ventricular wall motion abnormalities.  No significant valvular abnormalities.    Stress test  3/19/2024 Lexiscan: Positive for myocardial ischemia.  Moderate severity left ventricular stress perfusion defect that is medium in size present in the inferior segments that is predominantly reversible.  ECG demonstrates NSR.    11/6/2020 Lexiscan: Small apical fixed defect possible attenuation versus small infarct, no obvious ischemia.    Heart catheterization  7/31/2017: Normal left main, normal LAD, normal LCx, normal RCA, normal left ventricular systolic function.    All labs, EKG's, diagnostic testing and images as well as cardiac cath, stress testing were reviewed during this encounter    Past Medical History:   Diagnosis Date    Back pain     Depression     GERD (gastroesophageal reflux disease)     Headache(784.0) 09/22/2013    Migraines     ALICIA treated with BiPAP 2013    doesn't wear Bipap    Pneumonia     Pulmonary embolism (HCC) 02/25/2021    S/P cardiac catheterization: 7/31/2017: No obstructive lasions. 07/31/2017 7/31/2017: No obstructive lasions. Dr. Farris    Seizures (HCC)     Severe episode of recurrent major depressive disorder, with   Use to check blood pressure daily 2/26/24   Jyoti Dye MD   rOPINIRole (REQUIP) 0.25 MG tablet Take 1 tablet by mouth 3 times daily Dr. Nina 8/28/23    Andriy Panchal MD   aspirin-acetaminophen-caffeine (EXCEDRIN MIGRAINE) 250-250-65 MG per tablet Take 1 tablet by mouth every 6 hours as needed for Headaches    Andriy Panchal MD   divalproex (DEPAKOTE ER) 500 MG extended release tablet Take 3 tablets by mouth daily (3 tab)    Andriy Panchal MD   Scheduled Meds:   divalproex  1,500 mg Oral Daily    lamoTRIgine  200 mg Oral BID    pantoprazole  40 mg Oral QAM AC    rOPINIRole  0.25 mg Oral TID    venlafaxine  75 mg Oral Daily with breakfast    sodium chloride flush  5-40 mL IntraVENous 2 times per day    aspirin  81 mg Oral Daily    atorvastatin  80 mg Oral Nightly    famotidine  20 mg Oral BID    metoprolol tartrate  25 mg Oral BID     Continuous Infusions:   heparin (PORCINE) Infusion Stopped (03/22/24 0805)    nitroGLYCERIN 5 mcg/min (03/22/24 0525)    sodium chloride      sodium chloride 75 mL/hr at 03/22/24 0643     PRN Meds:.morphine, aspirin-acetaminophen-caffeine, fentaNYL, midazolam, lidocaine, heparin (porcine) 4,000 Units, nitroGLYCERIN 200 mcg, verapamil (ISOPTIN) 2.5 mg 6 mL syringe, heparin (porcine), heparin (porcine), sodium chloride flush, sodium chloride, potassium chloride **OR** potassium alternative oral replacement **OR** potassium chloride, magnesium sulfate, ondansetron **OR** ondansetron, acetaminophen **OR** acetaminophen, polyethylene glycol    Allergies   Allergen Reactions    Bee Venom Anaphylaxis    Dilaudid [Hydromorphone] Other (See Comments)    Hydrochlorothiazide Other (See Comments)    Lisinopril Swelling    Other Hives    Dilaudid [Hydromorphone Hcl] Nausea And Vomiting and Rash    Nitrofuran Derivatives Nausea And Vomiting     Severe headache    Nitroglycerin Nausea And Vomiting     migraine     Family History   Problem Relation Age of Onset

## 2024-03-22 NOTE — CARE COORDINATION
Case Management Assessment  Initial Evaluation    Date/Time of Evaluation: 3/22/2024 1:47 PM  Assessment Completed by: Bela Mars RN    If patient is discharged prior to next notation, then this note serves as note for discharge by case management.    Patient Name: Joel Bermudez                   YOB: 1955  Diagnosis: ACS (acute coronary syndrome) (HCC) [I24.9]  Chest pain with high risk for cardiac etiology [R07.9]                   Date / Time: 3/21/2024  9:52 PM  Location: [address] [place] [address]     Patient Admission Status: Inpatient   Readmission Risk Low 0-14, Mod 15-19), High > 20: Readmission Risk Score: 13.8    Current PCP: Jyoti Dye MD  PCP verified by CM? Yes    Chart Reviewed: Yes      History Provided by: Patient  Patient Orientation: Alert and Oriented    Patient Cognition: Alert    Hospitalization in the last 30 days (Readmission):  No    If yes, Readmission Assessment in CM Navigator will be completed.    Advance Directives:      Code Status: Full Code   Patient's Primary Decision Maker is: Named in Scanned ACP Document    Primary Decision Maker: Briacarline Warrener Addie - Spouse - [phone]    Supplemental (Other) Decision Maker: Ladan Driss  Child - [phone]    Discharge Planning:    Patient lives with: Spouse/Significant Other Type of Home: House  Primary Care Giver: Self  Patient Support Systems include: Spouse/Significant Other, Children, Family Members   Current Financial resources: Medicare  Current community resources: None  Current services prior to admission: Durable Medical Equipment            Current DME: Cane, Walker            Type of Home Care services:  None    ADLS  Prior functional level: Independent in ADLs/IADLs  Current functional level: Independent in ADLs/IADLs    Family can provide assistance at DC: Yes  Would you like Case Management to discuss the discharge plan with any other family members/significant others, and if so, who? No  Plans to Return to  Present Housing: Yes  Other Identified Issues/Barriers to RETURNING to current housing: n/a  Potential Assistance needed at discharge: N/A            Potential DME:    Patient expects to discharge to: House  Plan for transportation at discharge: Family    Financial    Payor: MEDICARE / Plan: MEDICARE PART A AND B / Product Type: *No Product type* /     Does insurance require precert for SNF: No    Potential assistance Purchasing Medications: No  Meds-to-Beds request: Yes      [facility] - [place], [place] - [address] -  [phone] - F [phone]  [address] [facility]  [address]  [facility] [address]  Phone: [phone] Fax: [phone]    [id number] - [place], [place] - [address] [facility] - P [phone] - F [phone]  [address] [facility] [address]  Phone: [phone] Fax: [phone]    [facility] - [place], [place] - [address] [place] [address] - P [phone] - F [phone]  [address]  [facility] [address]  Phone: [phone] Fax: [phone]      Notes:    Factors facilitating achievement of predicted outcomes: Family support, Motivated, Cooperative, Pleasant, and Good insight into deficits    Barriers to discharge: n/a    Additional Case Management Notes: Presented to ED with chest pain. Was scheduled for OP cardiac cath on 4/3. Hospitalist and cardio following. + troponin. Plan [facility]. IVF. Heparin gtt. NTG gtt.     Procedure:   3/21 CXR: 1. Chronic coarse reticular markings with bibasilar scarring, similar to the prior examination. No acute cardiopulmonary process.  3/22 [facility] planned    The Plan for Transition of Care is related to the following treatment goals of ACS (acute coronary syndrome) (HCC) [I24.9]  Chest pain with high risk for cardiac etiology [R07.9]    Patient Goals/Plan/Treatment Preferences: Spoke with Bryan and wife, plans to return home. He is independent, has dme if needed. They do not anticipate

## 2024-03-22 NOTE — PROGRESS NOTES
[facility]  Sedation/Analgesia History & Physical    Pt Name: Joel Bermudez  Account number: [id number]  MRN: [id number]  YOB: 1955  Provider Performing Procedure: Thelma Monteiro MD MD [facility]  Primary Care Physician: Jyoti Dye MD  Date: 3/22/2024    PRE-PROCEDURE    Code Status: FULL CODE  Brief History/Pre-Procedure Diagnosis:   Angina   Abn stress test      Consent: : I have discussed with the patient risks, benefits, and alternatives (and relevant risks, benefits, and side effects related to alternatives or not receiving care), and likelihood of the success.   The patient and/or representative appear to understand and agree to proceed.  The discussion encompasses risks, benefits, and side effects related to the alternatives and the risks related to not receiving the proposed care, treatment, and services.         MEDICAL HISTORY  []ASHD/ANGINA/MI/CHF   [x]Hypertension  []Diabetes  []Hyperlipidemia  []Smoking  []Family Hx of ASHD  []Additional information:       has a past medical history of Back pain, Depression, GERD (gastroesophageal reflux disease), Headache(784.0), Migraines, ALICIA treated with BiPAP, Pneumonia, Pulmonary embolism (HCC), S/P cardiac catheterization: 7/31/2017: No obstructive lasions., Seizures (HCC), and Severe episode of recurrent major depressive disorder, with psychotic features (HCC).    SURGICAL HISTORY   has a past surgical history that includes knee surgery (1976); Appendectomy (2012); hernia repair (1996); hernia repair (2012); shoulder surgery (2001); Cervical spine surgery (10-16-15); Cardiac catheterization (2013); Lumbar spine surgery (08/26/2014 ); back surgery (12/18/15); other surgical history (06/02/2017); Colonoscopy; Endoscopy, colon, diagnostic; pr office/outpt visit,procedure only (N/A, 10/19/2018); Neck surgery (10/19/2018); REMOVE HARDWARE SPINE (N/A, 3/13/2020); Stimulator Surgery (N/A, 7/8/2021); and Stimulator Surgery (Left,

## 2024-03-22 NOTE — PLAN OF CARE
Problem: Discharge Planning  Goal: Discharge to home or other facility with appropriate resources  Outcome: Progressing  Flowsheets  Taken 3/22/2024 0151  Discharge to home or other facility with appropriate resources:   Identify barriers to discharge with patient and caregiver   Arrange for needed discharge resources and transportation as appropriate  Taken 3/22/2024 0050  Discharge to home or other facility with appropriate resources:   Identify barriers to discharge with patient and caregiver   Identify discharge learning needs (meds, wound care, etc)   Arrange for needed discharge resources and transportation as appropriate     Problem: Pain  Goal: Verbalizes/displays adequate comfort level or baseline comfort level  Outcome: Progressing  Flowsheets  Taken 3/22/2024 0151  Verbalizes/displays adequate comfort level or baseline comfort level:   Encourage patient to monitor pain and request assistance   Assess pain using appropriate pain scale  Taken 3/22/2024 0015  Verbalizes/displays adequate comfort level or baseline comfort level:   Encourage patient to monitor pain and request assistance   Administer analgesics based on type and severity of pain and evaluate response     Problem: Safety - Adult  Goal: Free from fall injury  Outcome: Progressing  Flowsheets (Taken 3/22/2024 0151)  Free From Fall Injury:   Instruct family/caregiver on patient safety   Based on caregiver fall risk screen, instruct family/caregiver to ask for assistance with transferring infant if caregiver noted to have fall risk factors     Problem: ABCDS Injury Assessment  Goal: Absence of physical injury  Outcome: Progressing  Flowsheets (Taken 3/22/2024 0151)  Absence of Physical Injury: Implement safety measures based on patient assessment

## 2024-03-22 NOTE — ED PROVIDER NOTES
PATIENT NAME: Joel Bermudez  MRN: [id number]  : 1955  MORENO: 3/21/2024    I performed a history and physical examination of the patient and discussed management with the Resident. I reviewed the Resident's note and agree with the documented findings and plan of care. Any areas of disagreement are noted on the chart. I was personally present for the key portions of any procedures and have documented in the chart those procedures where I was not present during the key portions. I have reviewed the emergency nurses triage note and agree with the chief complaint, past medical history, past surgical history, allergies, medications, social and family history as documented unless otherwise noted below.    MEDICAL DEDISION MAKING (MDM)     Joel Bermudez is a 68 y.o. male who presents to Emergency Department with Chest Pain     Chest pain since tonight.  Pain is sharp, pressure, rated at 5/10, radiating to left shoulder and neck. Abnormal stress test on 3/19/2024. [facility] scheduled 4/3/2024.    Exam: AVSS. Nontoxic appearing. Heart: RRR, S1 and S2. Lungs CTAB. Soft abdomen w/o tenderness. Neurologically intact. No skin rashes.     EKG interpreted by me as normal sinus rhythm, VR 63 bpm,  ms, QRS duration 180 ms,  ms, no acute ischemic changes.  LVH changes.    Troponin 32.  Heart score 7.  Admission is warranted.    Heart Score    Heart Score for chest pain patients  History: Moderately Suspicious  ECG: Normal  Patient Age: > 65 years  *Risk factors for Atherosclerotic disease: Hypertension  Risk Factors: 1 or 2 risk factors  Troponin: > 1 and < 3X normal limit  Heart Score Total: 5    HEART Score recommendations:  Score 0 - 3:   <1.7%  risk of MACE over next 6 wks = Outpatient workup  Score 4 - 6: 12-16% risk of MACE over next 6 wks = Admission for observation  Score 7- 10: 50-65% risk of MACE over next 6 wks = Early invasive strategy    MACE: Major Acute Cardiac Event (All Cause Mortality, AMI or 
injection 2,000 Units (has no administration in time range)   heparin 25,000 units in dextrose 5% 250 mL (premix) infusion (10 Units/kg/hr × 98 kg IntraVENous New Bag 3/21/24 2342)   nitroGLYCERIN 200 mcg/mL in dextrose 5% (15 mcg/min IntraVENous Rate/Dose Change 3/21/24 2354)   sodium chloride flush 0.9 % injection 5-40 mL (has no administration in time range)   sodium chloride flush 0.9 % injection 5-40 mL (has no administration in time range)   0.9 % sodium chloride infusion (has no administration in time range)   potassium chloride (KLOR-CON M) extended release tablet 40 mEq (has no administration in time range)     Or   potassium bicarb-citric acid (EFFER-K) effervescent tablet 40 mEq (has no administration in time range)     Or   potassium chloride 10 mEq/100 mL IVPB (Peripheral Line) (has no administration in time range)   magnesium sulfate 2000 mg in 50 mL IVPB premix (has no administration in time range)   ondansetron (ZOFRAN-ODT) disintegrating tablet 4 mg (has no administration in time range)     Or   ondansetron (ZOFRAN) injection 4 mg (has no administration in time range)   acetaminophen (TYLENOL) tablet 650 mg (has no administration in time range)     Or   acetaminophen (TYLENOL) suppository 650 mg (has no administration in time range)   polyethylene glycol (GLYCOLAX) packet 17 g (has no administration in time range)   aspirin chewable tablet 81 mg (has no administration in time range)   atorvastatin (LIPITOR) tablet 80 mg (has no administration in time range)   0.9 % sodium chloride infusion (has no administration in time range)   famotidine (PEPCID) tablet 20 mg (has no administration in time range)   metoprolol tartrate (LOPRESSOR) tablet 25 mg (has no administration in time range)   heparin (porcine) injection 4,000 Units (4,000 Units IntraVENous Given 3/21/24 2341)   aspirin chewable tablet 324 mg (324 mg Oral Given 3/21/24 2345)                PROCEDURES: (None if blank)  Procedures:     CRITICAL

## 2024-03-22 NOTE — ED TRIAGE NOTES
Pt presents to ED via lobby for evaluation of CP. Pt states he had an abnormal Stress test done on 3/19, and pain has been persistent since. Pt rates current pain 5/10. Denies medications prior to arrival. Pt denies SOB. Vitals, labs, EKG obtained.

## 2024-03-23 VITALS
BODY MASS INDEX: 31.21 KG/M2 | HEART RATE: 59 BPM | RESPIRATION RATE: 17 BRPM | SYSTOLIC BLOOD PRESSURE: 102 MMHG | DIASTOLIC BLOOD PRESSURE: 66 MMHG | HEIGHT: 68 IN | TEMPERATURE: 97.8 F | WEIGHT: 205.91 LBS | OXYGEN SATURATION: 92 %

## 2024-03-23 PROBLEM — G43.909 MIGRAINES: Status: ACTIVE | Noted: 2024-03-23

## 2024-03-23 LAB
DEPRECATED RDW RBC AUTO: 46.9 FL (ref 35–45)
ERYTHROCYTE [DISTWIDTH] IN BLOOD BY AUTOMATED COUNT: 13.2 % (ref 11.5–14.5)
HCT VFR BLD AUTO: 48 % (ref 42–52)
HGB BLD-MCNC: 15.5 GM/DL (ref 14–18)
MCH RBC QN AUTO: 31.1 PG (ref 26–33)
MCHC RBC AUTO-ENTMCNC: 32.3 GM/DL (ref 32.2–35.5)
MCV RBC AUTO: 96.2 FL (ref 80–94)
PLATELET # BLD AUTO: 352 THOU/MM3 (ref 130–400)
PMV BLD AUTO: 10.1 FL (ref 9.4–12.4)
RBC # BLD AUTO: 4.99 MILL/MM3 (ref 4.7–6.1)
WBC # BLD AUTO: 7.6 THOU/MM3 (ref 4.8–10.8)

## 2024-03-23 PROCEDURE — 85027 COMPLETE CBC AUTOMATED: CPT

## 2024-03-23 PROCEDURE — 6370000000 HC RX 637 (ALT 250 FOR IP): Performed by: INTERNAL MEDICINE

## 2024-03-23 PROCEDURE — 6370000000 HC RX 637 (ALT 250 FOR IP): Performed by: NURSE PRACTITIONER

## 2024-03-23 PROCEDURE — 2580000003 HC RX 258: Performed by: INTERNAL MEDICINE

## 2024-03-23 PROCEDURE — 36415 COLL VENOUS BLD VENIPUNCTURE: CPT

## 2024-03-23 RX ORDER — ATORVASTATIN CALCIUM 20 MG/1
20 TABLET, FILM COATED ORAL NIGHTLY
Qty: 30 TABLET | Refills: 0 | Status: SHIPPED | OUTPATIENT
Start: 2024-03-23

## 2024-03-23 RX ORDER — ASPIRIN 81 MG/1
81 TABLET, CHEWABLE ORAL DAILY
Qty: 30 TABLET | Refills: 3 | Status: SHIPPED | OUTPATIENT
Start: 2024-03-24

## 2024-03-23 RX ORDER — SUMATRIPTAN 50 MG/1
50 TABLET, FILM COATED ORAL DAILY PRN
Qty: 10 TABLET | Refills: 0 | Status: SHIPPED | OUTPATIENT
Start: 2024-03-23

## 2024-03-23 RX ADMIN — LAMOTRIGINE 200 MG: 100 TABLET ORAL at 07:59

## 2024-03-23 RX ADMIN — METOPROLOL TARTRATE 25 MG: 25 TABLET, FILM COATED ORAL at 07:57

## 2024-03-23 RX ADMIN — ROPINIROLE HYDROCHLORIDE 0.25 MG: 0.25 TABLET, FILM COATED ORAL at 12:50

## 2024-03-23 RX ADMIN — AMLODIPINE BESYLATE 5 MG: 5 TABLET ORAL at 07:47

## 2024-03-23 RX ADMIN — ROPINIROLE HYDROCHLORIDE 0.25 MG: 0.25 TABLET, FILM COATED ORAL at 07:59

## 2024-03-23 RX ADMIN — VENLAFAXINE HYDROCHLORIDE 75 MG: 37.5 CAPSULE, EXTENDED RELEASE ORAL at 07:58

## 2024-03-23 RX ADMIN — ACETAMINOPHEN, ASPIRIN, CAFFEINE 1 TABLET: 250; 65; 250 TABLET, FILM COATED ORAL at 07:47

## 2024-03-23 RX ADMIN — SODIUM CHLORIDE, PRESERVATIVE FREE 10 ML: 5 INJECTION INTRAVENOUS at 07:48

## 2024-03-23 RX ADMIN — DIVALPROEX SODIUM 1500 MG: 500 TABLET, EXTENDED RELEASE ORAL at 07:59

## 2024-03-23 RX ADMIN — FAMOTIDINE 20 MG: 20 TABLET, FILM COATED ORAL at 07:47

## 2024-03-23 RX ADMIN — ASPIRIN 81 MG 81 MG: 81 TABLET ORAL at 07:47

## 2024-03-23 RX ADMIN — PANTOPRAZOLE SODIUM 40 MG: 40 TABLET, DELAYED RELEASE ORAL at 04:46

## 2024-03-23 ASSESSMENT — PAIN - FUNCTIONAL ASSESSMENT: PAIN_FUNCTIONAL_ASSESSMENT: ACTIVITIES ARE NOT PREVENTED

## 2024-03-23 ASSESSMENT — PAIN DESCRIPTION - DESCRIPTORS: DESCRIPTORS: ACHING

## 2024-03-23 ASSESSMENT — PAIN SCALES - GENERAL: PAINLEVEL_OUTOF10: 5

## 2024-03-23 ASSESSMENT — PAIN DESCRIPTION - FREQUENCY: FREQUENCY: INTERMITTENT

## 2024-03-23 ASSESSMENT — PAIN DESCRIPTION - LOCATION: LOCATION: HEAD

## 2024-03-23 ASSESSMENT — PAIN DESCRIPTION - PAIN TYPE: TYPE: CHRONIC PAIN

## 2024-03-23 ASSESSMENT — PAIN DESCRIPTION - ONSET: ONSET: ON-GOING

## 2024-03-23 ASSESSMENT — PAIN DESCRIPTION - ORIENTATION: ORIENTATION: MID

## 2024-03-23 NOTE — DISCHARGE INSTRUCTIONS
Radial Artery Catheterization Site Care   No pushing, pulling or lifting anything heavier than 5 pounds with affected hand/arm for 1 week   Maintain occlusive dressing for 24-48 hours after procedure. Then may leave uncovered.    Avoid soaking site in water, using hot tubs, or swimming for 1 week.    No exercising for one week.   After one week, you may resume normal activities   Some bruising is normal and may take 2-3 weeks to go away.     If you develop pain, bleeding or swelling at the procedure site, apply firm pressure to the site for no less than 15 continuous minutes. Then slowly release the pressure. If bleeding reoccurs, or large amounts of bleeding at any time apply direct pressure and call 911.

## 2024-03-23 NOTE — PLAN OF CARE
Problem: Discharge Planning  Goal: Discharge to home or other facility with appropriate resources  3/23/2024 0107 by Dayana Prieto, RN  Outcome: Progressing  Flowsheets  Taken 3/23/2024 0107  Discharge to home or other facility with appropriate resources:   Identify barriers to discharge with patient and caregiver   Arrange for needed discharge resources and transportation as appropriate  Taken 3/22/2024 2000  Discharge to home or other facility with appropriate resources: Identify barriers to discharge with patient and caregiver  3/22/2024 1232 by Katia Plasencia  Outcome: Progressing  Flowsheets (Taken 3/22/2024 0745 by Marlene Shi, RN)  Discharge to home or other facility with appropriate resources: Identify barriers to discharge with patient and caregiver  Note: Plant to go home with wife at discharge      Problem: Pain  Goal: Verbalizes/displays adequate comfort level or baseline comfort level  3/23/2024 0107 by Dayana Prieto, RN  Outcome: Progressing  Flowsheets (Taken 3/23/2024 0107)  Verbalizes/displays adequate comfort level or baseline comfort level:   Assess pain using appropriate pain scale   Encourage patient to monitor pain and request assistance  3/22/2024 1232 by Katia Plasencia  Outcome: Progressing  Note: Verbalizes no pain or acceptable pain level at discharge      Problem: Safety - Adult  Goal: Free from fall injury  3/23/2024 0107 by Dayana Prieto, RN  Outcome: Progressing  Flowsheets (Taken 3/23/2024 0107)  Free From Fall Injury: Based on caregiver fall risk screen, instruct family/caregiver to ask for assistance with transferring infant if caregiver noted to have fall risk factors  3/22/2024 1232 by Katia Plasencia  Outcome: Progressing     Problem: ABCDS Injury Assessment  Goal: Absence of physical injury  3/23/2024 0107 by Dayana Prieto, RN  Outcome: Progressing  Flowsheets (Taken 3/23/2024 0107)  Absence of Physical Injury: Implement safety measures based on patient  pressure and heart rate   Monitor urine output and notify Licensed Independent Practitioner for values outside of normal range  3/22/2024 1232 by Katia Plasencia  Outcome: Progressing  Note: Patient remains free of chest pain at discharge   Goal: Absence of cardiac dysrhythmias or at baseline  Outcome: Progressing  Flowsheets (Taken 3/23/2024 0107)  Absence of cardiac dysrhythmias or at baseline: Monitor cardiac rate and rhythm     Problem: Skin/Tissue Integrity - Adult  Goal: Incisions, wounds, or drain sites healing without S/S of infection  3/23/2024 0107 by Dayana Prieto, RN  Outcome: Progressing  Flowsheets (Taken 3/23/2024 0107)  Incisions, Wounds, or Drain Sites Healing Without Sign and Symptoms of Infection: TWICE DAILY: Assess and document skin integrity  3/22/2024 1232 by Katia Plasencia  Outcome: Progressing  Note: Patients incision site for right radial cath remains free from infection

## 2024-03-23 NOTE — DISCHARGE SUMMARY
Discharge Summary    Joel Bermudez  :  1955  MRN:  [id number]    Admit date:  3/21/2024  Discharge date:      Admitting Physician:  Chris Nunez MD    Discharge Diagnoses:    Chest pain / abn EKG, patent coronaries on LHC  Abn lexiscan cardiolite stress test  HTN  Migraine HA  Solitary galls tone       Admission Condition:  serious  Discharged Condition:  good    Hospital Course:   68-year-old gentleman presented with pressure-like chest pain.  He has a positive +Lexiscan Cardiolite stress test in the few days preceding presentation.  Proceeded with left heart catheterization which showed patent coronaries.  Patient was treated medically.  He also reports  severe migraine headaches.  Treated symptomatically with Excedrin and Imitrex with good response.  He has been discharged home today.  He will resume his prior antiHTNsive's.  Aspirin, Lipitor.  Follow-up with family doctor in outpatient setting.    Consults:  cardiology  Cath/EP waveform scan report   Conclusion  No major CAD  Mild diffuse CAD  Borderline EF   Medical RX     Discharge Medications:         Medication List        START taking these medications      aspirin 81 MG chewable tablet  Take 1 tablet by mouth daily  Start taking on: 2024     atorvastatin 20 MG tablet  Commonly known as: LIPITOR  Take 1 tablet by mouth nightly     SUMAtriptan 50 MG tablet  Commonly known as: Imitrex  Take 1 tablet by mouth daily as needed for Migraine            CONTINUE taking these medications      aspirin-acetaminophen-caffeine 250-250-65 MG per tablet  Commonly known as: EXCEDRIN MIGRAINE     Blood Pressure Kit  Dispense 1 blood pressure cuff.  Use to check blood pressure daily     butorphanol 10 MG/ML nasal spray  Commonly known as: STADOL     desvenlafaxine succinate 50 MG Tb24 extended release tablet  Commonly known as: PRISTIQ  Take 1 tablet by mouth daily     divalproex 500 MG extended release tablet  Commonly known as: DEPAKOTE ER     EPIPEN

## 2024-03-23 NOTE — PLAN OF CARE
Problem: Discharge Planning  Goal: Discharge to home or other facility with appropriate resources  3/23/2024 1308 by Carli Damon RN  Outcome: Progressing  3/23/2024 1308 by Carli Damon RN  Outcome: Progressing  3/23/2024 0107 by Dayana Prieto RN  Outcome: Progressing  Flowsheets  Taken 3/23/2024 0107  Discharge to home or other facility with appropriate resources:   Identify barriers to discharge with patient and caregiver   Arrange for needed discharge resources and transportation as appropriate  Taken 3/22/2024 2000  Discharge to home or other facility with appropriate resources: Identify barriers to discharge with patient and caregiver     Problem: Pain  Goal: Verbalizes/displays adequate comfort level or baseline comfort level  3/23/2024 1308 by Carli Damon RN  Outcome: Progressing  3/23/2024 1308 by Carli Damon RN  Outcome: Progressing  Flowsheets (Taken 3/23/2024 0730)  Verbalizes/displays adequate comfort level or baseline comfort level: Assess pain using appropriate pain scale  3/23/2024 0107 by Dayana Prieto RN  Outcome: Progressing  Flowsheets (Taken 3/23/2024 0107)  Verbalizes/displays adequate comfort level or baseline comfort level:   Assess pain using appropriate pain scale   Encourage patient to monitor pain and request assistance     Problem: Safety - Adult  Goal: Free from fall injury  3/23/2024 1308 by Carli Damon RN  Outcome: Progressing  3/23/2024 1308 by Carli Damon RN  Outcome: Progressing  Flowsheets (Taken 3/23/2024 1306)  Free From Fall Injury: Based on caregiver fall risk screen, instruct family/caregiver to ask for assistance with transferring infant if caregiver noted to have fall risk factors  3/23/2024 0107 by Dayana Prieto RN  Outcome: Progressing  Flowsheets (Taken 3/23/2024 0107)  Free From Fall Injury: Based on caregiver fall risk screen, instruct family/caregiver to ask for assistance with transferring infant if caregiver noted to have fall risk

## 2024-03-23 NOTE — PROGRESS NOTES
INTERNAL MEDICINE Progress Note  3/23/2024 12:49 PM  Subjective:   Admit Date: 3/21/2024  PCP: Jyoti Dye MD  Interval History:   Doing better today  No CP, no abd pain    Objective:   Vitals: /66   Pulse 59   Temp 97.8 °F (36.6 °C) (Oral)   Resp 17   Ht 1.727 m (5' 8\")   Wt 93.4 kg (205 lb 14.6 oz)   SpO2 92%   BMI 31.31 kg/m²   General appearance: alert and cooperative with exam  HEENT: Head: Normocephalic, no lesions, without obvious abnormality.  Neck: no adenopathy, no carotid bruit, no JVD, supple, symmetrical, trachea midline, and thyroid not enlarged, symmetric, no tenderness/mass/nodules  Lungs: clear to auscultation bilaterally  Heart: regular rate and rhythm, S1, S2 normal, no murmur, click, rub or gallop  Abdomen: soft, non-tender; bowel sounds normal; no masses,  no organomegaly  Extremities: extremities normal, atraumatic, no cyanosis or edema  Neurologic: Mental status: Alert, oriented, thought content appropriate      Medications:   Scheduled Meds:   divalproex  1,500 mg Oral Daily    lamoTRIgine  200 mg Oral BID    pantoprazole  40 mg Oral QAM AC    rOPINIRole  0.25 mg Oral TID    venlafaxine  75 mg Oral Daily with breakfast    amLODIPine  5 mg Oral Daily    sodium chloride flush  5-40 mL IntraVENous 2 times per day    aspirin  81 mg Oral Daily    atorvastatin  80 mg Oral Nightly    famotidine  20 mg Oral BID    metoprolol tartrate  25 mg Oral BID     Continuous Infusions:   sodium chloride         Lab Results:   CBC:   Recent Labs     03/21/24 2158 03/22/24  0423 03/23/24  0453   WBC 9.8 8.5 7.6   HGB 15.5 14.7 15.5    347 352     BMP:    Recent Labs     03/21/24 2158 03/22/24  0423    136   K 4.5 4.7    102   CO2 22* 23   BUN 16 15   CREATININE 0.9 0.9   GLUCOSE 108 118*     Hepatic:   Recent Labs     03/22/24  1403   AST 27   ALT 30   BILITOT 0.3   ALKPHOS 71     Troponin: No results for input(s): \"TROPONINI\" in the last 72 hours.  BNP: No results for  gallbladder.  4. No acute findings.       Assessment and Plan:   Chest pain / abn EKG, patent coronaries on LHC  Abn lexiscan cardiolite stress test  HTN  Migraine HA  Solitary galls tone / Cholelithiasis-asymptomatic    plan  Cont ASA, statin,  BB  Lovenox  Had LHC-patent coronaries  Rx Migraine.  Stable for dc home      Chris Nunez MD, MD

## 2024-03-25 ENCOUNTER — CARE COORDINATION (OUTPATIENT)
Dept: CASE MANAGEMENT | Age: 69
End: 2024-03-25

## 2024-03-25 ENCOUNTER — TELEPHONE (OUTPATIENT)
Dept: FAMILY MEDICINE CLINIC | Age: 69
End: 2024-03-25

## 2024-03-25 ENCOUNTER — CARE COORDINATION (OUTPATIENT)
Dept: CARE COORDINATION | Age: 69
End: 2024-03-25

## 2024-03-25 DIAGNOSIS — I24.9 ACS (ACUTE CORONARY SYNDROME) (HCC): Primary | ICD-10-CM

## 2024-03-25 PROCEDURE — 1111F DSCHRG MED/CURRENT MED MERGE: CPT | Performed by: FAMILY MEDICINE

## 2024-03-25 NOTE — TELEPHONE ENCOUNTER
Care Transitions Initial Follow Up Call    Outreach made within 2 business days of discharge: Yes    Patient: Joel Bermudez Patient : 1955   MRN: [id number]  Reason for Admission: There are no discharge diagnoses documented for the most recent discharge.  Discharge Date: 3/23/24       Spoke with: CTN spoke with the patient.     Discharge department/facility: [facility]      Scheduled appointment with PCP within 7-14 days    Follow Up  Future Appointments   Date Time Provider Department Center   3/26/2024  4:00 PM Yong Caceres APRN - CNP [facility] [place]   2024  1:15 PM Ivy Patel APRN - CNP [facility] [place]   2024 10:30 AM Jyoti Dye MD [facility] [place]   3/17/2025 10:15 AM Thelma Monteiro MD [facility] [place]       Yenny Merlos CMA ([facility])

## 2024-03-25 NOTE — CARE COORDINATION
Request from CTN Mehreen Alvarado RN.,  Please schedule patient for hospital f/u PCP    Patient and CTN have been notified.     Future Appointments   Date Time Provider Department Center   3/26/2024  4:00 PM Ynog Caceres APRN - CNP [facility] [place]   4/18/2024  1:15 PM Ivy Patel APRN - CNP [facility] [place]   5/1/2024 10:30 AM Jyoti Dye MD [facility] [place]   3/17/2025 10:15 AM Thelma Monteiro MD [facility] P - [place]

## 2024-03-25 NOTE — CARE COORDINATION
Heart P [facility]       Care Transition Nurse provided contact information.  Plan for follow-up call in 5-7 days based on severity of symptoms and risk factors.  Plan for next call: symptom management-new or worsening symptoms, chest pain  follow-up appointment-review f/u 3/26    Mehreen Alvarado RN

## 2024-03-26 ENCOUNTER — OFFICE VISIT (OUTPATIENT)
Dept: FAMILY MEDICINE CLINIC | Age: 69
End: 2024-03-26

## 2024-03-26 VITALS
HEIGHT: 68 IN | WEIGHT: 205 LBS | DIASTOLIC BLOOD PRESSURE: 78 MMHG | BODY MASS INDEX: 31.07 KG/M2 | RESPIRATION RATE: 18 BRPM | HEART RATE: 88 BPM | SYSTOLIC BLOOD PRESSURE: 136 MMHG | OXYGEN SATURATION: 99 %

## 2024-03-26 DIAGNOSIS — Z09 HOSPITAL DISCHARGE FOLLOW-UP: Primary | ICD-10-CM

## 2024-03-26 DIAGNOSIS — I10 PRIMARY HYPERTENSION: ICD-10-CM

## 2024-03-26 ASSESSMENT — ENCOUNTER SYMPTOMS
NAUSEA: 0
SORE THROAT: 0
COUGH: 0
ABDOMINAL PAIN: 0
WHEEZING: 0
RHINORRHEA: 0
SHORTNESS OF BREATH: 0
CONSTIPATION: 0
DIARRHEA: 0

## 2024-03-26 NOTE — PROGRESS NOTES
Headaches      divalproex (DEPAKOTE ER) 500 MG extended release tablet Take 3 tablets by mouth daily (3 tab)          Medications patient taking as of now reconciled against medications ordered at time of hospital discharge: Yes    Review of Systems   Constitutional:  Negative for chills, fatigue and fever.   HENT:  Negative for congestion, rhinorrhea and sore throat.    Respiratory:  Negative for cough, shortness of breath and wheezing.    Cardiovascular:  Negative for chest pain and palpitations.   Gastrointestinal:  Negative for abdominal pain, constipation, diarrhea and nausea.   Genitourinary:  Negative for dysuria and hematuria.   Musculoskeletal:  Negative for arthralgias and myalgias.   Allergic/Immunologic: Negative for environmental allergies and food allergies.   Neurological:  Positive for headaches. Negative for dizziness.   Psychiatric/Behavioral:  Negative for dysphoric mood and sleep disturbance. The patient is not nervous/anxious.        Objective:    /78 (Site: Left Upper Arm, Position: Sitting, Cuff Size: Large Adult)   Pulse 88   Resp 18   Ht 1.727 m (5' 7.99\")   Wt 93 kg (205 lb)   SpO2 99%   BMI 31.18 kg/m²   Physical Exam  Vitals and nursing note reviewed.   Constitutional:       Appearance: He is well-developed.   HENT:      Head: Normocephalic and atraumatic.   Eyes:      General: No scleral icterus.        Right eye: No discharge.         Left eye: No discharge.      Conjunctiva/sclera: Conjunctivae normal.   Cardiovascular:      Rate and Rhythm: Normal rate and regular rhythm.      Pulses: Normal pulses.      Heart sounds: Normal heart sounds.   Pulmonary:      Effort: Pulmonary effort is normal.      Breath sounds: Normal breath sounds. No wheezing.   Musculoskeletal:      Right lower leg: No edema.      Left lower leg: No edema.   Skin:     General: Skin is warm and dry.   Neurological:      Mental Status: He is alert and oriented to person, place, and time. Mental status is

## 2024-03-28 LAB
EKG ATRIAL RATE: 54 BPM
EKG ATRIAL RATE: 63 BPM
EKG P AXIS: 58 DEGREES
EKG P-R INTERVAL: 144 MS
EKG P-R INTERVAL: 144 MS
EKG Q-T INTERVAL: 408 MS
EKG Q-T INTERVAL: 436 MS
EKG QRS DURATION: 118 MS
EKG QRS DURATION: 120 MS
EKG QTC CALCULATION (BAZETT): 413 MS
EKG QTC CALCULATION (BAZETT): 417 MS
EKG R AXIS: -45 DEGREES
EKG R AXIS: -47 DEGREES
EKG T AXIS: -11 DEGREES
EKG T AXIS: 66 DEGREES
EKG VENTRICULAR RATE: 54 BPM
EKG VENTRICULAR RATE: 63 BPM

## 2024-03-29 ENCOUNTER — CARE COORDINATION (OUTPATIENT)
Dept: CASE MANAGEMENT | Age: 69
End: 2024-03-29

## 2024-04-01 ENCOUNTER — CARE COORDINATION (OUTPATIENT)
Dept: CASE MANAGEMENT | Age: 69
End: 2024-04-01

## 2024-04-01 DIAGNOSIS — I10 PRIMARY HYPERTENSION: ICD-10-CM

## 2024-04-01 RX ORDER — HYDRALAZINE HYDROCHLORIDE 50 MG/1
75 TABLET, FILM COATED ORAL 3 TIMES DAILY
Qty: 135 TABLET | Refills: 2 | Status: SHIPPED | OUTPATIENT
Start: 2024-04-01 | End: 2024-06-30

## 2024-04-01 NOTE — CARE COORDINATION
Care Transitions Follow Up Call    Patient Current Location:  Home: [address] [place] [id number]    Care Transition Nurse contacted the patient by telephone to follow up after admission on 3/21/24.  Verified name and  with patient as identifiers.    Patient: Joel Bermudez  Patient : 1955   MRN: [id number]  Reason for Admission: cp ACS  Discharge Date: 3/23/24 RARS: Readmission Risk Score: 14.2      Needs to be reviewed by the provider   Additional needs identified to be addressed with provider: Yes  medications-needs 30 day supply Hydralazine sent to  local pharmacy until mail in comes in.             Method of communication with provider: phone.    CTN call to Bryan today and he says he is not feeling too good. C/o migraine h/a and sinus congestion/drainage, prod cough-> clear mucous..  Taking meds for migraines w/ some relief.  Wt.=213 w/ clothes  Denies swelling, sob, chest pain, dizziness, malaise, fever, chills, n/v/d.  AQ=050/79  PCP HFU 3/26/24-> record daily BP and report in a week.  Cardio HFU 24  Discussed seeking medical attention if new or worsening sxs. He expressed understanding. Says he has not received Hydralazine ordered upon d/c and he doesn't have at home. Will call PCP office to send short term rx to [facility]. Declines CTN assistance.  Eating, drinking, sleeping ok. Denies problems w/ urination/bowels. No other concerns voiced at this time. Will continue to follow.      Addressed changes since last contact:  medications-Hydralazine  Discussed follow-up appointments. If no appointment was previously scheduled, appointment scheduling offered: Yes.   Is follow up appointment scheduled within 7 days of discharge? Yes.    Follow Up  Future Appointments   Date Time Provider Department Center   2024  1:15 PM Ivy Patel APRN - CNP [facility] [place]   2024 10:30 AM Jyoti Dye MD Fam [facility] [place]   3/17/2025 10:15 AM Thelma Monteiro MD N [facility]

## 2024-04-08 ENCOUNTER — CARE COORDINATION (OUTPATIENT)
Dept: CASE MANAGEMENT | Age: 69
End: 2024-04-08

## 2024-04-08 NOTE — CARE COORDINATION
Care Transitions Outreach Attempt    Call within 2 business days of discharge: Yes   Attempted to reach patient for transitions of care follow up. Unable to reach patient.  Left HIPAA compliant messages w/CTN # asking to return call day #1.    Patient: Joel Bermudez Patient : 1955 MRN: [id number]    Last Discharge Facility       Date Complaint Diagnosis Description Type Department Provider    3/21/24 Chest Pain ACS (acute coronary syndrome) (HCC) ... ED to Hosp-Admission (Discharged) (ADMITTED) Chris Cruz MD; Luciano Blanco MD              Was this an external facility discharge? No Discharge Facility Name:     Noted following upcoming appointments from discharge chart review:   [facility] follow up appointment(s):   Future Appointments   Date Time Provider Department Center   2024  1:15 PM Ivy Patel, APRN - CNP [facility] [place]   2024 10:30 AM Jyoti Dye MD [facility] [place]   3/17/2025 10:15 AM Thelma Monteiro MD N [facility] [place]       follow up appointment(s):

## 2024-04-09 ENCOUNTER — CARE COORDINATION (OUTPATIENT)
Dept: CASE MANAGEMENT | Age: 69
End: 2024-04-09

## 2024-04-09 NOTE — CARE COORDINATION
last contact:  none  Discussed follow-up appointments. If no appointment was previously scheduled, appointment scheduling offered: Yes.   Is follow up appointment scheduled within 7 days of discharge? Yes.    Follow Up  Future Appointments   Date Time Provider Department Center   4/18/2024  1:15 PM Ivy Patel APRN - CNP N [facility] [place]   5/1/2024 10:30 AM Jyoti Dye MD [facility] [place]   3/17/2025 10:15 AM [facility] [place]     External follow up appointment(s):     Care Transition Nurse reviewed discharge instructions, medical action plan, and red flags with patient and discussed any barriers to care and/or understanding of plan of care after discharge. Discussed appropriate site of care based on symptoms and resources available to patient including: PCP  Specialist  Urgent care clinics  When to call 911  [facility]. The patient agrees to contact the PCP office for questions related to their healthcare.     Advance Care Planning:   reviewed and current.     Patients top risk factors for readmission: ACS migraines, seizures, CHF, CAD, HTN, GERD, depression  Interventions to address risk factors: Scheduled appointment with PCP-5/1/24, Scheduled appointment with Specialist-4/18/24 Cardio, Education of patient/family/caregiver/guardian to support self-management-BP log to PCP as requested, and Assessment and support for treatment adherence and medication management-Hydralazine taking as dirtected    Offered patient enrollment in the Remote Patient Monitoring (RPM) program for in-home monitoring: Patient declined.     Care Transitions Subsequent and Final Call    Schedule Follow Up Appointment with PCP: Completed  Subsequent and Final Calls  Do you have any ongoing symptoms?: Yes  Onset of Patient-reported symptoms: Other  Patient-reported symptoms: Other  Interventions for patient-reported symptoms: Other  Have your medications changed?: No  Do you have any

## 2024-04-10 ENCOUNTER — CARE COORDINATION (OUTPATIENT)
Dept: CARE COORDINATION | Age: 69
End: 2024-04-10

## 2024-04-11 ENCOUNTER — TELEPHONE (OUTPATIENT)
Dept: FAMILY MEDICINE CLINIC | Age: 69
End: 2024-04-11

## 2024-04-11 NOTE — TELEPHONE ENCOUNTER
Pt stopped in the office to drop off his BP readings for Dr Dye to look over. Pt stated he is feeling fine. See attached forms.

## 2024-04-16 ENCOUNTER — CARE COORDINATION (OUTPATIENT)
Dept: CASE MANAGEMENT | Age: 69
End: 2024-04-16

## 2024-04-16 NOTE — CARE COORDINATION
Care Transition Nurse provided contact information for future needs. Plan for follow-up call in 5-7 days based on severity of symptoms and risk factors.  Plan for next call: symptom management-new or worsening sxs CHF, seizures, migraines, ACS  self management-BP wt. Low sodium  follow-up appointment-Cardio 4/18/24->review  Final call next week if stable    Bria Arnold RN

## 2024-04-18 ENCOUNTER — OFFICE VISIT (OUTPATIENT)
Dept: CARDIOLOGY CLINIC | Age: 69
End: 2024-04-18
Payer: MEDICARE

## 2024-04-18 VITALS
DIASTOLIC BLOOD PRESSURE: 72 MMHG | SYSTOLIC BLOOD PRESSURE: 120 MMHG | HEART RATE: 88 BPM | HEIGHT: 68 IN | BODY MASS INDEX: 32.07 KG/M2 | WEIGHT: 211.6 LBS

## 2024-04-18 DIAGNOSIS — G47.33 OSA (OBSTRUCTIVE SLEEP APNEA): ICD-10-CM

## 2024-04-18 DIAGNOSIS — E66.9 OBESITY (BMI 30-39.9): ICD-10-CM

## 2024-04-18 DIAGNOSIS — I25.10 CORONARY ARTERY DISEASE INVOLVING NATIVE CORONARY ARTERY OF NATIVE HEART WITHOUT ANGINA PECTORIS: ICD-10-CM

## 2024-04-18 DIAGNOSIS — R06.09 DYSPNEA ON EXERTION: Primary | ICD-10-CM

## 2024-04-18 DIAGNOSIS — I10 PRIMARY HYPERTENSION: ICD-10-CM

## 2024-04-18 DIAGNOSIS — R94.39 ABNORMAL STRESS TEST: ICD-10-CM

## 2024-04-18 PROCEDURE — 1123F ACP DISCUSS/DSCN MKR DOCD: CPT | Performed by: NURSE PRACTITIONER

## 2024-04-18 PROCEDURE — G8417 CALC BMI ABV UP PARAM F/U: HCPCS | Performed by: NURSE PRACTITIONER

## 2024-04-18 PROCEDURE — 1036F TOBACCO NON-USER: CPT | Performed by: NURSE PRACTITIONER

## 2024-04-18 PROCEDURE — 99214 OFFICE O/P EST MOD 30 MIN: CPT | Performed by: NURSE PRACTITIONER

## 2024-04-18 PROCEDURE — 3017F COLORECTAL CA SCREEN DOC REV: CPT | Performed by: NURSE PRACTITIONER

## 2024-04-18 PROCEDURE — 3078F DIAST BP <80 MM HG: CPT | Performed by: NURSE PRACTITIONER

## 2024-04-18 PROCEDURE — G8427 DOCREV CUR MEDS BY ELIG CLIN: HCPCS | Performed by: NURSE PRACTITIONER

## 2024-04-18 PROCEDURE — 3074F SYST BP LT 130 MM HG: CPT | Performed by: NURSE PRACTITIONER

## 2024-04-18 PROCEDURE — 1111F DSCHRG MED/CURRENT MED MERGE: CPT | Performed by: NURSE PRACTITIONER

## 2024-04-18 NOTE — PATIENT INSTRUCTIONS
Continue current medications as prescribed.    See the pulmonologist as scheduled.     Stay as active as you can.     Follow-up with your PCP as scheduled.    Follow-up with Dr. Drake on 3/15/2025  as scheduled or sooner if need.

## 2024-04-18 NOTE — PROGRESS NOTES
Patient here to follow up after the hospital    EKG done 3/28/24    C/o fatigue and sob    Denies any chest pain, palpitations, dizziness and KAREL  
Pressure Maternal Grandmother     Arthritis Maternal Grandfather     Diabetes Maternal Grandfather     Heart Disease Maternal Grandfather     Asthma Paternal Grandmother     Stroke Paternal Grandmother     High Blood Pressure Paternal Grandmother     Asthma Paternal Grandfather      Social History     Tobacco Use    Smoking status: Never    Smokeless tobacco: Never   Substance Use Topics    Alcohol use: No      Current Outpatient Medications   Medication Sig Dispense Refill    hydrALAZINE (APRESOLINE) 50 MG tablet Take 1.5 tablets by mouth 3 times daily 135 tablet 2    aspirin 81 MG chewable tablet Take 1 tablet by mouth daily 30 tablet 3    atorvastatin (LIPITOR) 20 MG tablet Take 1 tablet by mouth nightly 30 tablet 0    SUMAtriptan (IMITREX) 50 MG tablet Take 1 tablet by mouth daily as needed for Migraine 10 tablet 0    EPINEPHrine HCl, Anaphylaxis, (EPIPEN IM) Inject 1 Dose into the muscle as needed (for anaphylaxis)      sertraline (ZOLOFT) 100 MG tablet Take 1.5 tablets by mouth daily      butorphanol (STADOL) 10 MG/ML nasal spray 1 spray by Nasal route every 4 hours as needed for Pain.      desvenlafaxine succinate (PRISTIQ) 50 MG TB24 extended release tablet Take 1 tablet by mouth daily 90 tablet 3    lamoTRIgine (LAMICTAL) 200 MG tablet Take 1 tablet by mouth 2 times daily 180 tablet 3    omeprazole (PRILOSEC) 40 MG delayed release capsule Take 1 capsule by mouth daily 90 capsule 3    ondansetron (ZOFRAN-ODT) 4 MG disintegrating tablet Take 1 tablet by mouth 3 times daily as needed for Nausea or Vomiting 60 tablet 3    Blood Pressure KIT Dispense 1 blood pressure cuff.  Use to check blood pressure daily 1 kit 0    rOPINIRole (REQUIP) 0.25 MG tablet Take 1 tablet by mouth 3 times daily Dr. Nina 8/28/23      aspirin-acetaminophen-caffeine (EXCEDRIN MIGRAINE) 250-250-65 MG per tablet Take 1 tablet by mouth every 6 hours as needed for Headaches      divalproex (DEPAKOTE ER) 500 MG extended release tablet

## 2024-04-23 ENCOUNTER — CARE COORDINATION (OUTPATIENT)
Dept: CASE MANAGEMENT | Age: 69
End: 2024-04-23

## 2024-04-23 NOTE — CARE COORDINATION
Care Transitions Outreach Attempt    Call within 2 business days of discharge: Yes   Attempted to reach patient for transitions of care follow up. Unable to reach patient.  Left HIPAA compliant message w/ CTN # asking to return call w/ no response day #1.      Patient: Joel Bermudez Patient : 1955 MRN: [id number]    Last Discharge Facility       Date Complaint Diagnosis Description Type Department Provider    3/21/24 Chest Pain ACS (acute coronary syndrome) (HCC) ... ED to Hosp-Admission (Discharged) (ADMITTED) Chris Cruz MD; Luciano Blanco MD              Was this an external facility discharge? No Discharge Facility Name:     Noted following upcoming appointments from discharge chart review:   [facility] follow up appointment(s):   Future Appointments   Date Time Provider Department Center   2024 10:30 AM Jyoti Dye MD [facility] [place]   2024 10:00 AM John Whyte DO N Pulm Med [facility] [place]   3/17/2025 10:15 AM Thelma Monteiro MD N [facility] [place]       follow up appointment(s):

## 2024-04-24 ENCOUNTER — CARE COORDINATION (OUTPATIENT)
Dept: CASE MANAGEMENT | Age: 69
End: 2024-04-24

## 2024-04-29 ENCOUNTER — CARE COORDINATION (OUTPATIENT)
Dept: CARE COORDINATION | Age: 69
End: 2024-04-29

## 2024-05-13 ENCOUNTER — PATIENT MESSAGE (OUTPATIENT)
Dept: FAMILY MEDICINE CLINIC | Age: 69
End: 2024-05-13

## 2024-05-13 DIAGNOSIS — K21.9 GASTROESOPHAGEAL REFLUX DISEASE WITHOUT ESOPHAGITIS: ICD-10-CM

## 2024-05-13 RX ORDER — OMEPRAZOLE 40 MG/1
40 CAPSULE, DELAYED RELEASE ORAL DAILY
Qty: 90 CAPSULE | Refills: 3 | Status: SHIPPED | OUTPATIENT
Start: 2024-05-13

## 2024-05-13 NOTE — TELEPHONE ENCOUNTER
From: Joel Almonte  To: Dr. Jyoti Dye  Sent: 5/13/2024 9:43 AM EDT  Subject: refills     All my prescription now go to [facility]. I do have have to have one filled OMERAZOLE 40mg 1 daily THANK YOU  JOEL ALMONTE

## 2024-05-22 ENCOUNTER — HOSPITAL ENCOUNTER (OUTPATIENT)
Age: 69
Discharge: HOME | End: 2024-05-22
Payer: MEDICARE

## 2024-05-22 ENCOUNTER — OFFICE VISIT (OUTPATIENT)
Dept: FAMILY MEDICINE CLINIC | Age: 69
End: 2024-05-22

## 2024-05-22 VITALS
WEIGHT: 216.38 LBS | OXYGEN SATURATION: 92 % | BODY MASS INDEX: 32.79 KG/M2 | HEIGHT: 68 IN | RESPIRATION RATE: 20 BRPM | DIASTOLIC BLOOD PRESSURE: 72 MMHG | SYSTOLIC BLOOD PRESSURE: 130 MMHG | TEMPERATURE: 97.8 F | HEART RATE: 68 BPM

## 2024-05-22 DIAGNOSIS — E55.9 VITAMIN D DEFICIENCY: ICD-10-CM

## 2024-05-22 DIAGNOSIS — R61 EXCESSIVE SWEATING: ICD-10-CM

## 2024-05-22 DIAGNOSIS — R61 EXCESSIVE SWEATING: Primary | ICD-10-CM

## 2024-05-22 DIAGNOSIS — I10 PRIMARY HYPERTENSION: ICD-10-CM

## 2024-05-22 DIAGNOSIS — R53.83 OTHER FATIGUE: ICD-10-CM

## 2024-05-22 LAB
ANION GAP SERPL CALC-SCNC: 10 MEQ/L (ref 8–16)
BASOPHILS ABSOLUTE: 0 THOU/MM3 (ref 0–0.1)
BASOPHILS NFR BLD AUTO: 0.6 %
BUN SERPL-MCNC: 11 MG/DL (ref 7–22)
CALCIUM SERPL-MCNC: 8.8 MG/DL (ref 8.5–10.5)
CHLORIDE SERPL-SCNC: 107 MEQ/L (ref 98–111)
CO2 SERPL-SCNC: 25 MEQ/L (ref 23–33)
CREAT SERPL-MCNC: 0.9 MG/DL (ref 0.4–1.2)
DEPRECATED RDW RBC AUTO: 48.4 FL (ref 35–45)
EOSINOPHIL NFR BLD AUTO: 2.2 %
EOSINOPHILS ABSOLUTE: 0.2 THOU/MM3 (ref 0–0.4)
ERYTHROCYTE [DISTWIDTH] IN BLOOD BY AUTOMATED COUNT: 13.3 % (ref 11.5–14.5)
GFR SERPL CREATININE-BSD FRML MDRD: > 90 ML/MIN/1.73M2
GLUCOSE SERPL-MCNC: 91 MG/DL (ref 70–108)
HCT VFR BLD AUTO: 44.1 % (ref 42–52)
HGB BLD-MCNC: 14.2 GM/DL (ref 14–18)
IMM GRANULOCYTES # BLD AUTO: 0.05 THOU/MM3 (ref 0–0.07)
IMM GRANULOCYTES NFR BLD AUTO: 0.6 %
LYMPHOCYTES ABSOLUTE: 2.9 THOU/MM3 (ref 1–4.8)
LYMPHOCYTES NFR BLD AUTO: 37.1 %
MAGNESIUM SERPL-MCNC: 2.1 MG/DL (ref 1.6–2.4)
MCH RBC QN AUTO: 31.7 PG (ref 26–33)
MCHC RBC AUTO-ENTMCNC: 32.2 GM/DL (ref 32.2–35.5)
MCV RBC AUTO: 98.4 FL (ref 80–94)
MONOCYTES ABSOLUTE: 0.9 THOU/MM3 (ref 0.4–1.3)
MONOCYTES NFR BLD AUTO: 10.9 %
NEUTROPHILS ABSOLUTE: 3.8 THOU/MM3 (ref 1.8–7.7)
NEUTROPHILS NFR BLD AUTO: 48.6 %
NRBC BLD AUTO-RTO: 0 /100 WBC
PLATELET # BLD AUTO: 377 THOU/MM3 (ref 130–400)
PMV BLD AUTO: 11 FL (ref 9.4–12.4)
POTASSIUM SERPL-SCNC: 4.4 MEQ/L (ref 3.5–5.2)
RBC # BLD AUTO: 4.48 MILL/MM3 (ref 4.7–6.1)
SODIUM SERPL-SCNC: 142 MEQ/L (ref 135–145)
T4 FREE SERPL-MCNC: 1.05 NG/DL (ref 0.93–1.68)
TSH SERPL DL<=0.005 MIU/L-ACNC: 3.27 UIU/ML (ref 0.4–4.2)
WBC # BLD AUTO: 7.9 THOU/MM3 (ref 4.8–10.8)

## 2024-05-22 PROCEDURE — 80048 BASIC METABOLIC PNL TOTAL CA: CPT

## 2024-05-22 PROCEDURE — 83735 ASSAY OF MAGNESIUM: CPT

## 2024-05-22 PROCEDURE — 84439 ASSAY OF FREE THYROXINE: CPT

## 2024-05-22 PROCEDURE — 85025 COMPLETE CBC W/AUTO DIFF WBC: CPT

## 2024-05-22 PROCEDURE — 84443 ASSAY THYROID STIM HORMONE: CPT

## 2024-05-22 PROCEDURE — 36415 COLL VENOUS BLD VENIPUNCTURE: CPT

## 2024-05-22 PROCEDURE — 82306 VITAMIN D 25 HYDROXY: CPT

## 2024-05-22 ASSESSMENT — ENCOUNTER SYMPTOMS
CHEST TIGHTNESS: 0
SHORTNESS OF BREATH: 1
SORE THROAT: 0
NAUSEA: 0
CONSTIPATION: 0
EYE REDNESS: 0
COUGH: 0
DIARRHEA: 0
WHEEZING: 0
RHINORRHEA: 0
ABDOMINAL PAIN: 0

## 2024-05-22 NOTE — PROGRESS NOTES
summer which was normal.  He has not noticed any increase in leg swelling.  He does check his blood pressures at home and states that they have been normal range.  Around 120-130s/70-80s.  Denies heart palpitations.  He was started on Pristiq back in December but did not notice sweating prior to last few weeks.  In February he did have a vitamin D level of 15.  He is not on vitamin D supplement.  He denies any recent illness, congestion, cough, or fever.        Past Medical History:   Diagnosis Date    Back pain     Depression     GERD (gastroesophageal reflux disease)     Headache(784.0) 09/22/2013    Migraines     ALICIA treated with BiPAP 2013    doesn't wear Bipap    Pneumonia     Pulmonary embolism (HCC) 02/25/2021    S/P cardiac catheterization: 7/31/2017: No obstructive lasions. 07/31/2017 7/31/2017: No obstructive lasions. Dr. Farris    Seizures ([facility])     Severe episode of recurrent major depressive disorder, with psychotic features ([facility]) 02/01/2024        Past Surgical History:   Procedure Laterality Date    APPENDECTOMY  2012    Yogesh    BACK SURGERY  12/18/15    l3-s1 decompression, posterior fusion    CARDIAC CATHETERIZATION  2013    CARDIAC PROCEDURE N/A 3/22/2024    Left heart cath / coronary angiography performed by Thelma Monteiro MD at [facility] CARDIAC CATH LAB    CERVICAL SPINE SURGERY  10-16-15    C4-6 ACDF    COLONOSCOPY      ENDOSCOPY, COLON, DIAGNOSTIC      HERNIA REPAIR  1996    [facility]    HERNIA REPAIR  2012    Yogesh    KNEE SURGERY  1976    Rt     LUMBAR SPINE SURGERY  08/26/2014     L3-5 decompression, Dr. Braun, [facility]    NECK SURGERY  10/19/2018    OTHER SURGICAL HISTORY  06/02/2017    Diagnostic laparoscopy, Laparoscopic Ventral hernia Repair with Mesh by Dr Morton    [place] OFFICE/OUTPT VISIT,PROCEDURE ONLY N/A 10/19/2018    C3-4 AND C6-7 ACDF performed by Brynn Galvan MD at [facility] OR    REMOVE HARDWARE SPINE N/A 3/13/2020    L3-S1 HARDWARE REMOVAL performed by Brynn URENA

## 2024-05-23 LAB — 25(OH)D3 SERPL-MCNC: 18 NG/ML (ref 30–100)

## 2024-05-23 RX ORDER — ERGOCALCIFEROL 1.25 MG/1
50000 CAPSULE ORAL WEEKLY
Qty: 12 CAPSULE | Refills: 1 | Status: SHIPPED | OUTPATIENT
Start: 2024-05-23

## 2024-06-18 DIAGNOSIS — I10 PRIMARY HYPERTENSION: ICD-10-CM

## 2024-06-18 RX ORDER — HYDRALAZINE HYDROCHLORIDE 50 MG/1
TABLET, FILM COATED ORAL
Qty: 135 TABLET | Refills: 2 | Status: SHIPPED | OUTPATIENT
Start: 2024-06-18

## 2024-07-11 RX ORDER — ATORVASTATIN CALCIUM 20 MG/1
20 TABLET, FILM COATED ORAL DAILY
Qty: 90 TABLET | Refills: 3 | Status: SHIPPED | OUTPATIENT
Start: 2024-07-11

## 2024-08-26 ENCOUNTER — PATIENT MESSAGE (OUTPATIENT)
Dept: FAMILY MEDICINE CLINIC | Age: 69
End: 2024-08-26

## 2024-09-05 ENCOUNTER — OFFICE VISIT (OUTPATIENT)
Dept: FAMILY MEDICINE CLINIC | Age: 69
End: 2024-09-05
Payer: MEDICARE

## 2024-09-05 VITALS
RESPIRATION RATE: 21 BRPM | TEMPERATURE: 98.6 F | SYSTOLIC BLOOD PRESSURE: 144 MMHG | DIASTOLIC BLOOD PRESSURE: 68 MMHG | BODY MASS INDEX: 32.32 KG/M2 | HEART RATE: 68 BPM | OXYGEN SATURATION: 94 % | WEIGHT: 213.25 LBS | HEIGHT: 68 IN

## 2024-09-05 DIAGNOSIS — E55.9 VITAMIN D DEFICIENCY: ICD-10-CM

## 2024-09-05 DIAGNOSIS — K21.9 GASTROESOPHAGEAL REFLUX DISEASE WITHOUT ESOPHAGITIS: ICD-10-CM

## 2024-09-05 DIAGNOSIS — Z00.00 MEDICARE ANNUAL WELLNESS VISIT, SUBSEQUENT: Primary | ICD-10-CM

## 2024-09-05 PROCEDURE — 3017F COLORECTAL CA SCREEN DOC REV: CPT | Performed by: NURSE PRACTITIONER

## 2024-09-05 PROCEDURE — 3078F DIAST BP <80 MM HG: CPT | Performed by: NURSE PRACTITIONER

## 2024-09-05 PROCEDURE — G0439 PPPS, SUBSEQ VISIT: HCPCS | Performed by: NURSE PRACTITIONER

## 2024-09-05 PROCEDURE — 3077F SYST BP >= 140 MM HG: CPT | Performed by: NURSE PRACTITIONER

## 2024-09-05 PROCEDURE — 1123F ACP DISCUSS/DSCN MKR DOCD: CPT | Performed by: NURSE PRACTITIONER

## 2024-09-05 RX ORDER — ERGOCALCIFEROL 1.25 MG/1
50000 CAPSULE, LIQUID FILLED ORAL WEEKLY
Qty: 12 CAPSULE | Refills: 1 | Status: SHIPPED | OUTPATIENT
Start: 2024-09-05

## 2024-09-05 RX ORDER — OMEPRAZOLE 40 MG/1
40 CAPSULE, DELAYED RELEASE ORAL DAILY
Qty: 90 CAPSULE | Refills: 3 | Status: SHIPPED | OUTPATIENT
Start: 2024-09-05

## 2024-09-05 SDOH — ECONOMIC STABILITY: FOOD INSECURITY: WITHIN THE PAST 12 MONTHS, YOU WORRIED THAT YOUR FOOD WOULD RUN OUT BEFORE YOU GOT MONEY TO BUY MORE.: NEVER TRUE

## 2024-09-05 SDOH — ECONOMIC STABILITY: INCOME INSECURITY: HOW HARD IS IT FOR YOU TO PAY FOR THE VERY BASICS LIKE FOOD, HOUSING, MEDICAL CARE, AND HEATING?: NOT HARD AT ALL

## 2024-09-05 SDOH — ECONOMIC STABILITY: FOOD INSECURITY: WITHIN THE PAST 12 MONTHS, THE FOOD YOU BOUGHT JUST DIDN'T LAST AND YOU DIDN'T HAVE MONEY TO GET MORE.: NEVER TRUE

## 2024-09-05 ASSESSMENT — ANXIETY QUESTIONNAIRES
4. TROUBLE RELAXING: SEVERAL DAYS
IF YOU CHECKED OFF ANY PROBLEMS ON THIS QUESTIONNAIRE, HOW DIFFICULT HAVE THESE PROBLEMS MADE IT FOR YOU TO DO YOUR WORK, TAKE CARE OF THINGS AT HOME, OR GET ALONG WITH OTHER PEOPLE: SOMEWHAT DIFFICULT
5. BEING SO RESTLESS THAT IT IS HARD TO SIT STILL: MORE THAN HALF THE DAYS
3. WORRYING TOO MUCH ABOUT DIFFERENT THINGS: SEVERAL DAYS
2. NOT BEING ABLE TO STOP OR CONTROL WORRYING: SEVERAL DAYS
GAD7 TOTAL SCORE: 9
1. FEELING NERVOUS, ANXIOUS, OR ON EDGE: SEVERAL DAYS
6. BECOMING EASILY ANNOYED OR IRRITABLE: NEARLY EVERY DAY
7. FEELING AFRAID AS IF SOMETHING AWFUL MIGHT HAPPEN: NOT AT ALL

## 2024-09-05 ASSESSMENT — PATIENT HEALTH QUESTIONNAIRE - PHQ9
7. TROUBLE CONCENTRATING ON THINGS, SUCH AS READING THE NEWSPAPER OR WATCHING TELEVISION: SEVERAL DAYS
2. FEELING DOWN, DEPRESSED OR HOPELESS: MORE THAN HALF THE DAYS
SUM OF ALL RESPONSES TO PHQ9 QUESTIONS 1 & 2: 4
6. FEELING BAD ABOUT YOURSELF - OR THAT YOU ARE A FAILURE OR HAVE LET YOURSELF OR YOUR FAMILY DOWN: MORE THAN HALF THE DAYS
5. POOR APPETITE OR OVEREATING: NOT AT ALL
SUM OF ALL RESPONSES TO PHQ QUESTIONS 1-9: 13
SUM OF ALL RESPONSES TO PHQ QUESTIONS 1-9: 12
SUM OF ALL RESPONSES TO PHQ QUESTIONS 1-9: 13
1. LITTLE INTEREST OR PLEASURE IN DOING THINGS: MORE THAN HALF THE DAYS
3. TROUBLE FALLING OR STAYING ASLEEP: SEVERAL DAYS
8. MOVING OR SPEAKING SO SLOWLY THAT OTHER PEOPLE COULD HAVE NOTICED. OR THE OPPOSITE, BEING SO FIGETY OR RESTLESS THAT YOU HAVE BEEN MOVING AROUND A LOT MORE THAN USUAL: SEVERAL DAYS
10. IF YOU CHECKED OFF ANY PROBLEMS, HOW DIFFICULT HAVE THESE PROBLEMS MADE IT FOR YOU TO DO YOUR WORK, TAKE CARE OF THINGS AT HOME, OR GET ALONG WITH OTHER PEOPLE: SOMEWHAT DIFFICULT
SUM OF ALL RESPONSES TO PHQ QUESTIONS 1-9: 13
4. FEELING TIRED OR HAVING LITTLE ENERGY: NEARLY EVERY DAY
9. THOUGHTS THAT YOU WOULD BE BETTER OFF DEAD, OR OF HURTING YOURSELF: SEVERAL DAYS

## 2024-09-05 ASSESSMENT — COLUMBIA-SUICIDE SEVERITY RATING SCALE - C-SSRS
6. HAVE YOU EVER DONE ANYTHING, STARTED TO DO ANYTHING, OR PREPARED TO DO ANYTHING TO END YOUR LIFE?: NO
1. WITHIN THE PAST MONTH, HAVE YOU WISHED YOU WERE DEAD OR WISHED YOU COULD GO TO SLEEP AND NOT WAKE UP?: NO
2. HAVE YOU ACTUALLY HAD ANY THOUGHTS OF KILLING YOURSELF?: NO

## 2024-10-28 ENCOUNTER — APPOINTMENT (OUTPATIENT)
Dept: CT IMAGING | Age: 69
End: 2024-10-28
Payer: MEDICARE

## 2024-10-28 ENCOUNTER — HOSPITAL ENCOUNTER (EMERGENCY)
Age: 69
Discharge: HOME | End: 2024-10-28
Attending: FAMILY MEDICINE
Payer: MEDICARE

## 2024-10-28 VITALS
HEIGHT: 69 IN | SYSTOLIC BLOOD PRESSURE: 137 MMHG | RESPIRATION RATE: 16 BRPM | OXYGEN SATURATION: 95 % | TEMPERATURE: 97.8 F | HEART RATE: 69 BPM | BODY MASS INDEX: 32.58 KG/M2 | WEIGHT: 220 LBS | DIASTOLIC BLOOD PRESSURE: 73 MMHG

## 2024-10-28 DIAGNOSIS — G44.039 NONINTRACTABLE PAROXYSMAL HEMICRANIA, UNSPECIFIED CHRONICITY PATTERN: Primary | ICD-10-CM

## 2024-10-28 LAB
ALBUMIN SERPL BCG-MCNC: 4.2 G/DL (ref 3.5–5.1)
ALP SERPL-CCNC: 71 U/L (ref 38–126)
ALT SERPL W/O P-5'-P-CCNC: 18 U/L (ref 11–66)
ANION GAP SERPL CALC-SCNC: 16 MEQ/L (ref 8–16)
AST SERPL-CCNC: 19 U/L (ref 5–40)
BASOPHILS ABSOLUTE: 0 THOU/MM3 (ref 0–0.1)
BASOPHILS NFR BLD AUTO: 0.4 %
BILIRUB SERPL-MCNC: 0.2 MG/DL (ref 0.3–1.2)
BUN SERPL-MCNC: 16 MG/DL (ref 7–22)
CALCIUM SERPL-MCNC: 9 MG/DL (ref 8.5–10.5)
CHLORIDE SERPL-SCNC: 100 MEQ/L (ref 98–111)
CO2 SERPL-SCNC: 23 MEQ/L (ref 23–33)
CREAT SERPL-MCNC: 0.9 MG/DL (ref 0.4–1.2)
DEPRECATED RDW RBC AUTO: 45 FL (ref 35–45)
EKG ATRIAL RATE: 74 BPM
EKG P AXIS: 69 DEGREES
EKG P-R INTERVAL: 158 MS
EKG Q-T INTERVAL: 396 MS
EKG QRS DURATION: 118 MS
EKG QTC CALCULATION (BAZETT): 439 MS
EKG R AXIS: -52 DEGREES
EKG T AXIS: 75 DEGREES
EKG VENTRICULAR RATE: 74 BPM
EOSINOPHIL NFR BLD AUTO: 2.1 %
EOSINOPHILS ABSOLUTE: 0.2 THOU/MM3 (ref 0–0.4)
ERYTHROCYTE [DISTWIDTH] IN BLOOD BY AUTOMATED COUNT: 13 % (ref 11.5–14.5)
GFR SERPL CREATININE-BSD FRML MDRD: > 90 ML/MIN/1.73M2
GLUCOSE SERPL-MCNC: 103 MG/DL (ref 70–108)
HCT VFR BLD AUTO: 44.5 % (ref 42–52)
HGB BLD-MCNC: 14.6 GM/DL (ref 14–18)
IMM GRANULOCYTES # BLD AUTO: 0.06 THOU/MM3 (ref 0–0.07)
IMM GRANULOCYTES NFR BLD AUTO: 0.7 %
LIPASE SERPL-CCNC: 22.7 U/L (ref 5.6–51.3)
LYMPHOCYTES ABSOLUTE: 2.3 THOU/MM3 (ref 1–4.8)
LYMPHOCYTES NFR BLD AUTO: 26.3 %
MAGNESIUM SERPL-MCNC: 2 MG/DL (ref 1.6–2.4)
MCH RBC QN AUTO: 30.9 PG (ref 26–33)
MCHC RBC AUTO-ENTMCNC: 32.8 GM/DL (ref 32.2–35.5)
MCV RBC AUTO: 94.1 FL (ref 80–94)
MONOCYTES ABSOLUTE: 1 THOU/MM3 (ref 0.4–1.3)
MONOCYTES NFR BLD AUTO: 11.6 %
NEUTROPHILS ABSOLUTE: 5.2 THOU/MM3 (ref 1.8–7.7)
NEUTROPHILS NFR BLD AUTO: 58.9 %
NRBC BLD AUTO-RTO: 0 /100 WBC
OSMOLALITY SERPL CALC.SUM OF ELEC: 279 MOSMOL/KG (ref 275–300)
PLATELET # BLD AUTO: 365 THOU/MM3 (ref 130–400)
PMV BLD AUTO: 10.3 FL (ref 9.4–12.4)
POTASSIUM SERPL-SCNC: 4.3 MEQ/L (ref 3.5–5.2)
PROT SERPL-MCNC: 7.2 G/DL (ref 6.1–8)
RBC # BLD AUTO: 4.73 MILL/MM3 (ref 4.7–6.1)
SODIUM SERPL-SCNC: 139 MEQ/L (ref 135–145)
T4 FREE SERPL-MCNC: 1.09 NG/DL (ref 0.93–1.68)
TROPONIN, HIGH SENSITIVITY: 21 NG/L (ref 0–12)
TROPONIN, HIGH SENSITIVITY: 22 NG/L (ref 0–12)
TSH SERPL DL<=0.005 MIU/L-ACNC: 2.59 UIU/ML (ref 0.4–4.2)
WBC # BLD AUTO: 8.9 THOU/MM3 (ref 4.8–10.8)

## 2024-10-28 PROCEDURE — 83735 ASSAY OF MAGNESIUM: CPT

## 2024-10-28 PROCEDURE — 71275 CT ANGIOGRAPHY CHEST: CPT

## 2024-10-28 PROCEDURE — 6360000004 HC RX CONTRAST MEDICATION: Performed by: EMERGENCY MEDICINE

## 2024-10-28 PROCEDURE — 84443 ASSAY THYROID STIM HORMONE: CPT

## 2024-10-28 PROCEDURE — 84484 ASSAY OF TROPONIN QUANT: CPT

## 2024-10-28 PROCEDURE — 83690 ASSAY OF LIPASE: CPT

## 2024-10-28 PROCEDURE — 85025 COMPLETE CBC W/AUTO DIFF WBC: CPT

## 2024-10-28 PROCEDURE — 99285 EMERGENCY DEPT VISIT HI MDM: CPT

## 2024-10-28 PROCEDURE — 70498 CT ANGIOGRAPHY NECK: CPT

## 2024-10-28 PROCEDURE — 70496 CT ANGIOGRAPHY HEAD: CPT

## 2024-10-28 PROCEDURE — 6360000002 HC RX W HCPCS: Performed by: EMERGENCY MEDICINE

## 2024-10-28 PROCEDURE — 36415 COLL VENOUS BLD VENIPUNCTURE: CPT

## 2024-10-28 PROCEDURE — 70450 CT HEAD/BRAIN W/O DYE: CPT

## 2024-10-28 PROCEDURE — 93005 ELECTROCARDIOGRAM TRACING: CPT | Performed by: FAMILY MEDICINE

## 2024-10-28 PROCEDURE — 84439 ASSAY OF FREE THYROXINE: CPT

## 2024-10-28 PROCEDURE — 80053 COMPREHEN METABOLIC PANEL: CPT

## 2024-10-28 RX ORDER — DROPERIDOL 2.5 MG/ML
1.25 INJECTION, SOLUTION INTRAMUSCULAR; INTRAVENOUS ONCE
Status: COMPLETED | OUTPATIENT
Start: 2024-10-28 | End: 2024-10-28

## 2024-10-28 RX ORDER — IOPAMIDOL 755 MG/ML
80 INJECTION, SOLUTION INTRAVASCULAR
Status: COMPLETED | OUTPATIENT
Start: 2024-10-28 | End: 2024-10-28

## 2024-10-28 RX ADMIN — IOPAMIDOL 80 ML: 755 INJECTION, SOLUTION INTRAVENOUS at 19:13

## 2024-10-28 RX ADMIN — DROPERIDOL 1.25 MG: 2.5 INJECTION, SOLUTION INTRAMUSCULAR; INTRAVENOUS at 17:04

## 2024-10-28 ASSESSMENT — PAIN DESCRIPTION - LOCATION: LOCATION: HEAD

## 2024-10-28 ASSESSMENT — PAIN DESCRIPTION - ORIENTATION: ORIENTATION: LEFT

## 2024-10-28 ASSESSMENT — PAIN DESCRIPTION - PAIN TYPE: TYPE: ACUTE PAIN

## 2024-10-28 ASSESSMENT — PAIN DESCRIPTION - ONSET: ONSET: PROGRESSIVE

## 2024-10-28 ASSESSMENT — PAIN - FUNCTIONAL ASSESSMENT: PAIN_FUNCTIONAL_ASSESSMENT: 0-10

## 2024-10-28 ASSESSMENT — PAIN SCALES - GENERAL: PAINLEVEL_OUTOF10: 10

## 2024-10-28 NOTE — ED TRIAGE NOTES
Pt to ED via EMS from home w/rprts sudden on set of HA, w/radiation to L side of neck and shoulder pain. Intermittent chest tightness. EMS rprts treating w/4 (81mg aspirin) and nitro x1. Pt rprts HA worsened following nitro. Pt rprts a hx of parkinson's but not currently on any medications. Pt is A&O x4. Breathing easy and unlabored on RA. Rprts photosensitivity; rates 10/10. Intermittent dizziness. Unable to complete ROM w/neck due to increased pain to left side. L sided mouth droop noted. Wife rprts has problems with teeth and always has a \"slight droop.\" Weakness noted to lower extremities; pt rprts R sided knee replacement. Placed on cardiac monitor.  updated and at bedside to assess.

## 2024-10-29 NOTE — DISCHARGE INSTR - COC
Continuity of Care Form    Patient Name: Joel Bermudez   :  1955  MRN:  [id number]    Admit date:  10/28/2024  Discharge date:  ***    Code Status Order: Prior   Advance Directives:   Advance Care Flowsheet Documentation             Admitting Physician:  No admitting provider for patient encounter.  PCP: Jyoti Dye MD    Discharging Nurse: ***  Discharging Hospital Unit/Room#: 030A  Discharging Unit Phone Number: ***    Emergency Contact:   Extended Emergency Contact Information  Primary Emergency Contact: Bria Taylorncer Addie  Address: [address] [place] [id number] [facility]  Home Phone: [phone]  Relation: Spouse  Secondary Emergency Contact: Ladan Naqiv           [facility]  Home Phone: [phone]  Mobile Phone: [phone]  Relation: Child    Past Surgical History:  Past Surgical History:   Procedure Laterality Date    APPENDECTOMY      Yogesh    BACK SURGERY  12/18/15    l3-s1 decompression, posterior fusion    CARDIAC CATHETERIZATION  2013    CARDIAC PROCEDURE N/A 3/22/2024    Left heart cath / coronary angiography performed by Thelma Monteiro MD at [facility] CARDIAC CATH LAB    CERVICAL SPINE SURGERY  10-16-15    C4-6 ACDF    COLONOSCOPY      ENDOSCOPY, COLON, DIAGNOSTIC      HERNIA REPAIR      [facility]    HERNIA REPAIR      Yogesh    KNEE SURGERY  1976    Rt     LUMBAR SPINE SURGERY  2014     L3-5 decompression, Dr. Braun, [facility]    NECK SURGERY  10/19/2018    OTHER SURGICAL HISTORY  2017    Diagnostic laparoscopy, Laparoscopic Ventral hernia Repair with Mesh by Dr Morton    [place] OFFICE/OUTPT VISIT,PROCEDURE ONLY N/A 10/19/2018    C3-4 AND C6-7 ACDF performed by Brynn Galvan MD at [facility] OR    REMOVE HARDWARE SPINE N/A 3/13/2020    L3-S1 HARDWARE REMOVAL performed by Brynn Galvan MD at [facility] OR    SHOULDER SURGERY      Rt Rotator cuff, Dr. Kaur    STIMULATOR  WORK SECTION    Inpatient Status Date: ***    Readmission Risk Assessment Score:  Readmission Risk              Risk of Unplanned Readmission:  0           Discharging to Facility/ Agency   Name:   Address:  Phone:  Fax:    Dialysis Facility (if applicable)   Name:  Address:  Dialysis Schedule:  Phone:  Fax:    / signature: {Esignature:904683854}    PHYSICIAN SECTION    Prognosis: {Prognosis:5371328595}    Condition at Discharge: { Patient Condition:943267618}    Rehab Potential (if transferring to Rehab): {Prognosis:7439748028}    Recommended Labs or Other Treatments After Discharge: ***    Physician Certification: I certify the above information and transfer of Joel Bermudez  is necessary for the continuing treatment of the diagnosis listed and that he requires {Admit to Appropriate Level of Care:09033} for {GREATER/LESS:110913255} 30 days.     Update Admission H&P: {CHP DME Changes in HandP:569680311}    PHYSICIAN SIGNATURE:  {Esignature:672511875}

## 2024-10-29 NOTE — ED NOTES
Pt rprts feeling much better. Rates HA 1.5/10 at this time. Updated on new orders and POC. Vitals updated. Call light remains within reach.

## 2024-10-29 NOTE — DISCHARGE INSTRUCTIONS
PLEASE TRY TO MAINTAIN A LOW STRESS LEVEL.    YOUR SCANS DID NOT SHOW ANY BRAIN BLEEDING OR STROKES.    RETURN IF WORSE.

## 2024-11-26 NOTE — TELEPHONE ENCOUNTER
Care Transitions Initial Follow Up Call    Outreach made within 2 business days of discharge: Yes    Patient: Nicolasa Sparks Patient : 1955   MRN: [id number]  Reason for Admission: There are no discharge diagnoses documented for the most recent discharge. Discharge Date: 23       Spoke with: patient    Discharge department/facility: [facility] to [place]    TCM Interactive Patient Contact:  Was patient able to fill all prescriptions: NA  Was patient instructed to bring all medications to the follow-up visit: Yes  Is patient taking all medications as directed in the discharge summary?  Yes  Does patient understand their discharge instructions: Yes  Does patient have questions or concerns that need addressed prior to 7-14 day follow up office visit: no    Scheduled appointment with PCP within 7-14 days    Follow Up  Future Appointments   Date Time Provider [address] [place] [address]   2023 11:40 AM Sandy Falcon APRN - [id number] [facility]   2023 10:45 AM Sophie Baez MD Fam Med [facility]   3/11/2024 10:15 AM Digna Rawls MD N [facility] Heart [facility] - [facility] LPN
Opt out

## 2024-12-20 ENCOUNTER — PATIENT MESSAGE (OUTPATIENT)
Dept: FAMILY MEDICINE CLINIC | Age: 69
End: 2024-12-20

## 2024-12-20 DIAGNOSIS — F33.1 MODERATE EPISODE OF RECURRENT MAJOR DEPRESSIVE DISORDER (HCC): Primary | ICD-10-CM

## 2024-12-20 RX ORDER — SERTRALINE HYDROCHLORIDE 100 MG/1
100 TABLET, FILM COATED ORAL DAILY
Qty: 90 TABLET | Refills: 0 | Status: SHIPPED | OUTPATIENT
Start: 2024-12-20

## 2024-12-26 DIAGNOSIS — F33.1 MODERATE EPISODE OF RECURRENT MAJOR DEPRESSIVE DISORDER (HCC): ICD-10-CM

## 2024-12-26 RX ORDER — SERTRALINE HYDROCHLORIDE 100 MG/1
100 TABLET, FILM COATED ORAL DAILY
Qty: 90 TABLET | Refills: 0 | Status: SHIPPED | OUTPATIENT
Start: 2024-12-26

## 2024-12-26 NOTE — TELEPHONE ENCOUNTER
Last appointment this department: 9/5/2024  Next appointment this department: Visit date not found    Patient's Zoloft was originally sent to [facility]. He now uses [facility]. Can you resend please?

## 2025-01-13 ENCOUNTER — PATIENT MESSAGE (OUTPATIENT)
Dept: FAMILY MEDICINE CLINIC | Age: 70
End: 2025-01-13

## 2025-01-13 DIAGNOSIS — F33.1 MODERATE EPISODE OF RECURRENT MAJOR DEPRESSIVE DISORDER (HCC): ICD-10-CM

## 2025-01-14 RX ORDER — SERTRALINE HYDROCHLORIDE 100 MG/1
100 TABLET, FILM COATED ORAL DAILY
Qty: 90 TABLET | Refills: 0 | Status: SHIPPED | OUTPATIENT
Start: 2025-01-14

## 2025-01-14 NOTE — TELEPHONE ENCOUNTER
Patient called 12/26 requesting medication be sent to [facility], we sent it to his mail order pharmacy.   Please resend.

## 2025-02-11 ENCOUNTER — PATIENT MESSAGE (OUTPATIENT)
Dept: FAMILY MEDICINE CLINIC | Age: 70
End: 2025-02-11

## 2025-02-11 DIAGNOSIS — F33.1 MODERATE EPISODE OF RECURRENT MAJOR DEPRESSIVE DISORDER (HCC): ICD-10-CM

## 2025-02-11 DIAGNOSIS — K21.9 GASTROESOPHAGEAL REFLUX DISEASE WITHOUT ESOPHAGITIS: ICD-10-CM

## 2025-02-11 RX ORDER — OMEPRAZOLE 40 MG/1
40 CAPSULE, DELAYED RELEASE ORAL DAILY
Qty: 90 CAPSULE | Refills: 3 | Status: SHIPPED | OUTPATIENT
Start: 2025-02-11

## 2025-02-11 RX ORDER — ASPIRIN 81 MG/1
81 TABLET, CHEWABLE ORAL DAILY
Qty: 90 TABLET | Refills: 3 | Status: SHIPPED | OUTPATIENT
Start: 2025-02-11

## 2025-02-11 RX ORDER — SERTRALINE HYDROCHLORIDE 100 MG/1
100 TABLET, FILM COATED ORAL DAILY
Qty: 90 TABLET | Refills: 3 | Status: SHIPPED | OUTPATIENT
Start: 2025-02-11

## 2025-02-11 NOTE — TELEPHONE ENCOUNTER
Joel Bermudez called requesting a refill on the following medications:  Requested Prescriptions     Pending Prescriptions Disp Refills    sertraline (ZOLOFT) 100 MG tablet 90 tablet 0     Sig: Take 1 tablet by mouth daily    aspirin 81 MG chewable tablet 30 tablet 3     Sig: Take 1 tablet by mouth daily    omeprazole (PRILOSEC) 40 MG delayed release capsule 90 capsule 3     Sig: Take 1 capsule by mouth daily       Date of last visit: 9/5/2024  Date of next visit (if applicable):Visit date not found  Date of last refill: needs sent to new pharmacy   Pharmacy Name: [facility] delivery       Thanks,  Alicia Tan LPN

## 2025-02-12 ENCOUNTER — HOSPITAL ENCOUNTER (OUTPATIENT)
Age: 70
Discharge: HOME | End: 2025-02-12
Payer: MEDICARE

## 2025-02-12 LAB
BASOPHILS ABSOLUTE: 0 THOU/MM3 (ref 0–0.1)
BASOPHILS NFR BLD AUTO: 0.7 %
DEPRECATED RDW RBC AUTO: 47.8 FL (ref 35–45)
EOSINOPHIL NFR BLD AUTO: 4.1 %
EOSINOPHILS ABSOLUTE: 0.3 THOU/MM3 (ref 0–0.4)
ERYTHROCYTE [DISTWIDTH] IN BLOOD BY AUTOMATED COUNT: 13.2 % (ref 11.5–14.5)
HCT VFR BLD AUTO: 49.8 % (ref 42–52)
HGB BLD-MCNC: 16 GM/DL (ref 14–18)
IMM GRANULOCYTES # BLD AUTO: 0.03 THOU/MM3 (ref 0–0.07)
IMM GRANULOCYTES NFR BLD AUTO: 0.4 %
LYMPHOCYTES ABSOLUTE: 2.1 THOU/MM3 (ref 1–4.8)
LYMPHOCYTES NFR BLD AUTO: 31 %
MCH RBC QN AUTO: 31.1 PG (ref 26–33)
MCHC RBC AUTO-ENTMCNC: 32.1 GM/DL (ref 32.2–35.5)
MCV RBC AUTO: 96.7 FL (ref 80–94)
MONOCYTES ABSOLUTE: 1 THOU/MM3 (ref 0.4–1.3)
MONOCYTES NFR BLD AUTO: 14.8 %
NEUTROPHILS ABSOLUTE: 3.3 THOU/MM3 (ref 1.8–7.7)
NEUTROPHILS NFR BLD AUTO: 49 %
NRBC BLD AUTO-RTO: 0 /100 WBC
PLATELET # BLD AUTO: 347 THOU/MM3 (ref 130–400)
PMV BLD AUTO: 10.5 FL (ref 9.4–12.4)
RBC # BLD AUTO: 5.15 MILL/MM3 (ref 4.7–6.1)
WBC # BLD AUTO: 6.8 THOU/MM3 (ref 4.8–10.8)

## 2025-02-12 PROCEDURE — 85025 COMPLETE CBC W/AUTO DIFF WBC: CPT

## 2025-02-12 PROCEDURE — 80164 ASSAY DIPROPYLACETIC ACD TOT: CPT

## 2025-02-12 PROCEDURE — 80175 DRUG SCREEN QUAN LAMOTRIGINE: CPT

## 2025-02-12 PROCEDURE — 84450 TRANSFERASE (AST) (SGOT): CPT

## 2025-02-12 PROCEDURE — 36415 COLL VENOUS BLD VENIPUNCTURE: CPT

## 2025-02-12 PROCEDURE — 84460 ALANINE AMINO (ALT) (SGPT): CPT

## 2025-02-13 LAB
ALT SERPL W/O P-5'-P-CCNC: 25 U/L (ref 11–66)
AST SERPL-CCNC: 24 U/L (ref 5–40)
VALPROATE SERPL-MCNC: 20.7 UG/ML (ref 50–100)

## 2025-02-14 LAB — LAMOTRIGINE SERPL-MCNC: 10.3 UG/ML (ref 3–15)

## 2025-02-18 DIAGNOSIS — K21.9 GASTROESOPHAGEAL REFLUX DISEASE WITHOUT ESOPHAGITIS: ICD-10-CM

## 2025-02-18 RX ORDER — OMEPRAZOLE 40 MG/1
40 CAPSULE, DELAYED RELEASE ORAL DAILY
Qty: 30 CAPSULE | Refills: 0 | Status: SHIPPED | OUTPATIENT
Start: 2025-02-18

## 2025-02-18 NOTE — TELEPHONE ENCOUNTER
Patient needs short term supply of omeprazole sent to [facility], as mail order pharmacy did not process refill right away. Patient states he is now out of medication.     This medication refill is regarding a telephone request.  Refill requested by patient.    Requested Prescriptions     Pending Prescriptions Disp Refills    omeprazole (PRILOSEC) 40 MG delayed release capsule 30 capsule 0     Sig: Take 1 capsule by mouth daily       Date of last visit: 9/5/2024  Date of next visit: Visit date not found    Pharmacy Name: [facility]

## 2025-03-10 NOTE — CARE COORDINATION
Attempted to reach patient for continued Care Coordination follow up and education re: the management of his CHF and healthcare needs. Patient was unavailable at the time of ACM call, and generic voicemail message was left asking patient to please return call to [employer] direct number. Patient remains current with [facility] and recent readings reviewed: [id number]. RPM readings have remained stable and will consider discharge from [facility] in the near future. Will continue to work to f/u with patient in the future. Cardiology Office Note    Patient: Burt Webb   MRN: [id number]  : 1952     Reason For Consult:   Chief Complaint   Patient presents with    Consultation     Pt states no pain in chest. Pt states SOB with exertion. Pt states no Swelling in legs.        Service Requesting Consult: Juan Carlin DO   Primary Care Provider: Juan Carlin DO     HPI:   Burt Webb is a 72 year old male with past medical hx significant for tobacco use, HTN, PVCs presenting to [facility] care.     The patient was not sure why an echo was ordered.    He does have HTN. He takes metoprolol for many years.  He notes no CP. But notes WEAVER when he does physical activities.  He thinks this is chronic and stable.    No heart palpitations although he has PVCs on his EKGs in the past. No dizziness or syncope.    No CHF symptoms.  No prior MI or CVA. No DM.    He has tried to quit tobacco use but is unable to do so.  He drinks vodka shots/cranberry juice 2-3 per day.    Echo was done showing EF 43%.  PCP started him on lisinopril - he has not picked it up yet.    He reports angioedema with aspirin in the past-- head and neck swelling and throat closing up.    PAST MEDICAL HISTORY:  Past Medical History:   Diagnosis Date    Rheumatoid arthritis (CMD)         PAST SURGICAL HISTORY:   Past Surgical History:   Procedure Laterality Date    No past surgeries          SOCIAL HISTORY:   Social History     Socioeconomic History    Marital status:      Spouse name: Not on file    Number of children: Not on file    Years of education: Not on file    Highest education level: Not on file   Occupational History    Not on file   Tobacco Use    Smoking status: Every Day     Current packs/day: 1.00     Types: Cigarettes    Smokeless tobacco: Never   Vaping Use    Vaping status: never used   Substance and Sexual Activity    Alcohol use: Yes     Alcohol/week: 14.0 standard drinks of alcohol     Types: 14 Standard drinks or equivalent per week     Drug use: Never    Sexual activity: Not on file   Other Topics Concern    Not on file   Social History Narrative    Not on file     Social Determinants of Health     Financial Resource Strain: Not on file   Food Insecurity: Not on file   Transportation Needs: Not on file   Physical Activity: Not on file   Stress: Not on file   Social Connections: Not on file   Interpersonal Safety: Not on file        EtOH: social/none  Tobacco: none  Drugs: none  Diet/Exercise: not regularly  Employment:  Living Situation:    FAMILY HISTORY:   Family History   Problem Relation Age of Onset    Patient is unaware of any medical problems Mother     Cancer Father     Diabetes Maternal Grandfather     Dementia/Alzheimers Maternal Grandfather         MEDICATIONS:   Current Outpatient Medications   Medication Sig Dispense Refill    MAGNESIUM OXIDE PO       cyanocobalamin (Vitamin B-12) 250 MCG tablet Take 250 mcg by mouth daily.      ZINC SULFATE PO       cholecalciferol (VITAMIN D) 1.25 mg(50,000 units) capsule       lisinopril (ZESTRIL) 10 MG tablet Take 1 tablet by mouth daily. (Patient not taking: Reported on 3/10/2025) 30 tablet 1    fluticasone (FLONASE) 50 MCG/ACT nasal spray SPRAY 2 SPRAYS INTO EACH NOSTRIL EVERY DAY 48 mL 1    budesonide-formoterol (SYMBICORT) 160-4.5 MCG/ACT inhaler Inhale 2 puffs into the lungs in the morning and 2 puffs in the evening. Rinse mouth after use. 10.2 g 1    rosuvastatin (CRESTOR) 10 MG tablet Take 1 tablet by mouth daily. (Patient not taking: Reported on 3/10/2025) 90 tablet 3    metoPROLOL succinate (TOPROL-XL) 25 MG 24 hr tablet Take 25 mg by mouth daily.      albuterol 108 (90 Base) MCG/ACT inhaler Inhale 2 puffs into the lungs every 4 hours as needed for Shortness of Breath or Wheezing. 1 each 1     No current facility-administered medications for this visit.        ALLERGIES:   ALLERGIES:   Allergen Reactions    Aspirin ANAPHYLAXIS    Meloxicam Other (See Comments)        REVIEW OF SYSTEMS:    Gen:  Denies fevers/chills, weakness, fatigue, weight gain/loss  Skin: No rash  HEENT:  Denies vision or hearing changes  Resp:  Denies cough, SOB, wheezing  CV:  See HPI  GI:  Denies n/v.Normal bowel movement  :  Denies dysuria  Musc/Skel:  Denies joint pain/swelling, muscle weakness  Endo:  Blood sugar OK  Neuro/Psych:  Denies severe HA. Mood OK.    OBJECTIVE    Vitals:    03/10/25 1117   BP: (!) 140/80   BP Location: LUE - Left upper extremity   Patient Position: Sitting   Cuff Size: Regular   Pulse: 64   Weight: 75.8 kg (167 lb)   Height: 5' 4\" (1.626 m)        Physical Exam     Gen: A/Ox3 in NAD  HEENT: AC/AT  Neck: Supple  CV: RRR with irreg ectopic beats, Normal S1/S2  Pulm: CTAB  Abd: Soft, non-tender  Ext: No edema, 2+pulses    Neuro: non-focal    LABS:   Lab Results   Component Value Date    WBC 8.8 01/27/2025    RBC 5.46 01/27/2025    HGB 16.9 01/27/2025    HCT 51.0 01/27/2025    MCV 93.4 01/27/2025    MCH 31.0 01/27/2025    MCHC 33.1 01/27/2025     01/27/2025     Lab Results   Component Value Date    CO2 25 01/27/2025    BUN 19 01/27/2025    AST 17 01/27/2025     Lab Results   Component Value Date    CHOLESTEROL 196 01/27/2025    CALCLDL 96 01/27/2025    HDL 38 (L) 01/27/2025    TRIGLYCERIDE 312 (H) 01/27/2025    NONHDL 158 01/27/2025    CHOHDL 5.2 (H) 01/27/2025     Lab Results   Component Value Date    PT 12.2 (H) 08/02/2024    INR 1.2 08/02/2024           IMAGING:     I personally visualized and interpreted the following images today        Echo: 1. Left ventricle: The cavity size is normal. Wall thickness is normal.     Systolic function is mildly reduced. The ejection fraction was measured by     biplane method of disks. Doppler parameters are consistent with abnormal     left ventricular relaxation (grade 1 diastolic dysfunction). The ejection     fraction is 43%.  2. Regional wall motion: There is hypokinesis of the basal inferoseptal and     basal-mid inferior walls.  3. Mitral valve:  There is mild regurgitation.  4. Right ventricle: The cavity size is normal. Wall thickness is normal.     Systolic function is normal.           ECG:NSR non-specific ST/T and frequent PVCs        ASSESSMENT AND PLAN:      New cardiomyopathy - no symptoms of CHF.  NYHA I-II. Euvolemic on exam.  He has WEAVER. Etiology of cardiomyopathy includes possible CAD (he has significant CV risk factors), arrhythmias (from PVCs).  Recommend cardiac catheterization to further assess.  Then may consider doing holter to assess PVCs burden.  Will increase metoprolol from 25mg to 50mg. I will discuss with my interventional partner about options given aspirin allergy.  Patient does not wish to pursue inpatient aspirin desensitization.  Agree with adding ACEi.  Will need BMP checks after.  HTN - metoprolol and adding ACEi by PCP.   PVCs - increase metoprolol.  Will consider monitor to assess burden.  Will do ischemic eval first.     ADDENDUM  Discussed with Dr. Mitchell - plan for cardiac cath.  Will do left heart cath - diagnostic only to eval for CAD.  Will also do right heart cath for dyspnea to check PA pressure given smoking history.          Follow up in: in 1 month post cath    Visit Duration: I attest that I spent 60 total minutes for this patient's encounter.  This total time included the following components:   Review of prior records, Reviewing patient's chart, Reviewing results, Obtaining a medical history, Performing a physical examination, Counseling patient, family member or caregiver, Documenting clinical information in the EHR and Independently interpret results      The 21st Century Cures Act makes medical notes like these available to patients in the interest of transparency. However, be advised this is a medical document. It is intended as peer to peer communication. It is written in medical language and may contain abbreviations, jargon, and other verbiage that could be misleading or confusing to lay persons. It may  appear blunt or direct. Medical documents are intended to carry relevant information, facts as evident, and the clinical opinion of the physician.      Thank you for the consultation.  I will be sending a letter upon completion of this note to you as the referring provider.  Please do not hesitate to contact me with any questions or concerns.    Annamarie Ren MD, [facility], [facility]

## 2025-03-17 ENCOUNTER — OFFICE VISIT (OUTPATIENT)
Dept: CARDIOLOGY CLINIC | Age: 70
End: 2025-03-17
Payer: MEDICARE

## 2025-03-17 VITALS
HEIGHT: 68 IN | HEART RATE: 64 BPM | WEIGHT: 218.4 LBS | DIASTOLIC BLOOD PRESSURE: 70 MMHG | SYSTOLIC BLOOD PRESSURE: 132 MMHG | BODY MASS INDEX: 33.1 KG/M2

## 2025-03-17 DIAGNOSIS — I10 PRIMARY HYPERTENSION: Primary | ICD-10-CM

## 2025-03-17 DIAGNOSIS — I25.119 CORONARY ARTERY DISEASE INVOLVING NATIVE CORONARY ARTERY OF NATIVE HEART WITH ANGINA PECTORIS: ICD-10-CM

## 2025-03-17 DIAGNOSIS — E78.01 FAMILIAL HYPERCHOLESTEROLEMIA: ICD-10-CM

## 2025-03-17 PROCEDURE — 1123F ACP DISCUSS/DSCN MKR DOCD: CPT | Performed by: NUCLEAR MEDICINE

## 2025-03-17 PROCEDURE — 3078F DIAST BP <80 MM HG: CPT | Performed by: NUCLEAR MEDICINE

## 2025-03-17 PROCEDURE — 1159F MED LIST DOCD IN RCRD: CPT | Performed by: NUCLEAR MEDICINE

## 2025-03-17 PROCEDURE — 3075F SYST BP GE 130 - 139MM HG: CPT | Performed by: NUCLEAR MEDICINE

## 2025-03-17 PROCEDURE — 99214 OFFICE O/P EST MOD 30 MIN: CPT | Performed by: NUCLEAR MEDICINE

## 2025-03-17 NOTE — PROGRESS NOTES
Patient here for follow up.  
don't hesitate to contact me regarding any further issues related to the patient care    Orders Placed:  No orders of the defined types were placed in this encounter.      Prescribed:  No orders of the defined types were placed in this encounter.         Discussed use, benefit, and side effects of prescribed medications. All patient questions answered. Pt voicedunderstanding. Instructed to continue current medications, diet and exercise. Continue risk factor modification and medical management. Patient agreed with treatment plan. Follow up as directed.    Electronically signedby Thelma Monteiro MD on 3/17/2025 at 10:24 AM

## 2025-03-27 ENCOUNTER — OFFICE VISIT (OUTPATIENT)
Dept: FAMILY MEDICINE CLINIC | Age: 70
End: 2025-03-27

## 2025-03-27 VITALS
DIASTOLIC BLOOD PRESSURE: 90 MMHG | TEMPERATURE: 97.9 F | SYSTOLIC BLOOD PRESSURE: 140 MMHG | HEIGHT: 68 IN | RESPIRATION RATE: 18 BRPM | WEIGHT: 210.4 LBS | OXYGEN SATURATION: 97 % | BODY MASS INDEX: 31.89 KG/M2 | HEART RATE: 48 BPM

## 2025-03-27 DIAGNOSIS — G20.B2 PARKINSON'S DISEASE WITH DYSKINESIA AND FLUCTUATING MANIFESTATIONS (HCC): ICD-10-CM

## 2025-03-27 DIAGNOSIS — Z23 NEED FOR VACCINATION: ICD-10-CM

## 2025-03-27 DIAGNOSIS — K21.9 GASTROESOPHAGEAL REFLUX DISEASE WITHOUT ESOPHAGITIS: Primary | ICD-10-CM

## 2025-03-27 DIAGNOSIS — F33.1 MODERATE EPISODE OF RECURRENT MAJOR DEPRESSIVE DISORDER (HCC): ICD-10-CM

## 2025-03-27 DIAGNOSIS — G40.909 SEIZURE DISORDER (HCC): ICD-10-CM

## 2025-03-27 DIAGNOSIS — I50.22 CHRONIC SYSTOLIC (CONGESTIVE) HEART FAILURE: ICD-10-CM

## 2025-03-27 LAB
INFLUENZA VIRUS A RNA: NEGATIVE
INFLUENZA VIRUS B RNA: NEGATIVE

## 2025-03-27 RX ORDER — OMEPRAZOLE 40 MG/1
40 CAPSULE, DELAYED RELEASE ORAL DAILY
Qty: 90 CAPSULE | Refills: 3 | Status: SHIPPED | OUTPATIENT
Start: 2025-03-27

## 2025-03-27 SDOH — ECONOMIC STABILITY: FOOD INSECURITY: WITHIN THE PAST 12 MONTHS, THE FOOD YOU BOUGHT JUST DIDN'T LAST AND YOU DIDN'T HAVE MONEY TO GET MORE.: NEVER TRUE

## 2025-03-27 SDOH — ECONOMIC STABILITY: FOOD INSECURITY: WITHIN THE PAST 12 MONTHS, YOU WORRIED THAT YOUR FOOD WOULD RUN OUT BEFORE YOU GOT MONEY TO BUY MORE.: NEVER TRUE

## 2025-03-27 ASSESSMENT — PATIENT HEALTH QUESTIONNAIRE - PHQ9
3. TROUBLE FALLING OR STAYING ASLEEP: NOT AT ALL
5. POOR APPETITE OR OVEREATING: NOT AT ALL
9. THOUGHTS THAT YOU WOULD BE BETTER OFF DEAD, OR OF HURTING YOURSELF: NOT AT ALL
SUM OF ALL RESPONSES TO PHQ QUESTIONS 1-9: 2
10. IF YOU CHECKED OFF ANY PROBLEMS, HOW DIFFICULT HAVE THESE PROBLEMS MADE IT FOR YOU TO DO YOUR WORK, TAKE CARE OF THINGS AT HOME, OR GET ALONG WITH OTHER PEOPLE: NOT DIFFICULT AT ALL
2. FEELING DOWN, DEPRESSED OR HOPELESS: SEVERAL DAYS
4. FEELING TIRED OR HAVING LITTLE ENERGY: NOT AT ALL
SUM OF ALL RESPONSES TO PHQ QUESTIONS 1-9: 2
7. TROUBLE CONCENTRATING ON THINGS, SUCH AS READING THE NEWSPAPER OR WATCHING TELEVISION: SEVERAL DAYS
SUM OF ALL RESPONSES TO PHQ QUESTIONS 1-9: 2
1. LITTLE INTEREST OR PLEASURE IN DOING THINGS: NOT AT ALL
6. FEELING BAD ABOUT YOURSELF - OR THAT YOU ARE A FAILURE OR HAVE LET YOURSELF OR YOUR FAMILY DOWN: NOT AT ALL
8. MOVING OR SPEAKING SO SLOWLY THAT OTHER PEOPLE COULD HAVE NOTICED. OR THE OPPOSITE, BEING SO FIGETY OR RESTLESS THAT YOU HAVE BEEN MOVING AROUND A LOT MORE THAN USUAL: NOT AT ALL
SUM OF ALL RESPONSES TO PHQ QUESTIONS 1-9: 2

## 2025-03-27 ASSESSMENT — ENCOUNTER SYMPTOMS
VOMITING: 1
ABDOMINAL PAIN: 0
NAUSEA: 0
COUGH: 1
WHEEZING: 0
RHINORRHEA: 0
SORE THROAT: 0
CONSTIPATION: 0
DIARRHEA: 1
SHORTNESS OF BREATH: 0

## 2025-03-27 NOTE — PROGRESS NOTES
Joel Bermudez (:  1955) is a 69 y.o. male,  here for evaluation of the following chief complaint(s):  Vomiting and Pharyngitis         Assessment & Plan  1. Heartburn.  - Heartburn remains uncontrolled despite taking 40 mg of omeprazole in the morning.  - Advised to take omeprazole with dinner instead of in the morning to see if it helps alleviate symptoms that worsen when lying down.  - A refill for omeprazole will be sent to her [facility]-away pharmacy.    2. Depression.  - Reports feeling depressed due to limitations in activities but feels better after purchasing a camper.  - Encouraged to continue taking Zoloft and to spend time outdoors, as sunshine and outdoor activities can help improve mood.    3. Seizure disorder.  - No recent seizures reported.  - Continues to take Depakote and Lamictal.    4. Respiratory infection.  - Influenza screen returned negative results.  - Symptoms suggest a viral etiology, possibly norovirus, which typically resolves within 3 to 4 days.  - Advised to monitor symptoms and seek further medical attention if they persist or worsen.    5. Epistaxis.  - Nosebleeds likely due to aspirin regimen, which thins the blood.  - If nosebleeds persist beyond a few minutes, cauterization may be necessary.    6. PD/fall risk.  Follows with neuro.    - Reports increased pain and instability in the knee following knee replacement surgery.  - Prescription for a rolling walker and a shower chair will be provided to help prevent falls and improve mobility.    7.  CHF- stable- follows with cardio    8. Health maintenance.  - Due for pneumonia vaccine and will receive it today.  - Advised to get the shingles vaccine.    Results  Labs   - Influenza screen: Negative  1. Parkinson's disease with dyskinesia and fluctuating manifestations (HCC)  -     [facility]; Starting Thu 3/27/2025, Disp-1 each, R-0, Print3/27/30  2. Chronic systolic (congestive) heart failure (HCC)  -     Handicap

## 2025-05-29 DIAGNOSIS — G43.119 INTRACTABLE MIGRAINE WITH AURA WITHOUT STATUS MIGRAINOSUS: ICD-10-CM

## 2025-05-29 DIAGNOSIS — F33.1 MODERATE EPISODE OF RECURRENT MAJOR DEPRESSIVE DISORDER (HCC): ICD-10-CM

## 2025-05-29 DIAGNOSIS — I10 PRIMARY HYPERTENSION: ICD-10-CM

## 2025-05-29 DIAGNOSIS — K21.9 GASTROESOPHAGEAL REFLUX DISEASE WITHOUT ESOPHAGITIS: ICD-10-CM

## 2025-05-29 RX ORDER — OMEPRAZOLE 40 MG/1
40 CAPSULE, DELAYED RELEASE ORAL DAILY
Qty: 90 CAPSULE | Refills: 3 | Status: SHIPPED | OUTPATIENT
Start: 2025-05-29

## 2025-05-29 RX ORDER — HYDRALAZINE HYDROCHLORIDE 50 MG/1
75 TABLET, FILM COATED ORAL 3 TIMES DAILY
Qty: 135 TABLET | Refills: 2 | Status: SHIPPED | OUTPATIENT
Start: 2025-05-29

## 2025-05-29 RX ORDER — DIVALPROEX SODIUM 500 MG/1
1500 TABLET, FILM COATED, EXTENDED RELEASE ORAL DAILY
Qty: 30 TABLET | Refills: 3 | Status: SHIPPED | OUTPATIENT
Start: 2025-05-29

## 2025-05-29 RX ORDER — SERTRALINE HYDROCHLORIDE 100 MG/1
100 TABLET, FILM COATED ORAL DAILY
Qty: 90 TABLET | Refills: 3 | Status: SHIPPED | OUTPATIENT
Start: 2025-05-29

## 2025-05-29 NOTE — TELEPHONE ENCOUNTER
This medication refill is regarding a telephone request.  Refill requested by patient.    Requested Prescriptions     Pending Prescriptions Disp Refills    sertraline (ZOLOFT) 100 MG tablet 90 tablet 3     Sig: Take 1 tablet by mouth daily    omeprazole (PRILOSEC) 40 MG delayed release capsule 90 capsule 3     Sig: Take 1 capsule by mouth daily    divalproex (DEPAKOTE ER) 500 MG extended release tablet 30 tablet      Sig: Take 3 tablets by mouth daily (3 tab)    hydrALAZINE (APRESOLINE) 50 MG tablet 135 tablet 2       Date of last visit: 3/27/2025  Date of next visit: 9/10/2025  Pharmacy Name: [facility]

## 2025-06-30 ENCOUNTER — OFFICE VISIT (OUTPATIENT)
Dept: FAMILY MEDICINE CLINIC | Age: 70
End: 2025-06-30
Payer: MEDICARE

## 2025-06-30 VITALS
SYSTOLIC BLOOD PRESSURE: 128 MMHG | TEMPERATURE: 98.1 F | OXYGEN SATURATION: 96 % | BODY MASS INDEX: 31.52 KG/M2 | RESPIRATION RATE: 16 BRPM | HEART RATE: 58 BPM | DIASTOLIC BLOOD PRESSURE: 78 MMHG | HEIGHT: 68 IN | WEIGHT: 208 LBS

## 2025-06-30 DIAGNOSIS — M62.08 DIASTASIS OF RECTUS ABDOMINIS: ICD-10-CM

## 2025-06-30 DIAGNOSIS — R10.33 PERIUMBILICAL ABDOMINAL PAIN: ICD-10-CM

## 2025-06-30 DIAGNOSIS — L01.00 IMPETIGO: Primary | ICD-10-CM

## 2025-06-30 PROCEDURE — 1123F ACP DISCUSS/DSCN MKR DOCD: CPT | Performed by: NURSE PRACTITIONER

## 2025-06-30 PROCEDURE — 1159F MED LIST DOCD IN RCRD: CPT | Performed by: NURSE PRACTITIONER

## 2025-06-30 PROCEDURE — 3074F SYST BP LT 130 MM HG: CPT | Performed by: NURSE PRACTITIONER

## 2025-06-30 PROCEDURE — 99214 OFFICE O/P EST MOD 30 MIN: CPT | Performed by: NURSE PRACTITIONER

## 2025-06-30 PROCEDURE — 3078F DIAST BP <80 MM HG: CPT | Performed by: NURSE PRACTITIONER

## 2025-06-30 RX ORDER — CEPHALEXIN 500 MG/1
500 CAPSULE ORAL 3 TIMES DAILY
Qty: 30 CAPSULE | Refills: 0 | Status: SHIPPED | OUTPATIENT
Start: 2025-06-30 | End: 2025-07-10

## 2025-06-30 ASSESSMENT — ENCOUNTER SYMPTOMS
DIARRHEA: 0
CONSTIPATION: 0
ABDOMINAL PAIN: 1
VOMITING: 0
NAUSEA: 0
CHEST TIGHTNESS: 0
SHORTNESS OF BREATH: 0
SINUS PRESSURE: 0
ABDOMINAL DISTENTION: 0
COLOR CHANGE: 0
STRIDOR: 0
BACK PAIN: 0
SORE THROAT: 0
COUGH: 0
WHEEZING: 0

## 2025-07-01 ENCOUNTER — HOSPITAL ENCOUNTER (OUTPATIENT)
Age: 70
Discharge: HOME | End: 2025-07-01
Payer: MEDICARE

## 2025-07-01 DIAGNOSIS — R10.33 PERIUMBILICAL ABDOMINAL PAIN: ICD-10-CM

## 2025-07-01 LAB
ALBUMIN SERPL BCG-MCNC: 4.1 G/DL (ref 3.4–4.9)
ALP SERPL-CCNC: 71 U/L (ref 40–129)
ALT SERPL W/O P-5'-P-CCNC: 16 U/L (ref 10–50)
ANION GAP SERPL CALC-SCNC: 10 MEQ/L (ref 8–16)
AST SERPL-CCNC: 24 U/L (ref 10–50)
BASOPHILS ABSOLUTE: 0 THOU/MM3 (ref 0–0.1)
BASOPHILS NFR BLD AUTO: 0.5 %
BILIRUB SERPL-MCNC: 0.3 MG/DL (ref 0.3–1.2)
BUN SERPL-MCNC: 13 MG/DL (ref 8–23)
CALCIUM SERPL-MCNC: 8.9 MG/DL (ref 8.8–10.2)
CHLORIDE SERPL-SCNC: 105 MEQ/L (ref 98–111)
CO2 SERPL-SCNC: 25 MEQ/L (ref 22–29)
CREAT SERPL-MCNC: 1 MG/DL (ref 0.7–1.2)
DEPRECATED RDW RBC AUTO: 47.8 FL (ref 35–45)
EOSINOPHIL NFR BLD AUTO: 2.9 %
EOSINOPHILS ABSOLUTE: 0.2 THOU/MM3 (ref 0–0.4)
ERYTHROCYTE [DISTWIDTH] IN BLOOD BY AUTOMATED COUNT: 13.2 % (ref 11.5–14.5)
GFR SERPL CREATININE-BSD FRML MDRD: 82 ML/MIN/1.73M2
GLUCOSE SERPL-MCNC: 100 MG/DL (ref 74–109)
HCT VFR BLD AUTO: 48.5 % (ref 42–52)
HGB BLD-MCNC: 15.3 GM/DL (ref 14–18)
IMM GRANULOCYTES # BLD AUTO: 0.08 THOU/MM3 (ref 0–0.07)
IMM GRANULOCYTES NFR BLD AUTO: 1.3 %
LYMPHOCYTES ABSOLUTE: 2.1 THOU/MM3 (ref 1–4.8)
LYMPHOCYTES NFR BLD AUTO: 34.6 %
MCH RBC QN AUTO: 31 PG (ref 26–33)
MCHC RBC AUTO-ENTMCNC: 31.5 GM/DL (ref 32.2–35.5)
MCV RBC AUTO: 98.2 FL (ref 80–94)
MONOCYTES ABSOLUTE: 0.7 THOU/MM3 (ref 0.4–1.3)
MONOCYTES NFR BLD AUTO: 10.8 %
NEUTROPHILS ABSOLUTE: 3.1 THOU/MM3 (ref 1.8–7.7)
NEUTROPHILS NFR BLD AUTO: 49.9 %
NRBC BLD AUTO-RTO: 0 /100 WBC
PLATELET # BLD AUTO: 387 THOU/MM3 (ref 130–400)
PMV BLD AUTO: 10.4 FL (ref 9.4–12.4)
POTASSIUM SERPL-SCNC: 4.8 MEQ/L (ref 3.5–5.2)
PROT SERPL-MCNC: 7.3 G/DL (ref 6.4–8.3)
RBC # BLD AUTO: 4.94 MILL/MM3 (ref 4.7–6.1)
SODIUM SERPL-SCNC: 140 MEQ/L (ref 135–145)
WBC # BLD AUTO: 6.2 THOU/MM3 (ref 4.8–10.8)

## 2025-07-01 PROCEDURE — 85025 COMPLETE CBC W/AUTO DIFF WBC: CPT

## 2025-07-01 PROCEDURE — 80053 COMPREHEN METABOLIC PANEL: CPT

## 2025-07-01 PROCEDURE — 36415 COLL VENOUS BLD VENIPUNCTURE: CPT

## 2025-07-02 ENCOUNTER — RESULTS FOLLOW-UP (OUTPATIENT)
Dept: FAMILY MEDICINE CLINIC | Age: 70
End: 2025-07-02

## 2025-07-03 ENCOUNTER — HOSPITAL ENCOUNTER (OUTPATIENT)
Dept: CT IMAGING | Age: 70
Discharge: HOME | End: 2025-07-03
Payer: MEDICARE

## 2025-07-03 DIAGNOSIS — R10.33 PERIUMBILICAL ABDOMINAL PAIN: ICD-10-CM

## 2025-07-03 PROCEDURE — 6360000004 HC RX CONTRAST MEDICATION: Performed by: NURSE PRACTITIONER

## 2025-07-03 PROCEDURE — 74177 CT ABD & PELVIS W/CONTRAST: CPT

## 2025-07-03 RX ORDER — IOPAMIDOL 755 MG/ML
100 INJECTION, SOLUTION INTRAVASCULAR
Status: COMPLETED | OUTPATIENT
Start: 2025-07-03 | End: 2025-07-03

## 2025-07-03 RX ADMIN — IOHEXOL 50 ML: 240 INJECTION, SOLUTION INTRATHECAL; INTRAVASCULAR; INTRAVENOUS; ORAL at 14:10

## 2025-07-03 RX ADMIN — IOPAMIDOL 100 ML: 755 INJECTION, SOLUTION INTRAVENOUS at 14:09

## (undated) DEVICE — 4-PORT MANIFOLD: Brand: NEPTUNE 2

## (undated) DEVICE — GLOVE ORANGE PI 8   MSG9080

## (undated) DEVICE — CABLE NEUROSTIM EXTN 1X16 L213CM OR PRECIS SPECTR

## (undated) DEVICE — PACK PROCEDURE SURG PLAS SC MIN SRHP LF

## (undated) DEVICE — SUTURE ABSRB L45CM L36MM SZ 2-0 OS-6 VLT POLYGLACTIN 910 J710T

## (undated) DEVICE — GAUZE,SPONGE,8"X4",12PLY,XRAY,STRL,LF: Brand: MEDLINE

## (undated) DEVICE — SUTURE SOFSILK SZ 2 L30IN NONABSORBABLE WHT V-26 L37MM 1/2 CS746

## (undated) DEVICE — CATHETER DIAG 5FR L100CM LUMN ID0.047IN JR4 CRV 0 SIDE H

## (undated) DEVICE — GLOVE ORANGE PI 7 1/2   MSG9075

## (undated) DEVICE — SOLUTION IV IRRIG POUR BRL 0.9% SODIUM CHL 2F7124

## (undated) DEVICE — DRESSING TRNSPAR W5XL4.5IN FLM SHT SEMIPERMEABLE WIND

## (undated) DEVICE — GLOVE SURG SZ 65 THK91MIL LTX FREE SYN POLYISOPRENE

## (undated) DEVICE — SPONGE GZ W4XL4IN COT 12 PLY TYP VII WVN C FLD DSGN

## (undated) DEVICE — CHLORAPREP 26ML CLEAR

## (undated) DEVICE — RESERVOIR,SUCTION,100CC,SILICONE: Brand: MEDLINE

## (undated) DEVICE — GLIDESHEATH SLENDER NITINOL HYDROPHILIC COATED INTRODUCER SHEATH: Brand: GLIDESHEATH SLENDER

## (undated) DEVICE — CARBIDE MATCH HEAD

## (undated) DEVICE — GLOVE ORANGE PI 8 1/2   MSG9085

## (undated) DEVICE — BIPOLAR SEALER 23-112-1 AQM 6.0: Brand: AQUAMANTYS ®

## (undated) DEVICE — CODMAN® SURGICAL PATTIES 1" X 1" (2.54CM X 2.54CM): Brand: CODMAN®

## (undated) DEVICE — CONTAINER,SPECIMEN,PNEU TUBE,4OZ,OR STRL: Brand: MEDLINE

## (undated) DEVICE — LAPAROTOMY DRAPE WITH POUCHES: Brand: CONVERTORS

## (undated) DEVICE — PACK-MAJOR

## (undated) DEVICE — BASIC SINGLE BASIN BTC-LF: Brand: MEDLINE INDUSTRIES, INC.

## (undated) DEVICE — SYRINGE MED 10ML LUERLOCK TIP W/O SFTY DISP

## (undated) DEVICE — JCKSON TBL POSTNER NO HD REST: Brand: MEDLINE INDUSTRIES, INC.

## (undated) DEVICE — SHEET, T, LAPAROTOMY, STERILE: Brand: MEDLINE

## (undated) DEVICE — 3M™ TEGADERM™ TRANSPARENT FILM DRESSING FRAME STYLE, 1628, 6 IN X 8 IN (15 CM X 20 CM), 10/CT 8CT/CASE: Brand: 3M™ TEGADERM™

## (undated) DEVICE — DRAPE,INSTRUMENT,MAGNETIC,10X16: Brand: MEDLINE

## (undated) DEVICE — DEVICE SUT SPNL CRD STIM SYS SURG EQUIP FIXATE

## (undated) DEVICE — Device

## (undated) DEVICE — PACK PROCEDURE SURG SET UP SRMC

## (undated) DEVICE — MASTISOL ADHESIVE LIQ 2/3ML

## (undated) DEVICE — 3M™ IOBAN™ 2 ANTIMICROBIAL INCISE DRAPE 6650EZ: Brand: IOBAN™ 2

## (undated) DEVICE — SOLUTION IV 1000ML LAC RINGERS PH 6.5 INJ USP VIAFLX PLAS

## (undated) DEVICE — GOWN,SIRUS,NON REINFRCD,LARGE,SET IN SL: Brand: MEDLINE

## (undated) DEVICE — GLOVE ORANGE PI 7   MSG9070

## (undated) DEVICE — SKIN AFFIX SURG ADHESIVE 72/CS 0.55ML: Brand: MEDLINE

## (undated) DEVICE — CODMAN® SURGICAL PATTIES 1/2" X 1/2" (1.27CM X 1.27CM): Brand: CODMAN®

## (undated) DEVICE — HEAD POSITIONER, SURGICAL: Brand: DEROYAL

## (undated) DEVICE — STRIP,CLOSURE,WOUND,MEDI-STRIP,1/2X4: Brand: MEDLINE

## (undated) DEVICE — TUBING, SUCTION, 1/4" X 12', STRAIGHT: Brand: MEDLINE

## (undated) DEVICE — ROYAL SILK SURGICAL GOWN, XXL: Brand: CONVERTORS

## (undated) DEVICE — SUTURE VCRL SZ 1 L18IN ABSRB VLT CTX L48MM 1/2 CIR J765D

## (undated) DEVICE — DEVICE CLOSURE SKIN SURG

## (undated) DEVICE — SYRINGE IRRIG 60ML SFT PLIABLE BLB EZ TO GRP 1 HND USE W/

## (undated) DEVICE — SUTURE VCRL SZ 3-0 L27IN ABSRB UD L26MM SH 1/2 CIR J416H

## (undated) DEVICE — C-ARMOR C-ARM EQUIPMENT COVERS CLEAR STERILE UNIVERSAL FIT 12 PER CASE: Brand: C-ARMOR

## (undated) DEVICE — KIT CHARGING CHRG BASE STN PWR SUPL CHRG BELT PRECIS SPECTR

## (undated) DEVICE — TUNNELER NEUROSTIMULATOR L28CM FOR SPNL CRD STIM SYS

## (undated) DEVICE — BAND COMPR L24CM REG CLR PLAS HEMSTAT EXT HK AND LOOP RETEN

## (undated) DEVICE — PAD,NON-ADHERENT,3X8,STERILE,LF,1/PK: Brand: MEDLINE

## (undated) DEVICE — TRAY NRV BLK PARACERVICAL PUDEN W/ 10ML CTRL SYR

## (undated) DEVICE — 4.0MM PRECISION ROUND

## (undated) DEVICE — TUBING, SUCTION, 1/4" X 20', STRAIGHT: Brand: MEDLINE INDUSTRIES, INC.

## (undated) DEVICE — 3M™ BAIR HUGGER® MULTI ACCESS BLANKET, PEDIATRIC, FULL BODY, 10 PER CASE 31000: Brand: BAIR HUGGER™

## (undated) DEVICE — GOWN,SIRUS,NONRNF,SETINSLV,XL,20/CS: Brand: MEDLINE

## (undated) DEVICE — GOWN,AURORA,NON-REINFORCED,2XL: Brand: MEDLINE

## (undated) DEVICE — PACK,UNIVERSAL,NO GOWNS: Brand: MEDLINE

## (undated) DEVICE — ELEVATOR NEUROSTIMULATOR SPNL PASS FOR SPNL CRD STIM SYS

## (undated) DEVICE — PATIENT RETURN ELECTRODE, SINGLE-USE, CONTACT QUALITY MONITORING, ADULT, WITH 9FT CORD, FOR PATIENTS WEIGING OVER 33LBS. (15KG): Brand: MEGADYNE

## (undated) DEVICE — YANKAUER,BULB TIP,W/O VENT,RIGID,STERILE: Brand: MEDLINE

## (undated) DEVICE — GUIDEWIRE VASC L260CM DIA0.035IN RAD 3MM J TIP L7CM PTFE

## (undated) DEVICE — SUTURE VCRL SZ 0 L27IN ABSRB UD L36MM CT-1 1/2 CIR J260H

## (undated) DEVICE — INTEGUSEAL MICROBIAL SEALANT: Brand: AVANOS

## (undated) DEVICE — DRESSING TRNSPAR W4XL10IN FLM MIC POR SURESITE 123

## (undated) DEVICE — 3M™ TRANSPORE™ WHITE SURGICAL TAPE 1534-1, 1 INCH X 10 YARD (2,5CM X 9,1M), 12 ROLLS/CARTON 10 CARTONS/CASE: Brand: 3M™ TRANSPORE™

## (undated) DEVICE — TOTAL TRAY, 16FR 10ML SIL FOLEY, URN: Brand: MEDLINE

## (undated) DEVICE — GLOVE SURG SZ 85 L12IN FNGR THK13MIL BRN LTX SYN POLYMER W

## (undated) DEVICE — BLADE LARYNSCP SZ 4 ENH DIR INTUB GLIDESCOPE MCGRATH MAC

## (undated) DEVICE — DRAIN SURG FLAT W7MMXL20CM FULL PERF

## (undated) DEVICE — SUTURE ABSORBABLE BRAIDED 2-0 CT-1 27 IN UD VICRYL J259H

## (undated) DEVICE — LINER SUCT CANSTR 1500CC SEMI RIG W/ POR HYDROPHOBIC SHUT

## (undated) DEVICE — DRAIN SURG 10FR PVC TB W/ TRCR MID PERF NO RESVR HUBLESS

## (undated) DEVICE — CATHETER DIAG 5FR L100CM LUMN ID0.047IN JL3.5 CRV 0 SIDE H

## (undated) DEVICE — DRESSING TRNSPAR W8XL12IN FLM SURESITE 123

## (undated) DEVICE — PENCIL SMK EVAC ALL IN 1 DSGN ENH VISIBILITY IMPROVED AIR

## (undated) DEVICE — SUTURE VCRL SZ 2-0 L27IN ABSRB VLT L26MM UR-6 5/8 CIR J602H

## (undated) DEVICE — TAPE,CLOTH/SILK,CURAD,3"X10YD,LF,40/CS: Brand: CURAD

## (undated) DEVICE — TOTAL TRAY, DB, 100% SILI FOLEY, 16FR 10: Brand: MEDLINE

## (undated) DEVICE — INTENDED FOR TISSUE SEPARATION, AND OTHER PROCEDURES THAT REQUIRE A SHARP SURGICAL BLADE TO PUNCTURE OR CUT.: Brand: BARD-PARKER ® CARBON RIB-BACK BLADES

## (undated) DEVICE — SOLUTION IV 1000ML 0.9% SOD CHL PH 5 INJ USP VIAFLX PLAS

## (undated) DEVICE — SPONGE LAP W18XL18IN WHT COT 4 PLY FLD STRUNG RADPQ DISP ST

## (undated) DEVICE — BLANKET WRM W29.9XL79.1IN UP BODY FORC AIR MISTRAL-AIR

## (undated) DEVICE — KIT EVAC 400CC PVC RADPQ Y CONN

## (undated) DEVICE — GAUZE,SPONGE,4"X4",12PLY,STERILE,LF,2'S: Brand: MEDLINE

## (undated) DEVICE — AGENT HEMOSTATIC SURGIFLOW MATRIX KIT W/THROMBIN

## (undated) DEVICE — 1010 S-DRAPE TOWEL DRAPE 10/BX: Brand: STERI-DRAPE™

## (undated) DEVICE — SUTURE VCRL SZ 4-0 L27IN ABSRB UD L19MM FS-2 3/8 CIR REV J422H

## (undated) DEVICE — BLADE LARYNSCP SZ 3 ENH DIR INTUB GLIDESCOPE MCGRATH MAC

## (undated) DEVICE — FIXATE™ SUTURING DEVICE: Brand: FIXATE™ SUTURING DEVICE

## (undated) DEVICE — NEEDLE SPNL 22GA L3.5IN BLK HUB S STL REG WALL FIT STYL W/

## (undated) DEVICE — WRENCH SURG L76CM SPNL CRD HEX STIM SYS SURG EQUIP PRECIS

## (undated) DEVICE — DRAPE C ARM W36XL30IN RECTANG BND BG AND TAPE

## (undated) DEVICE — REMOTE CONTROL KIT: Brand: FREELINK™